# Patient Record
Sex: MALE | Race: OTHER | HISPANIC OR LATINO | Employment: OTHER | URBAN - METROPOLITAN AREA
[De-identification: names, ages, dates, MRNs, and addresses within clinical notes are randomized per-mention and may not be internally consistent; named-entity substitution may affect disease eponyms.]

---

## 2017-08-19 ENCOUNTER — APPOINTMENT (OUTPATIENT)
Dept: LAB | Facility: HOSPITAL | Age: 67
End: 2017-08-19
Attending: OTOLARYNGOLOGY
Payer: MEDICARE

## 2017-08-19 ENCOUNTER — TRANSCRIBE ORDERS (OUTPATIENT)
Dept: RADIOLOGY | Facility: HOSPITAL | Age: 67
End: 2017-08-19

## 2017-08-19 ENCOUNTER — OFFICE VISIT (OUTPATIENT)
Dept: LAB | Facility: HOSPITAL | Age: 67
End: 2017-08-19
Attending: OTOLARYNGOLOGY
Payer: MEDICARE

## 2017-08-19 DIAGNOSIS — R22.0 INTRACRANIAL SWELLING: ICD-10-CM

## 2017-08-19 DIAGNOSIS — R22.0 INTRACRANIAL SWELLING: Primary | ICD-10-CM

## 2017-08-19 LAB
ANION GAP SERPL CALCULATED.3IONS-SCNC: 10 MMOL/L (ref 4–13)
BASOPHILS # BLD AUTO: 0.04 THOUSANDS/ΜL (ref 0–0.1)
BASOPHILS NFR BLD AUTO: 1 % (ref 0–1)
BUN SERPL-MCNC: 13 MG/DL (ref 5–25)
CALCIUM SERPL-MCNC: 9.6 MG/DL (ref 8.3–10.1)
CHLORIDE SERPL-SCNC: 106 MMOL/L (ref 100–108)
CO2 SERPL-SCNC: 26 MMOL/L (ref 21–32)
CREAT SERPL-MCNC: 0.82 MG/DL (ref 0.6–1.3)
EOSINOPHIL # BLD AUTO: 0.11 THOUSAND/ΜL (ref 0–0.61)
EOSINOPHIL NFR BLD AUTO: 3 % (ref 0–6)
ERYTHROCYTE [DISTWIDTH] IN BLOOD BY AUTOMATED COUNT: 14.3 % (ref 11.6–15.1)
GFR SERPL CREATININE-BSD FRML MDRD: 91 ML/MIN/1.73SQ M
GLUCOSE SERPL-MCNC: 98 MG/DL (ref 65–140)
HCT VFR BLD AUTO: 38.2 % (ref 36.5–49.3)
HGB BLD-MCNC: 12.8 G/DL (ref 12–17)
LYMPHOCYTES # BLD AUTO: 1.01 THOUSANDS/ΜL (ref 0.6–4.47)
LYMPHOCYTES NFR BLD AUTO: 25 % (ref 14–44)
MCH RBC QN AUTO: 32 PG (ref 26.8–34.3)
MCHC RBC AUTO-ENTMCNC: 33.5 G/DL (ref 31.4–37.4)
MCV RBC AUTO: 96 FL (ref 82–98)
MONOCYTES # BLD AUTO: 0.38 THOUSAND/ΜL (ref 0.17–1.22)
MONOCYTES NFR BLD AUTO: 9 % (ref 4–12)
NEUTROPHILS # BLD AUTO: 2.57 THOUSANDS/ΜL (ref 1.85–7.62)
NEUTS SEG NFR BLD AUTO: 62 % (ref 43–75)
NRBC BLD AUTO-RTO: 0 /100 WBCS
PLATELET # BLD AUTO: 188 THOUSANDS/UL (ref 149–390)
PMV BLD AUTO: 10.6 FL (ref 8.9–12.7)
POTASSIUM SERPL-SCNC: 4.3 MMOL/L (ref 3.5–5.3)
RBC # BLD AUTO: 4 MILLION/UL (ref 3.88–5.62)
SODIUM SERPL-SCNC: 142 MMOL/L (ref 136–145)
WBC # BLD AUTO: 4.12 THOUSAND/UL (ref 4.31–10.16)

## 2017-08-19 PROCEDURE — 36415 COLL VENOUS BLD VENIPUNCTURE: CPT

## 2017-08-19 PROCEDURE — 80048 BASIC METABOLIC PNL TOTAL CA: CPT

## 2017-08-19 PROCEDURE — 93005 ELECTROCARDIOGRAM TRACING: CPT

## 2017-08-19 PROCEDURE — 85025 COMPLETE CBC W/AUTO DIFF WBC: CPT

## 2017-08-21 LAB
ATRIAL RATE: 57 BPM
P AXIS: 75 DEGREES
PR INTERVAL: 152 MS
QRS AXIS: -10 DEGREES
QRSD INTERVAL: 74 MS
QT INTERVAL: 398 MS
QTC INTERVAL: 387 MS
T WAVE AXIS: 53 DEGREES
VENTRICULAR RATE: 57 BPM

## 2017-08-22 ENCOUNTER — ANESTHESIA EVENT (OUTPATIENT)
Dept: PERIOP | Facility: HOSPITAL | Age: 67
End: 2017-08-22
Payer: MEDICARE

## 2017-08-24 ENCOUNTER — HOSPITAL ENCOUNTER (OUTPATIENT)
Facility: HOSPITAL | Age: 67
Setting detail: OUTPATIENT SURGERY
Discharge: HOME | End: 2017-08-24
Attending: OTOLARYNGOLOGY | Admitting: OTOLARYNGOLOGY
Payer: MEDICARE

## 2017-08-24 ENCOUNTER — ANESTHESIA (OUTPATIENT)
Dept: PERIOP | Facility: HOSPITAL | Age: 67
End: 2017-08-24
Payer: MEDICARE

## 2017-08-24 VITALS
RESPIRATION RATE: 16 BRPM | WEIGHT: 117 LBS | SYSTOLIC BLOOD PRESSURE: 183 MMHG | HEIGHT: 65 IN | BODY MASS INDEX: 19.49 KG/M2 | HEART RATE: 53 BPM | OXYGEN SATURATION: 96 % | TEMPERATURE: 97.5 F | DIASTOLIC BLOOD PRESSURE: 84 MMHG

## 2017-08-24 DIAGNOSIS — R22.0 LOCALIZED SWELLING, MASS, AND LUMP OF HEAD: ICD-10-CM

## 2017-08-24 PROCEDURE — 88342 IMHCHEM/IMCYTCHM 1ST ANTB: CPT | Performed by: OTOLARYNGOLOGY

## 2017-08-24 PROCEDURE — 88312 SPECIAL STAINS GROUP 1: CPT | Performed by: OTOLARYNGOLOGY

## 2017-08-24 PROCEDURE — 88305 TISSUE EXAM BY PATHOLOGIST: CPT | Performed by: OTOLARYNGOLOGY

## 2017-08-24 RX ORDER — PROPOFOL 10 MG/ML
INJECTION, EMULSION INTRAVENOUS AS NEEDED
Status: DISCONTINUED | OUTPATIENT
Start: 2017-08-24 | End: 2017-08-24 | Stop reason: SURG

## 2017-08-24 RX ORDER — FENTANYL CITRATE 50 UG/ML
INJECTION, SOLUTION INTRAMUSCULAR; INTRAVENOUS AS NEEDED
Status: DISCONTINUED | OUTPATIENT
Start: 2017-08-24 | End: 2017-08-24 | Stop reason: SURG

## 2017-08-24 RX ORDER — LIDOCAINE HYDROCHLORIDE 10 MG/ML
INJECTION, SOLUTION INFILTRATION; PERINEURAL AS NEEDED
Status: DISCONTINUED | OUTPATIENT
Start: 2017-08-24 | End: 2017-08-24 | Stop reason: SURG

## 2017-08-24 RX ORDER — FENTANYL CITRATE/PF 50 MCG/ML
50 SYRINGE (ML) INJECTION
Status: COMPLETED | OUTPATIENT
Start: 2017-08-24 | End: 2017-08-24

## 2017-08-24 RX ORDER — ACETAMINOPHEN 500 MG
500 TABLET ORAL EVERY 6 HOURS PRN
COMMUNITY
End: 2019-11-24 | Stop reason: HOSPADM

## 2017-08-24 RX ORDER — GLYCOPYRROLATE 0.2 MG/ML
INJECTION INTRAMUSCULAR; INTRAVENOUS AS NEEDED
Status: DISCONTINUED | OUTPATIENT
Start: 2017-08-24 | End: 2017-08-24 | Stop reason: SURG

## 2017-08-24 RX ORDER — IBUPROFEN 600 MG/1
TABLET ORAL EVERY 6 HOURS PRN
COMMUNITY
End: 2019-11-24 | Stop reason: HOSPADM

## 2017-08-24 RX ORDER — MIDAZOLAM HYDROCHLORIDE 1 MG/ML
INJECTION INTRAMUSCULAR; INTRAVENOUS AS NEEDED
Status: DISCONTINUED | OUTPATIENT
Start: 2017-08-24 | End: 2017-08-24 | Stop reason: SURG

## 2017-08-24 RX ORDER — SODIUM CHLORIDE 9 MG/ML
125 INJECTION, SOLUTION INTRAVENOUS CONTINUOUS
Status: DISCONTINUED | OUTPATIENT
Start: 2017-08-24 | End: 2017-08-24 | Stop reason: HOSPADM

## 2017-08-24 RX ORDER — MAGNESIUM HYDROXIDE 1200 MG/15ML
LIQUID ORAL AS NEEDED
Status: DISCONTINUED | OUTPATIENT
Start: 2017-08-24 | End: 2017-08-24 | Stop reason: HOSPADM

## 2017-08-24 RX ORDER — ROCURONIUM BROMIDE 10 MG/ML
INJECTION, SOLUTION INTRAVENOUS AS NEEDED
Status: DISCONTINUED | OUTPATIENT
Start: 2017-08-24 | End: 2017-08-24 | Stop reason: SURG

## 2017-08-24 RX ORDER — ONDANSETRON 2 MG/ML
INJECTION INTRAMUSCULAR; INTRAVENOUS AS NEEDED
Status: DISCONTINUED | OUTPATIENT
Start: 2017-08-24 | End: 2017-08-24 | Stop reason: SURG

## 2017-08-24 RX ORDER — OXYCODONE HCL 5 MG/5 ML
5 SOLUTION, ORAL ORAL EVERY 4 HOURS PRN
Status: DISCONTINUED | OUTPATIENT
Start: 2017-08-24 | End: 2017-08-24 | Stop reason: HOSPADM

## 2017-08-24 RX ORDER — ONDANSETRON 2 MG/ML
4 INJECTION INTRAMUSCULAR; INTRAVENOUS ONCE AS NEEDED
Status: DISCONTINUED | OUTPATIENT
Start: 2017-08-24 | End: 2017-08-24 | Stop reason: HOSPADM

## 2017-08-24 RX ADMIN — NEOSTIGMINE METHYLSULFATE 3 MG: 1 INJECTION, SOLUTION INTRAMUSCULAR; INTRAVENOUS; SUBCUTANEOUS at 09:29

## 2017-08-24 RX ADMIN — FENTANYL CITRATE 50 MCG: 50 INJECTION, SOLUTION INTRAMUSCULAR; INTRAVENOUS at 09:21

## 2017-08-24 RX ADMIN — LIDOCAINE HYDROCHLORIDE 60 MG: 10 INJECTION, SOLUTION INFILTRATION; PERINEURAL at 08:59

## 2017-08-24 RX ADMIN — ONDANSETRON HYDROCHLORIDE 8 MG: 2 INJECTION, SOLUTION INTRAVENOUS at 09:13

## 2017-08-24 RX ADMIN — SODIUM CHLORIDE: 0.9 INJECTION, SOLUTION INTRAVENOUS at 09:46

## 2017-08-24 RX ADMIN — SODIUM CHLORIDE 125 ML/HR: 0.9 INJECTION, SOLUTION INTRAVENOUS at 07:59

## 2017-08-24 RX ADMIN — FENTANYL CITRATE 50 MCG: 50 INJECTION INTRAMUSCULAR; INTRAVENOUS at 10:21

## 2017-08-24 RX ADMIN — FENTANYL CITRATE 100 MCG: 50 INJECTION, SOLUTION INTRAMUSCULAR; INTRAVENOUS at 08:59

## 2017-08-24 RX ADMIN — FENTANYL CITRATE 50 MCG: 50 INJECTION INTRAMUSCULAR; INTRAVENOUS at 10:13

## 2017-08-24 RX ADMIN — DEXAMETHASONE SODIUM PHOSPHATE 8 MG: 10 INJECTION INTRAMUSCULAR; INTRAVENOUS at 09:13

## 2017-08-24 RX ADMIN — GLYCOPYRROLATE 0.4 MG: 0.2 INJECTION, SOLUTION INTRAMUSCULAR; INTRAVENOUS at 09:29

## 2017-08-24 RX ADMIN — ROCURONIUM BROMIDE 30 MG: 10 INJECTION, SOLUTION INTRAVENOUS at 08:59

## 2017-08-24 RX ADMIN — PROPOFOL 200 MG: 10 INJECTION, EMULSION INTRAVENOUS at 08:59

## 2017-08-24 RX ADMIN — MIDAZOLAM HYDROCHLORIDE 2 MG: 1 INJECTION, SOLUTION INTRAMUSCULAR; INTRAVENOUS at 08:53

## 2017-08-24 RX ADMIN — FENTANYL CITRATE 50 MCG: 50 INJECTION, SOLUTION INTRAMUSCULAR; INTRAVENOUS at 09:18

## 2018-08-06 ENCOUNTER — HOSPITAL ENCOUNTER (EMERGENCY)
Facility: HOSPITAL | Age: 68
Discharge: HOME | End: 2018-08-06
Attending: EMERGENCY MEDICINE
Payer: MEDICARE

## 2018-08-06 VITALS
DIASTOLIC BLOOD PRESSURE: 73 MMHG | WEIGHT: 105 LBS | SYSTOLIC BLOOD PRESSURE: 156 MMHG | OXYGEN SATURATION: 100 % | HEIGHT: 65 IN | RESPIRATION RATE: 18 BRPM | HEART RATE: 80 BPM | TEMPERATURE: 98.2 F | BODY MASS INDEX: 17.49 KG/M2

## 2018-08-06 DIAGNOSIS — L03.90 CELLULITIS: Primary | ICD-10-CM

## 2018-08-06 PROCEDURE — 99282 EMERGENCY DEPT VISIT SF MDM: CPT

## 2018-08-06 RX ORDER — MULTIVIT WITH MINERALS/LUTEIN
TABLET ORAL
COMMUNITY
Start: 2018-06-06 | End: 2019-11-24 | Stop reason: HOSPADM

## 2018-08-06 RX ORDER — CHLORHEXIDINE GLUCONATE 0.12 MG/ML
15 RINSE ORAL
COMMUNITY
Start: 2018-06-06 | End: 2019-11-24 | Stop reason: HOSPADM

## 2018-08-06 RX ORDER — BACITRACIN, NEOMYCIN, POLYMYXIN B 400; 3.5; 5 [USP'U]/G; MG/G; [USP'U]/G
1 OINTMENT TOPICAL ONCE
Status: COMPLETED | OUTPATIENT
Start: 2018-08-06 | End: 2018-08-06

## 2018-08-06 RX ORDER — CEPHALEXIN 250 MG/1
500 CAPSULE ORAL ONCE
Status: COMPLETED | OUTPATIENT
Start: 2018-08-06 | End: 2018-08-06

## 2018-08-06 RX ORDER — BACITRACIN, NEOMYCIN, POLYMYXIN B 400; 3.5; 5 [USP'U]/G; MG/G; [USP'U]/G
OINTMENT TOPICAL 2 TIMES DAILY
Qty: 15 G | Refills: 0 | Status: SHIPPED | OUTPATIENT
Start: 2018-08-06

## 2018-08-06 RX ORDER — CEPHALEXIN 500 MG/1
500 CAPSULE ORAL EVERY 6 HOURS SCHEDULED
Qty: 30 CAPSULE | Refills: 0 | Status: SHIPPED | OUTPATIENT
Start: 2018-08-06 | End: 2018-08-14

## 2018-08-06 RX ORDER — MORPHINE SULFATE 15 MG/1
TABLET, FILM COATED, EXTENDED RELEASE ORAL
COMMUNITY
Start: 2018-05-15 | End: 2019-11-24 | Stop reason: HOSPADM

## 2018-08-06 RX ORDER — MORPHINE SULFATE 15 MG/1
TABLET ORAL
COMMUNITY
Start: 2018-05-15 | End: 2019-11-24 | Stop reason: HOSPADM

## 2018-08-06 RX ORDER — NUTRITIONAL SUPPLEMENT 0.07 G-1.5
250 LIQUID (ML) ORAL
COMMUNITY
Start: 2018-04-13 | End: 2019-11-24 | Stop reason: HOSPADM

## 2018-08-06 RX ADMIN — CEPHALEXIN 500 MG: 250 CAPSULE ORAL at 13:30

## 2018-08-06 RX ADMIN — BACITRACIN, NEOMYCIN, POLYMYXIN B 1 SMALL APPLICATION: 400; 3.5; 5 OINTMENT TOPICAL at 13:30

## 2018-08-06 NOTE — DISCHARGE INSTRUCTIONS
Celulitis   INFORMACIÓN GENERAL:   La celulitis  es lynda infección en la piel causada por bacterias  Los siguientes son los síntomas más comunes:   · Charles Anthony    · Un área rojiza, caliente e inflamada en robert piel    · Dolor al tocar el área afectada    · Vejigas o ampollas (absceso) que podrían drenar pus    · Piel elevada y desigual que se siente sandra la cáscara de lynda naranja  Busque atención inmediata al presentar los siguientes síntomas:   · Aumento del dolor, enrojecimiento, calor y tamaño del área     · Omayra hawkins que salen del área infectada    · Lynda secreción leve, de color jeffrey-marrón que supura del área de la piel infectada    · Usted siente sonidos crepitantes bajo la piel al tocarla    · Usted tiene puntos o protuberancias color steven en robert piel o nota tawny debajo robert piel    · Lynda nueva inflamación y dolor en bernadette piernas    · Dificultad para respirar o dolor en el pecho repentinamente  El tratamiento para la celulitis  puede incluir medicamentos para tratar la infección bacteriana o disminuir el dolor  La limpieza de la infección puede se necesaria  Es posible que tengan que cortar el tejido que esté dañado, muerto o infectado para ayudar a que robert herida cicatrice  Controle bernadette síntomas:   · Eleve robert herida por encima del nivel de robert corazón  tan frecuente sandra le sea posible  [place] le ayudará a disminuir la inflamación y el dolor  [place] robert herida sobre almohadas o cobijas dobladas para mantenerla elevada y cómoda  · Limpie robert herida según indicaciones  Es probable que usted tenga que lavarse la herida con agua y [place]  Revise por signos de infección  · Use medias de presión según indicaciones  Las medias de presión son ajustadas y ejercen presión en bernadette piernas  [place] mejora el flujo de tawny y disminuye la inflamación  Prevenir la celulitis:   · Lavando bernadette bhupendra a menudo  Use agua y [place]  [employer] las bhupendra después de usar el baño, cambiar pañales o estornudar   Lávese las bhupendra antes de preparar o consumir alimentos  Use lynda loción para prevenir la piel seca y agrietada  · No comparta artículos personales,  sandra toallas, ropa y navajas de afeitar  · Limpie el equipo de ejercicio  con un detergente desinfectante antes y después de usarlo  Programe lynda pamela con reynolds proveedor de [employer] se le haya indicado: Anote bernadette preguntas para que se acuerde de hacerlas roel bernadette visitas  ACUERDOS SOBRE REYNOLDS CUIDADO:   Usted tiene el derecho de participar en la planificación de reynolds cuidado  Aprenda todo lo que pueda sobre reynolds condición y sandra darle tratamiento  Discuta con bernadette médicos bernadette opciones de tratamiento para juntos decidir el cuidado que usted quiere recibir  Usted siempre tiene el derecho a rechazar reynolds tratamiento  Esta información es sólo para uso en educación  Reynolds intención no es darle un consejo médico sobre enfermedades o tratamientos  Colsulte con reynolds [place] [employer] farmacéutico antes de seguir cualquier régimen médico para saber si es seguro y efectivo para usted  © 2014 [address] is for End User's use only and may not be sold, redistributed or otherwise used for commercial purposes  All illustrations and images included in CareNotes® are the copyrighted property of A D A M , Inc  or Adriano Mcgraw

## 2018-08-06 NOTE — ED PROVIDER NOTES
History  Chief Complaint   Patient presents with    Feeding Tube Problem     daughter states the g tube appears to have come out this morning     HPI  76-year-old male presents with complaints of irritation, pain around his gastrostomy tube site  Patient has a past history of oral pharyngeal cancer status post chemo, radiation  While being treated for that he had a gastrostomy tube placed for supplemental nutrition  Patient says he no longer requires G-tube for nutrition has been taking food and drink by mouth at this point  Patient says his ENT says he does not require the G-tube anymore  He otherwise denies fevers, chills, nausea, vomiting, diarrhea  No abdominal pain only pain is localized to the skin around the G-tube site  On exam there appears to be irritation will some erythema and induration around the site  Twelve systems reviewed otherwise negative except as stated in HPI  Impression and plan 76-year-old male presents with irritation around G-tube site  No longer requires it for nutrition  I will remove the G-tube  And place patient on Keflex cover cellulitis as well as bacitracin  I will follow up his primary doctor  Strict return precautions discussed  Prior to Admission Medications   Prescriptions Last Dose Informant Patient Reported? Taking?    Nutritional Supplements (NUTREN 1 5) LIQD   Yes Yes   Si mL   acetaminophen (TYLENOL) 500 mg tablet   Yes No   Sig: Take 500 mg by mouth every 6 (six) hours as needed for mild pain   chlorhexidine (PERIDEX) 0 12 % solution   Yes Yes   Sig: Apply 15 mL topically   hydrocortisone 2 5 % cream   Yes Yes   Sig: Apply to to affected areas 2-3 times daily   ibuprofen (MOTRIN) 600 mg tablet   Yes No   Sig: Take by mouth every 6 (six) hours as needed for mild pain   morphine (MS CONTIN) 15 mg 12 hr tablet   Yes Yes   Sig: Take by mouth   morphine (MSIR) 15 mg tablet   Yes Yes   Sig: Take by mouth   vitamin E, tocopherol, 1,000 units capsule   Yes Yes   Sig: Take by mouth      Facility-Administered Medications: None       History reviewed  No pertinent past medical history  Past Surgical History:   Procedure Laterality Date    BYPASS FEMORAL-FEMORAL Right     ESOPHAGOSCOPY N/A 8/24/2017    Procedure: ESOPHAGOSCOPY FLEXIBLE;  Surgeon: Mitchell Benton MD;  [facility];  Service: ENT    DE [address] N/A 8/24/2017    Procedure: Bronchoscopy;  Surgeon: Mitchell Benton MD;  [facility];  Service: ENT    DE LARYNGOSCOPY,DIRCT,OP,BIOPSY N/A 8/24/2017    Procedure: LARYNGOSCOPY DIRECT;  Surgeon: Mitchell Benton MD;  [facility];  Service: ENT    TOE AMPUTATION Right     2 toes       History reviewed  No pertinent family history  I have reviewed and agree with the history as documented  Social History   Substance Use Topics    Smoking status: Current Every Day Smoker     Packs/day: 1 00     Types: Cigarettes    Smokeless tobacco: Not on file      Comment: 50+ years    Alcohol use No        Review of Systems   Constitutional: Negative for activity change, appetite change, chills and fever  HENT: Negative for trouble swallowing and voice change  Eyes: Negative for photophobia  Respiratory: Negative for shortness of breath  Cardiovascular: Negative for chest pain, palpitations and leg swelling  Gastrointestinal: Negative for abdominal distention, abdominal pain, anal bleeding, blood in stool, constipation, diarrhea, nausea, rectal pain and vomiting  Endocrine: Negative for polyuria  Genitourinary: Negative for dysuria  Musculoskeletal: Negative for back pain  Skin: Positive for wound  Negative for rash  Allergic/Immunologic: Negative  Neurological: Negative for dizziness, tremors, seizures, syncope, facial asymmetry, speech difficulty, weakness, light-headedness, numbness and headaches  Psychiatric/Behavioral: Negative for agitation         Physical Exam  Physical Exam   Constitutional: He is oriented to person, place, and time  He appears well-developed and well-nourished  No distress  HENT:   Head: Normocephalic and atraumatic  Right Ear: No hemotympanum  Left Ear: No hemotympanum  Nose: No nasal septal hematoma  Mouth/Throat: Uvula is midline and oropharynx is clear and moist  No oropharyngeal exudate  Eyes: EOM are normal  Pupils are equal, round, and reactive to light  Right eye exhibits no discharge  Left eye exhibits no discharge  No scleral icterus  Neck: Normal range of motion  Neck supple  No JVD present  No tracheal deviation present  No thyromegaly present  Cardiovascular: Normal rate, regular rhythm, normal heart sounds and intact distal pulses  Exam reveals no gallop and no friction rub  No murmur heard  Pulmonary/Chest: Effort normal and breath sounds normal  No stridor  No respiratory distress  He has no wheezes  He has no rales  He exhibits no tenderness  Abdominal: Soft  Bowel sounds are normal  He exhibits no distension and no mass  There is no tenderness  There is no rebound and no guarding  No hernia  Musculoskeletal: Normal range of motion  He exhibits no edema  Lymphadenopathy:     He has no cervical adenopathy  Neurological: He is alert and oriented to person, place, and time  He has normal strength and normal reflexes  He is not disoriented  No cranial nerve deficit or sensory deficit  GCS eye subscore is 4  GCS verbal subscore is 5  GCS motor subscore is 6  Reflex Scores:       Patellar reflexes are 2+ on the right side and 2+ on the left side  Achilles reflexes are 2+ on the right side and 2+ on the left side  Cn 2-12 grossly intact  No pronator drift  Normal gait  Normal strength/sensation   Skin: Skin is warm and dry  Capillary refill takes less than 2 seconds  He is not diaphoretic  No erythema  No pallor  Erythema around G tube site  Mildly irritated  No flutucance/purulence   Mildly indurated   Psychiatric: He has a normal mood and affect  Vital Signs  ED Triage Vitals [08/06/18 1203]   Temperature Pulse Respirations Blood Pressure SpO2   98 2 °F (36 8 °C) 80 18 156/73 100 %      Temp src Heart Rate Source Patient Position - Orthostatic VS BP Location FiO2 (%)   -- -- -- -- --      Pain Score       8           Vitals:    08/06/18 1203   BP: 156/73   Pulse: 80       Visual Acuity      ED Medications  Medications   cephalexin (KEFLEX) capsule 500 mg (500 mg Oral Given 8/6/18 1330)   neomycin-bacitracin-polymyxin b (NEOSPORIN) ointment 1 small application (1 small application Topical Given 8/6/18 1330)       Diagnostic Studies  Results Reviewed     None                 No orders to display              Procedures  Procedures       Phone Contacts  ED Phone Contact    ED Course  ED Course as of Aug 09 0744   [facility] Aug 06, 2018   1325 Removed feeding tube  MDM  CritCare Time    Disposition  Final diagnoses:   Cellulitis     Time reflects when diagnosis was documented in both MDM as applicable and the Disposition within this note     Time User Action Codes Description Comment    8/6/2018  1:42 PM Glee Members T Add [L03 [id number]] Cellulitis       ED Disposition     ED Disposition Condition Comment    Discharge  [address] discharge to home/self care      Condition at discharge: Good        Follow-up Information     Follow up With Specialties Details Why Contact Info Additional Information    La Lion MD Internal Medicine In 2 days  [address] [facility] [phone]       [facility] Emergency Department Emergency Medicine  If symptoms worsen [address] ED, [address] [place] [address], [id number]          Discharge Medication List as of 8/6/2018  1:43 PM      START taking these medications    Details   cephalexin (KEFLEX) 500 mg capsule Take 1 capsule (500 mg total) by mouth every 6 (six) hours for 30 doses, Starting Mon 8/6/2018, Until Tue 8/14/2018, Print      neomycin-bacitracin-polymyxin b (NEOSPORIN) ointment Apply topically 2 (two) times a day, Starting Mon 8/6/2018, Print         CONTINUE these medications which have NOT CHANGED    Details   chlorhexidine (PERIDEX) 0 12 % solution Apply 15 mL topically, Starting Wed 6/6/2018, Historical Med      hydrocortisone 2 5 % cream Apply to to affected areas 2-3 times daily, Historical Med      morphine (MS CONTIN) 15 mg 12 hr tablet Take by mouth, Starting Tue 5/15/2018, Historical Med      morphine (MSIR) 15 mg tablet Take by mouth, Starting Tue 5/15/2018, Historical Med      Nutritional Supplements (NUTREN 1 5) LIQD 250 mL, Starting Fri 4/13/2018, Historical Med      vitamin E, tocopherol, 1,000 units capsule Take by mouth, Starting Wed 6/6/2018, Historical Med      acetaminophen (TYLENOL) 500 mg tablet Take 500 mg by mouth every 6 (six) hours as needed for mild pain, Historical Med      ibuprofen (MOTRIN) 600 mg tablet Take by mouth every 6 (six) hours as needed for mild pain, Historical Med           No discharge procedures on file      ED Provider  Electronically Signed by           Houston Ortiz MD  08/09/18 4495

## 2019-04-30 ENCOUNTER — APPOINTMENT (EMERGENCY)
Dept: RADIOLOGY | Facility: HOSPITAL | Age: 69
End: 2019-04-30
Payer: MEDICARE

## 2019-04-30 ENCOUNTER — APPOINTMENT (EMERGENCY)
Dept: ULTRASOUND IMAGING | Facility: HOSPITAL | Age: 69
End: 2019-04-30
Payer: MEDICARE

## 2019-04-30 ENCOUNTER — HOSPITAL ENCOUNTER (EMERGENCY)
Facility: HOSPITAL | Age: 69
Discharge: HOME | End: 2019-04-30
Attending: EMERGENCY MEDICINE | Admitting: EMERGENCY MEDICINE
Payer: MEDICARE

## 2019-04-30 VITALS
HEIGHT: 65 IN | BODY MASS INDEX: 19.65 KG/M2 | SYSTOLIC BLOOD PRESSURE: 199 MMHG | RESPIRATION RATE: 16 BRPM | TEMPERATURE: 97.9 F | OXYGEN SATURATION: 99 % | WEIGHT: 117.95 LBS | HEART RATE: 71 BPM | DIASTOLIC BLOOD PRESSURE: 84 MMHG

## 2019-04-30 DIAGNOSIS — R60.0 PEDAL EDEMA: Primary | ICD-10-CM

## 2019-04-30 LAB
ANION GAP SERPL CALCULATED.3IONS-SCNC: 7 MMOL/L (ref 4–13)
ATRIAL RATE: 62 BPM
BASOPHILS # BLD AUTO: 0.03 THOUSANDS/ΜL (ref 0–0.1)
BASOPHILS NFR BLD AUTO: 1 % (ref 0–1)
BUN SERPL-MCNC: 22 MG/DL (ref 5–25)
CALCIUM SERPL-MCNC: 9.4 MG/DL (ref 8.3–10.1)
CHLORIDE SERPL-SCNC: 106 MMOL/L (ref 100–108)
CO2 SERPL-SCNC: 28 MMOL/L (ref 21–32)
CREAT SERPL-MCNC: 0.97 MG/DL (ref 0.6–1.3)
EOSINOPHIL # BLD AUTO: 0.28 THOUSAND/ΜL (ref 0–0.61)
EOSINOPHIL NFR BLD AUTO: 6 % (ref 0–6)
ERYTHROCYTE [DISTWIDTH] IN BLOOD BY AUTOMATED COUNT: 14.6 % (ref 11.6–15.1)
GFR SERPL CREATININE-BSD FRML MDRD: 79 ML/MIN/1.73SQ M
GLUCOSE SERPL-MCNC: 104 MG/DL (ref 65–140)
HCT VFR BLD AUTO: 38 % (ref 36.5–49.3)
HGB BLD-MCNC: 12.5 G/DL (ref 12–17)
IMM GRANULOCYTES # BLD AUTO: 0.03 THOUSAND/UL (ref 0–0.2)
IMM GRANULOCYTES NFR BLD AUTO: 1 % (ref 0–2)
LYMPHOCYTES # BLD AUTO: 0.69 THOUSANDS/ΜL (ref 0.6–4.47)
LYMPHOCYTES NFR BLD AUTO: 16 % (ref 14–44)
MCH RBC QN AUTO: 31.7 PG (ref 26.8–34.3)
MCHC RBC AUTO-ENTMCNC: 32.9 G/DL (ref 31.4–37.4)
MCV RBC AUTO: 96 FL (ref 82–98)
MONOCYTES # BLD AUTO: 0.44 THOUSAND/ΜL (ref 0.17–1.22)
MONOCYTES NFR BLD AUTO: 10 % (ref 4–12)
NEUTROPHILS # BLD AUTO: 2.93 THOUSANDS/ΜL (ref 1.85–7.62)
NEUTS SEG NFR BLD AUTO: 66 % (ref 43–75)
NRBC BLD AUTO-RTO: 0 /100 WBCS
P AXIS: 68 DEGREES
PLATELET # BLD AUTO: 218 THOUSANDS/UL (ref 149–390)
PMV BLD AUTO: 10.2 FL (ref 8.9–12.7)
POTASSIUM SERPL-SCNC: 4.2 MMOL/L (ref 3.5–5.3)
PR INTERVAL: 180 MS
QRS AXIS: -7 DEGREES
QRSD INTERVAL: 96 MS
QT INTERVAL: 390 MS
QTC INTERVAL: 395 MS
RBC # BLD AUTO: 3.94 MILLION/UL (ref 3.88–5.62)
SODIUM SERPL-SCNC: 141 MMOL/L (ref 136–145)
T WAVE AXIS: 53 DEGREES
VENTRICULAR RATE: 62 BPM
WBC # BLD AUTO: 4.4 THOUSAND/UL (ref 4.31–10.16)

## 2019-04-30 PROCEDURE — 93971 EXTREMITY STUDY: CPT

## 2019-04-30 PROCEDURE — 93971 EXTREMITY STUDY: CPT | Performed by: SURGERY

## 2019-04-30 PROCEDURE — 93005 ELECTROCARDIOGRAM TRACING: CPT

## 2019-04-30 PROCEDURE — 36415 COLL VENOUS BLD VENIPUNCTURE: CPT | Performed by: PHYSICIAN ASSISTANT

## 2019-04-30 PROCEDURE — 93010 ELECTROCARDIOGRAM REPORT: CPT | Performed by: INTERNAL MEDICINE

## 2019-04-30 PROCEDURE — 85025 COMPLETE CBC W/AUTO DIFF WBC: CPT | Performed by: PHYSICIAN ASSISTANT

## 2019-04-30 PROCEDURE — 73630 X-RAY EXAM OF FOOT: CPT

## 2019-04-30 PROCEDURE — 99284 EMERGENCY DEPT VISIT MOD MDM: CPT

## 2019-04-30 PROCEDURE — 80048 BASIC METABOLIC PNL TOTAL CA: CPT | Performed by: PHYSICIAN ASSISTANT

## 2019-04-30 PROCEDURE — 99284 EMERGENCY DEPT VISIT MOD MDM: CPT | Performed by: PHYSICIAN ASSISTANT

## 2019-11-01 ENCOUNTER — APPOINTMENT (EMERGENCY)
Dept: RADIOLOGY | Facility: HOSPITAL | Age: 69
End: 2019-11-01
Payer: MEDICARE

## 2019-11-01 ENCOUNTER — APPOINTMENT (EMERGENCY)
Dept: ULTRASOUND IMAGING | Facility: HOSPITAL | Age: 69
End: 2019-11-01
Payer: MEDICARE

## 2019-11-01 ENCOUNTER — HOSPITAL ENCOUNTER (EMERGENCY)
Facility: HOSPITAL | Age: 69
End: 2019-11-02
Attending: EMERGENCY MEDICINE | Admitting: EMERGENCY MEDICINE
Payer: MEDICARE

## 2019-11-01 ENCOUNTER — APPOINTMENT (EMERGENCY)
Dept: CT IMAGING | Facility: HOSPITAL | Age: 69
End: 2019-11-01
Payer: MEDICARE

## 2019-11-01 DIAGNOSIS — I70.202 FEMORAL ARTERY OCCLUSION, LEFT (HCC): Primary | ICD-10-CM

## 2019-11-01 LAB
ANION GAP SERPL CALCULATED.3IONS-SCNC: 7 MMOL/L (ref 4–13)
APTT PPP: 33 SECONDS (ref 23–37)
BASOPHILS # BLD AUTO: 0.05 THOUSANDS/ΜL (ref 0–0.1)
BASOPHILS NFR BLD AUTO: 1 % (ref 0–1)
BUN SERPL-MCNC: 19 MG/DL (ref 5–25)
CALCIUM SERPL-MCNC: 9.2 MG/DL (ref 8.3–10.1)
CHLORIDE SERPL-SCNC: 109 MMOL/L (ref 100–108)
CO2 SERPL-SCNC: 30 MMOL/L (ref 21–32)
CREAT SERPL-MCNC: 0.86 MG/DL (ref 0.6–1.3)
EOSINOPHIL # BLD AUTO: 0.16 THOUSAND/ΜL (ref 0–0.61)
EOSINOPHIL NFR BLD AUTO: 4 % (ref 0–6)
ERYTHROCYTE [DISTWIDTH] IN BLOOD BY AUTOMATED COUNT: 15.1 % (ref 11.6–15.1)
ERYTHROCYTE [DISTWIDTH] IN BLOOD BY AUTOMATED COUNT: 15.2 % (ref 11.6–15.1)
GFR SERPL CREATININE-BSD FRML MDRD: 88 ML/MIN/1.73SQ M
GLUCOSE SERPL-MCNC: 85 MG/DL (ref 65–140)
HCT VFR BLD AUTO: 37.2 % (ref 36.5–49.3)
HCT VFR BLD AUTO: 40.6 % (ref 36.5–49.3)
HGB BLD-MCNC: 12.2 G/DL (ref 12–17)
HGB BLD-MCNC: 13.3 G/DL (ref 12–17)
IMM GRANULOCYTES # BLD AUTO: 0.01 THOUSAND/UL (ref 0–0.2)
IMM GRANULOCYTES NFR BLD AUTO: 0 % (ref 0–2)
INR PPP: 1.15 (ref 0.84–1.19)
LYMPHOCYTES # BLD AUTO: 0.72 THOUSANDS/ΜL (ref 0.6–4.47)
LYMPHOCYTES NFR BLD AUTO: 16 % (ref 14–44)
MCH RBC QN AUTO: 31.6 PG (ref 26.8–34.3)
MCH RBC QN AUTO: 31.9 PG (ref 26.8–34.3)
MCHC RBC AUTO-ENTMCNC: 32.8 G/DL (ref 31.4–37.4)
MCHC RBC AUTO-ENTMCNC: 32.8 G/DL (ref 31.4–37.4)
MCV RBC AUTO: 96 FL (ref 82–98)
MCV RBC AUTO: 97 FL (ref 82–98)
MONOCYTES # BLD AUTO: 0.42 THOUSAND/ΜL (ref 0.17–1.22)
MONOCYTES NFR BLD AUTO: 9 % (ref 4–12)
NEUTROPHILS # BLD AUTO: 3.13 THOUSANDS/ΜL (ref 1.85–7.62)
NEUTS SEG NFR BLD AUTO: 70 % (ref 43–75)
NRBC BLD AUTO-RTO: 0 /100 WBCS
PLATELET # BLD AUTO: 180 THOUSANDS/UL (ref 149–390)
PLATELET # BLD AUTO: 197 THOUSANDS/UL (ref 149–390)
PMV BLD AUTO: 10.2 FL (ref 8.9–12.7)
PMV BLD AUTO: 10.9 FL (ref 8.9–12.7)
POTASSIUM SERPL-SCNC: 4.2 MMOL/L (ref 3.5–5.3)
PROTHROMBIN TIME: 14.8 SECONDS (ref 11.6–14.5)
RBC # BLD AUTO: 3.86 MILLION/UL (ref 3.88–5.62)
RBC # BLD AUTO: 4.17 MILLION/UL (ref 3.88–5.62)
SODIUM SERPL-SCNC: 146 MMOL/L (ref 136–145)
WBC # BLD AUTO: 4.49 THOUSAND/UL (ref 4.31–10.16)
WBC # BLD AUTO: 5.27 THOUSAND/UL (ref 4.31–10.16)

## 2019-11-01 PROCEDURE — 85730 THROMBOPLASTIN TIME PARTIAL: CPT | Performed by: EMERGENCY MEDICINE

## 2019-11-01 PROCEDURE — 85025 COMPLETE CBC W/AUTO DIFF WBC: CPT | Performed by: EMERGENCY MEDICINE

## 2019-11-01 PROCEDURE — 99285 EMERGENCY DEPT VISIT HI MDM: CPT | Performed by: EMERGENCY MEDICINE

## 2019-11-01 PROCEDURE — 85610 PROTHROMBIN TIME: CPT | Performed by: EMERGENCY MEDICINE

## 2019-11-01 PROCEDURE — 99285 EMERGENCY DEPT VISIT HI MDM: CPT

## 2019-11-01 PROCEDURE — 96375 TX/PRO/DX INJ NEW DRUG ADDON: CPT

## 2019-11-01 PROCEDURE — 80048 BASIC METABOLIC PNL TOTAL CA: CPT | Performed by: EMERGENCY MEDICINE

## 2019-11-01 PROCEDURE — 1124F ACP DISCUSS-NO DSCNMKR DOCD: CPT | Performed by: INTERNAL MEDICINE

## 2019-11-01 PROCEDURE — 96366 THER/PROPH/DIAG IV INF ADDON: CPT

## 2019-11-01 PROCEDURE — 75635 CT ANGIO ABDOMINAL ARTERIES: CPT

## 2019-11-01 PROCEDURE — 93971 EXTREMITY STUDY: CPT

## 2019-11-01 PROCEDURE — 36415 COLL VENOUS BLD VENIPUNCTURE: CPT | Performed by: EMERGENCY MEDICINE

## 2019-11-01 PROCEDURE — 85027 COMPLETE CBC AUTOMATED: CPT | Performed by: EMERGENCY MEDICINE

## 2019-11-01 PROCEDURE — 93971 EXTREMITY STUDY: CPT | Performed by: SURGERY

## 2019-11-01 PROCEDURE — 96365 THER/PROPH/DIAG IV INF INIT: CPT

## 2019-11-01 RX ORDER — HEPARIN SODIUM 1000 [USP'U]/ML
4000 INJECTION, SOLUTION INTRAVENOUS; SUBCUTANEOUS ONCE
Status: COMPLETED | OUTPATIENT
Start: 2019-11-01 | End: 2019-11-01

## 2019-11-01 RX ORDER — HEPARIN SODIUM 1000 [USP'U]/ML
2000 INJECTION, SOLUTION INTRAVENOUS; SUBCUTANEOUS AS NEEDED
Status: DISCONTINUED | OUTPATIENT
Start: 2019-11-01 | End: 2019-11-02 | Stop reason: HOSPADM

## 2019-11-01 RX ORDER — HEPARIN SODIUM 10000 [USP'U]/100ML
3-30 INJECTION, SOLUTION INTRAVENOUS
Status: DISCONTINUED | OUTPATIENT
Start: 2019-11-01 | End: 2019-11-02 | Stop reason: HOSPADM

## 2019-11-01 RX ORDER — HEPARIN SODIUM 1000 [USP'U]/ML
4000 INJECTION, SOLUTION INTRAVENOUS; SUBCUTANEOUS AS NEEDED
Status: DISCONTINUED | OUTPATIENT
Start: 2019-11-01 | End: 2019-11-02 | Stop reason: HOSPADM

## 2019-11-01 RX ADMIN — IOHEXOL 120 ML: 350 INJECTION, SOLUTION INTRAVENOUS at 21:55

## 2019-11-01 RX ADMIN — HEPARIN SODIUM 4000 UNITS: 1000 INJECTION, SOLUTION INTRAVENOUS; SUBCUTANEOUS at 21:32

## 2019-11-01 RX ADMIN — HEPARIN SODIUM AND DEXTROSE 18 UNITS/KG/HR: 10000; 5 INJECTION INTRAVENOUS at 21:33

## 2019-11-02 ENCOUNTER — HOSPITAL ENCOUNTER (INPATIENT)
Facility: HOSPITAL | Age: 69
LOS: 22 days | Discharge: NURSING, REHAB OR LONG-TERM CARE | DRG: 252 | End: 2019-11-24
Attending: INTERNAL MEDICINE | Admitting: ANESTHESIOLOGY
Payer: MEDICARE

## 2019-11-02 VITALS
OXYGEN SATURATION: 99 % | DIASTOLIC BLOOD PRESSURE: 82 MMHG | RESPIRATION RATE: 18 BRPM | SYSTOLIC BLOOD PRESSURE: 182 MMHG | TEMPERATURE: 97.5 F | HEART RATE: 57 BPM | WEIGHT: 110.89 LBS | BODY MASS INDEX: 18.45 KG/M2

## 2019-11-02 DIAGNOSIS — Z09 FOLLOW UP: ICD-10-CM

## 2019-11-02 DIAGNOSIS — I73.9 PERIPHERAL VASCULAR DISEASE (HCC): ICD-10-CM

## 2019-11-02 DIAGNOSIS — T14.8XXA HEMATOMA: ICD-10-CM

## 2019-11-02 DIAGNOSIS — I44.2 COMPLETE HEART BLOCK (HCC): ICD-10-CM

## 2019-11-02 DIAGNOSIS — R74.01 TRANSAMINITIS: ICD-10-CM

## 2019-11-02 DIAGNOSIS — R41.0 CONFUSION: ICD-10-CM

## 2019-11-02 DIAGNOSIS — D64.9 ANEMIA: ICD-10-CM

## 2019-11-02 DIAGNOSIS — D62 ACUTE BLOOD LOSS ANEMIA: ICD-10-CM

## 2019-11-02 DIAGNOSIS — I73.9 PAD (PERIPHERAL ARTERY DISEASE) (HCC): Primary | ICD-10-CM

## 2019-11-02 PROBLEM — Z85.819 HISTORY OF THROAT CANCER: Status: ACTIVE | Noted: 2019-11-02

## 2019-11-02 PROBLEM — R03.0 ELEVATED BLOOD-PRESSURE READING WITHOUT DIAGNOSIS OF HYPERTENSION: Status: ACTIVE | Noted: 2019-11-02

## 2019-11-02 PROBLEM — F17.210 DEPENDENCE ON NICOTINE FROM CIGARETTES: Status: ACTIVE | Noted: 2019-11-02

## 2019-11-02 LAB
ANION GAP SERPL CALCULATED.3IONS-SCNC: 6 MMOL/L (ref 4–13)
ANION GAP SERPL CALCULATED.3IONS-SCNC: 7 MMOL/L (ref 4–13)
APTT PPP: 128 SECONDS (ref 23–37)
APTT PPP: 58 SECONDS (ref 23–37)
APTT PPP: 59 SECONDS (ref 23–37)
APTT PPP: >210 SECONDS (ref 23–37)
ATRIAL RATE: 67 BPM
BASOPHILS # BLD AUTO: 0.07 THOUSANDS/ΜL (ref 0–0.1)
BASOPHILS NFR BLD AUTO: 1 % (ref 0–1)
BUN SERPL-MCNC: 16 MG/DL (ref 5–25)
BUN SERPL-MCNC: 20 MG/DL (ref 5–25)
CALCIUM SERPL-MCNC: 9.2 MG/DL (ref 8.3–10.1)
CALCIUM SERPL-MCNC: 9.3 MG/DL (ref 8.3–10.1)
CHLORIDE SERPL-SCNC: 108 MMOL/L (ref 100–108)
CHLORIDE SERPL-SCNC: 109 MMOL/L (ref 100–108)
CO2 SERPL-SCNC: 26 MMOL/L (ref 21–32)
CO2 SERPL-SCNC: 27 MMOL/L (ref 21–32)
CREAT SERPL-MCNC: 0.8 MG/DL (ref 0.6–1.3)
CREAT SERPL-MCNC: 1.15 MG/DL (ref 0.6–1.3)
EOSINOPHIL # BLD AUTO: 0.27 THOUSAND/ΜL (ref 0–0.61)
EOSINOPHIL NFR BLD AUTO: 4 % (ref 0–6)
ERYTHROCYTE [DISTWIDTH] IN BLOOD BY AUTOMATED COUNT: 15.2 % (ref 11.6–15.1)
ERYTHROCYTE [DISTWIDTH] IN BLOOD BY AUTOMATED COUNT: 15.2 % (ref 11.6–15.1)
GFR SERPL CREATININE-BSD FRML MDRD: 64 ML/MIN/1.73SQ M
GFR SERPL CREATININE-BSD FRML MDRD: 91 ML/MIN/1.73SQ M
GLUCOSE SERPL-MCNC: 101 MG/DL (ref 65–140)
GLUCOSE SERPL-MCNC: 117 MG/DL (ref 65–140)
GLUCOSE SERPL-MCNC: 127 MG/DL (ref 65–140)
HCT VFR BLD AUTO: 36 % (ref 36.5–49.3)
HCT VFR BLD AUTO: 37.5 % (ref 36.5–49.3)
HGB BLD-MCNC: 11.8 G/DL (ref 12–17)
HGB BLD-MCNC: 12.4 G/DL (ref 12–17)
IMM GRANULOCYTES # BLD AUTO: 0.03 THOUSAND/UL (ref 0–0.2)
IMM GRANULOCYTES NFR BLD AUTO: 0 % (ref 0–2)
INR PPP: 1.24 (ref 0.84–1.19)
LYMPHOCYTES # BLD AUTO: 1.17 THOUSANDS/ΜL (ref 0.6–4.47)
LYMPHOCYTES NFR BLD AUTO: 16 % (ref 14–44)
MAGNESIUM SERPL-MCNC: 1.9 MG/DL (ref 1.6–2.6)
MCH RBC QN AUTO: 31.6 PG (ref 26.8–34.3)
MCH RBC QN AUTO: 32 PG (ref 26.8–34.3)
MCHC RBC AUTO-ENTMCNC: 32.8 G/DL (ref 31.4–37.4)
MCHC RBC AUTO-ENTMCNC: 33.1 G/DL (ref 31.4–37.4)
MCV RBC AUTO: 96 FL (ref 82–98)
MCV RBC AUTO: 97 FL (ref 82–98)
MONOCYTES # BLD AUTO: 0.4 THOUSAND/ΜL (ref 0.17–1.22)
MONOCYTES NFR BLD AUTO: 6 % (ref 4–12)
NEUTROPHILS # BLD AUTO: 5.32 THOUSANDS/ΜL (ref 1.85–7.62)
NEUTS SEG NFR BLD AUTO: 73 % (ref 43–75)
NRBC BLD AUTO-RTO: 0 /100 WBCS
P AXIS: 62 DEGREES
PHOSPHATE SERPL-MCNC: 3.3 MG/DL (ref 2.3–4.1)
PLATELET # BLD AUTO: 170 THOUSANDS/UL (ref 149–390)
PLATELET # BLD AUTO: 205 THOUSANDS/UL (ref 149–390)
PMV BLD AUTO: 10.3 FL (ref 8.9–12.7)
PMV BLD AUTO: 10.9 FL (ref 8.9–12.7)
POTASSIUM SERPL-SCNC: 3.6 MMOL/L (ref 3.5–5.3)
POTASSIUM SERPL-SCNC: 3.7 MMOL/L (ref 3.5–5.3)
PR INTERVAL: 180 MS
PROTHROMBIN TIME: 15.2 SECONDS (ref 11.6–14.5)
QRS AXIS: -35 DEGREES
QRSD INTERVAL: 102 MS
QT INTERVAL: 398 MS
QTC INTERVAL: 420 MS
RBC # BLD AUTO: 3.74 MILLION/UL (ref 3.88–5.62)
RBC # BLD AUTO: 3.88 MILLION/UL (ref 3.88–5.62)
SODIUM SERPL-SCNC: 141 MMOL/L (ref 136–145)
SODIUM SERPL-SCNC: 142 MMOL/L (ref 136–145)
T WAVE AXIS: 51 DEGREES
TROPONIN I SERPL-MCNC: <0.02 NG/ML
VENTRICULAR RATE: 67 BPM
WBC # BLD AUTO: 4.69 THOUSAND/UL (ref 4.31–10.16)
WBC # BLD AUTO: 7.26 THOUSAND/UL (ref 4.31–10.16)

## 2019-11-02 PROCEDURE — NC001 PR NO CHARGE: Performed by: PHYSICIAN ASSISTANT

## 2019-11-02 PROCEDURE — 85730 THROMBOPLASTIN TIME PARTIAL: CPT | Performed by: ANESTHESIOLOGY

## 2019-11-02 PROCEDURE — 80048 BASIC METABOLIC PNL TOTAL CA: CPT | Performed by: STUDENT IN AN ORGANIZED HEALTH CARE EDUCATION/TRAINING PROGRAM

## 2019-11-02 PROCEDURE — 82948 REAGENT STRIP/BLOOD GLUCOSE: CPT

## 2019-11-02 PROCEDURE — 85610 PROTHROMBIN TIME: CPT | Performed by: PHYSICIAN ASSISTANT

## 2019-11-02 PROCEDURE — 93005 ELECTROCARDIOGRAM TRACING: CPT

## 2019-11-02 PROCEDURE — 84100 ASSAY OF PHOSPHORUS: CPT | Performed by: STUDENT IN AN ORGANIZED HEALTH CARE EDUCATION/TRAINING PROGRAM

## 2019-11-02 PROCEDURE — 93010 ELECTROCARDIOGRAM REPORT: CPT | Performed by: INTERNAL MEDICINE

## 2019-11-02 PROCEDURE — 96375 TX/PRO/DX INJ NEW DRUG ADDON: CPT

## 2019-11-02 PROCEDURE — 96372 THER/PROPH/DIAG INJ SC/IM: CPT

## 2019-11-02 PROCEDURE — 80048 BASIC METABOLIC PNL TOTAL CA: CPT | Performed by: PHYSICIAN ASSISTANT

## 2019-11-02 PROCEDURE — 85025 COMPLETE CBC W/AUTO DIFF WBC: CPT | Performed by: STUDENT IN AN ORGANIZED HEALTH CARE EDUCATION/TRAINING PROGRAM

## 2019-11-02 PROCEDURE — 85730 THROMBOPLASTIN TIME PARTIAL: CPT | Performed by: HOSPITALIST

## 2019-11-02 PROCEDURE — 90662 IIV NO PRSV INCREASED AG IM: CPT | Performed by: INTERNAL MEDICINE

## 2019-11-02 PROCEDURE — G0008 ADMIN INFLUENZA VIRUS VAC: HCPCS | Performed by: INTERNAL MEDICINE

## 2019-11-02 PROCEDURE — 99223 1ST HOSP IP/OBS HIGH 75: CPT | Performed by: SURGERY

## 2019-11-02 PROCEDURE — 85730 THROMBOPLASTIN TIME PARTIAL: CPT | Performed by: INTERNAL MEDICINE

## 2019-11-02 PROCEDURE — 83735 ASSAY OF MAGNESIUM: CPT | Performed by: STUDENT IN AN ORGANIZED HEALTH CARE EDUCATION/TRAINING PROGRAM

## 2019-11-02 PROCEDURE — 84484 ASSAY OF TROPONIN QUANT: CPT | Performed by: STUDENT IN AN ORGANIZED HEALTH CARE EDUCATION/TRAINING PROGRAM

## 2019-11-02 PROCEDURE — 85027 COMPLETE CBC AUTOMATED: CPT | Performed by: PHYSICIAN ASSISTANT

## 2019-11-02 PROCEDURE — 99291 CRITICAL CARE FIRST HOUR: CPT | Performed by: PHYSICIAN ASSISTANT

## 2019-11-02 PROCEDURE — 99223 1ST HOSP IP/OBS HIGH 75: CPT | Performed by: INTERNAL MEDICINE

## 2019-11-02 PROCEDURE — 96366 THER/PROPH/DIAG IV INF ADDON: CPT

## 2019-11-02 PROCEDURE — RECHECK: Performed by: INTERNAL MEDICINE

## 2019-11-02 RX ORDER — ACETAMINOPHEN 325 MG/1
650 TABLET ORAL EVERY 6 HOURS PRN
Status: DISCONTINUED | OUTPATIENT
Start: 2019-11-02 | End: 2019-11-24 | Stop reason: HOSPADM

## 2019-11-02 RX ORDER — NICOTINE 21 MG/24HR
1 PATCH, TRANSDERMAL 24 HOURS TRANSDERMAL DAILY
Status: DISCONTINUED | OUTPATIENT
Start: 2019-11-02 | End: 2019-11-24 | Stop reason: HOSPADM

## 2019-11-02 RX ORDER — ASPIRIN 81 MG/1
81 TABLET ORAL DAILY
Status: DISCONTINUED | OUTPATIENT
Start: 2019-11-02 | End: 2019-11-24 | Stop reason: HOSPADM

## 2019-11-02 RX ORDER — SODIUM CHLORIDE, SODIUM GLUCONATE, SODIUM ACETATE, POTASSIUM CHLORIDE, MAGNESIUM CHLORIDE, SODIUM PHOSPHATE, DIBASIC, AND POTASSIUM PHOSPHATE .53; .5; .37; .037; .03; .012; .00082 G/100ML; G/100ML; G/100ML; G/100ML; G/100ML; G/100ML; G/100ML
500 INJECTION, SOLUTION INTRAVENOUS ONCE
Status: COMPLETED | OUTPATIENT
Start: 2019-11-02 | End: 2019-11-02

## 2019-11-02 RX ORDER — OXYCODONE HYDROCHLORIDE 5 MG/1
2.5 TABLET ORAL EVERY 4 HOURS PRN
Status: DISCONTINUED | OUTPATIENT
Start: 2019-11-02 | End: 2019-11-04

## 2019-11-02 RX ORDER — LISINOPRIL 10 MG/1
10 TABLET ORAL DAILY
Status: DISCONTINUED | OUTPATIENT
Start: 2019-11-02 | End: 2019-11-02

## 2019-11-02 RX ORDER — ATORVASTATIN CALCIUM 40 MG/1
40 TABLET, FILM COATED ORAL
Status: DISCONTINUED | OUTPATIENT
Start: 2019-11-02 | End: 2019-11-13

## 2019-11-02 RX ORDER — MORPHINE SULFATE 4 MG/ML
4 INJECTION, SOLUTION INTRAMUSCULAR; INTRAVENOUS ONCE
Status: COMPLETED | OUTPATIENT
Start: 2019-11-02 | End: 2019-11-02

## 2019-11-02 RX ORDER — HEPARIN SODIUM 1000 [USP'U]/ML
4000 INJECTION, SOLUTION INTRAVENOUS; SUBCUTANEOUS AS NEEDED
Status: DISCONTINUED | OUTPATIENT
Start: 2019-11-02 | End: 2019-11-07

## 2019-11-02 RX ORDER — SODIUM CHLORIDE, SODIUM GLUCONATE, SODIUM ACETATE, POTASSIUM CHLORIDE, MAGNESIUM CHLORIDE, SODIUM PHOSPHATE, DIBASIC, AND POTASSIUM PHOSPHATE .53; .5; .37; .037; .03; .012; .00082 G/100ML; G/100ML; G/100ML; G/100ML; G/100ML; G/100ML; G/100ML
100 INJECTION, SOLUTION INTRAVENOUS CONTINUOUS
Status: DISCONTINUED | OUTPATIENT
Start: 2019-11-02 | End: 2019-11-06

## 2019-11-02 RX ORDER — FENTANYL CITRATE 50 UG/ML
50 INJECTION, SOLUTION INTRAMUSCULAR; INTRAVENOUS EVERY 2 HOUR PRN
Status: DISCONTINUED | OUTPATIENT
Start: 2019-11-02 | End: 2019-11-04

## 2019-11-02 RX ORDER — OLANZAPINE 10 MG/1
5 INJECTION, POWDER, LYOPHILIZED, FOR SOLUTION INTRAMUSCULAR ONCE
Status: DISCONTINUED | OUTPATIENT
Start: 2019-11-02 | End: 2019-11-02

## 2019-11-02 RX ORDER — OXYCODONE HYDROCHLORIDE 5 MG/1
5 TABLET ORAL EVERY 4 HOURS PRN
Status: DISCONTINUED | OUTPATIENT
Start: 2019-11-02 | End: 2019-11-04

## 2019-11-02 RX ORDER — HEPARIN SODIUM 10000 [USP'U]/100ML
3-30 INJECTION, SOLUTION INTRAVENOUS
Status: DISCONTINUED | OUTPATIENT
Start: 2019-11-02 | End: 2019-11-07

## 2019-11-02 RX ORDER — OLANZAPINE 10 MG/1
2.5 INJECTION, POWDER, LYOPHILIZED, FOR SOLUTION INTRAMUSCULAR ONCE
Status: DISCONTINUED | OUTPATIENT
Start: 2019-11-02 | End: 2019-11-02

## 2019-11-02 RX ORDER — MAGNESIUM SULFATE HEPTAHYDRATE 40 MG/ML
2 INJECTION, SOLUTION INTRAVENOUS ONCE
Status: COMPLETED | OUTPATIENT
Start: 2019-11-02 | End: 2019-11-02

## 2019-11-02 RX ORDER — HYDRALAZINE HYDROCHLORIDE 20 MG/ML
5 INJECTION INTRAMUSCULAR; INTRAVENOUS EVERY 6 HOURS PRN
Status: DISCONTINUED | OUTPATIENT
Start: 2019-11-02 | End: 2019-11-02

## 2019-11-02 RX ORDER — AMLODIPINE BESYLATE 5 MG/1
5 TABLET ORAL DAILY
Status: DISCONTINUED | OUTPATIENT
Start: 2019-11-02 | End: 2019-11-02

## 2019-11-02 RX ORDER — OLANZAPINE 10 MG/1
5 INJECTION, POWDER, LYOPHILIZED, FOR SOLUTION INTRAMUSCULAR ONCE
Status: COMPLETED | OUTPATIENT
Start: 2019-11-02 | End: 2019-11-02

## 2019-11-02 RX ORDER — ATORVASTATIN CALCIUM 80 MG/1
80 TABLET, FILM COATED ORAL
Status: DISCONTINUED | OUTPATIENT
Start: 2019-11-02 | End: 2019-11-02

## 2019-11-02 RX ORDER — POTASSIUM CHLORIDE 20 MEQ/1
20 TABLET, EXTENDED RELEASE ORAL ONCE
Status: COMPLETED | OUTPATIENT
Start: 2019-11-02 | End: 2019-11-02

## 2019-11-02 RX ORDER — DOPAMINE HYDROCHLORIDE 160 MG/100ML
1-20 INJECTION, SOLUTION INTRAVENOUS
Status: DISCONTINUED | OUTPATIENT
Start: 2019-11-02 | End: 2019-11-02

## 2019-11-02 RX ORDER — DOPAMINE HYDROCHLORIDE 160 MG/100ML
INJECTION, SOLUTION INTRAVENOUS
Status: COMPLETED
Start: 2019-11-02 | End: 2019-11-02

## 2019-11-02 RX ORDER — HEPARIN SODIUM 1000 [USP'U]/ML
2000 INJECTION, SOLUTION INTRAVENOUS; SUBCUTANEOUS AS NEEDED
Status: DISCONTINUED | OUTPATIENT
Start: 2019-11-02 | End: 2019-11-07

## 2019-11-02 RX ADMIN — SODIUM CHLORIDE, SODIUM GLUCONATE, SODIUM ACETATE, POTASSIUM CHLORIDE AND MAGNESIUM CHLORIDE 100 ML/HR: 526; 502; 368; 37; 30 INJECTION, SOLUTION INTRAVENOUS at 19:39

## 2019-11-02 RX ADMIN — ASPIRIN 81 MG: 81 TABLET, COATED ORAL at 12:48

## 2019-11-02 RX ADMIN — HYDRALAZINE HYDROCHLORIDE 5 MG: 20 INJECTION INTRAMUSCULAR; INTRAVENOUS at 07:17

## 2019-11-02 RX ADMIN — HEPARIN SODIUM 12 UNITS/KG/HR: 10000 INJECTION, SOLUTION INTRAVENOUS at 19:21

## 2019-11-02 RX ADMIN — AMLODIPINE BESYLATE 5 MG: 5 TABLET ORAL at 08:39

## 2019-11-02 RX ADMIN — MAGNESIUM SULFATE HEPTAHYDRATE 2 G: 40 INJECTION, SOLUTION INTRAVENOUS at 19:36

## 2019-11-02 RX ADMIN — INFLUENZA A VIRUS A/MICHIGAN/45/2015 X-275 (H1N1) ANTIGEN (FORMALDEHYDE INACTIVATED), INFLUENZA A VIRUS A/SINGAPORE/INFIMH-16-0019/2016 IVR-186 (H3N2) ANTIGEN (FORMALDEHYDE INACTIVATED), AND INFLUENZA B VIRUS B/MARYLAND/15/2016 BX-69A (A B/COLORADO/6/2017-LIKE VIRUS) ANTIGEN (FORMALDEHYDE INACTIVATED) 0.5 ML: 60; 60; 60 INJECTION, SUSPENSION INTRAMUSCULAR at 06:10

## 2019-11-02 RX ADMIN — WATER 10 ML: 1 INJECTION INTRAMUSCULAR; INTRAVENOUS; SUBCUTANEOUS at 01:13

## 2019-11-02 RX ADMIN — DOPAMINE HYDROCHLORIDE 1 MCG/KG/MIN: 160 INJECTION, SOLUTION INTRAVENOUS at 17:50

## 2019-11-02 RX ADMIN — OXYCODONE HYDROCHLORIDE 5 MG: 5 TABLET ORAL at 19:33

## 2019-11-02 RX ADMIN — ATORVASTATIN CALCIUM 40 MG: 40 TABLET, FILM COATED ORAL at 17:16

## 2019-11-02 RX ADMIN — MORPHINE SULFATE 4 MG: 4 INJECTION INTRAVENOUS at 00:53

## 2019-11-02 RX ADMIN — DOPAMINE HYDROCHLORIDE IN DEXTROSE 1 MCG/KG/MIN: 1.6 INJECTION, SOLUTION INTRAVENOUS at 17:50

## 2019-11-02 RX ADMIN — LISINOPRIL 10 MG: 10 TABLET ORAL at 08:39

## 2019-11-02 RX ADMIN — POTASSIUM CHLORIDE 20 MEQ: 1500 TABLET, EXTENDED RELEASE ORAL at 19:33

## 2019-11-02 RX ADMIN — OLANZAPINE 5 MG: 10 INJECTION, POWDER, FOR SOLUTION INTRAMUSCULAR at 01:13

## 2019-11-02 RX ADMIN — NICOTINE 1 PATCH: 14 PATCH TRANSDERMAL at 08:39

## 2019-11-02 RX ADMIN — SODIUM CHLORIDE, SODIUM GLUCONATE, SODIUM ACETATE, POTASSIUM CHLORIDE, MAGNESIUM CHLORIDE, SODIUM PHOSPHATE, DIBASIC, AND POTASSIUM PHOSPHATE 500 ML: .53; .5; .37; .037; .03; .012; .00082 INJECTION, SOLUTION INTRAVENOUS at 20:24

## 2019-11-02 NOTE — ASSESSMENT & PLAN NOTE
· During encounter, pt only sometimes answers daughter's questions appropriately  She says that this is his baseline  · Daughter reports that pt acts strangely sometimes, example given that he has urinated on a window at her home in the past  These types of behaviors have been occurring for at least one year    · Denies diagnosis of dementia  · Consider gerontology consult

## 2019-11-02 NOTE — ED PROVIDER NOTES
History  Chief Complaint   Patient presents with    Leg Pain     Pt presents to the ED reporting left leg pain [id number]  Pt reported he was Dx with a blood clot while in [employer]  Pt was on a plane ride today from [place]      77 y/o male, hx of throat cancer- not currently on chemo, presents to the ED for left leg pain x 2 months  Patient is Divehi speaking and daughter is translating  Patient's daughter states that he just returned from [place] today and reports that while he was there he was evaluated and told that he had blood clot  She states that he was not put on any medications at that time  Patient reports that he has pain with ambulation  States that when he is not ambulating then he does not have any pain or symptoms  Denies any numbness/tingling of the leg or swelling  Daughter states that the patient has "circulation issue" and had surgery to his right leg years ago  Patient's daughter is unsure what he had done at that time  She states that patient is not on any blood thinners  Denies any fevers/chills, chest pain, shortness of breath, abdominal pain, or nausea/vomiting  Denies any injury or trauma to the leg  No other complaints  History provided by:  Patient  Leg Pain   Location:  Leg  Injury: no    Leg location:  L leg  Pain details:     Quality:  Unable to specify    Radiates to:  Does not radiate    Severity:  Moderate    Onset quality:  Gradual    Timing:  Constant    Progression:  Worsening  Chronicity:  Recurrent  Prior injury to area:  No  Relieved by:  Immobilization  Exacerbated by: ambulation   Ineffective treatments:  None tried  Associated symptoms: no back pain, no fever, no neck pain, no numbness, no swelling and no tingling        Prior to Admission Medications   Prescriptions Last Dose Informant Patient Reported? Taking?    Nutritional Supplements (NUTREN 1 5) LIQD   Yes No   Si mL   acetaminophen (TYLENOL) 500 mg tablet   Yes No   Sig: Take 500 mg by mouth every 6 (six) hours as needed for mild pain   chlorhexidine (PERIDEX) 0 12 % solution   Yes No   Sig: Apply 15 mL topically   hydrocortisone 2 5 % cream   Yes No   Sig: Apply to to affected areas 2-3 times daily   ibuprofen (MOTRIN) 600 mg tablet   Yes No   Sig: Take by mouth every 6 (six) hours as needed for mild pain   morphine (MS CONTIN) 15 mg 12 hr tablet   Yes No   Sig: Take by mouth   morphine (MSIR) 15 mg tablet   Yes No   Sig: Take by mouth   neomycin-bacitracin-polymyxin b (NEOSPORIN) ointment   No No   Sig: Apply topically 2 (two) times a day   Patient not taking: Reported on 11/2/2019   vitamin E, tocopherol, 1,000 units capsule   Yes No   Sig: Take by mouth      Facility-Administered Medications: None       Past Medical History:   Diagnosis Date    Cancer ([facility] )     throat cancer    PAD (peripheral artery disease) ([facility] ) 11/2/2019    PEG (percutaneous endoscopic gastrostomy) status ([place] [id number] )     Port-A-Cath in place        Past Surgical History:   Procedure Laterality Date    BYPASS FEMORAL-FEMORAL Right     ESOPHAGOSCOPY N/A 8/24/2017    Procedure: ESOPHAGOSCOPY FLEXIBLE;  Surgeon: Conchis Beavers MD;  Location: [place] [facility];  Service: ENT    [place] [address] N/A 8/24/2017    Procedure: Bronchoscopy;  Surgeon: Conchis Beavers MD;  [facility];  Service: ENT    [place] LARYNGOSCOPY,DIRCT,OP,BIOPSY N/A 8/24/2017    Procedure: LARYNGOSCOPY DIRECT;  Surgeon: Conchis Beavers MD;  [facility] [place] [facility];  Service: ENT    TOE AMPUTATION Right     2 toes       History reviewed  No pertinent family history  I have reviewed and agree with the history as documented      Social History     Tobacco Use    Smoking status: Current Every Day Smoker     Packs/day: 1 00     Years: 15 00     Pack years: 15 00     Types: Cigarettes    Smokeless tobacco: Never Used    Tobacco comment: 50+ years   Substance Use Topics    Alcohol use: Never     Alcohol/week: 0 0 standard drinks     Frequency: Patient refused     Drinks per session: Patient refused     Binge frequency: Patient refused    Drug use: No        Review of Systems   Constitutional: Negative for chills and fever  HENT: Negative for congestion, ear pain and sore throat  Eyes: Negative for pain and visual disturbance  Respiratory: Negative for cough, shortness of breath and wheezing  Cardiovascular: Negative for chest pain and leg swelling  Gastrointestinal: Negative for abdominal pain, diarrhea, nausea and vomiting  Genitourinary: Negative for dysuria, frequency, hematuria and urgency  Musculoskeletal: Negative for back pain, neck pain and neck stiffness  Skin: Negative for rash and wound  Neurological: Negative for weakness, numbness and headaches  Psychiatric/Behavioral: Negative for agitation and confusion  All other systems reviewed and are negative  Physical Exam  Physical Exam   Constitutional: He is oriented to person, place, and time  He appears well-developed and well-nourished  HENT:   Head: Normocephalic and atraumatic  Mouth/Throat: Oropharynx is clear and moist    Eyes: Pupils are equal, round, and reactive to light  EOM are normal    Neck: Normal range of motion  Neck supple  Cardiovascular: Normal rate and regular rhythm  Pulmonary/Chest: Effort normal and breath sounds normal    Abdominal: Soft  Bowel sounds are normal  He exhibits no distension  There is no tenderness  Musculoskeletal: Normal range of motion  L lower leg- cool and pale  No DP pulses noted in LLE  Sensation grossly intact  Strength 5/5 bilateral LE  No bony tenderness noted    Neurological: He is alert and oriented to person, place, and time  No focal deficits   Skin: Skin is warm and dry  Nursing note and vitals reviewed        Vital Signs  ED Triage Vitals   Temperature Pulse Respirations Blood Pressure SpO2   11/01/19 1951 11/01/19 1951 11/01/19 1951 11/01/19 1951 11/01/19 1951   97 5 °F (36 4 °C) 58 16 (!) 192/88 99 % Temp Source Heart Rate Source Patient Position - Orthostatic VS BP Location FiO2 (%)   11/01/19 1951 11/01/19 1951 11/01/19 2200 11/01/19 1951 --   Oral Monitor Lying Right arm       Pain Score       11/01/19 1951       8           Vitals:    11/01/19 1951 11/01/19 2200 11/01/19 2300 11/02/19 0030   BP: (!) 192/88 (!) 222/88 164/77 (!) 182/82   Pulse: 58 67 59 57   Patient Position - Orthostatic VS:  Lying Lying Lying         Visual Acuity      ED Medications  Medications   heparin (porcine) injection 4,000 Units (4,000 Units Intravenous Given 11/1/19 2132)   iohexol (OMNIPAQUE) 350 MG/ML injection (MULTI-DOSE) 120 mL (120 mL Intravenous Given 11/1/19 2155)   morphine (PF) 4 mg/mL injection 4 mg (4 mg Intravenous Given 11/2/19 0053)   OLANZapine (ZyPREXA) IM injection 5 mg (5 mg Intramuscular Given 11/2/19 0113)   sterile water injection 10 mL (10 mL Injection Given 11/2/19 0113)       Diagnostic Studies  Results Reviewed     Procedure Component Value Units Date/Time    APTT [[id number]]  (Normal) Collected:  11/01/19 2120    Lab Status:  Final result Specimen:  Blood from Arm, Left Updated:  11/01/19 2147     PTT 33 seconds     Protime-INR [[id number]]  (Abnormal) Collected:  11/01/19 2120    Lab Status:  Final result Specimen:  Blood from Arm, Left Updated:  11/01/19 2147     Protime 14 8 seconds      INR 1 15    CBC [[id number]]  (Abnormal) Collected:  11/01/19 2120    Lab Status:  Final result Specimen:  Blood from Arm, Left Updated:  11/01/19 2126     WBC 5 27 Thousand/uL      RBC 4 17 Million/uL      Hemoglobin 13 3 g/dL      Hematocrit 40 6 %      MCV 97 fL      MCH 31 9 pg      MCHC 32 8 g/dL      RDW 15 2 %      Platelets 294 Thousands/uL      MPV 10 9 fL     Basic metabolic panel [[id number]]  (Abnormal) Collected:  11/01/19 2101    Lab Status:  Final result Specimen:  Blood from Arm, Right Updated:  11/01/19 2117     Sodium 146 mmol/L      Potassium 4 2 mmol/L      Chloride 109 mmol/L      CO2 30 mmol/L ANION GAP 7 mmol/L      BUN 19 mg/dL      Creatinine 0 86 mg/dL      Glucose 85 mg/dL      Calcium 9 2 mg/dL      eGFR 88 ml/min/1 73sq m     Narrative:       National Kidney Disease Foundation guidelines for Chronic Kidney Disease (CKD):     Stage 1 with normal or high GFR (GFR > 90 mL/min/1 73 square meters)    Stage 2 Mild CKD (GFR = 60-89 mL/min/1 73 square meters)    Stage 3A Moderate CKD (GFR = 45-59 mL/min/1 73 square meters)    Stage 3B Moderate CKD (GFR = 30-44 mL/min/1 73 square meters)    Stage 4 Severe CKD (GFR = 15-29 mL/min/1 73 square meters)    Stage 5 End Stage CKD (GFR <15 mL/min/1 73 square meters)  Note: GFR calculation is accurate only with a steady state creatinine    CBC and differential [[id number]]  (Abnormal) Collected:  11/01/19 2101    Lab Status:  Final result Specimen:  Blood from Arm, Right Updated:  11/01/19 2107     WBC 4 49 Thousand/uL      RBC 3 86 Million/uL      Hemoglobin 12 2 g/dL      Hematocrit 37 2 %      MCV 96 fL      MCH 31 6 pg      MCHC 32 8 g/dL      RDW 15 1 %      MPV 10 2 fL      Platelets 945 Thousands/uL      nRBC 0 /100 WBCs      Neutrophils Relative 70 %      Immat GRANS % 0 %      Lymphocytes Relative 16 %      Monocytes Relative 9 %      Eosinophils Relative 4 %      Basophils Relative 1 %      Neutrophils Absolute 3 13 Thousands/µL      Immature Grans Absolute 0 01 Thousand/uL      Lymphocytes Absolute 0 72 Thousands/µL      Monocytes Absolute 0 42 Thousand/µL      Eosinophils Absolute 0 16 Thousand/µL      Basophils Absolute 0 05 Thousands/µL                  CTA abdominal w run off w wo contrast   Final Result by Landon Dennis MD (11/01 2234)      1  Severe peripheral vascular disease as described above  Limited evaluation of the calf vessels due to extensive calcifications  2   Severe areas of narrowing and focal occlusion of the right superficial femoral artery and diffuse occlusion of the popliteal artery    Distal reconstitution of the right peroneal artery and scattered areas of the right posterior tibial artery  3   Occlusion of the left common femoral artery and left superficial femoral artery with distal reconstitution however severe narrowing of the mid to distal superficial femoral artery  Severe narrowing of the left popliteal artery  Patent left peroneal    artery and scattered areas of the left posterior tibial artery  4   Vascular consultation is recommended  5   9 x 5 x 5 mm calculus within the posterior bladder, near the left ureterovesicular junction however there is no hydronephrosis to suggest obstruction  Workstation performed: [id number]         VAS lower limb venous duplex study, unilateral/limited   Final Result by Boo Link MD (11/01 2108)                 Procedures  Procedures       ED Course  ED Course as of Nov 02 1725   Fri Nov 01, 2019 2108 Spoke with Vascular tech- states that there are no distal doppler signals- recommends contacting vascular and getting CTA with run off       2127 Spoke with vascular surgeon- Dr Gail Puckett- recommends CTA with runoff and heparin gtt      2243 CTA abdominal w run off w wo contrast   2304 Spoke with Dr Gail Puckett- recommends transfer to [place] under the [facility] service       Sat Nov 02, 2019   0104 Patient is agitated and c/o left leg pain after ambulating to the bed  Transport is at bedside- will give morphine and zyprexa for the ambulance ride  Identification of Seniors at Risk      Most Recent Value   (ISAR) Identification of Seniors at Risk   Before the illness or injury that brought you to the Emergency, did you need someone to help you on a regular basis? 1 Filed at: 11/01/2019 1953   In the last 24 hours, have you needed more help than usual?  0 Filed at: 11/01/2019 1953   Have you been hospitalized for one or more nights during the past 6 months?   0 Filed at: 11/01/2019 1953   In general, do you see well?  0 Filed at: 11/01/2019 1953   In general, do you have serious problems with your memory? 1 Filed at: 11/01/2019 1953   Do you take more than three different medications every day?  0 Filed at: 11/01/2019 1953   ISAR Score  2 Filed at: 11/01/2019 1953                          [facility]  Number of Diagnoses or Management Options  Femoral artery occlusion, left [facility]): new and requires workup  Diagnosis management comments: Patient with LLE pain- will get Duplex, labs, and xray  Spoke with vascular tech- concern for arterial occlusion  Will consult vascular for recommendations and start on heparin gtt  ED Course as of Nov 02 0149  Fri Nov 01, 2019  2108 Spoke with Vascular tech- states that there are no distal doppler signals- recommends contacting vascular and getting CTA with run off      2127 Spoke with vascular surgeon- Dr Misha Carroll- recommends CTA with runoff and heparin gtt     2243 CTA abdominal w run off w wo contrast  2304 Spoke with Dr Misha Carroll- recommends transfer to [place] under the [facility] service      Sat Nov 02, 2019  0104 Patient is agitated and c/o left leg pain after ambulating to the bed  Transport is at bedside- will give morphine and zyprexa for the ambulance ride               Amount and/or Complexity of Data Reviewed  Clinical lab tests: ordered and reviewed  Tests in the radiology section of CPT®: ordered and reviewed  Tests in the medicine section of CPT®: ordered and reviewed  Discussion of test results with the performing providers: yes  Decide to obtain previous medical records or to obtain history from someone other than the patient: yes  Obtain history from someone other than the patient: yes  Review and summarize past medical records: yes  Discuss the patient with other providers: yes  Independent visualization of images, tracings, or specimens: yes    Patient Progress  Patient progress: improved      Disposition  Final diagnoses:   Femoral artery occlusion, left ([address] )     Time reflects when diagnosis was documented in both MDM as applicable and the Disposition within this note     Time User Action Codes Description Comment    11/1/2019 11:04 PM Chely Munson Add [[address]] Femoral artery occlusion, left [facility])       ED Disposition     ED Disposition Condition Date/Time Comment    Transfer to Another Facility-In Network  Fri Nov 1, 2019 11:22 PM Mateusz Seen should be transferred out to [facility]          MD Documentation      Most Recent Value   Patient Condition  The patient has been stabilized such that within reasonable medical probability, no material deterioration of the patient condition or the condition of the unborn child(justin) is likely to result from the transfer   Reason for Transfer  Level of Care needed not available at this facility   Benefits of Transfer  Specialized equipment and/or services available at the receiving facility (Include comment)________________________, Continuity of care   Risks of Transfer  Potential for delay in receiving treatment, Potential deterioration of medical condition, Loss of IV, Increased discomfort during transfer, Possible worsening of condition or death during transfer   Accepting Physician  Dr Bard Ronquillo Name, Nini Hernandez   Provider Certification  General risk, such as traffic hazards, adverse weather conditions, rough terrain or turbulence, possible failure of equipment (including vehicle or aircraft), or consequences of actions of persons outside the control of the transport personnel, Unanticipated needs of medical equipment and personnel during transport, Risk of worsening condition, The possibility of a transport vehicle being unavailable      RN Documentation      [facility], [address]   SL   Bed Assignment  [facility]   Report Given to  Shimon Barnes RN      Follow-up Information    None         Discharge Medication List as of 11/2/2019  1:25 AM      CONTINUE these medications which have NOT CHANGED    Details acetaminophen (TYLENOL) 500 mg tablet Take 500 mg by mouth every 6 (six) hours as needed for mild pain, Historical Med      chlorhexidine (PERIDEX) 0 12 % solution Apply 15 mL topically, Starting Wed 6/6/2018, Historical Med      hydrocortisone 2 5 % cream Apply to to affected areas 2-3 times daily, Historical Med      ibuprofen (MOTRIN) 600 mg tablet Take by mouth every 6 (six) hours as needed for mild pain, Historical Med      morphine (MS CONTIN) 15 mg 12 hr tablet Take by mouth, Starting Tue 5/15/2018, Historical Med      morphine (MSIR) 15 mg tablet Take by mouth, Starting Tue 5/15/2018, Historical Med      neomycin-bacitracin-polymyxin b (NEOSPORIN) ointment Apply topically 2 (two) times a day, Starting Mon 8/6/2018, Print      Nutritional Supplements (NUTREN 1 5) LIQD 250 mL, Starting Fri 4/13/2018, Historical Med      vitamin E, tocopherol, 1,000 units capsule Take by mouth, Starting Wed 6/6/2018, Historical Med           No discharge procedures on file      ED Provider  Electronically Signed by           Quinn Oakes DO  11/02/19 3563

## 2019-11-02 NOTE — PLAN OF CARE
Problem: Prexisting or High Potential for Compromised Skin Integrity  Goal: Skin integrity is maintained or improved  Description  INTERVENTIONS:  - Identify patients at risk for skin breakdown  - Assess and monitor skin integrity  - Assess and monitor nutrition and hydration status  - Monitor labs   - Turn and reposition patient  - Assist with mobility/ambulation  - Relieve pressure over bony prominences  - Avoid friction and shearing  - Provide appropriate hygiene as needed including keeping skin clean and dry  - Evaluate need for skin moisturizer/barrier cream  - Collaborate with interdisciplinary team   - Patient/family teaching   Outcome: Progressing     Problem: Potential for Falls  Goal: Patient will remain free of falls  Description  INTERVENTIONS:  - Assess patient frequently for physical needs  -  Identify cognitive and physical deficits and behaviors that affect risk of falls    -  Dow fall precautions as indicated by assessment   - Educate patient/family on patient safety including physical limitations  - Instruct patient to call for assistance with activity based on assessment  - Modify environment to reduce risk of injury  - Consider OT/PT consult to assist with strengthening/mobility  Outcome: Progressing     Problem: RESPIRATORY - ADULT  Goal: Achieves optimal ventilation and oxygenation  Description  INTERVENTIONS:  - Assess for changes in respiratory status  - Assess for changes in mentation and behavior  - Position to facilitate oxygenation and minimize respiratory effort  - Oxygen administered by appropriate delivery if ordered  - Initiate smoking cessation education as indicated  - Encourage broncho-pulmonary hygiene including cough, deep breathe, Incentive Spirometry  - Assess the need for suctioning and aspirate as needed  - Assess and instruct to report SOB or any respiratory difficulty  - Respiratory Therapy support as indicated  Outcome: Progressing     Problem: METABOLIC, FLUID AND ELECTROLYTES - ADULT  Goal: Fluid balance maintained  Description  INTERVENTIONS:  - Monitor labs   - Monitor I/O and WT  - Instruct patient on fluid and nutrition as appropriate  - Assess for signs & symptoms of volume excess or deficit  Outcome: Progressing     Problem: MUSCULOSKELETAL - ADULT  Goal: Maintain or return mobility to safest level of function  Description  INTERVENTIONS:  - Assess patient's ability to carry out ADLs; assess patient's baseline for ADL function and identify physical deficits which impact ability to perform ADLs (bathing, care of mouth/teeth, toileting, grooming, dressing, etc )  - Assess/evaluate cause of self-care deficits   - Assess range of motion  - Assess patient's mobility  - Assess patient's need for assistive devices and provide as appropriate  - Encourage maximum independence but intervene and supervise when necessary  - Involve family in performance of ADLs  - Assess for home care needs following discharge   - Consider OT consult to assist with ADL evaluation and planning for discharge  - Provide patient education as appropriate  Outcome: Progressing

## 2019-11-02 NOTE — PROGRESS NOTES
H&P Exam - [facility] 78 y o  male MRN: [id number]  Unit/Bed#: [facility] 515-01 Encounter: [id number]      -------------------------------------------------------------------------------------------------------------  Chief Complaint: LLQ pain, bradycardia, hypotension     History of Present Illness   HX and PE limited by: Bulgarian speaking only   Bello Lopez is a 78 y o  male w/ PMHx PVD, HPTN, prior laryngeal CA, and HLD who initially presented to the ED @ [facility] this AM with complaints of B/L LE pain L>R  CTA on admission with R SFA and popliteal artery stenosis  As a result, the patient was started on a heparin infusion and transferred to [facility] for vascular surgery evaluation  Vascular surgery recommended L common femoral endartectomy with profundaplasty next week as he does not have an acutely ischemic limb  Cardiology evaluated the patient this AM for pre-operative cardiac clearance  An EKG was done and found to be in NSR  This afternoon a rapid response was called after the patient suffered a syncopal event  Upon my arrival the patient was found to be hypotensive and bradycardic with complaints of LLQ pain  EKG significant for heart block  The patient was started on a dopamine infusion with improvement in his HR and BP  He has been transferred to the ICU for further monitoring and care   History obtained from chart review and the patient   -------------------------------------------------------------------------------------------------------------  Assessment and Plan:    Neuro:    Analgesia:  P r n   Tylenol, oxycodone 2 5/5   Sedation none   Delirium prophylaxis      CV:    Complete heart block:  Cardiology and route to evaluate patient   o Maintain life pack monitoring with VFib pads in place  o Continue dopamine drip at 2  o Proceed with echocardiogram as ordered earlier in the day   Cardiogenic shock:  Secondary to above  o Continue dopamine infusion  o If BP does not improve consider arterial line      Pulm:   No active issues   Incentive spirometry      GI:    NPO sips with meds pending possible cardiology intervention   No indication for GI prophylaxis   Bowel regimen:  Colace and senna   LLQ pain: hgb stable, low concern for RP bleed      :    SARAH:  Likely prerenal secondary to poor flow cardiogenic shock as above continue low-dose maintenance fluids while NPO, forward flow with dopamine, repeat in a m   o Avoid nephrotoxins      F/E/N:    Fluids:  Isolyte 100      Heme/Onc:    Peripheral arterial disease:  Vascular surgery following continue heparin drip, aspirin, Lipitor   DVT prophylaxis:  Heparin drip, SCDs      Endo:    Q6 POCT while NPO   No active issues      ID:    No active issue is afebrile no leukocytosis      MSK/Skin:    Turn frequently and reposition      Disposition: Continue Critical Care   Code Status: Level 1 - Full Code  --------------------------------------------------------------------------------------------------------------  Review of Systems   Musculoskeletal: Positive for arthralgias (RLE pain)  A 12-point, complete review of systems was reviewed and negative except as stated above     Physical Exam   Constitutional: He is oriented to person, place, and time  He appears well-developed and well-nourished  HENT:   Head: Normocephalic and atraumatic  Eyes: Pupils are equal, round, and reactive to light  EOM are normal    Neck: Normal range of motion  Neck supple  Cardiovascular:   Irregular and bradycardic     Pulmonary/Chest: Effort normal and breath sounds normal  No respiratory distress  Abdominal: Soft  Bowel sounds are normal    LLQ incisional scar     Musculoskeletal: Normal range of motion  Neurological: He is alert and oriented to person, place, and time  Skin: Skin is warm and dry  Capillary refill takes less than 2 seconds  --------------------------------------------------------------------------------------------------------------  Historical Information   Past Medical History:   Diagnosis Date    Cancer ([place] [address] )     throat cancer    PAD (peripheral artery disease) ([place] [address] ) 11/2/2019    PEG (percutaneous endoscopic gastrostomy) status ([place] [address] )     Port-A-Cath in place      Past Surgical History:   Procedure Laterality Date    BYPASS FEMORAL-FEMORAL Right     ESOPHAGOSCOPY N/A 8/24/2017    Procedure: ESOPHAGOSCOPY FLEXIBLE;  Surgeon: Sedrick Hyman MD;  [facility];  Service: ENT    [place] [address] N/A 8/24/2017    Procedure: Bronchoscopy;  Surgeon: Sedrick Hyman MD;  [facility];  Service: ENT    IA LARYNGOSCOPY,DIRCT,OP,BIOPSY N/A 8/24/2017    Procedure: LARYNGOSCOPY DIRECT;  Surgeon: Sedrick Hyman MD;  [facility];  Service: ENT    TOE AMPUTATION Right     2 toes     Social History   Social History     Substance and Sexual Activity   Alcohol Use Never    Alcohol/week: 0 0 standard drinks    Frequency: Patient refused    Drinks per session: Patient refused    Binge frequency: Patient refused     Social History     Substance and Sexual Activity   Drug Use No     Social History     Tobacco Use   Smoking Status Current Every Day Smoker    Packs/day: 1 00    Years: 15 00    Pack years: 15 00    Types: Cigarettes   Smokeless Tobacco Never Used   Tobacco Comment    50+ years     Exercise History:  Unknown  Family History: I have reviewed this patient's family history and commented on sigificant items within the HPI    Vitals:   Vitals:    11/02/19 1800 11/02/19 1803 11/02/19 1805 11/02/19 1806   BP:   130/61    Pulse: (!) 50 (!) 54 (!) 54 (!) 53   Resp:       Temp:       SpO2: 97% 97%  98%   Weight:         Temp  Min: 97 3 °F (36 3 °C)  Max: 97 7 °F (36 5 °C)  IBW: -88 kg     Body mass index is 18 47 kg/m²    N/A    Medications:  Current Facility-Administered Medications Medication Dose Route Frequency    acetaminophen (TYLENOL) tablet 650 mg  650 mg Oral Q6H PRN    aspirin (ECOTRIN LOW STRENGTH) EC tablet 81 mg  81 mg Oral Daily    atorvastatin (LIPITOR) tablet 40 mg  40 mg Oral Daily With Dinner    DOPamine (INTROPIN) 400 mg in 250 mL infusion (premix)  1-20 mcg/kg/min Intravenous Titrated    heparin (porcine) 25,000 units in 250 mL infusion (premix)  3-30 Units/kg/hr (Order-Specific) Intravenous Titrated    heparin (porcine) injection 2,000 Units  2,000 Units Intravenous PRN    heparin (porcine) injection 4,000 Units  4,000 Units Intravenous PRN    nicotine (NICODERM CQ) 14 mg/24hr TD 24 hr patch 1 patch  1 patch Transdermal Daily     Home medications:  Prior to Admission Medications   Prescriptions Last Dose Informant Patient Reported? Taking?    Nutritional Supplements (NUTREN 1 5) LIQD Not Taking at Unknown time  Yes No   Si mL   acetaminophen (TYLENOL) 500 mg tablet Not Taking at Unknown time  Yes No   Sig: Take 500 mg by mouth every 6 (six) hours as needed for mild pain   chlorhexidine (PERIDEX) 0 12 % solution Not Taking at Unknown time  Yes No   Sig: Apply 15 mL topically   hydrocortisone 2 5 % cream Not Taking at Unknown time  Yes No   Sig: Apply to to affected areas 2-3 times daily   ibuprofen (MOTRIN) 600 mg tablet Not Taking at Unknown time  Yes No   Sig: Take by mouth every 6 (six) hours as needed for mild pain   morphine (MS CONTIN) 15 mg 12 hr tablet Not Taking at Unknown time  Yes No   Sig: Take by mouth   morphine (MSIR) 15 mg tablet Not Taking at Unknown time  Yes No   Sig: Take by mouth   neomycin-bacitracin-polymyxin b (NEOSPORIN) ointment Not Taking at Unknown time  No No   Sig: Apply topically 2 (two) times a day   Patient not taking: Reported on 2019   vitamin E, tocopherol, 1,000 units capsule Not Taking at Unknown time  Yes No   Sig: Take by mouth      Facility-Administered Medications: None     Allergies:  No Known Allergies      Laboratory and Diagnostics:  Results from last 7 days   Lab Units 11/02/19 1757 11/02/19 0317 11/01/19 2120 11/01/19  2101   WBC Thousand/uL 7 26 4 69 5 27 4 49   HEMOGLOBIN g/dL 12 4 11 8* 13 3 12 2   HEMATOCRIT % 37 5 36 0* 40 6 37 2   PLATELETS Thousands/uL 205 170 197 180   NEUTROS PCT % 73  --   --  70   MONOS PCT % 6  --   --  9     Results from last 7 days   Lab Units 11/02/19  1756 11/02/19 0357 11/01/19  2101   SODIUM mmol/L 141 142 146*   POTASSIUM mmol/L 3 7 3 6 4 2   CHLORIDE mmol/L 108 109* 109*   CO2 mmol/L 26 27 30   ANION GAP mmol/L 7 6 7   BUN mg/dL 20 16 19   CREATININE mg/dL 1 15 0 80 0 86   CALCIUM mg/dL 9 3 9 2 9 2   GLUCOSE RANDOM mg/dL 127 101 85     Results from last 7 days   Lab Units 11/02/19  1756   MAGNESIUM mg/dL 1 9   PHOSPHORUS mg/dL 3 3      Results from last 7 days   Lab Units 11/02/19  1040 11/02/19 0317 11/01/19 2120   INR   --  1 24* 1 15   PTT seconds 128* >210* 33              ABG:    VBG:          Micro:          EKG:  Complete heart block      ------------------------------------------------------------------------------------------------------------  Advance Directive and Living Will:      Power of :    POLST:    ------------------------------------------------------------------------------------------------------------  Anticipated Length of Stay is > 2 midnights    Counseling / Coordination of Care  Total Critical Care time spent Forty minutes excluding procedures, teaching and family updates  Deborah Hall PA-C        Portions of the record may have been created with voice recognition software  Occasional wrong word or "sound a like" substitutions may have occurred due to the inherent limitations of voice recognition software    Read the chart carefully and recognize, using context, where substitutions have occurred

## 2019-11-02 NOTE — ASSESSMENT & PLAN NOTE
· /80 on arrival  · Pt's daughter denies hx of hypertension  · Pt does not take any home meds  · Hydralazine 5mg prn for BP>180  · Pt was started on norvasc/lisinopril, continue and titrate PRN

## 2019-11-02 NOTE — ASSESSMENT & PLAN NOTE
· Pt presents with 2-month history of LLE pain with walking  Denies pain at rest, but pt is unable to walk more than a few feet without pain  · CTA: Severe peripheral vascular disease  Limited evaluation of the calf vessels due to extensive calcifications  Severe areas of narrowing and focal occlusion of the right superficial femoral artery and diffuse occlusion of the popliteal artery   Distal reconstitution of the right peroneal artery and scattered areas of the right posterior tibial artery  Occlusion of the left common femoral artery and left superficial femoral artery with distal reconstitution however severe narrowing of the mid to distal superficial femoral artery   Severe narrowing of the left popliteal artery  Patent left peroneal artery and scattered areas of the left posterior tibial artery  · Pt seen by vascular surgery residents in room  They found 2+ R femoral and 1+ L femoral pulses; doppler was unable to find pedal pulses b/l  They suspect that this is a chronic issue     · Scars on RLE suggest previous surgeries, but pt and daughter unable to provide details except that pt has "circulation problems"  · Heparin gtt  · Formal vascular surgery consult placed, recommendations appreciated

## 2019-11-02 NOTE — RAPID RESPONSE
Progress Note - Rapid Response   Madia Villalta 78 y o  male MRN: [id number]    Time Called ( Time): 8243  Date Called: 11/2/19  Level of Care: MS  Room#: 706  [id number] Time ( Time): 1386  Event End Time ( Time): 4430  Primary reason for call: Acute change in HR and Acute change in SBP  Interventions:  Airway/Breathing:  No Intervention  Circulation: IV Fluid Bolus  Other Treatments: dopamine infusion       Assessment:   1  Cardiogenic Shock  2  Heart Block  3  PVD    Plan:   · Dopamine infusion started, continue @ 2, BP improved  · Cardiology appreciated  · Patient will be transferred to [address] [place] [address] on P5       HPI/Chief Complaint (Background/Situation):   Maida Villalta is a 78y o  year old male w/ PMHx PVD, HPTN, prior laryngeal CA, and HLD who initially presented to the ED @ [facility] this AM with complaints of B/L LE pain L>R  CTA on admission with R SFA and popliteal artery stenosis  As a result, the patient was started on a heparin infusion and transferred to [facility] for vascular surgery evaluation  Vascular surgery recommended L common femoral endartectomy with profundaplasty next week as he does not have an acutely ischemic limb  Cardiology evaluated the patient this AM for pre-operative cardiac clearance  An EKG was done and found to be in NSR  This afternoon a rapid response was called after the patient suffered a syncopal event  Upon my arrival the patient was found to be hypotensive and bradycardic with complaints of LLQ pain  EKG significant for heart block  The patient was started on a dopamine infusion with improvement in his HR and BP  He will be transferred to ICU for further monitoring and care       Historical Information   Past Medical History:   Diagnosis Date    Cancer ([facility] [address] )     throat cancer    PAD (peripheral artery disease) ([facility] ) 11/2/2019    PEG (percutaneous endoscopic gastrostomy) status ([facility] )     Port-A-Cath in place      Past Surgical History:   Procedure Laterality Date    BYPASS FEMORAL-FEMORAL Right     ESOPHAGOSCOPY N/A 8/24/2017    Procedure: ESOPHAGOSCOPY FLEXIBLE;  Surgeon: Jarret Willingham MD;  [facility];  Service: ENT    CA [address] N/A 8/24/2017    Procedure: Bronchoscopy;  Surgeon: Jarret Willingham MD;  [facility];  Service: ENT    CA LARYNGOSCOPY,DIRCT,OP,BIOPSY N/A 8/24/2017    Procedure: LARYNGOSCOPY DIRECT;  Surgeon: Jarret Willingham MD;  [facility];  Service: ENT    TOE AMPUTATION Right     2 toes     Social History   Social History     Substance and Sexual Activity   Alcohol Use Never    Alcohol/week: 0 0 standard drinks    Frequency: Patient refused    Drinks per session: Patient refused    Binge frequency: Patient refused     Social History     Substance and Sexual Activity   Drug Use No     Social History     Tobacco Use   Smoking Status Current Every Day Smoker    Packs/day: 1 00    Years: 15 00    Pack years: 15 00    Types: Cigarettes   Smokeless Tobacco Never Used   Tobacco Comment    50+ years     Family History: non-contributory    Meds/Allergies     Current Facility-Administered Medications:  acetaminophen 650 mg Oral Q6H PRN Maritza Wan, PA-C    aspirin 81 mg Oral Daily Martina Frederick MD    atorvastatin 40 mg Oral Daily With Vicente Romero MD    DOPamine in dextrose 5% 1-20 mcg/kg/min Intravenous Titrated Chasity Garcia MD Last Rate: 2 mcg/kg/min (11/02/19 1805)   heparin (porcine) 3-30 Units/kg/hr (Order-Specific) Intravenous Titrated Maritza Wan, PA-C Last Rate: Stopped (11/02/19 1755)   heparin (porcine) 2,000 Units Intravenous PRN Maritza Byrnezajustice, PA-C    heparin (porcine) 4,000 Units Intravenous PRN Maritza Reidyl, PA-C    nicotine 1 patch Transdermal Daily Maritza Wan PA-C          DOPamine in dextrose 5% 1-20 mcg/kg/min Last Rate: 2 mcg/kg/min (11/02/19 1805)   heparin (porcine) 3-30 Units/kg/hr (Order-Specific) Last Rate: Stopped (11/02/19 1755)       No Known Allergies    ROS: Negative except LLQ pain, nausea     Vitals:   Vitals:    11/02/19 1800 11/02/19 1803 11/02/19 1805 11/02/19 1806   BP:   130/61    Pulse: (!) 50 (!) 54 (!) 54 (!) 53   Resp:       Temp:       SpO2: 97% 97%  98%   Weight:             Physical Exam:  Gen: frail, cachectic   HEENT: PERRLA  Neck: No JVD  Chest: bradycardia, no m/r/g  Cor: CTAB  Abd: soft, minimally tender   Ext: warm, dry, slow cap refill  Neuro: AAOx4, Georgian speaking only   Skin: warm, dry      Intake/Output Summary (Last 24 hours) at 11/2/2019 1807  Last data filed at 11/2/2019 0630  Gross per 24 hour   Intake 0 ml   Output 525 ml   Net -525 ml       Respiratory    Lab Data (Last 4 hours)    None         O2/Vent Data (Last 4 hours)    None              Invasive Devices     Peripheral Intravenous Line            Peripheral IV 11/01/19 Left Forearm less than 1 day    Peripheral IV 11/02/19 Right Hand less than 1 day                DIAGNOSTIC DATA:    Lab: I have personally reviewed pertinent lab results  CBC:   Results from last 7 days   Lab Units 11/02/19  0317   WBC Thousand/uL 4 69   HEMOGLOBIN g/dL 11 8*   HEMATOCRIT % 36 0*   PLATELETS Thousands/uL 170     CMP:   Results from last 7 days   Lab Units 11/02/19  0357 11/01/19  2101   POTASSIUM mmol/L 3 6 4 2   CHLORIDE mmol/L 109* 109*   CO2 mmol/L 27 30   BUN mg/dL 16 19   CREATININE mg/dL 0 80 0 86   CALCIUM mg/dL 9 2 9 2     PT/INR:   Lab Results   Component Value Date    INR 1 24 (H) 11/02/2019   ,   Magnesium: No components found for: MAG,   Phosphorous: No results found for: PHOS    Microbiology:  No results found for: Miguelina Records, SPUTUMCULTUR      OUTCOME:   Transferred to Critical Care Unit  Family notified of transfer: yes  Family member contacted: daughter @ bedside   Code Status: Level 1 - Full Code  Critical Care Time: Total Critical Care time spent 30 minutes excluding procedures, teaching and family updates

## 2019-11-02 NOTE — SOCIAL WORK
CM met with pt to discuss CM role in D/C planning  Dtr, Lorena Hart; GEORGE named Champ mock; and Son, Jacey Corrales at pt's bedside  Dtr and son reported the following:  Pt arrived [facility] yesterday  Pt previously lived in the [place]  Emergency Contact: Daughter, Lorena Hart ([phone]) and Michael Portillo ([phone])  POA/LW: None  Level of assist with ADL's PTA: Dressing self however pain interfered with pt's ambulation  House or Apt: Pt stays with dtCalvin lombardi in 2 sh  DAKOTAH to enter: 6 DAKOTAH to enter; Pt stays on 1st floor  BATH on 1st floor: Full Bath  DME: Ricco Kapadia  VNA: Hx however unable to recall provider  SNF/Rehab: None    Transportation: Calvin Alejandro  Help at home: Calvin Alejandro is not employed and assists pt  Pharmacy/Rx Coverage: [facility] in [place]  Name of PCP: Dr Diego Pressley tx for MH/SA: None  7 years ago, pt was hospitalized for 2 months in the [place] due to alcohol use  Dtr reported that pt no longer uses alcohol  Anticipated D/C Plan: Dtr stated that pt will return home and she will transport and assist  Dtr stated that pt will have surgery next week  CM reviewed d/c planning process including the following: identifying help at home, patient preference for d/c planning needs, Discharge Lounge, [facility] Meds to Bed program, availability of treatment team to discuss questions or concerns patient and/or family may have regarding understanding medications and recognizing signs and symptoms once discharged  CM also encouraged patient to follow up with all recommended appointments after discharge  Patient advised of importance for patient and family to participate in managing patients medical well being

## 2019-11-02 NOTE — PROGRESS NOTES
Pt  Reported dizziness, when doing vitals pt  Loss consciousness for couple of seconds, rapid response called

## 2019-11-02 NOTE — ED NOTES
P/U approximately 30 minutes by Corry Stout [address] [facility]   Call report to Tanvi [facility] 108  11/02/19 0020

## 2019-11-02 NOTE — CONSULTS
Consultation - Vascular Surgery   Maida Villalta 78 y o  male MRN: [id number]  Unit/Bed#: -01 Encounter: [id number]    Assessment/Plan     Assessment:  69M with likely chronic lower extremity PAD, L>R  Patient and family provide limited history and are unsure of previous positions  Plan:  -Continue heparin gtt  -Cardiac evaluation  -Further history investigation  -consider further vascular studies to prepare for possible operative intervention; time and procedures to be discussed  No acute tissue loss, so no urgent intervention indicated    History of Present Illness   HPI:  Maida Villalta is a 78 y o  male who presents with LLE pain  He has had the pain for about 2 months while residing in [facility]  His family convinced him to return to the [address] [facility] for further medical care  The patient only intermittently responds appropriately to questions, reportedly, this is at baseline  Limited history was provided by daughter at bedside  He has known "ciculation problems" and has had prior surgery on his RLE, but the procedure/s are unknown  CTA reveals significant PAD of b/l LE, currently worse on the left  He does not have acute tissue loss at this time  Consults    Review of Systems   Constitutional: Negative  Negative for activity change and appetite change  HENT: Negative  Negative for hearing loss and trouble swallowing  Eyes: Negative  Negative for photophobia and redness  Respiratory: Negative  Negative for chest tightness and shortness of breath  Cardiovascular: Negative  Negative for chest pain and palpitations  Gastrointestinal: Negative  Negative for abdominal distention and abdominal pain  Endocrine: Negative  Negative for cold intolerance and heat intolerance  Genitourinary: Negative  Negative for flank pain and genital sores  Musculoskeletal: Positive for gait problem (Pain with ambulation)  Negative for arthralgias and back pain  Skin: Negative    Negative for color change and pallor  Allergic/Immunologic: Negative  Neurological: Negative for facial asymmetry, speech difficulty and light-headedness  Hematological: Negative  Negative for adenopathy  Psychiatric/Behavioral: Negative  Negative for agitation and behavioral problems  Historical Information   Past Medical History:   Diagnosis Date    Cancer ([facility] )     throat cancer    PEG (percutaneous endoscopic gastrostomy) status ([facility] )     Port-A-Cath in place      Past Surgical History:   Procedure Laterality Date    BYPASS FEMORAL-FEMORAL Right     ESOPHAGOSCOPY N/A 8/24/2017    Procedure: Mony Quails;  Surgeon: Mar Johnson MD;  [facility]: [facility];  Service: ENT    TX [address] N/A 8/24/2017    Procedure: Bronchoscopy;  Surgeon: Mar Johnson MD;  [facility];  Service: ENT    TX LARYNGOSCOPY,DIRCT,OP,BIOPSY N/A 8/24/2017    Procedure: LARYNGOSCOPY DIRECT;  Surgeon: Mar Johnson MD;  [facility];  Service: ENT    TOE AMPUTATION Right     2 toes     Social History   Social History     Substance and Sexual Activity   Alcohol Use No     Social History     Substance and Sexual Activity   Drug Use No     Social History     Tobacco Use   Smoking Status Current Every Day Smoker    Packs/day: 1 00    Types: Cigarettes   Smokeless Tobacco Never Used   Tobacco Comment    50+ years     Family History: No family history on file      Meds/Allergies   current meds:   Current Facility-Administered Medications   Medication Dose Route Frequency    acetaminophen (TYLENOL) tablet 650 mg  650 mg Oral Q6H PRN    heparin (porcine) 25,000 units in 250 mL infusion (premix)  3-30 Units/kg/hr (Order-Specific) Intravenous Titrated    heparin (porcine) injection 2,000 Units  2,000 Units Intravenous PRN    heparin (porcine) injection 4,000 Units  4,000 Units Intravenous PRN    hydrALAZINE (APRESOLINE) injection 5 mg  5 mg Intravenous Q6H PRN    nicotine (NICODERM CQ) 14 mg/24hr TD 24 hr patch 1 patch  1 patch Transdermal Daily    and PTA meds:   Prior to Admission Medications   Prescriptions Last Dose Informant Patient Reported? Taking?    Nutritional Supplements (NUTREN 1 5) LIQD Not Taking at Unknown time  Yes No   Si mL   acetaminophen (TYLENOL) 500 mg tablet Not Taking at Unknown time  Yes No   Sig: Take 500 mg by mouth every 6 (six) hours as needed for mild pain   chlorhexidine (PERIDEX) 0 12 % solution Not Taking at Unknown time  Yes No   Sig: Apply 15 mL topically   hydrocortisone 2 5 % cream Not Taking at Unknown time  Yes No   Sig: Apply to to affected areas 2-3 times daily   ibuprofen (MOTRIN) 600 mg tablet Not Taking at Unknown time  Yes No   Sig: Take by mouth every 6 (six) hours as needed for mild pain   morphine (MS CONTIN) 15 mg 12 hr tablet Not Taking at Unknown time  Yes No   Sig: Take by mouth   morphine (MSIR) 15 mg tablet Not Taking at Unknown time  Yes No   Sig: Take by mouth   neomycin-bacitracin-polymyxin b (NEOSPORIN) ointment Not Taking at Unknown time  No No   Sig: Apply topically 2 (two) times a day   Patient not taking: Reported on 2019   vitamin E, tocopherol, 1,000 units capsule Not Taking at Unknown time  Yes No   Sig: Take by mouth      Facility-Administered Medications: None     No Known Allergies    Objective   First Vitals:   Blood Pressure: (!) 196/80 (19)  Pulse: 60 (19)  Temperature: 97 7 °F (36 5 °C) (19)  Respirations: 18 (19)  Weight - Scale: 50 3 kg (111 lb) (19)  SpO2: 100 % (19)    Current Vitals:   Blood Pressure: (!) 196/80 (19)  Pulse: 60 (19)  Temperature: 97 7 °F (36 5 °C) (19)  Respirations: 18 (19)  Weight - Scale: 50 3 kg (111 lb) (19)  SpO2: 100 % (19)    No intake or output data in the 24 hours ending 19    Invasive Devices     Peripheral Intravenous Line            Peripheral IV 11/01/19 Left Forearm less than 1 day                Physical Exam   Constitutional: He appears well-developed and well-nourished  No distress  HENT:   Head: Normocephalic and atraumatic  Right Ear: External ear normal    Left Ear: External ear normal    Eyes: Pupils are equal, round, and reactive to light  EOM are normal  Right eye exhibits no discharge  Left eye exhibits no discharge  No scleral icterus  Neck: No tracheal deviation present  Cardiovascular: Normal rate  Pulses:       Femoral pulses are 2+ on the right side, and 1+ on the left side  Dorsalis pedis pulses are 0 on the right side, and 0 on the left side  Posterior tibial pulses are Detected w/ doppler on the right side, and 0 on the left side  Pulmonary/Chest: Effort normal  No respiratory distress  Abdominal: Soft  He exhibits no distension  There is no tenderness  Feet:   Right Foot:   Skin Integrity: Negative for ulcer or skin breakdown  Left Foot:   Skin Integrity: Negative for ulcer or skin breakdown  Neurological: He is alert  Coordination normal    Skin: No rash noted  He is not diaphoretic  Vitals reviewed  Lab Results:   CBC:   Lab Results   Component Value Date    WBC 5 27 11/01/2019    HGB 13 3 11/01/2019    HCT 40 6 11/01/2019    MCV 97 11/01/2019     11/01/2019    MCH 31 9 11/01/2019    MCHC 32 8 11/01/2019    RDW 15 2 (H) 11/01/2019    MPV 10 9 11/01/2019    NRBC 0 11/01/2019   , CMP:   Lab Results   Component Value Date    SODIUM 146 (H) 11/01/2019    K 4 2 11/01/2019     (H) 11/01/2019    CO2 30 11/01/2019    BUN 19 11/01/2019    CREATININE 0 86 11/01/2019    CALCIUM 9 2 11/01/2019    EGFR 88 11/01/2019   , Coagulation:   Lab Results   Component Value Date    INR 1 15 11/01/2019     Imaging: I have personally reviewed pertinent reports  and I have personally reviewed pertinent films in PACS  EKG, Pathology, and Other Studies: I have personally reviewed pertinent reports

## 2019-11-02 NOTE — EMTALA/ACUTE CARE TRANSFER
[address] [facility] [address]  [place] [address]  Dept: [phone]      EMTALA TRANSFER CONSENT    NAME Pam Sender                                         1950                              MRN [id number]    I have been informed of my rights regarding examination, treatment, and transfer   by Dr Abebe Denise DO    Benefits: Specialized equipment and/or services available at the receiving facility (Include comment)________________________, Continuity of care    Risks: Potential for delay in receiving treatment, Potential deterioration of medical condition, Loss of IV, Increased discomfort during transfer, Possible worsening of condition or death during transfer      Consent for Transfer:  I acknowledge that my medical condition has been evaluated and explained to me by the emergency department physician or other qualified medical person and/or my attending physician, who has recommended that I be transferred to the service of  Accepting Physician: Ludmila Paulino at [address], [address] : [facility]  The above potential benefits of such transfer, the potential risks associated with such transfer, and the probable risks of not being transferred have been explained to me, and I fully understand them  The doctor has explained that, in my case, the benefits of transfer outweigh the risks  I agree to be transferred  I authorize the performance of emergency medical procedures and treatments upon me in both transit and upon arrival at the receiving facility  Additionally, I authorize the release of any and all medical records to the receiving facility and request they be transported with me, if possible  I understand that the safest mode of transportation during a medical emergency is an ambulance and that the Hospital advocates the use of this mode of transport   Risks of traveling to the receiving facility by car, including absence of medical control, life sustaining equipment, such as oxygen, and medical personnel has been explained to me and I fully understand them  (BORIS CORRECT BOX BELOW)  [  ]  I consent to the stated transfer and to be transported by ambulance/helicopter  [  ]  I consent to the stated transfer, but refuse transportation by ambulance and accept full responsibility for my transportation by car  I understand the risks of non-ambulance transfers and I exonerate the Hospital and its staff from any deterioration in my condition that results from this refusal     X___________________________________________    DATE  19  TIME________  Signature of patient or legally responsible individual signing on patient behalf           RELATIONSHIP TO PATIENT_________________________          Provider [address] [place] [address]                                         1950                              MRN [id number]    A medical screening exam was performed on the above named patient  Based on the examination:    Condition Necessitating Transfer The encounter diagnosis was Femoral artery occlusion, left ([address] )      Patient Condition: The patient has been stabilized such that within reasonable medical probability, no material deterioration of the patient condition or the condition of the unborn child(justin) is likely to result from the transfer    Reason for Transfer: Level of Care needed not available at this facility    Transfer Requirements: Facility [facility]   · Space available and qualified personnel available for treatment as acknowledged by    · Agreed to accept transfer and to provide appropriate medical treatment as acknowledged by       Brandon  · Appropriate medical records of the examination and treatment of the patient are provided at the time of transfer   [address] _______  · Transfer will be performed by qualified personnel from    and appropriate transfer equipment as required, including the use of necessary and appropriate life support measures  Provider Certification: I have examined the patient and explained the following risks and benefits of being transferred/refusing transfer to the patient/family:  General risk, such as traffic hazards, adverse weather conditions, rough terrain or turbulence, possible failure of equipment (including vehicle or aircraft), or consequences of actions of persons outside the control of the transport personnel, Unanticipated needs of medical equipment and personnel during transport, Risk of worsening condition, The possibility of a transport vehicle being unavailable      Based on these reasonable risks and benefits to the patient and/or the unborn child(justin), and based upon the information available at the time of the patients examination, I certify that the medical benefits reasonably to be expected from the provision of appropriate medical treatments at another medical facility outweigh the increasing risks, if any, to the individuals medical condition, and in the case of labor to the unborn child, from effecting the transfer      X____________________________________________ DATE 11/01/19        TIME_______      ORIGINAL - SEND TO MEDICAL RECORDS   COPY - SEND WITH PATIENT DURING TRANSFER

## 2019-11-02 NOTE — ASSESSMENT & PLAN NOTE
· /80 on arrival  · Pt's daughter denies hx of hypertension  · Pt does not take any home meds  · Hydralazine 5mg prn for BP>180  · Consider starting lisinopril and/or HCTZ

## 2019-11-02 NOTE — PROGRESS NOTES
[address] 1950, 78 y o  male MRN: [id number]    Unit/Bed#: -01 Encounter: [id number]    Primary Care Provider: Earl Waldron MD   Date and time admitted to hospital: 11/2/2019  2:01 AM    [facility] Internal Medicine Post Admit Check:     Date of visit: 11/02/19    The patient was admitted earlier to the [facility] Internal Medicine Service  Please see initial intake history and physical for detailed admission history  This is a supplemental update following a post admit checkup  Subjective: Pt awake, alertt and in no acute distress  Given some mild confusion reported by RN (speaks Nigerian), pt has been slightly confused  No acute complaints  Daughter at bedside requesting geriatrics consultation  Exam:   CV: no m/r/g, RRR  Lungs: CTAB  Abd: soft  Ext: difficult to palpate pulses, no obvious rashes, edema    Assessment and Plan:     * Peripheral vascular disease ([address] )  Assessment & Plan  · Pt presented with 2-month history of LLE pain with walking  Denies pain at rest, but pt is unable to walk more than a few feet without pain  · CTA: Severe peripheral vascular disease  Limited evaluation of the calf vessels due to extensive calcifications  Severe areas of narrowing and focal occlusion of the right superficial femoral artery and diffuse occlusion of the popliteal artery   Distal reconstitution of the right peroneal artery and scattered areas of the right posterior tibial artery  Occlusion of the left common femoral artery and left superficial femoral artery with distal reconstitution however severe narrowing of the mid to distal superficial femoral artery   Severe narrowing of the left popliteal artery  Patent left peroneal artery and scattered areas of the left posterior tibial artery  · Pt seen by vascular surgery--They found 2+ R femoral and 1+ L femoral pulses; doppler was unable to find pedal pulses b/l  They suspect that this is a chronic issue     · Scars on RLE suggest previous surgeries, but pt and daughter unable to provide details except that pt has "circulation problems"  · Heparin gtt, asa/statin  · F/u vascular surgery input--likely will require vascular surgery but needs cardiac clearance, consult appreciated   · Echo, EKG pending  BB started    Dependence on nicotine from cigarettes  Assessment & Plan  · Still smokes despite diagnosis of throat cancer, per daughter  · She thinks he smokes 1ppd  · Nicotine patch  · Provide smoking cessation education    Confusion  Assessment & Plan  · During encounter, pt only intermittently was appropriate in answering questions--?baseline    · Daughter reported that pt acts strangely sometimes, example given that he has urinated on a window at her home in the past  These types of behaviors have been occurring for at least one year    · Denies diagnosis of dementia  · Consider gerontology consult at some point     Elevated blood-pressure reading without diagnosis of hypertension  Assessment & Plan  · /80 on arrival  · Pt's daughter denies hx of hypertension  · Pt does not take any home meds  · Hydralazine 5mg prn for BP>180  · Pt was started on norvasc/lisinopril, continue and titrate PRN    History of throat cancer  Assessment & Plan  · Per daughter, pt diagnosed with throat CA 2 years ago and has been treated with chemo and radiation  · Not undergoing treatment at this time      Sánchez Francis PA-C

## 2019-11-02 NOTE — CONSULTS
Cardiology   Karlene Oaeks 78 y o  male MRN: [id number]  Unit/Bed#: -01 Encounter: [id number]      Reason for Consult / Principal Problem: Preop risk stratification    Physician Requesting Consult:  Margarito Olson MD    Cardiologist: None        Assessment/Plan:    >> LLE pain  >> Hypertension  >> Throat cancer  >> Anemia  >> Coronary artery and aortic calcifications on prior CT scan:  >> Active smoker      Plan: Will check ecg and echo  Patient likely has CAD given risk factors and coronary calcification reported on recent CT scan done to look for metastatic disease  Would start high intensity atorvastatin  Agree with amlodipine and lisinopril- he has not actually received either yet  Would add aspirin if OK with vascular surgery  Will start beta blocker if surgery is not urgent   Patient is high risk for perioperative MACE  Will await vascular surgery decision on surgical procedure  Heparin per vascular surgery          CC: Pre-operative     HPI: Karlene Oakes 78y o  year old male who presents with leg pain and difficulty ambulating  He is a very poor historian and it is difficult to assess if he does not understand the questions (cognitive impairment) or simply does not want to answer  History was attempted [facility] [place]   Patient denies any active chest or leg pain, admits to being a smoker  Denies chest pain before coming to the hospital but is very limited functionally  Attempted to call the patient's son but there is no answer and his VM inbox is full  The other number listed in patient's chart is disconnected  Denies chest pain, shortness of breath, palpitations, orthopnea, PND, pedal edema, syncope, presyncope, diaphoresis, nausea/vomiting     Remainder of ROS done and negative    Telemetry: not on tele      Weight: 50 kg    EKG: last ECG dated in April 2019- unremarkable        Family History: History reviewed  No pertinent family history    Historical Information   Past Medical History:   Diagnosis Date    Cancer [facility])     throat cancer    PAD (peripheral artery disease) ([facility] [address] ) 11/2/2019    PEG (percutaneous endoscopic gastrostomy) status ([facility] )     Port-A-Cath in place      Past Surgical History:   Procedure Laterality Date    BYPASS FEMORAL-FEMORAL Right     ESOPHAGOSCOPY N/A 8/24/2017    Procedure: ESOPHAGOSCOPY FLEXIBLE;  Surgeon: Tanner Guo MD;  [facility];  Service: ENT    [place] [address] N/A 8/24/2017    Procedure: Bronchoscopy;  Surgeon: Tanner Guo MD;  [facility];  Service: ENT    [place] LARYNGOSCOPY,DIRCT,OP,BIOPSY N/A 8/24/2017    Procedure: LARYNGOSCOPY DIRECT;  Surgeon: Tanner Guo MD;  [facility];  Service: ENT    TOE AMPUTATION Right     2 toes     Social History   Social History     Substance and Sexual Activity   Alcohol Use Never    Alcohol/week: 0 0 standard drinks    Frequency: Patient refused    Drinks per session: Patient refused    Binge frequency: Patient refused     Social History     Substance and Sexual Activity   Drug Use No     Social History     Tobacco Use   Smoking Status Current Every Day Smoker    Packs/day: 1 00    Years: 15 00    Pack years: 15 00    Types: Cigarettes   Smokeless Tobacco Never Used   Tobacco Comment    50+ years     Family History: History reviewed  No pertinent family history      Review of Systems:  Review of Systems        Scheduled Meds:  Current Facility-Administered Medications:  acetaminophen 650 mg Oral Q6H PRN Maritza Hutzayluk, PA-C    amLODIPine 5 mg Oral Daily Tyaskin Alias, DO    heparin (porcine) 3-30 Units/kg/hr (Order-Specific) Intravenous Titrated Maritza Catyzayluk, PA-C Last Rate: 15 Units/kg/hr (11/02/19 0525)   heparin (porcine) 2,000 Units Intravenous PRN Maritza Hutzayluk, PA-C    heparin (porcine) 4,000 Units Intravenous PRN Maritza Hutzayluk, PA-C    hydrALAZINE 5 mg Intravenous Q6H PRN Maritza Hutzayluk, PA-C    lisinopril 10 mg Oral Daily Lemon Hopping, DO    nicotine 1 patch Transdermal Daily Maritza Wan PA-C      Continuous Infusions:  heparin (porcine) 3-30 Units/kg/hr (Order-Specific) Last Rate: 15 Units/kg/hr (19 2706)     PRN Meds:   acetaminophen    heparin (porcine)    heparin (porcine)    hydrALAZINE  current meds:   Current Facility-Administered Medications   Medication Dose Route Frequency    acetaminophen (TYLENOL) tablet 650 mg  650 mg Oral Q6H PRN    amLODIPine (NORVASC) tablet 5 mg  5 mg Oral Daily    heparin (porcine) 25,000 units in 250 mL infusion (premix)  3-30 Units/kg/hr (Order-Specific) Intravenous Titrated    heparin (porcine) injection 2,000 Units  2,000 Units Intravenous PRN    heparin (porcine) injection 4,000 Units  4,000 Units Intravenous PRN    hydrALAZINE (APRESOLINE) injection 5 mg  5 mg Intravenous Q6H PRN    lisinopril (ZESTRIL) tablet 10 mg  10 mg Oral Daily    nicotine (NICODERM CQ) 14 mg/24hr TD 24 hr patch 1 patch  1 patch Transdermal Daily    and PTA meds:   Prior to Admission Medications   Prescriptions Last Dose Informant Patient Reported? Taking?    Nutritional Supplements (NUTREN 1 5) LIQD Not Taking at Unknown time  Yes No   Si mL   acetaminophen (TYLENOL) 500 mg tablet Not Taking at Unknown time  Yes No   Sig: Take 500 mg by mouth every 6 (six) hours as needed for mild pain   chlorhexidine (PERIDEX) 0 12 % solution Not Taking at Unknown time  Yes No   Sig: Apply 15 mL topically   hydrocortisone 2 5 % cream Not Taking at Unknown time  Yes No   Sig: Apply to to affected areas 2-3 times daily   ibuprofen (MOTRIN) 600 mg tablet Not Taking at Unknown time  Yes No   Sig: Take by mouth every 6 (six) hours as needed for mild pain   morphine (MS CONTIN) 15 mg 12 hr tablet Not Taking at Unknown time  Yes No   Sig: Take by mouth   morphine (MSIR) 15 mg tablet Not Taking at Unknown time  Yes No   Sig: Take by mouth   neomycin-bacitracin-polymyxin b (NEOSPORIN) ointment Not Taking at Unknown time  No No   Sig: Apply topically 2 (two) times a day   Patient not taking: Reported on 11/2/2019   vitamin E, tocopherol, 1,000 units capsule Not Taking at Unknown time  Yes No   Sig: Take by mouth      Facility-Administered Medications: None       No Known Allergies    Objective   Vitals: Blood pressure (!) 171/67, pulse 65, temperature (!) 97 3 °F (36 3 °C), resp  rate 18, weight 50 3 kg (111 lb), SpO2 100 %  , Body mass index is 18 47 kg/m² , Orthostatic Blood Pressures      Most Recent Value   Blood Pressure  (!) 171/67 filed at 11/02/2019 0753            Intake/Output Summary (Last 24 hours) at 11/2/2019 0802  Last data filed at 11/2/2019 0630  Gross per 24 hour   Intake 0 ml   Output 525 ml   Net -525 ml       Invasive Devices     Peripheral Intravenous Line            Peripheral IV 11/01/19 Left Forearm less than 1 day                Physical Exam:    General:  AO x3, no acute distress  Cardiac:  S1-S2 normal  No murmurs, rubs or gallops, JVP: not seen  Lungs:  Clear to auscultation bilaterally, no wheezing or crackles    Abdomen:  Soft nontender nondistended, positive bowel sounds  Extremities:  Warm, , pulses difficult to palpate, no ulcers or rashes, no pedal edema  Neuro: Grossly nonfocal  Psych:  Normal affect      Lab Results:   Recent Results (from the past 24 hour(s))   CBC and differential    Collection Time: 11/01/19  9:01 PM   Result Value Ref Range    WBC 4 49 4 31 - 10 16 Thousand/uL    RBC 3 86 (L) 3 88 - 5 62 Million/uL    Hemoglobin 12 2 12 0 - 17 0 g/dL    Hematocrit 37 2 36 5 - 49 3 %    MCV 96 82 - 98 fL    MCH 31 6 26 8 - 34 3 pg    MCHC 32 8 31 4 - 37 4 g/dL    RDW 15 1 11 6 - 15 1 %    MPV 10 2 8 9 - 12 7 fL    Platelets 810 057 - 937 Thousands/uL    nRBC 0 /100 WBCs    Neutrophils Relative 70 43 - 75 %    Immat GRANS % 0 0 - 2 %    Lymphocytes Relative 16 14 - 44 %    Monocytes Relative 9 4 - 12 %    Eosinophils Relative 4 0 - 6 %    Basophils Relative 1 0 - 1 % Neutrophils Absolute 3 13 1 85 - 7 62 Thousands/µL    Immature Grans Absolute 0 01 0 00 - 0 20 Thousand/uL    Lymphocytes Absolute 0 72 0 60 - 4 47 Thousands/µL    Monocytes Absolute 0 42 0 17 - 1 22 Thousand/µL    Eosinophils Absolute 0 16 0 00 - 0 61 Thousand/µL    Basophils Absolute 0 05 0 00 - 0 10 Thousands/µL   Basic metabolic panel    Collection Time: 11/01/19  9:01 PM   Result Value Ref Range    Sodium 146 (H) 136 - 145 mmol/L    Potassium 4 2 3 5 - 5 3 mmol/L    Chloride 109 (H) 100 - 108 mmol/L    CO2 30 21 - 32 mmol/L    ANION GAP 7 4 - 13 mmol/L    BUN 19 5 - 25 mg/dL    Creatinine 0 86 0 60 - 1 30 mg/dL    Glucose 85 65 - 140 mg/dL    Calcium 9 2 8 3 - 10 1 mg/dL    eGFR 88 ml/min/1 73sq m   APTT    Collection Time: 11/01/19  9:20 PM   Result Value Ref Range    PTT 33 23 - 37 seconds   CBC    Collection Time: 11/01/19  9:20 PM   Result Value Ref Range    WBC 5 27 4 31 - 10 16 Thousand/uL    RBC 4 17 3 88 - 5 62 Million/uL    Hemoglobin 13 3 12 0 - 17 0 g/dL    Hematocrit 40 6 36 5 - 49 3 %    MCV 97 82 - 98 fL    MCH 31 9 26 8 - 34 3 pg    MCHC 32 8 31 4 - 37 4 g/dL    RDW 15 2 (H) 11 6 - 15 1 %    Platelets 702 767 - 147 Thousands/uL    MPV 10 9 8 9 - 12 7 fL   Protime-INR    Collection Time: 11/01/19  9:20 PM   Result Value Ref Range    Protime 14 8 (H) 11 6 - 14 5 seconds    INR 1 15 0 84 - 1 19   APTT    Collection Time: 11/02/19  3:17 AM   Result Value Ref Range    PTT >210 (HH) 23 - 37 seconds   CBC    Collection Time: 11/02/19  3:17 AM   Result Value Ref Range    WBC 4 69 4 31 - 10 16 Thousand/uL    RBC 3 74 (L) 3 88 - 5 62 Million/uL    Hemoglobin 11 8 (L) 12 0 - 17 0 g/dL    Hematocrit 36 0 (L) 36 5 - 49 3 %    MCV 96 82 - 98 fL    MCH 31 6 26 8 - 34 3 pg    MCHC 32 8 31 4 - 37 4 g/dL    RDW 15 2 (H) 11 6 - 15 1 %    Platelets 556 031 - 513 Thousands/uL    MPV 10 3 8 9 - 12 7 fL   Protime-INR    Collection Time: 11/02/19  3:17 AM   Result Value Ref Range    Protime 15 2 (H) 11 6 - 14 5 seconds INR 1 24 (H) 0 84 - 8 57   Basic metabolic panel    Collection Time: 11/02/19  3:57 AM   Result Value Ref Range    Sodium 142 136 - 145 mmol/L    Potassium 3 6 3 5 - 5 3 mmol/L    Chloride 109 (H) 100 - 108 mmol/L    CO2 27 21 - 32 mmol/L    ANION GAP 6 4 - 13 mmol/L    BUN 16 5 - 25 mg/dL    Creatinine 0 80 0 60 - 1 30 mg/dL    Glucose 101 65 - 140 mg/dL    Calcium 9 2 8 3 - 10 1 mg/dL    eGFR 91 ml/min/1 73sq m       Imaging: I have personally reviewed pertinent reports

## 2019-11-02 NOTE — ASSESSMENT & PLAN NOTE
· During encounter, pt only intermittently was appropriate in answering questions--?baseline    · Daughter reported that pt acts strangely sometimes, example given that he has urinated on a window at her home in the past  These types of behaviors have been occurring for at least one year    · Denies diagnosis of dementia  · Consider gerontology consult at some point

## 2019-11-02 NOTE — ASSESSMENT & PLAN NOTE
· Per daughter, pt diagnosed with throat CA 2 years ago and has been treated with chemo and radiation  · Not undergoing treatment at this time

## 2019-11-02 NOTE — H&P
H&P- Marcel Rivera 1950, 78 y o  male MRN: [id number]    Unit/Bed#: -01 Encounter: [id number]    Primary Care Provider: Leticia Hale MD   Date and time admitted to hospital: 11/2/2019  2:01 AM        * Peripheral vascular disease ([facility] [address] )  Assessment & Plan  · Pt presents with 2-month history of LLE pain with walking  Denies pain at rest, but pt is unable to walk more than a few feet without pain  · CTA: Severe peripheral vascular disease  Limited evaluation of the calf vessels due to extensive calcifications  Severe areas of narrowing and focal occlusion of the right superficial femoral artery and diffuse occlusion of the popliteal artery   Distal reconstitution of the right peroneal artery and scattered areas of the right posterior tibial artery  Occlusion of the left common femoral artery and left superficial femoral artery with distal reconstitution however severe narrowing of the mid to distal superficial femoral artery   Severe narrowing of the left popliteal artery  Patent left peroneal artery and scattered areas of the left posterior tibial artery  · Pt seen by vascular surgery residents in room  They found 2+ R femoral and 1+ L femoral pulses; doppler was unable to find pedal pulses b/l  They suspect that this is a chronic issue  · Scars on RLE suggest previous surgeries, but pt and daughter unable to provide details except that pt has "circulation problems"  · Heparin gtt  · Formal vascular surgery consult placed, recommendations appreciated    Elevated blood-pressure reading without diagnosis of hypertension  Assessment & Plan  · /80 on arrival  · Pt's daughter denies hx of hypertension  · Pt does not take any home meds  · Hydralazine 5mg prn for BP>180  · Consider starting lisinopril and/or HCTZ     Confusion  Assessment & Plan  · During encounter, pt only sometimes answers daughter's questions appropriately  She says that this is his baseline     · Daughter reports that pt acts strangely sometimes, example given that he has urinated on a window at her home in the past  These types of behaviors have been occurring for at least one year  · Denies diagnosis of dementia  · Consider gerontology consult    History of throat cancer  Assessment & Plan  · Per daughter, pt diagnosed with throat CA 2 years ago and has been treated with chemo and radiation  · Not undergoing treatment at this time    Dependence on nicotine from cigarettes  Assessment & Plan  · Still smokes despite diagnosis of throat cancer, per daughter  · She thinks he smokes 1ppd  · Nicotine patch  · Provide smoking cessation education        VTE Prophylaxis: Heparin Drip  / sequential compression device   Code Status: Full Code  POLST: POLST form is not discussed and not completed at this time  Discussion with family: daughter at bedside    Anticipated Length of Stay:  Patient will be admitted on an Inpatient basis with an anticipated length of stay of  At least 2 midnights  Justification for Hospital Stay: vascular workup needed, possible intervention    Total Time for Visit, including Counseling / Coordination of Care: 45 minutes  Greater than 50% of this total time spent on direct patient counseling and coordination of care  Chief Complaint:   LLE pain x 2 months    History of Present Illness:    Rogelio Jain is a 78 y o  male with "circulation problems" and previous diagnosis of throat CA not currently being treated who presents with worsening LLE pain x 2 months  Pt is Slovak-speaking only, so the history was obtained from his daughter  Pt lives in [facility] and comes back to the [facility] about every 3 months for doctor appointments  He was scheduled to come back in December, but returned early due to his leg pain  Pt is unable to walk more than a few feet without pain   He denies pain at rest  Pt is poor historian, and would not always answer his daughter's questions during the encounter (she reports this is his baseline for the past year)  He has surgical scars on his RLE, but it is unknown what surgery he had or when  CTA shows extensive peripheral vascular disease bilaterally, worse on the left side  Review of Systems:    Reason ROS unable to be performed: Dementia    Past Medical and Surgical History:     Past Medical History:   Diagnosis Date    Cancer ([facility] )     throat cancer    PAD (peripheral artery disease) ([facility] ) 11/2/2019    PEG (percutaneous endoscopic gastrostomy) status ([place] [id number] )     Port-A-Cath in place        Past Surgical History:   Procedure Laterality Date    BYPASS FEMORAL-FEMORAL Right     ESOPHAGOSCOPY N/A 8/24/2017    Procedure: ESOPHAGOSCOPY FLEXIBLE;  Surgeon: Meghna Sanz MD;  [facility];  Service: ENT    [place] [address] N/A 8/24/2017    Procedure: Bronchoscopy;  Surgeon: Meghna Sanz MD;  [facility];  Service: ENT    [place] LARYNGOSCOPY,DIRCT,OP,BIOPSY N/A 8/24/2017    Procedure: LARYNGOSCOPY DIRECT;  Surgeon: Meghna Sanz MD;  [facility];  Service: ENT    TOE AMPUTATION Right     2 toes       Meds/Allergies:    Prior to Admission medications    Medication Sig Start Date End Date Taking?  Authorizing Provider   acetaminophen (TYLENOL) 500 mg tablet Take 500 mg by mouth every 6 (six) hours as needed for mild pain    Historical Provider, MD   chlorhexidine (PERIDEX) 0 12 % solution Apply 15 mL topically 6/6/18   Historical Provider, MD   hydrocortisone 2 5 % cream Apply to to affected areas 2-3 times daily 3/5/18   Historical Provider, MD   ibuprofen (MOTRIN) 600 mg tablet Take by mouth every 6 (six) hours as needed for mild pain    Historical Provider, MD   morphine (MS CONTIN) 15 mg 12 hr tablet Take by mouth 5/15/18   Historical Provider, MD   morphine (MSIR) 15 mg tablet Take by mouth 5/15/18   Historical Provider, MD   neomycin-bacitracin-polymyxin b (NEOSPORIN) ointment Apply topically 2 (two) times a day  Patient not taking: Reported on 11/2/2019 8/6/18   Karlene Vizcarra MD   Nutritional Supplements (NUTREN 1 5) LIQD 250 mL 4/13/18   Historical Provider, MD   vitamin E, tocopherol, 1,000 units capsule Take by mouth 6/6/18   Historical Provider, MD     I have reviewed home medications with patient family member  Allergies: No Known Allergies    Social History:     Marital Status: Single   Occupation: Retired  Patient Pre-hospital Living Situation: lives with daughter in [address] [place] [address],[address], or with other family members in [facility]  Patient Pre-hospital Level of Mobility: no AD used  Patient Pre-hospital Diet Restrictions: none  Substance Use History:   Social History     Substance and Sexual Activity   Alcohol Use No     Social History     Tobacco Use   Smoking Status Current Every Day Smoker    Packs/day: 1 00    Types: Cigarettes   Smokeless Tobacco Never Used   Tobacco Comment    50+ years     Social History     Substance and Sexual Activity   Drug Use No       Family History:    No family history on file  Physical Exam:     Vitals:   Blood Pressure: (!) 196/80 (11/02/19 0209)  Pulse: 60 (11/02/19 0209)  Temperature: 97 7 °F (36 5 °C) (11/02/19 0209)  Respirations: 18 (11/02/19 0209)  Weight - Scale: 50 3 kg (111 lb) (11/02/19 0209)  SpO2: 100 % (11/02/19 0209)    Physical Exam   Constitutional: He appears well-developed  No distress  HENT:   Head: Normocephalic and atraumatic  Right Ear: External ear normal    Left Ear: External ear normal    Eyes: Pupils are equal, round, and reactive to light  Conjunctivae and EOM are normal    Neck: Normal range of motion  Neck supple  Cardiovascular: Normal rate, regular rhythm and normal heart sounds  UE distal pulses intact, LE distal pulses unable to be found on doppler   Pulmonary/Chest: Effort normal and breath sounds normal  No respiratory distress  Abdominal: Soft  Bowel sounds are normal  He exhibits no distension  There is no tenderness     Musculoskeletal: Normal range of motion  He exhibits no edema or tenderness  Neurological: He is alert  Pt did not answer questions regarding orientation   Skin: Skin is dry  He is not diaphoretic  Cool lower extremities b/l   Psychiatric:   Intermittently responded to daughter's questions  Difficult to redirect when he wanted to get out of bed; he did not like having to use the bedside urinal    Vitals reviewed  Additional Data:     Lab Results: I have personally reviewed pertinent reports  Results from last 7 days   Lab Units 11/02/19  0317  11/01/19  2101   WBC Thousand/uL 4 69   < > 4 49   HEMOGLOBIN g/dL 11 8*   < > 12 2   HEMATOCRIT % 36 0*   < > 37 2   PLATELETS Thousands/uL 170   < > 180   NEUTROS PCT %  --   --  70   LYMPHS PCT %  --   --  16   MONOS PCT %  --   --  9   EOS PCT %  --   --  4    < > = values in this interval not displayed  Results from last 7 days   Lab Units 11/01/19  2101   SODIUM mmol/L 146*   POTASSIUM mmol/L 4 2   CHLORIDE mmol/L 109*   CO2 mmol/L 30   BUN mg/dL 19   CREATININE mg/dL 0 86   ANION GAP mmol/L 7   CALCIUM mg/dL 9 2   GLUCOSE RANDOM mg/dL 85     Results from last 7 days   Lab Units 11/01/19  2120   INR  1 15                   Imaging: I have personally reviewed pertinent reports  No orders to display       EKG, Pathology, and Other Studies Reviewed on Admission:   · CTA    Allscripts / Epic Records Reviewed: Yes     ** Please Note: This note has been constructed using a voice recognition system   **

## 2019-11-02 NOTE — ASSESSMENT & PLAN NOTE
· Pt presented with 2-month history of LLE pain with walking  Denies pain at rest, but pt is unable to walk more than a few feet without pain  · CTA: Severe peripheral vascular disease  Limited evaluation of the calf vessels due to extensive calcifications  Severe areas of narrowing and focal occlusion of the right superficial femoral artery and diffuse occlusion of the popliteal artery   Distal reconstitution of the right peroneal artery and scattered areas of the right posterior tibial artery  Occlusion of the left common femoral artery and left superficial femoral artery with distal reconstitution however severe narrowing of the mid to distal superficial femoral artery   Severe narrowing of the left popliteal artery  Patent left peroneal artery and scattered areas of the left posterior tibial artery  · Pt seen by vascular surgery--They found 2+ R femoral and 1+ L femoral pulses; doppler was unable to find pedal pulses b/l  They suspect that this is a chronic issue  · Scars on RLE suggest previous surgeries, but pt and daughter unable to provide details except that pt has "circulation problems"  · Heparin gtt, asa/statin  · F/u vascular surgery input--likely will require vascular surgery but needs cardiac clearance, consult appreciated   · Echo, EKG pending   MONY sage

## 2019-11-02 NOTE — ASSESSMENT & PLAN NOTE
· Still smokes despite diagnosis of throat cancer, per daughter  · She thinks he smokes 1ppd  · Nicotine patch  · Provide smoking cessation education

## 2019-11-03 ENCOUNTER — APPOINTMENT (INPATIENT)
Dept: NON INVASIVE DIAGNOSTICS | Facility: HOSPITAL | Age: 69
DRG: 252 | End: 2019-11-03
Payer: MEDICARE

## 2019-11-03 LAB
ALBUMIN SERPL BCP-MCNC: 3.4 G/DL (ref 3.5–5)
ALP SERPL-CCNC: 87 U/L (ref 46–116)
ALT SERPL W P-5'-P-CCNC: 13 U/L (ref 12–78)
ANION GAP SERPL CALCULATED.3IONS-SCNC: 7 MMOL/L (ref 4–13)
APTT PPP: 147 SECONDS (ref 23–37)
APTT PPP: 147 SECONDS (ref 23–37)
APTT PPP: 56 SECONDS (ref 23–37)
AST SERPL W P-5'-P-CCNC: 12 U/L (ref 5–45)
ATRIAL RATE: 45 BPM
ATRIAL RATE: 56 BPM
ATRIAL RATE: 58 BPM
ATRIAL RATE: 59 BPM
BASOPHILS # BLD AUTO: 0.02 THOUSANDS/ΜL (ref 0–0.1)
BASOPHILS NFR BLD AUTO: 0 % (ref 0–1)
BILIRUB SERPL-MCNC: 0.39 MG/DL (ref 0.2–1)
BUN SERPL-MCNC: 19 MG/DL (ref 5–25)
CALCIUM SERPL-MCNC: 8.9 MG/DL (ref 8.3–10.1)
CHLORIDE SERPL-SCNC: 108 MMOL/L (ref 100–108)
CO2 SERPL-SCNC: 26 MMOL/L (ref 21–32)
CREAT SERPL-MCNC: 1 MG/DL (ref 0.6–1.3)
EOSINOPHIL # BLD AUTO: 0.16 THOUSAND/ΜL (ref 0–0.61)
EOSINOPHIL NFR BLD AUTO: 3 % (ref 0–6)
ERYTHROCYTE [DISTWIDTH] IN BLOOD BY AUTOMATED COUNT: 15.1 % (ref 11.6–15.1)
EST. AVERAGE GLUCOSE BLD GHB EST-MCNC: 105 MG/DL
GFR SERPL CREATININE-BSD FRML MDRD: 76 ML/MIN/1.73SQ M
GLUCOSE SERPL-MCNC: 98 MG/DL (ref 65–140)
HBA1C MFR BLD: 5.3 % (ref 4.2–6.3)
HCT VFR BLD AUTO: 38.8 % (ref 36.5–49.3)
HGB BLD-MCNC: 12.5 G/DL (ref 12–17)
IMM GRANULOCYTES # BLD AUTO: 0.02 THOUSAND/UL (ref 0–0.2)
IMM GRANULOCYTES NFR BLD AUTO: 0 % (ref 0–2)
LYMPHOCYTES # BLD AUTO: 0.48 THOUSANDS/ΜL (ref 0.6–4.47)
LYMPHOCYTES NFR BLD AUTO: 9 % (ref 14–44)
MCH RBC QN AUTO: 31.4 PG (ref 26.8–34.3)
MCHC RBC AUTO-ENTMCNC: 32.2 G/DL (ref 31.4–37.4)
MCV RBC AUTO: 98 FL (ref 82–98)
MONOCYTES # BLD AUTO: 0.27 THOUSAND/ΜL (ref 0.17–1.22)
MONOCYTES NFR BLD AUTO: 5 % (ref 4–12)
NEUTROPHILS # BLD AUTO: 4.43 THOUSANDS/ΜL (ref 1.85–7.62)
NEUTS SEG NFR BLD AUTO: 83 % (ref 43–75)
NRBC BLD AUTO-RTO: 0 /100 WBCS
P AXIS: 52 DEGREES
P AXIS: 54 DEGREES
P AXIS: 62 DEGREES
P AXIS: 66 DEGREES
PLATELET # BLD AUTO: 185 THOUSANDS/UL (ref 149–390)
PMV BLD AUTO: 11.1 FL (ref 8.9–12.7)
POTASSIUM SERPL-SCNC: 4 MMOL/L (ref 3.5–5.3)
PR INTERVAL: 188 MS
PR INTERVAL: 188 MS
PR INTERVAL: 192 MS
PROT SERPL-MCNC: 6.8 G/DL (ref 6.4–8.2)
QRS AXIS: -28 DEGREES
QRS AXIS: -29 DEGREES
QRS AXIS: -29 DEGREES
QRS AXIS: 5 DEGREES
QRSD INTERVAL: 100 MS
QRSD INTERVAL: 72 MS
QRSD INTERVAL: 96 MS
QRSD INTERVAL: 96 MS
QT INTERVAL: 417 MS
QT INTERVAL: 418 MS
QT INTERVAL: 425 MS
QT INTERVAL: 429 MS
QTC INTERVAL: 340 MS
QTC INTERVAL: 414 MS
QTC INTERVAL: 414 MS
QTC INTERVAL: 418 MS
RBC # BLD AUTO: 3.98 MILLION/UL (ref 3.88–5.62)
SODIUM SERPL-SCNC: 141 MMOL/L (ref 136–145)
T WAVE AXIS: 28 DEGREES
T WAVE AXIS: 29 DEGREES
T WAVE AXIS: 35 DEGREES
T WAVE AXIS: 36 DEGREES
VENTRICULAR RATE: 40 BPM
VENTRICULAR RATE: 56 BPM
VENTRICULAR RATE: 58 BPM
VENTRICULAR RATE: 59 BPM
WBC # BLD AUTO: 5.38 THOUSAND/UL (ref 4.31–10.16)

## 2019-11-03 PROCEDURE — 83036 HEMOGLOBIN GLYCOSYLATED A1C: CPT | Performed by: PHYSICIAN ASSISTANT

## 2019-11-03 PROCEDURE — 97163 PT EVAL HIGH COMPLEX 45 MIN: CPT

## 2019-11-03 PROCEDURE — 85730 THROMBOPLASTIN TIME PARTIAL: CPT | Performed by: FAMILY MEDICINE

## 2019-11-03 PROCEDURE — 99233 SBSQ HOSP IP/OBS HIGH 50: CPT | Performed by: INTERNAL MEDICINE

## 2019-11-03 PROCEDURE — 99232 SBSQ HOSP IP/OBS MODERATE 35: CPT | Performed by: SURGERY

## 2019-11-03 PROCEDURE — 85025 COMPLETE CBC W/AUTO DIFF WBC: CPT | Performed by: PHYSICIAN ASSISTANT

## 2019-11-03 PROCEDURE — 80053 COMPREHEN METABOLIC PANEL: CPT | Performed by: PHYSICIAN ASSISTANT

## 2019-11-03 PROCEDURE — G8979 MOBILITY GOAL STATUS: HCPCS

## 2019-11-03 PROCEDURE — 93010 ELECTROCARDIOGRAM REPORT: CPT | Performed by: INTERNAL MEDICINE

## 2019-11-03 PROCEDURE — 93306 TTE W/DOPPLER COMPLETE: CPT | Performed by: INTERNAL MEDICINE

## 2019-11-03 PROCEDURE — 99232 SBSQ HOSP IP/OBS MODERATE 35: CPT | Performed by: INTERNAL MEDICINE

## 2019-11-03 PROCEDURE — 93306 TTE W/DOPPLER COMPLETE: CPT

## 2019-11-03 PROCEDURE — NC001 PR NO CHARGE: Performed by: SURGERY

## 2019-11-03 PROCEDURE — 93005 ELECTROCARDIOGRAM TRACING: CPT

## 2019-11-03 PROCEDURE — G8978 MOBILITY CURRENT STATUS: HCPCS

## 2019-11-03 PROCEDURE — 85730 THROMBOPLASTIN TIME PARTIAL: CPT | Performed by: ANESTHESIOLOGY

## 2019-11-03 PROCEDURE — NC001 PR NO CHARGE: Performed by: INTERNAL MEDICINE

## 2019-11-03 RX ORDER — POLYETHYLENE GLYCOL 3350 17 G/17G
17 POWDER, FOR SOLUTION ORAL DAILY
Status: DISCONTINUED | OUTPATIENT
Start: 2019-11-03 | End: 2019-11-04

## 2019-11-03 RX ORDER — BISACODYL 10 MG
10 SUPPOSITORY, RECTAL RECTAL DAILY PRN
Status: DISCONTINUED | OUTPATIENT
Start: 2019-11-03 | End: 2019-11-24 | Stop reason: HOSPADM

## 2019-11-03 RX ORDER — AMOXICILLIN 250 MG
1 CAPSULE ORAL 2 TIMES DAILY
Status: DISCONTINUED | OUTPATIENT
Start: 2019-11-03 | End: 2019-11-24 | Stop reason: HOSPADM

## 2019-11-03 RX ORDER — AMLODIPINE BESYLATE 2.5 MG/1
2.5 TABLET ORAL DAILY
Status: DISCONTINUED | OUTPATIENT
Start: 2019-11-03 | End: 2019-11-04

## 2019-11-03 RX ORDER — TAMSULOSIN HYDROCHLORIDE 0.4 MG/1
0.4 CAPSULE ORAL
Status: DISCONTINUED | OUTPATIENT
Start: 2019-11-03 | End: 2019-11-24 | Stop reason: HOSPADM

## 2019-11-03 RX ORDER — HYDRALAZINE HYDROCHLORIDE 20 MG/ML
10 INJECTION INTRAMUSCULAR; INTRAVENOUS EVERY 6 HOURS PRN
Status: DISCONTINUED | OUTPATIENT
Start: 2019-11-03 | End: 2019-11-08

## 2019-11-03 RX ADMIN — ATORVASTATIN CALCIUM 40 MG: 40 TABLET, FILM COATED ORAL at 17:06

## 2019-11-03 RX ADMIN — SODIUM CHLORIDE, SODIUM GLUCONATE, SODIUM ACETATE, POTASSIUM CHLORIDE AND MAGNESIUM CHLORIDE 100 ML/HR: 526; 502; 368; 37; 30 INJECTION, SOLUTION INTRAVENOUS at 20:07

## 2019-11-03 RX ADMIN — HYDRALAZINE HYDROCHLORIDE 10 MG: 20 INJECTION INTRAMUSCULAR; INTRAVENOUS at 23:15

## 2019-11-03 RX ADMIN — HYDRALAZINE HYDROCHLORIDE 10 MG: 20 INJECTION INTRAMUSCULAR; INTRAVENOUS at 17:36

## 2019-11-03 RX ADMIN — HEPARIN SODIUM 2000 UNITS: 1000 INJECTION, SOLUTION INTRAVENOUS; SUBCUTANEOUS at 00:49

## 2019-11-03 RX ADMIN — HEPARIN SODIUM 2000 UNITS: 1000 INJECTION, SOLUTION INTRAVENOUS; SUBCUTANEOUS at 15:09

## 2019-11-03 RX ADMIN — NICOTINE 1 PATCH: 14 PATCH TRANSDERMAL at 08:24

## 2019-11-03 RX ADMIN — TAMSULOSIN HYDROCHLORIDE 0.4 MG: 0.4 CAPSULE ORAL at 17:06

## 2019-11-03 RX ADMIN — ASPIRIN 81 MG: 81 TABLET, COATED ORAL at 08:23

## 2019-11-03 RX ADMIN — SENNOSIDES AND DOCUSATE SODIUM 1 TABLET: 8.6; 5 TABLET ORAL at 17:06

## 2019-11-03 RX ADMIN — SENNOSIDES AND DOCUSATE SODIUM 1 TABLET: 8.6; 5 TABLET ORAL at 09:00

## 2019-11-03 RX ADMIN — POLYETHYLENE GLYCOL 3350 17 G: 17 POWDER, FOR SOLUTION ORAL at 09:00

## 2019-11-03 RX ADMIN — AMLODIPINE BESYLATE 2.5 MG: 2.5 TABLET ORAL at 17:40

## 2019-11-03 RX ADMIN — SODIUM CHLORIDE, SODIUM GLUCONATE, SODIUM ACETATE, POTASSIUM CHLORIDE AND MAGNESIUM CHLORIDE 100 ML/HR: 526; 502; 368; 37; 30 INJECTION, SOLUTION INTRAVENOUS at 10:07

## 2019-11-03 NOTE — PHYSICAL THERAPY NOTE
Physical Therapy Evaluation:    2 forms of pt ID verified:name,birthdate and pt ID caroline    Patient's Name: Rogelio Jain    Admitting Diagnosis  Femoral artery occlusion ([place] [address] ) [E99 [id number]]    Problem List  Patient Active Problem List   Diagnosis    Peripheral vascular disease ([place] [address] )    History of throat cancer    Elevated blood-pressure reading without diagnosis of hypertension    Confusion    Dependence on nicotine from cigarettes       Past Medical History  Past Medical History:   Diagnosis Date    Cancer ([place] [address] )     throat cancer    PAD (peripheral artery disease) ([place] [address] ) 11/2/2019    PEG (percutaneous endoscopic gastrostomy) status ([place] [address] )     Port-A-Cath in place        Past Surgical History  Past Surgical History:   Procedure Laterality Date    BYPASS FEMORAL-FEMORAL Right     ESOPHAGOSCOPY N/A 8/24/2017    Procedure: ESOPHAGOSCOPY FLEXIBLE;  Surgeon: Idania Moore MD;  [facility];  Service: ENT    NE [address] N/A 8/24/2017    Procedure: Bronchoscopy;  Surgeon: Idania Moore MD;  [facility];  Service: ENT    NE LARYNGOSCOPY,DIRCT,OP,BIOPSY N/A 8/24/2017    Procedure: LARYNGOSCOPY DIRECT;  Surgeon: Idania Moore MD;  [facility];  Service: ENT    TOE AMPUTATION Right     2 toes          11/03/19 1100   Note Type   Note type Eval only   Pain Assessment   Pain Assessment 0-10   Pain Score 6   Pain Type Acute pain;Chronic pain   Pain Location Leg   Pain Orientation Left   Hospital Pain Intervention(s) Repositioned; Ambulation/increased activity; Emotional support; Rest   Home Living   Type of [address] One level;Stairs to enter with rails; Able to live on main level with bedroom/bathroom; Performs ADLs on one level  (ranch style home per pt's daughter,6 [place]AKOT[address])   Home Equipment Cane;Walker  (owns [facility] and rollator;no use of DME PTA)   Additional Comments pt currently lives with daughter and family,no use of personal DME PTA,reports no recent falls and reports being (I) PTA   Prior Function   Level of [place] Independent with ADLs and functional mobility  (per pt PTA)   Lives With Daughter;Family  (daughter A at all times as needed (24/7))   Receives Help From Family  (as needed PTA)   ADL Assistance Independent   IADLs Needs assistance   Falls in the last 6 months 0   Restrictions/Precautions   Other Precautions Multiple lines;Telemetry; Fall Risk;Pain  (language barrier (primary language Bulgarian))   General   Additional Pertinent History femoral artery occlusion,PVD   Family/Caregiver Present Yes  (daughter and family)   Cognition   Overall Cognitive Status Impaired   Arousal/Participation Cooperative   Orientation Level Oriented to person;Oriented to place;Oriented to time;Oriented to situation   Following Commands Follows one step commands with increased time or repetition  (2* inc pain LLE following mobility,language barrier,slow mob)   RLE Assessment   RLE Assessment   (4/5 grossly throughout)   LLE Assessment   LLE Assessment   (4/5 grossly throughout)   Coordination   Movements are Fluid and Coordinated 0   Coordination and Movement Description ataxic and unsteady,slow mobility,inc LLE pain following and during mobility   Sensation [facility]   Light Touch   RLE Light Touch Grossly intact   LLE Light Touch Grossly intact   Bed Mobility   Supine to Sit Unable to assess  (pt sitting in chair pre and post mobility)   Transfers   Sit to Stand 4  Minimal assistance   Additional items Assist x 1; Armrests; Increased time required;Verbal cues   Stand to Sit 4  Minimal assistance   Additional items Assist x 1; Armrests; Increased time required;Verbal cues  (for safety,education and control descent)   Ambulation/Elevation   Gait pattern Antalgic;Narrow DOMINICK; Forward Flexion; Shuffling; Inconsistent kusum; Foward flexed; Short stride; Ataxia   Gait Assistance 4  Minimal assist   Additional items Assist x 1;Verbal cues; Tactile cues  (2nd personal A for line management)   Assistive Device Other (Comment); Rolling walker  (use of IV pole for 1st 100 feet and RW for last 100 feet)   Distance 100 feet x2 with 1st 100 feet use of IV pole and 2nd 100 feet with use of RW  Balance   Static Sitting Good  (in chair pre and post mobility)   Dynamic Sitting Poor +   Static Standing Poor +   Dynamic Standing Poor +   Ambulatory Poor +   Endurance Deficit   Endurance Deficit Yes   Endurance Deficit Description weakness,pain,fatigue   Activity Tolerance   Activity Tolerance Patient limited by fatigue;Patient limited by pain  (fair)   Nurse Made Aware yes   Assessment   Prognosis Fair   Problem List Decreased strength; Impaired balance;Decreased endurance;Decreased mobility; Decreased skin integrity;Pain  (primary language Filipino)   Assessment Pt is a 79 y/o male admitted to [facility] 28 PVD,femoral artery occulsion  Pt lives with daughter and family in ranch style home,(+)DAKOTAH,reports no use of personal DME PTA,reports being completely (I) PTA and reports no recent falls  Pt currently is not at functional mobility baseline,needs Ax1 for mobility and use of RW,inc LLE pain during and following mobility,telemetry monitoring,ataxic and unsteady gait pattern,multiple lines,currently being medically treated in critical care P5,primary language Filipino and ongoing medical care  Pt demonstrates minimal deficits during functional mobility and gait including dec endurance,dec balance,dec BLE strength,ataxic and unsteady gait pattern,inc LLE pain and needs minAx1 for transfers and gait with use of RW  Pt would cont to benefit from skilled inpt PT services to maximize functional independence     Barriers to Discharge Inaccessible home environment  (DAKOTAH)   Goals   Patient Goals to dec the leg pain   STG Expiration Date 11/13/19   Short Term Goal #1 in 7-10 days:(1) Pt will be able to ambulate greater than 300 feet with use of appropriate DME on various surfaces needing S level of A in order to A pt to return to PLOF, (2) activity tolerance:45 mins/45mins, (3) pt will be able to perform sit to stand transfers needing S level of A to and from various surfaces consistently in order to return to PLOF, (4) pt will be able to perform BM needing S level of A to A pt to return to PLOF, (5) (I) with BLE therapeutic ex HEP in various positions to A pt to inc balance,strength,mobility,endurance and to A to dec pain, (6) inc balance 1/2 grade in order to dec fall risk, (7) pt will be able to go up and down 6 steps  needing minAx1->S level of A in order to navigate DAKOTAH as able and as needed prior to D/C, (8) cont to provide pt and pt family education for safe D/C planning, (9) inc BLE strength 1/2 to 1 full grade in order to A pt to inc balance,strength,mobility,endurance and to A to dec pain   PT Treatment Day 0   Plan   Treatment/Interventions Functional transfer training;LE strengthening/ROM; Elevations; Therapeutic exercise; Endurance training;Patient/family training;Equipment eval/education; Bed mobility;Gait training;Spoke to nursing;Family   PT Frequency Other (Comment)  (3-5x/week)   Recommendation   Recommendation Home with family support   Equipment Recommended Walker  (current use of RW for mobility)   Barthel Index   Feeding 10   Bathing 0   Grooming Score 5   Dressing Score 5   Bladder Score 10   Bowels Score 10   Toilet Use Score 5   Transfers (Bed/Chair) Score 10   Mobility (Level Surface) Score 0   Stairs Score 0   Barthel Index Score 55       @Karen Mendes, PT, DPT@

## 2019-11-03 NOTE — CONSULTS
Please refer to consult as completed 11/02/19 by LISA Martell MD- PGY2/ Dr Brigid Cam PA-C  11/3/2019

## 2019-11-03 NOTE — PLAN OF CARE
Problem: PHYSICAL THERAPY ADULT  Goal: Performs mobility at highest level of function for planned discharge setting  See evaluation for individualized goals  Description  Treatment/Interventions: Functional transfer training, LE strengthening/ROM, Elevations, Therapeutic exercise, Endurance training, Patient/family training, Equipment eval/education, Bed mobility, Gait training, Spoke to nursing, Family  Equipment Recommended: Walker(current use of RW for mobility)       See flowsheet documentation for full assessment, interventions and recommendations  Note:   Prognosis: Fair  Problem List: Decreased strength, Impaired balance, Decreased endurance, Decreased mobility, Decreased skin integrity, Pain(primary language Swedish)  Assessment: Pt is a 79 y/o male admitted to [facility] 28 PVD,femoral artery occulsion  Pt lives with daughter and family in ranch style home,(+)DAKOTAH,reports no use of personal DME PTA,reports being completely (I) PTA and reports no recent falls  Pt currently is not at functional mobility baseline,needs Ax1 for mobility and use of RW,inc LLE pain during and following mobility,telemetry monitoring,ataxic and unsteady gait pattern,multiple lines,currently being medically treated in critical care P5,primary language Swedish and ongoing medical care  Pt demonstrates minimal deficits during functional mobility and gait including dec endurance,dec balance,dec BLE strength,ataxic and unsteady gait pattern,inc LLE pain and needs minAx1 for transfers and gait with use of RW  Pt would cont to benefit from skilled inpt PT services to maximize functional independence  Barriers to Discharge: Inaccessible home environment(DAKOTAH)     Recommendation: Home with family support          See flowsheet documentation for full assessment

## 2019-11-03 NOTE — PROGRESS NOTES
Cardiology Progress Note - Rogelio Jain 78 y o  male MRN: [id number]    Unit/Bed#: [facility] 515-01 Encounter: [id number]      Assessment:  Principal Problem:    Peripheral vascular disease ([address] )  Active Problems:    History of throat cancer    Elevated blood-pressure reading without diagnosis of hypertension    Confusion    Dependence on nicotine from cigarettes      Plan:    1  Preoperative cardiovascular risk assessment - Overall intermediate risk for a vascular surgery of his lower extremity, assumed an open surgery  He has no cardiac history, but has multiple risk factors, coronary calcifications on CT scan and unclear what his functional status is  I do agree with starting high-intensity statin, but we will hold off on beta-blocker therapy at this point given his vagal event and bradycardia  No changes made today  2  Vasovagal syncope - Patient had an episode of syncope while in extreme pain yesterday  ECG shows sinus bradycardia with second-degree AV block, Dave 1  This was transient and repeat ECG was normal   Telemetry unremarkable since  Echocardiogram shows normal LV systolic function without significant valve disease  3  PAD yosvany CAD - His CAD is presumed based on coronary calcifications  I would start anti-platelet therapy, at least with aspirin but will leave the timing per vascular surgery  We will review his echocardiogram     4  HTN - Agree with amlodipine and lisinopril, and he is hypertensive as he has not received yet  Avoid beta-blocker for now  Subjective:   Patient seen and examined  No significant events overnight  Patient had a syncopal episode during extreme pain yesterday that appeared to be a vagal event  He was transferred to ICU for closer observation  No recurrent events  Patient without symptoms from a cardiac standpoint currently  Objective:     Vitals: Blood pressure 152/65, pulse 58, temperature 97 6 °F (36 4 °C), temperature source Oral, resp   rate 18, height 5' 5" (1 651 m), weight 50 3 kg (111 lb), SpO2 100 %  , Body mass index is 18 47 kg/m² ,   Orthostatic Blood Pressures      Most Recent Value   Blood Pressure  152/65 filed at 11/03/2019 0543            Intake/Output Summary (Last 24 hours) at 11/3/2019 1455  Last data filed at 11/3/2019 1435  Gross per 24 hour   Intake 2305 13 ml   Output 1635 ml   Net 670 13 ml       No significant arrhythmias seen on telemetry review  Normal sinus rhythm overnight in intensive care  Physical Exam:    GEN: Suraj Patrick appears well, alert and oriented x 3, pleasant and cooperative   HEENT: pupils equal, round, and reactive to light; extraocular muscles intact  NECK: supple, no carotid bruit    No JVD s   HEART: regular rhythm, distant S1 and S2, no murmurs, clicks, gallops or rubs   LUNGS:  Diminished bilaterally; no wheezes, rales, or rhonchi   ABDOMEN: normal bowel sounds, soft, no tenderness, no distention  EXTREMITIES: peripheral pulses diminished; no clubbing, cyanosis, or edema  NEURO: no focal findings   SKIN: normal without suspicious lesions on exposed skin    Medications:      Current Facility-Administered Medications:     acetaminophen (TYLENOL) tablet 650 mg, 650 mg, Oral, Q6H PRN, Valdez Ross PA-C    aspirin (ECOTRIN LOW STRENGTH) EC tablet 81 mg, 81 mg, Oral, Daily, Purvi Bella PA-C, 81 mg at 11/03/19 4915    atorvastatin (LIPITOR) tablet 40 mg, 40 mg, Oral, Daily With Christian Harrell PA-C, 40 mg at 11/02/19 1716    bisacodyl (DULCOLAX) rectal suppository 10 mg, 10 mg, Rectal, Daily PRN, April Salvdaor PA-C    fentanyl citrate (PF) 100 MCG/2ML 50 mcg, 50 mcg, Intravenous, Q2H PRN, Valdez Ross PA-C    heparin (porcine) 25,000 units in 250 mL infusion (premix), 3-30 Units/kg/hr (Order-Specific), Intravenous, Titrated, Valdez Ross PA-C, Last Rate: 8 mL/hr at 11/03/19 0047, 16 Units/kg/hr at 11/03/19 0047    heparin (porcine) injection 2,000 Units, 2,000 Units, Intravenous, PRN, Jessica Rao PA-C, 2,000 Units at 11/03/19 0049    heparin (porcine) injection 4,000 Units, 4,000 Units, Intravenous, PRN, Jessica Rao PA-C    multi-electrolyte (PLASMALYTE-A/ISOLYTE-S PH 7 4) IV solution, 100 mL/hr, Intravenous, Continuous, Frances Ross PA-C, Last Rate: 100 mL/hr at 11/03/19 1007, 100 mL/hr at 11/03/19 1007    nicotine (NICODERM CQ) 14 mg/24hr TD 24 hr patch 1 patch, 1 patch, Transdermal, Daily, Britany Ross PA-C, 1 patch at 11/03/19 0824    oxyCODONE (ROXICODONE) IR tablet 2 5 mg, 2 5 mg, Oral, Q4H PRN, Britany Ross PA-C    oxyCODONE (ROXICODONE) IR tablet 5 mg, 5 mg, Oral, Q4H PRN, Britany Ross PA-C, 5 mg at 11/02/19 1933    polyethylene glycol (MIRALAX) packet 17 g, 17 g, Oral, Daily, Eddie Salvador PA-C, 17 g at 11/03/19 0900    senna-docusate sodium (SENOKOT S) 8 6-50 mg per tablet 1 tablet, 1 tablet, Oral, BID, Hayes Salvador PA-C, 1 tablet at 11/03/19 0900    tamsulosin (FLOMAX) capsule 0 4 mg, 0 4 mg, Oral, Daily With aRdha Reese PA-C     Labs & Results:    Results from last 7 days   Lab Units 11/02/19 1756   TROPONIN I ng/mL <0 02     Results from last 7 days   Lab Units 11/03/19  0522 11/02/19  1757 11/02/19  0317   WBC Thousand/uL 5 38 7 26 4 69   HEMOGLOBIN g/dL 12 5 12 4 11 8*   HEMATOCRIT % 38 8 37 5 36 0*   PLATELETS Thousands/uL 185 205 170         Results from last 7 days   Lab Units 11/03/19  0522 11/02/19  1756 11/02/19  0357   POTASSIUM mmol/L 4 0 3 7 3 6   CHLORIDE mmol/L 108 108 109*   CO2 mmol/L 26 26 27   BUN mg/dL 19 20 16   CREATININE mg/dL 1 00 1 15 0 80   CALCIUM mg/dL 8 9 9 3 9 2   ALK PHOS U/L 87  --   --    ALT U/L 13  --   --    AST U/L 12  --   --      Results from last 7 days   Lab Units 11/03/19  1316 11/03/19  0522 11/02/19  2325  11/02/19  0317 11/01/19  2120   INR   --   --   --   --  1 24* 1 15   PTT seconds 56* 147* 58*   < > >210* 33    < > = values in this interval not displayed  Results from last 7 days   Lab Units 11/02/19  1756   MAGNESIUM mg/dL 1 9         EKG personally reviewed by Mehdi Savage MD   Sinus rhythm or sinus bradycardia most ECGs, normal ECG upon presentation  Yesterday during an apparent vagal event he had sinus bradycardia with Mobitz 1 second-degree AV block  Repeat ECG shows sinus rhythm and is normal     ECHO:  LEFT VENTRICLE:  Systolic function was normal  Ejection fraction was estimated to be 65 %  There were no regional wall motion abnormalities  Wall thickness was at the upper limits of normal      ATRIAL SEPTUM:  There was redundancy of the septum, with borderline criteria for aneurysm  No defect or patent foramen ovale was identified      MITRAL VALVE:  There was mild regurgitation      TRICUSPID VALVE:  There was trace regurgitation  Pulmonary artery systolic pressure was within the normal range        Counseling / Coordination of Care  Total floor / unit time spent today 25 minutes  Greater than 50% of total time was spent with the patient and / or family counseling and / or coordination of care

## 2019-11-03 NOTE — PROGRESS NOTES
Progress Note - Vascular Surgery   Pippa July 78 y o  male MRN: [id number]  Unit/Bed#: [facility] 515-01 Encounter: [id number]    Assessment/Plan:  78 y o  male with Chronic PAD, L>R     -hep gtt  -high risk for OR  -Cards recs: antiplatelet therapy; hold antihypertensives     Subjective/Objective     Subjective: Syncopal episode and moved to unite  No events since then      Objective:     Vitals: Temp:  [97 6 °F (36 4 °C)-97 8 °F (36 6 °C)] 97 8 °F (36 6 °C)  HR:  [41-66] 58  Resp:  [9-23] 18  BP: ()/(37-88) 152/65  Body mass index is 18 47 kg/m²  I/O       11/01 0701 - 11/02 0700 11/02 0701 - 11/03 0700 11/03 0701 - 11/04 0700    P  O  0      I V  (mL/kg)  1493 1 (29 7)     IV Piggyback  50     Total Intake(mL/kg) 0 (0) 1543 1 (30 7)     Urine (mL/kg/hr) 525 900 (0 7)     Total Output 525 900     Net -525 +643 1                  Physical Exam:  GEN: NAD  HEENT: MMM  CV: RRR  Lung: Normal effort  Ab: Soft, NT/ND  Extrem: No CCE  Neuro:  A+Ox3    Lab, Imaging and other studies:   CBC with diff:   Lab Results   Component Value Date    WBC 5 38 11/03/2019    HGB 12 5 11/03/2019    HCT 38 8 11/03/2019    MCV 98 11/03/2019     11/03/2019    MCH 31 4 11/03/2019    MCHC 32 2 11/03/2019    RDW 15 1 11/03/2019    MPV 11 1 11/03/2019    NRBC 0 11/03/2019   , BMP/CMP:   Lab Results   Component Value Date    SODIUM 141 11/03/2019    K 4 0 11/03/2019     11/03/2019    CO2 26 11/03/2019    BUN 19 11/03/2019    CREATININE 1 00 11/03/2019    CALCIUM 8 9 11/03/2019    AST 12 11/03/2019    ALT 13 11/03/2019    ALKPHOS 87 11/03/2019    EGFR 76 11/03/2019     VTE Pharmacologic Prophylaxis: Heparin  VTE Mechanical Prophylaxis: sequential compression device

## 2019-11-03 NOTE — PROGRESS NOTES
Daily Progress Note - [address] [facility] 78 y o  male MRN: [id number]  Unit/Bed#: [facility] 515-01 Encounter: [id number]        ----------------------------------------------------------------------------------------  HPI/24hr events: Patient rapid response yesterday for symptomatic 2nd degree AV block type 1 with hypotension and syncope  Briefly placed on dopamine infusion with improvement in HR and BP  Weaned off dopamine and remained off throughout the night  Cardiology evaluated bedside and suspected event was vasovagal 2/2 poor pain control  No further cardiologic interventions at this time  ---------------------------------------------------------------------------------------  SUBJECTIVE  Had conversation this AM with pt via  phone  Offers no complaints at this time during ROS  Denies any significant pain in lower extremities  Denies chest pain, SOB, dizziness/lightheadedness      Review of Systems  Review of systems was reviewed and negative unless stated above in HPI/24-hour events   ---------------------------------------------------------------------------------------  Assessment and Plan:    Neuro:  · Diagnosis: pain 2/2 lower extremity PAD  · Plan:  · Pain controlled with:  · PRN: tylenol, oxy 2 5/5  · Delirium Precautions  · CAM ICU per protocol  · Regulate sleep/wake cycle+  · Trend neuro exam    CV:   · Diagnosis: Transient 2nd degree heart block type 1 2/2 vasovagal response to pain, hypotension 2/2 previous  · Plan:   · Cardiology following  · Continue hemodynamic monitoring via telemetry  · Maintained off dopamine overnight  · F/u echo results  · Holding anti-hypertensive at this time  · MAP goal > 65  · Systolic (28GYT), QAX:770 , Min:66 , Max:171   ·   · Rhythm: NSR    Lung:   · Diagnosis: Nicotine dependence  · Plan:   · Nicotine patch  · Incentive spirometry    GI:   · Diagnosis: No acute issues  · Plan:   · Increase bowel regimen: colace  and senna, miralax, prn dulcolax suppository    FEN:   · Fluid/Diuretic plan: IVF for gentle hydration  · Nutrition/diet plan: was NPO pending cards eval, resume diet  · Replete electrolytes with goals: K >4 0, Mag >2 0, and Phos >3 0    :   · Diagnosis: Mild SARAH (resolving)  · Plan:   · Indwelling Willingham present: no   · Trend UOP and BUN/creat  · Strict I and O    Intake/Output Summary (Last 24 hours) at 11/3/2019 0737  Last data filed at 11/3/2019 0700  Gross per 24 hour   Intake 1543 13 ml   Output 900 ml   Net 643 13 ml   ·   · Gentle IV hydration,a void nephrotoxin agents    ID:   · Diagnosis: No active issues  · Plan:   · Trend temps and WBC count  · Temp (24hrs), Av 7 °F (36 5 °C), Min:97 6 °F (36 4 °C), Max:97 8 °F (36 6 °C)  ·     Heme:   · Diagnosis: Peripheral arterial disease  · Plan:   · Vascular surgery following  · Continue heparin gtt, ASA, lipitor  · Trend hgb and plts  · Transfuse as needed for goal hgb >7 0    Endo:   · Diagnosis: No acute issues  · Plan:   · Q6 accuchecks while NPO    MSK/Skin:  · Diagnosis: No acute issues  · Plan:   · Mobility goal: early mobility as able  · Frequent turning and pressure off-loading      Disposition: Transfer to Med Surg with Telemetry   Code Status: Level 1 - Full Code  ---------------------------------------------------------------------------------------  ICU CORE MEASURES    Prophylaxis   VTE Pharmacologic Prophylaxis: Heparin Drip  VTE Mechanical Prophylaxis: sequential compression device  Stress Ulcer Prophylaxis: Prophylaxis Not Indicated     ABCDE Protocol (if indicated)  Plan to perform spontaneous awakening trial today? Not applicable  Plan to perform spontaneous breathing trial today? Not applicable  Obvious barriers to extubation?  no  CAM-ICU: Negative    Invasive Devices Review  Invasive Devices     Peripheral Intravenous Line            Peripheral IV 19 Left Forearm 1 day    Peripheral IV 11/02/19 Right Hand less than 1 day              Can any invasive devices be discontinued today? Not applicable  ---------------------------------------------------------------------------------------  OBJECTIVE    Physical Exam   Constitutional: He appears well-developed and well-nourished  No distress  HENT:   Head: Normocephalic and atraumatic  Mouth/Throat: Oropharynx is clear and moist    Eyes: Pupils are equal, round, and reactive to light  Conjunctivae and EOM are normal    Neck: Normal range of motion  Neck supple  Cardiovascular: Normal heart sounds and intact distal pulses  Exam reveals no friction rub  No murmur heard  Sinus bradycardia   Pulmonary/Chest: Effort normal and breath sounds normal  No stridor  No respiratory distress  He has no wheezes  Abdominal: Bowel sounds are normal  He exhibits distension (firm)  There is no tenderness  There is no guarding  Musculoskeletal: Normal range of motion  He exhibits no edema  Neurological: He is alert  Skin: Skin is warm and dry  Capillary refill takes less than 2 seconds  He is not diaphoretic  Vitals   Vitals:    19 0343 19 0443 19 0543 19 0657   BP: 129/59 161/66 152/65    Pulse: 56 56 58    Resp: 12 12 18    Temp:    97 8 °F (36 6 °C)   TempSrc:    Oral   SpO2: 99% 99% 100%    Weight:         Temp (24hrs), Av 7 °F (36 5 °C), Min:97 6 °F (36 4 °C), Max:97 8 °F (36 6 °C)  Current: Temperature: 97 8 °F (36 6 °C)  HR: 60  BP: 152/65  RR: 18  SpO2: 100 (RA)    Invasive/non-invasive ventilation settings   Respiratory    Lab Data (Last 4 hours)    None         O2/Vent Data (Last 4 hours)    None                Height and Weights      IBW: -88 kg  Body mass index is 18 47 kg/m²  Weight (last 2 days)     Date/Time   Weight    19 02:09:14   50 3 (111)                Intake and Output  I/O        07 -  -  07 -  07    P  O  0      I V  (mL/kg)  1493 1 (29 7)     IV Piggyback  50     Total Intake(mL/kg) 0 (0) 1543 1 (30 7) Urine (mL/kg/hr) 525 900 (0 7)     Total Output 525 900     Net -525 +643 1                UOP: 40 ml/hr     Nutrition       Diet Orders   (From admission, onward)             Start     Ordered    11/02/19 1757  Diet NPO  Diet effective now     Question Answer Comment   Diet Type NPO    RD to adjust diet per protocol?  Yes        11/02/19 1756                Laboratory and Diagnostics:  Results from last 7 days   Lab Units 11/03/19 0522 11/02/19 1757 11/02/19 0317 11/01/19 2120 11/01/19  2101   WBC Thousand/uL 5 38 7 26 4 69 5 27 4 49   HEMOGLOBIN g/dL 12 5 12 4 11 8* 13 3 12 2   HEMATOCRIT % 38 8 37 5 36 0* 40 6 37 2   PLATELETS Thousands/uL 185 205 170 197 180   NEUTROS PCT % 83* 73  --   --  70   MONOS PCT % 5 6  --   --  9     Results from last 7 days   Lab Units 11/03/19 0522 11/02/19 1756 11/02/19 0357 11/01/19  2101   SODIUM mmol/L 141 141 142 146*   POTASSIUM mmol/L 4 0 3 7 3 6 4 2   CHLORIDE mmol/L 108 108 109* 109*   CO2 mmol/L 26 26 27 30   ANION GAP mmol/L 7 7 6 7   BUN mg/dL 19 20 16 19   CREATININE mg/dL 1 00 1 15 0 80 0 86   CALCIUM mg/dL 8 9 9 3 9 2 9 2   GLUCOSE RANDOM mg/dL 98 127 101 85   ALT U/L 13  --   --   --    AST U/L 12  --   --   --    ALK PHOS U/L 87  --   --   --    ALBUMIN g/dL 3 4*  --   --   --    TOTAL BILIRUBIN mg/dL 0 39  --   --   --      Results from last 7 days   Lab Units 11/02/19  1756   MAGNESIUM mg/dL 1 9   PHOSPHORUS mg/dL 3 3      Results from last 7 days   Lab Units 11/03/19 0522 11/02/19 2325 11/02/19 1802 11/02/19 1040 11/02/19 0317 11/01/19 2120   INR   --   --   --   --  1 24* 1 15   PTT seconds 147* 58* 59* 128* >210* 33      Results from last 7 days   Lab Units 11/02/19 1756   TROPONIN I ng/mL <0 02         ABG:    VBG:          Micro        EKG: Sinus bradycardia  Imaging: No new    Active Medications  Scheduled Meds:  Current Facility-Administered Medications:  acetaminophen 650 mg Oral Q6H PRN Jaret Ross PA-C    aspirin 81 mg Oral Daily Kieran Repress, PA-C    atorvastatin 40 mg Oral Daily With [employer] PA-C    fentanyl citrate (PF) 50 mcg Intravenous Q2H PRN Darling [place] Ann-Marieon, PA-C    heparin (porcine) 3-30 Units/kg/hr (Order-Specific) Intravenous Titrated Kieran Repress, PA-C Last Rate: 16 Units/kg/hr (11/03/19 0047)   heparin (porcine) 2,000 Units Intravenous PRN Darling [place] Rasmuson, PA-C    heparin (porcine) 4,000 Units Intravenous PRN Kieran Repress, PA-C    multi-electrolyte 100 mL/hr Intravenous Continuous [place] Repress, PA-C Last Rate: 100 mL/hr (11/02/19 1939)   nicotine 1 patch Transdermal Daily Darling Cora Ross PA-C    oxyCODONE 2 5 mg Oral Q4H PRN [place]cloud Repress, PA-C    oxyCODONE 5 mg Oral Q4H PRN Darling [place] Rasmuson, PA-C      Continuous Infusions:    heparin (porcine) 3-30 Units/kg/hr (Order-Specific) Last Rate: 16 Units/kg/hr (11/03/19 0047)   multi-electrolyte 100 mL/hr Last Rate: 100 mL/hr (11/02/19 1939)     PRN Meds:     acetaminophen 650 mg Q6H PRN   fentanyl citrate (PF) 50 mcg Q2H PRN   heparin (porcine) 2,000 Units PRN   heparin (porcine) 4,000 Units PRN   oxyCODONE 2 5 mg Q4H PRN   oxyCODONE 5 mg Q4H PRN       Allergies   No Known Allergies    Advance Directive and Living Will:      Power of :    POLST:        Counseling / Coordination of Care  Total time spent today 32 minutes  Greater than 50% of total time was spent with the patient and / or family counseling and / or coordination of care  A description of the counseling / coordination of care: physical, review labs/chart, ROS, documentation      Sj Sutton PA-C        Portions of the record may have been created with voice recognition software  Occasional wrong word or "sound a like" substitutions may have occurred due to the inherent limitations of voice recognition software    Read the chart carefully and recognize, using context, where substitutions have occurred

## 2019-11-03 NOTE — PROGRESS NOTES
Progress Note - ICU Transfer to SD/MS tele   Pippa July 78 y o  male MRN: [id number]  [address]   Unit/Bed#: [facility] 159-91 Encounter: [id number]    Code Status: Level 1 - Full Code  POA:    POLST:      Reason for ICU admission: Transient 2nd degree heart block type 1, syncopal event, hypotension    Active problems:   Principal Problem:    Peripheral vascular disease ([address] )  Active Problems:    History of throat cancer    Elevated blood-pressure reading without diagnosis of hypertension    Confusion    Dependence on nicotine from cigarettes  Resolved Problems:    * No resolved hospital problems  *      Consultants: Cardiology, vascular surgery    History of Present Illness: Per TERRA Ross, "Pippa July is a 78 y o  male w/ PMHx PVD, HPTN, prior laryngeal CA, and HLD who initially presented to the ED @ [facility] this AM with complaints of B/L LE pain L>R  CTA on admission with R SFA and popliteal artery stenosis  As a result, the patient was started on a heparin infusion and transferred to [facility] for vascular surgery evaluation  Vascular surgery recommended L common femoral endartectomy with profundaplasty next week as he does not have an acutely ischemic limb  Cardiology evaluated the patient this AM for pre-operative cardiac clearance  An EKG was done and found to be in NSR "    Summary of clinical course: Yesterday afternoon, patient was rapid response following a syncopal event  Family was in the room at time say patient complaining he was in severe pain that had nausea and diaphoresis and then passed out  Upon arrival to rapid response, patient noted to have heart rate in 40s and 50s EKG appeared to show a second-degree type 1 block time  BP 60s over 40s  Patient started on dopamine drip and transferred ICU  Dopamine rapidly weaned off    Patient was assessed by Cardiology service who felt that episode was a vagal response to pain and no further interventions were necessary at this time  Patient remained off any hemodynamic support throughout the night  Blood pressure remained stable and 110s to 150s  Maintain a sinus bradycardia in the 50s throughout most of the night  No further syncopal events  Patient this morning asymptomatic  Denying any chest pain, shortness of breath, leg pain, abdominal pain, nausea, diaphoresis, lightheadedness, dizziness  Stable for transfer out of ICU to [place] [id number]  Recent or scheduled procedures:  Left common femoral endarterectomy with profundoplasty next week with vascular surgery    Outstanding/pending diagnostics: Follow-up TTE results    Cultures:              Mobilization Plan:  Early mobilization as able  Out of bed at meals, ambulate is able  Nutrition Plan:          Diet Orders   (From admission, onward)             Start     Ordered    11/03/19 0921  Diet Cardiovascular; Cardiac  Diet effective now     Question Answer Comment   Diet Type Cardiovascular    Cardiac Cardiac    RD to adjust diet per protocol? Yes        11/03/19 0920                Discharge Plan:   Patient should be ready for discharge pending vascular surgery intervention      Initial Physical Therapy Recommendations:  Pending  Initial Occupational Therapy Recommendations:  Pending  Initial /Plan:  Pending    Home medications that are not reordered and reason why:  None     Specific Diagnosis Plan:    Transient second-degree type 1 heart block  - evaluated by Cardiology, thought to be a vagal response to pain  - no further cardiology intervention at this time be on TTE this morning  - follow up echo results  - cardiology continues to follow    Nicotine dependence  - nicotine patch, incentive spirometry    Mild Imtiaz  - continue gentle IV fluid hydration  - trend BUN/creatinine    Peripheral arterial disease  - vascular surgery following  - continue heparin drip, aspirin, Lipitor  - trend hemoglobin and platelets  - pain controlled with p r n  Tylenol, Oxy 2 5/5    Spoke with Dr Odalis Rivera  regarding transfer  Please call [phone] with any questions or concerns  Portions of the record may have been created with voice recognition software  Occasional wrong word or "sound a like" substitutions may have occurred due to the inherent limitations of voice recognition software  Read the chart carefully and recognize, using context, where substitutions have occurred      Gela Gardiner PA-C

## 2019-11-04 ENCOUNTER — APPOINTMENT (INPATIENT)
Dept: RADIOLOGY | Facility: HOSPITAL | Age: 69
DRG: 252 | End: 2019-11-04
Attending: INTERNAL MEDICINE
Payer: MEDICARE

## 2019-11-04 PROBLEM — R55 SYNCOPE AND COLLAPSE: Status: ACTIVE | Noted: 2019-11-04

## 2019-11-04 PROBLEM — N20.0 RENAL STONE: Status: ACTIVE | Noted: 2019-11-04

## 2019-11-04 PROBLEM — I10 HYPERTENSION: Status: ACTIVE | Noted: 2019-11-02

## 2019-11-04 PROBLEM — K59.00 CONSTIPATION: Status: ACTIVE | Noted: 2019-11-04

## 2019-11-04 LAB
APTT PPP: 51 SECONDS (ref 23–37)
APTT PPP: 69 SECONDS (ref 23–37)
APTT PPP: 99 SECONDS (ref 23–37)

## 2019-11-04 PROCEDURE — 74018 RADEX ABDOMEN 1 VIEW: CPT

## 2019-11-04 PROCEDURE — 99232 SBSQ HOSP IP/OBS MODERATE 35: CPT | Performed by: INTERNAL MEDICINE

## 2019-11-04 PROCEDURE — 99232 SBSQ HOSP IP/OBS MODERATE 35: CPT | Performed by: SURGERY

## 2019-11-04 PROCEDURE — 99222 1ST HOSP IP/OBS MODERATE 55: CPT | Performed by: INTERNAL MEDICINE

## 2019-11-04 PROCEDURE — 85730 THROMBOPLASTIN TIME PARTIAL: CPT | Performed by: FAMILY MEDICINE

## 2019-11-04 PROCEDURE — 85730 THROMBOPLASTIN TIME PARTIAL: CPT | Performed by: INTERNAL MEDICINE

## 2019-11-04 RX ORDER — LANOLIN ALCOHOL/MO/W.PET/CERES
6 CREAM (GRAM) TOPICAL
Status: DISCONTINUED | OUTPATIENT
Start: 2019-11-04 | End: 2019-11-24 | Stop reason: HOSPADM

## 2019-11-04 RX ORDER — OXYCODONE HYDROCHLORIDE 5 MG/1
2.5 TABLET ORAL EVERY 4 HOURS PRN
Status: DISCONTINUED | OUTPATIENT
Start: 2019-11-04 | End: 2019-11-24 | Stop reason: HOSPADM

## 2019-11-04 RX ORDER — AMLODIPINE BESYLATE 2.5 MG/1
2.5 TABLET ORAL ONCE
Status: COMPLETED | OUTPATIENT
Start: 2019-11-04 | End: 2019-11-04

## 2019-11-04 RX ORDER — POLYETHYLENE GLYCOL 3350 17 G/17G
17 POWDER, FOR SOLUTION ORAL 2 TIMES DAILY
Status: DISCONTINUED | OUTPATIENT
Start: 2019-11-04 | End: 2019-11-24 | Stop reason: HOSPADM

## 2019-11-04 RX ORDER — OLANZAPINE 10 MG/1
2.5 INJECTION, POWDER, LYOPHILIZED, FOR SOLUTION INTRAMUSCULAR EVERY 8 HOURS PRN
Status: DISCONTINUED | OUTPATIENT
Start: 2019-11-04 | End: 2019-11-14

## 2019-11-04 RX ORDER — GABAPENTIN 100 MG/1
100 CAPSULE ORAL DAILY
Status: DISCONTINUED | OUTPATIENT
Start: 2019-11-04 | End: 2019-11-24 | Stop reason: HOSPADM

## 2019-11-04 RX ORDER — OXYCODONE HYDROCHLORIDE 5 MG/1
5 TABLET ORAL EVERY 4 HOURS PRN
Status: DISCONTINUED | OUTPATIENT
Start: 2019-11-04 | End: 2019-11-24 | Stop reason: HOSPADM

## 2019-11-04 RX ORDER — HALOPERIDOL 5 MG/ML
1 INJECTION INTRAMUSCULAR ONCE AS NEEDED
Status: DISCONTINUED | OUTPATIENT
Start: 2019-11-04 | End: 2019-11-04

## 2019-11-04 RX ORDER — THIAMINE MONONITRATE (VIT B1) 100 MG
100 TABLET ORAL DAILY
Status: DISCONTINUED | OUTPATIENT
Start: 2019-11-05 | End: 2019-11-24 | Stop reason: HOSPADM

## 2019-11-04 RX ORDER — AMLODIPINE BESYLATE 5 MG/1
5 TABLET ORAL DAILY
Status: DISCONTINUED | OUTPATIENT
Start: 2019-11-05 | End: 2019-11-08

## 2019-11-04 RX ADMIN — AMLODIPINE BESYLATE 2.5 MG: 2.5 TABLET ORAL at 08:37

## 2019-11-04 RX ADMIN — FENTANYL CITRATE 50 MCG: 50 INJECTION INTRAMUSCULAR; INTRAVENOUS at 01:12

## 2019-11-04 RX ADMIN — SODIUM CHLORIDE, SODIUM GLUCONATE, SODIUM ACETATE, POTASSIUM CHLORIDE AND MAGNESIUM CHLORIDE 100 ML/HR: 526; 502; 368; 37; 30 INJECTION, SOLUTION INTRAVENOUS at 18:01

## 2019-11-04 RX ADMIN — TAMSULOSIN HYDROCHLORIDE 0.4 MG: 0.4 CAPSULE ORAL at 17:54

## 2019-11-04 RX ADMIN — WATER 2.1 ML: 1 INJECTION INTRAMUSCULAR; INTRAVENOUS; SUBCUTANEOUS at 23:31

## 2019-11-04 RX ADMIN — HEPARIN SODIUM 2000 UNITS: 1000 INJECTION, SOLUTION INTRAVENOUS; SUBCUTANEOUS at 05:53

## 2019-11-04 RX ADMIN — OXYCODONE HYDROCHLORIDE 5 MG: 5 TABLET ORAL at 00:41

## 2019-11-04 RX ADMIN — POLYETHYLENE GLYCOL 3350 17 G: 17 POWDER, FOR SOLUTION ORAL at 08:37

## 2019-11-04 RX ADMIN — GABAPENTIN 100 MG: 100 CAPSULE ORAL at 15:01

## 2019-11-04 RX ADMIN — SENNOSIDES AND DOCUSATE SODIUM 1 TABLET: 8.6; 5 TABLET ORAL at 17:54

## 2019-11-04 RX ADMIN — ACETAMINOPHEN 650 MG: 325 TABLET ORAL at 00:22

## 2019-11-04 RX ADMIN — NICOTINE 1 PATCH: 14 PATCH TRANSDERMAL at 08:41

## 2019-11-04 RX ADMIN — OXYCODONE HYDROCHLORIDE 5 MG: 5 TABLET ORAL at 17:55

## 2019-11-04 RX ADMIN — ASPIRIN 81 MG: 81 TABLET, COATED ORAL at 08:38

## 2019-11-04 RX ADMIN — HYDRALAZINE HYDROCHLORIDE 10 MG: 20 INJECTION INTRAMUSCULAR; INTRAVENOUS at 15:49

## 2019-11-04 RX ADMIN — SENNOSIDES AND DOCUSATE SODIUM 1 TABLET: 8.6; 5 TABLET ORAL at 08:37

## 2019-11-04 RX ADMIN — HEPARIN SODIUM 14 UNITS/KG/HR: 10000 INJECTION, SOLUTION INTRAVENOUS at 08:44

## 2019-11-04 RX ADMIN — POLYETHYLENE GLYCOL 3350 17 G: 17 POWDER, FOR SOLUTION ORAL at 17:54

## 2019-11-04 RX ADMIN — MELATONIN 6 MG: 3 TAB ORAL at 21:03

## 2019-11-04 RX ADMIN — OLANZAPINE 2.5 MG: 10 INJECTION, POWDER, FOR SOLUTION INTRAMUSCULAR at 23:20

## 2019-11-04 RX ADMIN — ACETAMINOPHEN 650 MG: 325 TABLET ORAL at 21:03

## 2019-11-04 RX ADMIN — AMLODIPINE BESYLATE 2.5 MG: 2.5 TABLET ORAL at 14:59

## 2019-11-04 RX ADMIN — ATORVASTATIN CALCIUM 40 MG: 40 TABLET, FILM COATED ORAL at 17:54

## 2019-11-04 RX ADMIN — SODIUM CHLORIDE, SODIUM GLUCONATE, SODIUM ACETATE, POTASSIUM CHLORIDE AND MAGNESIUM CHLORIDE 100 ML/HR: 526; 502; 368; 37; 30 INJECTION, SOLUTION INTRAVENOUS at 06:20

## 2019-11-04 NOTE — PROGRESS NOTES
RN responded to bed alarm  Pt OOB, removed IV, agitated and trying to leave  Patient not oriented to place  Attempted reorientation, patient became more agitated, and control team called  Patient placed back in bed, soft restraints applied, and SLIM paged  Anika Whiteside PA-C ordered Continuous 1:1 observation and restraints were removed  Patient now calm, 1:1 sitter in room  Will continue to monitor

## 2019-11-04 NOTE — ASSESSMENT & PLAN NOTE
· Had RRT while IP fpr syncope and was intially though to have CHB, was started on dopamine infusion and was transferred to ICU  · Cardiology evaluated the patient in icu, now off dopamine and OOU  · likely a vasovagal syncope in setting of severe pain and transient second degree heart block  · Echo showed EF of 65% with no WMA or no AS    PLAN:  · Avoid BB for now per cardiology  · Adequate pain control  · Continue to Monitor on telemetry for now

## 2019-11-04 NOTE — PROGRESS NOTES
Progress Note - Kulwant Stockton 1950, 78 y o  male MRN: [id number]    Unit/Bed#: [facility] 430-01 Encounter: [id number]    Primary Care Provider: Mariluz Quiroz MD   Date and time admitted to hospital: 11/2/2019  2:01 AM        * Peripheral vascular disease ([facility] [address] )  Assessment & Plan  · Pt presented with 2-month history of LLE pain with walking  Denies pain at rest, but pt is unable to walk more than a few feet without pain  · CTA: Severe peripheral vascular disease  Limited evaluation of the calf vessels due to extensive calcifications  Severe areas of narrowing and focal occlusion of the right superficial femoral artery and diffuse occlusion of the popliteal artery   Distal reconstitution of the right peroneal artery and scattered areas of the right posterior tibial artery  Occlusion of the left common femoral artery and left superficial femoral artery with distal reconstitution however severe narrowing of the mid to distal superficial femoral artery   Severe narrowing of the left popliteal artery  Patent left peroneal artery and scattered areas of the left posterior tibial artery  PLAN:  · Continue Heparin gtt, asa/statin  · Plan for left femoral endarterectomy with retrograde iliac intervention per vascular, tentatively planned for Friday  Appreciate vascular surgery input  · Cardiology on board for preop risk stratification, appreciate input  · Pain control per geriatrics pain management order set    Syncope and collapse  Assessment & Plan  · Had RRT while IP fpr syncope and was intially though to have CHB, was started on dopamine infusion and was transferred to ICU  · Cardiology evaluated the patient in icu, now off dopamine and OOU  · likely a vasovagal syncope in setting of severe pain and transient second degree heart block  · Echo showed EF of 65% with no WMA or no AS    PLAN:  · Avoid BB for now per cardiology    · Adequate pain control  · Continue to Monitor on telemetry for now     Confusion  Assessment & Plan  · Multifactorial  Likely he has vascular dementia at baseline per geriatrics, confusion is intermittently worse due to pain, constipation, language barrier and sun downing? · Daughter reported that pt acts strangely sometimes, example given that he has urinated on a window at her home in the past  These types of behaviors have been occurring for at least one year  · Denies diagnosis of dementia  · Geriatric input appreciated  · Limit narcotics as much as possible  · Gabapentin added  · No haldol per geriatrics, okay to give zyprexa prn  · Aggressive bowel regimen  · Delirium precautions and frequent reorientation  · Melatonin hs  · Currently on 1:1 due to intermittent agitation  · Check TSH, vitamin b12, folate    Constipation  Assessment & Plan  · No abdominal tenderness on exam  · Aggressive bowel regimen while on narcotics  · Obtain abdomen xray    Renal stone  Assessment & Plan  · Incidental finding on CT scan  · 9 x 5 x 5 mm calculus within the posterior bladder, near the left ureterovesicular junction however there is no hydronephrosis to suggest obstruction  · Pain control  · Consider urology eval if symptomatic or if renal function worsens    Dependence on nicotine from cigarettes  Assessment & Plan  · Still smokes despite diagnosis of throat cancer, per daughter  · She thinks he smokes 1ppd  · Nicotine patch  · Provide smoking cessation education    Hypertension  Assessment & Plan  · Increase amlodipine dose to 5 mg daily  · Hydralazine ordered prn    History of throat cancer  Assessment & Plan  · Per daughter, pt diagnosed with throat CA 2 years ago and has been treated with chemo and radiation  · Not undergoing treatment at this time      VTE Pharmacologic Prophylaxis:   Pharmacologic: Heparin Drip  Mechanical VTE Prophylaxis in Place: Yes    Patient Centered Rounds: I have performed bedside rounds with nursing staff today      Discussions with Specialists or Other Care Team Provider: geriatrics, CM    Education and Discussions with Family / Patient: plan of care, discussed with patient' s daughter on phone    Time Spent for Care: 30 minutes  More than 50% of total time spent on counseling and coordination of care as described above  Current Length of Stay: 2 day(s)    Current Patient Status: Inpatient   Certification Statement: The patient will continue to require additional inpatient hospital stay due to not medically stable    Discharge Plan: when medically stable    Code Status: Level 1 - Full Code      Subjective:   Overnight events of agitation mnoted  Pt was calm and pleasant during my interaction with him this morning  He denies any pain or discomfort   Objective:     Vitals:   Temp (24hrs), Av 9 °F (36 6 °C), Min:97 8 °F (36 6 °C), Max:98 1 °F (36 7 °C)    Temp:  [97 8 °F (36 6 °C)-98 1 °F (36 7 °C)] 98 1 °F (36 7 °C)  HR:  [] 86  Resp:  [18-20] 18  BP: (154-187)/() 168/72  SpO2:  [95 %-98 %] 95 %  Body mass index is 18 47 kg/m²  Input and Output Summary (last 24 hours): Intake/Output Summary (Last 24 hours) at 2019 1825  Last data filed at 2019 1701  Gross per 24 hour   Intake 2121 28 ml   Output 3500 ml   Net -1378 72 ml       Physical Exam:     Physical Exam   Constitutional: No distress  Eyes: Pupils are equal, round, and reactive to light  Cardiovascular: Normal rate, regular rhythm and normal heart sounds  No murmur heard  Pulmonary/Chest: Effort normal and breath sounds normal  No respiratory distress  He has no wheezes  He has no rales  Abdominal: Soft  Bowel sounds are normal  He exhibits no distension  There is no tenderness  Musculoskeletal: He exhibits no edema  Neurological: He is alert  Awake and communicative  moves all extremities   Skin: Skin is warm           Additional Data:     Labs:    Results from last 7 days   Lab Units 19  0522   WBC Thousand/uL 5 38   HEMOGLOBIN g/dL 12 5 HEMATOCRIT % 38 8   PLATELETS Thousands/uL 185   NEUTROS PCT % 83*   LYMPHS PCT % 9*   MONOS PCT % 5   EOS PCT % 3     Results from last 7 days   Lab Units 11/03/19  0522   SODIUM mmol/L 141   POTASSIUM mmol/L 4 0   CHLORIDE mmol/L 108   CO2 mmol/L 26   BUN mg/dL 19   CREATININE mg/dL 1 00   ANION GAP mmol/L 7   CALCIUM mg/dL 8 9   ALBUMIN g/dL 3 4*   TOTAL BILIRUBIN mg/dL 0 39   ALK PHOS U/L 87   ALT U/L 13   AST U/L 12   GLUCOSE RANDOM mg/dL 98     Results from last 7 days   Lab Units 11/02/19  0317   INR  1 24*     Results from last 7 days   Lab Units 11/02/19  1745   POC GLUCOSE mg/dl 117     Results from last 7 days   Lab Units 11/03/19  0522   HEMOGLOBIN A1C % 5 3               * I Have Reviewed All Lab Data Listed Above  * Additional Pertinent Lab Tests Reviewed:  All Labs Within Last 24 Hours Reviewed    Imaging:    XR abdomen 1 vw portable    (Results Pending)     Recent Cultures (last 7 days):           Last 24 Hours Medication List:     Current Facility-Administered Medications:  acetaminophen 650 mg Oral Q6H PRN Sj Sutton PA-C    [START ON 11/5/2019] amLODIPine 5 mg Oral Daily Janet Hoffman MD    aspirin 81 mg Oral Daily Eddie WING Salvador PA-C    atorvastatin 40 mg Oral Daily With [employer] WING Salvador PA-C    bisacodyl 10 mg Rectal Daily PRN Sj Sutton PA-C    gabapentin 100 mg Oral Daily Janet Hoffman MD    heparin (porcine) 3-30 Units/kg/hr (Order-Specific) Intravenous Titrated BANDAR Pichardo-C Last Rate: 12 Units/kg/hr (11/04/19 1331)   heparin (porcine) 2,000 Units Intravenous PRN Nae Salvador PA-C    heparin (porcine) 4,000 Units Intravenous PRN Nae Salvador PA-C    hydrALAZINE 10 mg Intravenous Q6H PRN Spring Morales MD    melatonin 6 mg Oral HS Janet Hoffman MD    multi-electrolyte 100 mL/hr Intravenous Continuous BANDAR Pichardo-C Last Rate: 100 mL/hr (11/04/19 1801)   nicotine 1 patch Transdermal Daily Nae Salvador PA-C    OLANZapine 2 5 mg Intramuscular Q8H PRN Janet Hoffman MD    oxyCODONE 2 5 mg Oral Q4H PRN Charlynne Route, TERRA    oxyCODONE 5 mg Oral Q4H PRN Lisalyaprile Route, TERRA    polyethylene glycol 17 g Oral BID Ani Alexandra MD    senna-docusate sodium 1 tablet Oral BID Kirill Salvador, TERRA    tamsulosin 0 4 mg Oral Daily With MD June Montes De Oca ON 11/5/2019] thiamine 100 mg Oral Daily Kristen Gonzalez, TERRA         Today, Patient Was Seen By: Ani Alexandra MD    ** Please Note: Dictation voice to text software may have been used in the creation of this document   **

## 2019-11-04 NOTE — CONSULTS
Consultation - Pérez [age] y o  male MRN: [id number]  Unit/Bed#: [facility] 430-01 Encounter: [id number]      Assessment/Plan     # Confusion  · Likely multifactorial in the setting of history of alcohol abuse, severe leg pain due to peripheral vascular disease and pain medications in the setting of oxycodone and fentanyl  · Differential include other undiagnosed medical problem such as constipation as patient had no documented bowel movement and his abdomen tense on exam with decreased bowel sounds  · Possible differential vascular dementia given his severe peripheral vascular disease  Plan:  · Adjust pain medication to  5 mg oxycodone q 4hours as breakthrough and 2 5 mg PRN Q 4 hrs for severe pain and discontinue fentanyl  · Tylenol 650 mg for mild pain q 4 hours  · Try non pharmacologic methods such as warm compresses  · X-ray abdomen looking for impaction of stool  · Check TSH and free T4  · Agree with one-to-one observation and avoid physical restraint    # PVD  · CTA with occlusive disease of the left femoral, tibioperoneal and SFA  · Heparin drip per vascular surgery  · Statin and aspirin okay from geriatric standpoint  · Possible surgical intervention this week per vascular surgery  # HTN  · Agree with amlodipine and lisinopril   · PRN hydralazine for goal SBP < 150  # History of alcohol abuse  · Quit over 5 years ago after 2 month hospitalization  · Recommend thiamine 100 mg daily  # Vasovagal syncope  · Evaluated by Cardiology now resolved  · EKG was second-degree poor o'clock now resolved status post brief dopamine infusion    # Incidental left ureter  stone 9 mm   · Agree with Flomax  · Ketorolac for her pain 15 mg q 8 hours p r n   With full glass of water     Memory/Cognitive screening:  · Impaired for the last year perdaughter in the setting of history of alcohol abuse  · Appears to have significant cognitive impairment recommend outpatient official evaluation for cognitive function  · Recommend thiamine supplementation given history of alcohol  · Occupational therapy as outpatient    Mobility:   ·  Independent at home without using DME per daughter  · Given history of peripheral vascular disease he will likely need assistance with device such as a walker  · He will likely benefit from short-term rehab  Falls:  Did not fall in the last 6 months  Assistive Devices:  Has a cane and walker but does not use it at home per daughter  Sheryl Ashia:   ·  Frail with depressed appetite for the past year  · Recommend Ensure supplement with meals  · Soft diet given absence of dentures or natural teeth  Nutrition/weight loss/grocery shopping/meal preparation:  Poor appetite per daughter  Vision impairment:  No known vision impairment does not wear glasses  Hearing impairment:  Difficulty hearing on the left here no hearing aide  Incontinence:  No  Delirium:    · For the past year has been having bizarre behavior - urinating on the window and forgetful  · Recommend gabapentin for delirium prophylaxis and treatment  Polypharmacy:  Does not take prescription medication at home  Patients primary residence:  Live in a home [address] Lives with:  Daughter  iADL's:  Independent with minimum assist  ADL's:  Independent    History of Present Illness   Physician Requesting Consult: Rukhsana Barragan MD  Reason for Consult / Principal Problem: # Confusion  # PVD   Hx and PE limited by:  Cognitive impairment and [place]i-speaking requiring interpretation by daughter  Additional history obtained from:  Chart review, patient and daughter at bedside        HPI: Bello Lopez is a 78y o  year old male who presents with past medical history of throat cancer status post surgery, radiation and chemo therapy 2 years ago, severe peripheral vascular disease status post right femoral bypass-[place]-  [place] over 5 years ago, alcohol abuse with 2 month hospitalization over 5 years ago in [place] now quit drinking and current smoker of 15 pack years  Jessica Forrester lives independently with his daughter in [address] he never been  he used to be a  retired over 12 years ago  He is independent of his activity of daily living  Has been traveling back and forth to [place] he just arrived from [facility] last Friday with two  months of severe left leg pain and now not able to walk  He had been diagnosed with peripheral vascular disease over 5 years ago and had right femoral bypass reported in [facility] [address] no medical record available  He was not taking any prescription medication apart from recent pain killer unsure of exact name reported by daughter in [facility] for his left leg pain  He has been having memory impairment, forgetfulness, bizarre behaviors for the past year such as urination inappropriately however never lost control of his bowel or bladder  Has been suffering from alcohol abuse requiring 2 month hospitalization over 7 years ago quit since then  They arrived Friday from [address] [place] [address] independently to be evaluated for his left leg pain  He was transferred from the [facility] when he was found to have severe peripheral vascular disease on CTA run off that showed occlusion of left common femoral artery and left superficial femoral artery and tibial peroneal artery for vascular surgery evaluation  Since admission has been on heparin drip however he has been having severe leg pain and screaming overnight with agitation requiring control team activation as well as 21 observation  He was managed by oxycodone than fentanyl before his pain improved  Geriatric consulted to evaluate further of his confusion            Inpatient consult to Gerontology     Performed by  Anneliese Hanna MD     Authorized by Hector Weeks PA-C              Review of Systems   Unable to perform ROS: Other   Pleasantly confused with impaired orientation and cognition    Historical Information   Past Medical History:   Diagnosis Date    Cancer ([facility] )     throat cancer    PAD (peripheral artery disease) ([facility] ) 11/2/2019    PEG (percutaneous endoscopic gastrostomy) status ([place] [id number] )     Port-A-Cath in place      Past Surgical History:   Procedure Laterality Date    BYPASS FEMORAL-FEMORAL Right     ESOPHAGOSCOPY N/A 8/24/2017    Procedure: ESOPHAGOSCOPY FLEXIBLE;  Surgeon: Idania Moore MD;  [facility];  Service: ENT    MT [address] N/A 8/24/2017    Procedure: Bronchoscopy;  Surgeon: Idania Moore MD;  [facility];  Service: ENT    MT LARYNGOSCOPY,DIRCT,OP,BIOPSY N/A 8/24/2017    Procedure: LARYNGOSCOPY DIRECT;  Surgeon: Idania Moore MD;  [facility];  Service: ENT    TOE AMPUTATION Right     2 toes     Social History   Social History     Substance and Sexual Activity   Alcohol Use Never    Alcohol/week: 0 0 standard drinks    Frequency: Patient refused    Drinks per session: Patient refused    Binge frequency: Patient refused     Social History     Substance and Sexual Activity   Drug Use No     Social History     Tobacco Use   Smoking Status Current Every Day Smoker    Packs/day: 1 00    Years: 15 00    Pack years: 15 00    Types: Cigarettes   Smokeless Tobacco Never Used   Tobacco Comment    50+ years     Family History: non-contributory    Meds/Allergies   all current active meds have been reviewed    No Known Allergies    Objective     Intake/Output Summary (Last 24 hours) at 11/4/2019 1002  Last data filed at 11/4/2019 0844  Gross per 24 hour   Intake 2127 44 ml   Output 4742 ml   Net -2614 56 ml     Invasive Devices     Peripheral Intravenous Line            Peripheral IV 11/02/19 Right Hand 1 day    Peripheral IV 11/04/19 Left Forearm less than 1 day                Physical Exam   Constitutional: No distress  Thin appearing   HENT:   Head: Normocephalic and atraumatic     Mouth/Throat: Oropharyngeal exudate present  Absence of natural teeth or dentures   Eyes: Right eye exhibits no discharge  Left eye exhibits no discharge  No scleral icterus  Neck: Neck supple  Cardiovascular: Regular rhythm and normal heart sounds  Exam reveals no friction rub  No murmur heard  Mild tachycardia in the low 100  Unable to feel dorsalis pedis bilaterally   Pulmonary/Chest: Effort normal and breath sounds normal  No stridor  No respiratory distress  He has no wheezes  He has no rales  Abdominal: Soft  He exhibits distension  He exhibits no mass  There is no tenderness  There is no guarding  Abdomen tense and decreased bowel sounds   Lymphadenopathy:     He has no cervical adenopathy  Neurological: He is alert  No sensory deficit  He exhibits normal muscle tone  Oriented to self, time not oriented to the President or place  Oriented to situation  Fluent speech, distracted  Skin: Skin is dry  He is not diaphoretic  Psychiatric: He has a normal mood and affect  His behavior is impaired with frequent disruption of medical equipment such as pulling pulse oximetry  Vitals reviewed  Lab Results:   I have personally reviewed pertinent lab results including the following:    Lab Results   Component Value Date    WBC 5 38 11/03/2019    HGB 12 5 11/03/2019    HCT 38 8 11/03/2019    MCV 98 11/03/2019     11/03/2019     Lab Results   Component Value Date    SODIUM 141 11/03/2019    K 4 0 11/03/2019     11/03/2019    CO2 26 11/03/2019    BUN 19 11/03/2019    CREATININE 1 00 11/03/2019    GLUC 98 11/03/2019    CALCIUM 8 9 11/03/2019     HbA1c 5 3%    I have personally reviewed the following imaging study reports:  - - CTA abdominal with run off W/WO  1  Severe peripheral vascular disease  Limited evaluation of the calf vessels due to extensive calcifications  2   Severe areas of narrowing and focal occlusion of the right superficial femoral artery and diffuse occlusion of the popliteal artery  Distal reconstitution of the right peroneal artery and scattered areas of the right posterior tibial artery  3   Occlusion of the left common femoral artery and left superficial femoral artery with distal reconstitution however severe narrowing of the mid to distal superficial femoral artery  Severe narrowing of the left popliteal artery  Patent left peroneal   artery and scattered areas of the left posterior tibial artery  4   Vascular consultation is recommended  5   9 x 5 x 5 mm calculus within the posterior bladder, near the left ureterovesicular junction however there is no hydronephrosis to suggest obstruction  Personal interpretation of imaging studies mentioned above:    - severe peripheral vascular disease with occlusive disease in the left femoral, tibial-peroneal arteries        Therapies:   PT:  Home with family support  OT:  In process    VTE Prophylaxis: Heparin    Code Status: Level 1 - Full Code  Advance Directive and Living Will:      Power of :    POLST:      Family and Social Support:  Daughter  No data recorded    Goals of Care:  Full code    Arie Cummins MD  Available on [employer]  THE [facility]  Internal medicine resident

## 2019-11-04 NOTE — OCCUPATIONAL THERAPY NOTE
Occupational Therapy Screen        Patient Name: Lalito Daniels  EKQORJOHANN Date: 11/4/2019      OT orders received  Chart reviewed  Pt for L femoral endarterectomy with profundoplasty, retrograde iliac artery stenting this week  Please re-consult post op  Recommend continued participation in ADLs and functional mobility w/ staff  No further acute OT needs at this time, d/c OT         Rachele Wiggins, MS, OTR/L

## 2019-11-04 NOTE — PROGRESS NOTES
Елена Goldsmith PA-C paged about unresolved LLE pain 30 minutes after oxycodone given  Told to give 50mcgs Fentanyl for breakthrough  Patient now less agitated, resting comfortably  Will continue to monitor

## 2019-11-04 NOTE — ASSESSMENT & PLAN NOTE
· Multifactorial  Likely he has vascular dementia at baseline per geriatrics, confusion is intermittently worse due to pain, constipation, language barrier and sun downing? · Daughter reported that pt acts strangely sometimes, example given that he has urinated on a window at her home in the past  These types of behaviors have been occurring for at least one year  · Denies diagnosis of dementia  · Geriatric input appreciated  · Limit narcotics as much as possible  · Gabapentin added  · No haldol per geriatrics, okay to give zyprexa prn    · Aggressive bowel regimen  · Delirium precautions and frequent reorientation  · Melatonin hs  · Currently on 1:1 due to intermittent agitation  · Check TSH, vitamin b12, folate

## 2019-11-04 NOTE — ASSESSMENT & PLAN NOTE
· No abdominal tenderness on exam  · Aggressive bowel regimen while on narcotics  · Obtain abdomen xray

## 2019-11-04 NOTE — PROGRESS NOTES
Reginaldo Ray PA-C about 10/10 LLE pain  Unable to doppler distal pulses in LLE, only femoral  Condition chronic and SLIM & Vascular aware  5mg Oxycodone given  Told to page SLIM in 30 minutes if no pain relief from Oxy   Will continue to monitor

## 2019-11-04 NOTE — PROGRESS NOTES
Progress Note - Vascular Surgery   Fabian Segal 78 y o  male MRN: [id number]  Unit/Bed#: [facility] 430-01 Encounter: [id number]      Subjective:  Some confusion overnight  Vitals:  BP (!) 175/74 (BP Location: Right arm) Comment: Map 107  Pulse 103   Temp 97 8 °F (36 6 °C) (Oral)   Resp 20   Ht 5' 5" (1 651 m)   Wt 50 3 kg (111 lb)   SpO2 98%   BMI 18 47 kg/m²     I/Os:  I/O last 3 completed shifts: In: 4047 4 [P O :360; I V :3637 4; IV Piggyback:50]  Out: 6815 [Urine:5317]  No intake/output data recorded      Lab Results and Cultures:   CBC with diff:   Lab Results   Component Value Date    WBC 5 38 11/03/2019    HGB 12 5 11/03/2019    HCT 38 8 11/03/2019    MCV 98 11/03/2019     11/03/2019    MCH 31 4 11/03/2019    MCHC 32 2 11/03/2019    RDW 15 1 11/03/2019    MPV 11 1 11/03/2019    NRBC 0 11/03/2019   ,   BMP/CMP:  Lab Results   Component Value Date    SODIUM 141 11/03/2019    K 4 0 11/03/2019     11/03/2019    CO2 26 11/03/2019    BUN 19 11/03/2019    CREATININE 1 00 11/03/2019    CALCIUM 8 9 11/03/2019    AST 12 11/03/2019    ALT 13 11/03/2019    ALKPHOS 87 11/03/2019    EGFR 76 11/03/2019   ,   Lipid Panel: No results found for: CHOL,   Coags:   Lab Results   Component Value Date    PTT 51 (H) 11/04/2019    INR 1 24 (H) 11/02/2019   ,     Blood Culture: No results found for: BLOODCX,   Urinalysis: No results found for: COLORU, CLARITYU, SPECGRAV, PHUR, LEUKOCYTESUR, NITRITE, PROTEINUA, GLUCOSEU, KETONESU, BILIRUBINUR, BLOODU,   Urine Culture: No results found for: URINECX,   Wound Culure: No results found for: WOUNDCULT    Medications:  Current Facility-Administered Medications   Medication Dose Route Frequency    acetaminophen (TYLENOL) tablet 650 mg  650 mg Oral Q6H PRN    amLODIPine (NORVASC) tablet 2 5 mg  2 5 mg Oral Daily    aspirin (ECOTRIN LOW STRENGTH) EC tablet 81 mg  81 mg Oral Daily    atorvastatin (LIPITOR) tablet 40 mg  40 mg Oral Daily With Dinner    bisacodyl (DULCOLAX) rectal suppository 10 mg  10 mg Rectal Daily PRN    fentanyl citrate (PF) 100 MCG/2ML 50 mcg  50 mcg Intravenous Q2H PRN    heparin (porcine) 25,000 units in 250 mL infusion (premix)  3-30 Units/kg/hr (Order-Specific) Intravenous Titrated    heparin (porcine) injection 2,000 Units  2,000 Units Intravenous PRN    heparin (porcine) injection 4,000 Units  4,000 Units Intravenous PRN    hydrALAZINE (APRESOLINE) injection 10 mg  10 mg Intravenous Q6H PRN    multi-electrolyte (PLASMALYTE-A/ISOLYTE-S PH 7 4) IV solution  100 mL/hr Intravenous Continuous    nicotine (NICODERM CQ) 14 mg/24hr TD 24 hr patch 1 patch  1 patch Transdermal Daily    oxyCODONE (ROXICODONE) IR tablet 2 5 mg  2 5 mg Oral Q4H PRN    oxyCODONE (ROXICODONE) IR tablet 5 mg  5 mg Oral Q4H PRN    polyethylene glycol (MIRALAX) packet 17 g  17 g Oral Daily    senna-docusate sodium (SENOKOT S) 8 6-50 mg per tablet 1 tablet  1 tablet Oral BID    tamsulosin (FLOMAX) capsule 0 4 mg  0 4 mg Oral Daily With Dinner         Physical Exam:    General:  No acute distress  CV:  Regular rate and rhythm  Respiratory:  Nonlabored respirations  Abdominal:  Soft, nontender  Extremities:  Moves all extremities      Pulse exam:  Palpable right femoral, Doppler signal left femoral  Doppler signal right PT    Assessment:  74-year-old male with peripheral arterial disease, severe left lower extremity claudication with left common femoral, proximal SFA and profunda femorals artery occlusion  Areas of severe left external iliac artery stenosis and diffuse fem/pop, tibioperoneal disease      Plan:  Diet as tolerated  Continue heparin drip  Plan for left femoral endarterectomy with profundoplasty, retrograde iliac artery stenting this week  Continue aspirin and statin  Rest of management per primary service    Toya La MD  11/4/2019

## 2019-11-04 NOTE — ASSESSMENT & PLAN NOTE
· Incidental finding on CT scan  · 9 x 5 x 5 mm calculus within the posterior bladder, near the left ureterovesicular junction however there is no hydronephrosis to suggest obstruction    · Pain control  · Consider urology eval if symptomatic or if renal function worsens

## 2019-11-04 NOTE — PHYSICAL THERAPY NOTE
Physical Therapy Cancellation Note    Chart reviewed; noted pt had an episode of agitation earlier in AM and was placed on 1:1 obs; nsg notes also indicate they were unable to obtain distal pulses in (L) LE w/ doppler; per vasc sx, the plan is for left femoral endarterectomy with profundoplasty, retrograde iliac artery stenting this week; will hold PT/mobilization at this time based on overall status; will follow as clinical course allows      Bernard Harris PT

## 2019-11-04 NOTE — ASSESSMENT & PLAN NOTE
· Pt presented with 2-month history of LLE pain with walking  Denies pain at rest, but pt is unable to walk more than a few feet without pain  · CTA: Severe peripheral vascular disease  Limited evaluation of the calf vessels due to extensive calcifications  Severe areas of narrowing and focal occlusion of the right superficial femoral artery and diffuse occlusion of the popliteal artery   Distal reconstitution of the right peroneal artery and scattered areas of the right posterior tibial artery  Occlusion of the left common femoral artery and left superficial femoral artery with distal reconstitution however severe narrowing of the mid to distal superficial femoral artery   Severe narrowing of the left popliteal artery  Patent left peroneal artery and scattered areas of the left posterior tibial artery  PLAN:  · Continue Heparin gtt, asa/statin  · Plan for left femoral endarterectomy with retrograde iliac intervention per vascular, tentatively planned for Friday  Appreciate vascular surgery input  · Cardiology on board for preop risk stratification, appreciate input    · Pain control per geriatrics pain management order set

## 2019-11-05 ENCOUNTER — APPOINTMENT (INPATIENT)
Dept: RADIOLOGY | Facility: HOSPITAL | Age: 69
DRG: 252 | End: 2019-11-05
Payer: MEDICARE

## 2019-11-05 LAB
ANION GAP SERPL CALCULATED.3IONS-SCNC: 6 MMOL/L (ref 4–13)
APTT PPP: 65 SECONDS (ref 23–37)
APTT PPP: 71 SECONDS (ref 23–37)
BASOPHILS # BLD AUTO: 0.01 THOUSANDS/ΜL (ref 0–0.1)
BASOPHILS NFR BLD AUTO: 0 % (ref 0–1)
BUN SERPL-MCNC: 7 MG/DL (ref 5–25)
CALCIUM SERPL-MCNC: 8.8 MG/DL (ref 8.3–10.1)
CHLORIDE SERPL-SCNC: 112 MMOL/L (ref 100–108)
CO2 SERPL-SCNC: 24 MMOL/L (ref 21–32)
CREAT SERPL-MCNC: 0.76 MG/DL (ref 0.6–1.3)
EOSINOPHIL # BLD AUTO: 0.13 THOUSAND/ΜL (ref 0–0.61)
EOSINOPHIL NFR BLD AUTO: 4 % (ref 0–6)
ERYTHROCYTE [DISTWIDTH] IN BLOOD BY AUTOMATED COUNT: 15.4 % (ref 11.6–15.1)
FOLATE SERPL-MCNC: 18.4 NG/ML (ref 3.1–17.5)
GFR SERPL CREATININE-BSD FRML MDRD: 92 ML/MIN/1.73SQ M
GLUCOSE SERPL-MCNC: 102 MG/DL (ref 65–140)
HCT VFR BLD AUTO: 33.3 % (ref 36.5–49.3)
HGB BLD-MCNC: 10.7 G/DL (ref 12–17)
IMM GRANULOCYTES # BLD AUTO: 0 THOUSAND/UL (ref 0–0.2)
IMM GRANULOCYTES NFR BLD AUTO: 0 % (ref 0–2)
LYMPHOCYTES # BLD AUTO: 0.39 THOUSANDS/ΜL (ref 0.6–4.47)
LYMPHOCYTES NFR BLD AUTO: 13 % (ref 14–44)
MCH RBC QN AUTO: 30.8 PG (ref 26.8–34.3)
MCHC RBC AUTO-ENTMCNC: 32.1 G/DL (ref 31.4–37.4)
MCV RBC AUTO: 96 FL (ref 82–98)
MONOCYTES # BLD AUTO: 0.27 THOUSAND/ΜL (ref 0.17–1.22)
MONOCYTES NFR BLD AUTO: 9 % (ref 4–12)
NEUTROPHILS # BLD AUTO: 2.15 THOUSANDS/ΜL (ref 1.85–7.62)
NEUTS SEG NFR BLD AUTO: 74 % (ref 43–75)
NRBC BLD AUTO-RTO: 0 /100 WBCS
PLATELET # BLD AUTO: 159 THOUSANDS/UL (ref 149–390)
PMV BLD AUTO: 11.1 FL (ref 8.9–12.7)
POTASSIUM SERPL-SCNC: 3.8 MMOL/L (ref 3.5–5.3)
RBC # BLD AUTO: 3.47 MILLION/UL (ref 3.88–5.62)
SODIUM SERPL-SCNC: 142 MMOL/L (ref 136–145)
T4 FREE SERPL-MCNC: 1.11 NG/DL (ref 0.76–1.46)
TSH SERPL DL<=0.05 MIU/L-ACNC: 4.38 UIU/ML (ref 0.36–3.74)
VIT B12 SERPL-MCNC: 431 PG/ML (ref 100–900)
WBC # BLD AUTO: 2.95 THOUSAND/UL (ref 4.31–10.16)

## 2019-11-05 PROCEDURE — 84439 ASSAY OF FREE THYROXINE: CPT | Performed by: INTERNAL MEDICINE

## 2019-11-05 PROCEDURE — 85730 THROMBOPLASTIN TIME PARTIAL: CPT | Performed by: INTERNAL MEDICINE

## 2019-11-05 PROCEDURE — 84443 ASSAY THYROID STIM HORMONE: CPT | Performed by: INTERNAL MEDICINE

## 2019-11-05 PROCEDURE — 99232 SBSQ HOSP IP/OBS MODERATE 35: CPT | Performed by: SURGERY

## 2019-11-05 PROCEDURE — 70450 CT HEAD/BRAIN W/O DYE: CPT

## 2019-11-05 PROCEDURE — 80048 BASIC METABOLIC PNL TOTAL CA: CPT | Performed by: INTERNAL MEDICINE

## 2019-11-05 PROCEDURE — 99232 SBSQ HOSP IP/OBS MODERATE 35: CPT | Performed by: INTERNAL MEDICINE

## 2019-11-05 PROCEDURE — 82607 VITAMIN B-12: CPT | Performed by: INTERNAL MEDICINE

## 2019-11-05 PROCEDURE — 85025 COMPLETE CBC W/AUTO DIFF WBC: CPT | Performed by: INTERNAL MEDICINE

## 2019-11-05 PROCEDURE — 82746 ASSAY OF FOLIC ACID SERUM: CPT | Performed by: INTERNAL MEDICINE

## 2019-11-05 RX ADMIN — SODIUM CHLORIDE, SODIUM GLUCONATE, SODIUM ACETATE, POTASSIUM CHLORIDE AND MAGNESIUM CHLORIDE 100 ML/HR: 526; 502; 368; 37; 30 INJECTION, SOLUTION INTRAVENOUS at 13:31

## 2019-11-05 RX ADMIN — HEPARIN SODIUM 12 UNITS/KG/HR: 10000 INJECTION, SOLUTION INTRAVENOUS at 23:54

## 2019-11-05 RX ADMIN — SODIUM CHLORIDE, SODIUM GLUCONATE, SODIUM ACETATE, POTASSIUM CHLORIDE AND MAGNESIUM CHLORIDE 100 ML/HR: 526; 502; 368; 37; 30 INJECTION, SOLUTION INTRAVENOUS at 03:14

## 2019-11-05 RX ADMIN — ATORVASTATIN CALCIUM 40 MG: 40 TABLET, FILM COATED ORAL at 17:07

## 2019-11-05 RX ADMIN — ASPIRIN 81 MG: 81 TABLET, COATED ORAL at 08:46

## 2019-11-05 RX ADMIN — SENNOSIDES AND DOCUSATE SODIUM 1 TABLET: 8.6; 5 TABLET ORAL at 08:45

## 2019-11-05 RX ADMIN — POLYETHYLENE GLYCOL 3350 17 G: 17 POWDER, FOR SOLUTION ORAL at 08:46

## 2019-11-05 RX ADMIN — SODIUM CHLORIDE, SODIUM GLUCONATE, SODIUM ACETATE, POTASSIUM CHLORIDE AND MAGNESIUM CHLORIDE 100 ML/HR: 526; 502; 368; 37; 30 INJECTION, SOLUTION INTRAVENOUS at 23:53

## 2019-11-05 RX ADMIN — MELATONIN 6 MG: 3 TAB ORAL at 22:34

## 2019-11-05 RX ADMIN — TAMSULOSIN HYDROCHLORIDE 0.4 MG: 0.4 CAPSULE ORAL at 17:07

## 2019-11-05 RX ADMIN — NICOTINE 1 PATCH: 14 PATCH TRANSDERMAL at 08:46

## 2019-11-05 RX ADMIN — AMLODIPINE BESYLATE 5 MG: 5 TABLET ORAL at 08:45

## 2019-11-05 RX ADMIN — GABAPENTIN 100 MG: 100 CAPSULE ORAL at 08:46

## 2019-11-05 RX ADMIN — POLYETHYLENE GLYCOL 3350 17 G: 17 POWDER, FOR SOLUTION ORAL at 17:07

## 2019-11-05 RX ADMIN — THIAMINE HCL TAB 100 MG 100 MG: 100 TAB at 08:46

## 2019-11-05 RX ADMIN — SENNOSIDES AND DOCUSATE SODIUM 1 TABLET: 8.6; 5 TABLET ORAL at 17:08

## 2019-11-05 NOTE — PROGRESS NOTES
Progress Note - Suraj Patrick 1950, 78 y o  male MRN: [id number]    Unit/Bed#: [facility] 430-01 Encounter: [id number]    Primary Care Provider: Jodee Gustafson MD   Date and time admitted to hospital: 11/2/2019  2:01 AM        Constipation  Assessment & Plan  · No abdominal tenderness on exam  · Aggressive bowel regimen while on narcotics  · Obtain abdomen xray    Renal stone  Assessment & Plan  · Incidental finding on CT scan  · 9 x 5 x 5 mm calculus within the posterior bladder, near the left ureterovesicular junction however there is no hydronephrosis to suggest obstruction  · Pain control  · Consider urology eval if symptomatic or if renal function worsens    Syncope and collapse  Assessment & Plan  · Had RRT while IP fpr syncope and was intially though to have CHB, was started on dopamine infusion and was transferred to ICU  · Cardiology evaluated the patient in icu, now off dopamine and OOU  · likely a vasovagal syncope in setting of severe pain and transient second degree heart block  · Echo showed EF of 65% with no WMA or no AS    PLAN:  · Avoid BB for now per cardiology  · Adequate pain control  · Continue to Monitor on telemetry for now  Dependence on nicotine from cigarettes  Assessment & Plan  · Still smokes despite diagnosis of throat cancer, per daughter  · She thinks he smokes 1ppd  · Nicotine patch  · Provide smoking cessation education    Confusion  Assessment & Plan  · Multifactorial  Likely he has vascular dementia at baseline per geriatrics, confusion is intermittently worse due to pain, constipation, language barrier and sun downing? · Daughter reported that pt acts strangely sometimes, example given that he has urinated on a window at her home in the past  These types of behaviors have been occurring for at least one year  · Denies diagnosis of dementia  · Geriatric input appreciated  · Limit narcotics as much as possible    · Gabapentin added  · No haldol per geriatrics, okay to give zyprexa prn  · Aggressive bowel regimen  · Delirium precautions and frequent reorientation  · Melatonin hs  · Currently on 1:1 due to intermittent agitation  · Check TSH, vitamin b12, folate    Hypertension  Assessment & Plan  · Increase amlodipine dose to 5 mg daily  · Hydralazine ordered prn    History of throat cancer  Assessment & Plan  · Per daughter, pt diagnosed with throat CA 2 years ago and has been treated with chemo and radiation  · Not undergoing treatment at this time    * Peripheral vascular disease ([facility] [address] )  Assessment & Plan  · Pt presented with 2-month history of LLE pain with walking  Denies pain at rest, but pt is unable to walk more than a few feet without pain  · CTA: Severe peripheral vascular disease  Limited evaluation of the calf vessels due to extensive calcifications  Severe areas of narrowing and focal occlusion of the right superficial femoral artery and diffuse occlusion of the popliteal artery   Distal reconstitution of the right peroneal artery and scattered areas of the right posterior tibial artery  Occlusion of the left common femoral artery and left superficial femoral artery with distal reconstitution however severe narrowing of the mid to distal superficial femoral artery   Severe narrowing of the left popliteal artery  Patent left peroneal artery and scattered areas of the left posterior tibial artery  PLAN:  · Continue Heparin gtt, asa/statin  · Plan for left femoral endarterectomy with retrograde iliac intervention per vascular, tentatively planned for Friday  Appreciate vascular surgery input  · Cardiology on board for preop risk stratification, appreciate input  · Pain control per geriatrics pain management order set           VTE Pharmacologic Prophylaxis:   Pharmacologic: Heparin Drip  Mechanical VTE Prophylaxis in Place: Yes    Patient Centered Rounds: I have performed bedside rounds with nursing staff today      Discussions with Specialists or Other Care Team Provider: geriatrics    Education and Discussions with Family / Patient: patient     Time Spent for Care: 45 minutes  More than 50% of total time spent on counseling and coordination of care as described above  Current Length of Stay: 3 day(s)    Current Patient Status: Inpatient   Certification Statement: The patient will continue to require additional inpatient hospital stay due to AMS, delirium    Discharge Plan: depending on secondary to A gram, patient capable of going through surgery and recover  Work up on the way  Code Status: Level 1 - Full Code      Subjective:   Improved significantly mental status   Continue close monitoring for now  Objective:     Vitals:   Temp (24hrs), Av 1 °F (36 7 °C), Min:97 7 °F (36 5 °C), Max:98 4 °F (36 9 °C)    Temp:  [97 7 °F (36 5 °C)-98 4 °F (36 9 °C)] 98 1 °F (36 7 °C)  HR:  [] 75  Resp:  [20] 20  BP: (144-175)/(65-79) 149/67  SpO2:  [93 %-97 %] 97 %  Body mass index is 18 47 kg/m²  Input and Output Summary (last 24 hours): Intake/Output Summary (Last 24 hours) at 2019 1651  Last data filed at 2019 1328  Gross per 24 hour   Intake 2176 67 ml   Output 2575 ml   Net -398 33 ml       Physical Exam:     Physical Exam   Constitutional: He is oriented to person, place, and time  He appears well-developed and well-nourished  HENT:   Head: Normocephalic and atraumatic  Right Ear: External ear normal    Left Ear: External ear normal    Nose: Nose normal    Mouth/Throat: Oropharynx is clear and moist  No oropharyngeal exudate  Eyes: Pupils are equal, round, and reactive to light  Conjunctivae and EOM are normal  Right eye exhibits no discharge  Left eye exhibits no discharge  No scleral icterus  Neck: Normal range of motion  Neck supple  No JVD present  No tracheal deviation present  No thyromegaly present  Cardiovascular: Normal rate, regular rhythm, normal heart sounds and intact distal pulses   Exam reveals no gallop and no friction rub  No murmur heard  Pulmonary/Chest: Effort normal and breath sounds normal  No stridor  No respiratory distress  He has no wheezes  He has no rales  He exhibits no tenderness  Abdominal: Soft  Bowel sounds are normal  He exhibits no distension and no mass  There is no tenderness  There is no rebound and no guarding  No hernia  Musculoskeletal: Normal range of motion  He exhibits no edema, tenderness or deformity  Lymphadenopathy:     He has no cervical adenopathy  Neurological: He is alert and oriented to person, place, and time  He displays normal reflexes  No cranial nerve deficit or sensory deficit  He exhibits normal muscle tone  Coordination normal    Skin: Skin is warm and dry  No erythema  No pallor  Nursing note and vitals reviewed  Additional Data:     Labs:    Results from last 7 days   Lab Units 11/05/19  0521   WBC Thousand/uL 2 95*   HEMOGLOBIN g/dL 10 7*   HEMATOCRIT % 33 3*   PLATELETS Thousands/uL 159   NEUTROS PCT % 74   LYMPHS PCT % 13*   MONOS PCT % 9   EOS PCT % 4     Results from last 7 days   Lab Units 11/05/19  0521 11/03/19  0522   POTASSIUM mmol/L 3 8 4 0   CHLORIDE mmol/L 112* 108   CO2 mmol/L 24 26   BUN mg/dL 7 19   CREATININE mg/dL 0 76 1 00   CALCIUM mg/dL 8 8 8 9   ALK PHOS U/L  --  87   ALT U/L  --  13   AST U/L  --  12     Results from last 7 days   Lab Units 11/02/19  0317   INR  1 24*       * I Have Reviewed All Lab Data Listed Above  * Additional Pertinent Lab Tests Reviewed:  [facility] 66 Admission Reviewed    Imaging:  C  Imaging Reports Reviewed Today Include:  CT head no contrast  Imaging Personally Reviewed by Myself Includes:  CT head no contrast     Recent Cultures (last 7 days):           Last 24 Hours Medication List:     Current Facility-Administered Medications:  acetaminophen 650 mg Oral Q6H PRN Vanessa Salvador PA-C    amLODIPine 5 mg Oral Daily Jerardo Mcintyre MD    aspirin 81 mg Oral Daily [place], [place] atorvastatin 40 mg Oral Daily With [employer] W[employer] Salvador PA-C    bisacodyl 10 mg Rectal Daily PRN Wallula Coral, PA-WENDY    gabapentin 100 mg Oral Daily Lana Sosa MD    heparin (porcine) 3-30 Units/kg/hr (Order-Specific) Intravenous Titrated Wallula Shanta, PA-C Last Rate: 12 Units/kg/hr (11/04/19 1331)   heparin (porcine) 2,000 Units Intravenous PRN Clearence BANDAR Ferrara-WENDY    heparin (porcine) 4,000 Units Intravenous PRN Clearence BANDAR Ferrara-C    hydrALAZINE 10 mg Intravenous Q6H PRN Cory Alonso MD    melatonin 6 mg Oral HS Lana Sosa MD    multi-electrolyte 100 mL/hr Intravenous Continuous Wallula Shanta PA-C Last Rate: 100 mL/hr (11/05/19 1331)   nicotine 1 patch Transdermal Daily Eddie WING Salvador PA-C    OLANZapine 2 5 mg Intramuscular Q8H PRN Lana Sosa MD    oxyCODONE 2 5 mg Oral Q4H PRN Jigar Yamilex, PA-WENDY    oxyCODONE 5 mg Oral Q4H PRN Jigar Yamilex, PA-C    polyethylene glycol 17 g Oral BID Lana Sosa MD    senna-docusate sodium 1 tablet Oral BID Nery Salvador PA-C    tamsulosin 0 4 mg Oral Daily With Terrance Torres MD    thiamine 100 mg Oral Daily Jigar TERRA Kaminski         Today, Patient Was Seen By: Luis Enrique Saini MD    ** Please Note: Dictation voice to text software may have been used in the creation of this document   **

## 2019-11-05 NOTE — NURSING NOTE
Pt got up  Got aggressive with staff, agitated and screaming  Control team called  Assisted back in bed  Zyprexa given prn for agitation  One to one observation continued  Patient calm down  Will continue to monitor

## 2019-11-05 NOTE — PROGRESS NOTES
Progress Note - Geriatric Medicine   Pippa July 78 y o  male MRN: [id number]  Unit/Bed#: [facility] 430-01 Encounter: [id number]      Assessment/Plan:    Acute metabolic encephalopathy  -multifactorial including constipation,suspected underlying vascular dementia with acute pain, and environment change  -continue to reorient and redirect as able  -currently on continual observation   -continue to monitor for additional underlying metabolic derangements which may be contributing   -suspect language barrier is also contributing, encourage staffing of employees who speak Wolof to allow easier interaction and help patient feel more at ease    Suspected Vascular Dementia  -CTH without contrast today revealed significant chronic microangiopathic disease most notable in bilateral temporal lobes  -recommend increasing Gabapentin to 300mg   -patient will likely require increased level of care at discharge and may require placement, will follow-up with family as concern for caregiver burnout     Constipation  -may be worsened by Amlodipine   -no BM charted since admission continue aggressive bowel regimen, ambulation with assistance as medically cleared as well as hydration    Deconditioning/debility/frailty  -multifactorial including albumin low at 3 4  -continue to encourage nutritional support   -continue PT/OT as tolerated/medically indicated    Peripheral vascular disease  -patient is on heparin GTT, aspirin, and statin  -is awaiting left femoral endarterectomy tentatively planned for later this week pending cardiac risk stratification  -patient is elevated risk of post operative delirium due to underlying cognition and current encephalopathy     Care coordination: Discussed results and recommendations with [facility] attending, RN, and discussed with patients daughter Abiodun De La Cruz [phone] by phone  Discussed suspect underlying vascular dementia and will need comprehensive assessment as o/p   Long term goals also discussed and she and family wish to care for patient at home with increased in home support as needed  Subjective:   Patient seen examined at the chair side where he is sitting resting comfortably  He is pleasantly confused  He has no family at the bedside and he has just returned from his CT brain  Review of Systems   Reason unable to perform ROS: patient reports left leg pain and denies all other ROS  Objective:     Vitals: Blood pressure 149/67, pulse 75, temperature 98 1 °F (36 7 °C), temperature source Oral, resp  rate 20, height 5' 5" (1 651 m), weight 50 3 kg (111 lb), SpO2 97 %  ,Body mass index is 18 47 kg/m²  Intake/Output Summary (Last 24 hours) at 11/5/2019 1642  Last data filed at 11/5/2019 1328  Gross per 24 hour   Intake 2176 67 ml   Output 2575 ml   Net -398 33 ml       Current Medications: Reviewed    Physical Exam:   Physical Exam   Constitutional: He appears well-developed and well-nourished  HENT:   Head: Normocephalic and atraumatic  Mucous membranes tachy   Eyes: Right conjunctiva is injected  Left conjunctiva is injected  Right pupil is round  Left pupil is round  Pupils are equal    Neck: Normal range of motion  No tracheal deviation present  Cardiovascular: Normal rate and regular rhythm  Pulmonary/Chest: Effort normal and breath sounds normal    Abdominal: Soft  There is no tenderness  Musculoskeletal: He exhibits tenderness (LLE )  Neurological:   Awake and alert, in chair aside bed   Skin: Skin is warm and dry  He is not diaphoretic  Nursing note and vitals reviewed       Invasive Devices     Peripheral Intravenous Line            Peripheral IV 11/02/19 Right Hand 2 days    Peripheral IV 11/04/19 Left Forearm 1 day              Lab, Imaging and other studies:    -Sodium 142, potassium 3 8, chloride 112, BUN 7, creatinine 0 76, GFR 92  -hemoglobin 10 7, hematocrit 33 3, WBC 2 95, platelets 432    CTH obtained 11/5/19: Final read pending, on personal image review appears to have moderate to severe microangiopathic disease consistent with expected for degree of peripheral vascular disease, most notable in bilateral temporal lobes

## 2019-11-06 ENCOUNTER — ANESTHESIA EVENT (INPATIENT)
Dept: PERIOP | Facility: HOSPITAL | Age: 69
DRG: 252 | End: 2019-11-06
Payer: MEDICARE

## 2019-11-06 PROBLEM — I73.9 PAD (PERIPHERAL ARTERY DISEASE) (HCC): Status: ACTIVE | Noted: 2019-11-01

## 2019-11-06 LAB
ABO GROUP BLD: NORMAL
ANION GAP SERPL CALCULATED.3IONS-SCNC: 9 MMOL/L (ref 4–13)
APTT PPP: 36 SECONDS (ref 23–37)
BASOPHILS # BLD AUTO: 0.02 THOUSANDS/ΜL (ref 0–0.1)
BASOPHILS NFR BLD AUTO: 1 % (ref 0–1)
BLD GP AB SCN SERPL QL: NEGATIVE
BUN SERPL-MCNC: 6 MG/DL (ref 5–25)
CALCIUM SERPL-MCNC: 8.9 MG/DL (ref 8.3–10.1)
CHLORIDE SERPL-SCNC: 110 MMOL/L (ref 100–108)
CO2 SERPL-SCNC: 23 MMOL/L (ref 21–32)
CREAT SERPL-MCNC: 0.8 MG/DL (ref 0.6–1.3)
EOSINOPHIL # BLD AUTO: 0.16 THOUSAND/ΜL (ref 0–0.61)
EOSINOPHIL NFR BLD AUTO: 4 % (ref 0–6)
ERYTHROCYTE [DISTWIDTH] IN BLOOD BY AUTOMATED COUNT: 15.3 % (ref 11.6–15.1)
GFR SERPL CREATININE-BSD FRML MDRD: 91 ML/MIN/1.73SQ M
GLUCOSE SERPL-MCNC: 95 MG/DL (ref 65–140)
HCT VFR BLD AUTO: 34.2 % (ref 36.5–49.3)
HGB BLD-MCNC: 11 G/DL (ref 12–17)
IMM GRANULOCYTES # BLD AUTO: 0.01 THOUSAND/UL (ref 0–0.2)
IMM GRANULOCYTES NFR BLD AUTO: 0 % (ref 0–2)
LYMPHOCYTES # BLD AUTO: 0.43 THOUSANDS/ΜL (ref 0.6–4.47)
LYMPHOCYTES NFR BLD AUTO: 12 % (ref 14–44)
MCH RBC QN AUTO: 31.1 PG (ref 26.8–34.3)
MCHC RBC AUTO-ENTMCNC: 32.2 G/DL (ref 31.4–37.4)
MCV RBC AUTO: 97 FL (ref 82–98)
MONOCYTES # BLD AUTO: 0.28 THOUSAND/ΜL (ref 0.17–1.22)
MONOCYTES NFR BLD AUTO: 8 % (ref 4–12)
NEUTROPHILS # BLD AUTO: 2.84 THOUSANDS/ΜL (ref 1.85–7.62)
NEUTS SEG NFR BLD AUTO: 75 % (ref 43–75)
NRBC BLD AUTO-RTO: 0 /100 WBCS
PLATELET # BLD AUTO: 155 THOUSANDS/UL (ref 149–390)
PMV BLD AUTO: 11.2 FL (ref 8.9–12.7)
POTASSIUM SERPL-SCNC: 3.7 MMOL/L (ref 3.5–5.3)
RBC # BLD AUTO: 3.54 MILLION/UL (ref 3.88–5.62)
RH BLD: POSITIVE
SODIUM SERPL-SCNC: 142 MMOL/L (ref 136–145)
SPECIMEN EXPIRATION DATE: NORMAL
WBC # BLD AUTO: 3.74 THOUSAND/UL (ref 4.31–10.16)

## 2019-11-06 PROCEDURE — 86920 COMPATIBILITY TEST SPIN: CPT

## 2019-11-06 PROCEDURE — 86850 RBC ANTIBODY SCREEN: CPT | Performed by: PHYSICIAN ASSISTANT

## 2019-11-06 PROCEDURE — 85025 COMPLETE CBC W/AUTO DIFF WBC: CPT | Performed by: INTERNAL MEDICINE

## 2019-11-06 PROCEDURE — 86900 BLOOD TYPING SEROLOGIC ABO: CPT | Performed by: PHYSICIAN ASSISTANT

## 2019-11-06 PROCEDURE — 99232 SBSQ HOSP IP/OBS MODERATE 35: CPT | Performed by: INTERNAL MEDICINE

## 2019-11-06 PROCEDURE — 97530 THERAPEUTIC ACTIVITIES: CPT

## 2019-11-06 PROCEDURE — 97116 GAIT TRAINING THERAPY: CPT

## 2019-11-06 PROCEDURE — 86901 BLOOD TYPING SEROLOGIC RH(D): CPT | Performed by: PHYSICIAN ASSISTANT

## 2019-11-06 PROCEDURE — 80048 BASIC METABOLIC PNL TOTAL CA: CPT | Performed by: INTERNAL MEDICINE

## 2019-11-06 PROCEDURE — 85730 THROMBOPLASTIN TIME PARTIAL: CPT | Performed by: PHYSICIAN ASSISTANT

## 2019-11-06 PROCEDURE — 99231 SBSQ HOSP IP/OBS SF/LOW 25: CPT | Performed by: SURGERY

## 2019-11-06 RX ORDER — CHLORHEXIDINE GLUCONATE 0.12 MG/ML
15 RINSE ORAL ONCE
Status: COMPLETED | OUTPATIENT
Start: 2019-11-06 | End: 2019-11-06

## 2019-11-06 RX ORDER — SODIUM CHLORIDE 9 MG/ML
60 INJECTION, SOLUTION INTRAVENOUS CONTINUOUS
Status: DISCONTINUED | OUTPATIENT
Start: 2019-11-07 | End: 2019-11-07

## 2019-11-06 RX ADMIN — MELATONIN 6 MG: 3 TAB ORAL at 22:03

## 2019-11-06 RX ADMIN — AMLODIPINE BESYLATE 5 MG: 5 TABLET ORAL at 09:30

## 2019-11-06 RX ADMIN — SODIUM CHLORIDE, SODIUM GLUCONATE, SODIUM ACETATE, POTASSIUM CHLORIDE AND MAGNESIUM CHLORIDE 100 ML/HR: 526; 502; 368; 37; 30 INJECTION, SOLUTION INTRAVENOUS at 10:32

## 2019-11-06 RX ADMIN — ATORVASTATIN CALCIUM 40 MG: 40 TABLET, FILM COATED ORAL at 17:23

## 2019-11-06 RX ADMIN — SENNOSIDES AND DOCUSATE SODIUM 1 TABLET: 8.6; 5 TABLET ORAL at 17:23

## 2019-11-06 RX ADMIN — SENNOSIDES AND DOCUSATE SODIUM 1 TABLET: 8.6; 5 TABLET ORAL at 09:30

## 2019-11-06 RX ADMIN — CHLORHEXIDINE GLUCONATE 0.12% ORAL RINSE 15 ML: 1.2 LIQUID ORAL at 14:34

## 2019-11-06 RX ADMIN — NICOTINE 1 PATCH: 14 PATCH TRANSDERMAL at 09:31

## 2019-11-06 RX ADMIN — THIAMINE HCL TAB 100 MG 100 MG: 100 TAB at 09:30

## 2019-11-06 RX ADMIN — POLYETHYLENE GLYCOL 3350 17 G: 17 POWDER, FOR SOLUTION ORAL at 17:23

## 2019-11-06 RX ADMIN — Medication 1 APPLICATION: at 22:04

## 2019-11-06 RX ADMIN — GABAPENTIN 100 MG: 100 CAPSULE ORAL at 09:30

## 2019-11-06 RX ADMIN — HEPARIN SODIUM 12 UNITS/KG/HR: 10000 INJECTION, SOLUTION INTRAVENOUS at 22:49

## 2019-11-06 RX ADMIN — OXYCODONE HYDROCHLORIDE 2.5 MG: 5 TABLET ORAL at 05:11

## 2019-11-06 RX ADMIN — OXYCODONE HYDROCHLORIDE 5 MG: 5 TABLET ORAL at 07:08

## 2019-11-06 RX ADMIN — ASPIRIN 81 MG: 81 TABLET, COATED ORAL at 09:30

## 2019-11-06 RX ADMIN — TAMSULOSIN HYDROCHLORIDE 0.4 MG: 0.4 CAPSULE ORAL at 17:23

## 2019-11-06 RX ADMIN — ACETAMINOPHEN 650 MG: 325 TABLET ORAL at 23:03

## 2019-11-06 RX ADMIN — HEPARIN SODIUM 4000 UNITS: 1000 INJECTION, SOLUTION INTRAVENOUS; SUBCUTANEOUS at 23:32

## 2019-11-06 NOTE — ASSESSMENT & PLAN NOTE
· Pt presented with 2-month history of LLE pain with walking  Denies pain at rest, but pt is unable to walk more than a few feet without pain  · CTA: Severe peripheral vascular disease  Limited evaluation of the calf vessels due to extensive calcifications  Severe areas of narrowing and focal occlusion of the right superficial femoral artery and diffuse occlusion of the popliteal artery   Distal reconstitution of the right peroneal artery and scattered areas of the right posterior tibial artery  Occlusion of the left common femoral artery and left superficial femoral artery with distal reconstitution however severe narrowing of the mid to distal superficial femoral artery   Severe narrowing of the left popliteal artery  Patent left peroneal artery and scattered areas of the left posterior tibial artery  PLAN:  · Continue Heparin gtt, asa/statin  · Plan for left femoral endarterectomy with retrograde iliac intervention per vascular, tentatively planned for Friday  Appreciate vascular surgery input  · Cardiology on board for preop risk stratification, appreciate input    · Pain control per geriatrics pain management order set  · Patient to get A gram tomorrow

## 2019-11-06 NOTE — PLAN OF CARE
Problem: PHYSICAL THERAPY ADULT  Goal: Performs mobility at highest level of function for planned discharge setting  See evaluation for individualized goals  Description  Treatment/Interventions: Functional transfer training, LE strengthening/ROM, Elevations, Therapeutic exercise, Endurance training, Patient/family training, Equipment eval/education, Bed mobility, Gait training, Spoke to nursing, Family  Equipment Recommended: Walker(current use of RW for mobility)       See flowsheet documentation for full assessment, interventions and recommendations  Outcome: Progressing  Note:   Prognosis: Fair  Problem List: Decreased strength, Impaired balance, Decreased endurance, Decreased mobility, Decreased skin integrity, Pain  Assessment: The pt  has improving mobility as he is ambulating community distances with assistance as he fatigues and his pain worsens  He had no loss of balance, and he did require occasional standing rests due to his pain  Will continue to follow  Barriers to Discharge: Inaccessible home environment, Decreased caregiver support     Recommendation: Home with family support     PT - OK to Discharge: No    See flowsheet documentation for full assessment

## 2019-11-06 NOTE — PROGRESS NOTES
Progress Note - Kelly Abraham 1950, 78 y o  male MRN: [id number]    Unit/Bed#: [facility] 430-01 Encounter: [id number]    Primary Care Provider: Gela Jose MD   Date and time admitted to hospital: 11/2/2019  2:01 AM        Constipation  Assessment & Plan  · No abdominal tenderness on exam  · Aggressive bowel regimen while on narcotics  · Obtain abdomen xray    Renal stone  Assessment & Plan  · Incidental finding on CT scan  · 9 x 5 x 5 mm calculus within the posterior bladder, near the left ureterovesicular junction however there is no hydronephrosis to suggest obstruction  · Pain control  · Consider urology eval if symptomatic or if renal function worsens    Syncope and collapse  Assessment & Plan  · Had RRT while IP fpr syncope and was intially though to have CHB, was started on dopamine infusion and was transferred to ICU  · Cardiology evaluated the patient in icu, now off dopamine and OOU  · likely a vasovagal syncope in setting of severe pain and transient second degree heart block  · Echo showed EF of 65% with no WMA or no AS    PLAN:  · Avoid BB for now per cardiology  · Adequate pain control  · Continue to Monitor on telemetry for now  Dependence on nicotine from cigarettes  Assessment & Plan  · Still smokes despite diagnosis of throat cancer, per daughter  · She thinks he smokes 1ppd  · Nicotine patch  · Provide smoking cessation education    Confusion  Assessment & Plan  · Multifactorial  Likely he has vascular dementia at baseline per geriatrics, confusion is intermittently worse due to pain, constipation, language barrier and sun downing? · Daughter reported that pt acts strangely sometimes, example given that he has urinated on a window at her home in the past  These types of behaviors have been occurring for at least one year  · Denies diagnosis of dementia  · Geriatric input appreciated  · Limit narcotics as much as possible    · Gabapentin added  · No haldol per geriatrics, okay to give zyprexa prn  · Aggressive bowel regimen  · Delirium precautions and frequent reorientation  · Melatonin hs  · Currently on 1:1 due to intermittent agitation  · Check TSH, vitamin b12, folate  · improved    Hypertension  Assessment & Plan  · Increase amlodipine dose to 5 mg daily  · Hydralazine ordered prn    History of throat cancer  Assessment & Plan  · Per daughter, pt diagnosed with throat CA 2 years ago and has been treated with chemo and radiation  · Not undergoing treatment at this time    * Peripheral vascular disease ([facility] [address] )  Assessment & Plan  · Pt presented with 2-month history of LLE pain with walking  Denies pain at rest, but pt is unable to walk more than a few feet without pain  · CTA: Severe peripheral vascular disease  Limited evaluation of the calf vessels due to extensive calcifications  Severe areas of narrowing and focal occlusion of the right superficial femoral artery and diffuse occlusion of the popliteal artery   Distal reconstitution of the right peroneal artery and scattered areas of the right posterior tibial artery  Occlusion of the left common femoral artery and left superficial femoral artery with distal reconstitution however severe narrowing of the mid to distal superficial femoral artery   Severe narrowing of the left popliteal artery  Patent left peroneal artery and scattered areas of the left posterior tibial artery  PLAN:  · Continue Heparin gtt, asa/statin  · Plan for left femoral endarterectomy with retrograde iliac intervention per vascular, tentatively planned for Friday  Appreciate vascular surgery input  · Cardiology on board for preop risk stratification, appreciate input    · Pain control per geriatrics pain management order set  · Patient to get A gram tomorrow         VTE Pharmacologic Prophylaxis:   Pharmacologic: Heparin Drip  Mechanical VTE Prophylaxis in Place: Yes    Patient Centered Rounds: I have performed bedside rounds with nursing staff today  Discussions with Specialists or Other Care Team Provider: Vascular surgery, Geriatrics    Education and Discussions with Family / Patient: patient     Time Spent for Care: 45 minutes  More than 50% of total time spent on counseling and coordination of care as described above  Current Length of Stay: 4 day(s)    Current Patient Status: Inpatient   Certification Statement: The patient will continue to require additional inpatient hospital stay due to need work up     Discharge Plan:  Possible once procedure tomorrow  Code Status: Level 1 - Full Code      Subjective:   Patient is improving  He has was making jokes today  He has no new complaints today  Objective:     Vitals:   Temp (24hrs), Av 3 °F (36 8 °C), Min:97 9 °F (36 6 °C), Max:98 9 °F (37 2 °C)    Temp:  [97 9 °F (36 6 °C)-98 9 °F (37 2 °C)] 98 9 °F (37 2 °C)  HR:  [76-84] 77  Resp:  [16-20] 16  BP: (143-167)/(63-90) 150/68  SpO2:  [94 %-99 %] 98 %  Body mass index is 18 47 kg/m²  Input and Output Summary (last 24 hours): Intake/Output Summary (Last 24 hours) at 2019 1732  Last data filed at 2019 1401  Gross per 24 hour   Intake 2979 28 ml   Output 1850 ml   Net 1129 28 ml       Physical Exam:     Physical Exam    Additional Data:     Labs:    Results from last 7 days   Lab Units 19  0544   WBC Thousand/uL 3 74*   HEMOGLOBIN g/dL 11 0*   HEMATOCRIT % 34 2*   PLATELETS Thousands/uL 155   NEUTROS PCT % 75   LYMPHS PCT % 12*   MONOS PCT % 8   EOS PCT % 4     Results from last 7 days   Lab Units 19  0544  19  0522   POTASSIUM mmol/L 3 7   < > 4 0   CHLORIDE mmol/L 110*   < > 108   CO2 mmol/L 23   < > 26   BUN mg/dL 6   < > 19   CREATININE mg/dL 0 80   < > 1 00   CALCIUM mg/dL 8 9   < > 8 9   ALK PHOS U/L  --   --  87   ALT U/L  --   --  13   AST U/L  --   --  12    < > = values in this interval not displayed       Results from last 7 days   Lab Units 19  0317   INR  1 24*       * I Have Reviewed All Lab Data Listed Above  * Additional Pertinent Lab Tests Reviewed: [facility] Admission Reviewed    Imaging:    Imaging Reports Reviewed Today Include:    Imaging Personally Reviewed by Myself Includes:       Recent Cultures (last 7 days):           Last 24 Hours Medication List:     Current Facility-Administered Medications:  acetaminophen 650 mg Oral Q6H PRN Phil Hennessy PA-C    amLODIPine 5 mg Oral Daily Rukhsana Barragan MD    aspirin 81 mg Oral Daily Eddie WING Salvador PA-C    atorvastatin 40 mg Oral Daily With [employer] W[employer] Salvador PA-C    bisacodyl 10 mg Rectal Daily PRN Phil Hennessy, TERRA    gabapentin 100 mg Oral Daily Rukhsana Barragan MD    heparin (porcine) 3-30 Units/kg/hr (Order-Specific) Intravenous Titrated Ayo Medina PA-C Last Rate: 12 Units/kg/hr (11/05/19 2955)   heparin (porcine) 2,000 Units Intravenous PRN Phil Hennessy PA-C    heparin (porcine) 4,000 Units Intravenous PRN Phil Hennessy PA-C    hydrALAZINE 10 mg Intravenous Q6H PRN Jb Quan MD    melatonin 6 mg Oral HS Rukhsana Barragan MD    mupirocin  Nasal Q12H [address] Elvia Morales MD    nicotine 1 patch Transdermal Daily Phil Hennessy PA-C    OLANZapine 2 5 mg Intramuscular Q8H PRN Rukhsana Barragan MD    oxyCODONE 2 5 mg Oral Q4H PRN Anika Kwon PA-C    oxyCODONE 5 mg Oral Q4H PRN Anika Kwon PA-C    polyethylene glycol 17 g Oral BID Rukhsana Barragan MD    senna-docusate sodium 1 tablet Oral BID Phil Hennessy PA-C    [START ON 11/7/2019] sodium chloride 60 mL/hr Intravenous Continuous Anika Kwon PA-C    tamsulosin 0 4 mg Oral Daily With Flory Merino MD    thiamine 100 mg Oral Daily Anika Kwon PA-C         Today, Patient Was Seen By: Elvia Morales MD    ** Please Note: Dictation voice to text software may have been used in the creation of this document   **

## 2019-11-06 NOTE — PROGRESS NOTES
Progress Note - Cardiology   Stella Pain 78 y o  male MRN: [id number]  Unit/Bed#: [facility] 430-01 Encounter: [id number]  11/06/19  1:27 PM          Impression and Plan:    71-year-old with hypertension, peripheral vascular disease, chronic and active tobacco use, admitted with claudication symptoms of the left lower extremity and worsening peripheral vascular disease  Denied any cardiac complaints  Also noted to have vascular calcifications on CT  Scheduled for left femoral endarterectomy with retrograde iliac intervention on 11/8/2019  At this time, patient is partial oriented but overall not a great historian  Plan:    Preoperative evaluation prior to vascular surgery:  Can proceed at intermediate cardiac risk without further cardiac testing  At this time patient remains asymptomatic and no evidence of volume overload  LV function is preserved  Ischemic evaluation/testing is unlikely to   Hypertension:  Controlled    Will check a routine lipid profile  New high potency statin  Will see patient postoperatively       ===================================================================    Chief Complaint: No chief complaint on file  Subjective/Objective     Subjective:  Patient is dementia, cannot provide any reliable history but denies any chest pains or shortness of breath      Objective:  No distress at the time of exam, appears comfortable    Patient Active Problem List   Diagnosis    Peripheral vascular disease ([address] )    History of throat cancer    Hypertension    Confusion    Dependence on nicotine from cigarettes    Syncope and collapse    Renal stone    Constipation    PAD (peripheral artery disease) ([facility])       Vitals: /67   Pulse 84   Temp 98 4 °F (36 9 °C) (Oral)   Resp 18   Ht 5' 5" (1 651 m)   Wt 50 3 kg (111 lb)   SpO2 99%   BMI 18 47 kg/m²     I/O this shift:  In: 0476 [P O :180; I V :1302]  Out: 150 [Urine:150]  Wt Readings from Last 3 Encounters:   11/02/19 50 3 kg (111 lb)   11/01/19 50 3 kg (110 lb 14 3 oz)   04/30/19 53 5 kg (117 lb 15 1 oz)       Intake/Output Summary (Last 24 hours) at 11/6/2019 1327  Last data filed at 11/6/2019 1217  Gross per 24 hour   Intake 3518 88 ml   Output 2600 ml   Net 918 88 ml     I/O last 3 completed shifts: In: 3461 8 [P O :1160;  I V :2301 8]  Out: 4025 [Urine:4025]    Invasive Devices     Peripheral Intravenous Line            Peripheral IV 11/02/19 Right Hand 3 days    Peripheral IV 11/04/19 Left Forearm 2 days                  Physical Exam:  GEN: Kelly Abraham appears well, alert and oriented x 3, pleasant and cooperative   HEENT: pupils equal, round, and reactive to light; extraocular muscles intact  NECK: supple, no carotid bruits or JVD  HEART: regular rhythm, normal S1 and S2, no murmur, no clicks, gallops or rubs   LUNGS: clear to auscultation bilaterally; no wheezes or rhonchi, no rales  ABDOMEN/GI: normal bowel sounds, soft, no tenderness, no distention  EXTREMITIES/Musculoskeltal: peripheral pulses normal; no clubbing, cyanosis, no edema  NEURO: no focal motor findings   SKIN: normal without suspicious lesions on exposed skin              Lab Results:   Results from last 7 days   Lab Units 11/02/19  1756   TROPONIN I ng/mL <0 02     Results from last 7 days   Lab Units 11/06/19  0544 11/05/19  0521 11/03/19  0522   WBC Thousand/uL 3 74* 2 95* 5 38   HEMOGLOBIN g/dL 11 0* 10 7* 12 5   HEMATOCRIT % 34 2* 33 3* 38 8   PLATELETS Thousands/uL 155 159 185         Results from last 7 days   Lab Units 11/06/19  0544 11/05/19  0521 11/03/19  0522   POTASSIUM mmol/L 3 7 3 8 4 0   CHLORIDE mmol/L 110* 112* 108   CO2 mmol/L 23 24 26   BUN mg/dL 6 7 19   CREATININE mg/dL 0 80 0 76 1 00   CALCIUM mg/dL 8 9 8 8 8 9   ALK PHOS U/L  --   --  87   ALT U/L  --   --  13   AST U/L  --   --  12     Results from last 7 days   Lab Units 11/02/19 0317 11/01/19  2120   INR  1 24* 1 15       Imaging: I have personally reviewed pertinent reports  EKG/Telemtry:  No events    Scheduled Meds:    Current Facility-Administered Medications:  acetaminophen 650 mg Oral Q6H PRN Maria E Salvador PA-C    amLODIPine 5 mg Oral Daily Nany Estes MD    aspirin 81 mg Oral Daily Eddie WING Salvador PA-C    atorvastatin 40 mg Oral Daily With [employer] WING Salvador PA-C    bisacodyl 10 mg Rectal Daily PRN Elma Hartman PA-C    gabapentin 100 mg Oral Daily Nany Estes MD    heparin (porcine) 3-30 Units/kg/hr (Order-Specific) Intravenous Titrated Elma Hartman PA-C Last Rate: 12 Units/kg/hr (11/05/19 2354)   heparin (porcine) 2,000 Units Intravenous PRN Maria E Salvador PA-C    heparin (porcine) 4,000 Units Intravenous PRN Maira E Terry Salvador PA-C    hydrALAZINE 10 mg Intravenous Q6H PRN Sunil Almendarez MD    melatonin 6 mg Oral HS Nany Estes MD    nicotine 1 patch Transdermal Daily Maria E Salvador PA-C    OLANZapine 2 5 mg Intramuscular Q8H PRN Nany Estes MD    oxyCODONE 2 5 mg Oral Q4H PRN Terrell Peralta PA-C    oxyCODONE 5 mg Oral Q4H PRN Terrell Peralta PA-C    polyethylene glycol 17 g Oral BID Nany Estes MD    senna-docusate sodium 1 tablet Oral BID Maria E Salvador PA-C    tamsulosin 0 4 mg Oral Daily With Dinner Sunil Almendarez MD    thiamine 100 mg Oral Daily Terrell Peralta PA-C      Continuous Infusions:    heparin (porcine) 3-30 Units/kg/hr (Order-Specific) Last Rate: 12 Units/kg/hr (11/05/19 2354)       VTE Pharmacologic Prophylaxis: Heparin  VTE Mechanical Prophylaxis: sequential compression device      Counseling / Coordination of Care  Total time spent 15 minutes including teaching and family updates  More than 50% was spent counseling pt and family        ]

## 2019-11-06 NOTE — PHYSICAL THERAPY NOTE
Physical Therapy Progress Note     11/06/19 1000   Pain Assessment   Pain Assessment 0-10   Pain Score 4   Pain Type Acute pain   Pain Location Leg   Pain Orientation Left   Hospital Pain Intervention(s) Repositioned; Ambulation/increased activity; Emotional support   Response to Interventions Tolerated  Restrictions/Precautions   Other Precautions Fall Risk;Pain   Subjective   Subjective The pt  states that he has some pain when he walks  Transfers   Sit to Stand 4  Minimal assistance   Additional items Assist x 1; Increased time required;Verbal cues   Stand to Sit 4  Minimal assistance   Additional items Assist x 1; Increased time required   Ambulation/Elevation   Gait pattern Excessively slow; Inconsistent kusum; Antalgic   Gait Assistance 4  Minimal assist   Additional items Assist x 1   Assistive Device Rolling walker   Distance 210 feet  Balance   Static Sitting Good   Dynamic Sitting Fair   Static Standing Fair -   Ambulatory Poor +   Activity Tolerance   Activity Tolerance Patient tolerated treatment well   Nurse Marnie Daley RN  Assessment   Prognosis Fair   Problem List Decreased strength; Impaired balance;Decreased endurance;Decreased mobility; Decreased skin integrity;Pain   Assessment The pt  has improving mobility as he is ambulating community distances with assistance as he fatigues and his pain worsens  He had no loss of balance, and he did require occasional standing rests due to his pain  Will continue to follow  Barriers to Discharge Inaccessible home environment;Decreased caregiver support   Goals   Patient Goals To have less pain  STG Expiration Date 11/13/19   PT Treatment Day 1   Plan   Treatment/Interventions Functional transfer training;LE strengthening/ROM; Therapeutic exercise; Endurance training;Patient/family training;Bed mobility;Gait training;Elevations   Progress Progressing toward goals   PT Frequency   (3-5x a week )   Recommendation   Recommendation Home with family support   Equipment Recommended Gabby Medina   PT - OK to Discharge No     Manolo Arredondo, MITUL

## 2019-11-06 NOTE — PROGRESS NOTES
Progress Note - Geriatric Medicine   Kelly Abraham 78 y o  male MRN: [id number]  Unit/Bed#: [facility] 430-01 Encounter: [id number]      Assessment/Plan:    Acute metabolic cephalopathy  -multifactorial including suspected underlying vascular dementia as well as hospital associated delirium superimposed and previously uncontrolled pain, continues to slowly improve and is almost back to baseline per family at bedside  -currently on continue observation  -continue treatment of underlying medical conditions   -continue dressing any metabolic derangements  -maintain normal sleep-wake cycle  -encourage nutritional support    Highly suspicious of vascular dementia  -CT head reviewed which revealed moderate to severe chronic microangiopathic disease most notable bilateral temporal areas  -currently on gabapentin for sundowning prophylaxis, tolerating well, continue at current dose, dependent on renal function could increased to 300 mg HS if needed  -patient will require close outpatient follow-up with Geriatrics in comprehensive geriatric assessment     Deconditioning/debility/frailty  -continue treatment of PVD, anticipating surgical intervention for lower extremity atherosclerotic disease tomorrow  -patient will require PT and OT and would likely benefit from short-term rehab following surgical procedure  -continue good control of other chronic medical conditions  -continue to encourage good nutritional support    Ambulatory dysfunction  -patient remains high risk of falls  -continue to encourage assistance with all transfers  -PT and OT re-eval following surgical procedure would be beneficial    Constipation  -continue to encourage bowel regimen for prevention of constipation which can contribute to delirium and encephalopathy    Care coordination: Plan discussed with [facility] attending, family updated at bedside  Will follow up post op  Subjective:   Patient seen and examined the chair side where he sitting resting  He remains on continual observation and per his PCA he continues to have fluctuations in mental status with short bursts of acute confusion but otherwise is much calmer today  He denies acute complaints  His daughter and son in law were updated at the bedside  Review of Systems   Constitutional: Negative for chills and fever  HENT: Positive for hearing loss  Negative for trouble swallowing  Eyes: Negative for visual disturbance  Respiratory: Negative for shortness of breath and wheezing  Cardiovascular: Negative for chest pain and palpitations  Gastrointestinal: Negative for diarrhea, nausea and vomiting  Genitourinary: Negative for difficulty urinating  Musculoskeletal: Negative for arthralgias  Skin: Negative  Neurological: Negative for weakness  Hematological: Bruises/bleeds easily  Psychiatric/Behavioral: Positive for confusion  Negative for sleep disturbance  The patient is not nervous/anxious  All other systems reviewed and are negative  Objective:     Vitals: Blood pressure 155/67, pulse 84, temperature 98 4 °F (36 9 °C), temperature source Oral, resp  rate 18, height 5' 5" (1 651 m), weight 50 3 kg (111 lb), SpO2 99 %  ,Body mass index is 18 47 kg/m²  Intake/Output Summary (Last 24 hours) at 11/6/2019 1315  Last data filed at 11/6/2019 1217  Gross per 24 hour   Intake 3518 88 ml   Output 2600 ml   Net 918 88 ml     Current Medications:Reviewed    Physical Exam:   Physical Exam   Constitutional: He appears well-developed and well-nourished  HENT:   Head: Normocephalic and atraumatic  MMM   Eyes: Conjunctivae and EOM are normal  No scleral icterus  Neck: Normal range of motion  No tracheal deviation present  Cardiovascular: Normal rate and intact distal pulses  Pulmonary/Chest: Effort normal  He has no wheezes  Abdominal: Soft  Bowel sounds are normal  There is no tenderness  Musculoskeletal: He exhibits no edema     Neurological:   Awake and alert to person Skin: Skin is warm and dry  Psychiatric:   Affect fluctuates through day   Nursing note and vitals reviewed       Invasive Devices     Peripheral Intravenous Line            Peripheral IV 11/02/19 Right Hand 3 days    Peripheral IV 11/04/19 Left Forearm 2 days              Lab, Imaging and other studies:   -sodium 142, potassium 3 point cycle chloride 110, BUN 6, creatinine 0 80  -hemoglobin 11 0, hematocrit 34 2, WBC 3 74, platelets 563

## 2019-11-06 NOTE — PROGRESS NOTES
Progress Note - Mar Batista 78 y o  male MRN: [id number]    Unit/Bed#: [facility] 430-01 Encounter: [id number]      Assessment:  68yo male with PAD and short distance claudication, has baseline confusion  VSS  Afebrile  Palpable femoral b/l  Dopplerable PT on Right, absent DP/PT signals on left  Plan:  Continue aspirin, statin, heparin gtt  Plan is for left fem endart w retrograde iliac intervention on 11/8  Subjective:   Calm overnight  No complaints  Continued 1:1 observation  Denied fever, chills, chest pain, shortness of breath, nausea, vomiting, or abdominal pain this morning  Objective:     Vitals: Blood pressure 167/74, pulse 76, temperature 98 3 °F (36 8 °C), temperature source Oral, resp  rate 20, height 5' 5" (1 651 m), weight 50 3 kg (111 lb), SpO2 96 %  ,Body mass index is 18 47 kg/m²  Intake/Output Summary (Last 24 hours) at 11/5/2019 2351  Last data filed at 11/5/2019 2253  Gross per 24 hour   Intake 1230 ml   Output 2500 ml   Net -1270 ml       Physical Exam  General: NAD  HEENT: NC/AT  MMM  Cv: RRR  Lungs: normal effort  Ab: Soft, NT/ND  Ex: no CCE  Palpable fem pulses b/l  dopplerable PT on right, absent DP/ PT on left     Neuro: AAOx3    Scheduled Meds:  Current Facility-Administered Medications:  acetaminophen 650 mg Oral Q6H PRN Jessa Salvador PA-C    amLODIPine 5 mg Oral Daily Neva Beaulieu MD    aspirin 81 mg Oral Daily Eddie WING Salvador PA-C    atorvastatin 40 mg Oral Daily With [employer] WING Salvador PA-C    bisacodyl 10 mg Rectal Daily PRN Janis Delong PA-C    gabapentin 100 mg Oral Daily Neva Beaulieu MD    heparin (porcine) 3-30 Units/kg/hr (Order-Specific) Intravenous Titrated Janis Delong PA-C Last Rate: 12 Units/kg/hr (11/05/19 4454)   heparin (porcine) 2,000 Units Intravenous PRN Jessa Salvador PA-C    heparin (porcine) 4,000 Units Intravenous PRN Jessa Salvador PA-C    hydrALAZINE 10 mg Intravenous Q6H PRN Lulu Patrick MD    melatonin 6 mg Oral HS [place] DIMAS Germania Godfrey MD    multi-electrolyte 100 mL/hr Intravenous Continuous Scott Verde [place], [place] Last Rate: 100 mL/hr (11/05/19 2353)   nicotine 1 patch Transdermal Daily Scott Salvador PA-C    OLANZapine 2 5 mg Intramuscular Q8H PRN Flavio Malloy MD    oxyCODONE 2 5 mg Oral Q4H PRN Hermilo Bhagat PA-C    oxyCODONE 5 mg Oral Q4H PRN Hermilo Bhagat PA-C    polyethylene glycol 17 g Oral BID Flavio Malloy MD    senna-docusate sodium 1 tablet Oral BID Layne Dumont PA-C    tamsulosin 0 4 mg Oral Daily With Dinner Heath Avendaño MD    thiamine 100 mg Oral Daily Hermilo Bhagat PA-C      Continuous Infusions:  heparin (porcine) 3-30 Units/kg/hr (Order-Specific) Last Rate: 12 Units/kg/hr (11/05/19 2354)   multi-electrolyte 100 mL/hr Last Rate: 100 mL/hr (11/05/19 2353)     PRN Meds:   acetaminophen    bisacodyl    heparin (porcine)    heparin (porcine)    hydrALAZINE    OLANZapine    oxyCODONE    oxyCODONE    Invasive Devices     Peripheral Intravenous Line            Peripheral IV 11/02/19 Right Hand 3 days    Peripheral IV 11/04/19 Left Forearm 1 day                Lab, Imaging and other studies: I have personally reviewed pertinent reports      VTE Pharmacologic Prophylaxis: Heparin  VTE Mechanical Prophylaxis: sequential compression device

## 2019-11-07 ENCOUNTER — APPOINTMENT (INPATIENT)
Dept: RADIOLOGY | Facility: HOSPITAL | Age: 69
DRG: 252 | End: 2019-11-07
Payer: MEDICARE

## 2019-11-07 ENCOUNTER — ANESTHESIA (INPATIENT)
Dept: PERIOP | Facility: HOSPITAL | Age: 69
DRG: 252 | End: 2019-11-07
Payer: MEDICARE

## 2019-11-07 PROBLEM — R41.0 CONFUSION: Status: RESOLVED | Noted: 2019-11-02 | Resolved: 2019-11-07

## 2019-11-07 LAB
ANION GAP SERPL CALCULATED.3IONS-SCNC: 6 MMOL/L (ref 4–13)
ANION GAP SERPL CALCULATED.3IONS-SCNC: 8 MMOL/L (ref 4–13)
APTT PPP: 43 SECONDS (ref 23–37)
APTT PPP: 43 SECONDS (ref 23–37)
APTT PPP: 45 SECONDS (ref 23–37)
APTT PPP: 76 SECONDS (ref 23–37)
BASE EXCESS BLDA CALC-SCNC: -4 MMOL/L (ref -2–3)
BASOPHILS # BLD AUTO: 0.02 THOUSANDS/ΜL (ref 0–0.1)
BASOPHILS NFR BLD AUTO: 0 % (ref 0–1)
BUN SERPL-MCNC: 11 MG/DL (ref 5–25)
BUN SERPL-MCNC: 8 MG/DL (ref 5–25)
CA-I BLD-SCNC: 1.14 MMOL/L (ref 1.12–1.32)
CALCIUM SERPL-MCNC: 7.8 MG/DL (ref 8.3–10.1)
CALCIUM SERPL-MCNC: 9.5 MG/DL (ref 8.3–10.1)
CHLORIDE SERPL-SCNC: 111 MMOL/L (ref 100–108)
CHLORIDE SERPL-SCNC: 115 MMOL/L (ref 100–108)
CO2 SERPL-SCNC: 22 MMOL/L (ref 21–32)
CO2 SERPL-SCNC: 23 MMOL/L (ref 21–32)
CREAT SERPL-MCNC: 0.8 MG/DL (ref 0.6–1.3)
CREAT SERPL-MCNC: 0.84 MG/DL (ref 0.6–1.3)
EOSINOPHIL # BLD AUTO: 0.15 THOUSAND/ΜL (ref 0–0.61)
EOSINOPHIL NFR BLD AUTO: 3 % (ref 0–6)
ERYTHROCYTE [DISTWIDTH] IN BLOOD BY AUTOMATED COUNT: 14.9 % (ref 11.6–15.1)
ERYTHROCYTE [DISTWIDTH] IN BLOOD BY AUTOMATED COUNT: 16.2 % (ref 11.6–15.1)
ERYTHROCYTE [DISTWIDTH] IN BLOOD BY AUTOMATED COUNT: 16.3 % (ref 11.6–15.1)
GFR SERPL CREATININE-BSD FRML MDRD: 89 ML/MIN/1.73SQ M
GFR SERPL CREATININE-BSD FRML MDRD: 91 ML/MIN/1.73SQ M
GLUCOSE SERPL-MCNC: 105 MG/DL (ref 65–140)
GLUCOSE SERPL-MCNC: 127 MG/DL (ref 65–140)
GLUCOSE SERPL-MCNC: 128 MG/DL (ref 65–140)
HCO3 BLDA-SCNC: 21.1 MMOL/L (ref 22–28)
HCT VFR BLD AUTO: 31.8 % (ref 36.5–49.3)
HCT VFR BLD AUTO: 33.6 % (ref 36.5–49.3)
HCT VFR BLD AUTO: 35.9 % (ref 36.5–49.3)
HCT VFR BLD CALC: 18 % (ref 36.5–49.3)
HGB BLD-MCNC: 10.6 G/DL (ref 12–17)
HGB BLD-MCNC: 11.2 G/DL (ref 12–17)
HGB BLD-MCNC: 11.6 G/DL (ref 12–17)
HGB BLDA-MCNC: 6.1 G/DL (ref 12–17)
IMM GRANULOCYTES # BLD AUTO: 0.02 THOUSAND/UL (ref 0–0.2)
IMM GRANULOCYTES NFR BLD AUTO: 0 % (ref 0–2)
INR PPP: 1.27 (ref 0.84–1.19)
INR PPP: 1.3 (ref 0.84–1.19)
KCT BLD-ACNC: 149 SEC (ref 89–137)
KCT BLD-ACNC: 230 SEC (ref 89–137)
LYMPHOCYTES # BLD AUTO: 0.5 THOUSANDS/ΜL (ref 0.6–4.47)
LYMPHOCYTES NFR BLD AUTO: 11 % (ref 14–44)
MCH RBC QN AUTO: 30.8 PG (ref 26.8–34.3)
MCH RBC QN AUTO: 31.2 PG (ref 26.8–34.3)
MCH RBC QN AUTO: 31.2 PG (ref 26.8–34.3)
MCHC RBC AUTO-ENTMCNC: 32.3 G/DL (ref 31.4–37.4)
MCHC RBC AUTO-ENTMCNC: 33.3 G/DL (ref 31.4–37.4)
MCHC RBC AUTO-ENTMCNC: 33.3 G/DL (ref 31.4–37.4)
MCV RBC AUTO: 92 FL (ref 82–98)
MCV RBC AUTO: 94 FL (ref 82–98)
MCV RBC AUTO: 97 FL (ref 82–98)
MONOCYTES # BLD AUTO: 0.34 THOUSAND/ΜL (ref 0.17–1.22)
MONOCYTES NFR BLD AUTO: 8 % (ref 4–12)
NEUTROPHILS # BLD AUTO: 3.47 THOUSANDS/ΜL (ref 1.85–7.62)
NEUTS SEG NFR BLD AUTO: 78 % (ref 43–75)
NRBC BLD AUTO-RTO: 0 /100 WBCS
PCO2 BLD: 22 MMOL/L (ref 21–32)
PCO2 BLD: 37.5 MM HG (ref 36–44)
PH BLD: 7.36 [PH] (ref 7.35–7.45)
PLATELET # BLD AUTO: 109 THOUSANDS/UL (ref 149–390)
PLATELET # BLD AUTO: 119 THOUSANDS/UL (ref 149–390)
PLATELET # BLD AUTO: 169 THOUSANDS/UL (ref 149–390)
PMV BLD AUTO: 10.6 FL (ref 8.9–12.7)
PMV BLD AUTO: 10.8 FL (ref 8.9–12.7)
PMV BLD AUTO: 11 FL (ref 8.9–12.7)
PO2 BLD: 351 MM HG (ref 75–129)
POTASSIUM BLD-SCNC: 3.6 MMOL/L (ref 3.5–5.3)
POTASSIUM SERPL-SCNC: 4.1 MMOL/L (ref 3.5–5.3)
POTASSIUM SERPL-SCNC: 4.1 MMOL/L (ref 3.5–5.3)
PROTHROMBIN TIME: 15.5 SECONDS (ref 11.6–14.5)
PROTHROMBIN TIME: 15.8 SECONDS (ref 11.6–14.5)
RBC # BLD AUTO: 3.4 MILLION/UL (ref 3.88–5.62)
RBC # BLD AUTO: 3.64 MILLION/UL (ref 3.88–5.62)
RBC # BLD AUTO: 3.72 MILLION/UL (ref 3.88–5.62)
SAO2 % BLD FROM PO2: 100 % (ref 60–85)
SODIUM BLD-SCNC: 140 MMOL/L (ref 136–145)
SODIUM SERPL-SCNC: 142 MMOL/L (ref 136–145)
SODIUM SERPL-SCNC: 143 MMOL/L (ref 136–145)
SPECIMEN SOURCE: ABNORMAL
WBC # BLD AUTO: 4.5 THOUSAND/UL (ref 4.31–10.16)
WBC # BLD AUTO: 7.74 THOUSAND/UL (ref 4.31–10.16)
WBC # BLD AUTO: 7.92 THOUSAND/UL (ref 4.31–10.16)

## 2019-11-07 PROCEDURE — 85027 COMPLETE CBC AUTOMATED: CPT | Performed by: SURGERY

## 2019-11-07 PROCEDURE — C1769 GUIDE WIRE: HCPCS | Performed by: SURGERY

## 2019-11-07 PROCEDURE — 85610 PROTHROMBIN TIME: CPT | Performed by: SURGERY

## 2019-11-07 PROCEDURE — 88304 TISSUE EXAM BY PATHOLOGIST: CPT | Performed by: PATHOLOGY

## 2019-11-07 PROCEDURE — 85347 COAGULATION TIME ACTIVATED: CPT

## 2019-11-07 PROCEDURE — 80048 BASIC METABOLIC PNL TOTAL CA: CPT | Performed by: INTERNAL MEDICINE

## 2019-11-07 PROCEDURE — C1894 INTRO/SHEATH, NON-LASER: HCPCS | Performed by: SURGERY

## 2019-11-07 PROCEDURE — B41GYZZ FLUOROSCOPY OF LEFT LOWER EXTREMITY ARTERIES USING OTHER CONTRAST: ICD-10-PCS | Performed by: SURGERY

## 2019-11-07 PROCEDURE — C1725 CATH, TRANSLUMIN NON-LASER: HCPCS | Performed by: SURGERY

## 2019-11-07 PROCEDURE — 99024 POSTOP FOLLOW-UP VISIT: CPT | Performed by: SURGERY

## 2019-11-07 PROCEDURE — 047D0DZ DILATION OF LEFT COMMON ILIAC ARTERY WITH INTRALUMINAL DEVICE, OPEN APPROACH: ICD-10-PCS | Performed by: SURGERY

## 2019-11-07 PROCEDURE — C1876 STENT, NON-COA/NON-COV W/DEL: HCPCS | Performed by: SURGERY

## 2019-11-07 PROCEDURE — 047J0DZ DILATION OF LEFT EXTERNAL ILIAC ARTERY WITH INTRALUMINAL DEVICE, OPEN APPROACH: ICD-10-PCS | Performed by: SURGERY

## 2019-11-07 PROCEDURE — 37221 PR REVASCULARIZE ILIAC ARTERY,ANGIOPLASTY/STENT, INITIAL VESSEL: CPT | Performed by: SURGERY

## 2019-11-07 PROCEDURE — 04UL0JZ SUPPLEMENT LEFT FEMORAL ARTERY WITH SYNTHETIC SUBSTITUTE, OPEN APPROACH: ICD-10-PCS | Performed by: SURGERY

## 2019-11-07 PROCEDURE — C1781 MESH (IMPLANTABLE): HCPCS | Performed by: SURGERY

## 2019-11-07 PROCEDURE — 85730 THROMBOPLASTIN TIME PARTIAL: CPT | Performed by: SURGERY

## 2019-11-07 PROCEDURE — 75710 ARTERY X-RAYS ARM/LEG: CPT

## 2019-11-07 PROCEDURE — 85025 COMPLETE CBC W/AUTO DIFF WBC: CPT | Performed by: INTERNAL MEDICINE

## 2019-11-07 PROCEDURE — P9016 RBC LEUKOCYTES REDUCED: HCPCS

## 2019-11-07 PROCEDURE — 04CL0ZZ EXTIRPATION OF MATTER FROM LEFT FEMORAL ARTERY, OPEN APPROACH: ICD-10-PCS | Performed by: SURGERY

## 2019-11-07 PROCEDURE — 84295 ASSAY OF SERUM SODIUM: CPT

## 2019-11-07 PROCEDURE — 80048 BASIC METABOLIC PNL TOTAL CA: CPT | Performed by: SURGERY

## 2019-11-07 PROCEDURE — 82947 ASSAY GLUCOSE BLOOD QUANT: CPT

## 2019-11-07 PROCEDURE — C1887 CATHETER, GUIDING: HCPCS | Performed by: SURGERY

## 2019-11-07 PROCEDURE — 30233N1 TRANSFUSION OF NONAUTOLOGOUS RED BLOOD CELLS INTO PERIPHERAL VEIN, PERCUTANEOUS APPROACH: ICD-10-PCS | Performed by: STUDENT IN AN ORGANIZED HEALTH CARE EDUCATION/TRAINING PROGRAM

## 2019-11-07 PROCEDURE — 99232 SBSQ HOSP IP/OBS MODERATE 35: CPT | Performed by: INTERNAL MEDICINE

## 2019-11-07 PROCEDURE — 82330 ASSAY OF CALCIUM: CPT

## 2019-11-07 PROCEDURE — 70450 CT HEAD/BRAIN W/O DYE: CPT

## 2019-11-07 PROCEDURE — 85730 THROMBOPLASTIN TIME PARTIAL: CPT | Performed by: INTERNAL MEDICINE

## 2019-11-07 PROCEDURE — 35371 RECHANNELING OF ARTERY: CPT | Performed by: SURGERY

## 2019-11-07 PROCEDURE — 85014 HEMATOCRIT: CPT

## 2019-11-07 PROCEDURE — 37223 PR REVASCULARIZE ILIAC ARTERY,ANGIOPLASTY/STENT, EA ADD VESSEL: CPT | Performed by: SURGERY

## 2019-11-07 PROCEDURE — 82803 BLOOD GASES ANY COMBINATION: CPT

## 2019-11-07 PROCEDURE — 84132 ASSAY OF SERUM POTASSIUM: CPT

## 2019-11-07 DEVICE — IMPLANTABLE DEVICE: Type: IMPLANTABLE DEVICE | Site: ARTERIAL | Status: FUNCTIONAL

## 2019-11-07 DEVICE — XENOSURE BIOLOGIC PATCH, 1CM X 14CM, EIFU
Type: IMPLANTABLE DEVICE | Site: GROIN | Status: FUNCTIONAL
Brand: XENOSURE BIOLOGIC PATCH

## 2019-11-07 RX ORDER — HEPARIN SODIUM 10000 [USP'U]/100ML
3-20 INJECTION, SOLUTION INTRAVENOUS
Status: DISCONTINUED | OUTPATIENT
Start: 2019-11-07 | End: 2019-11-07

## 2019-11-07 RX ORDER — FENTANYL CITRATE 50 UG/ML
INJECTION, SOLUTION INTRAMUSCULAR; INTRAVENOUS AS NEEDED
Status: DISCONTINUED | OUTPATIENT
Start: 2019-11-07 | End: 2019-11-07 | Stop reason: SURG

## 2019-11-07 RX ORDER — HEPARIN SODIUM 1000 [USP'U]/ML
INJECTION, SOLUTION INTRAVENOUS; SUBCUTANEOUS AS NEEDED
Status: DISCONTINUED | OUTPATIENT
Start: 2019-11-07 | End: 2019-11-07 | Stop reason: SURG

## 2019-11-07 RX ORDER — ONDANSETRON 2 MG/ML
4 INJECTION INTRAMUSCULAR; INTRAVENOUS ONCE AS NEEDED
Status: DISCONTINUED | OUTPATIENT
Start: 2019-11-07 | End: 2019-11-07 | Stop reason: HOSPADM

## 2019-11-07 RX ORDER — FENTANYL CITRATE/PF 50 MCG/ML
25 SYRINGE (ML) INJECTION
Status: DISCONTINUED | OUTPATIENT
Start: 2019-11-07 | End: 2019-11-07 | Stop reason: HOSPADM

## 2019-11-07 RX ORDER — SODIUM CHLORIDE, SODIUM LACTATE, POTASSIUM CHLORIDE, CALCIUM CHLORIDE 600; 310; 30; 20 MG/100ML; MG/100ML; MG/100ML; MG/100ML
100 INJECTION, SOLUTION INTRAVENOUS CONTINUOUS
Status: DISCONTINUED | OUTPATIENT
Start: 2019-11-07 | End: 2019-11-07

## 2019-11-07 RX ORDER — NEOSTIGMINE METHYLSULFATE 1 MG/ML
INJECTION INTRAVENOUS AS NEEDED
Status: DISCONTINUED | OUTPATIENT
Start: 2019-11-07 | End: 2019-11-07 | Stop reason: SURG

## 2019-11-07 RX ORDER — SODIUM CHLORIDE 9 MG/ML
75 INJECTION, SOLUTION INTRAVENOUS CONTINUOUS
Status: DISCONTINUED | OUTPATIENT
Start: 2019-11-07 | End: 2019-11-11

## 2019-11-07 RX ORDER — ALBUMIN, HUMAN INJ 5% 5 %
SOLUTION INTRAVENOUS CONTINUOUS PRN
Status: DISCONTINUED | OUTPATIENT
Start: 2019-11-07 | End: 2019-11-07 | Stop reason: SURG

## 2019-11-07 RX ORDER — CEFAZOLIN SODIUM 1 G/50ML
1000 SOLUTION INTRAVENOUS ONCE
Status: COMPLETED | OUTPATIENT
Start: 2019-11-07 | End: 2019-11-07

## 2019-11-07 RX ORDER — LABETALOL 20 MG/4 ML (5 MG/ML) INTRAVENOUS SYRINGE
AS NEEDED
Status: DISCONTINUED | OUTPATIENT
Start: 2019-11-07 | End: 2019-11-07 | Stop reason: SURG

## 2019-11-07 RX ORDER — ROCURONIUM BROMIDE 10 MG/ML
INJECTION, SOLUTION INTRAVENOUS AS NEEDED
Status: DISCONTINUED | OUTPATIENT
Start: 2019-11-07 | End: 2019-11-07 | Stop reason: SURG

## 2019-11-07 RX ORDER — SODIUM CHLORIDE, SODIUM LACTATE, POTASSIUM CHLORIDE, CALCIUM CHLORIDE 600; 310; 30; 20 MG/100ML; MG/100ML; MG/100ML; MG/100ML
125 INJECTION, SOLUTION INTRAVENOUS CONTINUOUS
Status: DISCONTINUED | OUTPATIENT
Start: 2019-11-07 | End: 2019-11-07

## 2019-11-07 RX ORDER — EPHEDRINE SULFATE 50 MG/ML
INJECTION INTRAVENOUS AS NEEDED
Status: DISCONTINUED | OUTPATIENT
Start: 2019-11-07 | End: 2019-11-07 | Stop reason: SURG

## 2019-11-07 RX ORDER — HYDROMORPHONE HCL/PF 1 MG/ML
0.5 SYRINGE (ML) INJECTION
Status: DISCONTINUED | OUTPATIENT
Start: 2019-11-07 | End: 2019-11-07 | Stop reason: HOSPADM

## 2019-11-07 RX ORDER — ONDANSETRON 2 MG/ML
INJECTION INTRAMUSCULAR; INTRAVENOUS AS NEEDED
Status: DISCONTINUED | OUTPATIENT
Start: 2019-11-07 | End: 2019-11-07 | Stop reason: SURG

## 2019-11-07 RX ORDER — DEXAMETHASONE SODIUM PHOSPHATE 10 MG/ML
INJECTION, SOLUTION INTRAMUSCULAR; INTRAVENOUS AS NEEDED
Status: DISCONTINUED | OUTPATIENT
Start: 2019-11-07 | End: 2019-11-07 | Stop reason: SURG

## 2019-11-07 RX ORDER — MIDAZOLAM HYDROCHLORIDE 2 MG/2ML
INJECTION, SOLUTION INTRAMUSCULAR; INTRAVENOUS AS NEEDED
Status: DISCONTINUED | OUTPATIENT
Start: 2019-11-07 | End: 2019-11-07 | Stop reason: SURG

## 2019-11-07 RX ORDER — DIPHENHYDRAMINE HYDROCHLORIDE 50 MG/ML
12.5 INJECTION INTRAMUSCULAR; INTRAVENOUS ONCE AS NEEDED
Status: DISCONTINUED | OUTPATIENT
Start: 2019-11-07 | End: 2019-11-07 | Stop reason: HOSPADM

## 2019-11-07 RX ORDER — PROPOFOL 10 MG/ML
INJECTION, EMULSION INTRAVENOUS AS NEEDED
Status: DISCONTINUED | OUTPATIENT
Start: 2019-11-07 | End: 2019-11-07 | Stop reason: SURG

## 2019-11-07 RX ORDER — HEPARIN SODIUM 10000 [USP'U]/100ML
3-20 INJECTION, SOLUTION INTRAVENOUS
Status: DISCONTINUED | OUTPATIENT
Start: 2019-11-07 | End: 2019-11-11

## 2019-11-07 RX ORDER — CLOPIDOGREL BISULFATE 75 MG/1
75 TABLET ORAL DAILY
Status: DISCONTINUED | OUTPATIENT
Start: 2019-11-08 | End: 2019-11-24 | Stop reason: HOSPADM

## 2019-11-07 RX ORDER — LIDOCAINE HYDROCHLORIDE 10 MG/ML
0.5 INJECTION, SOLUTION EPIDURAL; INFILTRATION; INTRACAUDAL; PERINEURAL ONCE AS NEEDED
Status: DISCONTINUED | OUTPATIENT
Start: 2019-11-07 | End: 2019-11-24 | Stop reason: HOSPADM

## 2019-11-07 RX ORDER — LIDOCAINE HYDROCHLORIDE 10 MG/ML
INJECTION, SOLUTION INFILTRATION; PERINEURAL AS NEEDED
Status: DISCONTINUED | OUTPATIENT
Start: 2019-11-07 | End: 2019-11-07 | Stop reason: SURG

## 2019-11-07 RX ORDER — SODIUM CHLORIDE, SODIUM LACTATE, POTASSIUM CHLORIDE, CALCIUM CHLORIDE 600; 310; 30; 20 MG/100ML; MG/100ML; MG/100ML; MG/100ML
INJECTION, SOLUTION INTRAVENOUS CONTINUOUS PRN
Status: DISCONTINUED | OUTPATIENT
Start: 2019-11-07 | End: 2019-11-07 | Stop reason: SURG

## 2019-11-07 RX ORDER — GLYCOPYRROLATE 0.2 MG/ML
INJECTION INTRAMUSCULAR; INTRAVENOUS AS NEEDED
Status: DISCONTINUED | OUTPATIENT
Start: 2019-11-07 | End: 2019-11-07 | Stop reason: SURG

## 2019-11-07 RX ORDER — HEPARIN SODIUM 10000 [USP'U]/100ML
3-30 INJECTION, SOLUTION INTRAVENOUS
Status: DISCONTINUED | OUTPATIENT
Start: 2019-11-07 | End: 2019-11-07

## 2019-11-07 RX ORDER — PROTAMINE SULFATE 10 MG/ML
INJECTION, SOLUTION INTRAVENOUS AS NEEDED
Status: DISCONTINUED | OUTPATIENT
Start: 2019-11-07 | End: 2019-11-07 | Stop reason: SURG

## 2019-11-07 RX ORDER — CHLORHEXIDINE GLUCONATE 0.12 MG/ML
15 RINSE ORAL EVERY 12 HOURS SCHEDULED
Status: DISCONTINUED | OUTPATIENT
Start: 2019-11-07 | End: 2019-11-24 | Stop reason: HOSPADM

## 2019-11-07 RX ORDER — KETAMINE HYDROCHLORIDE 50 MG/ML
INJECTION, SOLUTION, CONCENTRATE INTRAMUSCULAR; INTRAVENOUS AS NEEDED
Status: DISCONTINUED | OUTPATIENT
Start: 2019-11-07 | End: 2019-11-07 | Stop reason: SURG

## 2019-11-07 RX ORDER — SODIUM CHLORIDE 9 MG/ML
INJECTION, SOLUTION INTRAVENOUS CONTINUOUS PRN
Status: DISCONTINUED | OUTPATIENT
Start: 2019-11-07 | End: 2019-11-07 | Stop reason: SURG

## 2019-11-07 RX ADMIN — PHENYLEPHRINE HYDROCHLORIDE 200 MCG: 10 INJECTION INTRAVENOUS at 15:50

## 2019-11-07 RX ADMIN — Medication: at 22:28

## 2019-11-07 RX ADMIN — ALBUMIN (HUMAN): 12.5 SOLUTION INTRAVENOUS at 17:58

## 2019-11-07 RX ADMIN — HYDRALAZINE HYDROCHLORIDE 10 MG: 20 INJECTION INTRAMUSCULAR; INTRAVENOUS at 23:21

## 2019-11-07 RX ADMIN — SODIUM CHLORIDE 60 ML/HR: 0.9 INJECTION, SOLUTION INTRAVENOUS at 00:20

## 2019-11-07 RX ADMIN — PHENYLEPHRINE HYDROCHLORIDE 100 MCG: 10 INJECTION INTRAVENOUS at 15:46

## 2019-11-07 RX ADMIN — LABETALOL 20 MG/4 ML (5 MG/ML) INTRAVENOUS SYRINGE 5 MG: at 16:15

## 2019-11-07 RX ADMIN — HEPARIN SODIUM 3000 UNITS: 1000 INJECTION INTRAVENOUS; SUBCUTANEOUS at 19:36

## 2019-11-07 RX ADMIN — KETAMINE HYDROCHLORIDE 50 MG: 50 INJECTION, SOLUTION INTRAMUSCULAR; INTRAVENOUS at 15:35

## 2019-11-07 RX ADMIN — NEOSTIGMINE METHYLSULFATE 3 MG: 1 INJECTION, SOLUTION INTRAVENOUS at 20:03

## 2019-11-07 RX ADMIN — ASPIRIN 81 MG: 81 TABLET, COATED ORAL at 10:02

## 2019-11-07 RX ADMIN — AMLODIPINE BESYLATE 5 MG: 5 TABLET ORAL at 10:03

## 2019-11-07 RX ADMIN — Medication 1 APPLICATION: at 10:02

## 2019-11-07 RX ADMIN — EPHEDRINE SULFATE 5 MG: 50 INJECTION, SOLUTION INTRAVENOUS at 15:42

## 2019-11-07 RX ADMIN — ALBUMIN (HUMAN): 12.5 SOLUTION INTRAVENOUS at 18:51

## 2019-11-07 RX ADMIN — FENTANYL CITRATE 25 MCG: 50 INJECTION, SOLUTION INTRAMUSCULAR; INTRAVENOUS at 20:07

## 2019-11-07 RX ADMIN — DEXMEDETOMIDINE 0.4 MCG/KG/HR: 100 INJECTION, SOLUTION, CONCENTRATE INTRAVENOUS at 16:02

## 2019-11-07 RX ADMIN — ACETAMINOPHEN 650 MG: 325 TABLET ORAL at 10:11

## 2019-11-07 RX ADMIN — PROTAMINE SULFATE 40 MG: 10 INJECTION, SOLUTION INTRAVENOUS at 20:00

## 2019-11-07 RX ADMIN — CEFAZOLIN SODIUM 1000 MG: 1 SOLUTION INTRAVENOUS at 19:21

## 2019-11-07 RX ADMIN — MIDAZOLAM 2 MG: 1 INJECTION INTRAMUSCULAR; INTRAVENOUS at 15:18

## 2019-11-07 RX ADMIN — SODIUM CHLORIDE: 0.9 INJECTION, SOLUTION INTRAVENOUS at 15:49

## 2019-11-07 RX ADMIN — ROCURONIUM BROMIDE 50 MG: 10 INJECTION, SOLUTION INTRAVENOUS at 15:35

## 2019-11-07 RX ADMIN — PHENYLEPHRINE HYDROCHLORIDE 200 MCG: 10 INJECTION INTRAVENOUS at 15:55

## 2019-11-07 RX ADMIN — HEPARIN SODIUM 3000 UNITS: 1000 INJECTION INTRAVENOUS; SUBCUTANEOUS at 18:21

## 2019-11-07 RX ADMIN — HEPARIN SODIUM 2000 UNITS: 1000 INJECTION INTRAVENOUS; SUBCUTANEOUS at 16:57

## 2019-11-07 RX ADMIN — NICOTINE 1 PATCH: 14 PATCH TRANSDERMAL at 10:04

## 2019-11-07 RX ADMIN — MIDAZOLAM 2 MG: 1 INJECTION INTRAMUSCULAR; INTRAVENOUS at 16:16

## 2019-11-07 RX ADMIN — NOREPINEPHRINE BITARTRATE 6 MCG/MIN: 1 INJECTION INTRAVENOUS at 17:53

## 2019-11-07 RX ADMIN — SODIUM CHLORIDE, SODIUM LACTATE, POTASSIUM CHLORIDE, AND CALCIUM CHLORIDE: .6; .31; .03; .02 INJECTION, SOLUTION INTRAVENOUS at 18:15

## 2019-11-07 RX ADMIN — CHLORHEXIDINE GLUCONATE 0.12% ORAL RINSE 15 ML: 1.2 LIQUID ORAL at 10:02

## 2019-11-07 RX ADMIN — LIDOCAINE HYDROCHLORIDE 50 MG: 10 INJECTION, SOLUTION INFILTRATION; PERINEURAL at 15:35

## 2019-11-07 RX ADMIN — SODIUM CHLORIDE, SODIUM LACTATE, POTASSIUM CHLORIDE, AND CALCIUM CHLORIDE: .6; .31; .03; .02 INJECTION, SOLUTION INTRAVENOUS at 15:16

## 2019-11-07 RX ADMIN — HEPARIN SODIUM 12 UNITS/KG/HR: 10000 INJECTION, SOLUTION INTRAVENOUS at 21:11

## 2019-11-07 RX ADMIN — FENTANYL CITRATE 50 MCG: 50 INJECTION, SOLUTION INTRAMUSCULAR; INTRAVENOUS at 20:32

## 2019-11-07 RX ADMIN — PROPOFOL 120 MG: 10 INJECTION, EMULSION INTRAVENOUS at 15:35

## 2019-11-07 RX ADMIN — GLYCOPYRROLATE 0.4 MG: 0.2 INJECTION, SOLUTION INTRAMUSCULAR; INTRAVENOUS at 20:03

## 2019-11-07 RX ADMIN — HEPARIN SODIUM 2000 UNITS: 1000 INJECTION, SOLUTION INTRAVENOUS; SUBCUTANEOUS at 08:25

## 2019-11-07 RX ADMIN — GLYCOPYRROLATE 0.1 MG: 0.2 INJECTION, SOLUTION INTRAMUSCULAR; INTRAVENOUS at 15:18

## 2019-11-07 RX ADMIN — THIAMINE HCL TAB 100 MG 100 MG: 100 TAB at 10:02

## 2019-11-07 RX ADMIN — ONDANSETRON 4 MG: 2 INJECTION INTRAMUSCULAR; INTRAVENOUS at 15:18

## 2019-11-07 RX ADMIN — ROCURONIUM BROMIDE 20 MG: 10 INJECTION, SOLUTION INTRAVENOUS at 17:50

## 2019-11-07 RX ADMIN — GABAPENTIN 100 MG: 100 CAPSULE ORAL at 10:02

## 2019-11-07 RX ADMIN — HEPARIN SODIUM 6000 UNITS: 1000 INJECTION INTRAVENOUS; SUBCUTANEOUS at 16:45

## 2019-11-07 RX ADMIN — SODIUM CHLORIDE 100 ML/HR: 0.9 INJECTION, SOLUTION INTRAVENOUS at 22:28

## 2019-11-07 RX ADMIN — CEFAZOLIN SODIUM 1000 MG: 1 SOLUTION INTRAVENOUS at 15:26

## 2019-11-07 RX ADMIN — DEXAMETHASONE SODIUM PHOSPHATE 10 MG: 10 INJECTION, SOLUTION INTRAMUSCULAR; INTRAVENOUS at 18:37

## 2019-11-07 NOTE — PHYSICAL THERAPY NOTE
Physical Therapy Cancellation Note    The pt  Is off of the floor for left femoral endarterectomy and retrograde iliac stenting  Please re-consult physical therapy post-op with updated activity orders  Thank you      Bettie Monroe, MITUL

## 2019-11-07 NOTE — PROGRESS NOTES
Progress Note - Geriatric Medicine   Kayla Cavazos 78 y o  male MRN: [id number]  Unit/Bed#: OR POOL Encounter: [id number]      Assessment/Plan:    Acute metabolic encephalopathy  -somewhat improved this morning, continues to wax and wane  -suspect due to acute pain, or multiple metabolic derangements, and hospitalization superimposed on probable vascular dementia  -continue to reorient and redirect frequently  -continues to require 1:1 supervision for impulsivity and rapid mental status and mood changes  -anticipate vascular surgery later this afternoon, patient will need close monitoring following anesthesia given his underlying cognitive impairment   -continue Gabapentin HS for both pain control and behaviors    Acute pain due to surgery  -recommend acute pain control per geriatric pain protocol  -recommend aggressive bowel regimen to prevent constipation    Probable vascular dementia  -CTH revealed moderate-severe chronic microangiopathic changes consistent with vascular dementia and what be expected with his severity of peripheral vascular disease  -continue to avoid extremes of BP  -currently on Lipitor, aspirin, and Plavix   -would benefit from comprehensive geriatric assessment as outpatient    Deconditioning/debility/frailty  -anticipate OR today for vascular procedure  -PT OT pending return from OR and clearance by vascular surgery  -may benefit from short-term rehab discharge, will follow up with PT and OT for final recommendations  -continue fall precautions  -continue nutritional support    Ambulatory dysfunction  -multifactorial including peripheral vascular disease and claudication pain as well as deconditioning  -continue to encourage assistance all transfers  -continue fall precautions  -may benefit from rehab    Subjective:   Patient seen examined at bedside where he sitting resting comfortably, he is awaiting his surgical procedure later this afternoon   Nursing reports no acute complaints  Review of Systems   Reason unable to perform ROS: encephalopathy  Objective:      Vitals: Blood pressure (!) 189/76, pulse 77, temperature 98 5 °F (36 9 °C), temperature source Oral, resp  rate 18, height 5' 5" (1 651 m), weight 50 3 kg (111 lb), SpO2 99 %  ,Body mass index is 18 47 kg/m²  Intake/Output Summary (Last 24 hours) at 11/7/2019 1504  Last data filed at 11/7/2019 1201  Gross per 24 hour   Intake 173 9 ml   Output 2350 ml   Net -2176 1 ml     Current Medications: Reviewed    Physical Exam:   Physical Exam   Constitutional: He appears well-developed and well-nourished  No distress  HENT:   Head: Normocephalic and atraumatic  Mucous membranes dry   Eyes: Pupils are equal, round, and reactive to light  Conjunctivae are normal  No scleral icterus  Neck: Neck supple  No tracheal deviation present  Cardiovascular: Normal rate and intact distal pulses  Pulmonary/Chest: Effort normal  No respiratory distress  Abdominal: Soft  There is no tenderness  Musculoskeletal: Normal range of motion  He exhibits no edema or tenderness  Neurological: He is alert  Skin: Skin is warm and dry  He is not diaphoretic  Nursing note and vitals reviewed       Invasive Devices     Peripheral Intravenous Line            Peripheral IV 11/06/19 Right;Ventral (anterior) Forearm less than 1 day              Lab, Imaging and other studies:   -sodium 142, potassium 4 1, chloride 111, BUN 8, creatinine 0 80, GFR 91  -hemoglobin 11 6, hematocrit 35 9, WBC 4 5, platelets a 404

## 2019-11-07 NOTE — PROGRESS NOTES
Progress Note - Vascular Surgery  Rogelio Jain 78 y o  male MRN: [id number]  Unit/Bed#: [facility] 430-01 Encounter: [id number]    Assessment:  79 y/o M presents with PAD and LLE claudication    Plan:  - NPO today   - Plan for left femoral endarterectomy with retrograde iliac intervention Today with Dr Susana Michaels   - Continue ASA statin   - PT/OT recommends home with family support   - rest of care per primary service     Subjective/Objective   Chief Complaint: No chief complaint on file  Subjective: 78 y o  y/o male was seen and evaluated at bedside  Patient denies any acute events overnight  Blood pressure 163/77, pulse 83, temperature 98 4 °F (36 9 °C), temperature source Oral, resp  rate 18, height 5' 5" (1 651 m), weight 50 3 kg (111 lb), SpO2 98 %  ,Body mass index is 18 47 kg/m²  Invasive Devices     Peripheral Intravenous Line            Peripheral IV 11/04/19 Left Forearm 3 days    Peripheral IV 11/06/19 Right;Ventral (anterior) Forearm less than 1 day                Physical Exam:   General: Alert, cooperative and no distress  Lungs: Non labored breathing  Heart: Positive S1, S2  Abdomen: Soft, non-tender  Extremity: Pulse exam: Non-palpable pedal pulses bilateral  Dopplerable PT right, Non-dopplerable PT/DP  Lab, Imaging and other studies:   CBC:   Lab Results   Component Value Date    WBC 4 50 11/07/2019    HGB 11 6 (L) 11/07/2019    HCT 35 9 (L) 11/07/2019    MCV 97 11/07/2019     11/07/2019    MCH 31 2 11/07/2019    MCHC 32 3 11/07/2019    RDW 14 9 11/07/2019    MPV 11 0 11/07/2019    NRBC 0 11/07/2019   , CMP: No results found for: SODIUM, K, CL, CO2, ANIONGAP, BUN, CREATININE, GLUCOSE, CALCIUM, AST, ALT, ALKPHOS, PROT, BILITOT, EGFR    Imaging: I have personally reviewed pertinent films in PACS  EKG, Pathology, and Other Studies: I have personally reviewed pertinent reports    VTE Pharmacologic Prophylaxis: Sequential compression device (Venodyne)

## 2019-11-08 ENCOUNTER — APPOINTMENT (INPATIENT)
Dept: RADIOLOGY | Facility: HOSPITAL | Age: 69
DRG: 252 | End: 2019-11-08
Payer: MEDICARE

## 2019-11-08 PROBLEM — D62 ACUTE BLOOD LOSS ANEMIA: Status: ACTIVE | Noted: 2019-11-08

## 2019-11-08 LAB
ABO GROUP BLD BPU: NORMAL
ANION GAP SERPL CALCULATED.3IONS-SCNC: 10 MMOL/L (ref 4–13)
ANION GAP SERPL CALCULATED.3IONS-SCNC: 9 MMOL/L (ref 4–13)
APTT PPP: 64 SECONDS (ref 23–37)
BPU ID: NORMAL
BUN SERPL-MCNC: 11 MG/DL (ref 5–25)
BUN SERPL-MCNC: 16 MG/DL (ref 5–25)
CALCIUM SERPL-MCNC: 8.5 MG/DL (ref 8.3–10.1)
CALCIUM SERPL-MCNC: 8.6 MG/DL (ref 8.3–10.1)
CHLORIDE SERPL-SCNC: 111 MMOL/L (ref 100–108)
CHLORIDE SERPL-SCNC: 112 MMOL/L (ref 100–108)
CO2 SERPL-SCNC: 20 MMOL/L (ref 21–32)
CO2 SERPL-SCNC: 21 MMOL/L (ref 21–32)
CREAT SERPL-MCNC: 0.72 MG/DL (ref 0.6–1.3)
CREAT SERPL-MCNC: 0.81 MG/DL (ref 0.6–1.3)
CROSSMATCH: NORMAL
ERYTHROCYTE [DISTWIDTH] IN BLOOD BY AUTOMATED COUNT: 16.6 % (ref 11.6–15.1)
GFR SERPL CREATININE-BSD FRML MDRD: 90 ML/MIN/1.73SQ M
GFR SERPL CREATININE-BSD FRML MDRD: 95 ML/MIN/1.73SQ M
GLUCOSE SERPL-MCNC: 121 MG/DL (ref 65–140)
GLUCOSE SERPL-MCNC: 135 MG/DL (ref 65–140)
HCT VFR BLD AUTO: 35.6 % (ref 36.5–49.3)
HGB BLD-MCNC: 11.8 G/DL (ref 12–17)
MCH RBC QN AUTO: 30.5 PG (ref 26.8–34.3)
MCHC RBC AUTO-ENTMCNC: 33.1 G/DL (ref 31.4–37.4)
MCV RBC AUTO: 92 FL (ref 82–98)
PLATELET # BLD AUTO: 128 THOUSANDS/UL (ref 149–390)
PMV BLD AUTO: 11.9 FL (ref 8.9–12.7)
POTASSIUM SERPL-SCNC: 3.4 MMOL/L (ref 3.5–5.3)
POTASSIUM SERPL-SCNC: 3.9 MMOL/L (ref 3.5–5.3)
RBC # BLD AUTO: 3.87 MILLION/UL (ref 3.88–5.62)
SODIUM SERPL-SCNC: 141 MMOL/L (ref 136–145)
SODIUM SERPL-SCNC: 142 MMOL/L (ref 136–145)
UNIT DISPENSE STATUS: NORMAL
UNIT PRODUCT CODE: NORMAL
UNIT RH: NORMAL
WBC # BLD AUTO: 8.52 THOUSAND/UL (ref 4.31–10.16)

## 2019-11-08 PROCEDURE — 85027 COMPLETE CBC AUTOMATED: CPT | Performed by: SURGERY

## 2019-11-08 PROCEDURE — 99232 SBSQ HOSP IP/OBS MODERATE 35: CPT | Performed by: INTERNAL MEDICINE

## 2019-11-08 PROCEDURE — 80048 BASIC METABOLIC PNL TOTAL CA: CPT | Performed by: INTERNAL MEDICINE

## 2019-11-08 PROCEDURE — 71275 CT ANGIOGRAPHY CHEST: CPT

## 2019-11-08 PROCEDURE — 99024 POSTOP FOLLOW-UP VISIT: CPT | Performed by: SURGERY

## 2019-11-08 PROCEDURE — 85730 THROMBOPLASTIN TIME PARTIAL: CPT | Performed by: INTERNAL MEDICINE

## 2019-11-08 PROCEDURE — 80048 BASIC METABOLIC PNL TOTAL CA: CPT | Performed by: SURGERY

## 2019-11-08 RX ORDER — AMLODIPINE BESYLATE 5 MG/1
5 TABLET ORAL ONCE
Status: COMPLETED | OUTPATIENT
Start: 2019-11-08 | End: 2019-11-08

## 2019-11-08 RX ORDER — POTASSIUM CHLORIDE 20 MEQ/1
40 TABLET, EXTENDED RELEASE ORAL ONCE
Status: COMPLETED | OUTPATIENT
Start: 2019-11-08 | End: 2019-11-08

## 2019-11-08 RX ORDER — WARFARIN SODIUM 5 MG/1
5 TABLET ORAL
Status: DISCONTINUED | OUTPATIENT
Start: 2019-11-08 | End: 2019-11-10

## 2019-11-08 RX ORDER — HYDRALAZINE HYDROCHLORIDE 20 MG/ML
10 INJECTION INTRAMUSCULAR; INTRAVENOUS EVERY 4 HOURS PRN
Status: DISCONTINUED | OUTPATIENT
Start: 2019-11-08 | End: 2019-11-24 | Stop reason: HOSPADM

## 2019-11-08 RX ORDER — AMLODIPINE BESYLATE 10 MG/1
10 TABLET ORAL DAILY
Status: DISCONTINUED | OUTPATIENT
Start: 2019-11-09 | End: 2019-11-24 | Stop reason: HOSPADM

## 2019-11-08 RX ADMIN — POLYETHYLENE GLYCOL 3350 17 G: 17 POWDER, FOR SOLUTION ORAL at 08:11

## 2019-11-08 RX ADMIN — AMLODIPINE BESYLATE 5 MG: 5 TABLET ORAL at 18:08

## 2019-11-08 RX ADMIN — CHLORHEXIDINE GLUCONATE 0.12% ORAL RINSE 15 ML: 1.2 LIQUID ORAL at 08:11

## 2019-11-08 RX ADMIN — Medication 1 APPLICATION: at 08:11

## 2019-11-08 RX ADMIN — SODIUM CHLORIDE 100 ML/HR: 0.9 INJECTION, SOLUTION INTRAVENOUS at 08:11

## 2019-11-08 RX ADMIN — IOHEXOL 85 ML: 350 INJECTION, SOLUTION INTRAVENOUS at 17:13

## 2019-11-08 RX ADMIN — CLOPIDOGREL 75 MG: 75 TABLET, FILM COATED ORAL at 08:11

## 2019-11-08 RX ADMIN — HYDRALAZINE HYDROCHLORIDE 10 MG: 20 INJECTION INTRAMUSCULAR; INTRAVENOUS at 03:07

## 2019-11-08 RX ADMIN — WARFARIN SODIUM 5 MG: 5 TABLET ORAL at 18:06

## 2019-11-08 RX ADMIN — POLYETHYLENE GLYCOL 3350 17 G: 17 POWDER, FOR SOLUTION ORAL at 18:06

## 2019-11-08 RX ADMIN — THIAMINE HCL TAB 100 MG 100 MG: 100 TAB at 08:11

## 2019-11-08 RX ADMIN — POTASSIUM CHLORIDE 40 MEQ: 1500 TABLET, EXTENDED RELEASE ORAL at 10:36

## 2019-11-08 RX ADMIN — SENNOSIDES AND DOCUSATE SODIUM 1 TABLET: 8.6; 5 TABLET ORAL at 18:06

## 2019-11-08 RX ADMIN — GABAPENTIN 100 MG: 100 CAPSULE ORAL at 08:11

## 2019-11-08 RX ADMIN — HYDRALAZINE HYDROCHLORIDE 10 MG: 20 INJECTION INTRAMUSCULAR; INTRAVENOUS at 16:26

## 2019-11-08 RX ADMIN — MELATONIN 6 MG: 3 TAB ORAL at 21:01

## 2019-11-08 RX ADMIN — ATORVASTATIN CALCIUM 40 MG: 40 TABLET, FILM COATED ORAL at 16:25

## 2019-11-08 RX ADMIN — NICOTINE 1 PATCH: 14 PATCH TRANSDERMAL at 08:23

## 2019-11-08 RX ADMIN — ASPIRIN 81 MG: 81 TABLET, COATED ORAL at 08:11

## 2019-11-08 RX ADMIN — Medication 1 APPLICATION: at 21:01

## 2019-11-08 RX ADMIN — SENNOSIDES AND DOCUSATE SODIUM 1 TABLET: 8.6; 5 TABLET ORAL at 08:11

## 2019-11-08 RX ADMIN — CHLORHEXIDINE GLUCONATE 0.12% ORAL RINSE 15 ML: 1.2 LIQUID ORAL at 21:01

## 2019-11-08 RX ADMIN — AMLODIPINE BESYLATE 5 MG: 5 TABLET ORAL at 08:11

## 2019-11-08 RX ADMIN — TAMSULOSIN HYDROCHLORIDE 0.4 MG: 0.4 CAPSULE ORAL at 16:25

## 2019-11-08 RX ADMIN — SODIUM CHLORIDE 75 ML/HR: 0.9 INJECTION, SOLUTION INTRAVENOUS at 19:20

## 2019-11-08 NOTE — SOCIAL WORK
CM spoke w/ pt's daughter Carey San at bedside about aftercare planning  Daughter reported that she and rest of family want pt to return home no matter the recommendation  CM offered [facility] services as an alternative  Daughter reported she is agreeable  CM [facility]  Daughter requested referral to [facility]  CM made Ecin referral to [facility]  CM to follow

## 2019-11-08 NOTE — PROGRESS NOTES
Progress Note - Tru Hermila 1950, 78 y o  male MRN: [id number]    Unit/Bed#: OR POOL Encounter: [id number]    Primary Care Provider: Rachelle Pollard MD   Date and time admitted to hospital: 11/2/2019  2:01 AM        Renal stone  Assessment & Plan  · Incidental finding on CT scan  · 9 x 5 x 5 mm calculus within the posterior bladder, near the left ureterovesicular junction however there is no hydronephrosis to suggest obstruction  · Pain control  · Consider urology eval if symptomatic or if renal function worsens    Syncope and collapse  Assessment & Plan  · Had RRT while IP fpr syncope and was intially though to have CHB, was started on dopamine infusion and was transferred to ICU  · Cardiology evaluated the patient in icu, now off dopamine and OOU  · likely a vasovagal syncope in setting of severe pain and transient second degree heart block  · Echo showed EF of 65% with no WMA or no AS    PLAN:  · Avoid BB   · Adequate pain control  · Discontinue telemetry    Hypertension  Assessment & Plan  · Increase amlodipine dose to 5 mg daily  · Hydralazine ordered prn    History of throat cancer  Assessment & Plan  · Per daughter, pt diagnosed with throat CA 2 years ago and has been treated with chemo and radiation  · Not undergoing treatment at this time    * Peripheral vascular disease ([facility] [address] )  Assessment & Plan  · Pt presented with 2-month history of LLE pain with walking  Denies pain at rest, but pt is unable to walk more than a few feet without pain  · CTA: Severe peripheral vascular disease  Limited evaluation of the calf vessels due to extensive calcifications  Severe areas of narrowing and focal occlusion of the right superficial femoral artery and diffuse occlusion of the popliteal artery   Distal reconstitution of the right peroneal artery and scattered areas of the right posterior tibial artery   Occlusion of the left common femoral artery and left superficial femoral artery with distal reconstitution however severe narrowing of the mid to distal superficial femoral artery   Severe narrowing of the left popliteal artery  Patent left peroneal artery and scattered areas of the left posterior tibial artery  PLAN:  · Continue Heparin gtt, asa/statin  · Plan for left femoral endarterectomy with retrograde iliac intervention per vascular, tentatively planned for Friday  Appreciate vascular surgery input  · Cardiology on board for preop risk stratification, appreciate input  · Pain control per geriatrics pain management order set  · A gram in OR today    Confusionresolved as of 11/7/2019  Assessment & Plan  · Multifactorial  Likely he has vascular dementia at baseline per geriatrics, confusion is intermittently worse due to pain, constipation, language barrier and sun downing? · Daughter reported that pt acts strangely sometimes, example given that he has urinated on a window at her home in the past  These types of behaviors have been occurring for at least one year  · Denies diagnosis of dementia  · Geriatric input appreciated  · Limit narcotics as much as possible  · Gabapentin added  · No haldol per geriatrics, okay to give zyprexa prn  · Aggressive bowel regimen  · Delirium precautions and frequent reorientation  · Melatonin hs  · Currently on 1:1 due to intermittent agitation  · Check TSH, vitamin b12, folate  · improved       VTE Pharmacologic Prophylaxis:   Pharmacologic: Heparin Drip  Mechanical VTE Prophylaxis in Place: Yes    Patient Centered Rounds: I have performed bedside rounds with nursing staff today  Discussions with Specialists or Other Care Team Provider: Vascular surgery, Geriatrics    Education and Discussions with Family / Patient: patient     Time Spent for Care: 45 minutes  More than 50% of total time spent on counseling and coordination of care as described above  Current Length of Stay: 5 day(s)    Current Patient Status: Inpatient   Certification Statement:  The patient will continue to require additional inpatient hospital stay due to need work up     Discharge Plan:  Possible once procedure tomorrow  Code Status: Level 1 - Full Code      Subjective:   Patient is improving  He has was making jokes today  He has no new complaints today  Objective:     Vitals:   Temp (24hrs), Av 5 °F (36 9 °C), Min:98 4 °F (36 9 °C), Max:98 5 °F (36 9 °C)    Temp:  [98 4 °F (36 9 °C)-98 5 °F (36 9 °C)] 98 5 °F (36 9 °C)  HR:  [71-83] 77  Resp:  [16-18] 18  BP: (160-189)/(68-77) 189/76  SpO2:  [98 %-99 %] 99 %  Body mass index is 18 47 kg/m²  Input and Output Summary (last 24 hours): Intake/Output Summary (Last 24 hours) at 2019  Last data filed at 2019  Gross per 24 hour   Intake 3692 ml   Output 2950 ml   Net 742 ml       Physical Exam:     Physical Exam   Constitutional: He is oriented to person, place, and time  Eyes: Pupils are equal, round, and reactive to light  Conjunctivae and EOM are normal  Right eye exhibits no discharge  Left eye exhibits no discharge  No scleral icterus  Neck: Normal range of motion  Neck supple  No JVD present  No tracheal deviation present  No thyromegaly present  Pulmonary/Chest: Effort normal and breath sounds normal    Abdominal: Soft  Bowel sounds are normal  He exhibits no distension  Musculoskeletal: Normal range of motion  He exhibits edema and deformity  He exhibits no tenderness  Neurological: He is oriented to person, place, and time  He displays normal reflexes  No sensory deficit  He exhibits normal muscle tone  Skin: Skin is warm and dry  No rash noted  No erythema  No pallor  Psychiatric: He has a normal mood and affect         Additional Data:     Labs:    Results from last 7 days   Lab Units 19  0550   WBC Thousand/uL 4 50   HEMOGLOBIN g/dL 11 6*   HEMATOCRIT % 35 9*   PLATELETS Thousands/uL 169   NEUTROS PCT % 78*   LYMPHS PCT % 11*   MONOS PCT % 8   EOS PCT % 3     Results from last 7 days   Lab Units 11/07/19  0550  11/03/19  0522   POTASSIUM mmol/L 4 1   < > 4 0   CHLORIDE mmol/L 111*   < > 108   CO2 mmol/L 23   < > 26   BUN mg/dL 8   < > 19   CREATININE mg/dL 0 80   < > 1 00   CALCIUM mg/dL 9 5   < > 8 9   ALK PHOS U/L  --   --  87   ALT U/L  --   --  13   AST U/L  --   --  12    < > = values in this interval not displayed  Results from last 7 days   Lab Units 11/02/19  0317   INR  1 24*       * I Have Reviewed All Lab Data Listed Above  * Additional Pertinent Lab Tests Reviewed:  Frank Daniels Admission Reviewed    Imaging:    Imaging Reports Reviewed Today Include:    Imaging Personally Reviewed by Myself Includes:       Recent Cultures (last 7 days):           Last 24 Hours Medication List:     Current Facility-Administered Medications:  [MAR Hold] acetaminophen 650 mg Oral Q6H PRN Gela Gardiner PA-C    [facility] Hold] amLODIPine 5 mg Oral Daily Manual MD Yanique    [facility] Hold] aspirin 81 mg Oral Daily Alla Martinez [facility] Hold] atorvastatin 40 mg Oral Daily With Dariel Yeager PA-C    [facility] Hold] bisacodyl 10 mg Rectal Daily PRN Alla Salvador PA-C    cefazolin 1 g in sodium chloride 0 9% 1000 mL irrigation bottle  Irrigation Once Ksenia Riddle MD    chlorhexidine 15 mL Swish & Spit Q12H [facility] Ksenia Riddle MD    [facility] Hold] gabapentin 100 mg Oral Daily Manual MD Yanique    heparin (porcine) 3-30 Units/kg/hr (Order-Specific) Intravenous Titrated Gela Gardiner PA-C Last Rate: 18 Units/kg/hr (11/07/19 0825)   [MAR Hold] heparin (porcine) 2,000 Units Intravenous PRN Gela Gardiner PA-C    [facility] Hold] heparin (porcine) 4,000 Units Intravenous PRN Gela Gardiner PA-C    [facility] Hold] hydrALAZINE 10 mg Intravenous Q6H PRN Katelyn Ryder MD    iodixanol   PRN Ksenia Riddle MD    [facility] Hold] melatonin 6 mg Oral HS Manoj Chanel MD    [facility] Hold] mupirocin  Nasal Q12H Lesa Otero MD    [facility] Hold] nicotine 1 patch Transdermal Daily Gela Gardiner PA-C    [facility] Hold] OLANZapine 2 5 mg Intramuscular Q8H PRN Ursula Jarvis MD    [facility] Hold] oxyCODONE 2 5 mg Oral Q4H PRN Dana Esteban PA-C    [facility] Hold] oxyCODONE 5 mg Oral Q4H PRN Dana Esteban PA-C    [facility] Hold] polyethylene glycol 17 g Oral BID Ursula Jarvis MD    [facility] Hold] senna-docusate sodium 1 tablet Oral BID Chaya Salvador PA-C    sodium chloride 60 mL/hr Intravenous Continuous Dana Esteban PA-C Last Rate: 60 mL/hr (11/07/19 0020)   [MAR Hold] tamsulosin 0 4 mg Oral Daily With Lm Chapa MD    [facility] Hold] thiamine 100 mg Oral Daily Dana Esteban PA-C    thrombin   PRN Susan Colón MD      Facility-Administered Medications Ordered in Other Encounters:  albumin human   Continuous PRN D  Robbin Stade, CRNA Last Rate: Stopped (11/07/19 1912)   dexamethasone (PF)   PRN D  Robbin Stade, CRNA    dexmedetomidine (PRECEDEX) 4 mcg/ml infusion (ANES)  Intravenous Continuous PRN D  Robbin Stade, CRNA Last Rate: Stopped (11/07/19 1955)   ePHEDrine   PRN D  Robbin Stade, CRNA    fentanyl citrate (PF)  Intravenous PRN D  Robbin Stade, CRNA    glycopyrrolate  Intravenous PRN D  Robbin Stade, CRNA    heparin (porcine)   PRN D  Robbin Stade, CRNA    ketamine  Intramuscular PRN D  Robbin Stade, CRNA    Labetalol HCl  Intravenous PRN D  Robbin Stade, CRNA    lactated ringers   Continuous PRN D  Robbin Stade, CRNA    lidocaine   PRN D  Robbin Stade, CRNA    midazolam  Intravenous PRN D  Robbin Stade, CRNA    neostigmine  Intravenous PRN D  Robbin Stade, CRNA    norepinephrine (LEVOPHED) 4 mg (STANDARD CONCENTRATION) IV in sodium chloride 0 9% 250 mL   Continuous PRN D  Robbin Stade, CRNA Last Rate: Stopped (11/07/19 2034)   norepinephrine   PRN D  Robbin Stade, CRNA    ondansetron   PRN D  Robbin Stade, CRNA    phenylephrine   PRN D   Robbin Stade, CRNA    propofol  Intravenous PRN D  Kristen Fraser, CRNA    protamine   PRN D  Kristen Fraser, CRNA    ROCuronium   PRN D  Kristen Fraser, CRNA    sodium chloride   Continuous PRN D  Kristen Fraser, CRNA Last Rate: Stopped (11/07/19 1624)        Today, Patient Was Seen By: Craig Hopson MD    ** Please Note: Dictation voice to text software may have been used in the creation of this document   **

## 2019-11-08 NOTE — PROGRESS NOTES
Vascular Surgery Post-Op Check     S: Patient not answering questions appropriately  O: CT head negative for any acute intracranial abnormality    Vitals:    11/07/19 2153   BP: 167/74   Pulse: 72   Resp: 18   Temp:    SpO2:       I/O       11/06 0701 - 11/07 0700 11/07 0701 - 11/08 0700    P  O  300     I V  (mL/kg) 1366 3 (27 2) 2450 (48 7)    Blood  700    IV Piggyback  500    Total Intake(mL/kg) 1666 3 (33 1) 3650 (72 6)    Urine (mL/kg/hr) 2450 (2) 1400 (1 8)    Stool 0     Blood  500    Total Output 2450 1900    Net -783 7 +1750          Unmeasured Stool Occurrence 2 x         PE:  GEN: NAD, lethargic but arousable  HEENT: MMM  CV: RRR, leobardo in place L doppl PT  Lung: normal effort, no respiratory distress, SpO2 96% on RA  Abd: Soft, NT/ND  Groin: L groin incision C/D/I, del toro in place  Extrem: L foot appears warm and well-perfused, R foot with missing toes  Neuro: moving all 4 extremities against gravity, not following commands but withdraws to pain and opens eyes to voice     Lab Results   Component Value Date    WBC 7 74 11/07/2019    HGB 10 6 (L) 11/07/2019    HCT 31 8 (L) 11/07/2019    MCV 94 11/07/2019     (L) 11/07/2019     Lab Results   Component Value Date    GLUCOSE 128 11/07/2019    CALCIUM 7 8 (L) 11/07/2019    K 4 1 11/07/2019    CO2 22 11/07/2019     (H) 11/07/2019    BUN 11 11/07/2019    CREATININE 0 84 11/07/2019           A/P: 66yo M s/p L CFAE with profundoplasty, arteriogram with retrograde iliac stenting, SFA and profunda embolectomy  Received 2u packed cells following procedure    · Cardiac / dysphagia 3 dental  · NS @ 100  · CT head negative for any acute intracranial abnormalities  · 1:1 monitoring  · Continue heparin gtt (ACS low)  · Continue ASA 81 / statin  · Resume plavix 11/8  · Q2 neurovascular checks  · Maintain leobardo  · Maintain del toro  · F/u AM labs  · DVT PPx      Ciara Carranza MD

## 2019-11-08 NOTE — ASSESSMENT & PLAN NOTE
· Pt presented with 2-month history of LLE pain with walking  Denies pain at rest, but pt is unable to walk more than a few feet without pain  · CTA: Severe peripheral vascular disease  Limited evaluation of the calf vessels due to extensive calcifications  Severe areas of narrowing and focal occlusion of the right superficial femoral artery and diffuse occlusion of the popliteal artery   Distal reconstitution of the right peroneal artery and scattered areas of the right posterior tibial artery  Occlusion of the left common femoral artery and left superficial femoral artery with distal reconstitution however severe narrowing of the mid to distal superficial femoral artery   Severe narrowing of the left popliteal artery  Patent left peroneal artery and scattered areas of the left posterior tibial artery  PLAN:  · Continue Heparin gtt, asa/statin  · Plan for left femoral endarterectomy with retrograde iliac intervention per vascular, tentatively planned for Friday  Appreciate vascular surgery input  · Cardiology on board for preop risk stratification, appreciate input  · Pain control per geriatrics pain management order set  · s/p left CFA endarterectomy, profundoplasty, embolectomy, arteriogram with retrograde iliac stenting 11/7  · Blood pressure under control  · He is off a gram  · Off of step down as his mental status improved  · Continue close monitoring

## 2019-11-08 NOTE — PROGRESS NOTES
Progress Note - Cardiology   Mateusz Seen 78 y o  male MRN: [id number]  Unit/Bed#: [facility] 430-01 Encounter: [id number]        Principal Problem:    Peripheral vascular disease ([address] )  Active Problems:    History of throat cancer    Hypertension    Dependence on nicotine from cigarettes    Syncope and collapse    Renal stone    Constipation    PAD (peripheral artery disease) ([facility])    Acute blood loss anemia      Assessment/Plan    1  PAD- s/p left CFA endarterectomy, profundoplasty, embolectomy, arteriogram with retrograde iliac stenting 11/7  On IV heparin gtt  NOAC cost being investigated  Asa, plavix, statin  Vascular recommending CTA chest for thromboembolic w/u d/t 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 within common femoral artery SFA and profunda femoris  There was signifiant stenotic disease of external iliac artery significant stenosis secondary to atherosclerosis of left common iliac artery  Vascular recommending cardiac input regarding any further w/u- will discuss with attending  Telemetry - NSR  2D echo- LVEF 65%, redundancy of atrial septum, borderline aneurysmal  No PFO  2  Vasovagal syncope- episode of second degree AV block, mobitz 1 while in extreme pain  Transient  Tele- nsr no further events  3  CAD- presumed based on coronary calcifications  Outpatient followup   He lives "In [facility]"  Will plan f/u at [facility] office    4  HTN- hypertensive  On norvasc 5 , increase to BID  5  ABLA- s/p 2 UPRBC's    6  Tobacco abuse- nicotine patch        Subjective/Objective   Chief Complaint/Subjective  Pt on 1:1  He is [place] and speaks [place]  Sounds like confusion improving   Pt denies CP, SOB          Vitals: /68 (BP Location: Right arm) Comment: Map 111  Pulse 91   Temp 98 °F (36 7 °C) (Axillary)   Resp 18   Ht 5' 5" (1 651 m)   Wt 50 3 kg (111 lb)   SpO2 98%   BMI 18 47 kg/m²     Vitals:    11/02/19 0209   Weight: 50 3 kg (111 lb)     Orthostatic Blood Pressures      Most Recent Value   Blood Pressure  163/68 [Map 111] filed at 11/08/2019 1058   Patient Position - Orthostatic VS  Lying filed at 11/08/2019 1058            Intake/Output Summary (Last 24 hours) at 11/8/2019 1320  Last data filed at 11/8/2019 0823  Gross per 24 hour   Intake 4621 67 ml   Output 2675 ml   Net 1946 67 ml       Invasive Devices     Peripheral Intravenous Line            Peripheral IV 11/06/19 Right;Ventral (anterior) Forearm 1 day    Peripheral IV 11/07/19 Left Wrist less than 1 day          Arterial Line            Arterial Line 11/07/19 Right Radial less than 1 day          Drain            Urethral Catheter Latex 16 Fr  less than 1 day                Current Facility-Administered Medications   Medication Dose Route Frequency    acetaminophen (TYLENOL) tablet 650 mg  650 mg Oral Q6H PRN    amLODIPine (NORVASC) tablet 5 mg  5 mg Oral Daily    aspirin (ECOTRIN LOW STRENGTH) EC tablet 81 mg  81 mg Oral Daily    atorvastatin (LIPITOR) tablet 40 mg  40 mg Oral Daily With Dinner    bisacodyl (DULCOLAX) rectal suppository 10 mg  10 mg Rectal Daily PRN    chlorhexidine (PERIDEX) 0 12 % oral rinse 15 mL  15 mL Swish & Spit Q12H [facility]    clopidogrel (PLAVIX) tablet 75 mg  75 mg Oral Daily    gabapentin (NEURONTIN) capsule 100 mg  100 mg Oral Daily    heparin (porcine) 25,000 units in 250 mL infusion (premix)  3-20 Units/kg/hr (Order-Specific) Intravenous Titrated    hydrALAZINE (APRESOLINE) injection 10 mg  10 mg Intravenous Q4H PRN    lidocaine (PF) (XYLOCAINE-MPF) 1 % injection 0 5 mL  0 5 mL Infiltration Once PRN    melatonin tablet 6 mg  6 mg Oral HS    mupirocin (BACTROBAN) 2 % nasal ointment   Nasal Q12H [facility]    nicotine (NICODERM CQ) 14 mg/24hr TD 24 hr patch 1 patch  1 patch Transdermal Daily    OLANZapine (ZyPREXA) IM injection 2 5 mg  2 5 mg Intramuscular Q8H PRN    oxyCODONE (ROXICODONE) IR tablet 2 5 mg  2 5 mg Oral Q4H PRN    oxyCODONE (ROXICODONE) IR tablet 5 mg  5 mg Oral Q4H PRN    polyethylene glycol (MIRALAX) packet 17 g  17 g Oral BID    senna-docusate sodium (SENOKOT S) 8 6-50 mg per tablet 1 tablet  1 tablet Oral BID    sodium chloride 0 9 % infusion  100 mL/hr Intravenous Continuous    tamsulosin (FLOMAX) capsule 0 4 mg  0 4 mg Oral Daily With Dinner    thiamine (VITAMIN B1) tablet 100 mg  100 mg Oral Daily         Physical Exam: /68 (BP Location: Right arm) Comment: Map 111  Pulse 91   Temp 98 °F (36 7 °C) (Axillary)   Resp 18   Ht 5' 5" (1 651 m)   Wt 50 3 kg (111 lb)   SpO2 98%   BMI 18 47 kg/m²     General Appearance:    Alert, cooperative, no distress, appears stated age   Head:    Normocephalic, no scleral icterus   Eyes:    PERRL   Nose:   Nares normal, septum midline, no drainage    Throat:   Lips, mucosa, and tongue normal   Neck:   Supple, symmetrical, trachea midline,         Back:     Symmetric, no CVA tenderness   Lungs:     Clear to auscultation bilaterally, respirations unlabored   Chest Wall:    No tenderness or deformity    Heart:    Regular rate and rhythm, S1 and S2 normal, no murmur, rub   or gallop   Abdomen:     Soft, non-tender, bowel sounds active all four quadrants,     no masses, no organomegaly   Extremities:   Extremities normal, atraumatic, no cyanosis or edema       Skin:   Skin warm   Neurologic:   Alert and oriented to person place and time, no focal deficits                 Lab Results:   Recent Results (from the past 72 hour(s))   CBC and differential    Collection Time: 11/06/19  5:44 AM   Result Value Ref Range    WBC 3 74 (L) 4 31 - 10 16 Thousand/uL    RBC 3 54 (L) 3 88 - 5 62 Million/uL    Hemoglobin 11 0 (L) 12 0 - 17 0 g/dL    Hematocrit 34 2 (L) 36 5 - 49 3 %    MCV 97 82 - 98 fL    MCH 31 1 26 8 - 34 3 pg    MCHC 32 2 31 4 - 37 4 g/dL    RDW 15 3 (H) 11 6 - 15 1 %    MPV 11 2 8 9 - 12 7 fL    Platelets 128 718 - 678 Thousands/uL    nRBC 0 /100 WBCs    Neutrophils Relative 75 43 - 75 %    Immat GRANS % 0 0 - 2 %    Lymphocytes Relative 12 (L) 14 - 44 %    Monocytes Relative 8 4 - 12 %    Eosinophils Relative 4 0 - 6 %    Basophils Relative 1 0 - 1 %    Neutrophils Absolute 2 84 1 85 - 7 62 Thousands/µL    Immature Grans Absolute 0 01 0 00 - 0 20 Thousand/uL    Lymphocytes Absolute 0 43 (L) 0 60 - 4 47 Thousands/µL    Monocytes Absolute 0 28 0 17 - 1 22 Thousand/µL    Eosinophils Absolute 0 16 0 00 - 0 61 Thousand/µL    Basophils Absolute 0 02 0 00 - 0 10 Thousands/µL   Basic metabolic panel    Collection Time: 11/06/19  5:44 AM   Result Value Ref Range    Sodium 142 136 - 145 mmol/L    Potassium 3 7 3 5 - 5 3 mmol/L    Chloride 110 (H) 100 - 108 mmol/L    CO2 23 21 - 32 mmol/L    ANION GAP 9 4 - 13 mmol/L    BUN 6 5 - 25 mg/dL    Creatinine 0 80 0 60 - 1 30 mg/dL    Glucose 95 65 - 140 mg/dL    Calcium 8 9 8 3 - 10 1 mg/dL    eGFR 91 ml/min/1 73sq m   Type and screen    Collection Time: 11/06/19  2:32 PM   Result Value Ref Range    ABO Grouping A     Rh Factor Positive     Antibody Screen Negative     Specimen Expiration Date [id number]    APTT    Collection Time: 11/06/19 10:27 PM   Result Value Ref Range    PTT 36 23 - 37 seconds   APTT    Collection Time: 11/07/19  5:50 AM   Result Value Ref Range    PTT 45 (H) 23 - 37 seconds   CBC and differential    Collection Time: 11/07/19  5:50 AM   Result Value Ref Range    WBC 4 50 4 31 - 10 16 Thousand/uL    RBC 3 72 (L) 3 88 - 5 62 Million/uL    Hemoglobin 11 6 (L) 12 0 - 17 0 g/dL    Hematocrit 35 9 (L) 36 5 - 49 3 %    MCV 97 82 - 98 fL    MCH 31 2 26 8 - 34 3 pg    MCHC 32 3 31 4 - 37 4 g/dL    RDW 14 9 11 6 - 15 1 %    MPV 11 0 8 9 - 12 7 fL    Platelets 240 275 - 401 Thousands/uL    nRBC 0 /100 WBCs    Neutrophils Relative 78 (H) 43 - 75 %    Immat GRANS % 0 0 - 2 %    Lymphocytes Relative 11 (L) 14 - 44 %    Monocytes Relative 8 4 - 12 %    Eosinophils Relative 3 0 - 6 %    Basophils Relative 0 0 - 1 %    Neutrophils Absolute 3 47 1 85 - 7 62 Thousands/µL    Immature Grans Absolute 0 02 0 00 - 0 20 Thousand/uL    Lymphocytes Absolute 0 50 (L) 0 60 - 4 47 Thousands/µL    Monocytes Absolute 0 34 0 17 - 1 22 Thousand/µL    Eosinophils Absolute 0 15 0 00 - 0 61 Thousand/µL    Basophils Absolute 0 02 0 00 - 0 10 Thousands/µL   Basic metabolic panel    Collection Time: 11/07/19  5:50 AM   Result Value Ref Range    Sodium 142 136 - 145 mmol/L    Potassium 4 1 3 5 - 5 3 mmol/L    Chloride 111 (H) 100 - 108 mmol/L    CO2 23 21 - 32 mmol/L    ANION GAP 8 4 - 13 mmol/L    BUN 8 5 - 25 mg/dL    Creatinine 0 80 0 60 - 1 30 mg/dL    Glucose 105 65 - 140 mg/dL    Calcium 9 5 8 3 - 10 1 mg/dL    eGFR 91 ml/min/1 73sq m   POCT activated clotting time    Collection Time: 11/07/19  4:44 PM   Result Value Ref Range    Activated Clotting Time, i-STAT 149 (H) 89 - 137 sec    Specimen Type ARTERIAL    POCT activated clotting time    Collection Time: 11/07/19  4:55 PM   Result Value Ref Range    Activated Clotting Time, i-STAT 230 (H) 89 - 137 sec    Specimen Type ARTERIAL    POCT Blood Gas (CG8+)    Collection Time: 11/07/19  7:21 PM   Result Value Ref Range    pH, Art i-STAT 7 359 7 350 - 7 450    pCO2, Art i-STAT 37 5 36 0 - 44 0 mm HG    pO2, ART i-STAT 351 0 (H) 75 0 - 129 0 mm HG    BE, i-STAT -4 (L) -2 - 3 mmol/L    HCO3, Art i-STAT 21 1 (L) 22 0 - 28 0 mmol/L    CO2, i-STAT 22 21 - 32 mmol/L    O2 Sat, i-STAT 100 (H) 60 - 85 %    SODIUM, I-STAT 140 136 - 145 mmol/l    Potassium, i-STAT 3 6 3 5 - 5 3 mmol/L    Calcium, Ionized i-STAT 1 14 1 12 - 1 32 mmol/L    Hct, i-STAT 18 (L) 36 5 - 49 3 %    Hgb, i-STAT 6 1 (LL) 12 0 - 17 0 g/dl    Glucose, i-STAT 128 65 - 140 mg/dl    Specimen Type ARTERIAL    APTT    Collection Time: 11/07/19  9:00 PM   Result Value Ref Range    PTT 43 (H) 23 - 37 seconds   APTT    Collection Time: 11/07/19  9:00 PM   Result Value Ref Range    PTT 43 (H) 23 - 37 seconds   Protime-INR    Collection Time: 11/07/19  9:00 PM   Result Value Ref Range    Protime 15 8 (H) 11 6 - 14 5 seconds    INR 1 30 (H) 0 84 - 1 19   CBC    Collection Time: 11/07/19  9:00 PM   Result Value Ref Range    WBC 7 74 4 31 - 10 16 Thousand/uL    RBC 3 40 (L) 3 88 - 5 62 Million/uL    Hemoglobin 10 6 (L) 12 0 - 17 0 g/dL    Hematocrit 31 8 (L) 36 5 - 49 3 %    MCV 94 82 - 98 fL    MCH 31 2 26 8 - 34 3 pg    MCHC 33 3 31 4 - 37 4 g/dL    RDW 16 2 (H) 11 6 - 15 1 %    Platelets 092 (L) 837 - 390 Thousands/uL    MPV 10 6 8 9 - 12 7 fL   Basic metabolic panel    Collection Time: 11/07/19  9:00 PM   Result Value Ref Range    Sodium 143 136 - 145 mmol/L    Potassium 4 1 3 5 - 5 3 mmol/L    Chloride 115 (H) 100 - 108 mmol/L    CO2 22 21 - 32 mmol/L    ANION GAP 6 4 - 13 mmol/L    BUN 11 5 - 25 mg/dL    Creatinine 0 84 0 60 - 1 30 mg/dL    Glucose 127 65 - 140 mg/dL    Calcium 7 8 (L) 8 3 - 10 1 mg/dL    eGFR 89 ml/min/1 73sq m   CBC    Collection Time: 11/07/19 10:29 PM   Result Value Ref Range    WBC 7 92 4 31 - 10 16 Thousand/uL    RBC 3 64 (L) 3 88 - 5 62 Million/uL    Hemoglobin 11 2 (L) 12 0 - 17 0 g/dL    Hematocrit 33 6 (L) 36 5 - 49 3 %    MCV 92 82 - 98 fL    MCH 30 8 26 8 - 34 3 pg    MCHC 33 3 31 4 - 37 4 g/dL    RDW 16 3 (H) 11 6 - 15 1 %    Platelets 630 (L) 277 - 390 Thousands/uL    MPV 10 8 8 9 - 12 7 fL   APTT    Collection Time: 11/07/19 10:29 PM   Result Value Ref Range    PTT 76 (H) 23 - 37 seconds   Protime-INR    Collection Time: 11/07/19 10:29 PM   Result Value Ref Range    Protime 15 5 (H) 11 6 - 14 5 seconds    INR 1 27 (H) 0 84 - 1 19   APTT    Collection Time: 11/08/19  4:31 AM   Result Value Ref Range    PTT 64 (H) 23 - 37 seconds   CBC    Collection Time: 11/08/19  4:31 AM   Result Value Ref Range    WBC 8 52 4 31 - 10 16 Thousand/uL    RBC 3 87 (L) 3 88 - 5 62 Million/uL    Hemoglobin 11 8 (L) 12 0 - 17 0 g/dL    Hematocrit 35 6 (L) 36 5 - 49 3 %    MCV 92 82 - 98 fL    MCH 30 5 26 8 - 34 3 pg    MCHC 33 1 31 4 - 37 4 g/dL    RDW 16 6 (H) 11 6 - 15 1 %    Platelets 111 (L) 482 - 390 Thousands/uL    MPV 11 9 8 9 - 12 7 fL   Basic metabolic panel    Collection Time: 19  4:31 AM   Result Value Ref Range    Sodium 142 136 - 145 mmol/L    Potassium 3 4 (L) 3 5 - 5 3 mmol/L    Chloride 112 (H) 100 - 108 mmol/L    CO2 20 (L) 21 - 32 mmol/L    ANION GAP 10 4 - 13 mmol/L    BUN 11 5 - 25 mg/dL    Creatinine 0 72 0 60 - 1 30 mg/dL    Glucose 121 65 - 140 mg/dL    Calcium 8 5 8 3 - 10 1 mg/dL    eGFR 95 ml/min/1 73sq m   Prepare RBC:Has consent been obtained? Yes; Where is the Surgery Scheduled? [facility]; Date of Surgery: 2019; Date of Infusion: 2019, 2 Units    Collection Time: 19  5:55 AM   Result Value Ref Range    Unit Product Code F0002Q06     Unit Number [id number]     Unit ABO A     Unit [facility] POS     Crossmatch Compatible     Unit Dispense Status Presumed Trans     Unit Product Code W9816G23     Unit Number [id number]     Unit ABO A     Unit RH POS     Crossmatch Compatible     Unit Dispense Status Presumed Trans    Prepare RBC:Has consent been obtained?  No, 2 Units    Collection Time: 19  7:04 AM   Result Value Ref Range    Unit Product Code Q4844A77     Unit Number [id number]     Unit ABO A     Unit RH POS     Crossmatch Compatible     Unit Dispense Status Return to [facility]     Unit Product Code G2378G68     Unit Number [id number]     Unit ABO A     Unit RH POS     Crossmatch Compatible     Unit Dispense Status Return to [address] [place] [address]  [phone]     Transthoracic Echocardiogram  2D, M-mode, Doppler, and Color Doppler     Study date:  2019     Patient: Vani Barry  MR number: [id number]  Account number: [de-identified]  : 1950  Age: 78 years  Gender: Male  Status: Inpatient  Location: Bedside  Height: 65 in  Weight: 111 lb  BP: 91/ 46 mmHg     Indications: Cardiomyopathy     Diagnoses: I42 9 - Cardiomyopathy, unspecified     Sonographer:  SUKHI Melgar, [facility]  Primary Physician:  Nivia Edwards MD  Referring Physician:  Prasad Pardo DO  [facility]  [facility]  Interpreting Physician:  Nonnie Landau, MD     SUMMARY     LEFT VENTRICLE:  Systolic function was normal  Ejection fraction was estimated to be 65 %  There were no regional wall motion abnormalities  Wall thickness was at the upper limits of normal      ATRIAL SEPTUM:  There was redundancy of the septum, with borderline criteria for aneurysm  No defect or patent foramen ovale was identified      MITRAL VALVE:  There was mild regurgitation      TRICUSPID VALVE:  There was trace regurgitation  Pulmonary artery systolic pressure was within the normal range      HISTORY: PRIOR HISTORY: Hypertension; Syncope; PAD; PVD; Hx of throat Cancer; Smoker     PROCEDURE: The procedure was performed at the bedside  This was a routine study  The transthoracic approach was used  The study included complete 2D imaging, M-mode, complete spectral Doppler, and color Doppler  The heart rate was 57 bpm,  at the start of the study  Images were obtained from the parasternal, apical, subcostal, and suprasternal notch acoustic windows  Image quality was adequate      LEFT VENTRICLE: Size was normal  Systolic function was normal  Ejection fraction was estimated to be 65 %  There were no regional wall motion abnormalities  Wall thickness was at the upper limits of normal  DOPPLER: Left ventricular  diastolic function parameters were normal for the patient's age      RIGHT VENTRICLE: The size was normal  Systolic function was normal  Wall thickness was normal      LEFT ATRIUM: Size was normal      ATRIAL SEPTUM: There was redundancy of the septum, with borderline criteria for aneurysm  No defect or patent foramen ovale was identified      RIGHT ATRIUM: Size was normal      MITRAL VALVE: Valve structure was normal  There was normal leaflet separation   DOPPLER: The transmitral velocity was within the normal range  There was no evidence for stenosis  There was mild regurgitation      AORTIC VALVE: The valve was trileaflet  Leaflets exhibited normal thickness, normal cuspal separation, and sclerosis  DOPPLER: Transaortic velocity was within the normal range  There was no evidence for stenosis  There was no significant  regurgitation      TRICUSPID VALVE: The valve structure was normal  There was normal leaflet separation  DOPPLER: The transtricuspid velocity was within the normal range  There was no evidence for stenosis  There was trace regurgitation  Pulmonary artery  systolic pressure was within the normal range      PULMONIC VALVE: Not well visualized  DOPPLER: The transpulmonic velocity was within the normal range  There was no significant regurgitation      PERICARDIUM: There was no pericardial effusion  The pericardium was normal in appearance      AORTA: The root exhibited normal size      SYSTEMIC VEINS: IVC: The inferior vena cava was normal in size      PULMONARY VEINS: DOPPLER: Doppler flow pattern was normal in the pulmonary vein(s)      SYSTEM MEASUREMENT TABLES     Imaging: I have personally reviewed pertinent reports  Tele- nsr      Counseling / Coordination of Care  Total time spent today 30   minutes  Greater than 50% of total time was spent with the patient and / or family counseling and / or coordination of care

## 2019-11-08 NOTE — PROGRESS NOTES
Vascular Surgery    Progress Note - Blake Truong 1950, 78 y o  male MRN: [id number]    Unit/Bed#: [facility] 430-01 Encounter: [id number]    Primary Care Provider: Ananya Whitehead MD   Date and time admitted to hospital: 11/2/2019  2:01 AM    PAD (peripheral artery disease) [facility])  Assessment & Plan  PAD with LLE claudication/rest pain    S/P left CFA endarterectomy, profundoplasty, embolectomy, arteriogram with retrograde iliac stenting 11/7/19    Plan:  Continue heparin drip, will discuss plan for [facility] at time of discharge  Continue aspirin, Plavix, statin  OK to D/C Margret/Maulik and downgrade from step down when medically appropriate will defer to medicine team  PT/OT    Acute blood loss anemia  Assessment & Plan  In setting of vascular surgery  S/P 2 units pRBC's    Plan:  Monitor          Subjective:  Pt remains confused, awake/arousable  HTN overnight     Vitals:  /68   Pulse 77   Temp 98 2 °F (36 8 °C) (Oral)   Resp 18   Ht 5' 5" (1 651 m)   Wt 50 3 kg (111 lb)   SpO2 98%   BMI 18 47 kg/m²     I/Os:  I/O last 3 completed shifts: In: 3716 3 [P O :300; I V :3166 3; IV Piggyback:250]  Out: 5861 [Urine:3450;  Blood:200]  I/O this shift:  In: 1600 [I V :650; Blood:700; IV Piggyback:250]  Out: 1600 [Urine:1300; Blood:300]    Lab Results and Cultures:   Lab Results   Component Value Date    WBC 8 52 11/08/2019    HGB 11 8 (L) 11/08/2019    HCT 35 6 (L) 11/08/2019    MCV 92 11/08/2019     (L) 11/08/2019     Lab Results   Component Value Date    GLUCOSE 128 11/07/2019    CALCIUM 7 8 (L) 11/07/2019    K 4 1 11/07/2019    CO2 22 11/07/2019     (H) 11/07/2019    BUN 11 11/07/2019    CREATININE 0 84 11/07/2019     Lab Results   Component Value Date    INR 1 27 (H) 11/07/2019    INR 1 30 (H) 11/07/2019    INR 1 24 (H) 11/02/2019    PROTIME 15 5 (H) 11/07/2019    PROTIME 15 8 (H) 11/07/2019    PROTIME 15 2 (H) 11/02/2019        Blood Culture: No results found for: BLOODCX,   Urinalysis: No results found for: Moris Flack, SPECGRAV, PHUR, LEUKOCYTESUR, NITRITE, PROTEINUA, GLUCOSEU, KETONESU, BILIRUBINUR, BLOODU,   Urine Culture: No results found for: URINECX,   Wound Culure: No results found for: WOUNDCULT    Medications:  Current Facility-Administered Medications   Medication Dose Route Frequency    acetaminophen (TYLENOL) tablet 650 mg  650 mg Oral Q6H PRN    amLODIPine (NORVASC) tablet 5 mg  5 mg Oral Daily    aspirin (ECOTRIN LOW STRENGTH) EC tablet 81 mg  81 mg Oral Daily    atorvastatin (LIPITOR) tablet 40 mg  40 mg Oral Daily With Dinner    bisacodyl (DULCOLAX) rectal suppository 10 mg  10 mg Rectal Daily PRN    chlorhexidine (PERIDEX) 0 12 % oral rinse 15 mL  15 mL Swish & Spit Q12H [facility]    clopidogrel (PLAVIX) tablet 75 mg  75 mg Oral Daily    gabapentin (NEURONTIN) capsule 100 mg  100 mg Oral Daily    heparin (porcine) 25,000 units in 250 mL infusion (premix)  3-20 Units/kg/hr (Order-Specific) Intravenous Titrated    hydrALAZINE (APRESOLINE) injection 10 mg  10 mg Intravenous Q4H PRN    lidocaine (PF) (XYLOCAINE-MPF) 1 % injection 0 5 mL  0 5 mL Infiltration Once PRN    melatonin tablet 6 mg  6 mg Oral HS    mupirocin (BACTROBAN) 2 % nasal ointment   Nasal Q12H [facility]    nicotine (NICODERM CQ) 14 mg/24hr TD 24 hr patch 1 patch  1 patch Transdermal Daily    OLANZapine (ZyPREXA) IM injection 2 5 mg  2 5 mg Intramuscular Q8H PRN    oxyCODONE (ROXICODONE) IR tablet 2 5 mg  2 5 mg Oral Q4H PRN    oxyCODONE (ROXICODONE) IR tablet 5 mg  5 mg Oral Q4H PRN    polyethylene glycol (MIRALAX) packet 17 g  17 g Oral BID    senna-docusate sodium (SENOKOT S) 8 6-50 mg per tablet 1 tablet  1 tablet Oral BID    sodium chloride 0 9 % infusion  100 mL/hr Intravenous Continuous    tamsulosin (FLOMAX) capsule 0 4 mg  0 4 mg Oral Daily With Dinner    thiamine (VITAMIN B1) tablet 100 mg  100 mg Oral Daily       Physical Exam:    General appearance: alert and oriented, in no acute distress  Lungs: clear to auscultation bilaterally  Heart: regular rate and rhythm, S1, S2 normal, no murmur, click, rub or gallop  Abdomen: soft, non-tender; bowel sounds normal; no masses,  no organomegaly  Extremities: Left foot warm, M/S intact    Wound/Incision:  Left groin incision clean, dry, and intact    Pulse exam:  DP:  Left: doppler signal  PT: Left: 2+      Annette Lee PA-C  11/8/2019

## 2019-11-08 NOTE — SOCIAL WORK
Rx Xarelto for pt will cost $372/month after 1st month for free  Pt will then be charged $47/month beginning w/ 4th month (Feb 2020) through end of 2020, then will be charged $372 for 1st month (Jan 2021) of new year / new deductible period  Pt's family is not agreeable to these costs  CM notified Vascular Surgery TERRA Willson of this  CM to follow

## 2019-11-08 NOTE — PROGRESS NOTES
Progress Note - Ladarius Castañeda 1950, 78 y o  male MRN: [id number]    Unit/Bed#: [facility] 430-01 Encounter: [id number]    Primary Care Provider: Dominga Rodriguez MD   Date and time admitted to hospital: 11/2/2019  2:01 AM        Renal stone  Assessment & Plan  · Incidental finding on CT scan  · 9 x 5 x 5 mm calculus within the posterior bladder, near the left ureterovesicular junction however there is no hydronephrosis to suggest obstruction  · Pain control  · Consider urology eval if symptomatic or if renal function worsens    Syncope and collapse  Assessment & Plan  · Had RRT while IP fpr syncope and was intially though to have CHB, was started on dopamine infusion and was transferred to ICU  · Cardiology evaluated the patient in icu, now off dopamine and OOU  · likely a vasovagal syncope in setting of severe pain and transient second degree heart block  · Echo showed EF of 65% with no WMA or no AS    PLAN:  · Avoid BB   · Adequate pain control  · Discontinue telemetry    Hypertension  Assessment & Plan  · Increase amlodipine dose to 5 mg daily  · Hydralazine ordered prn    History of throat cancer  Assessment & Plan  · Per daughter, pt diagnosed with throat CA 2 years ago and has been treated with chemo and radiation  · Not undergoing treatment at this time    * Peripheral vascular disease ([facility] [address] )  Assessment & Plan  · Pt presented with 2-month history of LLE pain with walking  Denies pain at rest, but pt is unable to walk more than a few feet without pain  · CTA: Severe peripheral vascular disease  Limited evaluation of the calf vessels due to extensive calcifications  Severe areas of narrowing and focal occlusion of the right superficial femoral artery and diffuse occlusion of the popliteal artery   Distal reconstitution of the right peroneal artery and scattered areas of the right posterior tibial artery   Occlusion of the left common femoral artery and left superficial femoral artery with distal reconstitution however severe narrowing of the mid to distal superficial femoral artery   Severe narrowing of the left popliteal artery  Patent left peroneal artery and scattered areas of the left posterior tibial artery  PLAN:  · Continue Heparin gtt, asa/statin  · Plan for left femoral endarterectomy with retrograde iliac intervention per vascular, tentatively planned for Friday  Appreciate vascular surgery input  · Cardiology on board for preop risk stratification, appreciate input  · Pain control per geriatrics pain management order set  · s/p left CFA endarterectomy, profundoplasty, embolectomy, arteriogram with retrograde iliac stenting 11/7  · Blood pressure under control  · He is off a gram  · Off of step down as his mental status improved  · Continue close monitoring  Confusionresolved as of 11/7/2019  Assessment & Plan  · Multifactorial  Likely he has vascular dementia at baseline per geriatrics, confusion is intermittently worse due to pain, constipation, language barrier and sun downing? · Daughter reported that pt acts strangely sometimes, example given that he has urinated on a window at her home in the past  These types of behaviors have been occurring for at least one year  · Denies diagnosis of dementia  · Geriatric input appreciated  · Limit narcotics as much as possible  · Gabapentin added  · No haldol per geriatrics, okay to give zyprexa prn  · Aggressive bowel regimen  · Delirium precautions and frequent reorientation  · Melatonin hs  · Currently on 1:1 due to intermittent agitation  · Check TSH, vitamin b12, folate  · improved       VTE Pharmacologic Prophylaxis:   Pharmacologic: Heparin Drip  Mechanical VTE Prophylaxis in Place: Yes    Patient Centered Rounds: I have performed bedside rounds with nursing staff today      Discussions with Specialists or Other Care Team Provider: Vascular surgery, Geriatrics    Education and Discussions with Family / Patient: patient Time Spent for Care: 45 minutes  More than 50% of total time spent on counseling and coordination of care as described above  Current Length of Stay: 6 day(s)    Current Patient Status: Inpatient   Certification Statement:  Still on heparin gtt, need oral a/c before discharge  He was started on warfarin    Discharge Plan:  Possible once procedure tomorrow  Code Status: Level 1 - Full Code      Subjective:   Patient is improving  He has was making jokes today  He has no new complaints today  Objective:     Vitals:   Temp (24hrs), Av 3 °F (36 8 °C), Min:98 °F (36 7 °C), Max:98 5 °F (36 9 °C)    Temp:  [98 °F (36 7 °C)-98 5 °F (36 9 °C)] 98 5 °F (36 9 °C)  HR:  [] 97  Resp:  [17-26] 18  BP: (139-184)/(64-80) 162/70  SpO2:  [93 %-100 %] 93 %  Body mass index is 18 47 kg/m²  Input and Output Summary (last 24 hours): Intake/Output Summary (Last 24 hours) at 2019 1626  Last data filed at 2019 1530  Gross per 24 hour   Intake 4081 67 ml   Output 3575 ml   Net 506 67 ml       Physical Exam:     Physical Exam   Constitutional: He is oriented to person, place, and time  Eyes: Pupils are equal, round, and reactive to light  Conjunctivae and EOM are normal  Right eye exhibits no discharge  Left eye exhibits no discharge  No scleral icterus  Neck: Normal range of motion  Neck supple  No JVD present  No tracheal deviation present  No thyromegaly present  Pulmonary/Chest: Effort normal and breath sounds normal    Abdominal: Soft  Bowel sounds are normal  He exhibits no distension  Musculoskeletal: Normal range of motion  He exhibits edema and deformity  He exhibits no tenderness  Neurological: He is oriented to person, place, and time  He displays normal reflexes  No sensory deficit  He exhibits normal muscle tone  Skin: Skin is warm and dry  No rash noted  No erythema  No pallor  Psychiatric: He has a normal mood and affect         Additional Data:     Labs:    Results from last 7 days   Lab Units 11/08/19  0431  11/07/19  0550   WBC Thousand/uL 8 52   < > 4 50   HEMOGLOBIN g/dL 11 8*   < > 11 6*   I STAT HEMOGLOBIN   --    < >  --    HEMATOCRIT % 35 6*   < > 35 9*   HEMATOCRIT, ISTAT   --    < >  --    PLATELETS Thousands/uL 128*   < > 169   NEUTROS PCT %  --   --  78*   LYMPHS PCT %  --   --  11*   MONOS PCT %  --   --  8   EOS PCT %  --   --  3    < > = values in this interval not displayed  Results from last 7 days   Lab Units 11/08/19  0431  11/07/19  1921  11/03/19  0522   POTASSIUM mmol/L 3 4*   < >  --    < > 4 0   CHLORIDE mmol/L 112*   < >  --    < > 108   CO2 mmol/L 20*   < >  --    < > 26   CO2, I-STAT mmol/L  --   --  22  --   --    BUN mg/dL 11   < >  --    < > 19   CREATININE mg/dL 0 72   < >  --    < > 1 00   CALCIUM mg/dL 8 5   < >  --    < > 8 9   ALK PHOS U/L  --   --   --   --  87   ALT U/L  --   --   --   --  13   AST U/L  --   --   --   --  12   GLUCOSE, ISTAT mg/dl  --   --  128  --   --     < > = values in this interval not displayed  Results from last 7 days   Lab Units 11/07/19  2229   INR  1 27*       * I Have Reviewed All Lab Data Listed Above  * Additional Pertinent Lab Tests Reviewed:  [address] Admission Reviewed    Imaging:    Imaging Reports Reviewed Today Include:    Imaging Personally Reviewed by Myself Includes:       Recent Cultures (last 7 days):           Last 24 Hours Medication List:     Current Facility-Administered Medications:  acetaminophen 650 mg Oral Q6H PRN Lizzie Melendez MD    amLODIPine 5 mg Oral Daily Lizzie Melendez MD    aspirin 81 mg Oral Daily Lizzie Melendez MD    atorvastatin 40 mg Oral Daily With Shayna Calderon MD    bisacodyl 10 mg Rectal Daily PRN Lizzie Melendez MD    chlorhexidine 15 mL Swish & Spit Q12H Isabel Eid MD    clopidogrel 75 mg Oral Daily Lizzie Melendez MD    gabapentin 100 mg Oral Daily Lizzie Melendez MD    heparin (porcine) 3-20 Units/kg/hr (Order-Specific) Intravenous Titrated Fabrizio Evans MD Last Rate: 12 Units/kg/hr (11/07/19 2111)   hydrALAZINE 10 mg Intravenous Q4H PRN Massiel Haskins PA-C    lidocaine (PF) 0 5 mL Infiltration Once PRN Fabrizio Evans MD    melatonin 6 mg Oral HS Fabrizio Evans MD    mupirocin  Nasal Q12H [address] Fabrizio Evans MD    nicotine 1 patch Transdermal Daily Fabrizio Evans MD    OLANZapine 2 5 mg Intramuscular Q8H PRN Fabrizio Evans MD    oxyCODONE 2 5 mg Oral Q4H PRN Fabrizoi Evans MD    oxyCODONE 5 mg Oral Q4H PRN Fabrizio Evans MD    polyethylene glycol 17 g Oral BID Fabrizio Evans MD    senna-docusate sodium 1 tablet Oral BID Fabrizio Evans MD    sodium chloride 75 mL/hr Intravenous Continuous Selena Curtis PA-C Last Rate: 75 mL/hr (11/08/19 1427)   tamsulosin 0 4 mg Oral Daily With Sonia Iqbal MD    thiamine 100 mg Oral Daily Fabrizio Evans MD    warfarin 5 mg Oral Daily (warfarin) Selena Curtis PA-C         Today, Patient Was Seen By: Daniel Jamison MD    ** Please Note: Dictation voice to text software may have been used in the creation of this document   **

## 2019-11-08 NOTE — PROGRESS NOTES
SLIM paged to have patient downgraded to MS-tele  Order placed  Willingham to be removed  Paged Vascular team to clarify neuro checks since patient has been downgraded to MS-tele  Okay to check neurovascular q4h  Per Vascular team, okay to administer PRN Hydralazine for BP of 160s

## 2019-11-08 NOTE — ASSESSMENT & PLAN NOTE
· Multifactorial  Likely he has vascular dementia at baseline per geriatrics, confusion is intermittently worse due to pain, constipation, language barrier and sun downing? · Daughter reported that pt acts strangely sometimes, example given that he has urinated on a window at her home in the past  These types of behaviors have been occurring for at least one year  · Denies diagnosis of dementia  · Geriatric input appreciated  · Limit narcotics as much as possible  · Gabapentin added  · No haldol per geriatrics, okay to give zyprexa prn    · Aggressive bowel regimen  · Delirium precautions and frequent reorientation  · Melatonin hs  · Currently on 1:1 due to intermittent agitation  · Check TSH, vitamin b12, folate  · improved

## 2019-11-08 NOTE — PROGRESS NOTES
Pts BP was in the 180s around 2315 11/7 and PRN 10mg IV hydralazine was given  Again at 0300 11/8 (today) pt's BP alecia to the 170s  BANDAR Haskins switched PRN from Q6 to Q4 and 10 mg of hydralazine was given at 0307  Now at 0430 pt's BP is back in the 160s/170s  Nilsajuan a Marsh was [employer], awaiting further orders

## 2019-11-08 NOTE — ASSESSMENT & PLAN NOTE
PAD with LLE claudication/rest pain    S/P left CFA endarterectomy, profundoplasty, embolectomy, arteriogram with retrograde iliac stenting 11/7/19    Plan:  Continue heparin drip, will discuss plan for [facility] at time of discharge  Continue aspirin, Plavix, statin  OK to D/C Margret/Maulik and downgrade from step down when medically appropriate will defer to medicine team  PT/OT

## 2019-11-08 NOTE — OP NOTE
OPERATIVE REPORT  PATIENT NAME: Kelly Abraham    :  1950  MRN: [id number]  Pt Location: [facility] HYBRID OR ROOM 02    SURGERY DATE: 2019    Surgeon(s) and Role:     * Ann-Marie Murphy MD - Primary     * Fouzia Jones MD - Assisting    Preop Diagnosis:  PAD (peripheral artery disease) ([address] ) [I73 9]    Post-Op Diagnosis Codes:     * PAD (peripheral artery disease) ([address] ) [I73 9]    Procedure(s) (LRB):  LEFT ENDARTERECTOMY ARTERIAL FEMORAL; L CFAE WITH PROFUNDOPLASTY; ARTERIOGRAM;RETROGRADE ILIAC STENTING (Left)  ARTERIOGRAM Possible retrograde iliac stenting (Left)    Specimen(s):  ID Type Source Tests Collected by Time Destination   1 : femoral artery thrombus Tissue Thrombus/Embolus TISSUE EXAM Ann-Marie Murphy MD 2019 1653        Estimated Blood Loss:   500 mL    Drains:  Urethral Catheter Latex 16 Fr  (Active)   Number of days: 0       Anesthesia Type:   General    Operative Indications:  PAD (peripheral artery disease) ([facility] [address] ) [I689]  69-year-old male that presented with acute onset rest pain and left lower extremity claudication with noninvasive imaging demonstrating complete occlusion the left common femoral artery and femoral bifurcation along with a significant external iliac artery and common iliac artery atherosclerotic stenosis  Operative Findings:  Acute and chronic thrombus embolus within the common femoral artery SFA and profunda femoris  Significant stenotic disease of the external iliac artery significant stenosis secondary to atherosclerosis of the left common iliac artery  Complications:   none    Procedure and Technique:  Patient was brought to the OR and placed on the table in supine position  General anesthesia was initiated  The lower abdomen and bilateral extremity thighs were prepped and draped in usual sterile fashion  Perioperative antibiotics were administered  Surgical time-out was performed    Longitudinal incision was made over the location of the common femoral artery  Proximal and distal control was obtained using vessel loops in Michaels fashion  All side branches were also controlled with vessel loops in Michaels fashion  The artery was noted to have a bluish hue throughout its entire length extending into the profunda femoris and SFA  Patient was heparinized with 7000 units of IV heparin and ACTs were maintained greater than 250 throughout the remainder of the case  Longitudinal arteriotomy was performed using an 11 blade and extended with Michaels scissors  Immediately upon opening the artery we encountered acute subacute appearing thrombus this was mechanically removed with forceps and noted to have a chronic component to the proximally  The clot was extending down both the profunda femoris and SFA  Ligia embolectomy catheters were utilized a 3 and a 4 passed down the profunda femoris and the SFA  There was subsequent extensive amount acute and subacute appearing clot from the SFA  There was also a chronic appearing tan clot down the profunda femoris with the tail of acute thrombus attached to it  After the embolectomy was performed there was still residual significant atherosclerotic disease of the common femoral artery extending down the SFA and profunda femoris  Arteriotomy was extended down to the distal profunda femoris as there was noted to be no backbleeding from the profunda femoris after embolectomy  There was brisk backbleeding from the SFA after embolectomy  Dissection was carried down to the tertiary branches of the profunda femoris artery and the arteriotomy was extended  There was significant atherosclerotic disease of the profunda femoris as such endarterectomy was performed starting from the common femoral artery extending into the distal profunda femoris tertiary branches  All plaque was removed and after removal of the endarterectomy plaque from the profunda there was noted to be backbleeding from all the distal branches    Proximal endarterectomy was also performed of the SFA  There was atherosclerotic disease of the SFA which was not noted to be significant in the proximal segment  Patch was sewn in the entire endarterectomy with 6 0 Prolene suture  Prior to the completion of the anastomosis all arteries were back flushed as appropriate  At this point there was only a weak palpable pulse within the femoral artery and endarterectomy site  We then performed the endovascular portion of case  Access was obtained above the femoral endarterectomy patch in the proximal common femoral artery and also in the short 7 [place] sheath was placed wire access was obtained into the distal aorta and angiography was performed using a flush catheter  This demonstrated a significant atherosclerotic lesion of the left common iliac artery along with diffused stenotic segment of the external iliac artery  The common iliac artery was treated with 210 mm balloon expandable stents and the external iliac artery was treated with a long 6 mm self expanding stent  This was post dilated with a 6 mm balloon  Completion angiography demonstrated resolution of all of the previously noted lesions with no residual stenosis  The sheath was then removed along with the wire and the arteriotomy site was repaired a 6 0 Prolene suture  At this point there was a strongly palpable pulse within the proximal common artery however the endarterectomy site still remained with a very weak pulse  Given the change in quality of pulse or such short distance there was only a short segment of the the distal external iliac artery and proximal common femoral artery which was not directly visualized  As such clamps were reapplied and arteriotomy was performed more proximal to the patch and extended with Michaels scissors  There was a residual occlusive atherosclerotic plaque at the segment which was endarterectomized    An another patch angioplasty repair was performed using 6-0 Prolene suture  Prior to the completion of the anastomosis all arteries were back bled and flushed as appropriate  Upon removal of the clamps on the was brisk strong pulses within the entire common femoral artery profunda femoris and SFA  Doppler insonation of all 3 vessels was strong with low resistance flow noted in each one  Three repair sutures were required to obtain hemostasis  Signals were checked now in the feet and the patient was noted to have a biphasic PT signal   40 mg of protamine was administered  FloSeal and thrombin Gelfoam was applied to the surgical side  Electrocautery was utilized to obtain hemostasis  Antibiotic irrigation was performed and the wound bed is was noted to be hemostatic  Wound was closed with running layers of 2-0 3-0 Monocryl suture  The skin was reapproximated with 4-0 subcuticular stitch  Signals were again checked and noted to be stable  General anesthesia was terminated    Patient was transferred to recovery in stable condition     I was present for the entire procedure    Patient Disposition:  PACU     SIGNATURE: Vickie Barbosa MD  DATE: November 7, 2019  TIME: 8:24 PM

## 2019-11-08 NOTE — ASSESSMENT & PLAN NOTE
· Had RRT while IP fpr syncope and was intially though to have CHB, was started on dopamine infusion and was transferred to ICU  · Cardiology evaluated the patient in icu, now off dopamine and OOU    · likely a vasovagal syncope in setting of severe pain and transient second degree heart block  · Echo showed EF of 65% with no WMA or no AS    PLAN:  · Avoid BB   · Adequate pain control  · Discontinue telemetry

## 2019-11-08 NOTE — PLAN OF CARE
Problem: Prexisting or High Potential for Compromised Skin Integrity  Goal: Skin integrity is maintained or improved  Description  INTERVENTIONS:  - Identify patients at risk for skin breakdown  - Assess and monitor skin integrity  - Assess and monitor nutrition and hydration status  - Monitor labs   - Turn and reposition patient  - Assist with mobility/ambulation  - Relieve pressure over bony prominences  - Avoid friction and shearing  - Provide appropriate hygiene as needed including keeping skin clean and dry  - Evaluate need for skin moisturizer/barrier cream  - Collaborate with interdisciplinary team   - Patient/family teaching   Outcome: Progressing     Problem: Potential for Falls  Goal: Patient will remain free of falls  Description  INTERVENTIONS:  - Assess patient frequently for physical needs  -  Identify cognitive and physical deficits and behaviors that affect risk of falls    -  [place] fall precautions as indicated by assessment   - Educate patient/family on patient safety including physical limitations  - Instruct patient to call for assistance with activity based on assessment  - Modify environment to reduce risk of injury  - Consider OT/PT consult to assist with strengthening/mobility  Outcome: Progressing     Problem: RESPIRATORY - ADULT  Goal: Achieves optimal ventilation and oxygenation  Description  INTERVENTIONS:  - Assess for changes in respiratory status  - Assess for changes in mentation and behavior  - Position to facilitate oxygenation and minimize respiratory effort  - Oxygen administered by appropriate delivery if ordered  - Initiate smoking cessation education as indicated  - Encourage broncho-pulmonary hygiene including cough, deep breathe, Incentive Spirometry  - Assess the need for suctioning and aspirate as needed  - Assess and instruct to report SOB or any respiratory difficulty  - Respiratory Therapy support as indicated  Outcome: Progressing     Problem: METABOLIC, FLUID AND ELECTROLYTES - ADULT  Goal: Fluid balance maintained  Description  INTERVENTIONS:  - Monitor labs   - Monitor I/O and WT  - Instruct patient on fluid and nutrition as appropriate  - Assess for signs & symptoms of volume excess or deficit  Outcome: Progressing     Problem: MUSCULOSKELETAL - ADULT  Goal: Maintain or return mobility to safest level of function  Description  INTERVENTIONS:  - Assess patient's ability to carry out ADLs; assess patient's baseline for ADL function and identify physical deficits which impact ability to perform ADLs (bathing, care of mouth/teeth, toileting, grooming, dressing, etc )  - Assess/evaluate cause of self-care deficits   - Assess range of motion  - Assess patient's mobility  - Assess patient's need for assistive devices and provide as appropriate  - Encourage maximum independence but intervene and supervise when necessary  - Involve family in performance of ADLs  - Assess for home care needs following discharge   - Consider OT consult to assist with ADL evaluation and planning for discharge  - Provide patient education as appropriate  Outcome: Progressing     Problem: PAIN - ADULT  Goal: Verbalizes/displays adequate comfort level or baseline comfort level  Description  Interventions:  - Encourage patient to monitor pain and request assistance  - Assess pain using appropriate pain scale  - Administer analgesics based on type and severity of pain and evaluate response  - Implement non-pharmacological measures as appropriate and evaluate response  - Consider cultural and social influences on pain and pain management  - Notify physician/advanced practitioner if interventions unsuccessful or patient reports new pain  Outcome: Progressing     Problem: INFECTION - ADULT  Goal: Absence or prevention of progression during hospitalization  Description  INTERVENTIONS:  - Assess and monitor for signs and symptoms of infection  - Monitor lab/diagnostic results  - Monitor all insertion sites, i e  indwelling lines, tubes, and drains  - Monitor endotracheal if appropriate and nasal secretions for changes in amount and color  - [place] appropriate cooling/warming therapies per order  - Administer medications as ordered  - Instruct and encourage patient and family to use good hand hygiene technique  - Identify and instruct in appropriate isolation precautions for identified infection/condition  Outcome: Progressing     Problem: SAFETY ADULT  Goal: Maintain or return to baseline ADL function  Description  INTERVENTIONS:  -  Assess patient's ability to carry out ADLs; assess patient's baseline for ADL function and identify physical deficits which impact ability to perform ADLs (bathing, care of mouth/teeth, toileting, grooming, dressing, etc )  - Assess/evaluate cause of self-care deficits   - Assess range of motion  - Assess patient's mobility; develop plan if impaired  - Assess patient's need for assistive devices and provide as appropriate  - Encourage maximum independence but intervene and supervise when necessary  - Involve family in performance of ADLs  - Assess for home care needs following discharge   - Consider OT consult to assist with ADL evaluation and planning for discharge  - Provide patient education as appropriate  Outcome: Progressing  Goal: Maintain or return mobility status to optimal level  Description  INTERVENTIONS:  - Assess patient's baseline mobility status (ambulation, transfers, stairs, etc )    - Identify cognitive and physical deficits and behaviors that affect mobility  - Identify mobility aids required to assist with transfers and/or ambulation (gait belt, sit-to-stand, lift, walker, cane, etc )  - [place] fall precautions as indicated by assessment  - Record patient progress and toleration of activity level on Mobility SBAR; progress patient to next Phase/Stage  - Instruct patient to call for assistance with activity based on assessment  - Consider rehabilitation consult to assist with strengthening/weightbearing, etc   Outcome: Progressing     Problem: DISCHARGE PLANNING  Goal: Discharge to home or other facility with appropriate resources  Description  INTERVENTIONS:  - Identify barriers to discharge w/patient and caregiver  - Arrange for needed discharge resources and transportation as appropriate  - Identify discharge learning needs (meds, wound care, etc )  - Arrange for interpretive services to assist at discharge as needed  - Refer to Case Management Department for coordinating discharge planning if the patient needs post-hospital services based on physician/advanced practitioner order or complex needs related to functional status, cognitive ability, or social support system  Outcome: Progressing     Problem: Knowledge Deficit  Goal: Patient/family/caregiver demonstrates understanding of disease process, treatment plan, medications, and discharge instructions  Description  Complete learning assessment and assess knowledge base  Interventions:  - Provide teaching at level of understanding  - Provide teaching via preferred learning methods  Outcome: Progressing     Problem: Nutrition/Hydration-ADULT  Goal: Nutrient/Hydration intake appropriate for improving, restoring or maintaining nutritional needs  Description  Monitor and assess patient's nutrition/hydration status for malnutrition  Collaborate with interdisciplinary team and initiate plan and interventions as ordered  Monitor patient's weight and dietary intake as ordered or per policy  Utilize nutrition screening tool and intervene as necessary  Determine patient's food preferences and provide high-protein, high-caloric foods as appropriate       INTERVENTIONS:  - Monitor oral intake, urinary output, labs, and treatment plans  - Assess nutrition and hydration status and recommend course of action  - Evaluate amount of meals eaten  - Assist patient with eating if necessary   - Allow adequate time for meals  - Recommend/ encourage appropriate diets, oral nutritional supplements, and vitamin/mineral supplements  - Order, calculate, and assess calorie counts as needed  - Recommend, monitor, and adjust tube feedings and TPN/PPN based on assessed needs  - Assess need for intravenous fluids  - Provide specific nutrition/hydration education as appropriate  - Include patient/family/caregiver in decisions related to nutrition  Outcome: Progressing

## 2019-11-09 ENCOUNTER — APPOINTMENT (INPATIENT)
Dept: NON INVASIVE DIAGNOSTICS | Facility: HOSPITAL | Age: 69
DRG: 252 | End: 2019-11-09
Payer: MEDICARE

## 2019-11-09 PROBLEM — K59.00 CONSTIPATION: Status: RESOLVED | Noted: 2019-11-04 | Resolved: 2019-11-09

## 2019-11-09 LAB
ANION GAP SERPL CALCULATED.3IONS-SCNC: 7 MMOL/L (ref 4–13)
APTT PPP: 105 SECONDS (ref 23–37)
APTT PPP: 54 SECONDS (ref 23–37)
APTT PPP: 90 SECONDS (ref 23–37)
BASOPHILS # BLD AUTO: 0.03 THOUSANDS/ΜL (ref 0–0.1)
BASOPHILS NFR BLD AUTO: 0 % (ref 0–1)
BUN SERPL-MCNC: 17 MG/DL (ref 5–25)
CALCIUM SERPL-MCNC: 9.1 MG/DL (ref 8.3–10.1)
CHLORIDE SERPL-SCNC: 110 MMOL/L (ref 100–108)
CHOLEST SERPL-MCNC: 132 MG/DL (ref 50–200)
CO2 SERPL-SCNC: 23 MMOL/L (ref 21–32)
CREAT SERPL-MCNC: 0.83 MG/DL (ref 0.6–1.3)
EOSINOPHIL # BLD AUTO: 0.01 THOUSAND/ΜL (ref 0–0.61)
EOSINOPHIL NFR BLD AUTO: 0 % (ref 0–6)
ERYTHROCYTE [DISTWIDTH] IN BLOOD BY AUTOMATED COUNT: 16.2 % (ref 11.6–15.1)
GFR SERPL CREATININE-BSD FRML MDRD: 89 ML/MIN/1.73SQ M
GLUCOSE SERPL-MCNC: 115 MG/DL (ref 65–140)
HCT VFR BLD AUTO: 36.8 % (ref 36.5–49.3)
HDLC SERPL-MCNC: 44 MG/DL
HGB BLD-MCNC: 12.1 G/DL (ref 12–17)
IMM GRANULOCYTES # BLD AUTO: 0.04 THOUSAND/UL (ref 0–0.2)
IMM GRANULOCYTES NFR BLD AUTO: 0 % (ref 0–2)
INR PPP: 1.23 (ref 0.84–1.19)
LDLC SERPL CALC-MCNC: 73 MG/DL (ref 0–100)
LYMPHOCYTES # BLD AUTO: 0.43 THOUSANDS/ΜL (ref 0.6–4.47)
LYMPHOCYTES NFR BLD AUTO: 5 % (ref 14–44)
MCH RBC QN AUTO: 30.6 PG (ref 26.8–34.3)
MCHC RBC AUTO-ENTMCNC: 32.9 G/DL (ref 31.4–37.4)
MCV RBC AUTO: 93 FL (ref 82–98)
MONOCYTES # BLD AUTO: 0.69 THOUSAND/ΜL (ref 0.17–1.22)
MONOCYTES NFR BLD AUTO: 7 % (ref 4–12)
NEUTROPHILS # BLD AUTO: 8.45 THOUSANDS/ΜL (ref 1.85–7.62)
NEUTS SEG NFR BLD AUTO: 88 % (ref 43–75)
NRBC BLD AUTO-RTO: 0 /100 WBCS
PLATELET # BLD AUTO: 142 THOUSANDS/UL (ref 149–390)
PMV BLD AUTO: 11.3 FL (ref 8.9–12.7)
POTASSIUM SERPL-SCNC: 3.7 MMOL/L (ref 3.5–5.3)
PROTHROMBIN TIME: 15.1 SECONDS (ref 11.6–14.5)
RBC # BLD AUTO: 3.95 MILLION/UL (ref 3.88–5.62)
SODIUM SERPL-SCNC: 140 MMOL/L (ref 136–145)
TRIGL SERPL-MCNC: 76 MG/DL
WBC # BLD AUTO: 9.65 THOUSAND/UL (ref 4.31–10.16)

## 2019-11-09 PROCEDURE — 85610 PROTHROMBIN TIME: CPT | Performed by: PHYSICIAN ASSISTANT

## 2019-11-09 PROCEDURE — 80061 LIPID PANEL: CPT | Performed by: INTERNAL MEDICINE

## 2019-11-09 PROCEDURE — 99232 SBSQ HOSP IP/OBS MODERATE 35: CPT | Performed by: INTERNAL MEDICINE

## 2019-11-09 PROCEDURE — 93923 UPR/LXTR ART STDY 3+ LVLS: CPT

## 2019-11-09 PROCEDURE — 99024 POSTOP FOLLOW-UP VISIT: CPT | Performed by: SURGERY

## 2019-11-09 PROCEDURE — 93925 LOWER EXTREMITY STUDY: CPT

## 2019-11-09 PROCEDURE — 85730 THROMBOPLASTIN TIME PARTIAL: CPT | Performed by: INTERNAL MEDICINE

## 2019-11-09 PROCEDURE — 85025 COMPLETE CBC W/AUTO DIFF WBC: CPT | Performed by: INTERNAL MEDICINE

## 2019-11-09 PROCEDURE — 85730 THROMBOPLASTIN TIME PARTIAL: CPT | Performed by: SURGERY

## 2019-11-09 PROCEDURE — 80048 BASIC METABOLIC PNL TOTAL CA: CPT | Performed by: PHYSICIAN ASSISTANT

## 2019-11-09 RX ADMIN — Medication 1 APPLICATION: at 08:40

## 2019-11-09 RX ADMIN — SODIUM CHLORIDE 75 ML/HR: 0.9 INJECTION, SOLUTION INTRAVENOUS at 08:47

## 2019-11-09 RX ADMIN — SENNOSIDES AND DOCUSATE SODIUM 1 TABLET: 8.6; 5 TABLET ORAL at 18:07

## 2019-11-09 RX ADMIN — THIAMINE HCL TAB 100 MG 100 MG: 100 TAB at 08:39

## 2019-11-09 RX ADMIN — TAMSULOSIN HYDROCHLORIDE 0.4 MG: 0.4 CAPSULE ORAL at 18:05

## 2019-11-09 RX ADMIN — ASPIRIN 81 MG: 81 TABLET, COATED ORAL at 08:39

## 2019-11-09 RX ADMIN — OXYCODONE HYDROCHLORIDE 2.5 MG: 5 TABLET ORAL at 05:02

## 2019-11-09 RX ADMIN — HEPARIN SODIUM 14 UNITS/KG/HR: 10000 INJECTION, SOLUTION INTRAVENOUS at 08:41

## 2019-11-09 RX ADMIN — AMLODIPINE BESYLATE 10 MG: 10 TABLET ORAL at 08:39

## 2019-11-09 RX ADMIN — Medication 1 APPLICATION: at 22:17

## 2019-11-09 RX ADMIN — CLOPIDOGREL 75 MG: 75 TABLET, FILM COATED ORAL at 08:39

## 2019-11-09 RX ADMIN — POLYETHYLENE GLYCOL 3350 17 G: 17 POWDER, FOR SOLUTION ORAL at 08:39

## 2019-11-09 RX ADMIN — SODIUM CHLORIDE 75 ML/HR: 0.9 INJECTION, SOLUTION INTRAVENOUS at 23:53

## 2019-11-09 RX ADMIN — POLYETHYLENE GLYCOL 3350 17 G: 17 POWDER, FOR SOLUTION ORAL at 18:06

## 2019-11-09 RX ADMIN — CHLORHEXIDINE GLUCONATE 0.12% ORAL RINSE 15 ML: 1.2 LIQUID ORAL at 08:39

## 2019-11-09 RX ADMIN — WARFARIN SODIUM 5 MG: 5 TABLET ORAL at 18:06

## 2019-11-09 RX ADMIN — GABAPENTIN 100 MG: 100 CAPSULE ORAL at 08:39

## 2019-11-09 RX ADMIN — ATORVASTATIN CALCIUM 40 MG: 40 TABLET, FILM COATED ORAL at 18:05

## 2019-11-09 RX ADMIN — NICOTINE 1 PATCH: 14 PATCH TRANSDERMAL at 08:45

## 2019-11-09 RX ADMIN — SENNOSIDES AND DOCUSATE SODIUM 1 TABLET: 8.6; 5 TABLET ORAL at 08:39

## 2019-11-09 RX ADMIN — CHLORHEXIDINE GLUCONATE 0.12% ORAL RINSE 15 ML: 1.2 LIQUID ORAL at 22:16

## 2019-11-09 RX ADMIN — MELATONIN 6 MG: 3 TAB ORAL at 22:16

## 2019-11-09 NOTE — PROGRESS NOTES
Uneventful night [id number]  Surgical site looks good, no signs of infection, ALLAN, palpable pedal pulse  Will continue to monitor

## 2019-11-09 NOTE — PLAN OF CARE
Problem: Prexisting or High Potential for Compromised Skin Integrity  Goal: Skin integrity is maintained or improved  Description  INTERVENTIONS:  - Identify patients at risk for skin breakdown  - Assess and monitor skin integrity  - Assess and monitor nutrition and hydration status  - Monitor labs   - Turn and reposition patient  - Assist with mobility/ambulation  - Relieve pressure over bony prominences  - Avoid friction and shearing  - Provide appropriate hygiene as needed including keeping skin clean and dry  - Evaluate need for skin moisturizer/barrier cream  - Collaborate with interdisciplinary team   - Patient/family teaching   Outcome: Progressing     Problem: Potential for Falls  Goal: Patient will remain free of falls  Description  INTERVENTIONS:  - Assess patient frequently for physical needs  -  Identify cognitive and physical deficits and behaviors that affect risk of falls    -  [place] fall precautions as indicated by assessment   - Educate patient/family on patient safety including physical limitations  - Instruct patient to call for assistance with activity based on assessment  - Modify environment to reduce risk of injury  - Consider OT/PT consult to assist with strengthening/mobility  Outcome: Progressing     Problem: RESPIRATORY - ADULT  Goal: Achieves optimal ventilation and oxygenation  Description  INTERVENTIONS:  - Assess for changes in respiratory status  - Assess for changes in mentation and behavior  - Position to facilitate oxygenation and minimize respiratory effort  - Oxygen administered by appropriate delivery if ordered  - Initiate smoking cessation education as indicated  - Encourage broncho-pulmonary hygiene including cough, deep breathe, Incentive Spirometry  - Assess the need for suctioning and aspirate as needed  - Assess and instruct to report SOB or any respiratory difficulty  - Respiratory Therapy support as indicated  Outcome: Progressing     Problem: METABOLIC, FLUID AND ELECTROLYTES - ADULT  Goal: Fluid balance maintained  Description  INTERVENTIONS:  - Monitor labs   - Monitor I/O and WT  - Instruct patient on fluid and nutrition as appropriate  - Assess for signs & symptoms of volume excess or deficit  Outcome: Progressing     Problem: MUSCULOSKELETAL - ADULT  Goal: Maintain or return mobility to safest level of function  Description  INTERVENTIONS:  - Assess patient's ability to carry out ADLs; assess patient's baseline for ADL function and identify physical deficits which impact ability to perform ADLs (bathing, care of mouth/teeth, toileting, grooming, dressing, etc )  - Assess/evaluate cause of self-care deficits   - Assess range of motion  - Assess patient's mobility  - Assess patient's need for assistive devices and provide as appropriate  - Encourage maximum independence but intervene and supervise when necessary  - Involve family in performance of ADLs  - Assess for home care needs following discharge   - Consider OT consult to assist with ADL evaluation and planning for discharge  - Provide patient education as appropriate  Outcome: Progressing     Problem: PAIN - ADULT  Goal: Verbalizes/displays adequate comfort level or baseline comfort level  Description  Interventions:  - Encourage patient to monitor pain and request assistance  - Assess pain using appropriate pain scale  - Administer analgesics based on type and severity of pain and evaluate response  - Implement non-pharmacological measures as appropriate and evaluate response  - Consider cultural and social influences on pain and pain management  - Notify physician/advanced practitioner if interventions unsuccessful or patient reports new pain  Outcome: Progressing     Problem: INFECTION - ADULT  Goal: Absence or prevention of progression during hospitalization  Description  INTERVENTIONS:  - Assess and monitor for signs and symptoms of infection  - Monitor lab/diagnostic results  - Monitor all insertion sites, i e  indwelling lines, tubes, and drains  - Monitor endotracheal if appropriate and nasal secretions for changes in amount and color  - [place] appropriate cooling/warming therapies per order  - Administer medications as ordered  - Instruct and encourage patient and family to use good hand hygiene technique  - Identify and instruct in appropriate isolation precautions for identified infection/condition  Outcome: Progressing     Problem: SAFETY ADULT  Goal: Maintain or return to baseline ADL function  Description  INTERVENTIONS:  -  Assess patient's ability to carry out ADLs; assess patient's baseline for ADL function and identify physical deficits which impact ability to perform ADLs (bathing, care of mouth/teeth, toileting, grooming, dressing, etc )  - Assess/evaluate cause of self-care deficits   - Assess range of motion  - Assess patient's mobility; develop plan if impaired  - Assess patient's need for assistive devices and provide as appropriate  - Encourage maximum independence but intervene and supervise when necessary  - Involve family in performance of ADLs  - Assess for home care needs following discharge   - Consider OT consult to assist with ADL evaluation and planning for discharge  - Provide patient education as appropriate  Outcome: Progressing  Goal: Maintain or return mobility status to optimal level  Description  INTERVENTIONS:  - Assess patient's baseline mobility status (ambulation, transfers, stairs, etc )    - Identify cognitive and physical deficits and behaviors that affect mobility  - Identify mobility aids required to assist with transfers and/or ambulation (gait belt, sit-to-stand, lift, walker, cane, etc )  - [place] fall precautions as indicated by assessment  - Record patient progress and toleration of activity level on Mobility SBAR; progress patient to next Phase/Stage  - Instruct patient to call for assistance with activity based on assessment  - Consider rehabilitation consult to assist with strengthening/weightbearing, etc   Outcome: Progressing     Problem: DISCHARGE PLANNING  Goal: Discharge to home or other facility with appropriate resources  Description  INTERVENTIONS:  - Identify barriers to discharge w/patient and caregiver  - Arrange for needed discharge resources and transportation as appropriate  - Identify discharge learning needs (meds, wound care, etc )  - Arrange for interpretive services to assist at discharge as needed  - Refer to Case Management Department for coordinating discharge planning if the patient needs post-hospital services based on physician/advanced practitioner order or complex needs related to functional status, cognitive ability, or social support system  Outcome: Progressing     Problem: Knowledge Deficit  Goal: Patient/family/caregiver demonstrates understanding of disease process, treatment plan, medications, and discharge instructions  Description  Complete learning assessment and assess knowledge base  Interventions:  - Provide teaching at level of understanding  - Provide teaching via preferred learning methods  Outcome: Progressing     Problem: Nutrition/Hydration-ADULT  Goal: Nutrient/Hydration intake appropriate for improving, restoring or maintaining nutritional needs  Description  Monitor and assess patient's nutrition/hydration status for malnutrition  Collaborate with interdisciplinary team and initiate plan and interventions as ordered  Monitor patient's weight and dietary intake as ordered or per policy  Utilize nutrition screening tool and intervene as necessary  Determine patient's food preferences and provide high-protein, high-caloric foods as appropriate       INTERVENTIONS:  - Monitor oral intake, urinary output, labs, and treatment plans  - Assess nutrition and hydration status and recommend course of action  - Evaluate amount of meals eaten  - Assist patient with eating if necessary   - Allow adequate time for meals  - Recommend/ encourage appropriate diets, oral nutritional supplements, and vitamin/mineral supplements  - Order, calculate, and assess calorie counts as needed  - Recommend, monitor, and adjust tube feedings and TPN/PPN based on assessed needs  - Assess need for intravenous fluids  - Provide specific nutrition/hydration education as appropriate  - Include patient/family/caregiver in decisions related to nutrition  Outcome: Progressing

## 2019-11-09 NOTE — PROGRESS NOTES
[facility] paged around 9 pm to notify them about repeat BMP potassium level of 3 9  Patient received 40 mg oral potassium previous to repeat  No response yet

## 2019-11-09 NOTE — PROGRESS NOTES
Vascular Surgery    Progress Note - Ladarius Castañeda 1950, 78 y o  male MRN: [id number]    Unit/Bed#: [facility] 430-01 Encounter: [id number]    Primary Care Provider: Dominga Rodriguez MD   Date and time admitted to hospital: 11/2/2019  2:01 AM          Subjective:  Pt remains confused, awake/arousable on a 1 to 1   No overnight events     Vitals:  /63   Pulse 95   Temp 98 3 °F (36 8 °C) (Oral)   Resp 18   Ht 5' 5" (1 651 m)   Wt 50 3 kg (111 lb)   SpO2 95%   BMI 18 47 kg/m²     I/Os:  I/O last 3 completed shifts: In: 5620 4 [P O :840; I V :3830 4;  Blood:700; IV Piggyback:250]  Out: 4000 [Urine:3700; Blood:300]  I/O this shift:  In: 205 8 [I V :205 8]  Out: 100 [Urine:100]    Lab Results and Cultures:   Lab Results   Component Value Date    WBC 9 65 11/09/2019    HGB 12 1 11/09/2019    HCT 36 8 11/09/2019    MCV 93 11/09/2019     (L) 11/09/2019     Lab Results   Component Value Date    GLUCOSE 128 11/07/2019    CALCIUM 9 1 11/09/2019    K 3 7 11/09/2019    CO2 23 11/09/2019     (H) 11/09/2019    BUN 17 11/09/2019    CREATININE 0 83 11/09/2019     Lab Results   Component Value Date    INR 1 23 (H) 11/09/2019    INR 1 27 (H) 11/07/2019    INR 1 30 (H) 11/07/2019    PROTIME 15 1 (H) 11/09/2019    PROTIME 15 5 (H) 11/07/2019    PROTIME 15 8 (H) 11/07/2019        Blood Culture: No results found for: BLOODCX,   Urinalysis: No results found for: COLORU, CLARITYU, SPECGRAV, PHUR, LEUKOCYTESUR, NITRITE, PROTEINUA, GLUCOSEU, KETONESU, BILIRUBINUR, BLOODU,   Urine Culture: No results found for: URINECX,   Wound Culure: No results found for: WOUNDCULT    Medications:  Current Facility-Administered Medications   Medication Dose Route Frequency    acetaminophen (TYLENOL) tablet 650 mg  650 mg Oral Q6H PRN    amLODIPine (NORVASC) tablet 10 mg  10 mg Oral Daily    aspirin (ECOTRIN LOW STRENGTH) EC tablet 81 mg  81 mg Oral Daily    atorvastatin (LIPITOR) tablet 40 mg  40 mg Oral Daily With Dinner    bisacodyl (DULCOLAX) rectal suppository 10 mg  10 mg Rectal Daily PRN    chlorhexidine (PERIDEX) 0 12 % oral rinse 15 mL  15 mL Swish & Spit Q12H [address]    clopidogrel (PLAVIX) tablet 75 mg  75 mg Oral Daily    gabapentin (NEURONTIN) capsule 100 mg  100 mg Oral Daily    heparin (porcine) 25,000 units in 250 mL infusion (premix)  3-20 Units/kg/hr (Order-Specific) Intravenous Titrated    hydrALAZINE (APRESOLINE) injection 10 mg  10 mg Intravenous Q4H PRN    lidocaine (PF) (XYLOCAINE-MPF) 1 % injection 0 5 mL  0 5 mL Infiltration Once PRN    melatonin tablet 6 mg  6 mg Oral HS    mupirocin (BACTROBAN) 2 % nasal ointment   Nasal Q12H [address]    nicotine (NICODERM CQ) 14 mg/24hr TD 24 hr patch 1 patch  1 patch Transdermal Daily    OLANZapine (ZyPREXA) IM injection 2 5 mg  2 5 mg Intramuscular Q8H PRN    oxyCODONE (ROXICODONE) IR tablet 2 5 mg  2 5 mg Oral Q4H PRN    oxyCODONE (ROXICODONE) IR tablet 5 mg  5 mg Oral Q4H PRN    polyethylene glycol (MIRALAX) packet 17 g  17 g Oral BID    senna-docusate sodium (SENOKOT S) 8 6-50 mg per tablet 1 tablet  1 tablet Oral BID    sodium chloride 0 9 % infusion  75 mL/hr Intravenous Continuous    tamsulosin (FLOMAX) capsule 0 4 mg  0 4 mg Oral Daily With Dinner    thiamine (VITAMIN B1) tablet 100 mg  100 mg Oral Daily    warfarin (COUMADIN) tablet 5 mg  5 mg Oral Daily (warfarin)       Physical Exam:    General appearance: alert and oriented, in no acute distress  Lungs: clear to auscultation bilaterally  Heart: regular rate and rhythm, S1, S2 normal, no murmur, click, rub or gallop  Abdomen: soft, non-tender; bowel sounds normal; no masses,  no organomegaly  Extremities: Left foot warm, M/S intact    Wound/Incision:  Left groin incision clean, dry, and intact   No PSA    Pulse exam:  DP: Left: doppler signal  PT: Left: 2+      Plan:   -Continue asa/plavix/ hep gtt,   -CT-angio chest pending  Cardio on board  - will need repeat LEAD  - c/w PT eval and treat  Pau Zee MD  11/9/2019

## 2019-11-09 NOTE — PROGRESS NOTES
Progress Note - Pippa July 1950, 78 y o  male MRN: [id number]    Unit/Bed#: [facility] 430-01 Encounter: [id number]    Primary Care Provider: Santiago Mosley MD   Date and time admitted to hospital: 11/2/2019  2:01 AM        Renal stone  Assessment & Plan  · Incidental finding on CT scan  · 9 x 5 x 5 mm calculus within the posterior bladder, near the left ureterovesicular junction however there is no hydronephrosis to suggest obstruction  · Pain control  · Consider urology eval if symptomatic or if renal function worsens    Syncope and collapse  Assessment & Plan  · Had RRT while IP fpr syncope and was intially though to have CHB, was started on dopamine infusion and was transferred to ICU  · Cardiology evaluated the patient in icu, now off dopamine and OOU  · likely a vasovagal syncope in setting of severe pain and transient second degree heart block  · Echo showed EF of 65% with no WMA or no AS    PLAN:  · Avoid BB   · Adequate pain control  · Discontinue telemetry    Dependence on nicotine from cigarettes  Assessment & Plan  · Still smokes despite diagnosis of throat cancer, per daughter  · She thinks he smokes 1ppd  · Nicotine patch  · Provide smoking cessation education    Hypertension  Assessment & Plan  · Increase amlodipine dose to 5 mg daily  · Hydralazine ordered prn    History of throat cancer  Assessment & Plan  · Per daughter, pt diagnosed with throat CA 2 years ago and has been treated with chemo and radiation  · Not undergoing treatment at this time    * Peripheral vascular disease ([facility] [address] )  Assessment & Plan  · Pt presented with 2-month history of LLE pain with walking  Denies pain at rest, but pt is unable to walk more than a few feet without pain  · CTA: Severe peripheral vascular disease  Limited evaluation of the calf vessels due to extensive calcifications   Severe areas of narrowing and focal occlusion of the right superficial femoral artery and diffuse occlusion of the popliteal artery   Distal reconstitution of the right peroneal artery and scattered areas of the right posterior tibial artery  Occlusion of the left common femoral artery and left superficial femoral artery with distal reconstitution however severe narrowing of the mid to distal superficial femoral artery   Severe narrowing of the left popliteal artery  Patent left peroneal artery and scattered areas of the left posterior tibial artery  PLAN:  · Continue Heparin gtt, asa/statin  · Plan for left femoral endarterectomy with retrograde iliac intervention per vascular, tentatively planned for Friday  Appreciate vascular surgery input  · Cardiology on board for preop risk stratification, appreciate input  · Pain control per geriatrics pain management order set  · s/p left CFA endarterectomy, profundoplasty, embolectomy, arteriogram with retrograde iliac stenting 11/7  · Blood pressure under control  · He is off a gram  · Off of step down as his mental status improved  · Continue close monitoring  Constipationresolved as of 11/9/2019  Assessment & Plan  · No abdominal tenderness on exam  · Aggressive bowel regimen while on narcotics  · Obtain abdomen xray    Confusionresolved as of 11/7/2019  Assessment & Plan  · Multifactorial  Likely he has vascular dementia at baseline per geriatrics, confusion is intermittently worse due to pain, constipation, language barrier and sun downing? · Daughter reported that pt acts strangely sometimes, example given that he has urinated on a window at her home in the past  These types of behaviors have been occurring for at least one year  · Denies diagnosis of dementia  · Geriatric input appreciated  · Limit narcotics as much as possible  · Gabapentin added  · No haldol per geriatrics, okay to give zyprexa prn    · Aggressive bowel regimen  · Delirium precautions and frequent reorientation  · Melatonin hs  · Currently on 1:1 due to intermittent agitation  · Check TSH, vitamin b12, folate  · improved        VTE Pharmacologic Prophylaxis:   Pharmacologic: Heparin  Mechanical VTE Prophylaxis in Place: Yes    Patient Centered Rounds: I have performed bedside rounds with nursing staff today  Discussions with Specialists or Other Care Team Provider: vascular surgery     Education and Discussions with Family / Patient: patient     Time Spent for Care: 45 minutes  More than 50% of total time spent on counseling and coordination of care as described above  Current Length of Stay: 7 day(s)    Current Patient Status: Inpatient   Certification Statement: The patient will continue to require additional inpatient hospital stay due to vasular work up    Discharge Plan: pending work up    Code Status: Level 1 - Full Code      Subjective:   Patient has no questions  Objective:     Vitals:   Temp (24hrs), Av 7 °F (37 1 °C), Min:98 °F (36 7 °C), Max:100 °F (37 8 °C)    Temp:  [98 °F (36 7 °C)-100 °F (37 8 °C)] 100 °F (37 8 °C)  HR:  [90-95] 94  Resp:  [18] 18  BP: (133-181)/(60-82) 133/60  SpO2:  [95 %-97 %] 96 %  Body mass index is 18 47 kg/m²  Input and Output Summary (last 24 hours): Intake/Output Summary (Last 24 hours) at 2019 1631  Last data filed at 2019 1601  Gross per 24 hour   Intake 2875 83 ml   Output 1275 ml   Net 1600 83 ml       Physical Exam:     Physical Exam   Constitutional: He is oriented to person, place, and time  He appears well-developed and well-nourished  HENT:   Head: Normocephalic and atraumatic  Right Ear: External ear normal    Left Ear: External ear normal    Nose: Nose normal    Mouth/Throat: Oropharynx is clear and moist  No oropharyngeal exudate  Eyes: Pupils are equal, round, and reactive to light  Conjunctivae and EOM are normal  Right eye exhibits no discharge  Left eye exhibits no discharge  No scleral icterus  Neck: Normal range of motion  Neck supple  No JVD present  No tracheal deviation present   No thyromegaly present  Cardiovascular: Normal rate, regular rhythm, normal heart sounds and intact distal pulses  Exam reveals no gallop and no friction rub  No murmur heard  Pulmonary/Chest: Effort normal and breath sounds normal  No stridor  No respiratory distress  He has no wheezes  He has no rales  Abdominal: Soft  Bowel sounds are normal  He exhibits no distension and no mass  There is no tenderness  There is no guarding  Musculoskeletal: Normal range of motion  He exhibits no edema, tenderness or deformity  Lymphadenopathy:     He has no cervical adenopathy  Neurological: He is alert and oriented to person, place, and time  He displays normal reflexes  No cranial nerve deficit or sensory deficit  He exhibits normal muscle tone  Coordination normal    Skin: Skin is warm and dry  Psychiatric: He has a normal mood and affect  His behavior is normal  Judgment and thought content normal    Nursing note and vitals reviewed  Additional Data:     Labs:    Results from last 7 days   Lab Units 11/09/19 0444   WBC Thousand/uL 9 65   HEMOGLOBIN g/dL 12 1   HEMATOCRIT % 36 8   PLATELETS Thousands/uL 142*   NEUTROS PCT % 88*   LYMPHS PCT % 5*   MONOS PCT % 7   EOS PCT % 0     Results from last 7 days   Lab Units 11/09/19 0444 11/07/19  1921  11/03/19  0522   POTASSIUM mmol/L 3 7   < >  --    < > 4 0   CHLORIDE mmol/L 110*   < >  --    < > 108   CO2 mmol/L 23   < >  --    < > 26   CO2, I-STAT mmol/L  --   --  22  --   --    BUN mg/dL 17   < >  --    < > 19   CREATININE mg/dL 0 83   < >  --    < > 1 00   CALCIUM mg/dL 9 1   < >  --    < > 8 9   ALK PHOS U/L  --   --   --   --  87   ALT U/L  --   --   --   --  13   AST U/L  --   --   --   --  12   GLUCOSE, ISTAT mg/dl  --   --  128  --   --     < > = values in this interval not displayed  Results from last 7 days   Lab Units 11/09/19 0444   INR  1 23*       * I Have Reviewed All Lab Data Listed Above  * Additional Pertinent Lab Tests Reviewed:  All Labs For Current Hospital Admission Reviewed    Imaging:    Imaging Reports Reviewed Today Include:    Imaging Personally Reviewed by Myself Includes:      Recent Cultures (last 7 days):           Last 24 Hours Medication List:     Current Facility-Administered Medications:  acetaminophen 650 mg Oral Q6H PRN Abi Almonte MD    amLODIPine 10 mg Oral Daily Jimmy Alicea MD    aspirin 81 mg Oral Daily Abi Almonte MD    atorvastatin 40 mg Oral Daily With Thania Cline MD    bisacodyl 10 mg Rectal Daily PRN Abi Almonte MD    chlorhexidine 15 mL Swish & Spit Q12H [address] , MD    clopidogrel 75 mg Oral Daily Abi Minimelisa, MD    gabapentin 100 mg Oral Daily [place] MD Gage    heparin (porcine) 3-20 Units/kg/hr (Order-Specific) Intravenous Titrated Abi Almonte MD Last Rate: 14 Units/kg/hr (11/09/19 0841)   hydrALAZINE 10 mg Intravenous Q4H PRN Massiel Haskins PA-C    lidocaine (PF) 0 5 mL Infiltration Once PRN Abi Almonte MD    melatonin 6 mg Oral HS Abi Almonte MD    mupirocin  Nasal Q12H [address] , MD    nicotine 1 patch Transdermal Daily Abi Almonte MD    OLANZapine 2 5 mg Intramuscular Q8H PRN Abi Almonte MD    oxyCODONE 2 5 mg Oral Q4H PRN [place]karl Almonte MD    oxyCODONE 5 mg Oral Q4H PRN Abi Minimelisa, MD    polyethylene glycol 17 g Oral BID Abi Almonte MD    senna-docusate sodium 1 tablet Oral BID Abi Almonte MD    sodium chloride 75 mL/hr Intravenous Continuous Jeremias Savage PA-C Last Rate: 75 mL/hr (11/09/19 0847)   tamsulosin 0 4 mg Oral Daily With Thania Cline MD    thiamine 100 mg Oral Daily Abi Almonte MD    warfarin 5 mg Oral Daily (warfarin) Jeremias Savage PA-C         Today, Patient Was Seen By: Craig Hopson MD    ** Please Note: Dictation voice to text software may have been used in the creation of this document   **

## 2019-11-09 NOTE — PROGRESS NOTES
Progress Note - Geriatric Medicine   Fabian Segal 78 y o  male MRN: [id number]  Unit/Bed#: [facility] 430-01 Encounter: [id number]      Assessment/Plan:    Acute metabolic encephalopathy  -significantly improved, likely multifactorial including acute pain in hospitalization superimposed on likely underlying vascular dementia   -continue supportive cares  -continue to encourage normal sleep-wake cycle  -continue acute pain control  -avoid deliriogenic agents  -would benefit from outpatient comprehensive geriatric assessment to establish clear baseline and connect patient and family have resources    Probable vascular dementia  -continue supportive care  -recommend outpatient follow-up for comprehensive geriatric assessment as noted above    Peripheral artery disease  -s/p left CFA endarterectomy, embolectomy, and arteriogram with retrograde iliac stenting  -currently on aspirin/Plavix as well as heparin drip, CM assisting with determining cost of oral anticoagulation for discharge  -continue to monitor for signs of bleeding     Acute pain due to surgery  -continue multimodal acute pain control with geriatric pain protocol    Ambulatory dysfunction  -continue to encourage assistance with all transfers  -continue fall precautions  -continue bed to lowest setting with bed alarm    Subjective:   Patient seen examined at bedside where is lying resting comfortably  He reports that he has no pain from his surgical incision and that he slept well overnight  He reports that as soon as they took him down for surgery he immediately fell asleep and slept although into this morning  The PCA was bedside reports that he has been sleeping well and had no acute complaints  His appetite is good and his mood is significantly improved, his cognition is also significantly improved today  He is able to accurately provide year, month, date, and day of week      Review of Systems   Constitutional: Negative for appetite change, chills and fever  HENT: Negative for congestion  Eyes: Negative for visual disturbance  Respiratory: Negative for shortness of breath and wheezing  Cardiovascular: Negative for chest pain and palpitations  Gastrointestinal: Negative for diarrhea, nausea and vomiting  Musculoskeletal: Positive for gait problem  Negative for arthralgias and back pain  Skin: Negative  Neurological: Positive for weakness  Hematological: Bruises/bleeds easily  Psychiatric/Behavioral: Negative for decreased concentration and sleep disturbance  The patient is not nervous/anxious  All other systems reviewed and are negative  Objective:     Vitals: Blood pressure 145/65, pulse 90, temperature 98 °F (36 7 °C), temperature source Oral, resp  rate 18, height 5' 5" (1 651 m), weight 50 3 kg (111 lb), SpO2 97 %  ,Body mass index is 18 47 kg/m²  Intake/Output Summary (Last 24 hours) at 11/9/2019 0032  Last data filed at 11/8/2019 2200  Gross per 24 hour   Intake 2862 66 ml   Output 2425 ml   Net 437 66 ml     Current Medications: Reviewed    Physical Exam:   Physical Exam   Constitutional: He is oriented to person, place, and time  He appears well-developed  No distress  HENT:   Head: Normocephalic and atraumatic  Mouth/Throat: Oropharynx is clear and moist    Eyes: Pupils are equal, round, and reactive to light  Conjunctivae and EOM are normal    Neck: No tracheal deviation present  Cardiovascular: Normal rate  No murmur heard  Pulmonary/Chest: Effort normal and breath sounds normal  No respiratory distress  Abdominal: Soft  There is no tenderness  Musculoskeletal: Normal range of motion  He exhibits no edema or tenderness  Neurological: He is alert and oriented to person, place, and time  Skin: Skin is warm and dry  He is not diaphoretic  Left groin surgical incision clean, dry, intact   Nursing note and vitals reviewed       Invasive Devices     Peripheral Intravenous Line            Peripheral IV 11/06/19 Right;Ventral (anterior) Forearm 2 days    Peripheral IV 11/07/19 Left Wrist 1 day              Lab, Imaging and other studies:   -sodium 141, potassium 3 9, chloride 111, CO2 21, BUN 16, creatinine 0 1, glucose 135, GFR 90

## 2019-11-10 LAB
ANION GAP SERPL CALCULATED.3IONS-SCNC: 6 MMOL/L (ref 4–13)
APTT PPP: 107 SECONDS (ref 23–37)
APTT PPP: 74 SECONDS (ref 23–37)
APTT PPP: 78 SECONDS (ref 23–37)
APTT PPP: 87 SECONDS (ref 23–37)
APTT PPP: 95 SECONDS (ref 23–37)
BASOPHILS # BLD AUTO: 0.03 THOUSANDS/ΜL (ref 0–0.1)
BASOPHILS NFR BLD AUTO: 0 % (ref 0–1)
BUN SERPL-MCNC: 16 MG/DL (ref 5–25)
CALCIUM SERPL-MCNC: 8.4 MG/DL (ref 8.3–10.1)
CHLORIDE SERPL-SCNC: 111 MMOL/L (ref 100–108)
CO2 SERPL-SCNC: 23 MMOL/L (ref 21–32)
CREAT SERPL-MCNC: 0.68 MG/DL (ref 0.6–1.3)
EOSINOPHIL # BLD AUTO: 0.07 THOUSAND/ΜL (ref 0–0.61)
EOSINOPHIL NFR BLD AUTO: 1 % (ref 0–6)
ERYTHROCYTE [DISTWIDTH] IN BLOOD BY AUTOMATED COUNT: 15.7 % (ref 11.6–15.1)
GFR SERPL CREATININE-BSD FRML MDRD: 97 ML/MIN/1.73SQ M
GLUCOSE SERPL-MCNC: 107 MG/DL (ref 65–140)
HCT VFR BLD AUTO: 30.7 % (ref 36.5–49.3)
HGB BLD-MCNC: 10.1 G/DL (ref 12–17)
IMM GRANULOCYTES # BLD AUTO: 0.07 THOUSAND/UL (ref 0–0.2)
IMM GRANULOCYTES NFR BLD AUTO: 1 % (ref 0–2)
INR PPP: 3.74 (ref 0.84–1.19)
LYMPHOCYTES # BLD AUTO: 0.45 THOUSANDS/ΜL (ref 0.6–4.47)
LYMPHOCYTES NFR BLD AUTO: 6 % (ref 14–44)
MCH RBC QN AUTO: 30.6 PG (ref 26.8–34.3)
MCHC RBC AUTO-ENTMCNC: 32.9 G/DL (ref 31.4–37.4)
MCV RBC AUTO: 93 FL (ref 82–98)
MONOCYTES # BLD AUTO: 0.62 THOUSAND/ΜL (ref 0.17–1.22)
MONOCYTES NFR BLD AUTO: 8 % (ref 4–12)
NEUTROPHILS # BLD AUTO: 6.13 THOUSANDS/ΜL (ref 1.85–7.62)
NEUTS SEG NFR BLD AUTO: 84 % (ref 43–75)
NRBC BLD AUTO-RTO: 0 /100 WBCS
PLATELET # BLD AUTO: 129 THOUSANDS/UL (ref 149–390)
PMV BLD AUTO: 11.6 FL (ref 8.9–12.7)
POTASSIUM SERPL-SCNC: 3.7 MMOL/L (ref 3.5–5.3)
PROTHROMBIN TIME: 36.5 SECONDS (ref 11.6–14.5)
RBC # BLD AUTO: 3.3 MILLION/UL (ref 3.88–5.62)
SODIUM SERPL-SCNC: 140 MMOL/L (ref 136–145)
WBC # BLD AUTO: 7.37 THOUSAND/UL (ref 4.31–10.16)

## 2019-11-10 PROCEDURE — 93922 UPR/L XTREMITY ART 2 LEVELS: CPT | Performed by: SURGERY

## 2019-11-10 PROCEDURE — 93925 LOWER EXTREMITY STUDY: CPT | Performed by: SURGERY

## 2019-11-10 PROCEDURE — 99024 POSTOP FOLLOW-UP VISIT: CPT | Performed by: STUDENT IN AN ORGANIZED HEALTH CARE EDUCATION/TRAINING PROGRAM

## 2019-11-10 PROCEDURE — 85610 PROTHROMBIN TIME: CPT | Performed by: INTERNAL MEDICINE

## 2019-11-10 PROCEDURE — 85730 THROMBOPLASTIN TIME PARTIAL: CPT | Performed by: INTERNAL MEDICINE

## 2019-11-10 PROCEDURE — 99233 SBSQ HOSP IP/OBS HIGH 50: CPT | Performed by: INTERNAL MEDICINE

## 2019-11-10 PROCEDURE — 85025 COMPLETE CBC W/AUTO DIFF WBC: CPT | Performed by: INTERNAL MEDICINE

## 2019-11-10 PROCEDURE — 80048 BASIC METABOLIC PNL TOTAL CA: CPT | Performed by: INTERNAL MEDICINE

## 2019-11-10 RX ADMIN — SODIUM CHLORIDE 75 ML/HR: 0.9 INJECTION, SOLUTION INTRAVENOUS at 13:49

## 2019-11-10 RX ADMIN — TAMSULOSIN HYDROCHLORIDE 0.4 MG: 0.4 CAPSULE ORAL at 15:55

## 2019-11-10 RX ADMIN — ASPIRIN 81 MG: 81 TABLET, COATED ORAL at 08:23

## 2019-11-10 RX ADMIN — AMLODIPINE BESYLATE 10 MG: 10 TABLET ORAL at 08:23

## 2019-11-10 RX ADMIN — CHLORHEXIDINE GLUCONATE 0.12% ORAL RINSE 15 ML: 1.2 LIQUID ORAL at 08:23

## 2019-11-10 RX ADMIN — THIAMINE HCL TAB 100 MG 100 MG: 100 TAB at 08:23

## 2019-11-10 RX ADMIN — Medication: at 20:56

## 2019-11-10 RX ADMIN — NICOTINE 1 PATCH: 14 PATCH TRANSDERMAL at 08:23

## 2019-11-10 RX ADMIN — Medication 1 APPLICATION: at 08:23

## 2019-11-10 RX ADMIN — SENNOSIDES AND DOCUSATE SODIUM 1 TABLET: 8.6; 5 TABLET ORAL at 08:23

## 2019-11-10 RX ADMIN — CHLORHEXIDINE GLUCONATE 0.12% ORAL RINSE 15 ML: 1.2 LIQUID ORAL at 20:55

## 2019-11-10 RX ADMIN — MELATONIN 6 MG: 3 TAB ORAL at 22:31

## 2019-11-10 RX ADMIN — ATORVASTATIN CALCIUM 40 MG: 40 TABLET, FILM COATED ORAL at 15:55

## 2019-11-10 RX ADMIN — POLYETHYLENE GLYCOL 3350 17 G: 17 POWDER, FOR SOLUTION ORAL at 08:23

## 2019-11-10 RX ADMIN — POLYETHYLENE GLYCOL 3350 17 G: 17 POWDER, FOR SOLUTION ORAL at 17:41

## 2019-11-10 RX ADMIN — OXYCODONE HYDROCHLORIDE 5 MG: 5 TABLET ORAL at 14:46

## 2019-11-10 RX ADMIN — GABAPENTIN 100 MG: 100 CAPSULE ORAL at 08:23

## 2019-11-10 RX ADMIN — SENNOSIDES AND DOCUSATE SODIUM 1 TABLET: 8.6; 5 TABLET ORAL at 17:41

## 2019-11-10 RX ADMIN — CLOPIDOGREL 75 MG: 75 TABLET, FILM COATED ORAL at 08:23

## 2019-11-10 NOTE — PROGRESS NOTES
Progress Note - Tommy Maurice 1950, 78 y o  male MRN: [id number]    Unit/Bed#: [facility] 430-01 Encounter: [id number]    Primary Care Provider: Beth Mortensen MD   Date and time admitted to hospital: 2019  2:01 AM        Renal stone  Assessment & Plan  · Incidental finding on CT scan  · 9 x 5 x 5 mm calculus within the posterior bladder, near the left ureterovesicular junction however there is no hydronephrosis to suggest obstruction  · Pain control  · Consider urology eval if symptomatic or if renal function worsens    Syncope and collapse  Assessment & Plan  · Had RRT while IP fpr syncope and was intially though to have CHB, was started on dopamine infusion and was transferred to ICU  · Cardiology evaluated the patient in icu, now off dopamine and OOU  · likely a vasovagal syncope in setting of severe pain and transient second degree heart block  · Echo showed EF of 65% with no WMA or no AS    PLAN:  · Avoid BB   · Adequate pain control  · Vascular dementia noted on CT head    Dependence on nicotine from cigarettes  Assessment & Plan  · Still smokes despite diagnosis of throat cancer, per daughter  · She thinks he smokes 1ppd  · Nicotine patch  · Provide smoking cessation education    Hypertension  Assessment & Plan  · Increase amlodipine dose to 5 mg daily  · Hydralazine ordered prn    * Peripheral vascular disease ([facility] [address] )  Assessment & Plan  · Pt presented with 2-month history of LLE pain with walking  Denies pain at rest, but pt is unable to walk more than a few feet without pain  · CTA: Severe peripheral vascular disease  Limited evaluation of the calf vessels due to extensive calcifications  Severe areas of narrowing and focal occlusion of the right superficial femoral artery and diffuse occlusion of the popliteal artery   Distal reconstitution of the right peroneal artery and scattered areas of the right posterior tibial artery   Occlusion of the left common femoral artery and left superficial femoral artery with distal reconstitution however severe narrowing of the mid to distal superficial femoral artery   Severe narrowing of the left popliteal artery  Patent left peroneal artery and scattered areas of the left posterior tibial artery  PLAN:  · Continue Heparin gtt, asa/statin  · Plan for left femoral endarterectomy with retrograde iliac intervention per vascular, tentatively planned for Friday  Appreciate vascular surgery input  · Cardiology on board for preop risk stratification, appreciate input  · Pain control per geriatrics pain management order set  · s/p left CFA endarterectomy, profundoplasty, embolectomy, arteriogram with retrograde iliac stenting   · Blood pressure under control  · Off of step down,  as his mental status improved  · Await an angiogram/intervention of left lower extremity to optimize left foot perfusion prior to discharge      VTE Pharmacologic Prophylaxis:   Pharmacologic: Heparin  Mechanical VTE Prophylaxis in Place: Yes    Patient Centered Rounds: I have performed bedside rounds with nursing staff today  Discussions with Specialists or Other Care Team Provider: vascular surgery     Education and Discussions with Family / Patient: patient     Time Spent for Care: 45 minutes  More than 50% of total time spent on counseling and coordination of care as described above  Current Length of Stay: 8 day(s)    Current Patient Status: Inpatient   Certification Statement: The patient will continue to require additional inpatient hospital stay due to vasular work up    Discharge Plan: pending work up    Code Status: Level 1 - Full Code      Subjective:   Patient has no questions       Objective:     Vitals:   Temp (24hrs), Av 5 °F (37 5 °C), Min:97 9 °F (36 6 °C), Max:100 8 °F (38 2 °C)    Temp:  [97 9 °F (36 6 °C)-100 8 °F (38 2 °C)] 98 8 °F (37 1 °C)  HR:  [84-97] 87  Resp:  [18] 18  BP: (130-140)/(60-65) 139/62  SpO2:  [94 %-99 %] 99 %  Body mass index is 18 47 kg/m²  Input and Output Summary (last 24 hours): Intake/Output Summary (Last 24 hours) at 11/10/2019 1244  Last data filed at 11/10/2019 1005  Gross per 24 hour   Intake 2594 8 ml   Output 3400 ml   Net -805 2 ml       Physical Exam:     Physical Exam   Constitutional: He is oriented to person, place, and time  He appears well-developed and well-nourished  HENT:   Head: Normocephalic and atraumatic  Right Ear: External ear normal    Left Ear: External ear normal    Nose: Nose normal    Mouth/Throat: Oropharynx is clear and moist  No oropharyngeal exudate  Eyes: Pupils are equal, round, and reactive to light  Conjunctivae and EOM are normal  Right eye exhibits no discharge  Left eye exhibits no discharge  No scleral icterus  Neck: Normal range of motion  Neck supple  No JVD present  No tracheal deviation present  No thyromegaly present  Cardiovascular: Normal rate, regular rhythm, normal heart sounds and intact distal pulses  Exam reveals no gallop and no friction rub  No murmur heard  Pulmonary/Chest: Effort normal and breath sounds normal  No stridor  No respiratory distress  He has no wheezes  He has no rales  Abdominal: Soft  Bowel sounds are normal  He exhibits no distension and no mass  There is no tenderness  There is no guarding  Musculoskeletal: Normal range of motion  He exhibits edema, tenderness and deformity  Pedal pulses are deminished   Lymphadenopathy:     He has no cervical adenopathy  Neurological: He is alert and oriented to person, place, and time  He displays normal reflexes  No cranial nerve deficit or sensory deficit  He exhibits normal muscle tone  Coordination normal    Skin: Skin is warm and dry  Psychiatric: He has a normal mood and affect  His behavior is normal  Judgment and thought content normal    Nursing note and vitals reviewed        Additional Data:     Labs:    Results from last 7 days   Lab Units 11/10/19  0514   WBC Thousand/uL 7 37 HEMOGLOBIN g/dL 10 1*   HEMATOCRIT % 30 7*   PLATELETS Thousands/uL 129*   NEUTROS PCT % 84*   LYMPHS PCT % 6*   MONOS PCT % 8   EOS PCT % 1     Results from last 7 days   Lab Units 11/10/19  0514  11/07/19  1921   POTASSIUM mmol/L 3 7   < >  --    CHLORIDE mmol/L 111*   < >  --    CO2 mmol/L 23   < >  --    CO2, I-STAT mmol/L  --   --  22   BUN mg/dL 16   < >  --    CREATININE mg/dL 0 68   < >  --    CALCIUM mg/dL 8 4   < >  --    GLUCOSE, ISTAT mg/dl  --   --  128    < > = values in this interval not displayed  Results from last 7 days   Lab Units 11/10/19  0514   INR  3 74*       * I Have Reviewed All Lab Data Listed Above  * Additional Pertinent Lab Tests Reviewed:  Frank 65 Admission Reviewed    Imaging:    Imaging Reports Reviewed Today Include:    Imaging Personally Reviewed by Myself Includes:      Recent Cultures (last 7 days):           Last 24 Hours Medication List:     Current Facility-Administered Medications:  acetaminophen 650 mg Oral Q6H PRN Devin Mccloud MD    amLODIPine 10 mg Oral Daily Taylor Pedraza MD    aspirin 81 mg Oral Daily Devin Mccloud MD    atorvastatin 40 mg Oral Daily With Don Farmer MD    bisacodyl 10 mg Rectal Daily PRN Devin Mccloud MD    chlorhexidine 15 mL Swish & Spit Q12H [address] , MD    clopidogrel 75 mg Oral Daily Devin Mccloud MD    gabapentin 100 mg Oral Daily Devin Mccloud MD    heparin (porcine) 3-20 Units/kg/hr (Order-Specific) Intravenous Titrated Devin Mccloud MD Last Rate: 8 Units/kg/hr (11/10/19 1005)   hydrALAZINE 10 mg Intravenous Q4H PRN Massiel Haskins PA-C    lidocaine (PF) 0 5 mL Infiltration Once PRN Devin Mccloud MD    melatonin 6 mg Oral HS Devin Mccloud MD    mupirocin  Nasal Q12H [address] , MD    nicotine 1 patch Transdermal Daily Devin Mccloud MD    OLANZapine 2 5 mg Intramuscular Q8H PRN Devin Mccloud MD    oxyCODONE 2 5 mg Oral Q4H PRN Devin Mccloud MD oxyCODONE 5 mg Oral Q4H PRN Jeanette Ferrell MD    polyethylene glycol 17 g Oral BID Jeanette Ferrell MD    senna-docusate sodium 1 tablet Oral BID Jeanette Ferrell MD    sodium chloride 75 mL/hr Intravenous Continuous Tony Palacio PA-C Last Rate: 75 mL/hr (11/09/19 7653)   tamsulosin 0 4 mg Oral Daily With Jenny Bowman MD    thiamine 100 mg Oral Daily Jeanette Ferrell MD    warfarin 5 mg Oral Daily (warfarin) Tony Palacio PA-C         Today, Patient Was Seen By: Nichole Farah MD    ** Please Note: Dictation voice to text software may have been used in the creation of this document   **

## 2019-11-10 NOTE — PROGRESS NOTES
Progress Note - Vascular Surgery  Mateusz Seen 78 y o  male MRN: [id number]  Unit/Bed#: [facility] 430-01 Encounter: [id number]    Assessment:  79 y/o male with PAD/LLE claudication/rest pain now s/p left CFA endarterectomy, profundoplasty, embolectomy, arteriogram with retrograde iliac stenting 11/7/19     Plan:  - f/u final readings on CTA chest and LEADs  - continue heparin gtt, asa/statin   - pain control as per geriatrics pain management order set    - continue PT evaluation and treat     Subjective/Objective   Chief Complaint: No chief complaint on file  Subjective: 78 y o  y/o male was seen and evaluated at bedside  Patient states he does not have pain today  Denies acute events overnight  Blood pressure 130/62, pulse 85, temperature 97 9 °F (36 6 °C), temperature source Oral, resp  rate 18, height 5' 5" (1 651 m), weight 50 3 kg (111 lb), SpO2 99 %  ,Body mass index is 18 47 kg/m²  Invasive Devices     Peripheral Intravenous Line            Peripheral IV 11/10/19 Right Forearm less than 1 day                Physical Exam:   General: Alert, cooperative and no distress  Lungs: Non labored breathing  Heart: Positive S1, S2  Abdomen: Soft, non-tender  Extremity: Left foot is warm to touch  Left Dopplerable signal PT, DP and femoral  Able to move left foot and digits             Lab, Imaging and other studies:   CBC:   Lab Results   Component Value Date    WBC 7 37 11/10/2019    HGB 10 1 (L) 11/10/2019    HCT 30 7 (L) 11/10/2019    MCV 93 11/10/2019     (L) 11/10/2019    MCH 30 6 11/10/2019    MCHC 32 9 11/10/2019    RDW 15 7 (H) 11/10/2019    MPV 11 6 11/10/2019    NRBC 0 11/10/2019   , CMP:   Lab Results   Component Value Date    SODIUM 140 11/10/2019    K 3 7 11/10/2019     (H) 11/10/2019    CO2 23 11/10/2019    BUN 16 11/10/2019    CREATININE 0 68 11/10/2019    CALCIUM 8 4 11/10/2019    EGFR 97 11/10/2019       Imaging: I have personally reviewed pertinent films in PACS  EKG, Pathology, and Other Studies: I have personally reviewed pertinent reports    VTE Pharmacologic Prophylaxis: Heparin

## 2019-11-10 NOTE — ASSESSMENT & PLAN NOTE
· Had RRT while IP fpr syncope and was intially though to have CHB, was started on dopamine infusion and was transferred to ICU  · Cardiology evaluated the patient in icu, now off dopamine and OOU    · likely a vasovagal syncope in setting of severe pain and transient second degree heart block  · Echo showed EF of 65% with no WMA or no AS    PLAN:  · Avoid BB   · Adequate pain control  · Vascular dementia noted on CT head

## 2019-11-11 LAB
ANION GAP SERPL CALCULATED.3IONS-SCNC: 7 MMOL/L (ref 4–13)
APTT PPP: 90 SECONDS (ref 23–37)
BASOPHILS # BLD AUTO: 0.03 THOUSANDS/ΜL (ref 0–0.1)
BASOPHILS NFR BLD AUTO: 0 % (ref 0–1)
BUN SERPL-MCNC: 19 MG/DL (ref 5–25)
CALCIUM SERPL-MCNC: 8.5 MG/DL (ref 8.3–10.1)
CHLORIDE SERPL-SCNC: 111 MMOL/L (ref 100–108)
CO2 SERPL-SCNC: 22 MMOL/L (ref 21–32)
CREAT SERPL-MCNC: 0.68 MG/DL (ref 0.6–1.3)
EOSINOPHIL # BLD AUTO: 0.16 THOUSAND/ΜL (ref 0–0.61)
EOSINOPHIL NFR BLD AUTO: 2 % (ref 0–6)
ERYTHROCYTE [DISTWIDTH] IN BLOOD BY AUTOMATED COUNT: 15.4 % (ref 11.6–15.1)
GFR SERPL CREATININE-BSD FRML MDRD: 97 ML/MIN/1.73SQ M
GLUCOSE SERPL-MCNC: 108 MG/DL (ref 65–140)
HCT VFR BLD AUTO: 31.4 % (ref 36.5–49.3)
HGB BLD-MCNC: 10.2 G/DL (ref 12–17)
IMM GRANULOCYTES # BLD AUTO: 0.03 THOUSAND/UL (ref 0–0.2)
IMM GRANULOCYTES NFR BLD AUTO: 0 % (ref 0–2)
INR PPP: 4.83 (ref 0.84–1.19)
LYMPHOCYTES # BLD AUTO: 0.43 THOUSANDS/ΜL (ref 0.6–4.47)
LYMPHOCYTES NFR BLD AUTO: 6 % (ref 14–44)
MCH RBC QN AUTO: 31 PG (ref 26.8–34.3)
MCHC RBC AUTO-ENTMCNC: 32.5 G/DL (ref 31.4–37.4)
MCV RBC AUTO: 95 FL (ref 82–98)
MONOCYTES # BLD AUTO: 0.55 THOUSAND/ΜL (ref 0.17–1.22)
MONOCYTES NFR BLD AUTO: 8 % (ref 4–12)
NEUTROPHILS # BLD AUTO: 5.64 THOUSANDS/ΜL (ref 1.85–7.62)
NEUTS SEG NFR BLD AUTO: 84 % (ref 43–75)
NRBC BLD AUTO-RTO: 0 /100 WBCS
PLATELET # BLD AUTO: 151 THOUSANDS/UL (ref 149–390)
PMV BLD AUTO: 11.8 FL (ref 8.9–12.7)
POTASSIUM SERPL-SCNC: 4 MMOL/L (ref 3.5–5.3)
PROTHROMBIN TIME: 44.7 SECONDS (ref 11.6–14.5)
RBC # BLD AUTO: 3.29 MILLION/UL (ref 3.88–5.62)
SODIUM SERPL-SCNC: 140 MMOL/L (ref 136–145)
WBC # BLD AUTO: 6.84 THOUSAND/UL (ref 4.31–10.16)

## 2019-11-11 PROCEDURE — 99232 SBSQ HOSP IP/OBS MODERATE 35: CPT | Performed by: INTERNAL MEDICINE

## 2019-11-11 PROCEDURE — 80048 BASIC METABOLIC PNL TOTAL CA: CPT | Performed by: INTERNAL MEDICINE

## 2019-11-11 PROCEDURE — 99024 POSTOP FOLLOW-UP VISIT: CPT | Performed by: STUDENT IN AN ORGANIZED HEALTH CARE EDUCATION/TRAINING PROGRAM

## 2019-11-11 PROCEDURE — 85610 PROTHROMBIN TIME: CPT | Performed by: INTERNAL MEDICINE

## 2019-11-11 PROCEDURE — 85025 COMPLETE CBC W/AUTO DIFF WBC: CPT | Performed by: INTERNAL MEDICINE

## 2019-11-11 PROCEDURE — 99223 1ST HOSP IP/OBS HIGH 75: CPT | Performed by: INTERNAL MEDICINE

## 2019-11-11 PROCEDURE — 85730 THROMBOPLASTIN TIME PARTIAL: CPT | Performed by: INTERNAL MEDICINE

## 2019-11-11 RX ADMIN — CHLORHEXIDINE GLUCONATE 0.12% ORAL RINSE 15 ML: 1.2 LIQUID ORAL at 08:14

## 2019-11-11 RX ADMIN — POLYETHYLENE GLYCOL 3350 17 G: 17 POWDER, FOR SOLUTION ORAL at 17:45

## 2019-11-11 RX ADMIN — ATORVASTATIN CALCIUM 40 MG: 40 TABLET, FILM COATED ORAL at 17:46

## 2019-11-11 RX ADMIN — SODIUM CHLORIDE 75 ML/HR: 0.9 INJECTION, SOLUTION INTRAVENOUS at 03:07

## 2019-11-11 RX ADMIN — OXYCODONE HYDROCHLORIDE 5 MG: 5 TABLET ORAL at 18:19

## 2019-11-11 RX ADMIN — GABAPENTIN 100 MG: 100 CAPSULE ORAL at 08:11

## 2019-11-11 RX ADMIN — SENNOSIDES AND DOCUSATE SODIUM 1 TABLET: 8.6; 5 TABLET ORAL at 08:11

## 2019-11-11 RX ADMIN — MELATONIN 6 MG: 3 TAB ORAL at 21:16

## 2019-11-11 RX ADMIN — TAMSULOSIN HYDROCHLORIDE 0.4 MG: 0.4 CAPSULE ORAL at 17:46

## 2019-11-11 RX ADMIN — SENNOSIDES AND DOCUSATE SODIUM 1 TABLET: 8.6; 5 TABLET ORAL at 17:46

## 2019-11-11 RX ADMIN — NICOTINE 1 PATCH: 14 PATCH TRANSDERMAL at 08:19

## 2019-11-11 RX ADMIN — AMLODIPINE BESYLATE 10 MG: 10 TABLET ORAL at 08:11

## 2019-11-11 RX ADMIN — POLYETHYLENE GLYCOL 3350 17 G: 17 POWDER, FOR SOLUTION ORAL at 08:10

## 2019-11-11 RX ADMIN — OXYCODONE HYDROCHLORIDE 5 MG: 5 TABLET ORAL at 08:11

## 2019-11-11 RX ADMIN — THIAMINE HCL TAB 100 MG 100 MG: 100 TAB at 08:11

## 2019-11-11 RX ADMIN — ASPIRIN 81 MG: 81 TABLET, COATED ORAL at 08:11

## 2019-11-11 RX ADMIN — CHLORHEXIDINE GLUCONATE 0.12% ORAL RINSE 15 ML: 1.2 LIQUID ORAL at 21:16

## 2019-11-11 RX ADMIN — Medication 1 APPLICATION: at 08:18

## 2019-11-11 RX ADMIN — HEPARIN SODIUM 8 UNITS/KG/HR: 10000 INJECTION, SOLUTION INTRAVENOUS at 03:06

## 2019-11-11 RX ADMIN — CLOPIDOGREL 75 MG: 75 TABLET, FILM COATED ORAL at 08:11

## 2019-11-11 NOTE — PROGRESS NOTES
Progress Note - Geriatric Medicine   Stella Pain 78 y o  male MRN: [id number]  Unit/Bed#: [facility] 430-01 Encounter: [id number]      Assessment/Plan:    Acute metabolic encephalopathy  -significantly improved, mostly back to baseline which fluctuates between oriented and forgetful  -continue to treat pain and maintain normal sleep/wake cycle  -avoid deliriogenic agents  -continue to monitor and treat other/additional underlying metabolic derangements   -will need close o/p f/u with PCP and Geriatrics     Probable vascular dementia  -continue supportive care  -will need increased level of care at d/c, family wishes to care for him at home and do not wish for placement at this time  -continue reorientation and redirection, encourage patient to remain cognitively, physically and mentally engaged  -[facility]    Peripheral artery disease  -vascular surgery following  -s/p CFA endarectomy, embolectomy, and arteriogram with retrograde iliac stenting  -currently on ASA/Plavix, no longer on heparin gtt  -INR 4 83 today, heme-onc following, coumadin on hold 2/2 supra therapeutic INR    Supratherapeutic INR  -INR 4 83, coumadin on hold, continue per primary team  -consider coumadin teaching for patient and family, monitor nutritional supplements including boost/ensure which may contribute to fluctuations in INR  -monitor for signs bleeding    Ambulatory Dysfunction  -multifactorial including deconditioning and debility  -continue ambulation with assistance  -continue fall precautions  -continue PT/OT    Deconditioning/debility/frailty  -multifactorial including underlying dementia, PVD, and poor overall nutritional status   -continue to encourage smoking cessation  -continue to encourage nutritional support     Subjective:   Patient seen and examined at the bedside where he is sitting resting comfortably   His PCA is at his side and reports that he is doing well today, ate most of his breakfast and has been moving his bowels and bladder  He complains only of the bruising on his arms  He offers no additional complaints  Review of Systems   Constitutional: Negative for appetite change, chills and fever  HENT: Negative for congestion and trouble swallowing  Eyes: Negative for visual disturbance  Respiratory: Negative for shortness of breath and wheezing  Cardiovascular: Negative for chest pain  Gastrointestinal: Negative for diarrhea, nausea and vomiting  Genitourinary: Negative for difficulty urinating and dysuria  Incontinence     Musculoskeletal: Negative for arthralgias  Skin:        Bruising bilateral upper extremities     Neurological: Negative for dizziness, weakness and light-headedness  Hematological: Bruises/bleeds easily  Psychiatric/Behavioral: Positive for decreased concentration  Negative for suicidal ideas  All other systems reviewed and are negative  Objective:     Vitals: Blood pressure 151/67, pulse 75, temperature 98 3 °F (36 8 °C), temperature source Oral, resp  rate 18, height 5' 5" (1 651 m), weight 50 3 kg (111 lb), SpO2 98 %  ,Body mass index is 18 47 kg/m²  Intake/Output Summary (Last 24 hours) at 11/11/2019 1319  Last data filed at 11/11/2019 1100  Gross per 24 hour   Intake 2211 25 ml   Output 2330 ml   Net -118 75 ml     Current Medications: Reviewed    Physical Exam:   Physical Exam   Constitutional: He appears well-developed  No distress  Appears stated age, NAD    HENT:   Head: Normocephalic and atraumatic  Eyes: Pupils are equal, round, and reactive to light  Conjunctivae and EOM are normal    Cardiovascular: Normal rate  Pulmonary/Chest: Effort normal  He has no wheezes  Abdominal: Soft  He exhibits no distension  There is no tenderness  Musculoskeletal: He exhibits no tenderness  Reduced overall muscle mass and tone     Neurological: He is alert  Alert and oriented to person and    Skin: Skin is warm and dry   He is not diaphoretic  Psychiatric:   Normal mood and affect   Nursing note and vitals reviewed       Invasive Devices     Peripheral Intravenous Line            Peripheral IV 11/10/19 Right Forearm 1 day              Lab, Imaging and other studies:   -sodium 140, potassium 4 0, chloride 111, BUN 19, creatinine 0 68, GFR 97  -hemoglobin 10 2, hematocrit 31 4, WBC 6 4, platelets 553  -no new imaging overnight

## 2019-11-11 NOTE — PROGRESS NOTES
Progress Note - Lalito Daniels 1950, 78 y o  male MRN: [id number]    Unit/Bed#: [facility] 430-01 Encounter: [id number]    Primary Care Provider: Martina Winchester MD   Date and time admitted to hospital: 11/2/2019  2:01 AM        Renal stone  Assessment & Plan  · Incidental finding on CT scan  · 9 x 5 x 5 mm calculus within the posterior bladder, near the left ureterovesicular junction however there is no hydronephrosis to suggest obstruction  · Pain control  · Consider urology eval if symptomatic or if renal function worsens    Syncope and collapse  Assessment & Plan  · Had RRT while IP fpr syncope and was intially though to have CHB, was started on dopamine infusion and was transferred to ICU  · Cardiology evaluated the patient in icu, now off dopamine and OOU  · likely a vasovagal syncope in setting of severe pain and transient second degree heart block  · Echo showed EF of 65% with no WMA or no AS    PLAN:  · Avoid BB   · Adequate pain control  · Vascular dementia noted on CT head    Dependence on nicotine from cigarettes  Assessment & Plan  · Still smokes despite diagnosis of throat cancer, per daughter  · She thinks he smokes 1ppd  · Nicotine patch  · Provide smoking cessation education    Hypertension  Assessment & Plan  · Increase amlodipine dose to 5 mg daily  · Hydralazine ordered prn    History of throat cancer  Assessment & Plan  · Per daughter, pt diagnosed with throat CA 2 years ago and has been treated with chemo and radiation  · Not undergoing treatment at this time    * Peripheral vascular disease ([facility] [address] )  Assessment & Plan  · Pt presented with 2-month history of LLE pain with walking  Denies pain at rest, but pt is unable to walk more than a few feet without pain  · CTA: Severe peripheral vascular disease  Limited evaluation of the calf vessels due to extensive calcifications   Severe areas of narrowing and focal occlusion of the right superficial femoral artery and diffuse occlusion of the popliteal artery   Distal reconstitution of the right peroneal artery and scattered areas of the right posterior tibial artery  Occlusion of the left common femoral artery and left superficial femoral artery with distal reconstitution however severe narrowing of the mid to distal superficial femoral artery   Severe narrowing of the left popliteal artery  Patent left peroneal artery and scattered areas of the left posterior tibial artery  PLAN:  · Continue Heparin gtt, asa/statin  · Plan for left femoral endarterectomy with retrograde iliac intervention per vascular, tentatively planned for Friday  Appreciate vascular surgery input  · Cardiology on board for preop risk stratification, appreciate input  · Pain control per geriatrics pain management order set  · s/p left CFA endarterectomy, profundoplasty, embolectomy, arteriogram with retrograde iliac stenting 11/7  · Blood pressure under control  · Off of step down,  as his mental status improved  · Await an angiogram/intervention of left lower extremity to optimize left foot perfusion prior to discharge    Constipationresolved as of 11/9/2019  Assessment & Plan  · No abdominal tenderness on exam  · Aggressive bowel regimen while on narcotics  · Obtain abdomen xray        VTE Pharmacologic Prophylaxis:   Pharmacologic: Pharmacologic VTE Prophylaxis contraindicated due to on hold as INR is >4  Mechanical VTE Prophylaxis in Place: Yes    Patient Centered Rounds: I have performed bedside rounds with nursing staff today  Discussions with Specialists or Other Care Team Provider: Vascular surgery    Education and Discussions with Family / Patient: patient     Time Spent for Care: 45 minutes  More than 50% of total time spent on counseling and coordination of care as described above      Current Length of Stay: 9 day(s)    Current Patient Status: Inpatient   Certification Statement: The patient will continue to require additional inpatient hospital stay due to need a gram    Discharge Plan: pending A gram, anticoagulation with coumadin    Code Status: Level 1 - Full Code      Subjective:   Patient is a sleep  Still having a lot of pain in his leg  He says still numb to the leg  Objective:     Vitals:   Temp (24hrs), Av 6 °F (37 °C), Min:98 3 °F (36 8 °C), Max:99 1 °F (37 3 °C)    Temp:  [98 3 °F (36 8 °C)-99 1 °F (37 3 °C)] 98 7 °F (37 1 °C)  HR:  [75-89] 79  Resp:  [16-19] 18  BP: (119-151)/(58-67) 132/60  SpO2:  [91 %-98 %] 94 %  Body mass index is 18 47 kg/m²  Input and Output Summary (last 24 hours): Intake/Output Summary (Last 24 hours) at 2019 1624  Last data filed at 2019 1510  Gross per 24 hour   Intake 2411 25 ml   Output 2155 ml   Net 256 25 ml       Physical Exam:     Physical Exam   Constitutional: He is oriented to person, place, and time  He appears well-developed and well-nourished  HENT:   Head: Normocephalic and atraumatic  Right Ear: External ear normal    Left Ear: External ear normal    Nose: Nose normal    Mouth/Throat: Oropharynx is clear and moist  No oropharyngeal exudate  Eyes: Pupils are equal, round, and reactive to light  Conjunctivae and EOM are normal  Right eye exhibits no discharge  Left eye exhibits no discharge  No scleral icterus  Neck: Normal range of motion  Neck supple  No JVD present  No tracheal deviation present  No thyromegaly present  Cardiovascular: Normal rate, regular rhythm, normal heart sounds and intact distal pulses  Exam reveals no gallop and no friction rub  No murmur heard  Pulmonary/Chest: Effort normal and breath sounds normal  No stridor  No respiratory distress  He has no wheezes  He has no rales  He exhibits no tenderness  Abdominal: Soft  Bowel sounds are normal  He exhibits no distension and no mass  There is no tenderness  There is no rebound and no guarding  No hernia  Musculoskeletal: Normal range of motion   He exhibits no edema, tenderness or deformity  Lymphadenopathy:     He has no cervical adenopathy  Neurological: He is alert and oriented to person, place, and time  He displays normal reflexes  No cranial nerve deficit or sensory deficit  He exhibits normal muscle tone  Coordination normal    Skin: Skin is warm and dry  Psychiatric: He has a normal mood and affect  His behavior is normal  Judgment and thought content normal    Nursing note and vitals reviewed  Additional Data:     Labs:    Results from last 7 days   Lab Units 11/11/19  0509   WBC Thousand/uL 6 84   HEMOGLOBIN g/dL 10 2*   HEMATOCRIT % 31 4*   PLATELETS Thousands/uL 151   NEUTROS PCT % 84*   LYMPHS PCT % 6*   MONOS PCT % 8   EOS PCT % 2     Results from last 7 days   Lab Units 11/11/19  0509  11/07/19  1921   POTASSIUM mmol/L 4 0   < >  --    CHLORIDE mmol/L 111*   < >  --    CO2 mmol/L 22   < >  --    CO2, I-STAT mmol/L  --   --  22   BUN mg/dL 19   < >  --    CREATININE mg/dL 0 68   < >  --    CALCIUM mg/dL 8 5   < >  --    GLUCOSE, ISTAT mg/dl  --   --  128    < > = values in this interval not displayed  Results from last 7 days   Lab Units 11/11/19  0509   INR  4 83*       * I Have Reviewed All Lab Data Listed Above  * Additional Pertinent Lab Tests Reviewed:  Frank 65 Admission Reviewed    Imaging:    Imaging Reports Reviewed Today Include:    Imaging Personally Reviewed by Myself Includes:      Recent Cultures (last 7 days):           Last 24 Hours Medication List:     Current Facility-Administered Medications:  acetaminophen 650 mg Oral Q6H PRN Zafar Contreras MD   amLODIPine 10 mg Oral Daily Lupe Opitz, MD   aspirin 81 mg Oral Daily Zafar Contreras MD   atorvastatin 40 mg Oral Daily With Kristin Brewster MD   bisacodyl 10 mg Rectal Daily PRN Zafar Contreras MD   chlorhexidine 15 mL Swish & Spit Q12H Isabel Eid MD   clopidogrel 75 mg Oral Daily Zafar Contreras MD   gabapentin 100 mg Oral Daily aZfar Contreras MD hydrALAZINE 10 mg Intravenous Q4H PRN Massiel Haskins PA-C   lidocaine (PF) 0 5 mL Infiltration Once PRN Cira Chavez MD   melatonin 6 mg Oral HS Cira Chavez MD   nicotine 1 patch Transdermal Daily Cira Chavez MD   OLANZapine 2 5 mg Intramuscular Q8H PRN Cira Chavez MD   oxyCODONE 2 5 mg Oral Q4H PRN Cira Chavez MD   oxyCODONE 5 mg Oral Q4H PRN Cira Chavez MD   polyethylene glycol 17 g Oral BID Cira Chavez MD   senna-docusate sodium 1 tablet Oral BID Cira Chavez MD   tamsulosin 0 4 mg Oral Daily With El Coates MD   thiamine 100 mg Oral Daily Cira Chavez MD        Today, Patient Was Seen By: Faizan Kulkarni MD    ** Please Note: Dictation voice to text software may have been used in the creation of this document   **

## 2019-11-11 NOTE — CONSULTS
Oncology Consult Note  Lee Velazquez 78 y o  male MRN: [id number]  Unit/Bed#: [facility] 430-01 Encounter: [id number]      Presenting Complaint:  Jump in INR while on Coumadin and heparin    History of Presenting Illness: The patient is a pleasant 69-year-old male with possible memory issues/dementia who apparently presented with a two-month history of left lower extremity pain with walking  He had workup which revealed severe peripheral vascular disease with occlusion of the left common femoral artery and left superficial femoral artery with distal reconstitution however severe narrowing of the mid to distal superficial femoral artery was seen  There is also severe narrowing of the left popliteal artery  The patient ended up having left common femoral artery endarterectomy with her fundoplasty, embolectomy , arteriogram and retrograde iliac stenting on 11/07/2019  He was on heparin and was being transitioned to Coumadin apparently   He was given a dose of 5 mg of Coumadin on the 8th of November and another 5 mg on the 9th of November per the medical record  He was also on heparin at that time  On the 10th of November his INR jumped to 3 7 probably as a response to the 5 mg of Coumadin he had been given  This morning the INR went up to 4 83  Coumadin and heparin are on hold currently  His INR on the 1st of November was actually 1 15  It has been slightly higher while he has been on heparin  The patient does not speak [place]  A discussion through a  revealed he has no complaints currently  He does have memory issues and actually is on a 1-1 right now  Denies any nausea denies any vomiting denies any diarrhea  The rest of his 14 point review of systems today was negative  Again he does appear to have some dementia and 1 reason why his INR could have jumped quickly on the 5 mg of Coumadin could be underlying vitamin K deficiency because of inadequate diet         Review of Systems - As stated in the HPI otherwise the fourteen point review of systems was negative  Past Medical History:   Diagnosis Date    Cancer [facility])     throat cancer    PAD (peripheral artery disease) ([facility] ) 11/2/2019    PEG (percutaneous endoscopic gastrostomy) status ([facility] )     Port-A-Cath in place        Social History     Socioeconomic History    Marital status: Single     Spouse name: None    Number of children: None    Years of education: None    Highest education level: None   Occupational History    None   Social Needs    Financial resource strain: None    Food insecurity:     Worry: None     Inability: None    Transportation needs:     Medical: None     Non-medical: None   Tobacco Use    Smoking status: Current Every Day Smoker     Packs/day: 1 00     Years: 15 00     Pack years: 15 00     Types: Cigarettes    Smokeless tobacco: Never Used    Tobacco comment: 50+ years   Substance and Sexual Activity    Alcohol use: Never     Alcohol/week: 0 0 standard drinks     Frequency: Patient refused     Drinks per session: Patient refused     Binge frequency: Patient refused    Drug use: No    Sexual activity: Not Currently     Birth control/protection: None   Lifestyle    Physical activity:     Days per week: None     Minutes per session: None    Stress: None   Relationships    Social connections:     Talks on phone: None     Gets together: None     Attends [employer] service: None     Active member of club or organization: None     Attends meetings of clubs or organizations: None     Relationship status: None    Intimate partner violence:     Fear of current or ex partner: None     Emotionally abused: None     Physically abused: None     Forced sexual activity: None   Other Topics Concern    None   Social History Narrative    None       History reviewed  No pertinent family history      No Known Allergies      Current Facility-Administered Medications:     acetaminophen (TYLENOL) tablet 650 mg, 650 mg, Oral, Q6H PRN, Bony Bernal MD, 650 mg at 11/07/19 1011    amLODIPine (NORVASC) tablet 10 mg, 10 mg, Oral, Daily, Estefani Morgan MD, 10 mg at 11/11/19 0811    aspirin (ECOTRIN LOW STRENGTH) EC tablet 81 mg, 81 mg, Oral, Daily, Bony Bernal MD, 81 mg at 11/11/19 4525    atorvastatin (LIPITOR) tablet 40 mg, 40 mg, Oral, Daily With Carol Ferraro MD, 40 mg at 11/10/19 1555    bisacodyl (DULCOLAX) rectal suppository 10 mg, 10 mg, Rectal, Daily PRN, Bony Bernal MD    chlorhexidine (PERIDEX) 0 12 % oral rinse 15 mL, 15 mL, Swish & Spit, Q12H [address], Bony Bernal MD, 15 mL at 11/11/19 0814    clopidogrel (PLAVIX) tablet 75 mg, 75 mg, Oral, Daily, Bony Bernal MD, 75 mg at 11/11/19 0811    gabapentin (NEURONTIN) capsule 100 mg, 100 mg, Oral, Daily, Bony Bernal MD, 100 mg at 11/11/19 0811    hydrALAZINE (APRESOLINE) injection 10 mg, 10 mg, Intravenous, Q4H PRN, Massiel Haskins PA-C, 10 mg at 11/08/19 1626    lidocaine (PF) (XYLOCAINE-MPF) 1 % injection 0 5 mL, 0 5 mL, Infiltration, Once PRN, Bony Bernal MD    melatonin tablet 6 mg, 6 mg, Oral, HS, Bony Bernal MD, 6 mg at 11/10/19 2231    nicotine (NICODERM CQ) 14 mg/24hr TD 24 hr patch 1 patch, 1 patch, Transdermal, Daily, Bony Bernal MD, 1 patch at 11/11/19 0819    OLANZapine (ZyPREXA) IM injection 2 5 mg, 2 5 mg, Intramuscular, Q8H PRN, Bony Bernal MD, 2 5 mg at 11/04/19 2320    oxyCODONE (ROXICODONE) IR tablet 2 5 mg, 2 5 mg, Oral, Q4H PRN, Bony Bernal MD, 2 5 mg at 11/09/19 0502    oxyCODONE (ROXICODONE) IR tablet 5 mg, 5 mg, Oral, Q4H PRN, Bony Bernal MD, 5 mg at 11/11/19 0811    polyethylene glycol (MIRALAX) packet 17 g, 17 g, Oral, BID, oBny Bernla MD, 17 g at 11/11/19 0810    senna-docusate sodium (SENOKOT S) 8 6-50 mg per tablet 1 tablet, 1 tablet, Oral, BID, Bony Bernal MD, 1 tablet at 11/11/19 0811    tamsulosin (FLOMAX) capsule 0 4 mg, 0 4 mg, Oral, Daily With Jarrod White Polo Calles MD, 0 4 mg at 11/10/19 1555    thiamine (VITAMIN B1) tablet 100 mg, 100 mg, Oral, Daily, Olivia Negron MD, 100 mg at 11/11/19 0811      /65 (BP Location: Right arm)   Pulse 75   Temp 98 3 °F (36 8 °C) (Oral)   Resp 18   Ht 5' 5" (1 651 m)   Wt 50 3 kg (111 lb)   SpO2 98%   BMI 18 47 kg/m²     General Appearance:    Alert, oriented        Eyes:    PERRL   Ears:    Normal external ear canals, both ears   Nose:   Nares normal, septum midline   Throat:   Mucosa moist  Pharynx without injection  Neck:   Supple       Lungs:     Clear to auscultation bilaterally   Chest Wall:    No tenderness or deformity    Heart:    Regular rate and rhythm       Abdomen:     Soft, non-tender, bowel sounds +, no organomegaly           Extremities:   Extremities no cyanosis or edema       Skin:   no rash or icterus      Lymph nodes:   Cervical, supraclavicular, and axillary nodes normal   Neurologic:   CNII-XII intact, normal strength, sensation and reflexes     Throughout               Recent Results (from the past 48 hour(s))   APTT    Collection Time: 11/09/19 12:34 PM   Result Value Ref Range    PTT 90 (H) 23 - 37 seconds   APTT    Collection Time: 11/09/19  6:08 PM   Result Value Ref Range     (H) 23 - 37 seconds   APTT    Collection Time: 11/10/19  1:27 AM   Result Value Ref Range     (H) 23 - 37 seconds   CBC and differential    Collection Time: 11/10/19  5:14 AM   Result Value Ref Range    WBC 7 37 4 31 - 10 16 Thousand/uL    RBC 3 30 (L) 3 88 - 5 62 Million/uL    Hemoglobin 10 1 (L) 12 0 - 17 0 g/dL    Hematocrit 30 7 (L) 36 5 - 49 3 %    MCV 93 82 - 98 fL    MCH 30 6 26 8 - 34 3 pg    MCHC 32 9 31 4 - 37 4 g/dL    RDW 15 7 (H) 11 6 - 15 1 %    MPV 11 6 8 9 - 12 7 fL    Platelets 311 (L) 579 - 390 Thousands/uL    nRBC 0 /100 WBCs    Neutrophils Relative 84 (H) 43 - 75 %    Immat GRANS % 1 0 - 2 %    Lymphocytes Relative 6 (L) 14 - 44 %    Monocytes Relative 8 4 - 12 %    Eosinophils Relative 1 0 - 6 %    Basophils Relative 0 0 - 1 %    Neutrophils Absolute 6 13 1 85 - 7 62 Thousands/µL    Immature Grans Absolute 0 07 0 00 - 0 20 Thousand/uL    Lymphocytes Absolute 0 45 (L) 0 60 - 4 47 Thousands/µL    Monocytes Absolute 0 62 0 17 - 1 22 Thousand/µL    Eosinophils Absolute 0 07 0 00 - 0 61 Thousand/µL    Basophils Absolute 0 03 0 00 - 0 10 Thousands/µL   Basic metabolic panel    Collection Time: 11/10/19  5:14 AM   Result Value Ref Range    Sodium 140 136 - 145 mmol/L    Potassium 3 7 3 5 - 5 3 mmol/L    Chloride 111 (H) 100 - 108 mmol/L    CO2 23 21 - 32 mmol/L    ANION GAP 6 4 - 13 mmol/L    BUN 16 5 - 25 mg/dL    Creatinine 0 68 0 60 - 1 30 mg/dL    Glucose 107 65 - 140 mg/dL    Calcium 8 4 8 3 - 10 1 mg/dL    eGFR 97 ml/min/1 73sq m   Protime-INR    Collection Time: 11/10/19  5:14 AM   Result Value Ref Range    Protime 36 5 (H) 11 6 - 14 5 seconds    INR 3 74 (H) 0 84 - 1 19   APTT    Collection Time: 11/10/19  5:14 AM   Result Value Ref Range    PTT 78 (H) 23 - 37 seconds   APTT    Collection Time: 11/10/19  8:37 AM   Result Value Ref Range    PTT 95 (H) 23 - 37 seconds   APTT    Collection Time: 11/10/19  4:12 PM   Result Value Ref Range    PTT 87 (H) 23 - 37 seconds   APTT    Collection Time: 11/10/19 10:22 PM   Result Value Ref Range    PTT 74 (H) 23 - 37 seconds   Protime-INR    Collection Time: 11/11/19  5:09 AM   Result Value Ref Range    Protime 44 7 (H) 11 6 - 14 5 seconds    INR 4 83 (H) 0 84 - 1 19   CBC and differential    Collection Time: 11/11/19  5:09 AM   Result Value Ref Range    WBC 6 84 4 31 - 10 16 Thousand/uL    RBC 3 29 (L) 3 88 - 5 62 Million/uL    Hemoglobin 10 2 (L) 12 0 - 17 0 g/dL    Hematocrit 31 4 (L) 36 5 - 49 3 %    MCV 95 82 - 98 fL    MCH 31 0 26 8 - 34 3 pg    MCHC 32 5 31 4 - 37 4 g/dL    RDW 15 4 (H) 11 6 - 15 1 %    MPV 11 8 8 9 - 12 7 fL    Platelets 693 468 - 266 Thousands/uL    nRBC 0 /100 WBCs    Neutrophils Relative 84 (H) 43 - 75 %    Immat GRANS % 0 0 - 2 %    Lymphocytes Relative 6 (L) 14 - 44 %    Monocytes Relative 8 4 - 12 %    Eosinophils Relative 2 0 - 6 %    Basophils Relative 0 0 - 1 %    Neutrophils Absolute 5 64 1 85 - 7 62 Thousands/µL    Immature Grans Absolute 0 03 0 00 - 0 20 Thousand/uL    Lymphocytes Absolute 0 43 (L) 0 60 - 4 47 Thousands/µL    Monocytes Absolute 0 55 0 17 - 1 22 Thousand/µL    Eosinophils Absolute 0 16 0 00 - 0 61 Thousand/µL    Basophils Absolute 0 03 0 00 - 0 10 Thousands/µL   Basic metabolic panel    Collection Time: 11/11/19  5:09 AM   Result Value Ref Range    Sodium 140 136 - 145 mmol/L    Potassium 4 0 3 5 - 5 3 mmol/L    Chloride 111 (H) 100 - 108 mmol/L    CO2 22 21 - 32 mmol/L    ANION GAP 7 4 - 13 mmol/L    BUN 19 5 - 25 mg/dL    Creatinine 0 68 0 60 - 1 30 mg/dL    Glucose 108 65 - 140 mg/dL    Calcium 8 5 8 3 - 10 1 mg/dL    eGFR 97 ml/min/1 73sq m   APTT    Collection Time: 11/11/19  5:09 AM   Result Value Ref Range    PTT 90 (H) 23 - 37 seconds         Ct Head Wo Contrast    Result Date: 11/7/2019  Narrative: CT BRAIN - WITHOUT CONTRAST INDICATION:   Left sided weakness  COMPARISON:  CT head dated 11/5/2019 TECHNIQUE:  CT examination of the brain was performed  In addition to axial images, coronal 2D reformatted images were created and submitted for interpretation  Radiation dose length product (DLP) for this visit:  843 mGy-cm   This examination, like all CT scans performed in the [facility], was performed utilizing techniques to minimize radiation dose exposure, including the use of iterative reconstruction and automated exposure control  IMAGE QUALITY:  Diagnostic  FINDINGS: PARENCHYMA: Decreased attenuation is noted in periventricular and subcortical white matter demonstrating an appearance that is statistically most likely to represent mild microangiopathic change  Gray-white differentiation appears maintained  No CT signs of acute territorial infarction    Small old lacunar infarct in the right basal ganglia  No intracranial mass, mass effect or midline shift  No acute parenchymal hemorrhage  Mild parenchymal atrophy  VENTRICLES AND EXTRA-AXIAL SPACES:  Ventricles and extra-axial CSF spaces are prominent commensurate with the degree of volume loss  No hydrocephalus  No acute extra-axial hemorrhage  VISUALIZED ORBITS AND PARANASAL SINUSES:  Mild to moderate mucosal thickening in the bilateral ethmoid sinuses with opacification of several ethmoid air cells  There is mild mucosal thickening in the bilateral maxillary sinuses  Left-sided mastoid effusion is redemonstrated  Paranasal sinuses otherwise are grossly clear  The orbits appear intact  CALVARIUM AND EXTRACRANIAL SOFT TISSUES:  Normal      Impression: No acute intracranial abnormality is seen  Other findings as above  Workstation performed: [id number]     Ct Head Wo Contrast    Result Date: 11/5/2019  Narrative: CT BRAIN - WITHOUT CONTRAST INDICATION:   Altered mental status  COMPARISON:  None  TECHNIQUE:  CT examination of the brain was performed  In addition to axial images, coronal 2D reformatted images were created and submitted for interpretation  Radiation dose length product (DLP) for this visit:  966 93 mGy-cm   This examination, like all CT scans performed in the [facility], was performed utilizing techniques to minimize radiation dose exposure, including the use of iterative  reconstruction and automated exposure control  IMAGE QUALITY:  Diagnostic  FINDINGS: PARENCHYMA: Decreased attenuation is noted in periventricular and subcortical white matter demonstrating an appearance that is statistically most likely to represent moderate microangiopathic change  No CT signs of acute infarction  No intracranial mass, mass effect or midline shift  No acute parenchymal hemorrhage  VENTRICLES AND EXTRA-AXIAL SPACES:  Normal for the patient's age   VISUALIZED ORBITS AND PARANASAL SINUSES:  Left mastoid effusion CALVARIUM AND EXTRACRANIAL SOFT TISSUES:  Normal      Impression: No acute intracranial abnormality  Microangiopathic changes  Left mastoid effusion  Workstation performed: [id number]     Cta Abdominal W Run Off W Wo Contrast    Result Date: 11/1/2019  Narrative: CT ANGIOGRAM OF THE AORTA AND LOWER EXTREMITIES WITH IV CONTRAST INDICATION:  Arterial embolism, lower extremity  Right foot swelling  COMPARISON: None  TECHNIQUE:  CT angiogram examination of the abdomen, pelvis, and lower extremities was performed according to standard protocol with intravenous contrast   This examination, like all CT scans performed in the [facility], was performed utilizing techniques to minimize radiation dose exposure, including the use of iterative reconstruction and automated exposure control  3D reconstructions were performed an independent workstation, and are supplied for review  Rad dose 1712 75 mGy-cm IV Contrast:  120 mL of iohexol (OMNIPAQUE) was administered intravenously without immediate adverse reaction  FINDINGS: VASCULAR STRUCTURES:   Normal size lower abdominal aorta  Mild calcification of the lower abdominal aorta  The celiac, superior mesenteric, and inferior mesenteric arteries are patent  Mild narrowing at the origins of the bilateral single renal arteries  Atherosclerotic plaques of bilateral common iliac arteries with scattered areas of mild narrowing  There is irregularity and areas of stenosis within the distal internal iliac arteries bilaterally  Atherosclerosis and scattered areas of mild narrowing of the right external iliac artery  Mild narrowing just distal to the origin of the right profunda femoral artery however the remainder of the arteries patent  There is severe narrowing at the origin of the right superficial femoral artery and multiple scattered areas of severe narrowing and focal occlusion on (series 2, image 181)  The right popliteal artery is occluded    Limited evaluation of the calf vessels due to calcifications  There is distal reconstitution of the right peroneal artery which appears patent and scattered areas of the posterior tibial artery  The right anterior tibial artery appears occluded  Contrast return in posterior right calf veins are seen (series 6, image 412)  Atherosclerosis and scattered areas of severe narrowing of the left external iliac artery for example (series 4, image 435)  There is occlusion of the left common femoral artery, left superficial femoral artery, and origin of the left profunda femoral artery  There is distal reconstitution of the left profunda femoral artery branches  There is distal reconstitution with severe narrowing of the left mid to distal superficial femoral artery  There is severe narrowing with areas of near occlusion of the left popliteal artery  Limited evaluation of the calf vessels due to calcifications  The left peroneal artery appears patent  Scattered areas of the posterior tibial artery demonstrates reconstitution and occlusion  The left anterior tibial artery appears occluded  OTHER FINDINGS ABDOMEN LOWER CHEST:  No significant abnormality in the visualized lung bases  LIVER/BILIARY TREE:  The partially visualized liver is unremarkable  GALLBLADDER:  No calcified gallstones  No pericholecystic inflammatory change  SPLEEN:  Partially visualized spleen is unremarkable  Normal size  PANCREAS:  Unremarkable  ADRENAL GLANDS: Unremarkable  KIDNEYS/URETERS:  No solid renal mass  No hydronephrosis  No urinary tract calculi  PELVIS REPRODUCTIVE ORGANS:  Unremarkable for patient's age  URINARY BLADDER:  There is a lobulated 9 x 5 x 5 mm calculus within the posterior bladder  ADDITIONAL ABDOMINAL AND PELVIC STRUCTURES STOMACH AND BOWEL: ABDOMINOPELVIC CAVITY:   No pathologically enlarged mesenteric or retroperitoneal lymph nodes  No ascites or free intraperitoneal air   ABDOMINAL WALL/INGUINAL REGIONS:  Ventral abdominal mesh wall repair  Right lateral fat-containing inguinal hernias  OSSEOUS STRUCTURES:  No acute fracture or destructive osseous lesion  Amputations of the 3rd and 4th digits of the right foot  Impression: 1  Severe peripheral vascular disease as described above  Limited evaluation of the calf vessels due to extensive calcifications  2   Severe areas of narrowing and focal occlusion of the right superficial femoral artery and diffuse occlusion of the popliteal artery  Distal reconstitution of the right peroneal artery and scattered areas of the right posterior tibial artery  3   Occlusion of the left common femoral artery and left superficial femoral artery with distal reconstitution however severe narrowing of the mid to distal superficial femoral artery  Severe narrowing of the left popliteal artery  Patent left peroneal  artery and scattered areas of the left posterior tibial artery  4   Vascular consultation is recommended  5   9 x 5 x 5 mm calculus within the posterior bladder, near the left ureterovesicular junction however there is no hydronephrosis to suggest obstruction  Workstation performed: [id number]     Xr Abdomen 1 Vw Portable    Result Date: 11/5/2019  Narrative: ABDOMEN INDICATION:   abdomina; pain, fecal impaction? ?  COMPARISON:  None VIEWS:  AP supine FINDINGS: There is a nonobstructive bowel gas pattern  Mild fecal stasis  No discernible free air on this supine study  Upright or left lateral decubitus imaging is more sensitive to detect subtle free air in the appropriate setting  No pathologic calcifications or soft tissue masses  Visualized lung bases are clear  Visualized osseous structures are unremarkable for the patient's age  Pelvic hernia repair  Impression: Mild fecal stasis  No obstruction   Workstation performed: [id number]     Vas Lower Limb Arterial Duplex, Complete Bilateral    Result Date: 11/10/2019  Narrative:  [facility] REPORT CLINICAL: Indications: Patient complains of left leg pain  Left iliofemoral, fem-pop thromboembolectomy, along with femoral endarterectomy with extensive profundaplasty and stenting of common and external iliac artery  Risk Factors The patient has history of HTN, Diabetes (Yes), HLD and PAD  FINDINGS:  Segment                Right                 Left                                          Impression  PSV  EDV  Impression  PSV  Common Femoral Artery              274                    94  Prox Profunda                      134                    82  Prox SFA                            26       50-75%      272  Mid SFA                             26                   127  Dist SFA                            19       50-75%      326  Proximal Pop           Occluded      1                   153  Distal Pop             Occluded      0                   104  Dist Post Tibial                     7                    13  Dist  Ant  Tibial                   12    8               44  Dist Peroneal                       17                    50     CONCLUSION: Impression: RIGHT LOWER LIMB: The popliteal artery is occluded  Diffuse disease noted throughout the femoral artery and tibioperoneal disease  Ankle/Brachial index:  0 15 PVR/ PPG tracings are dampened  Metatarsal and Great toe pressure are unobtainable secondary to attenuated PPG waveform  LEFT LOWER LIMB: Common femoral arterectomy site appears patent, although it was difficult to visualize vessel secondary to edema  Residual 50-75% stenosis is noted in multiple segments of the femoral artery  There is tibioperoneal disease  Ankle/Brachial index:  0 52 PVR/ PPG tracings are dampened   Metatarsal pressure of 45mmHg Great toe pressure of 36mmHg, within the healing range  No previous study to compare  SIGNATURE: Electronically Signed by: Shahriar Erazo on 2019-11-10 11:40:50 AM    Vas Lower Limb Venous Duplex Study, Unilateral/limited    Result Date: 11/1/2019  Narrative:  [facility] REPORT CLINICAL: Indications: Limb Pain [M79 [id number]]  Left sided pain x several weeks  Patient just flew back from [facility]  No history of DVT  FINDINGS:  Left     Impression       CFV      Normal (Patent)     CONCLUSION: Impression: RIGHT LOWER LIMB LIMITED: Evaluation shows no evidence of thrombus in the common femoral vein  Doppler evaluation shows a normal response to augmentation maneuvers  LEFT LOWER LIMB: No evidence of acute or chronic deep vein thrombosis No evidence of superficial thrombophlebitis noted  Doppler evaluation shows a normal response to augmentation maneuvers  Popliteal arterial Doppler waveform is monophasic  Posterior and anterior tibial arteries appear chronically occluded  Incidentally, the common femoral artery is occluded with reconstitution in the mid superficial femoral artery  Technical findings were given to Dr Ricki Haines at time of scan  SIGNATURE: Electronically Signed by: Britany Sow MD on 2019-11-01 09:08:11 PM      Assessment and Plan: This is a pleasant 50-year-old male who was admitted for leg pain and found to have narrowing of blood vessels  He had an endarterectomy and other procedures performed and was being transitioned to Coumadin when his INR jumped  I suspect considering his INR was normal previously that this is related to the Coumadin he received with possible underlying vitamin K deficiency  My recommendation would be to restart a much lower dose of Coumadin at 2 mg once his INR drops below 2  With his underlying issues novel oral anticoagulants if indicated in this setting may be safer and more appropriate  Regardless if Coumadin is to be used I would recommend a much lower dose and going up or titration very slowly  He may just need a much lower dose based on his body habitus and diet  We will follow along with you  Please call with any questions    Of note imaging done recently although not ideal did not show any evidence of liver disease or cirrhosis   CMP on the 3rd of November had shown normal LFTs although the albumin was low at the time

## 2019-11-11 NOTE — PROGRESS NOTES
Progress Note - Vascular Surgery  Rogelio Jain 78 y o  male MRN: [id number]  Unit/Bed#: [facility] 430-01 Encounter: [id number]    Assessment:  79 y/o male with with PAD/LLE claudication/rest pain now s/p left CFA endarterectomy, profundoplasty, embolectomy, arteriogram with retrograde iliac stenting 11/7/19    Plan:  - Plan for LLE agram/intervension to optimize left foot perfusion during this admission   - Discontinue Heparin, INR 4 83  - continue ASA/Lipitor, plavix   - Pain control as per geriatric pain management order set  - continue PT evaluation and treat       Subjective/Objective   Chief Complaint: No chief complaint on file  Subjective: 78 y o  y/o male was seen and evaluated at bedside  MA at bedside states patient slept well and there were no acute events  Pt does complaint of pain in the LLE this morning  Blood pressure 138/63, pulse 81, temperature 98 6 °F (37 °C), temperature source Oral, resp  rate 16, height 5' 5" (1 651 m), weight 50 3 kg (111 lb), SpO2 91 %  ,Body mass index is 18 47 kg/m²  Invasive Devices     Peripheral Intravenous Line            Peripheral IV 11/10/19 Right Forearm 1 day                Physical Exam:   General: Alert, cooperative and no distress  Lungs: Non labored breathing  Heart: Positive S1, S2  Abdomen: Soft, non-tender  Extremity: Left foot is warm to touch  Left DP/PT/Femoral pulses dopplerable signals  Pain upon palpable to the left lower extremity             Lab, Imaging and other studies:   CBC: No results found for: WBC, HGB, HCT, MCV, PLT, ADJUSTEDWBC, MCH, MCHC, RDW, MPV, NRBC, CMP:   Lab Results   Component Value Date    SODIUM 140 11/11/2019    K 4 0 11/11/2019     (H) 11/11/2019    CO2 22 11/11/2019    BUN 19 11/11/2019    CREATININE 0 68 11/11/2019    CALCIUM 8 5 11/11/2019    EGFR 97 11/11/2019       Imaging: I have personally reviewed pertinent films in PACS  EKG, Pathology, and Other Studies: I have personally reviewed pertinent reports    VTE Pharmacologic Prophylaxis: Sequential compression device (Venodyne)

## 2019-11-12 PROBLEM — D68.9 COAGULOPATHY (HCC): Status: ACTIVE | Noted: 2019-11-12

## 2019-11-12 PROBLEM — R74.01 TRANSAMINITIS: Status: ACTIVE | Noted: 2019-11-12

## 2019-11-12 LAB
ALBUMIN SERPL BCP-MCNC: 2.6 G/DL (ref 3.5–5)
ALP SERPL-CCNC: 93 U/L (ref 46–116)
ALT SERPL W P-5'-P-CCNC: 91 U/L (ref 12–78)
ANION GAP SERPL CALCULATED.3IONS-SCNC: 7 MMOL/L (ref 4–13)
AST SERPL W P-5'-P-CCNC: 77 U/L (ref 5–45)
BASOPHILS # BLD AUTO: 0.03 THOUSANDS/ΜL (ref 0–0.1)
BASOPHILS NFR BLD AUTO: 1 % (ref 0–1)
BILIRUB DIRECT SERPL-MCNC: 0.12 MG/DL (ref 0–0.2)
BILIRUB SERPL-MCNC: 0.31 MG/DL (ref 0.2–1)
BUN SERPL-MCNC: 25 MG/DL (ref 5–25)
CALCIUM SERPL-MCNC: 8.9 MG/DL (ref 8.3–10.1)
CHLORIDE SERPL-SCNC: 106 MMOL/L (ref 100–108)
CO2 SERPL-SCNC: 25 MMOL/L (ref 21–32)
CREAT SERPL-MCNC: 0.71 MG/DL (ref 0.6–1.3)
EOSINOPHIL # BLD AUTO: 0.23 THOUSAND/ΜL (ref 0–0.61)
EOSINOPHIL NFR BLD AUTO: 4 % (ref 0–6)
ERYTHROCYTE [DISTWIDTH] IN BLOOD BY AUTOMATED COUNT: 15.2 % (ref 11.6–15.1)
GFR SERPL CREATININE-BSD FRML MDRD: 95 ML/MIN/1.73SQ M
GLUCOSE SERPL-MCNC: 105 MG/DL (ref 65–140)
HCT VFR BLD AUTO: 30.4 % (ref 36.5–49.3)
HGB BLD-MCNC: 9.8 G/DL (ref 12–17)
IMM GRANULOCYTES # BLD AUTO: 0.03 THOUSAND/UL (ref 0–0.2)
IMM GRANULOCYTES NFR BLD AUTO: 1 % (ref 0–2)
INR PPP: 2.78 (ref 0.84–1.19)
LYMPHOCYTES # BLD AUTO: 0.45 THOUSANDS/ΜL (ref 0.6–4.47)
LYMPHOCYTES NFR BLD AUTO: 7 % (ref 14–44)
MCH RBC QN AUTO: 30.6 PG (ref 26.8–34.3)
MCHC RBC AUTO-ENTMCNC: 32.2 G/DL (ref 31.4–37.4)
MCV RBC AUTO: 95 FL (ref 82–98)
MONOCYTES # BLD AUTO: 0.58 THOUSAND/ΜL (ref 0.17–1.22)
MONOCYTES NFR BLD AUTO: 9 % (ref 4–12)
NEUTROPHILS # BLD AUTO: 5.18 THOUSANDS/ΜL (ref 1.85–7.62)
NEUTS SEG NFR BLD AUTO: 78 % (ref 43–75)
NRBC BLD AUTO-RTO: 0 /100 WBCS
PLATELET # BLD AUTO: 176 THOUSANDS/UL (ref 149–390)
PMV BLD AUTO: 11.3 FL (ref 8.9–12.7)
POTASSIUM SERPL-SCNC: 4 MMOL/L (ref 3.5–5.3)
PROT SERPL-MCNC: 6 G/DL (ref 6.4–8.2)
PROTHROMBIN TIME: 28.8 SECONDS (ref 11.6–14.5)
RBC # BLD AUTO: 3.2 MILLION/UL (ref 3.88–5.62)
SODIUM SERPL-SCNC: 138 MMOL/L (ref 136–145)
WBC # BLD AUTO: 6.5 THOUSAND/UL (ref 4.31–10.16)

## 2019-11-12 PROCEDURE — 99024 POSTOP FOLLOW-UP VISIT: CPT | Performed by: SURGERY

## 2019-11-12 PROCEDURE — 85610 PROTHROMBIN TIME: CPT | Performed by: INTERNAL MEDICINE

## 2019-11-12 PROCEDURE — 99232 SBSQ HOSP IP/OBS MODERATE 35: CPT | Performed by: INTERNAL MEDICINE

## 2019-11-12 PROCEDURE — G8979 MOBILITY GOAL STATUS: HCPCS

## 2019-11-12 PROCEDURE — G8978 MOBILITY CURRENT STATUS: HCPCS

## 2019-11-12 PROCEDURE — 80076 HEPATIC FUNCTION PANEL: CPT | Performed by: PHYSICIAN ASSISTANT

## 2019-11-12 PROCEDURE — G8988 SELF CARE GOAL STATUS: HCPCS

## 2019-11-12 PROCEDURE — 97164 PT RE-EVAL EST PLAN CARE: CPT

## 2019-11-12 PROCEDURE — 80048 BASIC METABOLIC PNL TOTAL CA: CPT | Performed by: INTERNAL MEDICINE

## 2019-11-12 PROCEDURE — 97167 OT EVAL HIGH COMPLEX 60 MIN: CPT

## 2019-11-12 PROCEDURE — G8987 SELF CARE CURRENT STATUS: HCPCS

## 2019-11-12 PROCEDURE — 85025 COMPLETE CBC W/AUTO DIFF WBC: CPT | Performed by: INTERNAL MEDICINE

## 2019-11-12 RX ADMIN — POLYETHYLENE GLYCOL 3350 17 G: 17 POWDER, FOR SOLUTION ORAL at 17:15

## 2019-11-12 RX ADMIN — TAMSULOSIN HYDROCHLORIDE 0.4 MG: 0.4 CAPSULE ORAL at 17:15

## 2019-11-12 RX ADMIN — CHLORHEXIDINE GLUCONATE 0.12% ORAL RINSE 15 ML: 1.2 LIQUID ORAL at 22:42

## 2019-11-12 RX ADMIN — MELATONIN 6 MG: 3 TAB ORAL at 22:43

## 2019-11-12 RX ADMIN — POLYETHYLENE GLYCOL 3350 17 G: 17 POWDER, FOR SOLUTION ORAL at 08:44

## 2019-11-12 RX ADMIN — CHLORHEXIDINE GLUCONATE 0.12% ORAL RINSE 15 ML: 1.2 LIQUID ORAL at 08:44

## 2019-11-12 RX ADMIN — SENNOSIDES AND DOCUSATE SODIUM 1 TABLET: 8.6; 5 TABLET ORAL at 17:15

## 2019-11-12 RX ADMIN — THIAMINE HCL TAB 100 MG 100 MG: 100 TAB at 08:44

## 2019-11-12 RX ADMIN — SENNOSIDES AND DOCUSATE SODIUM 1 TABLET: 8.6; 5 TABLET ORAL at 08:44

## 2019-11-12 RX ADMIN — GABAPENTIN 100 MG: 100 CAPSULE ORAL at 08:44

## 2019-11-12 RX ADMIN — OXYCODONE HYDROCHLORIDE 5 MG: 5 TABLET ORAL at 08:09

## 2019-11-12 RX ADMIN — NICOTINE 1 PATCH: 14 PATCH TRANSDERMAL at 08:45

## 2019-11-12 RX ADMIN — ASPIRIN 81 MG: 81 TABLET, COATED ORAL at 08:44

## 2019-11-12 RX ADMIN — ATORVASTATIN CALCIUM 40 MG: 40 TABLET, FILM COATED ORAL at 17:15

## 2019-11-12 RX ADMIN — ACETAMINOPHEN 650 MG: 325 TABLET ORAL at 11:44

## 2019-11-12 RX ADMIN — AMLODIPINE BESYLATE 10 MG: 10 TABLET ORAL at 08:44

## 2019-11-12 RX ADMIN — CLOPIDOGREL 75 MG: 75 TABLET, FILM COATED ORAL at 08:44

## 2019-11-12 NOTE — OCCUPATIONAL THERAPY NOTE
[address]  Evaluation     Patient Name: Blake TRAYLOR Date: 11/12/2019  Problem List  Principal Problem:    Peripheral vascular disease ([facility] )  Active Problems:    History of throat cancer    Hypertension    Dependence on nicotine from cigarettes    Syncope and collapse    Renal stone    PAD (peripheral artery disease) (HCC)    Acute blood loss anemia    Past Medical History  Past Medical History:   Diagnosis Date    Cancer ([facility] )     throat cancer    PAD (peripheral artery disease) ([facility] ) 11/2/2019    PEG (percutaneous endoscopic gastrostomy) status ([place] [address] )     Port-A-Cath in place      Past Surgical History  Past Surgical History:   Procedure Laterality Date    BYPASS FEMORAL-FEMORAL Right     ESOPHAGOSCOPY N/A 8/24/2017    Procedure: ESOPHAGOSCOPY FLEXIBLE;  Surgeon: Monica Merino MD;  [facility];  Service: ENT    [place] [address] N/A 8/24/2017    Procedure: Bronchoscopy;  Surgeon: Monica Merino MD;  [facility];  Service: ENT    [place] LARYNGOSCOPY,DIRCT,OP,BIOPSY N/A 8/24/2017    Procedure: LARYNGOSCOPY DIRECT;  Surgeon: Monica Merino MD;  [facility];  Service: ENT    [place] [facility] Left 11/7/2019    Procedure: LEFT ENDARTERECTOMY ARTERIAL FEMORAL; L CFAE WITH PROFUNDOPLASTY; ARTERIOGRAM;RETROGRADE ILIAC STENTING;  Surgeon: Bryna Habermann, MD;  [facility];  Service: Vascular    TOE AMPUTATION Right     2 toes         11/12/19 1526   Note Type   Note type Eval/Treat   Restrictions/Precautions   Weight Bearing Precautions Per Order No   Other Precautions Bed Alarm; Fall Risk  (Persian speaking; bed alarm active post session)   Pain Assessment   Pain Assessment FLACC   Pain Rating: FLACC (Rest) - Face 0   Pain Rating: FLACC (Rest) - Legs 0   Pain Rating: FLACC (Rest) - Activity 0   Pain Rating: FLACC (Rest) - Cry 0   Pain Rating: FLACC (Rest) - Consolability 0   Score: FLACC (Rest) 0   Pain Rating: FLACC (Activity) - Face 1   Pain Rating: FLACC (Activity) - Legs 0   Pain Rating: FLACC (Activity) - Activity 0   Pain Rating: FLACC (Activity) - Cry 1   Pain Rating: FLACC (Activity) - Consolability 1   Score: FLACC (Activity) 3   Home Living   Type of Home House   Home Layout Two level; Able to live on main level with bedroom/bathroom   Bathroom Shower/Tub Tub/shower unit   Bathroom Toilet Standard   Home Equipment Walker;Cane   Additional Comments refused to use DME   Prior Function   Level of [place] Independent with ADLs and functional mobility   Lives With Daughter;Family   Receives Help From Family   ADL Assistance Independent   IADLs Needs assistance   Falls in the last 6 months 0   Vocational Retired   Lifestyle   Autonomy pta per pt's son pt I for ADLs/IADLs/funcitonal mobility   Reciprocal Relationships supportive family able to assist 24 hours PRN   Service to Others retired   Intrinsic Gratification enjoys going outside when they are in [address] [place] [address] ([address]) [address] "I walk, I go home"   ADL   Where [address] 5  Supervision/Setup   Grooming Assistance 5  Supervision/Setup   UB [address] 4  Minimal Assistance   LB [address] 4  [address] 4  [address] [employer] 4  Minimal Assistance   Bed Mobility   Supine to Sit 4  Minimal assistance   Additional items Assist x 1   Sit to Supine 4  Minimal assistance   Additional items Assist x 1   Transfers   Sit to Stand 3  Moderate assistance   Additional items Assist x 1   Stand to Sit 3  Moderate assistance   Additional items Assist x 1; Increased time required   Functional Mobility   Functional Mobility 4  Minimal assistance   Additional items Rolling walker   Balance   Static Sitting Fair   Dynamic Sitting Fair   Static Standing Poor +   Dynamic Standing Poor +   Ambulatory Poor +   Activity Tolerance   Activity Tolerance Patient limited by fatigue   Medical Staff Made Aware PT Kb   Nurse Made Aware okay to see per RN    RUE Assessment   RUE Assessment WFL   LUE Assessment   LUE Assessment WFL   Hand Function   Gross Motor Coordination Functional   Fine Motor Coordination Functional   Cognition   Overall Cognitive Status Impaired   Arousal/Participation Cooperative   Attention Attends with cues to redirect   Orientation Level Oriented to place;Oriented to person;Disoriented to time;Oriented to situation   Memory Decreased recall of precautions;Decreased recall of recent events   Following Commands Follows one step commands with increased time or repetition  (son translating for therapy)   Comments probable vascular dementia per EMR   Assessment   Limitation Decreased ADL status; Decreased Safe judgement during ADL;Decreased cognition;Decreased endurance;Decreased high-level ADLs   Prognosis Good   Assessment Pt is a 78 y o  YO  male admitted to [facility] on [facility]/2019 w/ PVD s/p left CFA endarterectomy, profundoplasty, embolectomy, arteriogram with retrograde iliac stenting 11/7/19  Comorbidities include a h/o throat cancer, probable vascular dementia, nicotine dependence, and PEG   Pt with active OT orders and activity as tolerated orders    Pt resides in a [facility] with first floor setup and family who is able to assist PRN  Pt was I w/  ADLS and IADLS, (-) drove, & required no use of DME PTA  Family reports he has a RW but does not utilize it  Currently pt is Mod A for transfers and Min A for ADLs  Pt is limited at this time 2*: pain, endurance, activity tolerance, trunk control, functional standing tolerance, unsupportive home environment, decreased I w/ ADLS/IADLS, cognitive impairments, decreased safety awareness and decreased insight into deficits  The following Occupational Performance Areas to address include: grooming, bathing/shower, toilet hygiene, dressing, health maintenance and household maintenance   Pt scored overall  55/100 on the Barthel Index  Based on the aforementioned OT evaluation, functional performance deficits, and assessments, pt has been identified as a high complexity evaluation  From OT standpoint, anticipate d/c home with family support w/ increased family support  Pt to continue to benefit from acute immediate OT services to address the following goals 3-5x/week to  w/in 7-10 days: Sofia Console Goals   Patient Goals to walk and go home   LTG Time Frame 7-10   Plan   Treatment Interventions ADL retraining;Functional transfer training; Endurance training;Patient/family training; Compensatory technique education   Goal Expiration Date 19   OT Frequency 3-5x/wk   Recommendation   OT Discharge Recommendation Home with family support  (increased family support)   OT - OK to Discharge Yes   Barthel Index   Feeding 10   Bathing 0   Grooming Score 5   Dressing Score 5   Bladder Score 10   Bowels Score 10   Toilet Use Score 5   Transfers (Bed/Chair) Score 10   Mobility (Level Surface) Score 0   Stairs Score 0   Barthel Index Score 55   Modified [place] Scale   Modified [place] Scale 4     GOALS    1) Pt will increase activity tolerance to G for 30 min txment sessions    2) Pt will complete UB/LB dressing/self care w/ S using adaptive device and DME as needed    3) Pt will complete bathing w/ S w/ use of AE and DME as needed    4) Pt will complete toileting w/ S w/ G hygiene/thoroughness using DME as needed    5) Pt will improve functional transfers to S on/off all surfaces using DME as needed w/ G balance/safety     6) Pt will improve functional mobility during ADL/IADL/leisure tasks to S using DME as needed w/ G balance/safety     7) Pt will demonstrate G carryover of pt/caregiver education and training as appropriate              Chuck Valadez MS, OTR/L

## 2019-11-12 NOTE — PHYSICAL THERAPY NOTE
Physical Therapy Re-Evaluation     Patient's Name: Chinedu Ballard    Admitting Diagnosis  Femoral artery occlusion ([place] [address] ) [Y22 [id number]]    Problem List  Patient Active Problem List   Diagnosis    Peripheral vascular disease ([place] [address] )    History of throat cancer    Hypertension    Dependence on nicotine from cigarettes    Syncope and collapse    Renal stone    PAD (peripheral artery disease) ([place] [address] )    Acute blood loss anemia       Past Medical History  Past Medical History:   Diagnosis Date    Cancer ([place] [address] )     throat cancer    PAD (peripheral artery disease) ([place] [address] ) 11/2/2019    PEG (percutaneous endoscopic gastrostomy) status ([place] [address] )     Port-A-Cath in place        Past Surgical History  Past Surgical History:   Procedure Laterality Date    BYPASS FEMORAL-FEMORAL Right     ESOPHAGOSCOPY N/A 8/24/2017    Procedure: ESOPHAGOSCOPY FLEXIBLE;  Surgeon: Trent Borden MD;  [facility];  Service: ENT    [place] [address] N/A 8/24/2017    Procedure: Bronchoscopy;  Surgeon: Trent Borden MD;  [facility];  Service: ENT    [place] LARYNGOSCOPY,DIRCT,OP,BIOPSY N/A 8/24/2017    Procedure: LARYNGOSCOPY DIRECT;  Surgeon: Trent Borden MD;  [facility];  Service: ENT    [place] [facility] Left 11/7/2019    Procedure: LEFT ENDARTERECTOMY ARTERIAL FEMORAL; L CFAE WITH PROFUNDOPLASTY; ARTERIOGRAM;RETROGRADE ILIAC STENTING;  Surgeon: Peggy Moran MD;  [facility];  Service: Vascular    TOE AMPUTATION Right     2 toes          11/12/19 8015   Note Type   Note type Re-eval   Pain Assessment   Pain Assessment FLACC   Pain Type Acute pain   Pain Location Leg   Pain Orientation Right  (non- op LE)   Pain Descriptors Aching;Discomfort;Tiring   Pain Frequency Intermittent   Pain Onset Unable to tell   Clinical Progression Gradually improving   Effect of Pain on Daily Activities discomfort post amb   Patient's Stated Pain Goal No pain   Hospital Pain Intervention(s) Repositioned; Emotional support;Distraction   Response to Interventions more comfortable during rest   Pain Rating: FLACC (Rest) - Face 0   Pain Rating: FLACC (Rest) - Legs 0   Pain Rating: FLACC (Rest) - Activity 0   Pain Rating: FLACC (Rest) - Cry 0   Pain Rating: FLACC (Rest) - Consolability 0   Score: FLACC (Rest) 0   Pain Rating: FLACC (Activity) - Face 1   Pain Rating: FLACC (Activity) - Legs 0   Pain Rating: FLACC (Activity) - Activity 0   Pain Rating: FLACC (Activity) - Cry 1   Pain Rating: FLACC (Activity) - Consolability 1   Score: FLACC (Activity) 3   Home Living   Type of Home House; Apartment   Home Layout Two level; Able to live on main level with bedroom/bathroom  (1st floor set-up w/ 3 +10 DAKOTAH w/ hand rail)   Home Equipment Walker;Cane   Prior Function   Level of [place] Independent with ADLs and functional mobility  (supposed to use an AD as per family but does not )   Lives With Daughter;Family   Receives Help From Family   Restrictions/Precautions   Braces or Orthoses   (none, as per family)   Other Precautions Fall Risk;Cognitive;Pain;Bed Alarm   General   Additional Pertinent History During the course, pt underwent left CFA endarterectomy, profundoplasty, embolectomy, arteriogram with retrograde iliac stenting 11/7/19; PT is reconsulted; pt is cleared for assessment/mobilization (spoke to nsg)   Family/Caregiver Present Yes  (son (provides interpretation during the session))   Cognition   Overall Cognitive Status Impaired   Arousal/Participation Alert   Orientation Level Oriented to person;Oriented to situation   Memory Decreased recall of precautions;Decreased recall of recent events   Following Commands Follows one step commands with increased time or repetition   Comments Pt is observed in bed; Uzbek speaking mainly (family is present); agreeable to try mobilization; denies dizziness upon sitting up   RUE Assessment   RUE Assessment WFL  (AROM)   LUE Assessment   LUE Assessment WFL  (AROM)   RLE Assessment   RLE Assessment WFL  (AROM)   Strength RLE   RLE Overall Strength   (fair +/ good - (grossly))   LLE Assessment   LLE Assessment WFL  (AROM)   Strength LLE   LLE Overall Strength   (fair/ fair + (grossly))   Bed Mobility   Supine to Sit 4  Minimal assistance   Additional items Assist x 1;Verbal cues   Sit to Supine 4  Minimal assistance   Additional items Assist x 1;Verbal cues   Transfers   Sit to Stand 3  Moderate assistance   Additional items Assist x 1;Assist x 2  ((A) x 2 -->1; 2 trials)   Stand to Sit 3  Moderate assistance   Additional items Assist x 1;Verbal cues  (2 trials)   Ambulation/Elevation   Gait pattern Excessively slow; Step to;Short stride; Inconsistent kusum  (gait patterns improved during the 2nd amb trial)   Gait Assistance 3  Moderate assist  (1st amb trial; min (A) 2nd trial)   Additional items Assist x 1;Verbal cues; Tactile cues  (stand by (A) of 2nd for chair follow)   Assistive Device Rolling walker   Distance 30 ft and 25 ft w/ seated rest period in between   Balance   Static Sitting Fair   Static Standing Poor +   Ambulatory Poor +   Activity Tolerance   Activity Tolerance Patient limited by fatigue;Patient limited by pain   Nurse Made Aware spoke to [address]   Assessment   Prognosis Good   Problem List Decreased strength;Decreased endurance; Impaired balance;Decreased mobility; Decreased cognition;Pain   Assessment Functional mobility re-assessment performed; pt presents s/p above vasc procedure and currently exhibits discomfort in non-op (R) LE (describes it as fatigue) and general weakness and deconditioning requiring (A)x1 w/ all aspects of observed mobility; pt was able to amb household distance w/ rw and (A)x1; remained in NAD and appeared to be comfortable at the end of session   Currently, anticipate pt will return home w/ family support upon D/C provided he cont improving w/ mobility skills, endurance and safety (incl on the steps) and if appropriate level of support is available at home; home PT follow up is then recommended; otherwise, rehab may need to be considered; will follow  Barriers to Discharge Inaccessible home environment   Goals   Patient Goals to walk and to go home   STG Expiration Date 11/22/19   Short Term Goal #1 7-10 days  Pt will amb 150 ft w/ rw <--> SPC, mod (I) in order to facilitate safe return to premorbid environment and to initiate return to community amb status  Pt will negotiate 13 steps w/ hand rail and SPC, (S)x1 in order to assure safe navigation in and out of the premorbid living environment  Pt will achieve (I) level w/ bed mob in order to facilitate safety with OOB and back to bed transitions in own living environment  Pt will perform transfers w/ mod (I) to assure (I) and safety w/ functional mobility/transitions w/ all aspects of mobility/locomotion  Pt will participate in LE therex and balance activities to max progression w/ mobility skills  PT Treatment Day 0   Plan   Treatment/Interventions Functional transfer training;LE strengthening/ROM; Elevations; Therapeutic exercise; Endurance training;Cognitive reorientation;Equipment eval/education; Bed mobility;Gait training;Spoke to nursing;OT;Family   PT Frequency Other (Comment)  (3-5x/wk)   Recommendation   Recommendation Home PT; Home with family support  (pending progress)   Equipment Recommended Walker  (at this time)   Modified [place] Scale   Modified [place] Scale 4   Barthel Index   Feeding 10   Bathing 0   Grooming Score 5   Dressing Score 5   Bladder Score 10   Bowels Score 10   Toilet Use Score 5   Transfers (Bed/Chair) Score 10   Mobility (Level Surface) Score 0   Stairs Score 0   Barthel Index Score 55         Branden Aponte, PT

## 2019-11-12 NOTE — PROGRESS NOTES
Brief HPI:  Becki  is a 5  y.o. 5  m.o.  Female   was admitted for hip pain.     Admit Date:  7/19/2017     Discharge Date: 7/21/17     PMD: Trini Marcos M.D.     Consults: Dr. Booth       [facility] Problem List/Discharge Diagnosis:   Chief complaint: Left Hip pain that radiated to upper thigh and lower abdominal quadrant.  Inability to walk or bare weight on leg due to pain.     Discharge Diagnosis: Probable transient synovitis     Hospital Course:   7/19:  Patient was admitted to pediatric floor from ER due to severe left hip pain and inability to bare weight on left leg.  In the ER, patient received an X-Ray that was unremarkable for femoral fracture or septic joint.  An ultrasound displayed small amounts of fluid in the hip joint.  Patient also had blood work that was not indicative of infection.  Ibuprofen, Hycet, and morphine were utilized PRN for pain management following admission.     7/20:  Patient received MRI and ortho consult (Dr. Booth).  Patient's symptoms continued to improve, but patient still required pain management regiment.  Patient was started on Toradol during the night, per Dr. Booth   7/21:  Patient's symptoms had improved significantly.  Patient was in good spirits upon waking and had been tolerating her Toradol treatment well.  Patient was no longer complaining of pain in her hip and was able to freely move her left leg about the hip and knee joint.  PT consult before discharge.  Patient discharged with motrin PRN for pain management.       Procedures:   None     Significant Imaging Findings:   X-Ray (7/19):  Unremarkable for signs of septic joint, femoral fracture, or abnormalities of hip joint   U/S (7/19): Unremarkable, but small amount of fluid in the left hip joint   MRI (7/20): Unremarkable     Significant Laboratory Findings:   CBC/CMP: within normal limits, with no sign of infection     Disposition:   Discharge to: Home     Follow Up:   Follow up with PCP.  Instructions  Progress Note - Geriatric Medicine   Maida Villalta 78 y o  male MRN: [id number]  Unit/Bed#: [facility] 430-01 Encounter: [id number]      Assessment/Plan:    Acute metabolic encephalopathy  -mostly resolved, son feels he is back at baseline  -continue to monitor for pain and fecal/urinary retention  -high risk of recurrent encephalopathy due to underlying cognitive impairment   -recommend close o/p f/u with PCP and Geriatrics    Probable Vascular dementia  -continue supportive care  -continue to encourage family at bedside for support  -maintain normal sleep/wake cycle  -continue tx acute pain  -monitor for sundowning, Gabapentin 100mg at 3pm daily has been VERY helpful for managing behaviors  -redirect unwanted behaviors  -avoid antipsychotics as may cause rebound adverse  -recommend comprehensive geriatric eval following dc to identify additional resources on return to home w family  -will likely need increased level of care on d/c    PAD  -vascular surgery following  -s/p CFA endarectomy, embolectomy and a gram with retrograde iliac stenting  -on ASA, Plavix  -awaiting repeat a-gram for residual SFA stenosis when INR drops to target 2 or lower    Supratherapeutic INR  -INR improved to 2 78 from 4 83 yesterday, continue to hold coumadin and close monitoring INR  -monitor INR with Tylenol use    Ambulatory dysfunction  -multifactorial including PVD and associated pain  -continue ASA/Plavix, awaiting repeat A-gram  -PT/OT following  -may benefit from graded exercise program following revascularization    Deconditioning/debility/frailty  -currently taking ensure supplements between meals  -continue nutritional supplementation, monitor response to increased intake  -will likely benefit from STR at d/c, will follow-up PT/OT post A-gram for final recommendations    Subjective:   Patient seen and examined at the bedside where he is sitting resting comfortably  His son is at his side   Adi reports much more pain in his have been given to return to ER if similar symptoms recur.     Discharge  Medications:   Motrin PRN for home pain management.    left leg today but that it is helped significantly with the low dose Roxicodone  He is awaiting A-gram when his INR is in acceptable range  His PCA at his side reports that he has been appropriate and not impulsive for the past day and is able to now ring for assistance  Review of Systems   Constitutional: Negative for appetite change (appetite is good, prefers soft foods), chills and fever  HENT: Positive for dental problem (poor dentition)  Eyes: Negative for visual disturbance  Respiratory: Negative for shortness of breath  Cardiovascular: Negative for chest pain and palpitations  Gastrointestinal: Negative for diarrhea, nausea and vomiting  Genitourinary: Negative for difficulty urinating  Musculoskeletal: Positive for gait problem (due to LLE pain with ambulation)  Digit amputation right foot   Skin: Negative  Neurological: Positive for weakness  Negative for dizziness, light-headedness and headaches  Hematological: Bruises/bleeds easily  Psychiatric/Behavioral: Negative for sleep disturbance  All other systems reviewed and are negative  Objective:     Vitals: Blood pressure 119/57, pulse 82, temperature 98 9 °F (37 2 °C), temperature source Oral, resp  rate 18, height 5' 5" (1 651 m), weight 50 3 kg (111 lb), SpO2 95 %  ,Body mass index is 18 47 kg/m²  Intake/Output Summary (Last 24 hours) at 11/12/2019 1710  Last data filed at 11/12/2019 1243  Gross per 24 hour   Intake 1494 ml   Output 1030 ml   Net 464 ml     Current Medications: Reviewed    Physical Exam:   Physical Exam   Constitutional: He appears well-developed  No distress  Appears thin   HENT:   Head: Normocephalic  Mouth/Throat: Oropharynx is clear and moist    Eyes: Conjunctivae and EOM are normal  No scleral icterus  Neck: Neck supple  No tracheal deviation present  Cardiovascular: Normal rate     Unable to palpate DP of either LE, nursing obtaining via doppoler   Pulmonary/Chest: Effort normal and breath sounds normal  He has no wheezes  Abdominal: Soft  There is no tenderness  Musculoskeletal: He exhibits tenderness (entire length of left lower extremity) and deformity (missing digit right foot)  Skin: Skin is warm and dry  He is not diaphoretic    ashen   Psychiatric: He has a normal mood and affect  Nursing note and vitals reviewed       Invasive Devices     Peripheral Intravenous Line            Peripheral IV 11/10/19 Right Forearm 2 days              Lab, Imaging and other studies:   -Na 138, K 4 0, Cl 106, CO 25, BUN 25, Cr 0 71, protein 6 0, alb 2 6, gfr 95  -Hb 9 8, Hct 30 4, WBC 6 50,   -INR 2 78

## 2019-11-12 NOTE — PLAN OF CARE
Problem: PHYSICAL THERAPY ADULT  Goal: Performs mobility at highest level of function for planned discharge setting  See evaluation for individualized goals  Description  Treatment/Interventions: Functional transfer training, LE strengthening/ROM, Elevations, Therapeutic exercise, Endurance training, Patient/family training, Equipment eval/education, Bed mobility, Gait training, Spoke to nursing, Family  Equipment Recommended: Walker(current use of RW for mobility)       See flowsheet documentation for full assessment, interventions and recommendations  Note:   Prognosis: Good  Problem List: Decreased strength, Decreased endurance, Impaired balance, Decreased mobility, Decreased cognition, Pain  Assessment: Functional mobility re-assessment performed; pt presents s/p above vasc procedure and currently exhibits discomfort in non-op (R) LE (describes it as fatigue) and general weakness and deconditioning requiring (A)x1 w/ all aspects of observed mobility; pt was able to amb household distance w/ rw and (A)x1; remained in NAD and appeared to be comfortable at the end of session  Currently, anticipate pt will return home w/ family support upon D/C provided he cont improving w/ mobility skills, endurance and safety (incl on the steps) and if appropriate level of support is available at home; home PT follow up is then recommended; otherwise, rehab may need to be considered; will follow  Barriers to Discharge: Inaccessible home environment     Recommendation: Home PT, Home with family support(pending progress)     PT - OK to Discharge: No    See flowsheet documentation for full assessment

## 2019-11-12 NOTE — PROGRESS NOTES
Progress Note - [address] 78 y o  male MRN: [id number]  Unit/Bed#: [facility] 430-01 Encounter: [id number]    Assessment:  78 M with PAD and LLE claudication status post left femoral endarterectomy, profundoplasty, embolectomy, and iliac stenting  - still with residual SFA stenosis and TACOS of 0 52    Plan:  Plan for angiogram and intervention for residual SFA stenosis  INR 2 7 today, resume coumadin at 2 mg  Continue Plavix  Pain control    Subjective/Objective     Subjective: No acute events overnight  No complaints this morning though still confused  Objective:    Blood pressure 142/64, pulse 77, temperature 98 1 °F (36 7 °C), temperature source Oral, resp  rate 20, height 5' 5" (1 651 m), weight 50 3 kg (111 lb), SpO2 97 %  ,Body mass index is 18 47 kg/m²        Intake/Output Summary (Last 24 hours) at 11/12/2019 0636  Last data filed at 11/12/2019 0316  Gross per 24 hour   Intake 2645 25 ml   Output 1850 ml   Net 795 25 ml       Invasive Devices     Peripheral Intravenous Line            Peripheral IV 11/10/19 Right Forearm 2 days                Physical Exam:   General: NAD, awake and alert  Eyes: PERRL  ENT: moist mucous membranes  Neck: supple  CV: RRR +S1/S2  Chest: breath sounds bilaterally  Abdomen: soft, NT ND  Extremities: left groin incision c/d/i  Doppler PT bilaterally      Results from last 7 days   Lab Units 11/12/19 0424 11/11/19  0509 11/10/19  0514   WBC Thousand/uL 6 50 6 84 7 37   HEMOGLOBIN g/dL 9 8* 10 2* 10 1*   HEMATOCRIT % 30 4* 31 4* 30 7*   PLATELETS Thousands/uL 176 151 129*     Results from last 7 days   Lab Units 11/12/19  0424 11/11/19  0509 11/10/19  0514  11/07/19  1921   POTASSIUM mmol/L 4 0 4 0 3 7   < >  --    CHLORIDE mmol/L 106 111* 111*   < >  --    CO2 mmol/L 25 22 23   < >  --    CO2, I-STAT mmol/L  --   --   --   --  22   BUN mg/dL 25 19 16   < >  --    CREATININE mg/dL 0 71 0 68 0 68   < >  --    GLUCOSE, ISTAT mg/dl  --   --   --   --  128 CALCIUM mg/dL 8 9 8 5 8 4   < >  --     < > = values in this interval not displayed  Results from last 7 days   Lab Units 11/12/19  0424 11/11/19  0509 11/10/19  2222 11/10/19  1612  11/10/19  0514   INR  2 78* 4 83*  --   --   --  3 74*   PTT seconds  --  90* 74* 87*   < > 78*    < > = values in this interval not displayed

## 2019-11-12 NOTE — PLAN OF CARE
Problem: OCCUPATIONAL THERAPY ADULT  Goal: Performs self-care activities at highest level of function for planned discharge setting  See evaluation for individualized goals  Description  Treatment Interventions: ADL retraining, Functional transfer training, Endurance training, Patient/family training, Compensatory technique education          See flowsheet documentation for full assessment, interventions and recommendations  Note:   Limitation: Decreased ADL status, Decreased Safe judgement during ADL, Decreased cognition, Decreased endurance, Decreased high-level ADLs  Prognosis: Good  Assessment: Pt is a 78 y o  YO  male admitted to [facility] on 11/2/2019 w/ PVD s/p left CFA endarterectomy, profundoplasty, embolectomy, arteriogram with retrograde iliac stenting 11/7/19  Comorbidities include a h/o throat cancer, probable vascular dementia, nicotine dependence, and PEG   Pt with active OT orders and activity as tolerated orders    Pt resides in a [facility] with first floor setup and family who is able to assist PRN  Pt was I w/  ADLS and IADLS, (-) drove, & required no use of DME PTA  Family reports he has a RW but does not utilize it  Currently pt is Mod A for transfers and Min A for ADLs  Pt is limited at this time 2*: pain, endurance, activity tolerance, trunk control, functional standing tolerance, unsupportive home environment, decreased I w/ ADLS/IADLS, cognitive impairments, decreased safety awareness and decreased insight into deficits  The following Occupational Performance Areas to address include: grooming, bathing/shower, toilet hygiene, dressing, health maintenance and household maintenance  Pt scored overall  55/100 on the Barthel Index  Based on the aforementioned OT evaluation, functional performance deficits, and assessments, pt has been identified as a high complexity evaluation  From OT standpoint, anticipate d/c home with family support w/ increased family support   Pt to continue to benefit from acute immediate OT services to address the following goals 3-5x/week to  w/in 7-10 days:   OT Discharge Recommendation: Home with family support(increased family support)  OT - OK to Discharge:  Yes

## 2019-11-12 NOTE — RESTORATIVE TECHNICIAN NOTE
Restorative Specialist Mobility Note       Activity: Chair     Assistive Device: Other (Comment)(HHA x 1)

## 2019-11-13 ENCOUNTER — APPOINTMENT (INPATIENT)
Dept: RADIOLOGY | Facility: HOSPITAL | Age: 69
DRG: 252 | End: 2019-11-13
Payer: MEDICARE

## 2019-11-13 LAB
ALBUMIN SERPL BCP-MCNC: 2.4 G/DL (ref 3.5–5)
ALP SERPL-CCNC: 103 U/L (ref 46–116)
ALT SERPL W P-5'-P-CCNC: 100 U/L (ref 12–78)
ANION GAP SERPL CALCULATED.3IONS-SCNC: 7 MMOL/L (ref 4–13)
APTT PPP: 94 SECONDS (ref 23–37)
AST SERPL W P-5'-P-CCNC: 84 U/L (ref 5–45)
BILIRUB DIRECT SERPL-MCNC: 0.13 MG/DL (ref 0–0.2)
BILIRUB SERPL-MCNC: 0.4 MG/DL (ref 0.2–1)
BUN SERPL-MCNC: 34 MG/DL (ref 5–25)
CALCIUM SERPL-MCNC: 8.8 MG/DL (ref 8.3–10.1)
CHLORIDE SERPL-SCNC: 106 MMOL/L (ref 100–108)
CO2 SERPL-SCNC: 25 MMOL/L (ref 21–32)
CREAT SERPL-MCNC: 0.81 MG/DL (ref 0.6–1.3)
ERYTHROCYTE [DISTWIDTH] IN BLOOD BY AUTOMATED COUNT: 14.9 % (ref 11.6–15.1)
GFR SERPL CREATININE-BSD FRML MDRD: 90 ML/MIN/1.73SQ M
GLUCOSE SERPL-MCNC: 106 MG/DL (ref 65–140)
HCT VFR BLD AUTO: 30.3 % (ref 36.5–49.3)
HGB BLD-MCNC: 9.7 G/DL (ref 12–17)
INR PPP: 1.98 (ref 0.84–1.19)
MCH RBC QN AUTO: 30.7 PG (ref 26.8–34.3)
MCHC RBC AUTO-ENTMCNC: 32 G/DL (ref 31.4–37.4)
MCV RBC AUTO: 96 FL (ref 82–98)
PLATELET # BLD AUTO: 201 THOUSANDS/UL (ref 149–390)
PMV BLD AUTO: 11.2 FL (ref 8.9–12.7)
POTASSIUM SERPL-SCNC: 4.2 MMOL/L (ref 3.5–5.3)
PROT SERPL-MCNC: 6.2 G/DL (ref 6.4–8.2)
PROTHROMBIN TIME: 22 SECONDS (ref 11.6–14.5)
RBC # BLD AUTO: 3.16 MILLION/UL (ref 3.88–5.62)
SODIUM SERPL-SCNC: 138 MMOL/L (ref 136–145)
WBC # BLD AUTO: 7.37 THOUSAND/UL (ref 4.31–10.16)

## 2019-11-13 PROCEDURE — 97530 THERAPEUTIC ACTIVITIES: CPT

## 2019-11-13 PROCEDURE — 97110 THERAPEUTIC EXERCISES: CPT

## 2019-11-13 PROCEDURE — 80048 BASIC METABOLIC PNL TOTAL CA: CPT | Performed by: PHYSICIAN ASSISTANT

## 2019-11-13 PROCEDURE — 76700 US EXAM ABDOM COMPLETE: CPT

## 2019-11-13 PROCEDURE — 85730 THROMBOPLASTIN TIME PARTIAL: CPT | Performed by: INTERNAL MEDICINE

## 2019-11-13 PROCEDURE — 99232 SBSQ HOSP IP/OBS MODERATE 35: CPT | Performed by: INTERNAL MEDICINE

## 2019-11-13 PROCEDURE — 80076 HEPATIC FUNCTION PANEL: CPT | Performed by: INTERNAL MEDICINE

## 2019-11-13 PROCEDURE — 85027 COMPLETE CBC AUTOMATED: CPT | Performed by: PHYSICIAN ASSISTANT

## 2019-11-13 PROCEDURE — 99024 POSTOP FOLLOW-UP VISIT: CPT | Performed by: STUDENT IN AN ORGANIZED HEALTH CARE EDUCATION/TRAINING PROGRAM

## 2019-11-13 PROCEDURE — 97116 GAIT TRAINING THERAPY: CPT

## 2019-11-13 PROCEDURE — 85610 PROTHROMBIN TIME: CPT | Performed by: PHYSICIAN ASSISTANT

## 2019-11-13 PROCEDURE — 99222 1ST HOSP IP/OBS MODERATE 55: CPT | Performed by: INTERNAL MEDICINE

## 2019-11-13 RX ORDER — HEPARIN SODIUM 1000 [USP'U]/ML
4000 INJECTION, SOLUTION INTRAVENOUS; SUBCUTANEOUS AS NEEDED
Status: DISCONTINUED | OUTPATIENT
Start: 2019-11-13 | End: 2019-11-23

## 2019-11-13 RX ORDER — SODIUM CHLORIDE 9 MG/ML
75 INJECTION, SOLUTION INTRAVENOUS CONTINUOUS
Status: DISCONTINUED | OUTPATIENT
Start: 2019-11-14 | End: 2019-11-17

## 2019-11-13 RX ORDER — HEPARIN SODIUM 1000 [USP'U]/ML
2000 INJECTION, SOLUTION INTRAVENOUS; SUBCUTANEOUS AS NEEDED
Status: DISCONTINUED | OUTPATIENT
Start: 2019-11-13 | End: 2019-11-23

## 2019-11-13 RX ORDER — HEPARIN SODIUM 10000 [USP'U]/100ML
3-30 INJECTION, SOLUTION INTRAVENOUS
Status: DISCONTINUED | OUTPATIENT
Start: 2019-11-13 | End: 2019-11-23

## 2019-11-13 RX ADMIN — MELATONIN 6 MG: 3 TAB ORAL at 22:58

## 2019-11-13 RX ADMIN — OXYCODONE HYDROCHLORIDE 2.5 MG: 5 TABLET ORAL at 15:01

## 2019-11-13 RX ADMIN — SENNOSIDES AND DOCUSATE SODIUM 1 TABLET: 8.6; 5 TABLET ORAL at 16:34

## 2019-11-13 RX ADMIN — OXYCODONE HYDROCHLORIDE 2.5 MG: 5 TABLET ORAL at 23:31

## 2019-11-13 RX ADMIN — CHLORHEXIDINE GLUCONATE 0.12% ORAL RINSE 15 ML: 1.2 LIQUID ORAL at 09:15

## 2019-11-13 RX ADMIN — CHLORHEXIDINE GLUCONATE 0.12% ORAL RINSE 15 ML: 1.2 LIQUID ORAL at 22:57

## 2019-11-13 RX ADMIN — SENNOSIDES AND DOCUSATE SODIUM 1 TABLET: 8.6; 5 TABLET ORAL at 09:15

## 2019-11-13 RX ADMIN — NICOTINE 1 PATCH: 14 PATCH TRANSDERMAL at 09:16

## 2019-11-13 RX ADMIN — SODIUM CHLORIDE 75 ML/HR: 0.9 INJECTION, SOLUTION INTRAVENOUS at 23:40

## 2019-11-13 RX ADMIN — GABAPENTIN 100 MG: 100 CAPSULE ORAL at 09:15

## 2019-11-13 RX ADMIN — HEPARIN SODIUM 18 UNITS/KG/HR: 10000 INJECTION, SOLUTION INTRAVENOUS at 12:18

## 2019-11-13 RX ADMIN — AMLODIPINE BESYLATE 10 MG: 10 TABLET ORAL at 09:15

## 2019-11-13 RX ADMIN — POLYETHYLENE GLYCOL 3350 17 G: 17 POWDER, FOR SOLUTION ORAL at 16:34

## 2019-11-13 RX ADMIN — CLOPIDOGREL 75 MG: 75 TABLET, FILM COATED ORAL at 09:15

## 2019-11-13 RX ADMIN — THIAMINE HCL TAB 100 MG 100 MG: 100 TAB at 09:15

## 2019-11-13 RX ADMIN — POLYETHYLENE GLYCOL 3350 17 G: 17 POWDER, FOR SOLUTION ORAL at 09:15

## 2019-11-13 RX ADMIN — ASPIRIN 81 MG: 81 TABLET, COATED ORAL at 09:15

## 2019-11-13 RX ADMIN — TAMSULOSIN HYDROCHLORIDE 0.4 MG: 0.4 CAPSULE ORAL at 16:34

## 2019-11-13 NOTE — PROGRESS NOTES
Progress Note - Vascular Surgery   Bello Lopez 78 y o  male MRN: [id number]  Unit/Bed#: [facility] 430-01 Encounter: [id number]    Assessment:  78 M with PAD and LLE claudication s/p L femoral endarterectomy, profundoplasty, SFA embolectomy and iliac stenting, with residual stenosis and decreased TACOS    Plan:  Diet as tolerated  Plan for Angiogram and intervention tomorrow, 11/14  NPO at midnight  Hold coumadin  Continue Plavix    Subjective/Objective     Subjective: No acute events overnight  No complaints this morning  Denies pain  Objective:    Blood pressure 135/60, pulse 77, temperature 98 4 °F (36 9 °C), temperature source Oral, resp  rate 20, height 5' 5" (1 651 m), weight 50 3 kg (111 lb), SpO2 97 %  ,Body mass index is 18 47 kg/m²        Intake/Output Summary (Last 24 hours) at 11/13/2019 0727  Last data filed at 11/13/2019 0601  Gross per 24 hour   Intake 1020 ml   Output 830 ml   Net 190 ml       Invasive Devices     Peripheral Intravenous Line            Peripheral IV 11/10/19 Right Forearm 3 days                Physical Exam:   General: NAD, AAOx3  Eyes: PERRL  ENT: moist mucous membranes  Neck: supple  CV: RRR +S1/S2  Chest: breath sounds bilaterally  Abdomen: soft, NT ND  Extremities: groin incision c/d/i  Doppler PT signals      Results from last 7 days   Lab Units 11/13/19 0454 11/12/19  0424 11/11/19  0509   WBC Thousand/uL 7 37 6 50 6 84   HEMOGLOBIN g/dL 9 7* 9 8* 10 2*   HEMATOCRIT % 30 3* 30 4* 31 4*   PLATELETS Thousands/uL 201 176 151     Results from last 7 days   Lab Units 11/13/19  0454 11/12/19  0424 11/11/19  0509  11/07/19  1921   POTASSIUM mmol/L 4 2 4 0 4 0   < >  --    CHLORIDE mmol/L 106 106 111*   < >  --    CO2 mmol/L 25 25 22   < >  --    CO2, I-STAT mmol/L  --   --   --   --  22   BUN mg/dL 34* 25 19   < >  --    CREATININE mg/dL 0 81 0 71 0 68   < >  --    GLUCOSE, ISTAT mg/dl  --   --   --   --  128   CALCIUM mg/dL 8 8 8 9 8 5   < >  --     < > = values in this interval not displayed       Results from last 7 days   Lab Units 11/13/19  0454 11/12/19  0424 11/11/19  0509 11/10/19  2222 11/10/19  1612   INR  1 98* 2 78* 4 83*  --   --    PTT seconds  --   --  90* 74* 87*

## 2019-11-13 NOTE — ASSESSMENT & PLAN NOTE
· Was placed on heparin and was being bridged to Coumadin per vascular for PAD  · Had rapid rise in INR - seen by Hematology - likely from underlying vitamin K deficiency  · Coumadin to be started at lower dose of 2 mg when INR drops below 2 per hematology   · Heparin/lovenox to Coumadin bridge when INR below 2

## 2019-11-13 NOTE — ASSESSMENT & PLAN NOTE
· PAD and left lower extremity claudication - post left femoral endarterectomy, profundoplasty, SFA embolectomy and retrograde iliac stenting  · Has residual left SFA stenosis with TACOS of 0 52  · Vascular following - input appreciated - for angiogram and possible intervention for residual SFA stenosis when INR is less than 2  · INR 2 78 today    Coumadin held  · Continue Plavix

## 2019-11-13 NOTE — PROGRESS NOTES
Progress Note - Jasmin Cummings 1950, 78 y o  male MRN: [id number]    Unit/Bed#: [facility] 430-01 Encounter: [id number]    Primary Care Provider: Jovanny Ozuna MD   Date and time admitted to hospital: 11/2/2019  2:01 AM        * PAD (peripheral artery disease) ([facility] )  Assessment & Plan  · PAD and left lower extremity claudication - post left femoral endarterectomy, profundoplasty, SFA embolectomy and retrograde iliac stenting  · Has residual left SFA stenosis with TACOS of 0 52  · Vascular following - input appreciated - for angiogram and possible intervention for residual SFA stenosis when INR is less than 2  · INR 2 78 today  Coumadin held  · Continue Plavix      Syncope and collapse  Assessment & Plan  · Had RRT while IP for syncope and was intially though to have CHB, was started on dopamine infusion and was transferred to ICU  · Cardiology evaluated the patient in icu, now off dopamine and OOU  · likely a vasovagal syncope in setting of severe pain and transient second degree heart block  · Echo showed EF of 65% with no WMA or no AS    PLAN:  · Avoid BB   · Adequate pain control      History of throat cancer  Assessment & Plan  · Per daughter, pt diagnosed with throat CA 2 years ago and has been treated with chemo and radiation  · Not undergoing treatment at this time    Hypertension  Assessment & Plan  · BP acceptable with increase in dose of amlodipine to 10 mg daily  · Continue hydralazine p r n      Dependence on nicotine from cigarettes  Assessment & Plan  · Still smokes despite diagnosis of throat cancer, per daughter  · She thinks he smokes 1ppd  · Nicotine patch  · Provide smoking cessation education    Coagulopathy ([facility] )  Assessment & Plan  · Was placed on heparin and was being bridged to Coumadin per vascular for PAD  · Had rapid rise in INR - seen by Hematology - likely from underlying vitamin K deficiency  · Coumadin to be started at lower dose of 2 mg when INR drops below 2 per hematology   · Heparin/lovenox to Coumadin bridge when INR below 2    Transaminitis  Assessment & Plan  · Not POA  · Likely from hypotension  · Monitor      VTE Pharmacologic Prophylaxis:   Pharmacologic: INR 2 78      Patient Centered Rounds: I have performed bedside rounds with nursing staff today  Discussions with Specialists or Other Care Team Provider:  Discussed with vascular surgery    Education and Discussions with Family / Patient:  Discussed with patient and son Eduar Whatley on the phone    Time Spent for Care: 20 minutes  More than 50% of total time spent on counseling and coordination of care as described above  Current Length of Stay: 10 day(s)    Current Patient Status: Inpatient   Certification Statement: The patient will continue to require additional inpatient hospital stay due to Peripheral arterial disease awaiting angiogram    Code Status: Level 1 - Full Code    Subjective:   Resting comfortably in bed  Left lower extremity pain controlled    Objective:     Vitals:   Temp (24hrs), Av 4 °F (36 9 °C), Min:98 °F (36 7 °C), Max:98 9 °F (37 2 °C)    Temp:  [98 °F (36 7 °C)-98 9 °F (37 2 °C)] 98 9 °F (37 2 °C)  HR:  [77-87] 82  Resp:  [18-20] 18  BP: (119-142)/(57-65) 119/57  SpO2:  [95 %-97 %] 95 %  Body mass index is 18 47 kg/m²  Physical Exam:     Physical Exam   HENT:   Head: Normocephalic and atraumatic  Eyes: Pupils are equal, round, and reactive to light  EOM are normal    Neck: Normal range of motion  Neck supple  Cardiovascular: Normal rate and regular rhythm  Pulmonary/Chest: Effort normal and breath sounds normal    Abdominal: Soft  Bowel sounds are normal    Musculoskeletal: He exhibits no edema  Left leg tender  Left groin incision - clean/dry/intact   Neurological: He is alert  Psychiatric: He has a normal mood and affect         Additional Data:     Labs:    Results from last 7 days   Lab Units 19  0424   WBC Thousand/uL 6 50   HEMOGLOBIN g/dL 9 8*   HEMATOCRIT % 30 4*   PLATELETS Thousands/uL 176   NEUTROS PCT % 78*   LYMPHS PCT % 7*   MONOS PCT % 9   EOS PCT % 4     Results from last 7 days   Lab Units 11/12/19  0424   SODIUM mmol/L 138   POTASSIUM mmol/L 4 0   CHLORIDE mmol/L 106   CO2 mmol/L 25   BUN mg/dL 25   CREATININE mg/dL 0 71   ANION GAP mmol/L 7   CALCIUM mg/dL 8 9   ALBUMIN g/dL 2 6*   TOTAL BILIRUBIN mg/dL 0 31   ALK PHOS U/L 93   ALT U/L 91*   AST U/L 77*   GLUCOSE RANDOM mg/dL 105     Results from last 7 days   Lab Units 11/12/19  0424   INR  2 78*         * I Have Reviewed All Lab Data Listed Above  * Additional Pertinent Lab Tests Reviewed:  Frank 65 Admission Reviewed      Last 24 Hours Medication List:     Current Facility-Administered Medications:  acetaminophen 650 mg Oral Q6H PRN Adriano Christensen MD   amLODIPine 10 mg Oral Daily Rachaeldeniz Dominguze MD   aspirin 81 mg Oral Daily Adriano Christensen MD   atorvastatin 40 mg Oral Daily With Izabel Hylton MD   bisacodyl 10 mg Rectal Daily PRN Adriano Christensen MD   chlorhexidine 15 mL Swish & Spit Q12H Bonillaléneleanor U  78 , MD   clopidogrel 75 mg Oral Daily Adriano Christensen MD   gabapentin 100 mg Oral Daily Adriano Christensen MD   hydrALAZINE 10 mg Intravenous Q4H PRN Massiel Haskins PA-C   lidocaine (PF) 0 5 mL Infiltration Once PRN Adriano Christensen MD   melatonin 6 mg Oral HS Adriano Christensen MD   nicotine 1 patch Transdermal Daily Adriano Christensen MD   OLANZapine 2 5 mg Intramuscular Q8H PRN Adriano Christensen MD   oxyCODONE 2 5 mg Oral Q4H PRN Adriano Crhistensen MD   oxyCODONE 5 mg Oral Q4H PRN Adriano Christensen MD   polyethylene glycol 17 g Oral BID Adriano Christensen MD   senna-docusate sodium 1 tablet Oral BID Adriano Christensen MD   tamsulosin 0 4 mg Oral Daily With Izabel Hylton MD   thiamine 100 mg Oral Daily Adriano Christensen MD        Today, Patient Was Seen By: Seema Sousa MD    ** Please Note: Dictation voice to text software may have been used in the creation of this document   **

## 2019-11-13 NOTE — PHYSICAL THERAPY NOTE
Physical Therapy Treatment Note     11/13/19 1126   Pain Assessment   Pain Assessment 0-10   Pain Score Worst Possible Pain   Pain Type Other (Comment); Chronic pain;Acute pain   Pain Location Foot   Pain Orientation Right;Left   Restrictions/Precautions   Weight Bearing Precautions Per Order No   Other Precautions Fall Risk;Pain; Chair Alarm; Bed Alarm  (Eth[place]ian speaking  son translates)   General   Chart Reviewed Yes   Family/Caregiver Present No  (talked on the phone)   Cognition   Overall Cognitive Status WFL   Subjective   Subjective Pt reported he has pain in his R foot which is chronic and L foot is also painful however increased pain in R foot currently  This therapist talked to son on the phone and all this was translated by the son to the therapist  pt  needed encouragement to participate in session due to pain  Pt  in bed supine upon entry   Bed Mobility   Supine to Sit 4  Minimal assistance   Additional items Assist x 1;Bedrails;HOB elevated; Increased time required   Transfers   Sit to Stand 4  Minimal assistance   Additional items Assist x 1; Increased time required;Verbal cues   Stand to Sit 4  Minimal assistance   Additional items Assist x 1; Increased time required;Verbal cues;Armrests   Stand pivot 4  Minimal assistance   Additional items Assist x 1; Increased time required   Ambulation/Elevation   Gait pattern Antalgic;Decreased foot clearance; Improper Weight shift;Decreased R stance; Inconsistent kusum; Excessively slow; Short stride   Gait Assistance 4  Minimal assist   Additional items Assist x 1;Assist x 2;Verbal cues  (2nd A for chair follow)   Assistive Device Rolling walker   Distance 50ft, 10ft   Stair Management Assistance 4  Minimal assist   Additional items Assist x 1;Verbal cues; Increased time required; Tactile cues   Stair Management Technique Two rails; Step to pattern; Alternating pattern; Foreward;Reciprocal;Nonreciprocal   Number of Stairs 7   Balance   Static Sitting Fair +   Ambulatory Poor +   Endurance Deficit   Endurance Deficit Yes   Endurance Deficit Description pain   Activity Tolerance   Activity Tolerance Patient limited by pain   Nurse Made Aware Yes   Exercises   TKR Sitting;Bilateral;AROM;10 reps   Assessment   Prognosis Good   Problem List Impaired balance;Decreased mobility; Decreased coordination; Impaired judgement;Decreased safety awareness;Pain;Decreased skin integrity   Assessment Pt  able to tolerate all activities and partiicpated well despite of the LE pain  Recommended to son to bring shoes for patient to ambulate  pt  was limited in ambualtion this day due to increased pain with WBing  Increased pain reported in R foot which son reported is chronic  Negotiated steps with Min A and cues for LE sequencing  Will continue to follow during the stay to maximize functional mobility  Decreased gait distance and hands on A needed for safe mobility  Goals   Patient Goals None reporterd   STG Expiration Date 11/22/19   PT Treatment Day 1   Plan   Treatment/Interventions Functional transfer training;LE strengthening/ROM; Elevations; Therapeutic exercise;Patient/family training;Equipment eval/education; Bed mobility;Gait training;Spoke to nursing;Family   Progress Progressing toward goals   PT Frequency Other (Comment)  (3-5x/week)   Recommendation   Recommendation Home PT; Home with family support  (pending progress)   Equipment Recommended Raquel Diaz, PTA

## 2019-11-13 NOTE — PLAN OF CARE
Problem: PHYSICAL THERAPY ADULT  Goal: Performs mobility at highest level of function for planned discharge setting  See evaluation for individualized goals  Description  Treatment/Interventions: Functional transfer training, LE strengthening/ROM, Elevations, Therapeutic exercise, Endurance training, Patient/family training, Equipment eval/education, Bed mobility, Gait training, Spoke to nursing, Family  Equipment Recommended: Walker(current use of RW for mobility)       See flowsheet documentation for full assessment, interventions and recommendations  Outcome: Progressing  Note:   Prognosis: Good  Problem List: Impaired balance, Decreased mobility, Decreased coordination, Impaired judgement, Decreased safety awareness, Pain, Decreased skin integrity  Assessment: Pt  able to tolerate all activities and partiicpated well despite of the LE pain  Recommended to son to bring shoes for patient to ambulate  pt  was limited in ambualtion this day due to increased pain with WBing  Increased pain reported in R foot which son reported is chronic  Negotiated steps with Min A and cues for LE sequencing  Will continue to follow during the stay to maximize functional mobility  Decreased gait distance and hands on A needed for safe mobility  Barriers to Discharge: Inaccessible home environment     Recommendation: Home PT, Home with family support(pending progress)     PT - OK to Discharge: No    See flowsheet documentation for full assessment

## 2019-11-13 NOTE — ASSESSMENT & PLAN NOTE
· Had RRT while IP for syncope and was intially though to have CHB, was started on dopamine infusion and was transferred to ICU  · Cardiology evaluated the patient in icu, now off dopamine and OOU    · likely a vasovagal syncope in setting of severe pain and transient second degree heart block  · Echo showed EF of 65% with no WMA or no AS    PLAN:  · Avoid BB   · Adequate pain control

## 2019-11-13 NOTE — PROGRESS NOTES
Progress Note - Geriatric Medicine   Aliyah Wilder 78 y o  male MRN: [id number]  Unit/Bed#: [facility] 430-01 Encounter: [id number]      Assessment/Plan:    Peripheral artery disease  -s/p CFA endarectomy, embolectomy and A-gram with retrograde iliac stent  -residual left SFA stenosis, confirmed on TACOS, vascular planning for A-gram and possible intervention tomorrow  -currently on Plavix, Coumadin on hold to maintain INR<2 for procedure per vascular    Acute pain due to claudication  -intermittent and now occurring at rest as well, continue treatment per vascular surgery  -pain is well controlled on current regimen  -continue monitor vascular status closely    Ambulatory Dysfunction  -multifactorial including acute and chronic medical conditions such as peripheral vascular disease  -continue fall precautions  -encourage use of assistant devices as directed by PT/OT  -encourage adequate lighting and assistance when out of bed  -encourage reduction of tethers to bed/IV to reduce trip hazards: consider hep lock IV when not actively infusing, ensure call bell is within reach  -encourage appropriate body mechanics and monitor for hypotension/orthostatic hypotension  -recommend appropriate footwear at all times   -PT/OT and early frequent mobilization    Deconditioning/debility/frailty  -albumin remains low at 2 4, total protein remains low at 6 2  -appetite remains somewhat poor and is possibly related to poor vasculature although patient does not report abdominal pain with eating which would suggest mesenteric ischemia  -continue to encourage nutritional intake  -encourage boost and Ensure supplements between meals and not at time of tray so they service supplement and not replace primary mealtime intake    Transaminitis  -LFTs slightly elevated from yesterday, medical team managing, abdominal US completed earlier today and with no significant sonographic abnormality noted  -GI consult pending  -continue to closely monitor LFTs and limit meds with extensive hepatic metabolism as able    Vascular dementia, probable  -continue supportive care  -continue to offer frequent redirection reorientation  -patient is doing well off one-to-one supervision and although is forgetful is appropriate and able to make his needs known  -continue to re-evaluate and recommend comprehensive geriatric assessment outpatient  -patient will likely require higher level of care on discharge as discussed with family    Acute metabolic encephalopathy  -resolved, continue standard delirium precautions as pt is very high risk of recurrence    Care coordination: A/P discussed with patient, RN and SLIM attending    Subjective:   Patient seen and examined at the bedside where he is sitting resting  Initially tried to use Trellise  phone but when  on phone patient said clearly in [place] "I dont know what they are saying"  He then proceeded to tell me that his left anterior leg is hurting and he is amenable to taking his pain medication, nursing reports that he has been doing well off the 1:1, has been appropriate and calling for help when needed  Review of Systems   Constitutional: Negative for appetite change, chills and fever  HENT: Negative for hearing loss and trouble swallowing  Eyes: Negative for visual disturbance  Respiratory: Negative for cough, shortness of breath and wheezing  Cardiovascular: Negative for chest pain and palpitations  Gastrointestinal: Negative for nausea and vomiting  Genitourinary: Negative for difficulty urinating  Musculoskeletal: Positive for gait problem (due to PVD)  Skin: Negative  Neurological: Negative for speech difficulty, weakness, light-headedness and headaches  Hematological: Does not bruise/bleed easily  Psychiatric/Behavioral: Positive for decreased concentration  Negative for agitation and sleep disturbance  The patient is not nervous/anxious      All other systems reviewed and are negative  Objective:     Vitals: Blood pressure 135/60, pulse 77, temperature 98 4 °F (36 9 °C), temperature source Oral, resp  rate 20, height 5' 5" (1 651 m), weight 50 3 kg (111 lb), SpO2 97 %  ,Body mass index is 18 47 kg/m²  Intake/Output Summary (Last 24 hours) at 11/13/2019 1502  Last data filed at 11/13/2019 1410  Gross per 24 hour   Intake 360 ml   Output 900 ml   Net -540 ml     Current Medications: Reviewed    Physical Exam:   Physical Exam   Constitutional: He appears well-developed and well-nourished  The patient patient is stable, restless in bed which she reports is due to left lower extremity pain   HENT:   Head: Normocephalic and atraumatic  Mouth/Throat: Oropharynx is clear and moist    Eyes: Conjunctivae and EOM are normal  No scleral icterus  Neck: Normal range of motion  No tracheal deviation present  Cardiovascular: Normal rate  No murmur heard  Pulmonary/Chest: Effort normal  No respiratory distress  He has no wheezes  Abdominal: Soft  Bowel sounds are normal  There is no tenderness  Musculoskeletal: He exhibits tenderness (LLE to palpation of anterior shin)  He exhibits no edema  Neurological:   Awake and alert person and place   Skin: Skin is warm and dry  Psychiatric: He has a normal mood and affect  Nursing note and vitals reviewed       Invasive Devices     Peripheral Intravenous Line            Peripheral IV 11/10/19 Right Forearm 3 days              Lab, Imaging and other studies:   -ultrasound abdomen 11/13/2019 revealed no significant sonographic abnormality, patent hepatic vasculature with normal directional blood flow  -sodium 138, potassium 4 2, chloride 106, CO2 25, BUN 34, creatinine 0 1, glucose 106, AST 84, , alk phos 103, total protein 6 2, albumin 2 4, GFR 90

## 2019-11-13 NOTE — CONSULTS
Consultation - [facility]  Rogelio Jain 78 y o  male MRN: [id number]  Unit/Bed#: [facility] 430-01 Encounter: [id number]              Inpatient consult to gastroenterology     Performed by  Alexsandra Dan MD     Authorized by Sean Bauman MD              Reason for Consult / Principal Problem:     Elevated LFTs      ASSESSMENT AND PLAN:      67-year-old with history peripheral arterial disease status post femoral endarterectomy SFA embolectomy, iliac stenting during current hospitalization  GI consulted for elevation of liver enzymes  1  Elevated liver enzymes  Hepatocellular pattern  Acute mild elevation  Patient has received multiple medications since 11/03/2019 that could potentially result in liver enzyme elevation/DILI  This includes hydralazine,olanzapine amlodipine, gabapentin and less commonly tamsulosin  Recommend to discuss with pharmacy about finding alternatives to potentially hepatotoxic medications  Will await hepatitis panel results  patient seen and staffed with attending, Dr Elbert Rose MD  Gastroenterology Fellow  [address]  Date: November 13, 2019      ______________________________________________________________________    HPI:  67-year-old with history peripheral arterial disease status post femoral endarterectomy SFA embolectomy, iliac stenting during current hospitalization  GI consulted for elevation of liver enzymes  Patient  does not speak English or translation was provided by son over the phone  He denies any nausea, vomiting, abdominal pain, heartburn, constipation, diarrhea, weight loss  No history of alcohol abuse, smoking, recreation drug use  On review of records AST ALT were normal on 11/03 and then started climbing yesterday when AST was 77 and ALT 91  Today ALT is 100 and AST is 84 while total bili is normal   Albumin 2 4 which was normal on 11/03 labs    INR 1 98 which is come down from 4 83 3 days ago  Patient was on Coumadin which was held due to elevated INR  Ultrasound with Doppler is negative for any liver disease or clots in the portal circulation  Echo on 11/03/2019 did not show heart failure  REVIEW OF SYSTEMS:    CONSTITUTIONAL: Denies any fever, chills, rigors, and weight loss  HEENT: No earache or tinnitus  Denies hearing loss or visual disturbances  CARDIOVASCULAR: No chest pain or palpitations  RESPIRATORY: Denies any cough, hemoptysis, shortness of breath or dyspnea on exertion  GASTROINTESTINAL: As noted in the History of Present Illness  GENITOURINARY: No problems with urination  Denies any hematuria or dysuria  NEUROLOGIC: No dizziness or vertigo, denies headaches  MUSCULOSKELETAL: Denies any muscle or joint pain  SKIN: Denies skin rashes or itching  ENDOCRINE: Denies excessive thirst  Denies intolerance to heat or cold  PSYCHOSOCIAL: Denies depression or anxiety  Denies any recent memory loss         Historical Information   Past Medical History:   Diagnosis Date    Cancer ([facility] )     throat cancer    PAD (peripheral artery disease) ([place] [address] ) 11/2/2019    PEG (percutaneous endoscopic gastrostomy) status ([place] [address] )     Port-A-Cath in place      Past Surgical History:   Procedure Laterality Date    BYPASS FEMORAL-FEMORAL Right     ESOPHAGOSCOPY N/A 8/24/2017    Procedure: ESOPHAGOSCOPY FLEXIBLE;  Surgeon: Idania Moore MD;  [facility];  Service: ENT    MS [place] [address] N/A 8/24/2017    Procedure: Bronchoscopy;  Surgeon: Idania Moore MD;  [facility];  Service: ENT    MS LARYNGOSCOPY,DIRCT,OP,BIOPSY N/A 8/24/2017    Procedure: LARYNGOSCOPY DIRECT;  Surgeon: Idania Moore MD;  [facility];  Service: ENT    MS [facility] Left 11/7/2019    Procedure: LEFT ENDARTERECTOMY ARTERIAL FEMORAL; L CFAE WITH PROFUNDOPLASTY; ARTERIOGRAM;RETROGRADE ILIAC STENTING;  Surgeon: Conner Fisher MD; [facility];  Service: Vascular    TOE AMPUTATION Right     2 toes     Social History   Social History     Substance and Sexual Activity   Alcohol Use Never    Alcohol/week: 0 0 standard drinks    Frequency: Patient refused    Drinks per session: Patient refused    Binge frequency: Patient refused     Social History     Substance and Sexual Activity   Drug Use No     Social History     Tobacco Use   Smoking Status Current Every Day Smoker    Packs/day: 1 00    Years: 15 00    Pack years: 15 00    Types: Cigarettes   Smokeless Tobacco Never Used   Tobacco Comment    50+ years     History reviewed  No pertinent family history      Meds/Allergies     Medications Prior to Admission   Medication    acetaminophen (TYLENOL) 500 mg tablet    chlorhexidine (PERIDEX) 0 12 % solution    hydrocortisone 2 5 % cream    ibuprofen (MOTRIN) 600 mg tablet    morphine (MS CONTIN) 15 mg 12 hr tablet    morphine (MSIR) 15 mg tablet    neomycin-bacitracin-polymyxin b (NEOSPORIN) ointment    Nutritional Supplements (NUTREN 1 5) LIQD    vitamin E, tocopherol, 1,000 units capsule     Current Facility-Administered Medications   Medication Dose Route Frequency    acetaminophen (TYLENOL) tablet 650 mg  650 mg Oral Q6H PRN    amLODIPine (NORVASC) tablet 10 mg  10 mg Oral Daily    aspirin (ECOTRIN LOW STRENGTH) EC tablet 81 mg  81 mg Oral Daily    bisacodyl (DULCOLAX) rectal suppository 10 mg  10 mg Rectal Daily PRN    chlorhexidine (PERIDEX) 0 12 % oral rinse 15 mL  15 mL Swish & Spit Q12H [address]    clopidogrel (PLAVIX) tablet 75 mg  75 mg Oral Daily    gabapentin (NEURONTIN) capsule 100 mg  100 mg Oral Daily    heparin (porcine) 25,000 units in 250 mL infusion (premix)  3-30 Units/kg/hr (Order-Specific) Intravenous Titrated    heparin (porcine) injection 2,000 Units  2,000 Units Intravenous PRN    heparin (porcine) injection 4,000 Units  4,000 Units Intravenous PRN    hydrALAZINE (APRESOLINE) injection 10 mg 10 mg Intravenous Q4H PRN    lidocaine (PF) (XYLOCAINE-MPF) 1 % injection 0 5 mL  0 5 mL Infiltration Once PRN    melatonin tablet 6 mg  6 mg Oral HS    nicotine (NICODERM CQ) 14 mg/24hr TD 24 hr patch 1 patch  1 patch Transdermal Daily    OLANZapine (ZyPREXA) IM injection 2 5 mg  2 5 mg Intramuscular Q8H PRN    oxyCODONE (ROXICODONE) IR tablet 2 5 mg  2 5 mg Oral Q4H PRN    oxyCODONE (ROXICODONE) IR tablet 5 mg  5 mg Oral Q4H PRN    polyethylene glycol (MIRALAX) packet 17 g  17 g Oral BID    senna-docusate sodium (SENOKOT S) 8 6-50 mg per tablet 1 tablet  1 tablet Oral BID    [START ON 11/14/2019] sodium chloride 0 9 % infusion  75 mL/hr Intravenous Continuous    tamsulosin (FLOMAX) capsule 0 4 mg  0 4 mg Oral Daily With Dinner    thiamine (VITAMIN B1) tablet 100 mg  100 mg Oral Daily       No Known Allergies        Objective     Blood pressure 127/61, pulse 85, temperature 98 7 °F (37 1 °C), temperature source Oral, resp  rate 18, height 5' 5" (1 651 m), weight 50 3 kg (111 lb), SpO2 97 %  Body mass index is 18 47 kg/m²  Intake/Output Summary (Last 24 hours) at 11/13/2019 1636  Last data filed at 11/13/2019 1600  Gross per 24 hour   Intake 481 57 ml   Output 900 ml   Net -418 43 ml         PHYSICAL EXAM:      General Appearance:   Alert, cooperative, no distress   HEENT:   Normocephalic, atraumatic, anicteric      Neck:  Supple, symmetrical, trachea midline   Lungs:   Clear to auscultation bilaterally; no rales, rhonchi or wheezing; respirations unlabored    Heart[de-identified]   Regular rate and rhythm; no murmur, rub, or gallop     Abdomen:   Soft, non-tender, non-distended; normal bowel sounds; no masses, no organomegaly    Genitalia:   Deferred    Rectal:   Deferred    Extremities:  No cyanosis, clubbing or edema    Pulses:  2+ and symmetric all extremities    Skin:  No jaundice, rashes, or lesions    Lymph nodes:  No palpable cervical lymphadenopathy        Lab Results:   No results displayed because visit has over 200 results  Imaging Studies: I have personally reviewed pertinent imaging studies

## 2019-11-14 ENCOUNTER — APPOINTMENT (INPATIENT)
Dept: RADIOLOGY | Facility: HOSPITAL | Age: 69
DRG: 252 | End: 2019-11-14
Payer: MEDICARE

## 2019-11-14 ENCOUNTER — APPOINTMENT (INPATIENT)
Dept: NON INVASIVE DIAGNOSTICS | Facility: HOSPITAL | Age: 69
DRG: 252 | End: 2019-11-14
Payer: MEDICARE

## 2019-11-14 LAB
ALBUMIN SERPL BCP-MCNC: 2.1 G/DL (ref 3.5–5)
ALP SERPL-CCNC: 98 U/L (ref 46–116)
ALT SERPL W P-5'-P-CCNC: 124 U/L (ref 12–78)
ANION GAP SERPL CALCULATED.3IONS-SCNC: 6 MMOL/L (ref 4–13)
APTT PPP: 146 SECONDS (ref 23–37)
APTT PPP: 154 SECONDS (ref 23–37)
APTT PPP: 41 SECONDS (ref 23–37)
APTT PPP: 72 SECONDS (ref 23–37)
APTT PPP: 72 SECONDS (ref 23–37)
AST SERPL W P-5'-P-CCNC: 112 U/L (ref 5–45)
BILIRUB DIRECT SERPL-MCNC: 0.12 MG/DL (ref 0–0.2)
BILIRUB SERPL-MCNC: 0.37 MG/DL (ref 0.2–1)
BUN SERPL-MCNC: 25 MG/DL (ref 5–25)
CALCIUM SERPL-MCNC: 8.3 MG/DL (ref 8.3–10.1)
CHLORIDE SERPL-SCNC: 107 MMOL/L (ref 100–108)
CO2 SERPL-SCNC: 24 MMOL/L (ref 21–32)
CREAT SERPL-MCNC: 0.8 MG/DL (ref 0.6–1.3)
ERYTHROCYTE [DISTWIDTH] IN BLOOD BY AUTOMATED COUNT: 15 % (ref 11.6–15.1)
GFR SERPL CREATININE-BSD FRML MDRD: 91 ML/MIN/1.73SQ M
GLUCOSE SERPL-MCNC: 106 MG/DL (ref 65–140)
HAV IGM SER QL: NORMAL
HBV CORE IGM SER QL: NORMAL
HBV SURFACE AG SER QL: NORMAL
HCT VFR BLD AUTO: 26.3 % (ref 36.5–49.3)
HCV AB SER QL: NORMAL
HGB BLD-MCNC: 8.5 G/DL (ref 12–17)
INR PPP: 1.52 (ref 0.84–1.19)
MCH RBC QN AUTO: 31 PG (ref 26.8–34.3)
MCHC RBC AUTO-ENTMCNC: 32.3 G/DL (ref 31.4–37.4)
MCV RBC AUTO: 96 FL (ref 82–98)
PLATELET # BLD AUTO: 226 THOUSANDS/UL (ref 149–390)
PMV BLD AUTO: 11.2 FL (ref 8.9–12.7)
POTASSIUM SERPL-SCNC: 4.4 MMOL/L (ref 3.5–5.3)
PROT SERPL-MCNC: 5.8 G/DL (ref 6.4–8.2)
PROTHROMBIN TIME: 17.8 SECONDS (ref 11.6–14.5)
RBC # BLD AUTO: 2.74 MILLION/UL (ref 3.88–5.62)
SODIUM SERPL-SCNC: 137 MMOL/L (ref 136–145)
WBC # BLD AUTO: 7.02 THOUSAND/UL (ref 4.31–10.16)

## 2019-11-14 PROCEDURE — 85730 THROMBOPLASTIN TIME PARTIAL: CPT | Performed by: SURGERY

## 2019-11-14 PROCEDURE — B41DYZZ FLUOROSCOPY OF AORTA AND BILATERAL LOWER EXTREMITY ARTERIES USING OTHER CONTRAST: ICD-10-PCS | Performed by: RADIOLOGY

## 2019-11-14 PROCEDURE — C1769 GUIDE WIRE: HCPCS

## 2019-11-14 PROCEDURE — C1725 CATH, TRANSLUMIN NON-LASER: HCPCS

## 2019-11-14 PROCEDURE — 85730 THROMBOPLASTIN TIME PARTIAL: CPT | Performed by: INTERNAL MEDICINE

## 2019-11-14 PROCEDURE — 047K3ZZ DILATION OF RIGHT FEMORAL ARTERY, PERCUTANEOUS APPROACH: ICD-10-PCS | Performed by: RADIOLOGY

## 2019-11-14 PROCEDURE — 047L3ZZ DILATION OF LEFT FEMORAL ARTERY, PERCUTANEOUS APPROACH: ICD-10-PCS | Performed by: RADIOLOGY

## 2019-11-14 PROCEDURE — C1894 INTRO/SHEATH, NON-LASER: HCPCS

## 2019-11-14 PROCEDURE — C1760 CLOSURE DEV, VASC: HCPCS

## 2019-11-14 PROCEDURE — 80074 ACUTE HEPATITIS PANEL: CPT | Performed by: INTERNAL MEDICINE

## 2019-11-14 PROCEDURE — 76937 US GUIDE VASCULAR ACCESS: CPT

## 2019-11-14 PROCEDURE — 37224 PR REVSC OPN/PRG FEM/POP W/ANGIOPLASTY UNI: CPT | Performed by: RADIOLOGY

## 2019-11-14 PROCEDURE — 80048 BASIC METABOLIC PNL TOTAL CA: CPT | Performed by: INTERNAL MEDICINE

## 2019-11-14 PROCEDURE — 99024 POSTOP FOLLOW-UP VISIT: CPT | Performed by: RADIOLOGY

## 2019-11-14 PROCEDURE — 99152 MOD SED SAME PHYS/QHP 5/>YRS: CPT

## 2019-11-14 PROCEDURE — 99152 MOD SED SAME PHYS/QHP 5/>YRS: CPT | Performed by: RADIOLOGY

## 2019-11-14 PROCEDURE — 76937 US GUIDE VASCULAR ACCESS: CPT | Performed by: RADIOLOGY

## 2019-11-14 PROCEDURE — C1887 CATHETER, GUIDING: HCPCS

## 2019-11-14 PROCEDURE — 85610 PROTHROMBIN TIME: CPT | Performed by: PHYSICIAN ASSISTANT

## 2019-11-14 PROCEDURE — 75625 CONTRAST EXAM ABDOMINL AORTA: CPT

## 2019-11-14 PROCEDURE — 3E05317 INTRODUCTION OF OTHER THROMBOLYTIC INTO PERIPHERAL ARTERY, PERCUTANEOUS APPROACH: ICD-10-PCS | Performed by: RADIOLOGY

## 2019-11-14 PROCEDURE — 85027 COMPLETE CBC AUTOMATED: CPT | Performed by: INTERNAL MEDICINE

## 2019-11-14 PROCEDURE — 99153 MOD SED SAME PHYS/QHP EA: CPT

## 2019-11-14 PROCEDURE — 047N3ZZ DILATION OF LEFT POPLITEAL ARTERY, PERCUTANEOUS APPROACH: ICD-10-PCS | Performed by: RADIOLOGY

## 2019-11-14 PROCEDURE — 75716 ARTERY X-RAYS ARMS/LEGS: CPT | Performed by: RADIOLOGY

## 2019-11-14 PROCEDURE — 99024 POSTOP FOLLOW-UP VISIT: CPT | Performed by: SURGERY

## 2019-11-14 PROCEDURE — 80076 HEPATIC FUNCTION PANEL: CPT | Performed by: INTERNAL MEDICINE

## 2019-11-14 PROCEDURE — 99232 SBSQ HOSP IP/OBS MODERATE 35: CPT | Performed by: INTERNAL MEDICINE

## 2019-11-14 PROCEDURE — 76942 ECHO GUIDE FOR BIOPSY: CPT

## 2019-11-14 RX ORDER — FENTANYL CITRATE 50 UG/ML
INJECTION, SOLUTION INTRAMUSCULAR; INTRAVENOUS CODE/TRAUMA/SEDATION MEDICATION
Status: COMPLETED | OUTPATIENT
Start: 2019-11-14 | End: 2019-11-14

## 2019-11-14 RX ORDER — LIDOCAINE WITH 8.4% SOD BICARB 0.9%(10ML)
SYRINGE (ML) INJECTION CODE/TRAUMA/SEDATION MEDICATION
Status: COMPLETED | OUTPATIENT
Start: 2019-11-14 | End: 2019-11-14

## 2019-11-14 RX ORDER — MIDAZOLAM HYDROCHLORIDE 2 MG/2ML
INJECTION, SOLUTION INTRAMUSCULAR; INTRAVENOUS CODE/TRAUMA/SEDATION MEDICATION
Status: COMPLETED | OUTPATIENT
Start: 2019-11-14 | End: 2019-11-14

## 2019-11-14 RX ORDER — DIPHENHYDRAMINE HYDROCHLORIDE 50 MG/ML
INJECTION INTRAMUSCULAR; INTRAVENOUS CODE/TRAUMA/SEDATION MEDICATION
Status: COMPLETED | OUTPATIENT
Start: 2019-11-14 | End: 2019-11-14

## 2019-11-14 RX ADMIN — MIDAZOLAM 0.5 MG: 1 INJECTION INTRAMUSCULAR; INTRAVENOUS at 13:53

## 2019-11-14 RX ADMIN — MIDAZOLAM 0.5 MG: 1 INJECTION INTRAMUSCULAR; INTRAVENOUS at 14:47

## 2019-11-14 RX ADMIN — NICOTINE 1 PATCH: 14 PATCH TRANSDERMAL at 09:24

## 2019-11-14 RX ADMIN — DIPHENHYDRAMINE HYDROCHLORIDE 25 MG: 50 INJECTION, SOLUTION INTRAMUSCULAR; INTRAVENOUS at 13:50

## 2019-11-14 RX ADMIN — MIDAZOLAM 0.5 MG: 1 INJECTION INTRAMUSCULAR; INTRAVENOUS at 14:22

## 2019-11-14 RX ADMIN — LIDOCAINE HYDROCHLORIDE 10 ML: 10 INJECTION, SOLUTION INFILTRATION; PERINEURAL at 16:43

## 2019-11-14 RX ADMIN — IODIXANOL 85 ML: 320 INJECTION, SOLUTION INTRAVASCULAR at 15:43

## 2019-11-14 RX ADMIN — FENTANYL CITRATE 25 MCG: 50 INJECTION, SOLUTION INTRAMUSCULAR; INTRAVENOUS at 16:55

## 2019-11-14 RX ADMIN — GABAPENTIN 100 MG: 100 CAPSULE ORAL at 09:21

## 2019-11-14 RX ADMIN — MIDAZOLAM 0.5 MG: 1 INJECTION INTRAMUSCULAR; INTRAVENOUS at 14:38

## 2019-11-14 RX ADMIN — FENTANYL CITRATE 25 MCG: 50 INJECTION, SOLUTION INTRAMUSCULAR; INTRAVENOUS at 18:09

## 2019-11-14 RX ADMIN — TAMSULOSIN HYDROCHLORIDE 0.4 MG: 0.4 CAPSULE ORAL at 20:57

## 2019-11-14 RX ADMIN — ALTEPLASE 4 MG: 2.2 INJECTION, POWDER, LYOPHILIZED, FOR SOLUTION INTRAVENOUS at 18:05

## 2019-11-14 RX ADMIN — OXYCODONE HYDROCHLORIDE 5 MG: 5 TABLET ORAL at 20:59

## 2019-11-14 RX ADMIN — OXYCODONE HYDROCHLORIDE 2.5 MG: 5 TABLET ORAL at 11:20

## 2019-11-14 RX ADMIN — MELATONIN 6 MG: 3 TAB ORAL at 21:01

## 2019-11-14 RX ADMIN — FENTANYL CITRATE 25 MCG: 50 INJECTION, SOLUTION INTRAMUSCULAR; INTRAVENOUS at 15:05

## 2019-11-14 RX ADMIN — MIDAZOLAM 0.5 MG: 1 INJECTION INTRAMUSCULAR; INTRAVENOUS at 18:07

## 2019-11-14 RX ADMIN — CHLORHEXIDINE GLUCONATE 0.12% ORAL RINSE 15 ML: 1.2 LIQUID ORAL at 09:22

## 2019-11-14 RX ADMIN — FENTANYL CITRATE 25 MCG: 50 INJECTION, SOLUTION INTRAMUSCULAR; INTRAVENOUS at 14:21

## 2019-11-14 RX ADMIN — CHLORHEXIDINE GLUCONATE 0.12% ORAL RINSE 15 ML: 1.2 LIQUID ORAL at 21:05

## 2019-11-14 RX ADMIN — MIDAZOLAM 0.5 MG: 1 INJECTION INTRAMUSCULAR; INTRAVENOUS at 17:29

## 2019-11-14 RX ADMIN — FENTANYL CITRATE 25 MCG: 50 INJECTION, SOLUTION INTRAMUSCULAR; INTRAVENOUS at 13:46

## 2019-11-14 RX ADMIN — MIDAZOLAM 0.5 MG: 1 INJECTION INTRAMUSCULAR; INTRAVENOUS at 18:19

## 2019-11-14 RX ADMIN — MIDAZOLAM 0.5 MG: 1 INJECTION INTRAMUSCULAR; INTRAVENOUS at 14:33

## 2019-11-14 RX ADMIN — HEPARIN SODIUM 4000 UNITS: 1000 INJECTION, SOLUTION INTRAVENOUS; SUBCUTANEOUS at 14:53

## 2019-11-14 RX ADMIN — FENTANYL CITRATE 25 MCG: 50 INJECTION, SOLUTION INTRAMUSCULAR; INTRAVENOUS at 14:45

## 2019-11-14 RX ADMIN — FENTANYL CITRATE 25 MCG: 50 INJECTION, SOLUTION INTRAMUSCULAR; INTRAVENOUS at 13:53

## 2019-11-14 RX ADMIN — FENTANYL CITRATE 25 MCG: 50 INJECTION, SOLUTION INTRAMUSCULAR; INTRAVENOUS at 15:07

## 2019-11-14 RX ADMIN — MIDAZOLAM 0.5 MG: 1 INJECTION INTRAMUSCULAR; INTRAVENOUS at 14:07

## 2019-11-14 RX ADMIN — FENTANYL CITRATE 25 MCG: 50 INJECTION, SOLUTION INTRAMUSCULAR; INTRAVENOUS at 14:35

## 2019-11-14 RX ADMIN — AMLODIPINE BESYLATE 10 MG: 10 TABLET ORAL at 09:21

## 2019-11-14 RX ADMIN — DIPHENHYDRAMINE HYDROCHLORIDE 25 MG: 50 INJECTION, SOLUTION INTRAMUSCULAR; INTRAVENOUS at 14:02

## 2019-11-14 RX ADMIN — ASPIRIN 81 MG: 81 TABLET, COATED ORAL at 09:21

## 2019-11-14 RX ADMIN — MIDAZOLAM 0.5 MG: 1 INJECTION INTRAMUSCULAR; INTRAVENOUS at 13:46

## 2019-11-14 RX ADMIN — MIDAZOLAM 0.5 MG: 1 INJECTION INTRAMUSCULAR; INTRAVENOUS at 16:55

## 2019-11-14 RX ADMIN — CLOPIDOGREL 75 MG: 75 TABLET, FILM COATED ORAL at 09:21

## 2019-11-14 RX ADMIN — FENTANYL CITRATE 25 MCG: 50 INJECTION, SOLUTION INTRAMUSCULAR; INTRAVENOUS at 17:14

## 2019-11-14 RX ADMIN — SENNOSIDES AND DOCUSATE SODIUM 1 TABLET: 8.6; 5 TABLET ORAL at 20:57

## 2019-11-14 RX ADMIN — HEPARIN SODIUM 17 UNITS/KG/HR: 10000 INJECTION, SOLUTION INTRAVENOUS at 18:02

## 2019-11-14 RX ADMIN — POLYETHYLENE GLYCOL 3350 17 G: 17 POWDER, FOR SOLUTION ORAL at 21:04

## 2019-11-14 RX ADMIN — THIAMINE HCL TAB 100 MG 100 MG: 100 TAB at 09:21

## 2019-11-14 NOTE — ASSESSMENT & PLAN NOTE
· Not POA  · Noted on 11/12 and worsening   · Discussed with Dr Chase Sexton - likely medication induced  · Atorvastatin held  · RUQ US - normal  Acute Hepatitis panel - negative  · Discussed with pharmacy - current medications reviewed - continue to hold Atorvastatin  No further Zyprexa   Ct other meds  · Received last Atorvaststin on 11/12   · Ct to monitor LFT

## 2019-11-14 NOTE — PROGRESS NOTES
On right foot unable to find pedal pulse this afternoon  Post Tib pulse able to be found by doppler and marked  Vascular made aware  No new orders

## 2019-11-14 NOTE — PHYSICAL THERAPY NOTE
Physical Therapy Cancellation Note    The pt  Is off of the floor at IR for arteriogram  Will continue to follow      Gurvinder Plasencia PTA

## 2019-11-14 NOTE — ASSESSMENT & PLAN NOTE
· Had RRT with syncope, bradycardia and hypotension  · EKG showed Mobitz Type 1 block     · Required transient dopamine  · Reviewed by cardiology - episode felt to be vasovagal from pain  · Adequate pain control  · Avoid AV blocking agents

## 2019-11-14 NOTE — ASSESSMENT & PLAN NOTE
· Not POA  · Noted on 11/12 and worsened today  · Discussed with Dr Genie Cohn - likely medication induced  · Atorvastatin held  · F/u RUQ US, hepatitis panel  · If persistently elevated will need alternatives to potential other hepatotoxic medications including Gabapentin and Amlodipine

## 2019-11-14 NOTE — PROGRESS NOTES
Called to [facility] for hospice consult. Met with patient's son and daughter and their spouses. Hospice philosophy, insurance coverage, care settings discussed. They state patient does not want to return to hospital unless his condition would be significantly improved. Unfortunately, he does not have treatment options for his heart failure or kidney failure to accomplish this.      Family states he c/o abdominal distention and pain, dyspnea with little exertion. He appears to meet criteria for home hospice and they all live nearby and visit daily. Epic order entered, [facility] hospice team updated.     Plan: [facility] to meet with patient and family at his home tomorrow and sign to services if he is in agreement.     Iraida Ortiz, ERIK  [phone]   Progress Note - Vascular Surgery   Jenny Go 78 y o  male MRN: [id number]  Unit/Bed#: [facility] 430-01 Encounter: [id number]    Assessment:  78 M with PAD and LLE claudication s/p left femoral endarterectomy, profundoplasty, SFA embolectomy, and iliac stenting with residual SFA stenosis    Plan:  NPO  To IR for angiogram and possible intervention today  Resume coumadin post-procedure  Continue plavix    Subjective/Objective     Subjective: no acute events overnight  Left leg without significant pain, but feels somewhat heavy    Objective:    Blood pressure 117/57, pulse 70, temperature 98 3 °F (36 8 °C), temperature source Oral, resp  rate 16, height 5' 5" (1 651 m), weight 50 3 kg (111 lb), SpO2 100 %  ,Body mass index is 18 47 kg/m²        Intake/Output Summary (Last 24 hours) at 11/14/2019 0742  Last data filed at 11/14/2019 0600  Gross per 24 hour   Intake 481 57 ml   Output 900 ml   Net -418 43 ml       Invasive Devices     Peripheral Intravenous Line            Peripheral IV 11/10/19 Right Forearm 4 days                Physical Exam:   General: NAD, awake and alert  Eyes: PERRL  ENT: moist mucous membranes  Neck: supple  CV: RRR +S1/S2  Chest: breath sounds bilaterally  Abdomen: soft, NT ND  Extremities: left groin incision c/d/i      Results from last 7 days   Lab Units 11/14/19 0515 11/13/19 0454 11/12/19  0424   WBC Thousand/uL 7 02 7 37 6 50   HEMOGLOBIN g/dL 8 5* 9 7* 9 8*   HEMATOCRIT % 26 3* 30 3* 30 4*   PLATELETS Thousands/uL 226 201 176     Results from last 7 days   Lab Units 11/14/19 0515 11/13/19 0454 11/12/19  0424  11/07/19  1921   POTASSIUM mmol/L 4 4 4 2 4 0   < >  --    CHLORIDE mmol/L 107 106 106   < >  --    CO2 mmol/L 24 25 25   < >  --    CO2, I-STAT mmol/L  --   --   --   --  22   BUN mg/dL 25 34* 25   < >  --    CREATININE mg/dL 0 80 0 81 0 71   < >  --    GLUCOSE, ISTAT mg/dl  --   --   --   --  128   CALCIUM mg/dL 8 3 8 8 8 9   < >  --     < > = values in this interval not displayed       Results from last 7 days   Lab Units 11/14/19  0515 11/14/19  0053 11/13/19  1802 11/13/19  0454 11/12/19  0424   INR  1 52*  --   --  1 98* 2 78*   PTT seconds 72* 154* 94*  --   --

## 2019-11-14 NOTE — PROGRESS NOTES
Progress Note - Lee Velazquez 1950, 78 y o  male MRN: [id number]    Unit/Bed#: [facility] 430-01 Encounter: [id number]    Primary Care Provider: Gadiel Hu MD   Date and time admitted to hospital: 11/2/2019  2:01 AM        * PAD (peripheral artery disease) ([address] )  Assessment & Plan  · PAD and left lower extremity claudication - status post left femoral endarterectomy, profundoplasty, SFA embolectomy and retrograde iliac stenting on 11/06/2019  · Has residual left SFA stenosis with TACOS of 0 52  · Vascular following - input appreciated - for angiogram and possible intervention for residual SFA stenosis today    · Continue Plavix, heparin drip     Syncope and collapse  Assessment & Plan  · Had RRT with syncope, bradycardia and hypotension  · EKG showed Mobitz Type 1 block  · Required transient dopamine  · Reviewed by cardiology - episode felt to be vasovagal from pain  · Adequate pain control  · Avoid AV blocking agents      History of throat cancer  Assessment & Plan  · Per daughter, patient diagnosed with throat cancer 2 years ago and has been treated with chemo and radiation  · Not undergoing treatment at this time    Hypertension  Assessment & Plan  · BP acceptable on Amlodipine 10 mg daily  · Continue hydralazine p r n      Dependence on nicotine from cigarettes  Assessment & Plan  · Still smokes despite diagnosis of throat cancer, per daughter  · She thinks he smokes 1ppd  · Nicotine patch  · Smoking cessation education    Coagulopathy [facility])  Assessment & Plan  · Was placed on heparin and was being bridged to Coumadin per vascular for PAD  · Had rapid rise in INR - seen by Hematology - likely from underlying vitamin K deficiency  · Coumadin to be started at lower dose of 2 mg when INR drops below 2 per hematology   · Heparin/lovenox to Coumadin bridge when INR below 2    Transaminitis  Assessment & Plan  · Not POA  · Noted on 11/12 and worsening   · Discussed with Dr Dominguez Failing - likely medication induced  · Atorvastatin held  · RUQ US - normal  Acute Hepatitis panel - negative  · Discussed with pharmacy - current medications reviewed - continue to hold Atorvastatin  No further Zyprexa  Ct other meds  · Received last Atorvaststin on    · Ct to monitor LFT      VTE Pharmacologic Prophylaxis:   Pharmacologic: Heparin Drip  Mechanical VTE Prophylaxis in Place: Yes    Patient Centered Rounds: I have performed bedside rounds with nursing staff today  Discussions with Specialists or Other Care Team Provider: Discussed with pharmacy    Education and Discussions with Family / Patient:  Discussed with patient  Discussed with son on the phone    Time Spent for Care: 20 minutes  More than 50% of total time spent on counseling and coordination of care as described above  Current Length of Stay: 12 day(s)    Current Patient Status: Inpatient   Certification Statement: The patient will continue to require additional inpatient hospital stay due to Peripheral arterial disease requiring intervention, transaminitis    Code Status: Level 1 - Full Code    Subjective:   Resting comfortably in bed  No leg pain    Objective:     Vitals:   Temp (24hrs), Av 5 °F (36 9 °C), Min:98 °F (36 7 °C), Max:99 2 °F (37 3 °C)    Temp:  [98 °F (36 7 °C)-99 2 °F (37 3 °C)] 98 4 °F (36 9 °C)  HR:  [60-84] 78  Resp:  [15-18] 18  BP: (107-163)/(54-77) 129/60  SpO2:  [91 %-100 %] 95 %  Body mass index is 18 47 kg/m²  Physical Exam:     Physical Exam   Constitutional: He is oriented to person, place, and time  HENT:   Head: Normocephalic and atraumatic  Eyes: Pupils are equal, round, and reactive to light  EOM are normal    Neck: Normal range of motion  Neck supple  Cardiovascular: Normal rate and regular rhythm  Pulmonary/Chest: Effort normal and breath sounds normal    Abdominal: Soft  Bowel sounds are normal    Musculoskeletal: He exhibits no edema     Neurological: He is alert and oriented to person, place, and time  Skin: Skin is warm and dry  Psychiatric: He has a normal mood and affect  Additional Data:     Labs:    Results from last 7 days   Lab Units 11/14/19  0515  11/12/19  0424   WBC Thousand/uL 7 02   < > 6 50   HEMOGLOBIN g/dL 8 5*   < > 9 8*   HEMATOCRIT % 26 3*   < > 30 4*   PLATELETS Thousands/uL 226   < > 176   NEUTROS PCT %  --   --  78*   LYMPHS PCT %  --   --  7*   MONOS PCT %  --   --  9   EOS PCT %  --   --  4    < > = values in this interval not displayed  Results from last 7 days   Lab Units 11/14/19  0515   SODIUM mmol/L 137   POTASSIUM mmol/L 4 4   CHLORIDE mmol/L 107   CO2 mmol/L 24   BUN mg/dL 25   CREATININE mg/dL 0 80   ANION GAP mmol/L 6   CALCIUM mg/dL 8 3   ALBUMIN g/dL 2 1*   TOTAL BILIRUBIN mg/dL 0 37   ALK PHOS U/L 98   ALT U/L 124*   AST U/L 112*   GLUCOSE RANDOM mg/dL 106     Results from last 7 days   Lab Units 11/14/19  0515   INR  1 52*       * I Have Reviewed All Lab Data Listed Above  * Additional Pertinent Lab Tests Reviewed:  [address] Admission Reviewed      Last 24 Hours Medication List:     Current Facility-Administered Medications:  acetaminophen 650 mg Oral Q6H PRN Abi Almonte MD    amLODIPine 10 mg Oral Daily Jimmy Alicea MD    aspirin 81 mg Oral Daily Abi Almonte MD    bisacodyl 10 mg Rectal Daily PRN Abi Almonte MD    chlorhexidine 15 mL Swish & Spit Q12H Isabel Eid MD    clopidogrel 75 mg Oral Daily Abi Almonte MD    diphenhydrAMINE  Intravenous Code/Trauma/Sedation Jamir Lopez MD    fentanyl citrate (PF)  Intravenous Code/Trauma/Sedation Jamir Lopez MD    gabapentin 100 mg Oral Daily Abi Almonte MD    heparin (porcine) 3-30 Units/kg/hr (Order-Specific) Intravenous Titrated Jeremias Savage PA-C Last Rate: 17 Units/kg/hr (11/14/19 1802)   heparin (porcine) 2,000 Units Intravenous PRN Jeremias Savage PA-C    heparin (porcine) 4,000 Units Intravenous PRN Jeremias Savage PA-C hydrALAZINE 10 mg Intravenous Q4H PRN Massiel Haskins PA-C    lidocaine (PF) 0 5 mL Infiltration Once PRN Jeanette Ferrell MD    lidocaine 1% buffered   Code/Trauma/Sedation Med Sonam Contreras MD    melatonin 6 mg Oral HS Jeanette Ferrell MD    midazolam   Code/Trauma/Sedation Med Sonam Contreras MD    nicotine 1 patch Transdermal Daily Jeanette Ferrell MD    oxyCODONE 2 5 mg Oral Q4H PRN Jeanette Ferrell MD    oxyCODONE 5 mg Oral Q4H PRN Jeanette Ferrell MD    polyethylene glycol 17 g Oral BID Jeanette Ferrell MD    senna-docusate sodium 1 tablet Oral BID Jeanette Ferrell MD    sodium chloride 75 mL/hr Intravenous Continuous Tony Palacio PA-C Last Rate: 75 mL/hr (11/13/19 2340)   tamsulosin 0 4 mg Oral Daily With Jenny Bowman MD    thiamine 100 mg Oral Daily Jeanette Ferrell MD         Today, Patient Was Seen By: Maty Duarte MD    ** Please Note: Dictation voice to text software may have been used in the creation of this document   **

## 2019-11-14 NOTE — ASSESSMENT & PLAN NOTE
· Still smokes despite diagnosis of throat cancer, per daughter  · She thinks he smokes 1ppd  · Nicotine patch  · Smoking cessation education

## 2019-11-14 NOTE — ASSESSMENT & PLAN NOTE
· PAD and left lower extremity claudication - status post left femoral endarterectomy, profundoplasty, SFA embolectomy and retrograde iliac stenting on 11/06/2019  · Has residual left SFA stenosis with TACOS of 0 52  · Vascular following - input appreciated - for angiogram and possible intervention for residual SFA stenosis on 11/14   · INR 1 98  · Continue Plavix   Heparin drip restarted by vascular

## 2019-11-14 NOTE — ASSESSMENT & PLAN NOTE
· Per daughter, patient diagnosed with throat cancer 2 years ago and has been treated with chemo and radiation  · Not undergoing treatment at this time

## 2019-11-14 NOTE — SEDATION DOCUMENTATION
IR Procedure Bedrest Start Time is [id number]  Report called to NATHAN Dwyer  Will transport to floor

## 2019-11-14 NOTE — CONSULTS
Interventional Radiology  Consultation 11/14/2019     History of Present Illness:  78year old male with LLE claudication s/p left femoral endarterectomy, profundoplasty, SFA embolectomy, and iliac artery stenting is referred for LLE arteriogram with possible intervention      Past Medical History:   Diagnosis Date    Cancer [facility])     throat cancer    P[address] (peripheral artery [facility]) [address] [address] ) 11/2/2019    PEG (percutaneous endoscopic gastrostomy) status ([facility] )     Port-A-Cath in place         Past Surgical History:   Procedure Laterality Date    BYPASS FEMORAL-FEMORAL Right     ESOPHAGOSCOPY N/A 8/24/2017    Procedure: ESOPHAGOSCOPY FLEXIBLE;  Surgeon: Tatyana Tatum MD;  [facility];  Service: ENT    AL [address] N/A 8/24/2017    Procedure: Bronchoscopy;  Surgeon: Tatyana Tatum MD;  [facility];  Service: ENT    AL LARYNGOSCOPY,DIRCT,OP,BIOPSY N/A 8/24/2017    Procedure: LARYNGOSCOPY DIRECT;  Surgeon: Tatyana Tatum MD;  [facility];  Service: ENT    AL [facility] Left 11/7/2019    Procedure: LEFT ENDARTERECTOMY ARTERIAL FEMORAL; L CFAE WITH PROFUNDOPLASTY; ARTERIOGRAM;RETROGRADE ILIAC STENTING;  Surgeon: Anu Blevins MD;  [facility];  Service: Vascular    TOE AMPUTATION Right     2 toes        Social History     Tobacco Use   Smoking Status Current Every Day Smoker    Packs/day: 1 00    Years: 15 00    Pack years: 15 00    Types: Cigarettes   Smokeless Tobacco Never Used   Tobacco Comment    50+ years        Social History     Substance and Sexual Activity   Alcohol Use Never    Alcohol/week: 0 0 standard drinks    Frequency: Patient refused    Drinks per session: Patient refused    Binge frequency: Patient refused        Social History     Substance and Sexual Activity   Drug Use No        No Known Allergies    Objective:    Vitals:    11/13/19 2331 11/14/19 0300 11/14/19 0735 11/14/19 1137   BP: 116/56 107/58 117/57 117/56   BP Location: Left arm Right arm Right arm Left arm   Pulse: 82 75 70 72   Resp: 18 16 16 18   Temp: 99 2 °F (37 3 °C) 98 °F (36 7 °C) 98 3 °F (36 8 °C) 98 4 °F (36 9 °C)   TempSrc: Oral Oral Oral Oral   SpO2: 97% 98% 100% 100%   Weight:       Height:            Physical Exam    Gen: NAD  LLE: DP and PT dopplerable  RLE: PT dopplerable    Lab Results   Component Value Date    WBC 7 02 11/14/2019    HGB 8 5 (L) 11/14/2019    HCT 26 3 (L) 11/14/2019    MCV 96 11/14/2019     11/14/2019     Lab Results   Component Value Date    INR 1 52 (H) 11/14/2019    INR 1 98 (H) 11/13/2019    INR 2 78 (H) 11/12/2019    PROTIME 17 8 (H) 11/14/2019    PROTIME 22 0 (H) 11/13/2019    PROTIME 28 8 (H) 11/12/2019     Lab Results   Component Value Date    PTT 72 (H) 11/14/2019     I have personally reviewed pertinent imaging and laboratory results  Assessment/Plan:  - Abdominal aortogram with LLE arterial runoff and possible intervention  This procedure has been fully reviewed with the patient and written informed consent has been obtained

## 2019-11-14 NOTE — OCCUPATIONAL THERAPY NOTE
Occupational Therapy Cancellation        Patient Name: Kelly Abraham  HIVKL'G Date: 11/14/2019      Chart reviewed  Pt off the floor at IR for arteriogram  OT will continue to follow to see post op      Cony Torres MS, OTR/L

## 2019-11-14 NOTE — PROGRESS NOTES
Progress Note - Rogelio Jain 1950, 78 y o  male MRN: [id number]    Unit/Bed#: [facility] 430-01 Encounter: [id number]    Primary Care Provider: Polo Rainey MD   Date and time admitted to hospital: 11/2/2019  2:01 AM        * PAD (peripheral artery disease) ([facility] [address] )  Assessment & Plan  · PAD and left lower extremity claudication - status post left femoral endarterectomy, profundoplasty, SFA embolectomy and retrograde iliac stenting on 11/06/2019  · Has residual left SFA stenosis with TACOS of 0 52  · Vascular following - input appreciated - for angiogram and possible intervention for residual SFA stenosis on 11/14   · INR 1 98  · Continue Plavix  Heparin drip restarted by vascular      Syncope and collapse  Assessment & Plan  · Had RRT with syncope, bradycardia and hypotension  · EKG showed Mobitz Type 1 block  · Required transient dopamine  · Reviewed by cardiology - episode felt to be vasovagal from pain  · Adequate pain control  · Avoid AV blocking agents      History of throat cancer  Assessment & Plan  · Per daughter, patient diagnosed with throat cancer 2 years ago and has been treated with chemo and radiation  · Not undergoing treatment at this time    Hypertension  Assessment & Plan  · BP acceptable on Amlodipine 10 mg daily  · Continue hydralazine p r n      Dependence on nicotine from cigarettes  Assessment & Plan  · Still smokes despite diagnosis of throat cancer, per daughter  · She thinks he smokes 1ppd  · Nicotine patch  · Smoking cessation education    Coagulopathy [facility])  Assessment & Plan  · Was placed on heparin and was being bridged to Coumadin per vascular for PAD  · Had rapid rise in INR - seen by Hematology - likely from underlying vitamin K deficiency  · Coumadin to be started at lower dose of 2 mg when INR drops below 2 per hematology   · Heparin/lovenox to Coumadin bridge when INR below 2    Transaminitis  Assessment & Plan  · Not POA  · Noted on 11/12 and worsened today  · Discussed with Dr Genie oChn - likely medication induced  · Atorvastatin held  · F/u RUQ US, hepatitis panel  · If persistently elevated will need alternatives to potential other hepatotoxic medications including Gabapentin and Amlodipine    VTE Pharmacologic Prophylaxis:   Pharmacologic: Heparin Drip    Patient Centered Rounds: Discussed with RN    Discussions with Specialists or Other Care Team Provider:  Discussed with vascular surgery and Gastroenterology    Education and Discussions with Family / Patient: Discussed with patient  Discussed with daughter on the phone      Time Spent for Care: 20 minutes  More than 50% of total time spent on counseling and coordination of care as described above  Current Length of Stay: 11 day(s)    Current Patient Status: Inpatient   Certification Statement: The patient will continue to require additional inpatient hospital stay due to Peripheral arterial disease awaiting angiogram    Code Status: Level 1 - Full Code    Subjective:   Feels well  No pain in left lower extremity    Objective:     Vitals:   Temp (24hrs), Av 5 °F (36 9 °C), Min:98 4 °F (36 9 °C), Max:98 7 °F (37 1 °C)    Temp:  [98 4 °F (36 9 °C)-98 7 °F (37 1 °C)] 98 7 °F (37 1 °C)  HR:  [77-85] 85  Resp:  [18-20] 18  BP: (120-135)/(60-79) 127/61  SpO2:  [95 %-97 %] 97 %  Body mass index is 18 47 kg/m²  Physical Exam:     Physical Exam   Constitutional: He is oriented to person, place, and time  HENT:   Head: Normocephalic and atraumatic  Eyes: Pupils are equal, round, and reactive to light  EOM are normal    Neck: Normal range of motion  Neck supple  Cardiovascular: Normal rate and regular rhythm  Pulmonary/Chest: Breath sounds normal  No respiratory distress  Abdominal: Soft  Bowel sounds are normal    Musculoskeletal: He exhibits no edema  Neurological: He is alert and oriented to person, place, and time     Skin:   Left groin incision - clean/dry/intact   Psychiatric: He has a normal mood and affect  Additional Data:     Labs:    Results from last 7 days   Lab Units 11/13/19  0454 11/12/19  0424   WBC Thousand/uL 7 37 6 50   HEMOGLOBIN g/dL 9 7* 9 8*   HEMATOCRIT % 30 3* 30 4*   PLATELETS Thousands/uL 201 176   NEUTROS PCT %  --  78*   LYMPHS PCT %  --  7*   MONOS PCT %  --  9   EOS PCT %  --  4     Results from last 7 days   Lab Units 11/13/19  0454   SODIUM mmol/L 138   POTASSIUM mmol/L 4 2   CHLORIDE mmol/L 106   CO2 mmol/L 25   BUN mg/dL 34*   CREATININE mg/dL 0 81   ANION GAP mmol/L 7   CALCIUM mg/dL 8 8   ALBUMIN g/dL 2 4*   TOTAL BILIRUBIN mg/dL 0 40   ALK PHOS U/L 103   ALT U/L 100*   AST U/L 84*   GLUCOSE RANDOM mg/dL 106     Results from last 7 days   Lab Units 11/13/19  0454   INR  1 98*       * I Have Reviewed All Lab Data Listed Above  * Additional Pertinent Lab Tests Reviewed:  [address] Admission Reviewed      Last 24 Hours Medication List:     Current Facility-Administered Medications:  acetaminophen 650 mg Oral Q6H PRN Tatianna Epsosito MD    amLODIPine 10 mg Oral Daily Tia Baeza MD    aspirin 81 mg Oral Daily Tatianna Esposito MD    bisacodyl 10 mg Rectal Daily PRN Tatianna Esposito MD    chlorhexidine 15 mL Swish & Spit Q12H Isabel Eid MD    clopidogrel 75 mg Oral Daily Tatianna Esposito MD    gabapentin 100 mg Oral Daily Tatianna Esposito MD    heparin (porcine) 3-30 Units/kg/hr (Order-Specific) Intravenous Titrated Velvet Grewal, PA-C Last Rate: 16 Units/kg/hr (11/13/19 1847)   heparin (porcine) 2,000 Units Intravenous PRN Velvet Grewal, PA-C    heparin (porcine) 4,000 Units Intravenous PRN Velvet Grewal, PA-C    hydrALAZINE 10 mg Intravenous Q4H PRN Massiel SLAUGHTER Held, PA-C    lidocaine (PF) 0 5 mL Infiltration Once PRN Tatianna Esposito MD    melatonin 6 mg Oral HS Tatianna Esposito MD    nicotine 1 patch Transdermal Daily Tatianna Esposito MD    OLANZapine 2 5 mg Intramuscular Q8H PRN Tatianna Esposito MD oxyCODONE 2 5 mg Oral Q4H PRN Raya Bergeron, MD    oxyCODONE 5 mg Oral Q4H PRN Raya Bergeron, MD    polyethylene glycol 17 g Oral BID Raya Bergeron MD    senna-docusate sodium 1 tablet Oral BID MD Ankush Alegria ON 11/14/2019] sodium chloride 75 mL/hr Intravenous Continuous Alida Villeda PA-C    tamsulosin 0 4 mg Oral Daily With Alex Armenta MD    thiamine 100 mg Oral Daily Raya Bergeron MD         Today, Patient Was Seen By: Juaquin Vega MD    ** Please Note: Dictation voice to text software may have been used in the creation of this document   **

## 2019-11-14 NOTE — ASSESSMENT & PLAN NOTE
· PAD and left lower extremity claudication - status post left femoral endarterectomy, profundoplasty, SFA embolectomy and retrograde iliac stenting on 11/06/2019  · Has residual left SFA stenosis with TACOS of 0 52  · Vascular following - input appreciated - for angiogram and possible intervention for residual SFA stenosis today    · Continue Plavix, heparin drip

## 2019-11-15 ENCOUNTER — APPOINTMENT (INPATIENT)
Dept: NON INVASIVE DIAGNOSTICS | Facility: HOSPITAL | Age: 69
DRG: 252 | End: 2019-11-15
Payer: MEDICARE

## 2019-11-15 LAB
ALBUMIN SERPL BCP-MCNC: 2.5 G/DL (ref 3.5–5)
ALP SERPL-CCNC: 97 U/L (ref 46–116)
ALT SERPL W P-5'-P-CCNC: 97 U/L (ref 12–78)
ANION GAP SERPL CALCULATED.3IONS-SCNC: 8 MMOL/L (ref 4–13)
APTT PPP: 59 SECONDS (ref 23–37)
APTT PPP: 77 SECONDS (ref 23–37)
APTT PPP: 99 SECONDS (ref 23–37)
AST SERPL W P-5'-P-CCNC: 58 U/L (ref 5–45)
BILIRUB DIRECT SERPL-MCNC: 0.18 MG/DL (ref 0–0.2)
BILIRUB SERPL-MCNC: 0.44 MG/DL (ref 0.2–1)
BUN SERPL-MCNC: 20 MG/DL (ref 5–25)
CALCIUM SERPL-MCNC: 8.3 MG/DL (ref 8.3–10.1)
CHLORIDE SERPL-SCNC: 109 MMOL/L (ref 100–108)
CO2 SERPL-SCNC: 21 MMOL/L (ref 21–32)
CREAT SERPL-MCNC: 0.64 MG/DL (ref 0.6–1.3)
ERYTHROCYTE [DISTWIDTH] IN BLOOD BY AUTOMATED COUNT: 14.7 % (ref 11.6–15.1)
GFR SERPL CREATININE-BSD FRML MDRD: 99 ML/MIN/1.73SQ M
GLUCOSE SERPL-MCNC: 108 MG/DL (ref 65–140)
HCT VFR BLD AUTO: 26.6 % (ref 36.5–49.3)
HGB BLD-MCNC: 8.3 G/DL (ref 12–17)
INR PPP: 1.6 (ref 0.84–1.19)
MCH RBC QN AUTO: 30.9 PG (ref 26.8–34.3)
MCHC RBC AUTO-ENTMCNC: 31.2 G/DL (ref 31.4–37.4)
MCV RBC AUTO: 99 FL (ref 82–98)
PLATELET # BLD AUTO: 271 THOUSANDS/UL (ref 149–390)
PMV BLD AUTO: 11.1 FL (ref 8.9–12.7)
POTASSIUM SERPL-SCNC: 4.2 MMOL/L (ref 3.5–5.3)
PROT SERPL-MCNC: 6.1 G/DL (ref 6.4–8.2)
PROTHROMBIN TIME: 18.6 SECONDS (ref 11.6–14.5)
RBC # BLD AUTO: 2.69 MILLION/UL (ref 3.88–5.62)
SODIUM SERPL-SCNC: 138 MMOL/L (ref 136–145)
WBC # BLD AUTO: 8.11 THOUSAND/UL (ref 4.31–10.16)

## 2019-11-15 PROCEDURE — 93880 EXTRACRANIAL BILAT STUDY: CPT | Performed by: SURGERY

## 2019-11-15 PROCEDURE — 80076 HEPATIC FUNCTION PANEL: CPT | Performed by: INTERNAL MEDICINE

## 2019-11-15 PROCEDURE — 85610 PROTHROMBIN TIME: CPT | Performed by: PHYSICIAN ASSISTANT

## 2019-11-15 PROCEDURE — 85730 THROMBOPLASTIN TIME PARTIAL: CPT | Performed by: INTERNAL MEDICINE

## 2019-11-15 PROCEDURE — 99232 SBSQ HOSP IP/OBS MODERATE 35: CPT | Performed by: INTERNAL MEDICINE

## 2019-11-15 PROCEDURE — 80048 BASIC METABOLIC PNL TOTAL CA: CPT | Performed by: INTERNAL MEDICINE

## 2019-11-15 PROCEDURE — 99024 POSTOP FOLLOW-UP VISIT: CPT | Performed by: SURGERY

## 2019-11-15 PROCEDURE — 97116 GAIT TRAINING THERAPY: CPT

## 2019-11-15 PROCEDURE — 93880 EXTRACRANIAL BILAT STUDY: CPT

## 2019-11-15 PROCEDURE — 85027 COMPLETE CBC AUTOMATED: CPT | Performed by: PHYSICIAN ASSISTANT

## 2019-11-15 RX ORDER — WARFARIN SODIUM 1 MG/1
2 TABLET ORAL
Status: DISCONTINUED | OUTPATIENT
Start: 2019-11-15 | End: 2019-11-15

## 2019-11-15 RX ADMIN — NICOTINE 1 PATCH: 14 PATCH TRANSDERMAL at 08:08

## 2019-11-15 RX ADMIN — POLYETHYLENE GLYCOL 3350 17 G: 17 POWDER, FOR SOLUTION ORAL at 17:09

## 2019-11-15 RX ADMIN — THIAMINE HCL TAB 100 MG 100 MG: 100 TAB at 08:07

## 2019-11-15 RX ADMIN — GABAPENTIN 100 MG: 100 CAPSULE ORAL at 08:07

## 2019-11-15 RX ADMIN — CLOPIDOGREL 75 MG: 75 TABLET, FILM COATED ORAL at 08:08

## 2019-11-15 RX ADMIN — SENNOSIDES AND DOCUSATE SODIUM 1 TABLET: 8.6; 5 TABLET ORAL at 08:08

## 2019-11-15 RX ADMIN — CHLORHEXIDINE GLUCONATE 0.12% ORAL RINSE 15 ML: 1.2 LIQUID ORAL at 08:08

## 2019-11-15 RX ADMIN — WARFARIN SODIUM 2 MG: 1 TABLET ORAL at 17:09

## 2019-11-15 RX ADMIN — OXYCODONE HYDROCHLORIDE 5 MG: 5 TABLET ORAL at 10:48

## 2019-11-15 RX ADMIN — HEPARIN SODIUM 2000 UNITS: 1000 INJECTION INTRAVENOUS; SUBCUTANEOUS at 21:11

## 2019-11-15 RX ADMIN — SENNOSIDES AND DOCUSATE SODIUM 1 TABLET: 8.6; 5 TABLET ORAL at 17:10

## 2019-11-15 RX ADMIN — POLYETHYLENE GLYCOL 3350 17 G: 17 POWDER, FOR SOLUTION ORAL at 08:07

## 2019-11-15 RX ADMIN — AMLODIPINE BESYLATE 10 MG: 10 TABLET ORAL at 08:08

## 2019-11-15 RX ADMIN — OXYCODONE HYDROCHLORIDE 5 MG: 5 TABLET ORAL at 01:48

## 2019-11-15 RX ADMIN — TAMSULOSIN HYDROCHLORIDE 0.4 MG: 0.4 CAPSULE ORAL at 17:10

## 2019-11-15 RX ADMIN — SODIUM CHLORIDE 75 ML/HR: 0.9 INJECTION, SOLUTION INTRAVENOUS at 18:17

## 2019-11-15 RX ADMIN — ASPIRIN 81 MG: 81 TABLET, COATED ORAL at 08:08

## 2019-11-15 RX ADMIN — CHLORHEXIDINE GLUCONATE 0.12% ORAL RINSE 15 ML: 1.2 LIQUID ORAL at 21:16

## 2019-11-15 RX ADMIN — MELATONIN 6 MG: 3 TAB ORAL at 21:16

## 2019-11-15 RX ADMIN — OXYCODONE HYDROCHLORIDE 5 MG: 5 TABLET ORAL at 18:16

## 2019-11-15 RX ADMIN — SODIUM CHLORIDE 75 ML/HR: 0.9 INJECTION, SOLUTION INTRAVENOUS at 03:00

## 2019-11-15 NOTE — PHYSICAL THERAPY NOTE
Physical Therapy Progress Note     11/15/19 7317   Pain Assessment   Pain Assessment 0-10   Pain Score 3   Pain Type Acute pain   Pain Location Leg   Pain Orientation Right   Hospital Pain Intervention(s) Repositioned; Ambulation/increased activity; Emotional support   Response to Interventions Tolerated  Restrictions/Precautions   Other Precautions Fall Risk;Pain; Chair Alarm; Bed Alarm  (Pt  on stretcher post session )   Subjective   Subjective The pt  notes that his leg continues to hurt  Bed Mobility   Sit to Supine 4  Minimal assistance   Additional items Assist x 1; Increased time required;Verbal cues;LE management   Transfers   Stand to Sit 4  Minimal assistance   Additional items Assist x 1; Increased time required;Verbal cues   Ambulation/Elevation   Gait pattern Excessively slow; Step to;Short stride; Inconsistent kusum;Decreased foot clearance; Antalgic; Improper Weight shift   Gait Assistance 3  Moderate assist   Additional items Assist x 1; Tactile cues; Verbal cues   Assistive Device Other (Comment)  (hand-hold assist )   Distance 5 feet  Balance   Static Sitting Good   Dynamic Sitting Fair   Static Standing Poor +   Ambulatory Poor   Activity Tolerance   Activity Tolerance Patient tolerated treatment well;Patient limited by pain   Nurse [id number] Ryan, RN  Assessment   Prognosis Good   Problem List Impaired balance;Decreased mobility; Decreased coordination; Impaired judgement;Decreased safety awareness;Pain;Decreased skin integrity   Assessment The pt  transferring to the stretcher with transport and they were having notable difficulty  The pt  was attempting to hop on one foot, but this was corrected with instruction for therapist  He was able to transfer to the stretcher with improved steps as well  Will continue to follow  Barriers to Discharge Inaccessible home environment;Decreased caregiver support   Goals   Patient Goals To have less pain     STG Expiration Date 11/22/19   PT Treatment Day 2 Plan   Treatment/Interventions Functional transfer training;LE strengthening/ROM; Therapeutic exercise; Endurance training;Cognitive reorientation;Patient/family training;Bed mobility;Gait training;Elevations   Progress Slow progress, decreased activity tolerance   PT Frequency   (3-5x a week )   Recommendation   Recommendation Home PT; Home with family support   Equipment Recommended Ange Jordan   PT - OK to Discharge No     [place], PTA

## 2019-11-15 NOTE — BRIEF OP NOTE (RAD/CATH)
IR ABDOMINAL ANGIOGRAPHY / INTERVENTION Procedure Note    PATIENT NAME: Jenny Go  : 1950  MRN: [id number]    Pre-op Diagnosis:   1  PAD (peripheral artery disease) ([address] )    2  Confusion    3  Complete heart block ([address] )    4  Acute blood loss anemia    5  Transaminitis      Post-op Diagnosis:   1  PAD (peripheral artery disease) ([address] )    2  Confusion    3  Complete heart block ([address] )    4  Acute blood loss anemia    5  Transaminitis        Surgeon:   Shannan Turner MD    Estimated Blood Loss: 10 mL    Findings:   1  There was diffuse moderate to severe luminal narrowing of the left SFA and popliteal arteries which were successfully angioplastied using 6 mm balloon  2  There was 1 vessel runoff of the LLE through the peroneal artery  3  The left AT and PT appeared chronically occluded  4  There was diffuse severe luminal narrowing of the right SFA which was successfully angioplastied using 5 mm balloon  5  The right popliteal and tibial arteries were chronically occluded  6  No dopplerable signals in the right foot  Left DP and PT were dopplerable      Specimens: None    Complications:  None    Anesthesia: Conscious sedation and Local    Shannan Turner MD     Date: 2019  Time: 7:06 PM

## 2019-11-15 NOTE — PROGRESS NOTES
Progress Note - Vascular Surgery   Arnav Deng 78 y o  male MRN: [id number]  Unit/Bed#: [facility] 430-01 Encounter: [id number]    Assessment:  78 M with PAD and LLE claudication s/p left femoral endarterectomy, profundoplasty, SFA embolectomy, and iliac stenting with residual SFA stenosis  11/14 IR agram with angioplasty b/l SFA with poor flow to the RLE    Plan:  C/w dysphagia diet  Resume coumadin  Continue plavix  Will d/w team and attending about further interventions, if any could be offered     Subjective/Objective     Subjective: no acute events overnight  Left leg without significant pain, but feels somewhat heavy    Objective:    Blood pressure 128/61, pulse 80, temperature 98 5 °F (36 9 °C), temperature source Oral, resp  rate 18, height 5' 5" (1 651 m), weight 50 3 kg (111 lb), SpO2 98 %  ,Body mass index is 18 47 kg/m²  Intake/Output Summary (Last 24 hours) at 11/15/2019 0758  Last data filed at 11/15/2019 0635  Gross per 24 hour   Intake 1260 ml   Output 1400 ml   Net -140 ml       Invasive Devices     Peripheral Intravenous Line            Peripheral IV 11/14/19 Dorsal (posterior); Left Hand 1 day                Physical Exam:   General: NAD, awake and alert  Eyes: PERRL  ENT: moist mucous membranes  Neck: supple  CV: RRR +S1/S2  Chest: breath sounds bilaterally  Abdomen: soft, NT ND  Extremities: left groin incision c/d/i      Results from last 7 days   Lab Units 11/15/19  0437 11/14/19  0515 11/13/19  0454   WBC Thousand/uL 8 11 7 02 7 37   HEMOGLOBIN g/dL 8 3* 8 5* 9 7*   HEMATOCRIT % 26 6* 26 3* 30 3*   PLATELETS Thousands/uL 271 226 201     Results from last 7 days   Lab Units 11/15/19  0437 11/14/19  0515 11/13/19  0454   POTASSIUM mmol/L 4 2 4 4 4 2   CHLORIDE mmol/L 109* 107 106   CO2 mmol/L 21 24 25   BUN mg/dL 20 25 34*   CREATININE mg/dL 0 64 0 80 0 81   CALCIUM mg/dL 8 3 8 3 8 8     Results from last 7 days   Lab Units 11/15/19  0438 11/15/19  0437 11/14/19  2259 11/14/19  1422 11/14/19  0515  11/13/19  0454   INR   --  1 60*  --   --   --  1 52*  --  1 98*   PTT seconds 99*  --  146* 41*   < > 72*   < >  --     < > = values in this interval not displayed

## 2019-11-15 NOTE — PLAN OF CARE
Problem: PHYSICAL THERAPY ADULT  Goal: Performs mobility at highest level of function for planned discharge setting  See evaluation for individualized goals  Description  Treatment/Interventions: Functional transfer training, LE strengthening/ROM, Elevations, Therapeutic exercise, Endurance training, Patient/family training, Equipment eval/education, Bed mobility, Gait training, Spoke to nursing, Family  Equipment Recommended: Walker(current use of RW for mobility)       See flowsheet documentation for full assessment, interventions and recommendations  Outcome: Progressing  Note:   Prognosis: Good  Problem List: Impaired balance, Decreased mobility, Decreased coordination, Impaired judgement, Decreased safety awareness, Pain, Decreased skin integrity  Assessment: The pt  transferring to the stretcher with transport and they were having notable difficulty  The pt  was attempting to hop on one foot, but this was corrected with instruction for therapist  He was able to transfer to the stretcher with improved steps as well  Will continue to follow  Barriers to Discharge: Inaccessible home environment, Decreased caregiver support     Recommendation: Home PT, Home with family support     PT - OK to Discharge: No    See flowsheet documentation for full assessment

## 2019-11-15 NOTE — OCCUPATIONAL THERAPY NOTE
Occupational Therapy Cancellation Note        Patient Name: Dominga STALLWORTH Date: 11/15/2019      OT treatment attempted, pt is currently off the floor at vascular lab  OT will continue to follow and see as medically appropriate and able       HYUN Ernst, OTR/L

## 2019-11-16 LAB
ALBUMIN SERPL BCP-MCNC: 2.5 G/DL (ref 3.5–5)
ALP SERPL-CCNC: 96 U/L (ref 46–116)
ALT SERPL W P-5'-P-CCNC: 79 U/L (ref 12–78)
ANION GAP SERPL CALCULATED.3IONS-SCNC: 11 MMOL/L (ref 4–13)
APTT PPP: 105 SECONDS (ref 23–37)
APTT PPP: 82 SECONDS (ref 23–37)
APTT PPP: 87 SECONDS (ref 23–37)
AST SERPL W P-5'-P-CCNC: 45 U/L (ref 5–45)
BILIRUB SERPL-MCNC: 0.31 MG/DL (ref 0.2–1)
BUN SERPL-MCNC: 19 MG/DL (ref 5–25)
CALCIUM SERPL-MCNC: 8.4 MG/DL (ref 8.3–10.1)
CHLORIDE SERPL-SCNC: 108 MMOL/L (ref 100–108)
CO2 SERPL-SCNC: 23 MMOL/L (ref 21–32)
CREAT SERPL-MCNC: 0.76 MG/DL (ref 0.6–1.3)
ERYTHROCYTE [DISTWIDTH] IN BLOOD BY AUTOMATED COUNT: 14.8 % (ref 11.6–15.1)
GFR SERPL CREATININE-BSD FRML MDRD: 92 ML/MIN/1.73SQ M
GLUCOSE SERPL-MCNC: 90 MG/DL (ref 65–140)
HCT VFR BLD AUTO: 24.7 % (ref 36.5–49.3)
HGB BLD-MCNC: 7.7 G/DL (ref 12–17)
INR PPP: 1.58 (ref 0.84–1.19)
MCH RBC QN AUTO: 30.3 PG (ref 26.8–34.3)
MCHC RBC AUTO-ENTMCNC: 31.2 G/DL (ref 31.4–37.4)
MCV RBC AUTO: 97 FL (ref 82–98)
PLATELET # BLD AUTO: 315 THOUSANDS/UL (ref 149–390)
PMV BLD AUTO: 11.3 FL (ref 8.9–12.7)
POTASSIUM SERPL-SCNC: 4 MMOL/L (ref 3.5–5.3)
PROT SERPL-MCNC: 6.2 G/DL (ref 6.4–8.2)
PROTHROMBIN TIME: 18.4 SECONDS (ref 11.6–14.5)
RBC # BLD AUTO: 2.54 MILLION/UL (ref 3.88–5.62)
SODIUM SERPL-SCNC: 142 MMOL/L (ref 136–145)
WBC # BLD AUTO: 8.39 THOUSAND/UL (ref 4.31–10.16)

## 2019-11-16 PROCEDURE — G8998 SWALLOW D/C STATUS: HCPCS

## 2019-11-16 PROCEDURE — 85027 COMPLETE CBC AUTOMATED: CPT | Performed by: INTERNAL MEDICINE

## 2019-11-16 PROCEDURE — G8996 SWALLOW CURRENT STATUS: HCPCS

## 2019-11-16 PROCEDURE — 99232 SBSQ HOSP IP/OBS MODERATE 35: CPT | Performed by: INTERNAL MEDICINE

## 2019-11-16 PROCEDURE — 80053 COMPREHEN METABOLIC PANEL: CPT | Performed by: INTERNAL MEDICINE

## 2019-11-16 PROCEDURE — 92610 EVALUATE SWALLOWING FUNCTION: CPT

## 2019-11-16 PROCEDURE — 99024 POSTOP FOLLOW-UP VISIT: CPT | Performed by: SURGERY

## 2019-11-16 PROCEDURE — 85610 PROTHROMBIN TIME: CPT | Performed by: INTERNAL MEDICINE

## 2019-11-16 PROCEDURE — 85730 THROMBOPLASTIN TIME PARTIAL: CPT | Performed by: INTERNAL MEDICINE

## 2019-11-16 PROCEDURE — G8997 SWALLOW GOAL STATUS: HCPCS

## 2019-11-16 RX ORDER — WARFARIN SODIUM 1 MG/1
2 TABLET ORAL
Status: DISCONTINUED | OUTPATIENT
Start: 2019-11-16 | End: 2019-11-17

## 2019-11-16 RX ADMIN — WARFARIN SODIUM 2 MG: 1 TABLET ORAL at 17:19

## 2019-11-16 RX ADMIN — TAMSULOSIN HYDROCHLORIDE 0.4 MG: 0.4 CAPSULE ORAL at 17:19

## 2019-11-16 RX ADMIN — CHLORHEXIDINE GLUCONATE 0.12% ORAL RINSE 15 ML: 1.2 LIQUID ORAL at 08:33

## 2019-11-16 RX ADMIN — AMLODIPINE BESYLATE 10 MG: 10 TABLET ORAL at 08:33

## 2019-11-16 RX ADMIN — SODIUM CHLORIDE 75 ML/HR: 0.9 INJECTION, SOLUTION INTRAVENOUS at 05:44

## 2019-11-16 RX ADMIN — ASPIRIN 81 MG: 81 TABLET, COATED ORAL at 08:33

## 2019-11-16 RX ADMIN — NICOTINE 1 PATCH: 14 PATCH TRANSDERMAL at 08:35

## 2019-11-16 RX ADMIN — CLOPIDOGREL 75 MG: 75 TABLET, FILM COATED ORAL at 08:33

## 2019-11-16 RX ADMIN — MELATONIN 6 MG: 3 TAB ORAL at 21:07

## 2019-11-16 RX ADMIN — GABAPENTIN 100 MG: 100 CAPSULE ORAL at 08:33

## 2019-11-16 RX ADMIN — THIAMINE HCL TAB 100 MG 100 MG: 100 TAB at 08:33

## 2019-11-16 RX ADMIN — OXYCODONE HYDROCHLORIDE 5 MG: 5 TABLET ORAL at 08:33

## 2019-11-16 RX ADMIN — HEPARIN SODIUM 12 UNITS/KG/HR: 10000 INJECTION, SOLUTION INTRAVENOUS at 05:45

## 2019-11-16 RX ADMIN — CHLORHEXIDINE GLUCONATE 0.12% ORAL RINSE 15 ML: 1.2 LIQUID ORAL at 21:07

## 2019-11-16 RX ADMIN — OXYCODONE HYDROCHLORIDE 5 MG: 5 TABLET ORAL at 23:57

## 2019-11-16 RX ADMIN — SODIUM CHLORIDE 75 ML/HR: 0.9 INJECTION, SOLUTION INTRAVENOUS at 19:47

## 2019-11-16 NOTE — ASSESSMENT & PLAN NOTE
· PAD and left lower extremity claudication - status post left femoral endarterectomy, profundoplasty, SFA embolectomy and retrograde iliac stenting on 11/06/2019  · Has residual left SFA stenosis for which he underwent left lower extremity angiogram with treatment of SFA and popliteal arteries on 11/14/2019  · Developed thrombosis of small collaterals into the posterior tibial artery on the right - s/p tPA and SFA angioplasty  · Right foot cooler than left - motor/sensory intact in right foot   · Discussed with vascular - no intervention planned at present  · Cleared by vascular to continue Coumadin  · Heparin to Coumadin bridge, Plavix   · Statin held due to worsening transaminitis which has improved off statins

## 2019-11-16 NOTE — ASSESSMENT & PLAN NOTE
· Was placed on heparin and was being bridged to Coumadin per vascular for PAD  · Had rapid rise in INR - seen by Hematology - likely from underlying vitamin K deficiency  · Coumadin started at lower dose of 2 mg on 11/15 as recommended by Hematology  · Heparin to Coumadin bridge as above

## 2019-11-16 NOTE — PROGRESS NOTES
Progress Note - Tru Hermila 1950, 78 y o  male MRN: [id number]    Unit/Bed#: [facility] 430-01 Encounter: [id number]    Primary Care Provider: Rachelle Pollard MD   Date and time admitted to hospital: 11/2/2019  2:01 AM        * PAD (peripheral artery disease) ([facility] [address] )  Assessment & Plan  · PAD and left lower extremity claudication - status post left femoral endarterectomy, profundoplasty, SFA embolectomy and retrograde iliac stenting on 11/06/2019  · Has residual left SFA stenosis for which he underwent left lower extremity angiogram with treatment of SFA and popliteal arteries on 11/14/2019  · Developed thrombosis of small collaterals into the posterior tibial artery on the right - s/p tPA and SFA angioplasty  · Right foot cooler than left - motor/sensory intact in right foot   · Discussed with vascular - no intervention planned at present  · Cleared by vascular to continue Coumadin  · Heparin to Coumadin bridge, Plavix   · Statin held due to worsening transaminitis which has improved off statins    Syncope and collapse  Assessment & Plan  · Had RRT with syncope, bradycardia and hypotension  · EKG showed Mobitz Type 1 block  · Required transient dopamine  · Reviewed by cardiology - episode felt to be vasovagal from pain  · Adequate pain control  · Avoid AV blocking agents      History of throat cancer  Assessment & Plan  · Per daughter, patient diagnosed with throat cancer 2 years ago and has been treated with chemo and radiation  · Not undergoing treatment at this time    Hypertension  Assessment & Plan  · BP acceptable on Amlodipine 10 mg daily  · Continue hydralazine p r n      Dependence on nicotine from cigarettes  Assessment & Plan  · Still smokes despite diagnosis of throat cancer, per daughter  · She thinks he smokes 1ppd  · Nicotine patch  · Smoking cessation education    Coagulopathy [facility])  Assessment & Plan  · Was placed on heparin and was being bridged to Coumadin per vascular for PAD  · Had rapid rise in INR - seen by Hematology - likely from underlying vitamin K deficiency  · Coumadin started at lower dose of 2 mg on 11/15 as recommended by Hematology  · Heparin to Coumadin bridge as above    Transaminitis  Assessment & Plan  · Not POA  · Noted on  - worsened till  and improving since then     · Discussed with Dr Issac Boston on  - likely medication induced  · RUQ US - normal  Acute Hepatitis panel - negative  · Continue to hold atorvastatin  · Ct to monitor LFT      VTE Pharmacologic Prophylaxis:   Pharmacologic: Heparin Drip    Patient Centered Rounds: I have performed bedside rounds with nursing staff today  Discussions with Specialists or Other Care Team Provider: Discussed with vascular    Education and Discussions with Family / Patient:  Discussed with patient  Discussed with son on the phone    Time Spent for Care: 20 minutes  More than 50% of total time spent on counseling and coordination of care as described above  Current Length of Stay: 14 day(s)    Current Patient Status: Inpatient   Certification Statement: The patient will continue to require additional inpatient hospital stay due to Peripheral arterial disease being bridged from heparin to Coumadin    Code Status: Level 1 - Full Code    Subjective:   Feels well  Objective:     Vitals:   Temp (24hrs), Av 6 °F (37 °C), Min:98 2 °F (36 8 °C), Max:99 4 °F (37 4 °C)    Temp:  [98 2 °F (36 8 °C)-99 4 °F (37 4 °C)] 98 9 °F (37 2 °C)  HR:  [76-86] 77  Resp:  [18-20] 18  BP: (106-131)/(56-76) 113/56  SpO2:  [95 %-96 %] 95 %  Body mass index is 18 47 kg/m²  Physical Exam:     Physical Exam   Constitutional: He is oriented to person, place, and time  HENT:   Head: Normocephalic and atraumatic  Eyes: Pupils are equal, round, and reactive to light  EOM are normal    Neck: Normal range of motion  Neck supple  Cardiovascular: Normal rate and regular rhythm     Pulmonary/Chest: Effort normal and breath sounds normal    Abdominal: Soft  Bowel sounds are normal    Musculoskeletal:   Right foot cooler than left   Neurological: He is alert and oriented to person, place, and time  Psychiatric: He has a normal mood and affect  Additional Data:     Labs:    Results from last 7 days   Lab Units 11/16/19 0424 11/12/19 0424   WBC Thousand/uL 8 39   < > 6 50   HEMOGLOBIN g/dL 7 7*   < > 9 8*   HEMATOCRIT % 24 7*   < > 30 4*   PLATELETS Thousands/uL 315   < > 176   NEUTROS PCT %  --   --  78*   LYMPHS PCT %  --   --  7*   MONOS PCT %  --   --  9   EOS PCT %  --   --  4    < > = values in this interval not displayed  Results from last 7 days   Lab Units 11/16/19 0424   SODIUM mmol/L 142   POTASSIUM mmol/L 4 0   CHLORIDE mmol/L 108   CO2 mmol/L 23   BUN mg/dL 19   CREATININE mg/dL 0 76   ANION GAP mmol/L 11   CALCIUM mg/dL 8 4   ALBUMIN g/dL 2 5*   TOTAL BILIRUBIN mg/dL 0 31   ALK PHOS U/L 96   ALT U/L 79*   AST U/L 45   GLUCOSE RANDOM mg/dL 90     Results from last 7 days   Lab Units 11/16/19  0434   INR  1 58*       * I Have Reviewed All Lab Data Listed Above  * Additional Pertinent Lab Tests Reviewed:  [address] Admission Reviewed      Last 24 Hours Medication List:     Current Facility-Administered Medications:  acetaminophen 650 mg Oral Q6H PRN Bony Bernal MD    amLODIPine 10 mg Oral Daily Estefani Morgan MD    aspirin 81 mg Oral Daily Bony Bernal MD    bisacodyl 10 mg Rectal Daily PRN Bony Bernal MD    chlorhexidine 15 mL Swish & Spit Q12H Isabel Eid MD    clopidogrel 75 mg Oral Daily Bony Bernal MD    gabapentin 100 mg Oral Daily Bony Bernal MD    heparin (porcine) 3-30 Units/kg/hr (Order-Specific) Intravenous Titrated Dick Martin PA-C Last Rate: 12 Units/kg/hr (11/16/19 0545)   heparin (porcine) 2,000 Units Intravenous PRN Dick Martin PA-C    heparin (porcine) 4,000 Units Intravenous PRN Dick Martin PA-C    hydrALAZINE 10 mg Intravenous Q4H PRN Massiel Haskins PA-C    lidocaine (PF) 0 5 mL Infiltration Once PRN Lee Clancy MD    melatonin 6 mg Oral HS Lee Clancy MD    nicotine 1 patch Transdermal Daily Lee Clancy MD    oxyCODONE 2 5 mg Oral Q4H PRN Lee Clancy MD    oxyCODONE 5 mg Oral Q4H PRN Lee Clancy MD    polyethylene glycol 17 g Oral BID Lee Clancy MD    senna-docusate sodium 1 tablet Oral BID Lee Clancy MD    sodium chloride 75 mL/hr Intravenous Continuous Liseth Day PA-C Last Rate: 75 mL/hr (11/16/19 0544)   tamsulosin 0 4 mg Oral Daily With Vishnu Whitney MD    thiamine 100 mg Oral Daily Lee Clancy MD    warfarin 2 mg Oral Daily (warfarin) Gaby Cisse MD         Today, Patient Was Seen By: Gbay Cisse MD    ** Please Note: Dictation voice to text software may have been used in the creation of this document   **

## 2019-11-16 NOTE — ASSESSMENT & PLAN NOTE
· Not POA  · Noted on 11/12 - worsened till yesterday but now improved    · Discussed with Dr Rian Oconnell on 11/12 - likely medication induced  · Atorvastatin held  · RUQ US - normal  Acute Hepatitis panel - negative  · Continue to hold atorvastatin  · Ct to monitor LFT

## 2019-11-16 NOTE — ASSESSMENT & PLAN NOTE
· Not POA  · Noted on 11/12 - worsened till 11/14 and improving since then     · Discussed with Dr Ulises Adkins on 11/12 - likely medication induced  · RUQ US - normal  Acute Hepatitis panel - negative  · Continue to hold atorvastatin  · Ct to monitor LFT

## 2019-11-16 NOTE — ASSESSMENT & PLAN NOTE
· Was placed on heparin and was being bridged to Coumadin per vascular for PAD  · Had rapid rise in INR - seen by Hematology - likely from underlying vitamin K deficiency  · Coumadin started at lower dose of 2 mg tonight as recommended by Hematology  · Heparin to Coumadin bridge as above

## 2019-11-16 NOTE — PROGRESS NOTES
Progress Note - Vascular Surgery   Sherri José 78 y o  male MRN: [id number]  Unit/Bed#: [facility] 430-01 Encounter: [id number]    Assessment:  78 M with PAD and LLE claudication s/p left femoral endarterectomy, profundoplasty, SFA embolectomy, and iliac stenting with residual SFA stenosis  11/14 IR agram with angioplasty b/l SFA with poor flow to the RLE    Plan:  -C/w dysphagia diet  -Resume coumadin  -Continue plavix  -Left Carotid with >70% stenosis in proximal and mid ICA    Subjective/Objective     Subjective: no acute events overnight  Objective:    Blood pressure 106/76, pulse 85, temperature 99 4 °F (37 4 °C), temperature source Oral, resp  rate 18, height 5' 5" (1 651 m), weight 50 3 kg (111 lb), SpO2 96 %  ,Body mass index is 18 47 kg/m²  Intake/Output Summary (Last 24 hours) at 11/16/2019 0558  Last data filed at 11/16/2019 0200  Gross per 24 hour   Intake 1862 65 ml   Output 1475 ml   Net 387 65 ml       Invasive Devices     Peripheral Intravenous Line            Peripheral IV 11/14/19 Dorsal (posterior); Left Hand 1 day    Peripheral IV 11/15/19 Right Forearm less than 1 day                Physical Exam:  GEN: NAD  HEENT: MMM  CV: RRR  Lung: Normal effort  Ab: Soft, NT/ND  Extrem: No CCE  Right foot +M/S  Neuro:  A+Ox3        Results from last 7 days   Lab Units 11/16/19  0424 11/15/19  0437 11/14/19  0515   WBC Thousand/uL 8 39 8 11 7 02   HEMOGLOBIN g/dL 7 7* 8 3* 8 5*   HEMATOCRIT % 24 7* 26 6* 26 3*   PLATELETS Thousands/uL 315 271 226     Results from last 7 days   Lab Units 11/15/19  0437 11/14/19  0515 11/13/19  0454   POTASSIUM mmol/L 4 2 4 4 4 2   CHLORIDE mmol/L 109* 107 106   CO2 mmol/L 21 24 25   BUN mg/dL 20 25 34*   CREATININE mg/dL 0 64 0 80 0 81   CALCIUM mg/dL 8 3 8 3 8 8     Results from last 7 days   Lab Units 11/16/19  0434 11/16/19  0408 11/15/19  1849 11/15/19  1241  11/15/19  0437  11/14/19  0515   INR  1 58*  --   --   --   --  1 60*  --  1 52*   PTT seconds  --  105* 59* 77*   < >  --    < > 72*    < > = values in this interval not displayed

## 2019-11-16 NOTE — ASSESSMENT & PLAN NOTE
· PAD and left lower extremity claudication - status post left femoral endarterectomy, profundoplasty, SFA embolectomy and retrograde iliac stenting on 11/06/2019  · Has residual left SFA stenosis for which he underwent left lower extremity angiogram with treatment of SFA and popliteal arteries on 11/14/2019  · After procedure right foot pulses could not be found on Doppler and he underwent antegrade invert intervention to the right leg including right SFA PTA  · TPA was administered  · Examination of right foot this morning shows cooler right foot with no signals in the right foot  · Discussed with vascular - no intervention planned at present  · Cleared by vascular to begin Coumadin tonight  · Heparin to Coumadin bridge, Plavix

## 2019-11-16 NOTE — PROGRESS NOTES
Progress Note - Jonah Ibrahim 1950, 78 y o  male MRN: [id number]    Unit/Bed#: [facility] 430-01 Encounter: [id number]    Primary Care Provider: Alice Arizmendi MD   Date and time admitted to hospital: 11/2/2019  2:01 AM        * PAD (peripheral artery disease) ([facility] [address] )  Assessment & Plan  · PAD and left lower extremity claudication - status post left femoral endarterectomy, profundoplasty, SFA embolectomy and retrograde iliac stenting on 11/06/2019  · Has residual left SFA stenosis for which he underwent left lower extremity angiogram with treatment of SFA and popliteal arteries on 11/14/2019  · After procedure right foot pulses could not be found on Doppler and he underwent antegrade invert intervention to the right leg including right SFA PTA  · TPA was administered  · Examination of right foot this morning shows cooler right foot with no signals in the right foot  · Discussed with vascular - no intervention planned at present  · Cleared by vascular to begin Coumadin tonight  · Heparin to Coumadin bridge, Plavix      Syncope and collapse  Assessment & Plan  · Had RRT with syncope, bradycardia and hypotension  · EKG showed Mobitz Type 1 block  · Required transient dopamine  · Reviewed by cardiology - episode felt to be vasovagal from pain  · Adequate pain control  · Avoid AV blocking agents      History of throat cancer  Assessment & Plan  · Per daughter, patient diagnosed with throat cancer 2 years ago and has been treated with chemo and radiation  · Not undergoing treatment at this time    Hypertension  Assessment & Plan  · BP acceptable on Amlodipine 10 mg daily  · Continue hydralazine p r n      Dependence on nicotine from cigarettes  Assessment & Plan  · Still smokes despite diagnosis of throat cancer, per daughter  · She thinks he smokes 1ppd  · Nicotine patch  · Smoking cessation education    Coagulopathy [facility])  Assessment & Plan  · Was placed on heparin and was being bridged to Coumadin per vascular for PAD  · Had rapid rise in INR - seen by Hematology - likely from underlying vitamin K deficiency  · Coumadin started at lower dose of 2 mg tonight as recommended by Hematology  · Heparin to Coumadin bridge as above    Transaminitis  Assessment & Plan  · Not POA  · Noted on  - worsened till yesterday but now improved    · Discussed with Dr Charbel Polk on  - likely medication induced  · Atorvastatin held  · RUQ US - normal  Acute Hepatitis panel - negative  · Continue to hold atorvastatin  · Ct to monitor LFT      VTE Pharmacologic Prophylaxis:   Pharmacologic: Heparin Drip    Patient Centered Rounds: I have performed bedside rounds with nursing staff today  Discussions with Specialists or Other Care Team Provider:  Discussed with vascular    Education and Discussions with Family / Patient:  Discussed with patient  Discussed with son on the phone    Time Spent for Care: 20 minutes  More than 50% of total time spent on counseling and coordination of care as described above  Current Length of Stay: 13 day(s)    Current Patient Status: Inpatient   Certification Statement: The patient will continue to require additional inpatient hospital stay due to Peripheral arterial disease undergoing heparin to Coumadin bridge    Code Status: Level 1 - Full Code    Subjective:   No lower extremity pain    Objective:     Vitals:   Temp (24hrs), Av 7 °F (37 1 °C), Min:97 5 °F (36 4 °C), Max:99 8 °F (37 7 °C)    Temp:  [97 5 °F (36 4 °C)-99 8 °F (37 7 °C)] 99 8 °F (37 7 °C)  HR:  [75-91] 91  Resp:  [18-21] 21  BP: (122-153)/() 140/64  SpO2:  [96 %-98 %] 96 %  Body mass index is 18 47 kg/m²  Physical Exam:     Physical Exam   Constitutional: He is oriented to person, place, and time  HENT:   Head: Normocephalic and atraumatic  Eyes: Pupils are equal, round, and reactive to light  EOM are normal    Neck: Normal range of motion  Neck supple  Cardiovascular: Normal rate and regular rhythm  Pulmonary/Chest: Effort normal and breath sounds normal    Abdominal: Soft  Bowel sounds are normal    Musculoskeletal: He exhibits no edema  Neurological: He is alert and oriented to person, place, and time  Skin:   Right foot cool   Psychiatric: He has a normal mood and affect  Additional Data:     Labs:    Results from last 7 days   Lab Units 11/15/19  0437  11/12/19  0424   WBC Thousand/uL 8 11   < > 6 50   HEMOGLOBIN g/dL 8 3*   < > 9 8*   HEMATOCRIT % 26 6*   < > 30 4*   PLATELETS Thousands/uL 271   < > 176   NEUTROS PCT %  --   --  78*   LYMPHS PCT %  --   --  7*   MONOS PCT %  --   --  9   EOS PCT %  --   --  4    < > = values in this interval not displayed  Results from last 7 days   Lab Units 11/15/19  0437   SODIUM mmol/L 138   POTASSIUM mmol/L 4 2   CHLORIDE mmol/L 109*   CO2 mmol/L 21   BUN mg/dL 20   CREATININE mg/dL 0 64   ANION GAP mmol/L 8   CALCIUM mg/dL 8 3   ALBUMIN g/dL 2 5*   TOTAL BILIRUBIN mg/dL 0 44   ALK PHOS U/L 97   ALT U/L 97*   AST U/L 58*   GLUCOSE RANDOM mg/dL 108     Results from last 7 days   Lab Units 11/15/19  0437   INR  1 60*       * I Have Reviewed All Lab Data Listed Above  * Additional Pertinent Lab Tests Reviewed:  [address] Admission Reviewed      Last 24 Hours Medication List:     Current Facility-Administered Medications:  acetaminophen 650 mg Oral Q6H PRN Sandra Cross MD    amLODIPine 10 mg Oral Daily Stone Oconnor MD    aspirin 81 mg Oral Daily Sandra Cross MD    bisacodyl 10 mg Rectal Daily PRN Sandra Cross MD    chlorhexidine 15 mL Swish & Spit Q12H Isabel Eid MD    clopidogrel 75 mg Oral Daily Sandra Cross MD    gabapentin 100 mg Oral Daily Sandra Cross MD    heparin (porcine) 3-30 Units/kg/hr (Order-Specific) Intravenous Titrated Roxanne Salo, PA-C Last Rate: 12 Units/kg/hr (11/15/19 0617)   heparin (porcine) 2,000 Units Intravenous PRN Roxanne Salo, PA-C    heparin (porcine) 4,000 Units Intravenous PRN Velvet Grewal, PA-C    hydrALAZINE 10 mg Intravenous Q4H PRN Massiel Haskins PA-C    lidocaine (PF) 0 5 mL Infiltration Once PRN Tatianna Esposito MD    melatonin 6 mg Oral HS Tatianna Esposito MD    nicotine 1 patch Transdermal Daily Tatianna Esposito MD    oxyCODONE 2 5 mg Oral Q4H PRN Tatianna Esposito MD    oxyCODONE 5 mg Oral Q4H PRN Tatianna Esposito MD    polyethylene glycol 17 g Oral BID Tatianna Esposito MD    senna-docusate sodium 1 tablet Oral BID Tatianna Esposito MD    sodium chloride 75 mL/hr Intravenous Continuous Velvet GrewalTERRA Last Rate: 75 mL/hr (11/15/19 1817)   tamsulosin 0 4 mg Oral Daily With Shruthi Albright MD    thiamine 100 mg Oral Daily Tatianna Esposito MD         Today, Patient Was Seen By: Sarah Erickson MD    ** Please Note: Dictation voice to text software may have been used in the creation of this document   **

## 2019-11-17 ENCOUNTER — APPOINTMENT (INPATIENT)
Dept: RADIOLOGY | Facility: HOSPITAL | Age: 69
DRG: 252 | End: 2019-11-17
Payer: MEDICARE

## 2019-11-17 PROBLEM — Z85.818 HISTORY OF CANCER TONSIL: Status: ACTIVE | Noted: 2019-11-02

## 2019-11-17 LAB
ABO GROUP BLD: NORMAL
ALBUMIN SERPL BCP-MCNC: 2.2 G/DL (ref 3.5–5)
ALP SERPL-CCNC: 87 U/L (ref 46–116)
ALT SERPL W P-5'-P-CCNC: 69 U/L (ref 12–78)
ANION GAP SERPL CALCULATED.3IONS-SCNC: 11 MMOL/L (ref 4–13)
APTT PPP: 79 SECONDS (ref 23–37)
AST SERPL W P-5'-P-CCNC: 43 U/L (ref 5–45)
BILIRUB SERPL-MCNC: 0.26 MG/DL (ref 0.2–1)
BLD GP AB SCN SERPL QL: NEGATIVE
BUN SERPL-MCNC: 18 MG/DL (ref 5–25)
CALCIUM SERPL-MCNC: 8.2 MG/DL (ref 8.3–10.1)
CHLORIDE SERPL-SCNC: 111 MMOL/L (ref 100–108)
CO2 SERPL-SCNC: 19 MMOL/L (ref 21–32)
CREAT SERPL-MCNC: 0.62 MG/DL (ref 0.6–1.3)
ERYTHROCYTE [DISTWIDTH] IN BLOOD BY AUTOMATED COUNT: 14.8 % (ref 11.6–15.1)
FERRITIN SERPL-MCNC: 310 NG/ML (ref 8–388)
GFR SERPL CREATININE-BSD FRML MDRD: 101 ML/MIN/1.73SQ M
GLUCOSE SERPL-MCNC: 109 MG/DL (ref 65–140)
HCT VFR BLD AUTO: 23.5 % (ref 36.5–49.3)
HCT VFR BLD AUTO: 25.8 % (ref 36.5–49.3)
HGB BLD-MCNC: 7.3 G/DL (ref 12–17)
HGB BLD-MCNC: 8.1 G/DL (ref 12–17)
INR PPP: 1.77 (ref 0.84–1.19)
IRON SATN MFR SERPL: 8 %
IRON SERPL-MCNC: 17 UG/DL (ref 65–175)
MCH RBC QN AUTO: 30.5 PG (ref 26.8–34.3)
MCHC RBC AUTO-ENTMCNC: 31.1 G/DL (ref 31.4–37.4)
MCV RBC AUTO: 98 FL (ref 82–98)
PLATELET # BLD AUTO: 305 THOUSANDS/UL (ref 149–390)
PMV BLD AUTO: 11 FL (ref 8.9–12.7)
POTASSIUM SERPL-SCNC: 4 MMOL/L (ref 3.5–5.3)
PROT SERPL-MCNC: 5.7 G/DL (ref 6.4–8.2)
PROTHROMBIN TIME: 20.1 SECONDS (ref 11.6–14.5)
RBC # BLD AUTO: 2.39 MILLION/UL (ref 3.88–5.62)
RH BLD: POSITIVE
SODIUM SERPL-SCNC: 141 MMOL/L (ref 136–145)
SPECIMEN EXPIRATION DATE: NORMAL
TIBC SERPL-MCNC: 211 UG/DL (ref 250–450)
WBC # BLD AUTO: 7.35 THOUSAND/UL (ref 4.31–10.16)

## 2019-11-17 PROCEDURE — 74177 CT ABD & PELVIS W/CONTRAST: CPT

## 2019-11-17 PROCEDURE — 99233 SBSQ HOSP IP/OBS HIGH 50: CPT | Performed by: INTERNAL MEDICINE

## 2019-11-17 PROCEDURE — 86850 RBC ANTIBODY SCREEN: CPT | Performed by: INTERNAL MEDICINE

## 2019-11-17 PROCEDURE — 86900 BLOOD TYPING SEROLOGIC ABO: CPT | Performed by: INTERNAL MEDICINE

## 2019-11-17 PROCEDURE — 83540 ASSAY OF IRON: CPT | Performed by: INTERNAL MEDICINE

## 2019-11-17 PROCEDURE — 85027 COMPLETE CBC AUTOMATED: CPT | Performed by: INTERNAL MEDICINE

## 2019-11-17 PROCEDURE — NC001 PR NO CHARGE: Performed by: SURGERY

## 2019-11-17 PROCEDURE — 83550 IRON BINDING TEST: CPT | Performed by: INTERNAL MEDICINE

## 2019-11-17 PROCEDURE — 85610 PROTHROMBIN TIME: CPT | Performed by: INTERNAL MEDICINE

## 2019-11-17 PROCEDURE — 99223 1ST HOSP IP/OBS HIGH 75: CPT | Performed by: INTERNAL MEDICINE

## 2019-11-17 PROCEDURE — 80053 COMPREHEN METABOLIC PANEL: CPT | Performed by: INTERNAL MEDICINE

## 2019-11-17 PROCEDURE — 85014 HEMATOCRIT: CPT | Performed by: INTERNAL MEDICINE

## 2019-11-17 PROCEDURE — 85730 THROMBOPLASTIN TIME PARTIAL: CPT | Performed by: INTERNAL MEDICINE

## 2019-11-17 PROCEDURE — 85018 HEMOGLOBIN: CPT | Performed by: INTERNAL MEDICINE

## 2019-11-17 PROCEDURE — 86901 BLOOD TYPING SEROLOGIC RH(D): CPT | Performed by: INTERNAL MEDICINE

## 2019-11-17 PROCEDURE — 82728 ASSAY OF FERRITIN: CPT | Performed by: INTERNAL MEDICINE

## 2019-11-17 RX ORDER — FERROUS SULFATE 325(65) MG
325 TABLET ORAL 2 TIMES DAILY WITH MEALS
Status: DISCONTINUED | OUTPATIENT
Start: 2019-11-17 | End: 2019-11-24 | Stop reason: HOSPADM

## 2019-11-17 RX ADMIN — OXYCODONE HYDROCHLORIDE 5 MG: 5 TABLET ORAL at 22:42

## 2019-11-17 RX ADMIN — NICOTINE 1 PATCH: 14 PATCH TRANSDERMAL at 09:34

## 2019-11-17 RX ADMIN — AMLODIPINE BESYLATE 10 MG: 10 TABLET ORAL at 09:32

## 2019-11-17 RX ADMIN — POLYETHYLENE GLYCOL 3350 17 G: 17 POWDER, FOR SOLUTION ORAL at 17:06

## 2019-11-17 RX ADMIN — MELATONIN 6 MG: 3 TAB ORAL at 21:26

## 2019-11-17 RX ADMIN — SENNOSIDES AND DOCUSATE SODIUM 1 TABLET: 8.6; 5 TABLET ORAL at 09:32

## 2019-11-17 RX ADMIN — CHLORHEXIDINE GLUCONATE 0.12% ORAL RINSE 15 ML: 1.2 LIQUID ORAL at 09:32

## 2019-11-17 RX ADMIN — Medication 1 TABLET: at 17:08

## 2019-11-17 RX ADMIN — POLYETHYLENE GLYCOL 3350 17 G: 17 POWDER, FOR SOLUTION ORAL at 09:32

## 2019-11-17 RX ADMIN — CLOPIDOGREL 75 MG: 75 TABLET, FILM COATED ORAL at 09:32

## 2019-11-17 RX ADMIN — FERROUS SULFATE TAB 325 MG (65 MG ELEMENTAL FE) 325 MG: 325 (65 FE) TAB at 17:06

## 2019-11-17 RX ADMIN — ASPIRIN 81 MG: 81 TABLET, COATED ORAL at 09:32

## 2019-11-17 RX ADMIN — CHLORHEXIDINE GLUCONATE 0.12% ORAL RINSE 15 ML: 1.2 LIQUID ORAL at 21:26

## 2019-11-17 RX ADMIN — OXYCODONE HYDROCHLORIDE 5 MG: 5 TABLET ORAL at 09:49

## 2019-11-17 RX ADMIN — SENNOSIDES AND DOCUSATE SODIUM 1 TABLET: 8.6; 5 TABLET ORAL at 17:06

## 2019-11-17 RX ADMIN — THIAMINE HCL TAB 100 MG 100 MG: 100 TAB at 09:32

## 2019-11-17 RX ADMIN — GABAPENTIN 100 MG: 100 CAPSULE ORAL at 09:32

## 2019-11-17 RX ADMIN — TAMSULOSIN HYDROCHLORIDE 0.4 MG: 0.4 CAPSULE ORAL at 17:06

## 2019-11-17 RX ADMIN — SODIUM CHLORIDE 75 ML/HR: 0.9 INJECTION, SOLUTION INTRAVENOUS at 09:46

## 2019-11-17 RX ADMIN — IOHEXOL 100 ML: 350 INJECTION, SOLUTION INTRAVENOUS at 15:50

## 2019-11-17 NOTE — ASSESSMENT & PLAN NOTE
· Was placed on heparin and was being bridged to Coumadin per vascular for PAD  · Had rapid rise in INR - seen by Hematology - likely from underlying vitamin K deficiency  · Coumadin started at lower dose of 2 mg on 11/15 as recommended by Hematology - now held due to acute blood loss anemia  · When H/H stable restart Coumadin at 2 mg

## 2019-11-17 NOTE — ASSESSMENT & PLAN NOTE
· Not POA  · Noted on 11/12 - worsened till 11/14 and improving since then - now resolved    · Discussed with Dr Chase Sexton on 11/12 - likely medication induced  · RUQ US - normal  Acute Hepatitis panel - negative  · Continue to hold atorvastatin  · If LFT remains normal after discharge trial of Pravastatin with LFT monitoring

## 2019-11-17 NOTE — CONSULTS
Oncology Consult Note  Stella Pain 78 y o  male MRN: [id number]  Unit/Bed#: [facility] 430-01 Encounter: [id number]      Presenting Complaint:  Progressive anemia, peripheral arterial disease the patient on heparin drip currently he was on Coumadin previously    History of Presenting Illness:  He is 71-year-old  male with history of tonsillar cancer status post radiation therapy with Erbitux in 2017, possible dementia, he presented with left lower extremity pain and claudication, he was found to have occlusion of the left common femoral artery and left superficial femoral artery with distal reconstitution in severe narrowing of popliteal artery status post left common femoral artery endarterectomy, appendectomy he was on heparin drip and later on transition Coumadin at 5 mg p o  Daily iron went up to 4 83 them both agents heparin and Coumadin with put on hold    He was transfused with 1 unit of packed RBC on 11/08/2019    Baseline hemoglobin of 11 8    Currently on heparin drip with hemoglobin of 7 7, MCV 97, WBC 8 3, platelets [id number], creatinine 0 76, INR 1 58,     There was no evidence of GI bleed    Consult is requested for management of anemia    The patient does not speak [place] however he denied any fever chills abdominal pain nausea vomiting skin rash melena or hematochezia              Review of Systems - As stated in the HPI otherwise the fourteen point review of systems was negative      Past Medical History:   Diagnosis Date    Cancer [facility])     throat cancer    PAD (peripheral artery disease) ([facility] ) 11/2/2019    PEG (percutaneous endoscopic gastrostomy) status ([facility] )     Port-A-Cath in place        Social History     Socioeconomic History    Marital status: Single     Spouse name: None    Number of children: None    Years of education: None    Highest education level: None   Occupational History    None   Social Needs    Financial resource strain: None    Food insecurity: Worry: None     Inability: None    Transportation needs:     Medical: None     Non-medical: None   Tobacco Use    Smoking status: Current Every Day Smoker     Packs/day: 1 00     Years: 15 00     Pack years: 15 00     Types: Cigarettes    Smokeless tobacco: Never Used    Tobacco comment: 50+ years   Substance and Sexual Activity    Alcohol use: Never     Alcohol/week: 0 0 standard drinks     Frequency: Patient refused     Drinks per session: Patient refused     Binge frequency: Patient refused    Drug use: No    Sexual activity: Not Currently     Birth control/protection: None   Lifestyle    Physical activity:     Days per week: None     Minutes per session: None    Stress: None   Relationships    Social connections:     Talks on phone: None     Gets together: None     Attends Hindu service: None     Active member of club or organization: None     Attends meetings of clubs or organizations: None     Relationship status: None    Intimate partner violence:     Fear of current or ex partner: None     Emotionally abused: None     Physically abused: None     Forced sexual activity: None   Other Topics Concern    None   Social History Narrative    None       History reviewed  No pertinent family history      No Known Allergies      Current Facility-Administered Medications:     acetaminophen (TYLENOL) tablet 650 mg, 650 mg, Oral, Q6H PRN, Patricio Moise MD, 650 mg at 11/12/19 1144    amLODIPine (NORVASC) tablet 10 mg, 10 mg, Oral, Daily, Mikey Tinoco MD, 10 mg at 11/17/19 0932    aspirin (ECOTRIN LOW STRENGTH) EC tablet 81 mg, 81 mg, Oral, Daily, Patricio Moise MD, 81 mg at 11/17/19 0932    bisacodyl (DULCOLAX) rectal suppository 10 mg, 10 mg, Rectal, Daily PRN, Patricio Moise MD    chlorhexidine (PERIDEX) 0 12 % oral rinse 15 mL, 15 mL, Swish & Spit, Q12H [address], Patricio Moise MD, 15 mL at 11/17/19 0932    clopidogrel (PLAVIX) tablet 75 mg, 75 mg, Oral, Daily, Patricio Moise MD, 75 mg at 11/17/19 0932    gabapentin (NEURONTIN) capsule 100 mg, 100 mg, Oral, Daily, Elise Contreras MD, 100 mg at 11/17/19 0932    heparin (porcine) 25,000 units in 250 mL infusion (premix), 3-30 Units/kg/hr (Order-Specific), Intravenous, Titrated, Jeniffer Metro PA-C, Last Rate: 6 mL/hr at 11/16/19 0545, 12 Units/kg/hr at 11/16/19 0545    heparin (porcine) injection 2,000 Units, 2,000 Units, Intravenous, PRN, Jeniffer Cirilo PA-C, 2,000 Units at 11/15/19 2111    heparin (porcine) injection 4,000 Units, 4,000 Units, Intravenous, PRN, Jeniffer Metro PA-C, 4,000 Units at 11/14/19 1453    hydrALAZINE (APRESOLINE) injection 10 mg, 10 mg, Intravenous, Q4H PRN, Massiel Haskins PA-C, 10 mg at 11/08/19 1626    lidocaine (PF) (XYLOCAINE-MPF) 1 % injection 0 5 mL, 0 5 mL, Infiltration, Once PRN, Elise Contreras MD    melatonin tablet 6 mg, 6 mg, Oral, HS, Elise Contreras MD, 6 mg at 11/16/19 2107    nicotine (NICODERM CQ) 14 mg/24hr TD 24 hr patch 1 patch, 1 patch, Transdermal, Daily, Elise Contreras MD, 1 patch at 11/17/19 0934    oxyCODONE (ROXICODONE) IR tablet 2 5 mg, 2 5 mg, Oral, Q4H PRN, Elise Contreras MD, 2 5 mg at 11/14/19 1120    oxyCODONE (ROXICODONE) IR tablet 5 mg, 5 mg, Oral, Q4H PRN, Elise Contreras MD, 5 mg at 11/17/19 0949    polyethylene glycol (MIRALAX) packet 17 g, 17 g, Oral, BID, Elise Contreras MD, 17 g at 11/17/19 0932    senna-docusate sodium (SENOKOT S) 8 6-50 mg per tablet 1 tablet, 1 tablet, Oral, BID, Elise Contreras MD, 1 tablet at 11/17/19 0932    tamsulosin (FLOMAX) capsule 0 4 mg, 0 4 mg, Oral, Daily With General Annetta MD, 0 4 mg at 11/16/19 1719    thiamine (VITAMIN B1) tablet 100 mg, 100 mg, Oral, Daily, Elise Contreras MD, 100 mg at 11/17/19 0932    warfarin (COUMADIN) tablet 2 mg, 2 mg, Oral, Daily (warfarin), Divina Ramsey MD, 2 mg at 11/16/19 1719      /60 (BP Location: Right arm)   Pulse 85   Temp 98 2 °F (36 8 °C) (Oral) Resp 20   Ht 5' 5" (1 651 m)   Wt 50 3 kg (111 lb)   SpO2 95%   BMI 18 47 kg/m²       General Appearance:    Alert, oriented, cachectic        Eyes:    PERRL   Ears:    Normal external ear canals, both ears   Nose:   Nares normal, septum midline   Throat:   Mucosa moist  Pharynx without injection  Neck:   Supple       Lungs:     Clear to auscultation bilaterally   Chest Wall:    No tenderness or deformity    Heart:    Regular rate and rhythm       Abdomen:     Soft, non-tender, bowel sounds +, no organomegaly           Extremities:   Extremities no cyanosis or edema, right lower extremity is cold       Skin:   no rash or icterus      Lymph nodes:   Cervical, supraclavicular, and axillary nodes normal   Neurologic:   CNII-XII intact, normal strength, sensation and reflexes     Throughout               Recent Results (from the past 48 hour(s))   APTT    Collection Time: 11/15/19  6:49 PM   Result Value Ref Range    PTT 59 (H) 23 - 37 seconds   APTT    Collection Time: 11/16/19  4:08 AM   Result Value Ref Range     (H) 23 - 37 seconds   CBC    Collection Time: 11/16/19  4:24 AM   Result Value Ref Range    WBC 8 39 4 31 - 10 16 Thousand/uL    RBC 2 54 (L) 3 88 - 5 62 Million/uL    Hemoglobin 7 7 (L) 12 0 - 17 0 g/dL    Hematocrit 24 7 (L) 36 5 - 49 3 %    MCV 97 82 - 98 fL    MCH 30 3 26 8 - 34 3 pg    MCHC 31 2 (L) 31 4 - 37 4 g/dL    RDW 14 8 11 6 - 15 1 %    Platelets 640 194 - 450 Thousands/uL    MPV 11 3 8 9 - 12 7 fL   Comprehensive metabolic panel    Collection Time: 11/16/19  4:24 AM   Result Value Ref Range    Sodium 142 136 - 145 mmol/L    Potassium 4 0 3 5 - 5 3 mmol/L    Chloride 108 100 - 108 mmol/L    CO2 23 21 - 32 mmol/L    ANION GAP 11 4 - 13 mmol/L    BUN 19 5 - 25 mg/dL    Creatinine 0 76 0 60 - 1 30 mg/dL    Glucose 90 65 - 140 mg/dL    Calcium 8 4 8 3 - 10 1 mg/dL    AST 45 5 - 45 U/L    ALT 79 (H) 12 - 78 U/L    Alkaline Phosphatase 96 46 - 116 U/L    Total Protein 6 2 (L) 6 4 - 8 2 g/dL    Albumin 2 5 (L) 3 5 - 5 0 g/dL    Total Bilirubin 0 31 0 20 - 1 00 mg/dL    eGFR 92 ml/min/1 73sq m   Protime-INR    Collection Time: 11/16/19  4:34 AM   Result Value Ref Range    Protime 18 4 (H) 11 6 - 14 5 seconds    INR 1 58 (H) 0 84 - 1 19   APTT    Collection Time: 11/16/19 11:00 AM   Result Value Ref Range    PTT 82 (H) 23 - 37 seconds   APTT    Collection Time: 11/16/19  5:00 PM   Result Value Ref Range    PTT 87 (H) 23 - 37 seconds   Protime-INR    Collection Time: 11/17/19  5:42 AM   Result Value Ref Range    Protime 20 1 (H) 11 6 - 14 5 seconds    INR 1 77 (H) 0 84 - 1 19   Comprehensive metabolic panel    Collection Time: 11/17/19  5:42 AM   Result Value Ref Range    Sodium 141 136 - 145 mmol/L    Potassium 4 0 3 5 - 5 3 mmol/L    Chloride 111 (H) 100 - 108 mmol/L    CO2 19 (L) 21 - 32 mmol/L    ANION GAP 11 4 - 13 mmol/L    BUN 18 5 - 25 mg/dL    Creatinine 0 62 0 60 - 1 30 mg/dL    Glucose 109 65 - 140 mg/dL    Calcium 8 2 (L) 8 3 - 10 1 mg/dL    AST 43 5 - 45 U/L    ALT 69 12 - 78 U/L    Alkaline Phosphatase 87 46 - 116 U/L    Total Protein 5 7 (L) 6 4 - 8 2 g/dL    Albumin 2 2 (L) 3 5 - 5 0 g/dL    Total Bilirubin 0 26 0 20 - 1 00 mg/dL    eGFR 101 ml/min/1 73sq m   APTT    Collection Time: 11/17/19  5:42 AM   Result Value Ref Range    PTT 79 (H) 23 - 37 seconds   Iron Saturation %    Collection Time: 11/17/19  5:42 AM   Result Value Ref Range    Iron Saturation 8 %    TIBC 211 (L) 250 - 450 ug/dL    Iron 17 (L) 65 - 175 ug/dL   Ferritin    Collection Time: 11/17/19  5:42 AM   Result Value Ref Range    Ferritin 310 8 - 388 ng/mL   CBC    Collection Time: 11/17/19  5:43 AM   Result Value Ref Range    WBC 7 35 4 31 - 10 16 Thousand/uL    RBC 2 39 (L) 3 88 - 5 62 Million/uL    Hemoglobin 7 3 (L) 12 0 - 17 0 g/dL    Hematocrit 23 5 (L) 36 5 - 49 3 %    MCV 98 82 - 98 fL    MCH 30 5 26 8 - 34 3 pg    MCHC 31 1 (L) 31 4 - 37 4 g/dL    RDW 14 8 11 6 - 15 1 %    Platelets 099 427 - 973 Thousands/uL MPV 11 0 8 9 - 12 7 fL         Ct Head Wo Contrast    Result Date: 11/7/2019  Narrative: CT BRAIN - WITHOUT CONTRAST INDICATION:   Left sided weakness  COMPARISON:  CT head dated 11/5/2019 TECHNIQUE:  CT examination of the brain was performed  In addition to axial images, coronal 2D reformatted images were created and submitted for interpretation  Radiation dose length product (DLP) for this visit:  843 mGy-cm   This examination, like all CT scans performed in the [facility], was performed utilizing techniques to minimize radiation dose exposure, including the use of iterative reconstruction and automated exposure control  IMAGE QUALITY:  Diagnostic  FINDINGS: PARENCHYMA: Decreased attenuation is noted in periventricular and subcortical white matter demonstrating an appearance that is statistically most likely to represent mild microangiopathic change  Gray-white differentiation appears maintained  No CT signs of acute territorial infarction  Small old lacunar infarct in the right basal ganglia  No intracranial mass, mass effect or midline shift  No acute parenchymal hemorrhage  Mild parenchymal atrophy  VENTRICLES AND EXTRA-AXIAL SPACES:  Ventricles and extra-axial CSF spaces are prominent commensurate with the degree of volume loss  No hydrocephalus  No acute extra-axial hemorrhage  VISUALIZED ORBITS AND PARANASAL SINUSES:  Mild to moderate mucosal thickening in the bilateral ethmoid sinuses with opacification of several ethmoid air cells  There is mild mucosal thickening in the bilateral maxillary sinuses  Left-sided mastoid effusion is redemonstrated  Paranasal sinuses otherwise are grossly clear  The orbits appear intact  CALVARIUM AND EXTRACRANIAL SOFT TISSUES:  Normal      Impression: No acute intracranial abnormality is seen  Other findings as above   Workstation performed: [id number]     Ct Head Wo Contrast    Result Date: 11/5/2019  Narrative: CT BRAIN - WITHOUT CONTRAST INDICATION:   Altered mental status  COMPARISON:  None  TECHNIQUE:  CT examination of the brain was performed  In addition to axial images, coronal 2D reformatted images were created and submitted for interpretation  Radiation dose length product (DLP) for this visit:  966 93 mGy-cm   This examination, like all CT scans performed in the [facility], was performed utilizing techniques to minimize radiation dose exposure, including the use of iterative  reconstruction and automated exposure control  IMAGE QUALITY:  Diagnostic  FINDINGS: PARENCHYMA: Decreased attenuation is noted in periventricular and subcortical white matter demonstrating an appearance that is statistically most likely to represent moderate microangiopathic change  No CT signs of acute infarction  No intracranial mass, mass effect or midline shift  No acute parenchymal hemorrhage  VENTRICLES AND EXTRA-AXIAL SPACES:  Normal for the patient's age  VISUALIZED ORBITS AND PARANASAL SINUSES:  Left mastoid effusion CALVARIUM AND EXTRACRANIAL SOFT TISSUES:  Normal      Impression: No acute intracranial abnormality  Microangiopathic changes  Left mastoid effusion  Workstation performed: [id number]     Cta Chest Wo W Contrast    Result Date: 11/14/2019  Narrative: CT ANGIOGRAM OF THE CHEST, WITH AND WITHOUT IV CONTRAST INDICATION:  Evaluate for embolic source COMPARISON: None  TECHNIQUE:  CT angiogram examination of the chest was performed according to standard protocol  Contrast as well as noncontrast images were obtained  This examination, like all CT scans performed in the [facility], was performed utilizing techniques to minimize radiation dose exposure, including the use of iterative reconstruction and automated exposure control  3D reconstructions were performed an independent workstation, and are supplied for review    Rad dose 428 mGy-cm IV Contrast:  85 mL of iohexol (OMNIPAQUE)  FINDINGS: VASCULAR STRUCTURES:  The ascending aorta is nonaneurysmal and normal in appearance without significant atherosclerosis  The aortic arch is minimally atherosclerotic without an ulcerated plaque or source for emboli  There is classic branching anatomy of the aortic arch  The right brachiocephalic artery and visualized common carotid artery are patent  The right subclavian artery is patent  The left common carotid artery has calcifications at its origin with a possible stenosis  Evaluation is difficult due to artifact from the calcification  The remainder of the common carotid artery is patent  The left subclavian artery has a mild ostial stenosis, but is otherwise patent  The descending thoracic aorta is minimally atherosclerotic with scattered calcified plaque, but no source for distal emboli  The visualized abdominal aorta is unremarkable  OTHER FINDINGS: HEART:  Normal cardiac size  No pericardial effusions  LUNGS:  Centrilobular emphysematous changes upper lobe prominence and biapical bullae  2 mm right upper lobe pulmonary nodule (series 3 image 47)  Biapical pleural thickening  Bibasilar subsegmental atelectasis  PLEURA:  Tiny bilateral pleural effusions  MEDIASTINUM AND ARTURO:  No mass or significant lymphadenopathy  CHEST WALL AND LOWER NECK:  Bilateral gynecomastia  VISUALIZED STRUCTURES IN THE UPPER ABDOMEN:  Unremarkable  Impression: Mild atherosclerosis of the thoracic aorta without a source for distal emboli  Possible left common carotid artery stenosis with limited evaluation due to artifact  Mild left subclavian artery ostial stenosis  Emphysema  2 mm right upper lobe pulmonary nodule without a prior study for comparison  Based on current Fleischner Society 2017 Guidelines on incidental pulmonary nodule, because the patient is considered high risk for lung cancer, 12 month follow-up non-contrast chest CT is recommended  Tiny bilateral pleural effusions and subsegmental atelectasis    Workstation performed: [id number]     Cta Abdominal W Run Off W Wo Contrast    Result Date: 11/1/2019  Narrative: CT ANGIOGRAM OF THE AORTA AND LOWER EXTREMITIES WITH IV CONTRAST INDICATION:  Arterial embolism, lower extremity  Right foot swelling  COMPARISON: None  TECHNIQUE:  CT angiogram examination of the abdomen, pelvis, and lower extremities was performed according to standard protocol with intravenous contrast   This examination, like all CT scans performed in the [facility], was performed utilizing techniques to minimize radiation dose exposure, including the use of iterative reconstruction and automated exposure control  3D reconstructions were performed an independent workstation, and are supplied for review  Rad dose 1712 75 mGy-cm IV Contrast:  120 mL of iohexol (OMNIPAQUE) was administered intravenously without immediate adverse reaction  FINDINGS: VASCULAR STRUCTURES:   Normal size lower abdominal aorta  Mild calcification of the lower abdominal aorta  The celiac, superior mesenteric, and inferior mesenteric arteries are patent  Mild narrowing at the origins of the bilateral single renal arteries  Atherosclerotic plaques of bilateral common iliac arteries with scattered areas of mild narrowing  There is irregularity and areas of stenosis within the distal internal iliac arteries bilaterally  Atherosclerosis and scattered areas of mild narrowing of the right external iliac artery  Mild narrowing just distal to the origin of the right profunda femoral artery however the remainder of the arteries patent  There is severe narrowing at the origin of the right superficial femoral artery and multiple scattered areas of severe narrowing and focal occlusion on (series 2, image 181)  The right popliteal artery is occluded  Limited evaluation of the calf vessels due to calcifications    There is distal reconstitution of the right peroneal artery which appears patent and scattered areas of the posterior tibial artery  The right anterior tibial artery appears occluded  Contrast return in posterior right calf veins are seen (series 6, image 412)  Atherosclerosis and scattered areas of severe narrowing of the left external iliac artery for example (series 4, image 435)  There is occlusion of the left common femoral artery, left superficial femoral artery, and origin of the left profunda femoral artery  There is distal reconstitution of the left profunda femoral artery branches  There is distal reconstitution with severe narrowing of the left mid to distal superficial femoral artery  There is severe narrowing with areas of near occlusion of the left popliteal artery  Limited evaluation of the calf vessels due to calcifications  The left peroneal artery appears patent  Scattered areas of the posterior tibial artery demonstrates reconstitution and occlusion  The left anterior tibial artery appears occluded  OTHER FINDINGS ABDOMEN LOWER CHEST:  No significant abnormality in the visualized lung bases  LIVER/BILIARY TREE:  The partially visualized liver is unremarkable  GALLBLADDER:  No calcified gallstones  No pericholecystic inflammatory change  SPLEEN:  Partially visualized spleen is unremarkable  Normal size  PANCREAS:  Unremarkable  ADRENAL GLANDS: Unremarkable  KIDNEYS/URETERS:  No solid renal mass  No hydronephrosis  No urinary tract calculi  PELVIS REPRODUCTIVE ORGANS:  Unremarkable for patient's age  URINARY BLADDER:  There is a lobulated 9 x 5 x 5 mm calculus within the posterior bladder  ADDITIONAL ABDOMINAL AND PELVIC STRUCTURES STOMACH AND BOWEL: ABDOMINOPELVIC CAVITY:   No pathologically enlarged mesenteric or retroperitoneal lymph nodes  No ascites or free intraperitoneal air  ABDOMINAL WALL/INGUINAL REGIONS:  Ventral abdominal mesh wall repair  Right lateral fat-containing inguinal hernias  OSSEOUS STRUCTURES:  No acute fracture or destructive osseous lesion    Amputations of the 3rd and 4th digits of the right foot  Impression: 1  Severe peripheral vascular disease as described above  Limited evaluation of the calf vessels due to extensive calcifications  2   Severe areas of narrowing and focal occlusion of the right superficial femoral artery and diffuse occlusion of the popliteal artery  Distal reconstitution of the right peroneal artery and scattered areas of the right posterior tibial artery  3   Occlusion of the left common femoral artery and left superficial femoral artery with distal reconstitution however severe narrowing of the mid to distal superficial femoral artery  Severe narrowing of the left popliteal artery  Patent left peroneal  artery and scattered areas of the left posterior tibial artery  4   Vascular consultation is recommended  5   9 x 5 x 5 mm calculus within the posterior bladder, near the left ureterovesicular junction however there is no hydronephrosis to suggest obstruction  Workstation performed: [id number]     Xr Abdomen 1 Vw Portable    Result Date: 11/5/2019  Narrative: ABDOMEN INDICATION:   abdomina; pain, fecal impaction? ?  COMPARISON:  None VIEWS:  AP supine FINDINGS: There is a nonobstructive bowel gas pattern  Mild fecal stasis  No discernible free air on this supine study  Upright or left lateral decubitus imaging is more sensitive to detect subtle free air in the appropriate setting  No pathologic calcifications or soft tissue masses  Visualized lung bases are clear  Visualized osseous structures are unremarkable for the patient's age  Pelvic hernia repair  Impression: Mild fecal stasis  No obstruction  Workstation performed: [id number]     Vas Carotid Complete Study    Result Date: 11/15/2019  Narrative:  [facility] REPORT CLINICAL: Indications: Follow up evaluation to CTA which shows questionable stenosis of left common carotid artery and left subclavian artery  Physician wants to rule out B/L ICA stenosis   Operative History: 2019-11-08 Left Common Femoral endarterectomy, primary closure 2019-11-08 Left Extended profundoplasty 2019-11-08 Left Iliac thrombembolectomy, leg incision 2019-11-08 Left Transluminal balloon angioplasty, lower limb artery Fem Fem BPG Right 2nd Toe amputation Peg Tube Risk Factors The patient has history of HTN, Diabetes, throat cancer, left femoral artery occlusion, HLD, PAD, and smoking (current) 1-2 ppd  The patient's current BMI is 18 47, Weight is 111 lb and height is 65 in  Brachial BP: Right:  126/60 mmHg  Left:  IV site  FINDINGS:  Right        Impression         PSV  EDV (cm/s)  Direction of Flow  Ratio  Dist  ICA                       118          17                      0 58  Mid  ICA                         82          25                      0 40  Prox  ICA    1 - 49%             77          14                      0 38  Dist CCA                        170          15                            Mid CCA                         204          34                      1 94  Prox CCA                        105          15                            Ext Carotid                     110           0                      0 54  Prox Vert                       120          23  Antegrade                 Subclavian   Moderate stenosis  284          11                             Left         Impression         PSV  EDV (cm/s)  Direction of Flow  Ratio  Dist  ICA                       113          58                      0 75  Mid  ICA     70%+               357          81                      2 36  Prox   ICA    70 - 99%           368         100                      2 43  Dist CCA     moderate stenosis  211          35                            Mid CCA                         151          30                      1 38  Prox CCA                        109          21                            Ext Carotid  moderate stenosis  277          18                      1 83  Prox Vert                        82          25 Antegrade                 Subclavian   moderate stenosis  275          13                               CONCLUSION: Impression RIGHT: There is <50% stenosis noted in the internal carotid artery  Plaque is heterogenous/calcified and irregular  Vertebral artery flow is antegrade  A moderate stenosis was identified in the proximal subclavian artery  LEFT: There is 70-99% stenosis noted in the internal carotid artery  Plaque is heterogenous/calcified and irregular  Vertebral artery flow is antegrade  A moderate stenosis was identified in the proximal subclavian artery  A moderate stenosis was identified in the proximal external carotid artery and distal common carotid artery  Technically difficult study secondary to severe B/L calcified plaque and arterial shadowing  Technical findings called to  TERRA Munoz Masslino and faxed to chart  Internal carotid artery stenosis determination by consensus criteria from: Prashanth Oro et al  Carotid Artery Stenosis: Gray-Scale and Doppler US Diagnosis - Society of Radiologists in [facility] [address], Radiology 2003; [id number]  SIGNATURE: Electronically Signed by: Tao Sterling MD, [address] on 2019-11-15 11:33:55 PM    Vas Lower Limb Arterial Duplex, Complete Bilateral    Result Date: 11/10/2019  Narrative:  [facility] REPORT CLINICAL: Indications:  Patient complains of left leg pain  Left iliofemoral, fem-pop thromboembolectomy, along with femoral endarterectomy with extensive profundaplasty and stenting of common and external iliac artery  Risk Factors The patient has history of HTN, Diabetes (Yes), HLD and PAD    FINDINGS:  Segment                Right                 Left                                          Impression  PSV  EDV  Impression  PSV  Common Femoral Artery              274                    94  Prox Profunda                      134                    82  Prox SFA                            26       50-75%      272  Mid SFA 26                   127  Dist SFA                            19       50-75%      326  Proximal Pop           Occluded      1                   153  Distal Pop             Occluded      0                   104  Dist Post Tibial                     7                    13  Dist  Ant  Tibial                   12    8               44  Dist Peroneal                       17                    50     CONCLUSION: Impression: RIGHT LOWER LIMB: The popliteal artery is occluded  Diffuse disease noted throughout the femoral artery and tibioperoneal disease  Ankle/Brachial index:  0 15 PVR/ PPG tracings are dampened  Metatarsal and Great toe pressure are unobtainable secondary to attenuated PPG waveform  LEFT LOWER LIMB: Common femoral arterectomy site appears patent, although it was difficult to visualize vessel secondary to edema  Residual 50-75% stenosis is noted in multiple segments of the femoral artery  There is tibioperoneal disease  Ankle/Brachial index:  0 52 PVR/ PPG tracings are dampened  Metatarsal pressure of 45mmHg Great toe pressure of 36mmHg, within the healing range  No previous study to compare  SIGNATURE: Electronically Signed by: Cole Lopez on 2019-11-10 11:40:50 AM    Ir Abdominal Angiography / Intervention    Result Date: 11/14/2019  Narrative: EXAMINATION: 1  Abdominal aortogram with left lower extremity arterial runoff  2   Left SFA and popliteal artery angioplasty 3  Right SFA angioplasty INDICATION: 60-year-old male with left lower extremity claudication status post left femoral endarterectomy, profundoplasty, SFA embolectomy, and iliac artery stenting is referred for left lower extremity arteriogram with possible intervention  CONTRAST: 85 mL Visipaque 320 intra-arterial FLUOROSCOPY TIME:   21 5 MINUTES IMAGES:  694 ANESTHESIA: Moderate sedation and local lidocaine PROCEDURE:  The patient was identified verbally and by wristband  Timeout was performed    Informed consent was obtained from healthcare proxy  Following obtaining informed consent, the patient was prepped and draped in the usual sterile fashion  All elements of maximal sterile barrier technique, cap and mask and sterile gown and sterile gloves and sterile full-body drape and hand hygiene and 2% chlorhexidine for cutaneous antisepsis  Sterile ultrasound technique with sterile gel and sterile probe covers was also utilized  Under ultrasound guidance, a 21-gauge needle was used to gain access into the right common femoral artery  Access was secured using a 5-Cymraes vascular sheath  An Omni Flush catheter was advanced into the abdominal aorta and an abdominal aortogram with  bilateral iliac artery runoff was performed  The Omni Flush catheter was used to across the aortic bifurcation and advanced into the left common femoral artery  A left lower extremity arterial runoff was performed  A 0 035 inch Glidewire advantage was advanced into the left popliteal artery  Angioplasty of the stenoses along the left SFA and popliteal arteries was performed using 6 mm x 220 mm conventional angioplasty balloon  Additional angioplasty of the proximal left SFA was performed using 7 mm x 40 mm conventional angioplasty balloon  Post angioplasty angiogram of the left leg was performed  All wires, catheters, balloons, and sheath were removed  The right CFA arteriotomy site was closed using manual pressure  Sterile dressing was applied  Following the case, a previously dopplerable right PT pulse was found to be no longer dopplerable  Therefore decision was made to perform a right lower extremity arteriogram  Under ultrasound guidance, a 21-gauge needle was used to gain access into the right proximal SFA  Access was secured using a 5-Cymraes transitional sheath and right lower extremity arterial runoff was performed  The 5-Cymraes transitional sheath was exchanged for a 6-Cymraes vascular sheath   A 0 018 inch Glidewire advantage was advanced into the right popliteal artery and angioplasty of the right SFA was performed using 5 mm x 220 mm conventional angioplasty balloon  At this point, the arteries distal to the right SFA was noted to be chronically occluded  After discussion with the vascular surgeon, decision was made not to perform further intervention  4 mg TPA was injected through the sheath in the right SFA for additional thrombolysis  All wires, catheters, balloons, and sheath were removed  Hemostasis at the right SFA puncture site was obtained using manual pressure  Sterile dressing was applied  The patient tolerated the procedure well without complication  The patient left the IR department in stable condition  Findings: 1  Abdominal aorta, and bilateral iliac arteries were patent with atherosclerotic calcifications  2   The left common iliac to external iliac artery stents were patent  3   The left deep femoral artery was patent  4   There were diffuse moderate to severe luminal narrowing of the left SFA and popliteal arteries which were successfully treated using 6 mm and 7 mm balloons  5   There was one vessel runoff of the left lower extremity through the peroneal artery  6   The left AT and PT were chronically occluded  The left dorsalis pedis and plantar arteries reconstituted through through collaterals  7   There were diffuse moderate to severe narrowing of the right SFA which was successfully angioplastied using 5 mm balloon  8   The right popliteal artery and tibial arteries were chronically occluded with faint distal reconstitution of the right posterior tibial artery  9   The right peroneal artery appeared to reconstitute distally through collaterals  Impression: Impression: 1  Successful angioplasty of moderate to severe stenoses along the left SFA and popliteal arteries  2   Successful angioplasty of moderate to severe stenoses along the right SFA   Workstation performed: [id number]     Vas Lower Limb Venous Duplex Study, Unilateral/limited    Result Date: 11/1/2019  Narrative:  [facility] REPORT CLINICAL: Indications: Limb Pain [M79 609]  Left sided pain x several weeks  Patient just flew back from [place]  No history of DVT  FINDINGS:  Left     Impression       CFV      Normal (Patent)     CONCLUSION: Impression: RIGHT LOWER LIMB LIMITED: Evaluation shows no evidence of thrombus in the common femoral vein  Doppler evaluation shows a normal response to augmentation maneuvers  LEFT LOWER LIMB: No evidence of acute or chronic deep vein thrombosis No evidence of superficial thrombophlebitis noted  Doppler evaluation shows a normal response to augmentation maneuvers  Popliteal arterial Doppler waveform is monophasic  Posterior and anterior tibial arteries appear chronically occluded  Incidentally, the common femoral artery is occluded with reconstitution in the mid superficial femoral artery  Technical findings were given to Dr Berenice Shaw at time of scan  SIGNATURE: Electronically Signed by: Yoshi Schrader MD on 2019-11-01 09:08:11 PM    Us Abdomen Complete With Doppler    Result Date: 11/13/2019  Narrative: ABDOMEN ULTRASOUND, COMPLETE WITH DOPPLER INDICATION: Elevated LFTs  COMPARISON:  CT 11/1/2019 TECHNIQUE:   Real-time ultrasound of the abdomen was performed with a curvilinear transducer with both volumetric sweeps and still imaging techniques  FINDINGS: PANCREAS:  Visualized portions of the pancreas are within normal limits  AORTA AND IVC:  Visualized portions are normal for patient age  LIVER: Size:  Within normal range  The liver measures 14 7 cm in the midclavicular line  Contour:  Surface contour is smooth  Parenchyma:  Echogenicity and echotexture are within normal limits  No evidence of suspicious mass  LIVER DOPPLER: The main portal vein and primary branch segments are patent and hepatopetal with normal spectral waveform  Hepatic veins are patent  Spectral waveforms within normal limits    Main hepatic artery appears normal size, patent with normal spectral waveform  BILIARY: The gallbladder is decompressed  No pericholecystic fluid  No stones or sludge identified  No sonographic Parson's sign  No intrahepatic biliary dilatation  CBD measures 5 mm  No choledocholithiasis  KIDNEY: Right kidney measures 10 x 4 3 cm  Within normal limits  Left kidney measures 10 1 x 6 6 cm  Within normal limits  SPLEEN: Measures 9 8 x 11 2 x 4 1 cm  Within normal limits  ASCITES:  None  Impression: No significant sonographic abnormality  Patent hepatic vasculature with normal directional blood flow  Workstation performed: [id number]     ECOG :2      Assessment and plan:  1  History of tonsillar cancer status post radiation therapy with Erbitux in 2017 at [facility]    2   Significant and severe arterial disease specially in the lower extremities, status post angioplasty, endarterectomy of the left lower extremity, his baseline hemoglobin 11 6, the patient has been transfuse with 1 unit of packed RBC, now his hemoglobin of 7 7    This is most likely anemia secondary to GI blood loss or intraperitoneal blood loss    I suggest if the patient does not have melena or hematochezia to do CT scan of the abdomen and pelvis to rule out hematoma formation while he is on heparin drip    Transfuse with 1 unit of packed RBC if hemoglobin went below 7    Discussed with the slim team

## 2019-11-17 NOTE — PROGRESS NOTES
Progress Note - Sherri José 1950, 78 y o  male MRN: [id number]    Unit/Bed#: [facility] 430-01 Encounter: [id number]    Primary Care Provider: Davey Lux MD   Date and time admitted to hospital: 11/2/2019  2:01 AM        * PAD (peripheral artery disease) ([facility] [address] )  Assessment & Plan  · PAD and left lower extremity claudication - status post left femoral endarterectomy, profundoplasty, SFA embolectomy and retrograde iliac stenting on 11/06/2019  · Has residual left SFA stenosis for which he underwent left lower extremity angiogram with treatment of SFA and popliteal arteries on 11/14/2019  · Developed thrombosis of small collaterals into the posterior tibial artery on the right - s/p tPA and SFA angioplasty  · Right foot cooler than left - motor/sensory intact in right foot - no intervention planned per vascular  · Noted to have left groin hematoma with concern for pseudoaneurysm when underwent CT for drop in Hb  · Discussed with vascular - pseudoaneurysm study ordered  · Ok to continue anticoagulation per vascular  · Ct heparin drip  · Hold Coumadin till H/H stable  · Statin held due to worsening transaminitis which has resolved off statins    Acute blood loss anemia  Assessment & Plan  · Drop in Hb to 7 3 from baseline of 11 8 to 13  Repeat Hb this evening 8 1  Am value likely hemodiluted(was on IVF's)  · CT abdomen - "Small hematoma in the region of the left common femoral artery bifurcation measuring 3 8 x 4 1 cm in maximal axial dimensions "  · Monitor H/H  · Transfuse as needed   · Needs to continue anticoagulation per vascular    Hypertension  Assessment & Plan  · BP acceptable on Amlodipine 10 mg daily      History of cancer tonsil  Assessment & Plan  · 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 diagnosed in July 2017  · S/p concurrent chemoradiation with weekly Cetuximab from 10/26/17 to 12/15/17   Total 6750 cGy radiation to left tonsil  · Last seen by ENT on 8/20/19 and by oncology on 9/11/19 - no evidence of disease  · Ct outpatient follow-up with primary oncologist at [facility] and ENT         Dependence on nicotine from cigarettes  Assessment & Plan  · Still smokes despite diagnosis of throat cancer, per daughter  · She thinks he smokes 1ppd  · Nicotine patch  · Smoking cessation education    Coagulopathy [facility])  Assessment & Plan  · Was placed on heparin and was being bridged to Coumadin per vascular for PAD  · Had rapid rise in INR - seen by Hematology - likely from underlying vitamin K deficiency  · Coumadin started at lower dose of 2 mg on 11/15 as recommended by Hematology - now held due to acute blood loss anemia  · When H/H stable restart Coumadin at 2 mg     Transaminitis  Assessment & Plan  · Not POA  · Noted on 11/12 - worsened till 11/14 and improving since then - now resolved    · Discussed with Dr Issac Boston on 11/12 - likely medication induced  · RUQ US - normal  Acute Hepatitis panel - negative  · Continue to hold atorvastatin  · If LFT remains normal after discharge trial of Pravastatin with LFT monitoring    Syncope and collapse  Assessment & Plan  · Had RRT with syncope, bradycardia and hypotension  · EKG showed Mobitz Type 1 block  · Required transient dopamine  · Reviewed by cardiology - episode felt to be vasovagal from pain  · Adequate pain control  · Avoid AV blocking agents        VTE Pharmacologic Prophylaxis:   Pharmacologic: Heparin Drip  Mechanical VTE Prophylaxis in Place: Yes    Patient Centered Rounds: I have performed bedside rounds with nursing staff today  Discussions with Specialists or Other Care Team Provider: Discussed with Dr Melissa Segovia and vascular    Education and Discussions with Family / Patient: Discussed with patient  Discussed with son and daughter on the phone     Time Spent for Care: 45 minutes    More than 50% of total time spent on counseling and coordination of care as described above  Current Length of Stay: 15 day(s)    Current Patient Status: Inpatient   Certification Statement: The patient will continue to require additional inpatient hospital stay due to Acute blood loss anemia    Code Status: Level 1 - Full Code    Subjective:   Leg pain controlled  No abdominal pain  No shortness of breath or lightheadedness  Objective:     Vitals:   Temp (24hrs), Av 4 °F (36 9 °C), Min:98 2 °F (36 8 °C), Max:98 7 °F (37 1 °C)    Temp:  [98 2 °F (36 8 °C)-98 7 °F (37 1 °C)] 98 4 °F (36 9 °C)  HR:  [80-85] 81  Resp:  [18-20] 18  BP: (129-133)/(59-60) 133/60  SpO2:  [95 %-96 %] 96 %  Body mass index is 18 47 kg/m²  Physical Exam:     Physical Exam   HENT:   Head: Normocephalic and atraumatic  Eyes: Pupils are equal, round, and reactive to light  EOM are normal    Neck: Normal range of motion  Neck supple  Cardiovascular: Normal rate and regular rhythm  Pulmonary/Chest: Effort normal and breath sounds normal  No stridor  No respiratory distress  Abdominal: Soft  Bowel sounds are normal  He exhibits no distension  There is no tenderness  Musculoskeletal: He exhibits no edema  Neurological: He is alert  Skin: Skin is warm and dry  Psychiatric: He has a normal mood and affect  Additional Data:     Labs:    Results from last 7 days   Lab Units 19  1644 19  0543  19  0424   WBC Thousand/uL  --  7 35   < > 6 50   HEMOGLOBIN g/dL 8 1* 7 3*   < > 9 8*   HEMATOCRIT % 25 8* 23 5*   < > 30 4*   PLATELETS Thousands/uL  --  305   < > 176   NEUTROS PCT %  --   --   --  78*   LYMPHS PCT %  --   --   --  7*   MONOS PCT %  --   --   --  9   EOS PCT %  --   --   --  4    < > = values in this interval not displayed       Results from last 7 days   Lab Units 19  0542   SODIUM mmol/L 141   POTASSIUM mmol/L 4 0   CHLORIDE mmol/L 111*   CO2 mmol/L 19*   BUN mg/dL 18   CREATININE mg/dL 0 62   ANION GAP mmol/L 11   CALCIUM mg/dL 8 2*   ALBUMIN g/dL 2 2* TOTAL BILIRUBIN mg/dL 0 26   ALK PHOS U/L 87   ALT U/L 69   AST U/L 43   GLUCOSE RANDOM mg/dL 109     Results from last 7 days   Lab Units 11/17/19  0542   INR  1 77*         * I Have Reviewed All Lab Data Listed Above  * Additional Pertinent Lab Tests Reviewed: Frank 66 Admission Reviewed      Last 24 Hours Medication List:     Current Facility-Administered Medications:  acetaminophen 650 mg Oral Q6H PRN Zafar Contreras MD    amLODIPine 10 mg Oral Daily Lupe Opitz, MD    aspirin 81 mg Oral Daily Zafar Contreras MD    bisacodyl 10 mg Rectal Daily PRN Zafar Contreras MD    chlorhexidine 15 mL Swish & Spit Q12H Isabel Eid MD    clopidogrel 75 mg Oral Daily Zafar Contreras MD    ferrous sulfate 325 mg Oral BID With Meals Kimmie Yeager MD    gabapentin 100 mg Oral Daily Zafar Contreras MD    heparin (porcine) 3-30 Units/kg/hr (Order-Specific) Intravenous Titrated Angel Witt PA-C Last Rate: 12 Units/kg/hr (11/16/19 0545)   heparin (porcine) 2,000 Units Intravenous PRN Angel Witt PA-C    heparin (porcine) 4,000 Units Intravenous PRN Angel Witt PA-C    hydrALAZINE 10 mg Intravenous Q4H PRN Massiel Haskins PA-C    lidocaine (PF) 0 5 mL Infiltration Once PRN Zafar Contreras MD    melatonin 6 mg Oral HS Zafar Contreras MD    multivitamin-minerals 1 tablet Oral Daily Kimmie Yeager MD    nicotine 1 patch Transdermal Daily Zafar Contreras MD    oxyCODONE 2 5 mg Oral Q4H PRN Zafar Contreras MD    oxyCODONE 5 mg Oral Q4H PRN Zafar Contreras MD    polyethylene glycol 17 g Oral BID Zafar Contreras MD    senna-docusate sodium 1 tablet Oral BID Zafar Contreras MD    tamsulosin 0 4 mg Oral Daily With Kristin Brewster MD    thiamine 100 mg Oral Daily Zafar Contreras MD         Today, Patient Was Seen By: Osman Ramirez MD    ** Please Note: Dictation voice to text software may have been used in the creation of this document  **

## 2019-11-17 NOTE — ASSESSMENT & PLAN NOTE
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

## 2019-11-17 NOTE — ASSESSMENT & PLAN NOTE
· Drop in Hb to 7 3 from baseline of 11 8 to 13  Repeat Hb this evening 8 1   Am value likely hemodiluted(was on IVF's)  · CT abdomen - "Small hematoma in the region of the left common femoral artery bifurcation measuring 3 8 x 4 1 cm in maximal axial dimensions "  · Monitor H/H  · Transfuse as needed   · Needs to continue anticoagulation per vascular

## 2019-11-17 NOTE — ASSESSMENT & PLAN NOTE
· PAD and left lower extremity claudication - status post left femoral endarterectomy, profundoplasty, SFA embolectomy and retrograde iliac stenting on 11/06/2019  · Has residual left SFA stenosis for which he underwent left lower extremity angiogram with treatment of SFA and popliteal arteries on 11/14/2019  · Developed thrombosis of small collaterals into the posterior tibial artery on the right - s/p tPA and SFA angioplasty  · Right foot cooler than left - motor/sensory intact in right foot - no intervention planned per vascular  · Noted to have left groin hematoma with concern for pseudoaneurysm when underwent CT for drop in Hb  · Discussed with vascular - pseudoaneurysm study ordered     · Ok to continue anticoagulation per vascular  · Ct heparin drip  · Hold Coumadin till H/H stable  · Statin held due to worsening transaminitis which has resolved off statins

## 2019-11-18 ENCOUNTER — APPOINTMENT (INPATIENT)
Dept: NON INVASIVE DIAGNOSTICS | Facility: HOSPITAL | Age: 69
DRG: 252 | End: 2019-11-18
Payer: MEDICARE

## 2019-11-18 PROBLEM — R26.2 AMBULATORY DYSFUNCTION: Status: ACTIVE | Noted: 2019-11-18

## 2019-11-18 PROBLEM — D69.6 THROMBOCYTOPENIA (HCC): Status: ACTIVE | Noted: 2019-11-18

## 2019-11-18 LAB
ANION GAP SERPL CALCULATED.3IONS-SCNC: 9 MMOL/L (ref 4–13)
APTT PPP: 80 SECONDS (ref 23–37)
BUN SERPL-MCNC: 13 MG/DL (ref 5–25)
CALCIUM SERPL-MCNC: 8.7 MG/DL (ref 8.3–10.1)
CHLORIDE SERPL-SCNC: 107 MMOL/L (ref 100–108)
CO2 SERPL-SCNC: 22 MMOL/L (ref 21–32)
CREAT SERPL-MCNC: 0.66 MG/DL (ref 0.6–1.3)
ERYTHROCYTE [DISTWIDTH] IN BLOOD BY AUTOMATED COUNT: 14.5 % (ref 11.6–15.1)
GFR SERPL CREATININE-BSD FRML MDRD: 98 ML/MIN/1.73SQ M
GLUCOSE SERPL-MCNC: 104 MG/DL (ref 65–140)
HCT VFR BLD AUTO: 27 % (ref 36.5–49.3)
HGB BLD-MCNC: 8.6 G/DL (ref 12–17)
INR PPP: 1.79 (ref 0.84–1.19)
MCH RBC QN AUTO: 30.5 PG (ref 26.8–34.3)
MCHC RBC AUTO-ENTMCNC: 31.9 G/DL (ref 31.4–37.4)
MCV RBC AUTO: 96 FL (ref 82–98)
PLATELET # BLD AUTO: 387 THOUSANDS/UL (ref 149–390)
PMV BLD AUTO: 10.7 FL (ref 8.9–12.7)
POTASSIUM SERPL-SCNC: 4 MMOL/L (ref 3.5–5.3)
PROCALCITONIN SERPL-MCNC: 0.1 NG/ML
PROTHROMBIN TIME: 20.3 SECONDS (ref 11.6–14.5)
RBC # BLD AUTO: 2.82 MILLION/UL (ref 3.88–5.62)
SODIUM SERPL-SCNC: 138 MMOL/L (ref 136–145)
WBC # BLD AUTO: 7.69 THOUSAND/UL (ref 4.31–10.16)

## 2019-11-18 PROCEDURE — 84145 PROCALCITONIN (PCT): CPT | Performed by: INTERNAL MEDICINE

## 2019-11-18 PROCEDURE — 99024 POSTOP FOLLOW-UP VISIT: CPT | Performed by: SURGERY

## 2019-11-18 PROCEDURE — 85730 THROMBOPLASTIN TIME PARTIAL: CPT | Performed by: INTERNAL MEDICINE

## 2019-11-18 PROCEDURE — 80048 BASIC METABOLIC PNL TOTAL CA: CPT | Performed by: INTERNAL MEDICINE

## 2019-11-18 PROCEDURE — 85027 COMPLETE CBC AUTOMATED: CPT | Performed by: INTERNAL MEDICINE

## 2019-11-18 PROCEDURE — 99232 SBSQ HOSP IP/OBS MODERATE 35: CPT | Performed by: INTERNAL MEDICINE

## 2019-11-18 PROCEDURE — 93926 LOWER EXTREMITY STUDY: CPT

## 2019-11-18 PROCEDURE — 93926 LOWER EXTREMITY STUDY: CPT | Performed by: SURGERY

## 2019-11-18 PROCEDURE — 85610 PROTHROMBIN TIME: CPT | Performed by: INTERNAL MEDICINE

## 2019-11-18 RX ADMIN — AMLODIPINE BESYLATE 10 MG: 10 TABLET ORAL at 09:02

## 2019-11-18 RX ADMIN — ASPIRIN 81 MG: 81 TABLET, COATED ORAL at 09:03

## 2019-11-18 RX ADMIN — CLOPIDOGREL 75 MG: 75 TABLET, FILM COATED ORAL at 09:02

## 2019-11-18 RX ADMIN — OXYCODONE HYDROCHLORIDE 5 MG: 5 TABLET ORAL at 07:38

## 2019-11-18 RX ADMIN — CHLORHEXIDINE GLUCONATE 0.12% ORAL RINSE 15 ML: 1.2 LIQUID ORAL at 09:02

## 2019-11-18 RX ADMIN — MELATONIN 6 MG: 3 TAB ORAL at 22:12

## 2019-11-18 RX ADMIN — FERROUS SULFATE TAB 325 MG (65 MG ELEMENTAL FE) 325 MG: 325 (65 FE) TAB at 09:02

## 2019-11-18 RX ADMIN — Medication 1 TABLET: at 09:02

## 2019-11-18 RX ADMIN — OXYCODONE HYDROCHLORIDE 5 MG: 5 TABLET ORAL at 22:48

## 2019-11-18 RX ADMIN — GABAPENTIN 100 MG: 100 CAPSULE ORAL at 09:02

## 2019-11-18 RX ADMIN — TAMSULOSIN HYDROCHLORIDE 0.4 MG: 0.4 CAPSULE ORAL at 17:03

## 2019-11-18 RX ADMIN — HEPARIN SODIUM 12 UNITS/KG/HR: 10000 INJECTION, SOLUTION INTRAVENOUS at 01:40

## 2019-11-18 RX ADMIN — CHLORHEXIDINE GLUCONATE 0.12% ORAL RINSE 15 ML: 1.2 LIQUID ORAL at 22:13

## 2019-11-18 RX ADMIN — THIAMINE HCL TAB 100 MG 100 MG: 100 TAB at 09:02

## 2019-11-18 RX ADMIN — OXYCODONE HYDROCHLORIDE 5 MG: 5 TABLET ORAL at 11:57

## 2019-11-18 RX ADMIN — NICOTINE 1 PATCH: 14 PATCH TRANSDERMAL at 09:03

## 2019-11-18 RX ADMIN — FERROUS SULFATE TAB 325 MG (65 MG ELEMENTAL FE) 325 MG: 325 (65 FE) TAB at 17:03

## 2019-11-18 NOTE — RESTORATIVE TECHNICIAN NOTE
Restorative Specialist Mobility Note       Activity: Chair     Assistive Device: Other (Comment)(HHA)

## 2019-11-18 NOTE — PROGRESS NOTES
Progress Note - Vascular Surgery   Sherri José 78 y o  male MRN: [id number]  Unit/Bed#: [facility] 430-01 Encounter: [id number]    Assessment:  78 y o  M with hx of PAD and LLE claudication s/p left femoral endarterectomy, profundoplasty, SFA embolectomy, and iliac stenting with residual SFA stenosis  11/14 IR agram with angioplasty b/l SFA with poor flow to the RLE    11/17 CTAP: Small hematoma in the region of the L CFA bifurcation  There is a hyperdense focus measuring 8mm in this region, cannot exclude a small pseudoaneurysm  Pulses: R: palpable femoral, no distal signals  L: palpable femoral, dopp DP/PT/AT/peroneal    Plan:  PSA study today, 11/18  Dysphagia diet  Heparin gtt  ASA  Continue holding Coumadin  PT/OT    Subjective/Objective     Subjective: No acute events overnight  Complains of pain in his right foot  Tolerating diet without nausea/vomiting  No fevers, chills  Objective:     Blood pressure 136/64, pulse 78, temperature 97 5 °F (36 4 °C), temperature source Oral, resp  rate 16, height 5' 5" (1 651 m), weight 50 3 kg (111 lb), SpO2 97 %  ,Body mass index is 18 47 kg/m²        Intake/Output Summary (Last 24 hours) at 11/18/2019 0640  Last data filed at 11/18/2019 0501  Gross per 24 hour   Intake 753 9 ml   Output 2225 ml   Net -1471 1 ml       Invasive Devices     Peripheral Intravenous Line            Peripheral IV 11/15/19 Right Forearm 2 days                  NAD, alert   Normocephalic, atraumatic  MMM, EOMI, PERRLA  Norm resp effort on RA  RRR  Abd soft, NT/ND  L groin incisions c/d/i, small area of edema along inferior aspect of incision, no pulsatility appreciated   Pulses: R: palpable femoral, no distal signals  L: palpable femoral, dopp DP/PT/AT/peroneal  No calf tenderness or peripheral edema  Motor/sensation intact in distal extremities  CN grossly intact  -rash/lesions    Lab, Imaging and other studies:  CBC:   Lab Results   Component Value Date    WBC 7 69 11/18/2019    HGB 8 6 (L) 11/18/2019    HCT 27 0 (L) 11/18/2019    MCV 96 11/18/2019     11/18/2019    MCH 30 5 11/18/2019    MCHC 31 9 11/18/2019    RDW 14 5 11/18/2019    MPV 10 7 11/18/2019   , CMP:   Lab Results   Component Value Date    SODIUM 138 11/18/2019    K 4 0 11/18/2019     11/18/2019    CO2 22 11/18/2019    BUN 13 11/18/2019    CREATININE 0 66 11/18/2019    CALCIUM 8 7 11/18/2019    EGFR 98 11/18/2019     VTE Pharmacologic Prophylaxis: Heparin  VTE Mechanical Prophylaxis: sequential compression device

## 2019-11-18 NOTE — PROGRESS NOTES
Progress Note - Vascular Surgery   Kelly Abraham 78 y o  male MRN: [id number]  Unit/Bed#: [facility] 430-01 Encounter: [id number]    Assessment:  78 M with PAD and LLE claudication s/p left femoral endarterectomy, profundoplasty, SFA embolectomy, and iliac stenting with residual SFA stenosis  11/14 IR agram with angioplasty b/l SFA with poor flow to the RLE    Plan:  - PSA scan tomorrow morning for concerning CT scan finding  Hematoma though not large enough to account for the Hgb drift   -C/w dysphagia diet  -c/w coumadin  -Continue plavix  -Left Carotid with >70% stenosis in proximal and mid ICA, follow up o/p    Subjective/Objective     Subjective: no acute events     Objective:    Blood pressure 133/60, pulse 81, temperature 98 4 °F (36 9 °C), temperature source Oral, resp  rate 18, height 5' 5" (1 651 m), weight 50 3 kg (111 lb), SpO2 96 %  ,Body mass index is 18 47 kg/m²  Intake/Output Summary (Last 24 hours) at 11/17/2019 1952  Last data filed at 11/17/2019 1843  Gross per 24 hour   Intake 689 ml   Output 1625 ml   Net -936 ml       Invasive Devices     Peripheral Intravenous Line            Peripheral IV 11/14/19 Dorsal (posterior); Left Hand 3 days    Peripheral IV 11/15/19 Right Forearm 2 days                Physical Exam:  GEN: NAD  HEENT: MMM  CV: RRR  Lung: Normal effort  Ab: Soft, NT/ND  Extrem: No CCE  Right foot +M/S  Left groin incision medial inferior aspect has a small raised area of induration corresponding to CT scan finding   Neuro:  A+Ox3        Results from last 7 days   Lab Units 11/17/19  1644 11/17/19  0543 11/16/19  0424 11/15/19  0437   WBC Thousand/uL  --  7 35 8 39 8 11   HEMOGLOBIN g/dL 8 1* 7 3* 7 7* 8 3*   HEMATOCRIT % 25 8* 23 5* 24 7* 26 6*   PLATELETS Thousands/uL  --  305 315 271     Results from last 7 days   Lab Units 11/17/19  0542 11/16/19  0424 11/15/19  0437   POTASSIUM mmol/L 4 0 4 0 4 2   CHLORIDE mmol/L 111* 108 109*   CO2 mmol/L 19* 23 21   BUN mg/dL 18 19 20 CREATININE mg/dL 0 62 0 76 0 64   CALCIUM mg/dL 8 2* 8 4 8 3     Results from last 7 days   Lab Units 11/17/19  0542 11/16/19  1700 11/16/19  1100 11/16/19  0434  11/15/19  0437   INR  1 77*  --   --  1 58*  --  1 60*   PTT seconds 79* 87* 82*  --    < >  --     < > = values in this interval not displayed

## 2019-11-19 LAB
APTT PPP: 65 SECONDS (ref 23–37)
ERYTHROCYTE [DISTWIDTH] IN BLOOD BY AUTOMATED COUNT: 14.7 % (ref 11.6–15.1)
HCT VFR BLD AUTO: 27.8 % (ref 36.5–49.3)
HGB BLD-MCNC: 8.8 G/DL (ref 12–17)
INR PPP: 1.6 (ref 0.84–1.19)
MCH RBC QN AUTO: 30.2 PG (ref 26.8–34.3)
MCHC RBC AUTO-ENTMCNC: 31.7 G/DL (ref 31.4–37.4)
MCV RBC AUTO: 96 FL (ref 82–98)
PLATELET # BLD AUTO: 419 THOUSANDS/UL (ref 149–390)
PMV BLD AUTO: 10.1 FL (ref 8.9–12.7)
PROTHROMBIN TIME: 18.6 SECONDS (ref 11.6–14.5)
RBC # BLD AUTO: 2.91 MILLION/UL (ref 3.88–5.62)
WBC # BLD AUTO: 7.79 THOUSAND/UL (ref 4.31–10.16)

## 2019-11-19 PROCEDURE — 97530 THERAPEUTIC ACTIVITIES: CPT

## 2019-11-19 PROCEDURE — 85027 COMPLETE CBC AUTOMATED: CPT | Performed by: INTERNAL MEDICINE

## 2019-11-19 PROCEDURE — 97116 GAIT TRAINING THERAPY: CPT

## 2019-11-19 PROCEDURE — 85610 PROTHROMBIN TIME: CPT | Performed by: INTERNAL MEDICINE

## 2019-11-19 PROCEDURE — 99232 SBSQ HOSP IP/OBS MODERATE 35: CPT | Performed by: INTERNAL MEDICINE

## 2019-11-19 PROCEDURE — 85730 THROMBOPLASTIN TIME PARTIAL: CPT | Performed by: INTERNAL MEDICINE

## 2019-11-19 PROCEDURE — 99024 POSTOP FOLLOW-UP VISIT: CPT | Performed by: SURGERY

## 2019-11-19 RX ORDER — OXYMETAZOLINE HYDROCHLORIDE 0.05 G/100ML
2 SPRAY NASAL EVERY 12 HOURS PRN
Status: DISPENSED | OUTPATIENT
Start: 2019-11-19 | End: 2019-11-20

## 2019-11-19 RX ORDER — OLANZAPINE 10 MG/1
2.5 INJECTION, POWDER, LYOPHILIZED, FOR SOLUTION INTRAMUSCULAR ONCE AS NEEDED
Status: COMPLETED | OUTPATIENT
Start: 2019-11-19 | End: 2019-11-19

## 2019-11-19 RX ORDER — OLANZAPINE 5 MG/1
2.5 TABLET, ORALLY DISINTEGRATING ORAL ONCE
Status: DISCONTINUED | OUTPATIENT
Start: 2019-11-19 | End: 2019-11-20

## 2019-11-19 RX ORDER — WARFARIN SODIUM 1 MG/1
2 TABLET ORAL
Status: DISCONTINUED | OUTPATIENT
Start: 2019-11-19 | End: 2019-11-20

## 2019-11-19 RX ORDER — ATORVASTATIN CALCIUM 20 MG/1
20 TABLET, FILM COATED ORAL
Status: DISCONTINUED | OUTPATIENT
Start: 2019-11-19 | End: 2019-11-24 | Stop reason: HOSPADM

## 2019-11-19 RX ADMIN — ATORVASTATIN CALCIUM 20 MG: 20 TABLET, FILM COATED ORAL at 17:08

## 2019-11-19 RX ADMIN — POLYETHYLENE GLYCOL 3350 17 G: 17 POWDER, FOR SOLUTION ORAL at 17:01

## 2019-11-19 RX ADMIN — OXYCODONE HYDROCHLORIDE 5 MG: 5 TABLET ORAL at 09:47

## 2019-11-19 RX ADMIN — FERROUS SULFATE TAB 325 MG (65 MG ELEMENTAL FE) 325 MG: 325 (65 FE) TAB at 09:46

## 2019-11-19 RX ADMIN — MELATONIN 6 MG: 3 TAB ORAL at 21:17

## 2019-11-19 RX ADMIN — OXYCODONE HYDROCHLORIDE 5 MG: 5 TABLET ORAL at 05:35

## 2019-11-19 RX ADMIN — Medication 1 TABLET: at 09:46

## 2019-11-19 RX ADMIN — WATER 10 ML: 1 INJECTION INTRAMUSCULAR; INTRAVENOUS; SUBCUTANEOUS at 23:31

## 2019-11-19 RX ADMIN — WARFARIN SODIUM 2 MG: 1 TABLET ORAL at 17:07

## 2019-11-19 RX ADMIN — GABAPENTIN 100 MG: 100 CAPSULE ORAL at 09:46

## 2019-11-19 RX ADMIN — CLOPIDOGREL 75 MG: 75 TABLET, FILM COATED ORAL at 09:46

## 2019-11-19 RX ADMIN — SENNOSIDES AND DOCUSATE SODIUM 1 TABLET: 8.6; 5 TABLET ORAL at 09:46

## 2019-11-19 RX ADMIN — OXYMETAZOLINE HYDROCHLORIDE 2 SPRAY: 0.05 SPRAY NASAL at 21:39

## 2019-11-19 RX ADMIN — OLANZAPINE 2.5 MG: 10 INJECTION, POWDER, FOR SOLUTION INTRAMUSCULAR at 23:21

## 2019-11-19 RX ADMIN — AMLODIPINE BESYLATE 10 MG: 10 TABLET ORAL at 09:46

## 2019-11-19 RX ADMIN — SENNOSIDES AND DOCUSATE SODIUM 1 TABLET: 8.6; 5 TABLET ORAL at 17:01

## 2019-11-19 RX ADMIN — TAMSULOSIN HYDROCHLORIDE 0.4 MG: 0.4 CAPSULE ORAL at 16:59

## 2019-11-19 RX ADMIN — CHLORHEXIDINE GLUCONATE 0.12% ORAL RINSE 15 ML: 1.2 LIQUID ORAL at 09:46

## 2019-11-19 RX ADMIN — THIAMINE HCL TAB 100 MG 100 MG: 100 TAB at 09:46

## 2019-11-19 RX ADMIN — ASPIRIN 81 MG: 81 TABLET, COATED ORAL at 09:46

## 2019-11-19 RX ADMIN — FERROUS SULFATE TAB 325 MG (65 MG ELEMENTAL FE) 325 MG: 325 (65 FE) TAB at 16:59

## 2019-11-19 RX ADMIN — HEPARIN SODIUM 12 UNITS/KG/HR: 10000 INJECTION, SOLUTION INTRAVENOUS at 16:58

## 2019-11-19 RX ADMIN — POLYETHYLENE GLYCOL 3350 17 G: 17 POWDER, FOR SOLUTION ORAL at 09:46

## 2019-11-19 NOTE — PROGRESS NOTES
Progress Note - Vascular Surgery   Pippa July 78 y o  male MRN: [id number]  Unit/Bed#: [facility] 430-01 Encounter: [id number]    Assessment:  78 y o  M with hx of PAD and LLE claudication s/p left femoral endarterectomy, profundoplasty, SFA embolectomy, and iliac stenting with residual SFA stenosis  - 11/14 IR amarilysam with angioplasty b/l SFA with poor flow to the RLE    Plan:  - continue heparin gtt and aspirin, plavix   - Dysphagia diet  - PT/OT eval and treat, recommending home PT with family support   - rest of care per primary service     Subjective/Objective   Chief Complaint: No chief complaint on file  Subjective: 78 y o  y/o male was seen and evaluated at bedside  Patient denies pain to the left lower extremity  Complaints of pain to the right medial foot arch and heel  Blood pressure 129/55, pulse 86, temperature 98 3 °F (36 8 °C), temperature source Oral, resp  rate 18, height 5' 5" (1 651 m), weight 50 3 kg (111 lb), SpO2 97 %  ,Body mass index is 18 47 kg/m²  Invasive Devices     Peripheral Intravenous Line            Peripheral IV 11/19/19 Left Forearm less than 1 day                Physical Exam:   General: Alert, cooperative and no distress  Lungs: Non labored breathing  Heart: Positive S1, S2  Abdomen: Soft, non-tender  Extremity: Palpable femoral b/l  Dopplerable signals on the left for DP and PT  Left foot warm to touch and well perfused  Absent signals for right DP and PT             Lab, Imaging and other studies:   CBC:   Lab Results   Component Value Date    WBC 7 79 11/19/2019    HGB 8 8 (L) 11/19/2019    HCT 27 8 (L) 11/19/2019    MCV 96 11/19/2019     (H) 11/19/2019    MCH 30 2 11/19/2019    MCHC 31 7 11/19/2019    RDW 14 7 11/19/2019    MPV 10 1 11/19/2019   , CMP: No results found for: SODIUM, K, CL, CO2, ANIONGAP, BUN, CREATININE, GLUCOSE, CALCIUM, AST, ALT, ALKPHOS, PROT, BILITOT, EGFR    Imaging: I have personally reviewed pertinent films in PACS  EKG, Pathology, and Other Studies: I have personally reviewed pertinent reports    VTE Pharmacologic Prophylaxis: Heparin

## 2019-11-19 NOTE — ASSESSMENT & PLAN NOTE
Patient called back and said yes that works. Patient added to Dr. Cox's schedule for Wednesday at 4.    YANIQUE Davis         · Pt presented with 2-month history of LLE pain with walking  Denies pain at rest, but pt is unable to walk more than a few feet without pain  · CTA: Severe peripheral vascular disease  Limited evaluation of the calf vessels due to extensive calcifications  Severe areas of narrowing and focal occlusion of the right superficial femoral artery and diffuse occlusion of the popliteal artery   Distal reconstitution of the right peroneal artery and scattered areas of the right posterior tibial artery  Occlusion of the left common femoral artery and left superficial femoral artery with distal reconstitution however severe narrowing of the mid to distal superficial femoral artery   Severe narrowing of the left popliteal artery  Patent left peroneal artery and scattered areas of the left posterior tibial artery  PLAN:  · Continue Heparin gtt, asa/statin  · Plan for left femoral endarterectomy with retrograde iliac intervention per vascular, tentatively planned for Friday  Appreciate vascular surgery input  · Cardiology on board for preop risk stratification, appreciate input  · Pain control per geriatrics pain management order set  · s/p left CFA endarterectomy, profundoplasty, embolectomy, arteriogram with retrograde iliac stenting 11/7  · Blood pressure under control  · Off of step down,  as his mental status improved    · Await an angiogram/intervention of left lower extremity to optimize left foot perfusion prior to discharge

## 2019-11-19 NOTE — ASSESSMENT & PLAN NOTE
· Not POA  · Noted on 11/12 - worsened till 11/14 and improving since then - now resolved    · Discussed with Dr Syeda Pate on 11/12 - likely medication induced  · RUQ US - normal  Acute Hepatitis panel - negative  · Continue to hold atorvastatin  · If LFT remains normal after discharge trial of Pravastatin with LFT monitoring

## 2019-11-19 NOTE — PHYSICAL THERAPY NOTE
Physical Therapy Progress Note     11/19/19 1512   Pain Assessment   Pain Assessment 0-10   Pain Score 7   Pain Type Acute pain   Pain Location Leg   Pain Orientation Right   [facility] Pain Intervention(s) Repositioned; Ambulation/increased activity; Emotional support   Response to Interventions Tolerated  Restrictions/Precautions   Other Precautions Pain; Fall Risk; Chair Alarm; Bed Alarm;Cognitive  (Alarm active post session )   Subjective   Subjective The pt  is agreeable to walk, but he notes that he has considerable pain in his leg  Bed Mobility   Supine to Sit 5  Supervision   Additional items Increased time required   Sit to Supine 5  Supervision   Additional items Increased time required   Transfers   Sit to Stand 4  Minimal assistance   Additional items Assist x 1; Increased time required; Impulsive;Verbal cues   Stand to Sit 5  Supervision   Additional items Increased time required;Verbal cues   Ambulation/Elevation   Gait pattern Excessively slow; Step to;Short stride; Inconsistent kusum;Decreased foot clearance; Improper Weight shift; Antalgic   Gait Assistance 4  Minimal assist   Additional items Assist x 1;Verbal cues   Assistive Device Rolling walker   Distance 35 feet x 2  Balance   Static Sitting Good   Dynamic Sitting Fair   Static Standing Poor +   Ambulatory Poor +   Activity Tolerance   Activity Tolerance Patient tolerated treatment well;Patient limited by fatigue;Patient limited by pain   Nurse [address] [facility], RN  Assessment   Prognosis Good   Problem List Impaired balance;Decreased mobility; Decreased coordination; Impaired judgement;Decreased safety awareness;Pain;Decreased skin integrity   Assessment The pt  was limited today as he was only able to ambulate very short distances before requiring to rest due to increased pain  He also was only able to tolerate minimal weight on his RLE during gait   As the pt  has had notably limited mobility in the last two sessions will change recommendation to inpatient rehab pending continued progress  Barriers to Discharge Inaccessible home environment;Decreased caregiver support   Goals   Patient Goals To have less pain  STG Expiration Date 11/22/19   PT Treatment Day 3   Plan   Treatment/Interventions Functional transfer training;LE strengthening/ROM; Therapeutic exercise; Endurance training;Patient/family training;Bed mobility;Gait training;Elevations   Progress Slow progress, decreased activity tolerance   PT Frequency   (3-5x a week )   Recommendation   Recommendation Post acute IP rehab; Other (Comment)  (pending pt's progress with mobility )   Equipment Recommended Ching Ibrahim PTA

## 2019-11-19 NOTE — ASSESSMENT & PLAN NOTE
· Acute blood loss anemia from left groin hematoma  · H/H stable at present  · Tolerating heparin drip  · Monitor H/H  · Transfuse as needed   · Needs to continue anticoagulation per vascular

## 2019-11-19 NOTE — ASSESSMENT & PLAN NOTE
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

## 2019-11-19 NOTE — ASSESSMENT & PLAN NOTE
· Not POA  · Noted on 11/12 - worsened till 11/14 and improving since then - now resolved    · Discussed with Dr Linn Klinefelter on 11/12 - likely medication induced  · RUQ US - normal  Acute Hepatitis panel - negative  · Continue to hold atorvastatin  · If LFT remains normal after discharge trial of Pravastatin with LFT monitoring

## 2019-11-19 NOTE — SOCIAL WORK
Pt remains recommended for short-term skilled rehab placement for his aftercare plan  LOUISEIM Dr Bandar Lema reported concern that pt will not succeed at home without pursuing SNF rehab  SLIM reported she spoke w/ pt's daughter Denise Harman at bedside who reported she and family are now agreeable toward SNF rehab placement for pt's aftercare  CM spoke w/ pt's daughter about recommendation  Daughter confirmed she is agreeable  CM provided daughter w/ freedom of choice for SNF providers  Daughter requested referrals in no order of preference to [facility], [facility], and [facility]  CM made Ecin referrals in no order of preference to [facility], [facility], and [facility]  CM to follow

## 2019-11-19 NOTE — PROGRESS NOTES
Progress Note - Darral Borne 1950, 78 y o  male MRN: [id number]    Unit/Bed#: [facility] 430-01 Encounter: [id number]    Primary Care Provider: Prince Osorio MD   Date and time admitted to hospital: 11/2/2019  2:01 AM        * PAD (peripheral artery disease) ([facility] [address] )  Assessment & Plan  · PAD and left lower extremity claudication - status post left femoral endarterectomy, profundoplasty, SFA embolectomy and retrograde iliac stenting on 11/06/2019  · Has residual left SFA stenosis for which he underwent left lower extremity angiogram with treatment of SFA and popliteal arteries on 11/14/2019  · Developed thrombosis of small collaterals into the posterior tibial artery on the right - s/p tPA and SFA angioplasty  · Right foot cooler than left - motor/sensory intact in right foot - no intervention planned per vascular  · Noted to have left groin hematoma with concern for pseudoaneurysm when underwent CT for drop in Hb  · On heparin drip - continue per vascular  · Coumadin held till H/H stable and cleared by vascular  · F/u pseudoaneurysm study  · Statin held due to worsening transaminitis which has resolved off statins  · Await vascular review  · Monitor H/H    Acute blood loss anemia  Assessment & Plan  · Acute blood loss anemia from left groin hematoma  · H/H stable at present  · Tolerating heparin drip  · Monitor H/H  · Transfuse as needed   · Needs to continue anticoagulation per vascular    Hypertension  Assessment & Plan  · BP acceptable on Amlodipine 10 mg daily      History of cancer tonsil  Assessment & Plan  · 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 diagnosed in July 2017  · S/p concurrent chemoradiation with weekly Cetuximab from 10/26/17 to 12/15/17   Total 6750 cGy radiation to left tonsil  · Last seen by ENT on 8/20/19 and by oncology on 9/11/19 - no evidence of disease  · Ct outpatient follow-up with primary oncologist at [facility] and ENT         Dependence on nicotine from cigarettes  Assessment & Plan  · Still smokes despite diagnosis of throat cancer, per daughter  · She thinks he smokes 1ppd  · Nicotine patch  · Smoking cessation education    Coagulopathy [facility])  Assessment & Plan  · Was placed on heparin and was being bridged to Coumadin per vascular for PAD  · Had rapid rise in INR - seen by Hematology - likely from underlying vitamin K deficiency  · Coumadin started at lower dose of 2 mg on 11/15 as recommended by Hematology - now held due to acute blood loss anemia  · When H/H stable restart Coumadin at 2 mg     Transaminitis  Assessment & Plan  · Not POA  · Noted on 11/12 - worsened till 11/14 and improving since then - now resolved    · Discussed with Dr Brandon Helton on 11/12 - likely medication induced  · RUQ US - normal  Acute Hepatitis panel - negative  · Continue to hold atorvastatin  · If LFT remains normal after discharge trial of Pravastatin with LFT monitoring    Syncope and collapse  Assessment & Plan  · Had RRT with syncope, bradycardia and hypotension  · EKG showed Mobitz Type 1 block  · Required transient dopamine  · Reviewed by cardiology - episode felt to be vasovagal from pain  · Adequate pain control  · Avoid AV blocking agents      Ambulatory dysfunction  Assessment & Plan  · PT recommends rehab  · Family refused initially but now agreeable    Thrombocytopenia ([address] )  Assessment & Plan  · Resolved  · Monitor    VTE Pharmacologic Prophylaxis:   Pharmacologic: Heparin Drip    Patient Centered Rounds: I have performed bedside rounds with nursing staff today  Education and Discussions with Family / Patient: Discussed with patient  Discussed with daughter on the phone    Time Spent for Care: 20 minutes  More than 50% of total time spent on counseling and coordination of care as described above      Current Length of Stay: 16 day(s)    Current Patient Status: Inpatient   Certification Statement: The patient will continue to require additional inpatient hospital stay due to acute blood loss anemia from recent vascular procedure requiring anticoagulation and monitoring  Awaiting vascular clearance    Code Status: Level 1 - Full Code    Subjective:   No pain in the extremities or left groin  No lightheadedness or shortness of breath  Objective:     Vitals:   Temp (24hrs), Av 9 °F (36 6 °C), Min:97 5 °F (36 4 °C), Max:98 3 °F (36 8 °C)    Temp:  [97 5 °F (36 4 °C)-98 3 °F (36 8 °C)] 98 °F (36 7 °C)  HR:  [78-79] 79  Resp:  [16] 16  BP: (129-146)/(60-67) 129/60  SpO2:  [97 %] 97 %  Body mass index is 18 47 kg/m²  Physical Exam:     Physical Exam   HENT:   Head: Normocephalic and atraumatic  Eyes: Pupils are equal, round, and reactive to light  EOM are normal    Neck: Normal range of motion  Neck supple  Cardiovascular: Normal rate and regular rhythm  Pulmonary/Chest: Effort normal and breath sounds normal    Abdominal: Soft  Bowel sounds are normal    Musculoskeletal: He exhibits no edema  Neurological: He is alert  Skin:   Right foot cooler than left   Psychiatric: He has a normal mood and affect  Additional Data:     Labs:    Results from last 7 days   Lab Units 19  0424   WBC Thousand/uL 7 69   < > 6 50   HEMOGLOBIN g/dL 8 6*   < > 9 8*   HEMATOCRIT % 27 0*   < > 30 4*   PLATELETS Thousands/uL 387   < > 176   NEUTROS PCT %  --   --  78*   LYMPHS PCT %  --   --  7*   MONOS PCT %  --   --  9   EOS PCT %  --   --  4    < > = values in this interval not displayed       Results from last 7 days   Lab Units 19  0444 19  0542   SODIUM mmol/L 138 141   POTASSIUM mmol/L 4 0 4 0   CHLORIDE mmol/L 107 111*   CO2 mmol/L 22 19*   BUN mg/dL 13 18   CREATININE mg/dL 0 66 0 62   ANION GAP mmol/L 9 11   CALCIUM mg/dL 8 7 8 2*   ALBUMIN g/dL  --  2 2*   TOTAL BILIRUBIN mg/dL  --  0 26   ALK PHOS U/L  --  87   ALT U/L  --  69   AST U/L  --  43   GLUCOSE RANDOM mg/dL 104 109     Results from last 7 days   Lab Units 11/18/19  0444   INR  1 79*             Results from last 7 days   Lab Units 11/18/19  0444   PROCALCITONIN ng/ml 0 10     * I Have Reviewed All Lab Data Listed Above  * Additional Pertinent Lab Tests Reviewed: [address] Admission Reviewed      Last 24 Hours Medication List:     Current Facility-Administered Medications:  acetaminophen 650 mg Oral Q6H PRN Calos Wan MD    amLODIPine 10 mg Oral Daily Sammy Oneill MD    aspirin 81 mg Oral Daily Calos Wan MD    bisacodyl 10 mg Rectal Daily PRN Calos Wan MD    chlorhexidine 15 mL Swish & Spit Q12H Isabel [address] , MD    clopidogrel 75 mg Oral Daily Calos Wan MD    ferrous sulfate 325 mg Oral BID With Meals Victor Manuel Power MD    gabapentin 100 mg Oral Daily Calos Wan MD    heparin (porcine) 3-30 Units/kg/hr (Order-Specific) Intravenous Titrated Jerrie Sandifer, PA-C Last Rate: 12 Units/kg/hr (11/18/19 0400)   heparin (porcine) 2,000 Units Intravenous PRN Jerrie Sandifer, PA-C    heparin (porcine) 4,000 Units Intravenous PRN Jerrie Sandifer, PA-WENDY    hydrALAZINE 10 mg Intravenous Q4H PRN Massiel Haskins PA-C    lidocaine (PF) 0 5 mL Infiltration Once PRN Calos Wan MD    melatonin 6 mg Oral HS Calos Wan MD    multivitamin-minerals 1 tablet Oral Daily Victor Manuel Power MD    nicotine 1 patch Transdermal Daily Calos Wan MD    oxyCODONE 2 5 mg Oral Q4H PRN Calos Wan MD    oxyCODONE 5 mg Oral Q4H PRN Calos Wan MD    polyethylene glycol 17 g Oral BID Calos Wan MD    senna-docusate sodium 1 tablet Oral BID Calos Wan MD    tamsulosin 0 4 mg Oral Daily With David Higgins MD    thiamine 100 mg Oral Daily Calos Wan MD         Today, Patient Was Seen By: Lizandro Zuñiga MD    ** Please Note: Dictation voice to text software may have been used in the creation of this document  **

## 2019-11-19 NOTE — PROGRESS NOTES
Progress Note - Mateusz Seen 1950, 78 y o  male MRN: [id number]    Unit/Bed#: [facility] 430-01 Encounter: [id number]    Primary Care Provider: Nelson Alejandre MD   Date and time admitted to hospital: 11/2/2019  2:01 AM        * PAD (peripheral artery disease) ([facility] )  Assessment & Plan  · PAD and left lower extremity claudication - status post left femoral endarterectomy, profundoplasty, SFA embolectomy and retrograde iliac stenting on 11/06/2019  · Has residual left SFA stenosis for which he underwent left lower extremity angiogram with treatment of SFA and popliteal arteries on 11/14/2019  · Developed thrombosis of small collaterals into the posterior tibial artery on the right - s/p tPA and SFA angioplasty  · Right foot cooler than left - motor/sensory intact in right foot - no intervention planned per vascular  · Noted to have left groin hematoma with concern for pseudoaneurysm when underwent CT for drop in Hb  · On heparin drip - continue per vascular  · Coumadin held till H/H stable and cleared by vascular  · F/u pseudoaneurysm study  · Statin held due to worsening transaminitis which has resolved off statins  · Await vascular review  · Monitor H/H    Thrombocytopenia ([facility] )  Assessment & Plan  · Resolved  · Monitor    Ambulatory dysfunction  Assessment & Plan  · PT recommends rehab  · Family refused initially but now agreeable    Transaminitis  Assessment & Plan  · Not POA  · Noted on 11/12 - worsened till 11/14 and improving since then - now resolved    · Discussed with Dr Naun Yeager on 11/12 - likely medication induced  · RUQ US - normal  Acute Hepatitis panel - negative  · Continue to hold atorvastatin  · If LFT remains normal after discharge trial of Pravastatin with LFT monitoring    Coagulopathy ([facility] )  Assessment & Plan  · Was placed on heparin and was being bridged to Coumadin per vascular for PAD  · Had rapid rise in INR - seen by Hematology - likely from underlying vitamin K deficiency  · Coumadin started at lower dose of 2 mg on 11/15 as recommended by Hematology - now held due to acute blood loss anemia  · When H/H stable restart Coumadin at 2 mg     Acute blood loss anemia  Assessment & Plan  · Acute blood loss anemia from left groin hematoma  · H/H stable at present  · Tolerating heparin drip  · Monitor H/H  · Transfuse as needed   · Needs to continue anticoagulation per vascular    Syncope and collapse  Assessment & Plan  · Had RRT with syncope, bradycardia and hypotension  · EKG showed Mobitz Type 1 block  · Required transient dopamine  · Reviewed by cardiology - episode felt to be vasovagal from pain  · Adequate pain control  · Avoid AV blocking agents      Dependence on nicotine from cigarettes  Assessment & Plan  · Still smokes despite diagnosis of throat cancer, per daughter  · She thinks he smokes 1ppd  · Nicotine patch  · Smoking cessation education    Hypertension  Assessment & Plan  · BP acceptable on Amlodipine 10 mg daily      History of cancer tonsil  Assessment & Plan  · 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 diagnosed in July 2017  · S/p concurrent chemoradiation with weekly Cetuximab from 10/26/17 to 12/15/17  Total 6750 cGy radiation to left tonsil  · Last seen by ENT on 8/20/19 and by oncology on 9/11/19 - no evidence of disease  · Ct outpatient follow-up with primary oncologist at [facility] and ENT         Peripheral vascular disease [facility])  Assessment & Plan  · Pt presented with 2-month history of LLE pain with walking  Denies pain at rest, but pt is unable to walk more than a few feet without pain  · CTA: Severe peripheral vascular disease  Limited evaluation of the calf vessels due to extensive calcifications   Severe areas of narrowing and focal occlusion of the right superficial femoral artery and diffuse occlusion of the popliteal artery   Distal reconstitution of the right peroneal artery and scattered areas of the right posterior tibial artery  Occlusion of the left common femoral artery and left superficial femoral artery with distal reconstitution however severe narrowing of the mid to distal superficial femoral artery   Severe narrowing of the left popliteal artery  Patent left peroneal artery and scattered areas of the left posterior tibial artery  PLAN:  · Continue Heparin gtt, asa/statin  · Plan for left femoral endarterectomy with retrograde iliac intervention per vascular, tentatively planned for Friday  Appreciate vascular surgery input  · Cardiology on board for preop risk stratification, appreciate input  · Pain control per geriatrics pain management order set  · s/p left CFA endarterectomy, profundoplasty, embolectomy, arteriogram with retrograde iliac stenting   · Blood pressure under control  · Off of step down,  as his mental status improved  · Await an angiogram/intervention of left lower extremity to optimize left foot perfusion prior to discharge    VTE Pharmacologic Prophylaxis:   Pharmacologic: Heparin Drip  Mechanical VTE Prophylaxis in Place: No    Patient Centered Rounds: I have performed bedside rounds with nursing staff today  Time Spent for Care: 15 minutes  More than 50% of total time spent on counseling and coordination of care as described above  Current Length of Stay: 17 day(s)    Current Patient Status: Inpatient   Certification Statement: The patient will continue to require additional inpatient hospital stay due to Need to monitor symptoms      Code Status: Level 1 - Full Code      Subjective:   No acute distress    Objective:     Vitals:   Temp (24hrs), Av 3 °F (36 8 °C), Min:98 °F (36 7 °C), Max:98 5 °F (36 9 °C)    Temp:  [98 °F (36 7 °C)-98 5 °F (36 9 °C)] 98 3 °F (36 8 °C)  HR:  [79-86] 86  Resp:  [16-18] 18  BP: (129-140)/(55-66) 129/55  SpO2:  [96 %-97 %] 97 %  Body mass index is 18 47 kg/m²       Input and Output Summary (last 24 hours): Intake/Output Summary (Last 24 hours) at 11/19/2019 0955  Last data filed at 11/19/2019 0859  Gross per 24 hour   Intake 892 1 ml   Output 1075 ml   Net -182 9 ml       Physical Exam:     Physical Exam   Constitutional: He is oriented to person, place, and time  HENT:   Head: Normocephalic and atraumatic  Eyes: Pupils are equal, round, and reactive to light  EOM are normal    Neck: Normal range of motion  Neck supple  Pulmonary/Chest: Effort normal and breath sounds normal    Abdominal: Soft  Bowel sounds are normal    Musculoskeletal: Normal range of motion  He exhibits no edema  Neurological: He is alert and oriented to person, place, and time  Skin: Skin is warm and dry  Additional Data:     Labs:    Results from last 7 days   Lab Units 11/19/19  0532   WBC Thousand/uL 7 79   HEMOGLOBIN g/dL 8 8*   HEMATOCRIT % 27 8*   PLATELETS Thousands/uL 419*     Results from last 7 days   Lab Units 11/18/19  0444 11/17/19  0542   POTASSIUM mmol/L 4 0 4 0   CHLORIDE mmol/L 107 111*   CO2 mmol/L 22 19*   BUN mg/dL 13 18   CREATININE mg/dL 0 66 0 62   CALCIUM mg/dL 8 7 8 2*   ALK PHOS U/L  --  87   ALT U/L  --  69   AST U/L  --  43     Results from last 7 days   Lab Units 11/19/19  0532   INR  1 60*       * I Have Reviewed All Lab Data Listed Above  * Additional Pertinent Lab Tests Reviewed:  All Labs Within Last 24 Hours Reviewed        Recent Cultures (last 7 days):           Last 24 Hours Medication List:     Current Facility-Administered Medications:  acetaminophen 650 mg Oral Q6H PRN Diana Doherty MD    amLODIPine 10 mg Oral Daily Jana Ramirez MD    aspirin 81 mg Oral Daily Diana Doherty MD    bisacodyl 10 mg Rectal Daily PRN Diana Doherty MD    chlorhexidine 15 mL Swish & Spit Q12H Isabel Eid MD    clopidogrel 75 mg Oral Daily Diana Doherty MD    ferrous sulfate 325 mg Oral BID With Meals Deacon Bustos MD    gabapentin 100 mg Oral Daily Diana Doherty MD    heparin (porcine) 3-30 Units/kg/hr (Order-Specific) Intravenous Titrated BADNAR Mccormack-WENDY Last Rate: 12 Units/kg/hr (11/18/19 0400)   heparin (porcine) 2,000 Units Intravenous PRN Thersa Clover PA-C    heparin (porcine) 4,000 Units Intravenous PRN Thersa Clover PA-C    hydrALAZINE 10 mg Intravenous Q4H PRN Massiel Haskins PA-C    lidocaine (PF) 0 5 mL Infiltration Once PRN Laurent Santoyo MD    melatonin 6 mg Oral HS Laurent Santoyo MD    multivitamin-minerals 1 tablet Oral Daily Berta Peterson MD    nicotine 1 patch Transdermal Daily Laurent Santoyo MD    oxyCODONE 2 5 mg Oral Q4H PRN Laurent Santoyo MD    oxyCODONE 5 mg Oral Q4H PRN Laurent Santoyo MD    polyethylene glycol 17 g Oral BID Laurent Santoyo MD    senna-docusate sodium 1 tablet Oral BID Laurent Santoyo MD    tamsulosin 0 4 mg Oral Daily With Cheryl Kinney MD    thiamine 100 mg Oral Daily Laurent Santoyo MD         Today, Patient Was Seen By: Sushila Bernard DO    ** Please Note: Dictation voice to text software may have been used in the creation of this document   **

## 2019-11-19 NOTE — ASSESSMENT & PLAN NOTE
· PAD and left lower extremity claudication - status post left femoral endarterectomy, profundoplasty, SFA embolectomy and retrograde iliac stenting on 11/06/2019  · Has residual left SFA stenosis for which he underwent left lower extremity angiogram with treatment of SFA and popliteal arteries on 11/14/2019  · Developed thrombosis of small collaterals into the posterior tibial artery on the right - s/p tPA and SFA angioplasty  · Right foot cooler than left - motor/sensory intact in right foot - no intervention planned per vascular  · Noted to have left groin hematoma with concern for pseudoaneurysm when underwent CT for drop in Hb  · On heparin drip - continue per vascular  · Coumadin held till H/H stable and cleared by vascular  · F/u pseudoaneurysm study  · Statin held due to worsening transaminitis which has resolved off statins  · Await vascular review  · Monitor H/H

## 2019-11-19 NOTE — ASSESSMENT & PLAN NOTE
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

## 2019-11-19 NOTE — ASSESSMENT & PLAN NOTE
· PAD and left lower extremity claudication - status post left femoral endarterectomy, profundoplasty, SFA embolectomy and retrograde iliac stenting on 11/06/2019  · Has residual left SFA stenosis for which he underwent left lower extremity angiogram with treatment of SFA and popliteal arteries on 11/14/2019  · Developed thrombosis of small collaterals into the posterior tibial artery on the right - s/p tPA and SFA angioplasty  · Right foot cooler than left - motor/sensory intact in right foot - no intervention planned per vascular  · Noted to have left groin hematoma with concern for pseudoaneurysm when underwent CT for drop in Hb  · On heparin drip - continue per vascular  · Coumadin held till H/H stable and cleared by vascular  · F/u pseudoaneurysm study-appears to be negative  · Statin held due to worsening transaminitis which has resolved off statins  · Await vascular review  · Monitor H/H

## 2019-11-19 NOTE — PLAN OF CARE
Problem: PHYSICAL THERAPY ADULT  Goal: Performs mobility at highest level of function for planned discharge setting  See evaluation for individualized goals  Description  Treatment/Interventions: Functional transfer training, LE strengthening/ROM, Elevations, Therapeutic exercise, Endurance training, Patient/family training, Equipment eval/education, Bed mobility, Gait training, Spoke to nursing, Family  Equipment Recommended: Walker(current use of RW for mobility)       See flowsheet documentation for full assessment, interventions and recommendations  Outcome: Progressing  Note:   Prognosis: Good  Problem List: Impaired balance, Decreased mobility, Decreased coordination, Impaired judgement, Decreased safety awareness, Pain, Decreased skin integrity  Assessment: The pt  was limited today as he was only able to ambulate very short distances before requiring to rest due to increased pain  He also was only able to tolerate minimal weight on his RLE during gait  As the pt  has had notably limited mobility in the last two sessions will change recommendation to inpatient rehab pending continued progress  Barriers to Discharge: Inaccessible home environment, Decreased caregiver support     Recommendation: Post acute IP rehab, Other (Comment)(pending pt's progress with mobility )     PT - OK to Discharge: No    See flowsheet documentation for full assessment

## 2019-11-20 LAB
ALBUMIN SERPL BCP-MCNC: 3.1 G/DL (ref 3.5–5)
ALP SERPL-CCNC: 119 U/L (ref 46–116)
ALT SERPL W P-5'-P-CCNC: 71 U/L (ref 12–78)
ANION GAP SERPL CALCULATED.3IONS-SCNC: 9 MMOL/L (ref 4–13)
APTT PPP: 49 SECONDS (ref 23–37)
APTT PPP: 49 SECONDS (ref 23–37)
APTT PPP: 61 SECONDS (ref 23–37)
AST SERPL W P-5'-P-CCNC: 35 U/L (ref 5–45)
BASOPHILS # BLD AUTO: 0.04 THOUSANDS/ΜL (ref 0–0.1)
BASOPHILS NFR BLD AUTO: 0 % (ref 0–1)
BILIRUB DIRECT SERPL-MCNC: 0.16 MG/DL (ref 0–0.2)
BILIRUB SERPL-MCNC: 0.39 MG/DL (ref 0.2–1)
BUN SERPL-MCNC: 20 MG/DL (ref 5–25)
CALCIUM SERPL-MCNC: 9.6 MG/DL (ref 8.3–10.1)
CHLORIDE SERPL-SCNC: 106 MMOL/L (ref 100–108)
CO2 SERPL-SCNC: 22 MMOL/L (ref 21–32)
CREAT SERPL-MCNC: 0.76 MG/DL (ref 0.6–1.3)
EOSINOPHIL # BLD AUTO: 0.16 THOUSAND/ΜL (ref 0–0.61)
EOSINOPHIL NFR BLD AUTO: 2 % (ref 0–6)
ERYTHROCYTE [DISTWIDTH] IN BLOOD BY AUTOMATED COUNT: 14.7 % (ref 11.6–15.1)
GFR SERPL CREATININE-BSD FRML MDRD: 92 ML/MIN/1.73SQ M
GLUCOSE SERPL-MCNC: 117 MG/DL (ref 65–140)
HCT VFR BLD AUTO: 27.7 % (ref 36.5–49.3)
HGB BLD-MCNC: 8.7 G/DL (ref 12–17)
IMM GRANULOCYTES # BLD AUTO: 0.05 THOUSAND/UL (ref 0–0.2)
IMM GRANULOCYTES NFR BLD AUTO: 1 % (ref 0–2)
INR PPP: 1.29 (ref 0.84–1.19)
LYMPHOCYTES # BLD AUTO: 0.43 THOUSANDS/ΜL (ref 0.6–4.47)
LYMPHOCYTES NFR BLD AUTO: 5 % (ref 14–44)
MCH RBC QN AUTO: 29.9 PG (ref 26.8–34.3)
MCHC RBC AUTO-ENTMCNC: 31.4 G/DL (ref 31.4–37.4)
MCV RBC AUTO: 95 FL (ref 82–98)
MONOCYTES # BLD AUTO: 0.36 THOUSAND/ΜL (ref 0.17–1.22)
MONOCYTES NFR BLD AUTO: 4 % (ref 4–12)
NEUTROPHILS # BLD AUTO: 8.5 THOUSANDS/ΜL (ref 1.85–7.62)
NEUTS SEG NFR BLD AUTO: 88 % (ref 43–75)
NRBC BLD AUTO-RTO: 0 /100 WBCS
PLATELET # BLD AUTO: 484 THOUSANDS/UL (ref 149–390)
PMV BLD AUTO: 10.2 FL (ref 8.9–12.7)
POTASSIUM SERPL-SCNC: 4.5 MMOL/L (ref 3.5–5.3)
PROT SERPL-MCNC: 7.5 G/DL (ref 6.4–8.2)
PROTHROMBIN TIME: 15.7 SECONDS (ref 11.6–14.5)
RBC # BLD AUTO: 2.91 MILLION/UL (ref 3.88–5.62)
SODIUM SERPL-SCNC: 137 MMOL/L (ref 136–145)
WBC # BLD AUTO: 9.54 THOUSAND/UL (ref 4.31–10.16)

## 2019-11-20 PROCEDURE — 99024 POSTOP FOLLOW-UP VISIT: CPT | Performed by: SURGERY

## 2019-11-20 PROCEDURE — 85610 PROTHROMBIN TIME: CPT | Performed by: INTERNAL MEDICINE

## 2019-11-20 PROCEDURE — 99232 SBSQ HOSP IP/OBS MODERATE 35: CPT | Performed by: INTERNAL MEDICINE

## 2019-11-20 PROCEDURE — 85025 COMPLETE CBC W/AUTO DIFF WBC: CPT | Performed by: INTERNAL MEDICINE

## 2019-11-20 PROCEDURE — 80048 BASIC METABOLIC PNL TOTAL CA: CPT | Performed by: PHYSICIAN ASSISTANT

## 2019-11-20 PROCEDURE — 85730 THROMBOPLASTIN TIME PARTIAL: CPT | Performed by: INTERNAL MEDICINE

## 2019-11-20 PROCEDURE — 80076 HEPATIC FUNCTION PANEL: CPT | Performed by: PHYSICIAN ASSISTANT

## 2019-11-20 RX ORDER — WARFARIN SODIUM 1 MG/1
2 TABLET ORAL
Status: DISCONTINUED | OUTPATIENT
Start: 2019-11-20 | End: 2019-11-24 | Stop reason: HOSPADM

## 2019-11-20 RX ORDER — OLANZAPINE 10 MG/1
2.5 INJECTION, POWDER, LYOPHILIZED, FOR SOLUTION INTRAMUSCULAR ONCE AS NEEDED
Status: DISCONTINUED | OUTPATIENT
Start: 2019-11-20 | End: 2019-11-24 | Stop reason: HOSPADM

## 2019-11-20 RX ORDER — WARFARIN SODIUM 5 MG/1
5 TABLET ORAL
Status: DISCONTINUED | OUTPATIENT
Start: 2019-11-20 | End: 2019-11-20

## 2019-11-20 RX ADMIN — Medication 1 TABLET: at 09:31

## 2019-11-20 RX ADMIN — ATORVASTATIN CALCIUM 20 MG: 20 TABLET, FILM COATED ORAL at 17:50

## 2019-11-20 RX ADMIN — TAMSULOSIN HYDROCHLORIDE 0.4 MG: 0.4 CAPSULE ORAL at 17:50

## 2019-11-20 RX ADMIN — FERROUS SULFATE TAB 325 MG (65 MG ELEMENTAL FE) 325 MG: 325 (65 FE) TAB at 09:32

## 2019-11-20 RX ADMIN — HEPARIN SODIUM 2000 UNITS: 1000 INJECTION INTRAVENOUS; SUBCUTANEOUS at 06:12

## 2019-11-20 RX ADMIN — AMLODIPINE BESYLATE 10 MG: 10 TABLET ORAL at 09:31

## 2019-11-20 RX ADMIN — ASPIRIN 81 MG: 81 TABLET, COATED ORAL at 09:31

## 2019-11-20 RX ADMIN — WARFARIN SODIUM 2 MG: 1 TABLET ORAL at 17:51

## 2019-11-20 RX ADMIN — POLYETHYLENE GLYCOL 3350 17 G: 17 POWDER, FOR SOLUTION ORAL at 09:29

## 2019-11-20 RX ADMIN — CLOPIDOGREL 75 MG: 75 TABLET, FILM COATED ORAL at 09:31

## 2019-11-20 RX ADMIN — THIAMINE HCL TAB 100 MG 100 MG: 100 TAB at 09:31

## 2019-11-20 RX ADMIN — SENNOSIDES AND DOCUSATE SODIUM 1 TABLET: 8.6; 5 TABLET ORAL at 17:50

## 2019-11-20 RX ADMIN — POLYETHYLENE GLYCOL 3350 17 G: 17 POWDER, FOR SOLUTION ORAL at 17:50

## 2019-11-20 RX ADMIN — MELATONIN 6 MG: 3 TAB ORAL at 22:21

## 2019-11-20 RX ADMIN — FERROUS SULFATE TAB 325 MG (65 MG ELEMENTAL FE) 325 MG: 325 (65 FE) TAB at 17:51

## 2019-11-20 RX ADMIN — HEPARIN SODIUM 2000 UNITS: 1000 INJECTION INTRAVENOUS; SUBCUTANEOUS at 14:33

## 2019-11-20 RX ADMIN — HEPARIN SODIUM 14 UNITS/KG/HR: 10000 INJECTION, SOLUTION INTRAVENOUS at 22:21

## 2019-11-20 RX ADMIN — NICOTINE 1 PATCH: 14 PATCH TRANSDERMAL at 09:29

## 2019-11-20 RX ADMIN — GABAPENTIN 100 MG: 100 CAPSULE ORAL at 09:32

## 2019-11-20 RX ADMIN — SENNOSIDES AND DOCUSATE SODIUM 1 TABLET: 8.6; 5 TABLET ORAL at 09:32

## 2019-11-20 RX ADMIN — ACETAMINOPHEN 650 MG: 325 TABLET ORAL at 13:00

## 2019-11-20 NOTE — DISCHARGE INSTRUCTIONS
DISCHARGE INSTRUCTIONS  LEG SURGERY    Following discharge from the hospital, you may have some questions about your operation, your activities or your general condition  These instructions may answer some of your questions and help you adjust during the first few weeks following your operation  ACTIVITY:  Limit your physical activity to slow walking  Avoid climbing or heavy lifting for the first four weeks after surgery  Initially some discomfort will be felt when walking as the skin and muscle tissues heal  You may require help with walking or feel more secure with someone to lean on  Walking up steps and normal activities may be resumed, as you feel ready  You should not drive a car for one week following discharge from the hospital   You may ride in a car for short trips upon discharge  DIET:  Resume your normal diet  Try to eat low fat and low cholesterol foods  Good nutrition is important for healing of your incision  INCISION:  You may include the operated area in a shower on the third day following surgery  Wash your incision daily with soap and water  Pat the incision dry and leave open to air  Do not apply lotions, ointments, creams or powders to incision  Sitting in a tub is not recommended for the first two weeks following surgery or if you have any open wounds  It is normal to have swelling or discoloration around the incision  If increasing redness or pain develops, call our office immediately  Numbness in the region of the incision may occur following the surgery  This normally resolves in six to twelve months  If any of your incisions are open and require dressing changes, you will be given instruction for your daily incision care  If you are not able to change the dressings, a visiting nurse will be arranged  Your physician may have chosen to use a type of adhesive glue to close your incision  There are stitches present under the skin which will absorb on their own  The glue is used to cover the incision, assist in closure, and prevent contamination  This adhesive will darken and peel away on its own within one to two weeks  Alternatively, your surgeon may have chosen to use skin staples to close your incision which will be removed in the office at the time of your follow-up appointment  You may wash the incision with the staples present  LEG SWELLING: Most patients have noticeable leg swelling after leg surgery  This usually disappears within a few weeks  If swelling is present, elevate the leg whenever possible  Avoid sitting with the leg hanging down for prolonged time periods  Walking is beneficial   An ACE bandage or support stocking may be helpful, but this should be discussed with your physician prior to use  FOLLOW UP STUDIES:  Doppler ultrasound studies are very important to your post-operative care  Your surgeon will arrange for them at your first postoperative visit  Repeat studies are then scheduled every three months for the first year and periodically after this  Appt w/ Dr Suraj Solis: 12/9/19 at [address]     [phone] [address] [phone]  [address] [place] [address] , [address], [facility] [place], [address] [place] [address]  [address], [address], [place], [address] [facility] [address]  [address], [place], [address]  [address] [facility], [address]    [facility], [address]st Floor, [address]  [address], [address] [facility], [address] [facility] 97   [address] [facility], [address] [facility], [address]  [address] [facility], [address] [facility], One [facility], Mila Joyce 6               ARTERIOGRAM    WHAT YOU SHOULD KNOW:   An angiogram is a procedure to look at arteries in your body  Arteries are the blood vessels that carry blood from your heart to your body  AFTER YOU LEAVE:     Self-care:   · Limit activity: Rest for the remainder of the day of your procedure  Have some one with you until the next morning  Keep your arm or leg straight as much as possible  Rest as much as possible, sitting lying or reclining  Walk only to go to the bathroom, to bed or to eat  If the angiogram catheter was put in your leg, use the stairs as little as possible  No driving  · Keep your wound clean and dry  You may shower 24 hours after your procedure  The bandage you have on should fall off in 2-3 days  If there is any drainage from the puncture site, you should put on a clean bandage  · Watch for bleeding and bruising: It is normal to have a bruise and soreness where the angiogram catheter went in  · Diet:   · You may resume your regular diet, Sips of flat soda will help with mild nausea  · Drink more liquids than usual for the next 24 hours      · IMMEDIATELY Contact Interventional Radiology at [phone] [place] PATIENTS: Contact Interventional Radiology at [phone]) Aide Cornejo PATIENTS: Contact Interventional Radiology at [phone]) if any of the following occur:  · If your bruise gets larger or if you notice any active bleeding  APPLY DIRECT PRESSURE TO THE BLEEDING SITE  · If you notice increased swelling or have increased pain at the puncture site   · If you have any numbness or pain in the extremity of the puncture site   · If that extremity seems cold or pale      · You have fever greater than 101  · Persistent nausea or vomitting    Follow up with your primary healthcare provider  as directed: Write down your questions so you remember to ask them during your visits

## 2019-11-20 NOTE — ASSESSMENT & PLAN NOTE
· Pt presented with 2-month history of LLE pain with walking  Denies pain at rest, but pt is unable to walk more than a few feet without pain  · CTA: Severe peripheral vascular disease  Limited evaluation of the calf vessels due to extensive calcifications  Severe areas of narrowing and focal occlusion of the right superficial femoral artery and diffuse occlusion of the popliteal artery   Distal reconstitution of the right peroneal artery and scattered areas of the right posterior tibial artery  Occlusion of the left common femoral artery and left superficial femoral artery with distal reconstitution however severe narrowing of the mid to distal superficial femoral artery   Severe narrowing of the left popliteal artery  Patent left peroneal artery and scattered areas of the left posterior tibial artery  PLAN:  · Continue Heparin gtt, asa/statin-bridge to Coumadin  · Plan for left femoral endarterectomy with retrograde iliac intervention per vascular, tentatively planned for Friday  Appreciate vascular surgery input  · Cardiology on board for preop risk stratification, appreciate input  · Pain control per geriatrics pain management order set  · s/p left CFA endarterectomy, profundoplasty, embolectomy, arteriogram with retrograde iliac stenting 11/7  · Blood pressure under control  · Off of step down,  as his mental status improved    · Await an angiogram/intervention of left lower extremity to optimize left foot perfusion prior to discharge

## 2019-11-20 NOTE — NURSING NOTE
Continual observation discontinued by Dr Sammi Smith - bed alarm on, Room near nurses station, urinal at bedside in reach, pt sleeping comfortably  IV wrapped  Will continue to monitor

## 2019-11-20 NOTE — PROGRESS NOTES
Pt becoming increasingly agitated throughout night, wanting to leave AMA and asking for his IV's to be taken out  TERRA Collins with SLIM notified and p o  Zyprexa ordered  Pt refusing to take any p o  medications  2 5 mg IM Zyprexa given  Will continue to monitor

## 2019-11-20 NOTE — PROGRESS NOTES
Progress Note - Vascular Surgery  Laray Sender 78 y o  male MRN: [id number]  Unit/Bed#: [facility] 430-01 Encounter: [id number]    Assessment:  69 y o  M with hx of PAD and LLE claudication s/p left femoral endarterectomy, profundoplasty, SFA embolectomy, and iliac stenting with residual SFA stenosis  - 11/14 IR agram with angioplasty b/l SFA with poor flow to the RLE    Plan:  - Continue coumadin 5mg daily  - Dysphagia diet  - nursing order was daily wound care to the left groin incision site with betadine and dry sterile gauze    - PT/OT eval and treat  - patient is stable from vascular surgery standpoint   - rest of care per primary service      Subjective/Objective   Chief Complaint: No chief complaint on file  Subjective: 78 y o  y/o male was seen and evaluated at bedside  This morning patient appeared very upset, he was found confused and agitated last night wanting to go home  Blood pressure 142/64, pulse 83, temperature 98 3 °F (36 8 °C), temperature source Oral, resp  rate 20, height 5' 5" (1 651 m), weight 50 3 kg (111 lb), SpO2 97 %  ,Body mass index is 18 47 kg/m²  Invasive Devices     Peripheral Intravenous Line            Peripheral IV 11/20/19 Right;Ventral (anterior) Forearm less than 1 day                Physical Exam:   General: Alert, cooperative and no distress  Lungs: Non labored breathing  Heart: Positive S1, S2  Abdomen: Soft, non-tender  Extremity: Left dopplerable signal DP and PT and palpable femoral pulse  Left foot is warm to touch and well perfused  Right non-palpable and non-dopplerable PT and DP  Right foot is cool to touch  Motor sensation intact               Lab, Imaging and other studies:   CBC:   Lab Results   Component Value Date    WBC 9 54 11/20/2019    HGB 8 7 (L) 11/20/2019    HCT 27 7 (L) 11/20/2019    MCV 95 11/20/2019     (H) 11/20/2019    MCH 29 9 11/20/2019    MCHC 31 4 11/20/2019    RDW 14 7 11/20/2019    MPV 10 2 11/20/2019    NRBC 0 11/20/2019   , CMP:   Lab Results   Component Value Date    SODIUM 137 11/20/2019    K 4 5 11/20/2019     11/20/2019    CO2 22 11/20/2019    BUN 20 11/20/2019    CREATININE 0 76 11/20/2019    CALCIUM 9 6 11/20/2019    AST 35 11/20/2019    ALT 71 11/20/2019    ALKPHOS 119 (H) 11/20/2019    EGFR 92 11/20/2019       Imaging: I have personally reviewed pertinent films in PACS  EKG, Pathology, and Other Studies: I have personally reviewed pertinent reports    VTE Pharmacologic Prophylaxis: Sequential compression device (Venodyne)

## 2019-11-20 NOTE — PROGRESS NOTES
Pt found at bedside confused and agitated with his gown off  He also peed all over the floor and blood was also on his sheets and floor  He ripped his IV out with his Heparin infusing out of his arm and ripped the IV tubing in half  TERRA SAUCEDA made aware and pt ordered 2 5 mg IM Zyprexa  1:1 continual observation also ordered  Will continue to monitor

## 2019-11-20 NOTE — PROGRESS NOTES
Progress Note - David Alma Roach 78 y o  male MRN: [id number]  Unit/Bed#: [facility] 430-01 Encounter: [id number]      Assessment/Plan:    Acute metabolic encephalopathy/delirium  -multifactorial including prolonged hospitalization with multiple procedures required for peripheral vascular disease superimposed on probable underlying vascular dementia  -episode overnight of agitation requiring pharmacologic restraints and one-to-one continue observation  -pain likely in part driving encephalopathy overnight, consider scheduling low-dose oxycodone 2 5 mg in early afternoon as patient does not verbalize need for pain medication but is frequently visibly in pain and is unreliable in reporting symptoms  -continue to address underlying factors contributing to agitation/encephalopathy with medication use as last line  -if pain control and conservative delirium treatment is inadequate would recommend first increasing dose of Gabapentin from 100mg to 300mg in early afternoon which has helped significantly in past, if gabapentin increase insufficient than the risk of self harm/interfering with necessary medical care would be greater than the risk of medication required to treat agitation and at that time would recommend low dose Zyprexa 2 5mg PO Q8H PRN or Zyprexa 2 5mg IM Q8H PRN   -if administered dose of Zyprexa recommend rechecking EKG for QTc monitoring, potassium, Mag, and phos    Vascular dementia, probable  -now with recurrent episode of metabolic encephalopathy superimposed  -continue to encourage calm environment to minimize noxious external stimuli  -maintain normal sleep-wake cycle  -encourage family at bedside which has previously beneficial in helping redirect unwanted behaviors  -patient will likely require higher level of care discharge, will follow up with PT and OT for final recommendations  -continue to provide supportive care to family to prevent caregiver burnout    Deconditioning/debility/frailty  -albumin remains low at 3 1  -continue encourage nutritional support  -continue nutritional supplements such as boost shakes between meals    Ambulatory dysfunction  -multifactorial including peripheral vascular disease requiring multiple vascular interventions  -continue to provide assistance with all ambulation  -PT and OT following  -would likely benefit from short-term rehab discharge    PAD  -vascular surgery following, no further interventions anticipated at this immediate time  -currently on aspirin and Plavix with heparin GTT bridging to Coumadin    Care coordination: assessment and plan discussed with SLIM attending    Subjective:   Patient seen examined at bedside where he is lying sleeping, overnight he was noted to be agitated/delirious/encephalopathic and was given IM Zyprexa  On exam this morning he is somewhat somnolent  His PCA at bedside states that he has been sleeping most of the morning  He does not wake up to participate in exam      Review of Systems   Unable to perform ROS: Mental status change     Objective:     Vitals: Blood pressure 142/64, pulse 83, temperature 98 3 °F (36 8 °C), temperature source Oral, resp  rate 20, height 5' 5" (1 651 m), weight 50 3 kg (111 lb), SpO2 97 %  ,Body mass index is 18 47 kg/m²  Intake/Output Summary (Last 24 hours) at 11/20/2019 1301  Last data filed at 11/20/2019 1255  Gross per 24 hour   Intake 83 9 ml   Output 2055 ml   Net -1971 1 ml     Current Medications: Reviewed    Physical Exam:   Physical Exam   Constitutional: He appears well-developed  Patient appears older than stated age, thin, frail, deconditioned and debilitated   HENT:   Unshaven  Mucous membranes dry   Eyes:   Does not open eyes for exam   Neck: Neck supple  No tracheal deviation present  Cardiovascular: Normal rate  Pulmonary/Chest: No respiratory distress  He has no wheezes  Abdominal: Soft   He exhibits distension (Slightly distended with no tenderness or rebound)  There is no tenderness  Musculoskeletal: He exhibits no edema  Right lower extremity cool mid shin and downward   Neurological:   Somnolent, does not awaken for exam   Skin: Skin is dry  Psychiatric:   Unable to determine due to current mental status   Nursing note and vitals reviewed       Invasive Devices     Peripheral Intravenous Line            Peripheral IV 11/20/19 Right;Ventral (anterior) Forearm less than 1 day              Lab, Imaging and other studies:   -sodium 137, potassium 4 5, chloride 106, CO2 22, BUN 20, creatinine 0 76, glucose 117, AST 35, ALT 71, alk-phos 119, total protein 7 5, albumin 3 1, bilirubin 0 39, GFR 92  -hemoglobin 8 7, hematocrit 27 7, WBC 9 54, platelets 820

## 2019-11-20 NOTE — PROGRESS NOTES
Progress Note - Lalito Daniels 1950, 78 y o  male MRN: [id number]    Unit/Bed#: [facility] 430-01 Encounter: [id number]    Primary Care Provider: Martina Winchester MD   Date and time admitted to hospital: 11/2/2019  2:01 AM        * PAD (peripheral artery disease) ([facility] [address] )  Assessment & Plan  · PAD and left lower extremity claudication - status post left femoral endarterectomy, profundoplasty, SFA embolectomy and retrograde iliac stenting on 11/06/2019  · Has residual left SFA stenosis for which he underwent left lower extremity angiogram with treatment of SFA and popliteal arteries on 11/14/2019  · Developed thrombosis of small collaterals into the posterior tibial artery on the right - s/p tPA and SFA angioplasty  · Right foot cooler than left - motor/sensory intact in right foot - no intervention planned per vascular  · Noted to have left groin hematoma with concern for pseudoaneurysm when underwent CT for drop in Hb  · On heparin drip - continue per vascular  · Coumadin held till H/H stable and cleared by vascular  · F/u pseudoaneurysm study-appears to be negative  · Statin held due to worsening transaminitis which has resolved off statins  · Await vascular review  · Monitor H/H    Thrombocytopenia (HCC)  Assessment & Plan  · Resolved  · Monitor    Ambulatory dysfunction  Assessment & Plan  · PT recommends rehab  · Family refused initially but now agreeable    Coagulopathy [facility])  Assessment & Plan  · Was placed on heparin and was being bridged to Coumadin per vascular for PAD  · Had rapid rise in INR - seen by Hematology - likely from underlying vitamin K deficiency  · Coumadin started at lower dose of 2 mg on 11/15 as recommended by Hematology - now held due to acute blood loss anemia  · When H/H stable restart Coumadin at 2 mg     Acute blood loss anemia  Assessment & Plan  · Acute blood loss anemia from left groin hematoma  · H/H stable at present  · Tolerating heparin drip  · Monitor H/H  · Transfuse as needed   · Needs to continue anticoagulation per vascular    Renal stone  Assessment & Plan  · Incidental finding on CT scan  · 9 x 5 x 5 mm calculus within the posterior bladder, near the left ureterovesicular junction however there is no hydronephrosis to suggest obstruction  · Pain control  · Consider urology eval if symptomatic or if renal function worsens    Syncope and collapse  Assessment & Plan  · Had RRT with syncope, bradycardia and hypotension  · EKG showed Mobitz Type 1 block  · Required transient dopamine   · Reviewed by cardiology - episode felt to be vasovagal from pain  · Adequate pain control  · Avoid AV blocking agents      Dependence on nicotine from cigarettes  Assessment & Plan  · Still smokes despite diagnosis of throat cancer, per daughter  · She thinks he smokes 1ppd  · Nicotine patch  · Smoking cessation education    Hypertension  Assessment & Plan  · BP acceptable on Amlodipine 10 mg daily      Peripheral vascular disease ([facility] [address] )  Assessment & Plan  · Pt presented with 2-month history of LLE pain with walking  Denies pain at rest, but pt is unable to walk more than a few feet without pain  · CTA: Severe peripheral vascular disease  Limited evaluation of the calf vessels due to extensive calcifications  Severe areas of narrowing and focal occlusion of the right superficial femoral artery and diffuse occlusion of the popliteal artery   Distal reconstitution of the right peroneal artery and scattered areas of the right posterior tibial artery  Occlusion of the left common femoral artery and left superficial femoral artery with distal reconstitution however severe narrowing of the mid to distal superficial femoral artery   Severe narrowing of the left popliteal artery  Patent left peroneal artery and scattered areas of the left posterior tibial artery      PLAN:  · Continue Heparin gtt, asa/statin-bridge to Coumadin  · Plan for left femoral endarterectomy with retrograde iliac intervention per vascular, tentatively planned for Friday  Appreciate vascular surgery input  · Cardiology on board for preop risk stratification, appreciate input  · Pain control per geriatrics pain management order set  · s/p left CFA endarterectomy, profundoplasty, embolectomy, arteriogram with retrograde iliac stenting   · Blood pressure under control  · Off of step down,  as his mental status improved  · Await an angiogram/intervention of left lower extremity to optimize left foot perfusion prior to discharge        VTE Pharmacologic Prophylaxis:   Pharmacologic: Heparin Drip  Mechanical VTE Prophylaxis in Place: No    Patient Centered Rounds: I have performed bedside rounds with nursing staff today  Time Spent for Care: 15 minutes  More than 50% of total time spent on counseling and coordination of care as described above  Current Length of Stay: 18 day(s)    Current Patient Status: Inpatient       Code Status: Level 1 - Full Code      Subjective:   No acute distress    Objective:     Vitals:   Temp (24hrs), Av 1 °F (36 7 °C), Min:97 9 °F (36 6 °C), Max:98 3 °F (36 8 °C)    Temp:  [97 9 °F (36 6 °C)-98 3 °F (36 8 °C)] 98 3 °F (36 8 °C)  HR:  [83-86] 83  Resp:  [18-20] 20  BP: (142-144)/(64-67) 142/64  SpO2:  [97 %] 97 %  Body mass index is 18 47 kg/m²  Input and Output Summary (last 24 hours): Intake/Output Summary (Last 24 hours) at 2019 1100  Last data filed at 2019 0901  Gross per 24 hour   Intake 548 9 ml   Output 1605 ml   Net -1056 1 ml       Physical Exam:     Physical Exam   Constitutional: He is oriented to person, place, and time  HENT:   Head: Normocephalic and atraumatic  Eyes: Pupils are equal, round, and reactive to light  EOM are normal    Neck: Normal range of motion  Neck supple  Cardiovascular: Normal rate and regular rhythm  No murmur heard  Pulmonary/Chest: Effort normal and breath sounds normal  No respiratory distress     Abdominal: Soft  Bowel sounds are normal  He exhibits no distension  There is no tenderness  Musculoskeletal: Normal range of motion  He exhibits no edema  Neurological: He is alert and oriented to person, place, and time  Skin: Skin is warm and dry  Additional Data:     Labs:    Results from last 7 days   Lab Units 11/20/19  0442   WBC Thousand/uL 9 54   HEMOGLOBIN g/dL 8 7*   HEMATOCRIT % 27 7*   PLATELETS Thousands/uL 484*   NEUTROS PCT % 88*   LYMPHS PCT % 5*   MONOS PCT % 4   EOS PCT % 2     Results from last 7 days   Lab Units 11/20/19  0505   POTASSIUM mmol/L 4 5   CHLORIDE mmol/L 106   CO2 mmol/L 22   BUN mg/dL 20   CREATININE mg/dL 0 76   CALCIUM mg/dL 9 6   ALK PHOS U/L 119*   ALT U/L 71   AST U/L 35     Results from last 7 days   Lab Units 11/20/19  0500   INR  1 29*       * I Have Reviewed All Lab Data Listed Above  * Additional Pertinent Lab Tests Reviewed:  All Labs Within Last 24 Hours Reviewed        Recent Cultures (last 7 days):           Last 24 Hours Medication List:     Current Facility-Administered Medications:  acetaminophen 650 mg Oral Q6H PRN Robin Spence MD    amLODIPine 10 mg Oral Daily Milagros Nelson MD    aspirin 81 mg Oral Daily Robin Spence MD    atorvastatin 20 mg Oral After Dinner Evorn BANDAR Reyes-WENDY    bisacodyl 10 mg Rectal Daily PRN Robin Spence MD    chlorhexidine 15 mL Swish & Spit Q12H Isabel Eid MD    clopidogrel 75 mg Oral Daily Robin Spence MD    ferrous sulfate 325 mg Oral BID With Meals Steven Phillips MD    gabapentin 100 mg Oral Daily Robin Spence MD    heparin (porcine) 3-30 Units/kg/hr (Order-Specific) Intravenous Titrated Dorrene Fan, PA-C Last Rate: 14 Units/kg/hr (11/20/19 7754)   heparin (porcine) 2,000 Units Intravenous PRN Dorrene Fan, PA-C    heparin (porcine) 4,000 Units Intravenous PRN Dorrene Fan, PA-C    hydrALAZINE 10 mg Intravenous Q4H PRN Massiel Haskins, PA-C    lidocaine (PF) 0 5 mL Infiltration Once PRN Cira Chavez MD    melatonin 6 mg Oral HS Cira Chavez MD    multivitamin-minerals 1 tablet Oral Daily Renee Lockett MD    nicotine 1 patch Transdermal Daily Cira Chavez MD    OLANZapine 2 5 mg Intramuscular Once PRN Radha Ortiz PA-C    oxyCODONE 2 5 mg Oral Q4H PRN Cira Chavez MD    oxyCODONE 5 mg Oral Q4H PRN Cira Chavez MD    oxymetazoline 2 spray Each Nare Q12H PRN Radha Ortiz PA-C    polyethylene glycol 17 g Oral BID Cira Chavez MD    senna-docusate sodium 1 tablet Oral BID Cira Chavez MD    tamsulosin 0 4 mg Oral Daily With El Coates MD    thiamine 100 mg Oral Daily Cira Chavez MD    warfarin 5 mg Oral Daily (warfarin) Zari Webber DO         Today, Patient Was Seen By: Kenia Rubin DO    ** Please Note: Dictation voice to text software may have been used in the creation of this document   **

## 2019-11-21 LAB
APTT PPP: 66 SECONDS (ref 23–37)
INR PPP: 1.4 (ref 0.84–1.19)
PROTHROMBIN TIME: 16.7 SECONDS (ref 11.6–14.5)

## 2019-11-21 PROCEDURE — 85610 PROTHROMBIN TIME: CPT | Performed by: INTERNAL MEDICINE

## 2019-11-21 PROCEDURE — 97116 GAIT TRAINING THERAPY: CPT

## 2019-11-21 PROCEDURE — 99232 SBSQ HOSP IP/OBS MODERATE 35: CPT | Performed by: INTERNAL MEDICINE

## 2019-11-21 PROCEDURE — 97110 THERAPEUTIC EXERCISES: CPT

## 2019-11-21 PROCEDURE — 85730 THROMBOPLASTIN TIME PARTIAL: CPT | Performed by: INTERNAL MEDICINE

## 2019-11-21 RX ADMIN — GABAPENTIN 100 MG: 100 CAPSULE ORAL at 08:13

## 2019-11-21 RX ADMIN — POLYETHYLENE GLYCOL 3350 17 G: 17 POWDER, FOR SOLUTION ORAL at 08:10

## 2019-11-21 RX ADMIN — NICOTINE 1 PATCH: 14 PATCH TRANSDERMAL at 08:11

## 2019-11-21 RX ADMIN — SENNOSIDES AND DOCUSATE SODIUM 1 TABLET: 8.6; 5 TABLET ORAL at 18:49

## 2019-11-21 RX ADMIN — OXYCODONE HYDROCHLORIDE 2.5 MG: 5 TABLET ORAL at 14:49

## 2019-11-21 RX ADMIN — CHLORHEXIDINE GLUCONATE 0.12% ORAL RINSE 15 ML: 1.2 LIQUID ORAL at 22:09

## 2019-11-21 RX ADMIN — THIAMINE HCL TAB 100 MG 100 MG: 100 TAB at 08:12

## 2019-11-21 RX ADMIN — Medication 1 TABLET: at 08:13

## 2019-11-21 RX ADMIN — MELATONIN 6 MG: 3 TAB ORAL at 22:09

## 2019-11-21 RX ADMIN — ATORVASTATIN CALCIUM 20 MG: 20 TABLET, FILM COATED ORAL at 18:49

## 2019-11-21 RX ADMIN — WARFARIN SODIUM 2 MG: 1 TABLET ORAL at 18:49

## 2019-11-21 RX ADMIN — OXYCODONE HYDROCHLORIDE 5 MG: 5 TABLET ORAL at 16:45

## 2019-11-21 RX ADMIN — FERROUS SULFATE TAB 325 MG (65 MG ELEMENTAL FE) 325 MG: 325 (65 FE) TAB at 16:45

## 2019-11-21 RX ADMIN — SENNOSIDES AND DOCUSATE SODIUM 1 TABLET: 8.6; 5 TABLET ORAL at 08:14

## 2019-11-21 RX ADMIN — TAMSULOSIN HYDROCHLORIDE 0.4 MG: 0.4 CAPSULE ORAL at 16:45

## 2019-11-21 RX ADMIN — CHLORHEXIDINE GLUCONATE 0.12% ORAL RINSE 15 ML: 1.2 LIQUID ORAL at 08:12

## 2019-11-21 RX ADMIN — ASPIRIN 81 MG: 81 TABLET, COATED ORAL at 08:14

## 2019-11-21 RX ADMIN — FERROUS SULFATE TAB 325 MG (65 MG ELEMENTAL FE) 325 MG: 325 (65 FE) TAB at 08:13

## 2019-11-21 RX ADMIN — AMLODIPINE BESYLATE 10 MG: 10 TABLET ORAL at 08:16

## 2019-11-21 RX ADMIN — ACETAMINOPHEN 650 MG: 325 TABLET ORAL at 08:12

## 2019-11-21 RX ADMIN — ACETAMINOPHEN 650 MG: 325 TABLET ORAL at 14:49

## 2019-11-21 RX ADMIN — CLOPIDOGREL 75 MG: 75 TABLET, FILM COATED ORAL at 08:13

## 2019-11-21 NOTE — PROGRESS NOTES
Progress Note - Suraj Patrick 1950, 78 y o  male MRN: [id number]    Unit/Bed#: [facility] 430-01 Encounter: [id number]    Primary Care Provider: Jodee Gustafson MD   Date and time admitted to hospital: 11/2/2019  2:01 AM        * PAD (peripheral artery disease) ([facility] [address] )  Assessment & Plan  · PAD and left lower extremity claudication - status post left femoral endarterectomy, profundoplasty, SFA embolectomy and retrograde iliac stenting on 11/06/2019  · Has residual left SFA stenosis for which he underwent left lower extremity angiogram with treatment of SFA and popliteal arteries on 11/14/2019  · Developed thrombosis of small collaterals into the posterior tibial artery on the right - s/p tPA and SFA angioplasty  · Right foot cooler than left - motor/sensory intact in right foot - no intervention planned per vascular  · Noted to have left groin hematoma with concern for pseudoaneurysm when underwent CT for drop in Hb  · On heparin drip - continue per vascular  · Coumadin held till H/H stable and cleared by vascular  · F/u pseudoaneurysm study-appears to be negative  · Statin held due to worsening transaminitis which has resolved off statins  · Await vascular review  · Monitor H/H    Ambulatory dysfunction  Assessment & Plan  · PT recommends rehab  · Family refused initially but now agreeable    Coagulopathy [facility])  Assessment & Plan  · Was placed on heparin and was being bridged to Coumadin per vascular for PAD  · Had rapid rise in INR - seen by Hematology - likely from underlying vitamin K deficiency  · Coumadin started at lower dose of 2 mg on 11/15 as recommended by Hematology - now held due to acute blood loss anemia  · When H/H stable restart Coumadin at 2 mg     Acute blood loss anemia  Assessment & Plan  · Acute blood loss anemia from left groin hematoma  · H/H stable at present  · Tolerating heparin drip  · Monitor H/H  · Transfuse as needed   · Needs to continue anticoagulation per vascular    Dependence on nicotine from cigarettes  Assessment & Plan  · Still smokes despite diagnosis of throat cancer, per daughter  · She thinks he smokes 1ppd  · Nicotine patch  · Smoking cessation education    Hypertension  Assessment & Plan  · BP acceptable on Amlodipine 10 mg daily      History of cancer tonsil  Assessment & Plan  · 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 and ENT         Peripheral vascular disease [facility])  Assessment & Plan  · Pt presented with 2-month history of LLE pain with walking  Denies pain at rest, but pt is unable to walk more than a few feet without pain  · CTA: Severe peripheral vascular disease  Limited evaluation of the calf vessels due to extensive calcifications  Severe areas of narrowing and focal occlusion of the right superficial femoral artery and diffuse occlusion of the popliteal artery   Distal reconstitution of the right peroneal artery and scattered areas of the right posterior tibial artery  Occlusion of the left common femoral artery and left superficial femoral artery with distal reconstitution however severe narrowing of the mid to distal superficial femoral artery   Severe narrowing of the left popliteal artery  Patent left peroneal artery and scattered areas of the left posterior tibial artery  PLAN:  · Continue Heparin gtt, asa/statin-bridge to Coumadin  · Plan for left femoral endarterectomy with retrograde iliac intervention per vascular, tentatively planned for Friday  Appreciate vascular surgery input  · Cardiology on board for preop risk stratification, appreciate input    · Pain control per geriatrics pain management order set  · s/p left CFA endarterectomy, profundoplasty, embolectomy, arteriogram with retrograde iliac stenting   · Blood pressure under control  · Off of step down,  as his mental status improved  · Await an angiogram/intervention of left lower extremity to optimize left foot perfusion prior to discharge      VTE Pharmacologic Prophylaxis:   Pharmacologic: Heparin Drip  Mechanical VTE Prophylaxis in Place: No    Patient Centered Rounds: I have performed bedside rounds with nursing staff today  Time Spent for Care: 15 minutes  More than 50% of total time spent on counseling and coordination of care as described above  Current Length of Stay: 19 day(s)    Current Patient Status: Inpatient       Code Status: Level 1 - Full Code      Subjective:   nad    Objective:     Vitals:   Temp (24hrs), Av °F (36 7 °C), Min:97 8 °F (36 6 °C), Max:98 2 °F (36 8 °C)    Temp:  [97 8 °F (36 6 °C)-98 2 °F (36 8 °C)] 97 8 °F (36 6 °C)  HR:  [60-83] 81  Resp:  [16-20] 16  BP: (119-156)/(56-76) 133/61  SpO2:  [97 %-100 %] 98 %  Body mass index is 18 47 kg/m²  Input and Output Summary (last 24 hours): Intake/Output Summary (Last 24 hours) at 2019 0906  Last data filed at 2019 0849  Gross per 24 hour   Intake 1262 ml   Output 1750 ml   Net -488 ml       Physical Exam:     Physical Exam   Constitutional: He is oriented to person, place, and time  HENT:   Head: Normocephalic and atraumatic  Eyes: Pupils are equal, round, and reactive to light  EOM are normal    Neck: Normal range of motion  Neck supple  Pulmonary/Chest: Effort normal and breath sounds normal  No stridor  No respiratory distress  Abdominal: Soft  Bowel sounds are normal  He exhibits no distension  Musculoskeletal: He exhibits no edema  Neurological: He is alert and oriented to person, place, and time  Skin: Skin is warm         Additional Data:     Labs:    Results from last 7 days   Lab Units 11/20/19  0442   WBC Thousand/uL 9 54   HEMOGLOBIN g/dL 8 7*   HEMATOCRIT % 27 7*   PLATELETS Thousands/uL 484*   NEUTROS PCT % 88*   LYMPHS PCT % 5*   MONOS PCT % 4   EOS PCT % 2     Results from last 7 days   Lab Units 11/20/19  0505   POTASSIUM mmol/L 4 5   CHLORIDE mmol/L 106   CO2 mmol/L 22   BUN mg/dL 20   CREATININE mg/dL 0 76   CALCIUM mg/dL 9 6   ALK PHOS U/L 119*   ALT U/L 71   AST U/L 35     Results from last 7 days   Lab Units 11/21/19  0440   INR  1 40*       * I Have Reviewed All Lab Data Listed Above  * Additional Pertinent Lab Tests Reviewed:  All Labs Within Last 24 Hours Reviewed    Recent Cultures (last 7 days):           Last 24 Hours Medication List:     Current Facility-Administered Medications:  acetaminophen 650 mg Oral Q6H PRN Robin Spence MD    amLODIPine 10 mg Oral Daily Milagros Nelson MD    aspirin 81 mg Oral Daily Robin Spence MD    atorvastatin 20 mg Oral After Dinner Evorn Luana Jorge PA-C    bisacodyl 10 mg Rectal Daily PRN Robin Spence MD    chlorhexidine 15 mL Swish & Spit Q12H Isabel Eid MD    clopidogrel 75 mg Oral Daily Robin Spence MD    ferrous sulfate 325 mg Oral BID With Meals Steven Phillips MD    gabapentin 100 mg Oral Daily Robin Spence MD    heparin (porcine) 3-30 Units/kg/hr (Order-Specific) Intravenous Titrated Gogo Shi PA-C Last Rate: 14 Units/kg/hr (11/20/19 2221)   heparin (porcine) 2,000 Units Intravenous PRN BANDAR Schmitz-WENDY    heparin (porcine) 4,000 Units Intravenous PRN Gogo Shi PA-C    hydrALAZINE 10 mg Intravenous Q4H PRN Massiel Haskins PA-C    lidocaine (PF) 0 5 mL Infiltration Once PRN Robin Spence MD    melatonin 6 mg Oral HS Robin Spence MD    multivitamin-minerals 1 tablet Oral Daily Steven Phillips MD    nicotine 1 patch Transdermal Daily Robin Spence MD    OLANZapine 2 5 mg Intramuscular Once PRN Jose De La Cruz PA-C    oxyCODONE 2 5 mg Oral Q4H PRN Robin Spence MD oxyCODONE 5 mg Oral Q4H PRN Adriana Araiza MD    polyethylene glycol 17 g Oral BID Adriana Araiza MD    senna-docusate sodium 1 tablet Oral BID Adriana Araiza MD    tamsulosin 0 4 mg Oral Daily With Evelia Yao MD    thiamine 100 mg Oral Daily Adriana Araiza MD    warfarin 2 mg Oral Daily (warfarin) Karen Olvera PA-C         Today, Patient Was Seen By: Galdino Ribeiro DO    ** Please Note: Dictation voice to text software may have been used in the creation of this document   **

## 2019-11-21 NOTE — PHYSICIAN ADVISOR
Current patient class: Inpatient  The patient is currently on Hospital Day: 20 at [address] [place] [address]      The patient was admitted to the hospital at [facility] on 11/2/19 for the following diagnosis:  Femoral artery occlusion ([address] ) [I70 [id number]]       There is documentation in the medical record of an expected length of stay of at least 2 midnights  The patient is therefore expected to satisfy the 2 midnight benchmark and given the 2 midnight presumption is appropriate for INPATIENT ADMISSION  Given this expectation of a satisfying stay, CMS instructs us that the patient is most often appropriate for inpatient admission under part A provided medical necessity is documented in the chart  After review of the relevant documentation, labs, vital signs and test results, the patient is appropriate for INPATIENT ADMISSION  Admission to the hospital as an inpatient is a complex decision making process which requires the practitioner to consider the patients presenting complaint, history and physical examination and all relevant testing  With this in mind, in this case, the patient was deemed appropriate for INPATIENT ADMISSION  After review of the documentation and testing available at the time of the admission I concur with this clinical determination of medical necessity  Rationale is as follows: The patient is a 78 yrs old Male who presented to the ED at 11/2/2019  2:01 AM with a chief complaint of worsening left lower ext pain for 2 months  79-year-old male with past medical history significant for throat cancer not currently on treatment and apparent history of circulation problems presenting with worsening left lower extremity pain x2 months  Found on CTA with runoff to have extensive peripheral vascular disease bilaterally worse on left side  Unfortunately patient extremely poor historian which limits history intake   Patient also with possible history of dementia and on admission noted to have confusion  He was evaluated by vascular surgery 11/2/19-A venous duplex was performed which was negative for acute venous problems but arterial waveforms were difficult to obtain  A CTA abdomen with runoff had been obtained at [facility] which revealed an occluded left common femoral artery and origins of the SFA and PFA with reconstitution distally but significant stenosis in the SFA and popliteal artery  He has a patent peroneal artery runoff to the foot and was started on a heparin drip  Determined to need a left common femoral endarterectomy with profundaplasty to be performed the following week  Cardiology evaluated patient preop echo performed on 11/3/19 with normal ef 65%,    Patient had RRT for syncopal episode 11/2/19, patient reported dizziness with LOC for several seconds  Patient was bradycardic and hypotensive 60/40's and hr 40-50's with ekg type 1 2nd degree heart block  He was started on dopamine gtt and transferred to ICU-he was transferred to the medical floor 11/3/19-event thought to be due to vasovagal response to pain  He was evaluated by geriatrics for episodes of agitation, delirium and noted to have acute metabolic encephalopathy likely on basis of underlying vascular dementia  Ct head 11/5/19 without acute intracranial abnormalities  Pain medication adjustments and bowel regimen were recommended  11/7/19 patient underwnt L CFAE with profundoplasty, arteriogram with retrograde iliac stenting, SFA and profunda embolectomy  Received 2u packed cells following procedure  HE was maintained on heparin gtt  CTA chest was requested for embolic source and was negative 11/8/19     11/10/19 vascular surgery says Left foot appears well perfused  Questionable persistent intermittent rest pain  LEAD performed yesterday reveals an TACOS of 0 52  There are persistent SFA lesions present    Therefore will schedule for angiogram/intervention of left lower extremity to optimize left foot perfusion prior to discharge    11/11/19-heparin gtt discontinued as INR 4 83 and Plan for LLE agram/intervension to optimize left foot perfusion during this admission     11/12/19--vascular surgery note component of reperfusion pain and swelling versus continued severe claudication  Entire left leg is tender to touch  Left groin incision is c/d/I  Dopplerable PT signal  Plan for left leg angiogram with possible SFA intervention when INR <2  Currently INR is 2 78 after receiving coumadin  Hold coumadin  11/13/19-inr down to 1 98-vascular surgery Anticipated angiogram in the next 2 days for SFA intervention  GI consulted for elevated LFTs-; thought to be due to multiple medications started during hospitalization  Holding lipitor and zyprexa  11/14/19 to IR for possible SFA intervention showing  1  Successful angioplasty of moderate to severe stenoses along the left SFA and popliteal arteries  2   Successful angioplasty of moderate to severe stenoses along the right SFA      11/14/19 INR 1 52 resumed heparin gtt to therapeutic INR 11/15 1 6, inr 11/17 1 77, 11/18 1 79, 11/19 1 6, 11/20 1 3 11/21 1 4  And heparin drip continued, coumadin held for anemia-see below  Hgb dropped to 7 3 on 11/17 but rebounded to 8  1-pseudoaneurysm study done 11/18 without evidence pseudoaneurysm  Anticoagulation continued  Coumadin resumed      Family agreeable to Short term rehab            The patients vitals on arrival were ED Triage Vitals   Temperature Pulse Respirations Blood Pressure SpO2   11/02/19 0209 11/02/19 0209 11/02/19 0209 11/02/19 0209 11/02/19 0209   97 7 °F (36 5 °C) 60 18 (!) 196/80 100 %      Temp Source Heart Rate Source Patient Position - Orthostatic VS BP Location FiO2 (%)   11/03/19 0657 11/02/19 1834 11/03/19 1824 11/03/19 1824 --   Oral Monitor Lying Right arm       Pain Score       11/02/19 0213       No Pain           Past Medical History:   Diagnosis Date    Cancer ([facility] )     throat cancer    PAD (peripheral artery disease) ([facility] ) 11/2/2019    PEG (percutaneous endoscopic gastrostomy) status ([place] [address] )     Port-A-Cath in place      Past Surgical History:   Procedure Laterality Date    BYPASS FEMORAL-FEMORAL Right     ESOPHAGOSCOPY N/A 8/24/2017    Procedure: ESOPHAGOSCOPY FLEXIBLE;  Surgeon: Toan Francisco MD;  [facility];  Service: ENT    IR ABDOMINAL ANGIOGRAPHY / INTERVENTION  11/14/2019    IR ABDOMINAL ANGIOGRAPHY / INTERVENTION  11/7/2019    CO [address] N/A 8/24/2017    Procedure: Bronchoscopy;  Surgeon: Toan Francisco MD;  [facility];  Service: ENT    CO LARYNGOSCOPY,DIRCT,OP,BIOPSY N/A 8/24/2017    Procedure: LARYNGOSCOPY DIRECT;  Surgeon: Toan Francisco MD;  [facility];  Service: ENT    CO William Collins Left 11/7/2019    Procedure: LEFT ENDARTERECTOMY ARTERIAL FEMORAL; L CFAE WITH PROFUNDOPLASTY; ARTERIOGRAM;RETROGRADE ILIAC STENTING;  Surgeon: Daniel Leos MD;  [facility];  Service: Vascular    TOE AMPUTATION Right     2 toes           Consults have been placed to:   IP CONSULT TO CARDIOLOGY  IP CONSULT TO VASCULAR SURGERY  IP CONSULT TO GERONTOLOGY  IP CONSULT TO HEMATOLOGY  IP CONSULT TO GASTROENTEROLOGY  IP CONSULT TO HEMATOLOGY    Vitals:    11/20/19 1921 11/20/19 2330 11/21/19 0745 11/21/19 0816   BP: 140/65 127/76 156/69 133/61   BP Location: Right arm Right arm     Pulse: 60 83 81    Resp: 19 20 16    Temp: 98 °F (36 7 °C) 98 2 °F (36 8 °C) 97 8 °F (36 6 °C)    TempSrc: Oral Oral Oral    SpO2: 100% 99% 98%    Weight:       Height:           Most recent labs:    Recent Labs     11/20/19  0442  11/20/19  0505 11/21/19  0440   WBC 9 54  --   --   --    HGB 8 7*  --   --   --    HCT 27 7*  --   --   --    *  --   --   --    K  --   --  4 5  --    CALCIUM  --   --  9 6  --    BUN  --   --  20  --    CREATININE  --   --  0 76  --    INR  --    < >  --  1 40*   AST  --   -- 35  --    ALT  --   --  71  --    ALKPHOS  --   --  119*  --     < > = values in this interval not displayed         Scheduled Meds:  Current Facility-Administered Medications:  acetaminophen 650 mg Oral Q6H PRN Adamra Tawanas, MD    amLODIPine 10 mg Oral Daily Carlee Ruiz MD    aspirin 81 mg Oral Daily Soundra Oats, MD    atorvastatin 20 mg Oral After Dinner Daniel Jorge PA-C    bisacodyl 10 mg Rectal Daily PRN Raya Bergeron, MD    chlorhexidine 15 mL Swish & Spit Q12H Isabel U  79 , MD    clopidogrel 75 mg Oral Daily Soundra Oats, MD    ferrous sulfate 325 mg Oral BID With Meals Deny Bain MD    gabapentin 100 mg Oral Daily Adamra Oats, MD    heparin (porcine) 3-30 Units/kg/hr (Order-Specific) Intravenous Titrated Alida Villeda PA-C Last Rate: 14 Units/kg/hr (11/20/19 2221)   heparin (porcine) 2,000 Units Intravenous PRN Alida Villeda PA-C    heparin (porcine) 4,000 Units Intravenous PRN Alida Villeda PA-C    hydrALAZINE 10 mg Intravenous Q4H PRN Massiel Haskins PA-C    lidocaine (PF) 0 5 mL Infiltration Once PRN Raya Bergeron, MD    melatonin 6 mg Oral HS Soundra Oats, MD    multivitamin-minerals 1 tablet Oral Daily Deny Bain MD    nicotine 1 patch Transdermal Daily Raya Oats, MD    OLANZapine 2 5 mg Intramuscular Once PRN Lucinda Thapa PA-C    oxyCODONE 2 5 mg Oral Q4H PRN Soundra Oats, MD    oxyCODONE 5 mg Oral Q4H PRN Soundra Oats, MD    polyethylene glycol 17 g Oral BID Soundra Oats, MD    senna-docusate sodium 1 tablet Oral BID Adamra Oats, MD    tamsulosin 0 4 mg Oral Daily With Alex Armenta MD    thiamine 100 mg Oral Daily Soundra Oats, MD    warfarin 2 mg Oral Daily (warfarin) Alida Villeda PA-C      Continuous Infusions:  heparin (porcine) 3-30 Units/kg/hr (Order-Specific) Last Rate: 14 Units/kg/hr (11/20/19 2221)     PRN Meds:   acetaminophen    bisacodyl    heparin (porcine)    heparin (porcine)    hydrALAZINE    lidocaine (PF)    OLANZapine    oxyCODONE    oxyCODONE    Surgical procedures (if appropriate):  Procedure(s):  LEFT ENDARTERECTOMY ARTERIAL FEMORAL; L CFAE WITH PROFUNDOPLASTY; ARTERIOGRAM;RETROGRADE ILIAC STENTING

## 2019-11-21 NOTE — PLAN OF CARE
Problem: PHYSICAL THERAPY ADULT  Goal: Performs mobility at highest level of function for planned discharge setting  See evaluation for individualized goals  Description  Treatment/Interventions: Functional transfer training, LE strengthening/ROM, Elevations, Therapeutic exercise, Endurance training, Patient/family training, Equipment eval/education, Bed mobility, Gait training, Spoke to nursing, Family  Equipment Recommended: Walker(current use of RW for mobility)       See flowsheet documentation for full assessment, interventions and recommendations  Outcome: Progressing  Note:   Prognosis: Good  Problem List: Impaired balance, Decreased mobility, Decreased coordination, Impaired judgement, Decreased safety awareness, Pain, Decreased skin integrity  Assessment: The patient was limited in todays treatment session due to pain in his right foot  While ambulating to the bathroom he would not bear any weight on his right lower extremity due to the pain  The patient continues to have demonstrate limited mobilty while trying to ambulate with the rolling walker  The patient was able to tolerate some LE TE while in the supine position but the ankle pumps began to cause him some discomfort  At the conclussion of the treatment session the patient was left in the bed with the alarm on and with the call bell in his hand  Barriers to Discharge: Inaccessible home environment, Decreased caregiver support     Recommendation: Post acute IP rehab, Other (Comment)     PT - OK to Discharge: No    See flowsheet documentation for full assessment

## 2019-11-21 NOTE — ASSESSMENT & PLAN NOTE
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

## 2019-11-21 NOTE — PROGRESS NOTES
Progress Note - Geriatric Medicine   Sandro Tavares 78 y o  male MRN: [id number]  Unit/Bed#: [facility] 430-01 Encounter: [id number]      Assessment/Plan:    Acute metabolic encephalopathy   -significantly improved and appears almost back to baseline per sam at bedside  -etiology of most recent episode suspected to be pain and postprocedural  -continue acute pain control, consider increase gabapentin to 300mg HS for neuropathic pain component  -continue frequent orientation redirection  -encourage family at bedside as assist with patient remaining calmed, may need family at bedside on transition to short-term rehab to bridge when patient moved to new environment which is time of significant anxiety and increased risk of irritability    Probable vascular dementia  -continue risk factor reduction including blood pressure control, lipid control, quitting smoking  -continue frequent reorientation redirection as appropriate  -patient or advised not to travel internationally without direct supervision due to risk of confusion in new or busy environments    Deconditioning/debility/frailty  -continue to encourage nutritional supplementation  -patient taking boost/Ensure shakes between meals    Peripheral artery disease  -vascular surgery following  -currently on heparin GTT bridging to Coumadin    Ambulatory dysfunction  -continue to encourage good body mechanics and assistance with transfers  -continue fall precautions  -would likely benefit from short-term rehab at discharge    Subjective:   Patient seen examined the bedside where he is lying resting comfortably  No further episodes of agitation or irritation, patient is resting comfortably with his daughter and son-in-law at bedside  He reports this pain is now well controlled and he feels much less restless  Review of Systems   Constitutional: Negative for appetite change, chills and fever  HENT: Positive for hearing loss  Negative for trouble swallowing  Eyes: Negative for visual disturbance  Respiratory: Negative for shortness of breath and wheezing  Cardiovascular: Negative for chest pain, palpitations and leg swelling  Gastrointestinal: Negative for nausea and vomiting  Genitourinary: Negative for difficulty urinating  Musculoskeletal: Positive for arthralgias and back pain  Skin: Negative  Neurological: Negative for dizziness, light-headedness and headaches  Hematological: Bruises/bleeds easily  Psychiatric/Behavioral: Positive for confusion and decreased concentration  Negative for sleep disturbance  All other systems reviewed and are negative  Objective:     Vitals: Blood pressure 132/62, pulse 77, temperature 97 7 °F (36 5 °C), temperature source Oral, resp  rate 18, height 5' 5" (1 651 m), weight 50 3 kg (111 lb), SpO2 99 %  ,Body mass index is 18 47 kg/m²  Intake/Output Summary (Last 24 hours) at 11/21/2019 1810  Last data filed at 11/21/2019 1552  Gross per 24 hour   Intake 742 ml   Output 2250 ml   Net -1508 ml     Current Medications: Reviewed    Physical Exam:   Physical Exam   Constitutional: He appears well-developed  Appears older than stated age, less restless than prior days   HENT:   Head: Normocephalic and atraumatic  Eyes: Conjunctivae and EOM are normal  No scleral icterus  Neck: Normal range of motion  Neck supple  No JVD present  No tracheal deviation present  Cardiovascular: Normal rate  Unable to palpate right DP, right lower extremity cool and tender to palpation from tibial plateau downward which is not new per dtr at bedside, nursing reports primary team/vascular aware   Pulmonary/Chest: Effort normal and breath sounds normal  No respiratory distress  He has no wheezes  Abdominal: Soft  There is no tenderness  Musculoskeletal: He exhibits tenderness (RLE tibial plateau down to foot)  Neurological:   Alert and oriented only to self   Skin: Skin is dry  There is pallor     Psychiatric:   Flat affect, remains somewhat confused but more easily redirected   Nursing note and vitals reviewed       Invasive Devices     Peripheral Intravenous Line            Peripheral IV 11/20/19 Right;Ventral (anterior) Forearm 1 day              Lab, Imaging and other studies:   -INR 1 40

## 2019-11-21 NOTE — PHYSICAL THERAPY NOTE
Physical Therapy Progress Note       11/21/19 1300   Pain Assessment   Pain Assessment 0-10   Pain Score 6   Pain Type Chronic pain   Pain Location Leg   Pain Orientation Right   Pain Descriptors Aching;Discomfort   Pain Frequency Constant/continuous   Pain Onset Unable to tell   Clinical Progression Not changed   Hospital Pain Intervention(s) Elevated; Emotional support;Repositioned   Response to Interventions   (tolerated )   Restrictions/Precautions   Weight Bearing Precautions Per Order No   Other Precautions Bed Alarm; Chair Alarm; Fall Risk;Pain;Cognitive   General   Chart Reviewed Yes   Family/Caregiver Present No   Subjective   Subjective the patient was agreeabe to treatment session   Transfers   Sit to Stand 3  Moderate assistance   Additional items Assist x 1   Stand to Sit 3  Moderate assistance   Additional items Assist x 1   Ambulation/Elevation   Gait pattern Improper Weight shift; Antalgic; Forward Flexion;Decreased foot clearance; Short stride; Excessively slow   Gait Assistance 3  Moderate assist   Additional items Assist x 1   Assistive Device Rolling walker   Distance 10'x2   Balance   Static Sitting Good   Dynamic Sitting Fair   Static Standing Poor   Dynamic Standing Poor -   Ambulatory Poor -   Endurance Deficit   Endurance Deficit Yes   Endurance Deficit Description pain    Activity Tolerance   Activity Tolerance Patient limited by fatigue;Patient limited by pain   Nurse Tanika Zuñiga RN   Exercises   TKR Supine;10 reps;AROM; Bilateral  (Marches, ankle pumps, SLR, )   Assessment   Prognosis Good   Problem List Impaired balance;Decreased mobility; Decreased coordination; Impaired judgement;Decreased safety awareness;Pain;Decreased skin integrity   Assessment The patient was limited in todays treatment session due to pain in his right foot  While ambulating to the bathroom he would not bear any weight on his right lower extremity due to the pain   The patient continues to have demonstrate limited mobilty while trying to ambulate with the rolling walker  The patient was able to tolerate some LE TE while in the supine position but the ankle pumps began to cause him some discomfort  At the conclussion of the treatment session the patient was left in the bed with the alarm on and with the call bell in his hand  Goals   Patient Goals to reduce his pain   STG Expiration Date 11/22/19   PT Treatment Day 4   Plan   Treatment/Interventions Functional transfer training;LE strengthening/ROM; Elevations; Therapeutic exercise; Endurance training;Patient/family training;Equipment eval/education; Bed mobility;Gait training   Progress Slow progress, decreased activity tolerance   Recommendation   Recommendation Post acute IP rehab; Other (Comment)   Equipment Recommended Jeanette Grace, PTA Student

## 2019-11-22 LAB
ANION GAP SERPL CALCULATED.3IONS-SCNC: 9 MMOL/L (ref 4–13)
APTT PPP: 68 SECONDS (ref 23–37)
BASOPHILS # BLD AUTO: 0.06 THOUSANDS/ΜL (ref 0–0.1)
BASOPHILS NFR BLD AUTO: 1 % (ref 0–1)
BUN SERPL-MCNC: 17 MG/DL (ref 5–25)
CALCIUM SERPL-MCNC: 9 MG/DL (ref 8.3–10.1)
CHLORIDE SERPL-SCNC: 105 MMOL/L (ref 100–108)
CO2 SERPL-SCNC: 22 MMOL/L (ref 21–32)
CREAT SERPL-MCNC: 0.77 MG/DL (ref 0.6–1.3)
EOSINOPHIL # BLD AUTO: 0.19 THOUSAND/ΜL (ref 0–0.61)
EOSINOPHIL NFR BLD AUTO: 2 % (ref 0–6)
ERYTHROCYTE [DISTWIDTH] IN BLOOD BY AUTOMATED COUNT: 14.7 % (ref 11.6–15.1)
GFR SERPL CREATININE-BSD FRML MDRD: 92 ML/MIN/1.73SQ M
GLUCOSE SERPL-MCNC: 106 MG/DL (ref 65–140)
HCT VFR BLD AUTO: 25.3 % (ref 36.5–49.3)
HGB BLD-MCNC: 7.9 G/DL (ref 12–17)
IMM GRANULOCYTES # BLD AUTO: 0.06 THOUSAND/UL (ref 0–0.2)
IMM GRANULOCYTES NFR BLD AUTO: 1 % (ref 0–2)
INR PPP: 1.59 (ref 0.84–1.19)
LYMPHOCYTES # BLD AUTO: 0.55 THOUSANDS/ΜL (ref 0.6–4.47)
LYMPHOCYTES NFR BLD AUTO: 7 % (ref 14–44)
MCH RBC QN AUTO: 29.9 PG (ref 26.8–34.3)
MCHC RBC AUTO-ENTMCNC: 31.2 G/DL (ref 31.4–37.4)
MCV RBC AUTO: 96 FL (ref 82–98)
MONOCYTES # BLD AUTO: 0.53 THOUSAND/ΜL (ref 0.17–1.22)
MONOCYTES NFR BLD AUTO: 7 % (ref 4–12)
NEUTROPHILS # BLD AUTO: 6.62 THOUSANDS/ΜL (ref 1.85–7.62)
NEUTS SEG NFR BLD AUTO: 82 % (ref 43–75)
NRBC BLD AUTO-RTO: 0 /100 WBCS
PLATELET # BLD AUTO: 469 THOUSANDS/UL (ref 149–390)
PMV BLD AUTO: 10 FL (ref 8.9–12.7)
POTASSIUM SERPL-SCNC: 4.2 MMOL/L (ref 3.5–5.3)
PROTHROMBIN TIME: 18.5 SECONDS (ref 11.6–14.5)
RBC # BLD AUTO: 2.64 MILLION/UL (ref 3.88–5.62)
SODIUM SERPL-SCNC: 136 MMOL/L (ref 136–145)
WBC # BLD AUTO: 8.01 THOUSAND/UL (ref 4.31–10.16)

## 2019-11-22 PROCEDURE — 97116 GAIT TRAINING THERAPY: CPT

## 2019-11-22 PROCEDURE — 85730 THROMBOPLASTIN TIME PARTIAL: CPT | Performed by: INTERNAL MEDICINE

## 2019-11-22 PROCEDURE — 85025 COMPLETE CBC W/AUTO DIFF WBC: CPT | Performed by: INTERNAL MEDICINE

## 2019-11-22 PROCEDURE — 80048 BASIC METABOLIC PNL TOTAL CA: CPT | Performed by: INTERNAL MEDICINE

## 2019-11-22 PROCEDURE — 99232 SBSQ HOSP IP/OBS MODERATE 35: CPT | Performed by: INTERNAL MEDICINE

## 2019-11-22 PROCEDURE — 85610 PROTHROMBIN TIME: CPT | Performed by: INTERNAL MEDICINE

## 2019-11-22 RX ADMIN — THIAMINE HCL TAB 100 MG 100 MG: 100 TAB at 08:58

## 2019-11-22 RX ADMIN — ATORVASTATIN CALCIUM 20 MG: 20 TABLET, FILM COATED ORAL at 17:24

## 2019-11-22 RX ADMIN — OXYCODONE HYDROCHLORIDE 5 MG: 5 TABLET ORAL at 13:01

## 2019-11-22 RX ADMIN — NICOTINE 1 PATCH: 14 PATCH TRANSDERMAL at 09:00

## 2019-11-22 RX ADMIN — MELATONIN 6 MG: 3 TAB ORAL at 21:08

## 2019-11-22 RX ADMIN — Medication 1 TABLET: at 08:58

## 2019-11-22 RX ADMIN — OXYCODONE HYDROCHLORIDE 2.5 MG: 5 TABLET ORAL at 21:08

## 2019-11-22 RX ADMIN — WARFARIN SODIUM 2 MG: 1 TABLET ORAL at 17:24

## 2019-11-22 RX ADMIN — SENNOSIDES AND DOCUSATE SODIUM 1 TABLET: 8.6; 5 TABLET ORAL at 08:58

## 2019-11-22 RX ADMIN — TAMSULOSIN HYDROCHLORIDE 0.4 MG: 0.4 CAPSULE ORAL at 15:31

## 2019-11-22 RX ADMIN — GABAPENTIN 100 MG: 100 CAPSULE ORAL at 08:58

## 2019-11-22 RX ADMIN — ASPIRIN 81 MG: 81 TABLET, COATED ORAL at 08:57

## 2019-11-22 RX ADMIN — FERROUS SULFATE TAB 325 MG (65 MG ELEMENTAL FE) 325 MG: 325 (65 FE) TAB at 15:31

## 2019-11-22 RX ADMIN — OXYCODONE HYDROCHLORIDE 2.5 MG: 5 TABLET ORAL at 06:32

## 2019-11-22 RX ADMIN — CHLORHEXIDINE GLUCONATE 0.12% ORAL RINSE 15 ML: 1.2 LIQUID ORAL at 08:57

## 2019-11-22 RX ADMIN — CLOPIDOGREL 75 MG: 75 TABLET, FILM COATED ORAL at 08:58

## 2019-11-22 RX ADMIN — HEPARIN SODIUM 14 UNITS/KG/HR: 10000 INJECTION, SOLUTION INTRAVENOUS at 06:36

## 2019-11-22 RX ADMIN — FERROUS SULFATE TAB 325 MG (65 MG ELEMENTAL FE) 325 MG: 325 (65 FE) TAB at 06:32

## 2019-11-22 RX ADMIN — CHLORHEXIDINE GLUCONATE 0.12% ORAL RINSE 15 ML: 1.2 LIQUID ORAL at 21:08

## 2019-11-22 RX ADMIN — AMLODIPINE BESYLATE 10 MG: 10 TABLET ORAL at 08:58

## 2019-11-22 NOTE — PLAN OF CARE
Problem: PHYSICAL THERAPY ADULT  Goal: Performs mobility at highest level of function for planned discharge setting  See evaluation for individualized goals  Description  Treatment/Interventions: Functional transfer training, LE strengthening/ROM, Elevations, Therapeutic exercise, Endurance training, Patient/family training, Equipment eval/education, Bed mobility, Gait training, Spoke to nursing, Family  Equipment Recommended: Walker(current use of RW for mobility)       See flowsheet documentation for full assessment, interventions and recommendations  Outcome: Progressing  Note:   Prognosis: Good  Problem List: Impaired balance, Decreased mobility, Decreased coordination, Impaired judgement, Decreased safety awareness, Pain, Decreased skin integrity  Assessment: The patient was limited in todays treatment session due to increased pain in his right foot which he describes as a burning sensation  The patient was willing to participate in the treatment session and he was able to ambulate a limited distance without placing any weight on the right lower extremity  He was able to hop to the doorway while using the rolling walker and then he required a seated rest  The patient was instructed that he does not have any weight bearing precautions but he states it hurts to much to place foot on the floor  The patient was returned to the room where he was placed in his recliner with the chair alarm activated and his legs elevated with a pillow placed under his right leg  Barriers to Discharge: Inaccessible home environment     Recommendation: Post acute IP rehab, Other (Comment)     PT - OK to Discharge: No    See flowsheet documentation for full assessment

## 2019-11-22 NOTE — PROGRESS NOTES
Progress Note - Dominga Ruiz 1950, 78 y o  male MRN: [id number]    Unit/Bed#: [facility] 430-01 Encounter: [id number]    Primary Care Provider: Cecile Austin MD   Date and time admitted to hospital: 11/2/2019  2:01 AM        * PAD (peripheral artery disease) ([facility] )  Assessment & Plan  · PAD and left lower extremity claudication - status post left femoral endarterectomy, profundoplasty, SFA embolectomy and retrograde iliac stenting on 11/06/2019  · Has residual left SFA stenosis for which he underwent left lower extremity angiogram with treatment of SFA and popliteal arteries on 11/14/2019  · Developed thrombosis of small collaterals into the posterior tibial artery on the right - s/p tPA and SFA angioplasty  · Right foot cooler than left - motor/sensory intact in right foot - no intervention planned per vascular  · Noted to have left groin hematoma with concern for pseudoaneurysm when underwent CT for drop in Hb  · On heparin drip - continue per vascular  · Coumadin held till H/H stable and cleared by vascular  · F/u pseudoaneurysm study-appears to be negative  · Statin held due to worsening transaminitis which has resolved off statins  · Await vascular review  · Monitor H/H    Thrombocytopenia ([facility] )  Assessment & Plan  · Resolved  · Monitor    Ambulatory dysfunction  Assessment & Plan  · PT recommends rehab  · Family refused initially but now agreeable    Transaminitis  Assessment & Plan  · Not POA  · Noted on 11/12 - worsened till 11/14 and improving since then - now resolved    · Discussed with Dr Ramirez Client on 11/12 - likely medication induced  · RUQ US - normal  Acute Hepatitis panel - negative  · Continue to hold atorvastatin  · If LFT remains normal after discharge trial of Pravastatin with LFT monitoring    Coagulopathy ([facility] )  Assessment & Plan  · Was placed on heparin and was being bridged to Coumadin per vascular for PAD  · Had rapid rise in INR - seen by Hematology - likely from underlying vitamin K deficiency  · Coumadin started at lower dose of 2 mg on 11/15 as recommended by Hematology - now held due to acute blood loss anemia  · When H/H stable restart Coumadin at 2 mg     Acute blood loss anemia  Assessment & Plan  · Acute blood loss anemia from left groin hematoma  · H/H stable at present  · Tolerating heparin drip  · Monitor H/H  · Transfuse as needed   · Needs to continue anticoagulation per vascular    Dependence on nicotine from cigarettes  Assessment & Plan  · Still smokes despite diagnosis of throat cancer, per daughter  · She thinks he smokes 1ppd  · Nicotine patch  · Smoking cessation education    Hypertension  Assessment & Plan  · BP acceptable on Amlodipine 10 mg daily      History of cancer tonsil  Assessment & Plan  · 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 diagnosed in July 2017  · S/p concurrent chemoradiation with weekly Cetuximab from 10/26/17 to 12/15/17  Total 6750 cGy radiation to left tonsil  · Last seen by ENT on 8/20/19 and by oncology on 9/11/19 - no evidence of disease  · Ct outpatient follow-up with primary oncologist at [facility] and ENT         Peripheral vascular disease [facility])  Assessment & Plan  · Pt presented with 2-month history of LLE pain with walking  Denies pain at rest, but pt is unable to walk more than a few feet without pain  · CTA: Severe peripheral vascular disease  Limited evaluation of the calf vessels due to extensive calcifications  Severe areas of narrowing and focal occlusion of the right superficial femoral artery and diffuse occlusion of the popliteal artery   Distal reconstitution of the right peroneal artery and scattered areas of the right posterior tibial artery   Occlusion of the left common femoral artery and left superficial femoral artery with distal reconstitution however severe narrowing of the mid to distal superficial femoral artery   Severe narrowing of the left popliteal artery  Patent left peroneal artery and scattered areas of the left posterior tibial artery  PLAN:  · Continue Heparin gtt, asa/statin-bridge to Coumadin  · Plan for left femoral endarterectomy with retrograde iliac intervention per vascular, tentatively planned for Friday  Appreciate vascular surgery input  · Cardiology on board for preop risk stratification, appreciate input  · Pain control per geriatrics pain management order set  · s/p left CFA endarterectomy, profundoplasty, embolectomy, arteriogram with retrograde iliac stenting   · Blood pressure under control  · Off of step down,  as his mental status improved  · Await an angiogram/intervention of left lower extremity to optimize left foot perfusion prior to discharge        VTE Pharmacologic Prophylaxis:   Pharmacologic: Heparin Drip  Mechanical VTE Prophylaxis in Place: No    Patient Centered Rounds: I have performed bedside rounds with nursing staff today  Time Spent for Care: 15 minutes  More than 50% of total time spent on counseling and coordination of care as described above  Current Length of Stay: 20 day(s)    Current Patient Status: Inpatient   Certification Statement: The patient will continue to require additional inpatient hospital stay due to Need to monitor anticoagulation  Discharge Plan:  Tentative discharge over weekend  Code Status: Level 1 - Full Code      Subjective:   Patient more comfortable today  Objective:     Vitals:   Temp (24hrs), Av 1 °F (36 7 °C), Min:97 7 °F (36 5 °C), Max:98 6 °F (37 °C)    Temp:  [97 7 °F (36 5 °C)-98 6 °F (37 °C)] 98 6 °F (37 °C)  HR:  [77-86] 80  Resp:  [18-20] 18  BP: (101-135)/(57-68) 135/64  SpO2:  [92 %-100 %] 98 %  Body mass index is 18 47 kg/m²  Input and Output Summary (last 24 hours):        Intake/Output Summary (Last 24 hours) at 2019 1010  Last data filed at 2019 0401  Gross per 24 hour   Intake 359 55 ml   Output 1450 ml   Net -1090 45 ml       Physical Exam:     Physical Exam   Constitutional: He is oriented to person, place, and time  HENT:   Head: Normocephalic and atraumatic  Eyes: Pupils are equal, round, and reactive to light  EOM are normal    Cardiovascular: Normal rate  No murmur heard  Pulmonary/Chest: Effort normal and breath sounds normal    Abdominal: Soft  Bowel sounds are normal    Musculoskeletal: Normal range of motion  He exhibits no edema  Neurological: He is alert and oriented to person, place, and time  Skin: Skin is warm and dry  Additional Data:     Labs:    Results from last 7 days   Lab Units 11/22/19  0442   WBC Thousand/uL 8 01   HEMOGLOBIN g/dL 7 9*   HEMATOCRIT % 25 3*   PLATELETS Thousands/uL 469*   NEUTROS PCT % 82*   LYMPHS PCT % 7*   MONOS PCT % 7   EOS PCT % 2     Results from last 7 days   Lab Units 11/22/19  0442 11/20/19  0505   POTASSIUM mmol/L 4 2 4 5   CHLORIDE mmol/L 105 106   CO2 mmol/L 22 22   BUN mg/dL 17 20   CREATININE mg/dL 0 77 0 76   CALCIUM mg/dL 9 0 9 6   ALK PHOS U/L  --  119*   ALT U/L  --  71   AST U/L  --  35     Results from last 7 days   Lab Units 11/22/19  0442   INR  1 59*       * I Have Reviewed All Lab Data Listed Above  * Additional Pertinent Lab Tests Reviewed:  All Labs Within Last 24 Hours Reviewed        Recent Cultures (last 7 days):           Last 24 Hours Medication List:     Current Facility-Administered Medications:  acetaminophen 650 mg Oral Q6H PRN Olivia Negron MD    amLODIPine 10 mg Oral Daily Nicolasmichael Dixon MD    aspirin 81 mg Oral Daily Olivia Negron MD    atorvastatin 20 mg Oral After Dinner Orestes Jorge PA-C    bisacodyl 10 mg Rectal Daily PRN Olivia Negron MD    chlorhexidine 15 mL Swish & Spit Q12H Isabel Eid MD    clopidogrel 75 mg Oral Daily Olivia Negron MD    ferrous sulfate 325 mg Oral BID With Meals Jovita Roach MD    gabapentin 100 mg Oral Daily Olivia Negron MD    heparin (porcine) 3-30 Units/kg/hr (Order-Specific) Intravenous Titrated Liseth Fails, PA-C Last Rate: 14 Units/kg/hr (11/22/19 0636)   heparin (porcine) 2,000 Units Intravenous PRN Liseth Fails, PA-C    heparin (porcine) 4,000 Units Intravenous PRN Liseth Fails, PA-C    hydrALAZINE 10 mg Intravenous Q4H PRN Massiel Haskins PA-C    lidocaine (PF) 0 5 mL Infiltration Once PRN Lee Clancy MD    melatonin 6 mg Oral HS Lee Clancy MD    multivitamin-minerals 1 tablet Oral Daily Edie Peralta MD    nicotine 1 patch Transdermal Daily Lee Clancy MD    OLANZapine 2 5 mg Intramuscular Once PRN Milad Terrazas PA-C    oxyCODONE 2 5 mg Oral Q4H PRN Lee Clancy MD    oxyCODONE 5 mg Oral Q4H PRN Lee Clancy MD    polyethylene glycol 17 g Oral BID Lee Clancy MD    senna-docusate sodium 1 tablet Oral BID Lee Clancy MD    tamsulosin 0 4 mg Oral Daily With Vishnu Whitney MD    thiamine 100 mg Oral Daily Lee Clancy MD    warfarin 2 mg Oral Daily (warfarin) Liseth Fails, PA-C         Today, Patient Was Seen By: Jessica Loco DO    ** Please Note: Dictation voice to text software may have been used in the creation of this document   **

## 2019-11-22 NOTE — ASSESSMENT & PLAN NOTE
· Not POA  · Noted on 11/12 - worsened till 11/14 and improving since then - now resolved    · Discussed with Dr Toya Madera on 11/12 - likely medication induced  · RUQ US - normal  Acute Hepatitis panel - negative  · Continue to hold atorvastatin  · If LFT remains normal after discharge trial of Pravastatin with LFT monitoring

## 2019-11-22 NOTE — ASSESSMENT & PLAN NOTE
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

## 2019-11-22 NOTE — PHYSICAL THERAPY NOTE
Physical Therapy Progress Note     11/22/19 1105   Pain Assessment   Pain Assessment 0-10   Pain Score 6   Pain Type Chronic pain   Pain Location Foot   Pain Orientation Right   Pain Descriptors Burning   Pain Frequency Constant/continuous   Pain Onset Unable to tell   Clinical Progression Not changed   Effect of Pain on Daily Activities pain with ambulation   Hospital Pain Intervention(s) Repositioned;Elevated; Ambulation/increased activity   Restrictions/Precautions   Weight Bearing Precautions Per Order No   General   Chart Reviewed Yes   Family/Caregiver Present No   Subjective   Subjective the patient complaining of a burning sensation in his right foot   Bed Mobility   Supine to Sit 4  Minimal assistance   Additional items Assist x 1; Increased time required;Verbal cues   Transfers   Sit to Stand 3  Moderate assistance   Additional items Assist x 1   Stand to Sit 3  Moderate assistance   Additional items Assist x 1   Additional Comments patient does not want to bear any weight on the right foot when out of bed  Ambulation/Elevation   Gait pattern Improper Weight shift;Narrow DOMINICK; Decreased foot clearance; Inconsistent kusum; Foward flexed; Short stride; Step to   Gait Assistance 3  Moderate assist   Additional items Assist x 1   Assistive Device Rolling walker   Distance 12'   Balance   Static Sitting Good   Dynamic Sitting Fair   Static Standing Poor -   Dynamic Standing Poor -   Ambulatory Poor -   Endurance Deficit   Endurance Deficit Yes   Endurance Deficit Description pain   Activity Tolerance   Activity Tolerance Patient limited by pain   Nurse Harry Christianson RN   Assessment   Prognosis Good   Problem List Impaired balance;Decreased mobility; Decreased coordination; Impaired judgement;Decreased safety awareness;Pain;Decreased skin integrity   Assessment The patient was limited in todays treatment session due to increased pain in his right foot which he describes as a burning sensation   The patient was willing to participate in the treatment session and he was able to ambulate a limited distance without placing any weight on the right lower extremity  He was able to hop to the doorway while using the rolling walker and then he required a seated rest  The patient was instructed that he does not have any weight bearing precautions but he states it hurts to much to place foot on the floor  The patient was returned to the room where he was placed in his recliner with the chair alarm activated and his legs elevated with a pillow placed under his right leg  Barriers to Discharge Inaccessible home environment   Goals   Patient Goals to reduce his right leg pain   STG Expiration Date 11/22/19   PT Treatment Day 5   Plan   Treatment/Interventions Functional transfer training;LE strengthening/ROM; Elevations; Therapeutic exercise; Endurance training;Patient/family training;Equipment eval/education; Bed mobility;Gait training   Recommendation   Recommendation Post acute IP rehab; Other (Comment)   Equipment Recommended Juan Ocampo   PT - OK to Discharge No       Lola Hurtado, PTA Student

## 2019-11-23 LAB
ANION GAP SERPL CALCULATED.3IONS-SCNC: 8 MMOL/L (ref 4–13)
APTT PPP: 50 SECONDS (ref 23–37)
APTT PPP: 91 SECONDS (ref 23–37)
BASOPHILS # BLD AUTO: 0.07 THOUSANDS/ΜL (ref 0–0.1)
BASOPHILS NFR BLD AUTO: 1 % (ref 0–1)
BUN SERPL-MCNC: 20 MG/DL (ref 5–25)
CALCIUM SERPL-MCNC: 9.4 MG/DL (ref 8.3–10.1)
CHLORIDE SERPL-SCNC: 102 MMOL/L (ref 100–108)
CO2 SERPL-SCNC: 25 MMOL/L (ref 21–32)
CREAT SERPL-MCNC: 0.81 MG/DL (ref 0.6–1.3)
EOSINOPHIL # BLD AUTO: 0.15 THOUSAND/ΜL (ref 0–0.61)
EOSINOPHIL NFR BLD AUTO: 2 % (ref 0–6)
ERYTHROCYTE [DISTWIDTH] IN BLOOD BY AUTOMATED COUNT: 14.6 % (ref 11.6–15.1)
GFR SERPL CREATININE-BSD FRML MDRD: 90 ML/MIN/1.73SQ M
GLUCOSE SERPL-MCNC: 101 MG/DL (ref 65–140)
HCT VFR BLD AUTO: 29.3 % (ref 36.5–49.3)
HGB BLD-MCNC: 9.2 G/DL (ref 12–17)
IMM GRANULOCYTES # BLD AUTO: 0.05 THOUSAND/UL (ref 0–0.2)
IMM GRANULOCYTES NFR BLD AUTO: 1 % (ref 0–2)
INR PPP: 1.7 (ref 0.84–1.19)
LYMPHOCYTES # BLD AUTO: 0.42 THOUSANDS/ΜL (ref 0.6–4.47)
LYMPHOCYTES NFR BLD AUTO: 6 % (ref 14–44)
MCH RBC QN AUTO: 29.9 PG (ref 26.8–34.3)
MCHC RBC AUTO-ENTMCNC: 31.4 G/DL (ref 31.4–37.4)
MCV RBC AUTO: 95 FL (ref 82–98)
MONOCYTES # BLD AUTO: 0.49 THOUSAND/ΜL (ref 0.17–1.22)
MONOCYTES NFR BLD AUTO: 7 % (ref 4–12)
NEUTROPHILS # BLD AUTO: 5.4 THOUSANDS/ΜL (ref 1.85–7.62)
NEUTS SEG NFR BLD AUTO: 83 % (ref 43–75)
NRBC BLD AUTO-RTO: 0 /100 WBCS
PLATELET # BLD AUTO: 463 THOUSANDS/UL (ref 149–390)
PMV BLD AUTO: 9.8 FL (ref 8.9–12.7)
POTASSIUM SERPL-SCNC: 4.4 MMOL/L (ref 3.5–5.3)
PROTHROMBIN TIME: 19.5 SECONDS (ref 11.6–14.5)
RBC # BLD AUTO: 3.08 MILLION/UL (ref 3.88–5.62)
SODIUM SERPL-SCNC: 135 MMOL/L (ref 136–145)
WBC # BLD AUTO: 6.58 THOUSAND/UL (ref 4.31–10.16)

## 2019-11-23 PROCEDURE — 99232 SBSQ HOSP IP/OBS MODERATE 35: CPT | Performed by: INTERNAL MEDICINE

## 2019-11-23 PROCEDURE — 85025 COMPLETE CBC W/AUTO DIFF WBC: CPT | Performed by: INTERNAL MEDICINE

## 2019-11-23 PROCEDURE — 85610 PROTHROMBIN TIME: CPT | Performed by: INTERNAL MEDICINE

## 2019-11-23 PROCEDURE — 80048 BASIC METABOLIC PNL TOTAL CA: CPT | Performed by: INTERNAL MEDICINE

## 2019-11-23 PROCEDURE — 85730 THROMBOPLASTIN TIME PARTIAL: CPT | Performed by: INTERNAL MEDICINE

## 2019-11-23 RX ADMIN — ATORVASTATIN CALCIUM 20 MG: 20 TABLET, FILM COATED ORAL at 17:09

## 2019-11-23 RX ADMIN — THIAMINE HCL TAB 100 MG 100 MG: 100 TAB at 08:43

## 2019-11-23 RX ADMIN — FERROUS SULFATE TAB 325 MG (65 MG ELEMENTAL FE) 325 MG: 325 (65 FE) TAB at 17:09

## 2019-11-23 RX ADMIN — Medication 1 TABLET: at 08:43

## 2019-11-23 RX ADMIN — NICOTINE 1 PATCH: 14 PATCH TRANSDERMAL at 08:44

## 2019-11-23 RX ADMIN — CHLORHEXIDINE GLUCONATE 0.12% ORAL RINSE 15 ML: 1.2 LIQUID ORAL at 22:06

## 2019-11-23 RX ADMIN — ASPIRIN 81 MG: 81 TABLET, COATED ORAL at 08:43

## 2019-11-23 RX ADMIN — WARFARIN SODIUM 2 MG: 1 TABLET ORAL at 17:09

## 2019-11-23 RX ADMIN — OXYCODONE HYDROCHLORIDE 5 MG: 5 TABLET ORAL at 12:41

## 2019-11-23 RX ADMIN — TAMSULOSIN HYDROCHLORIDE 0.4 MG: 0.4 CAPSULE ORAL at 17:09

## 2019-11-23 RX ADMIN — OXYCODONE HYDROCHLORIDE 2.5 MG: 5 TABLET ORAL at 02:14

## 2019-11-23 RX ADMIN — ENOXAPARIN SODIUM 50 MG: 60 INJECTION SUBCUTANEOUS at 22:06

## 2019-11-23 RX ADMIN — OXYCODONE HYDROCHLORIDE 5 MG: 5 TABLET ORAL at 22:04

## 2019-11-23 RX ADMIN — CLOPIDOGREL 75 MG: 75 TABLET, FILM COATED ORAL at 08:43

## 2019-11-23 RX ADMIN — MELATONIN 6 MG: 3 TAB ORAL at 22:03

## 2019-11-23 RX ADMIN — GABAPENTIN 100 MG: 100 CAPSULE ORAL at 08:43

## 2019-11-23 RX ADMIN — AMLODIPINE BESYLATE 10 MG: 10 TABLET ORAL at 08:43

## 2019-11-23 RX ADMIN — CHLORHEXIDINE GLUCONATE 0.12% ORAL RINSE 15 ML: 1.2 LIQUID ORAL at 08:43

## 2019-11-23 NOTE — PROGRESS NOTES
Pt in bed, pulled iv out and under covers with no gown on  Called daughter because pt unwilling to use blue phone  He did not want to talk to her when I put the phone to his ear  She stated this is how he acted before and will probably pull the iv out if we put a new one in    Informed Dr Soha Miller and he stated to draw labs and will determine how we will move forward

## 2019-11-23 NOTE — PLAN OF CARE
Problem: Prexisting or High Potential for Compromised Skin Integrity  Goal: Skin integrity is maintained or improved  Description  INTERVENTIONS:  - Identify patients at risk for skin breakdown  - Assess and monitor skin integrity  - Assess and monitor nutrition and hydration status  - Monitor labs   - Turn and reposition patient  - Assist with mobility/ambulation  - Relieve pressure over bony prominences  - Avoid friction and shearing  - Provide appropriate hygiene as needed including keeping skin clean and dry  - Evaluate need for skin moisturizer/barrier cream  - Collaborate with interdisciplinary team   - Patient/family teaching   Outcome: Progressing     Problem: Potential for Falls  Goal: Patient will remain free of falls  Description  INTERVENTIONS:  - Assess patient frequently for physical needs  -  Identify cognitive and physical deficits and behaviors that affect risk of falls    -  [place] fall precautions as indicated by assessment   - Educate patient/family on patient safety including physical limitations  - Instruct patient to call for assistance with activity based on assessment  - Modify environment to reduce risk of injury  - Consider OT/PT consult to assist with strengthening/mobility  Outcome: Progressing     Problem: RESPIRATORY - ADULT  Goal: Achieves optimal ventilation and oxygenation  Description  INTERVENTIONS:  - Assess for changes in respiratory status  - Assess for changes in mentation and behavior  - Position to facilitate oxygenation and minimize respiratory effort  - Oxygen administered by appropriate delivery if ordered  - Initiate smoking cessation education as indicated  - Encourage broncho-pulmonary hygiene including cough, deep breathe, Incentive Spirometry  - Assess the need for suctioning and aspirate as needed  - Assess and instruct to report SOB or any respiratory difficulty  - Respiratory Therapy support as indicated  Outcome: Progressing     Problem: METABOLIC, FLUID AND ELECTROLYTES - ADULT  Goal: Fluid balance maintained  Description  INTERVENTIONS:  - Monitor labs   - Monitor I/O and WT  - Instruct patient on fluid and nutrition as appropriate  - Assess for signs & symptoms of volume excess or deficit  Outcome: Progressing     Problem: MUSCULOSKELETAL - ADULT  Goal: Maintain or return mobility to safest level of function  Description  INTERVENTIONS:  - Assess patient's ability to carry out ADLs; assess patient's baseline for ADL function and identify physical deficits which impact ability to perform ADLs (bathing, care of mouth/teeth, toileting, grooming, dressing, etc )  - Assess/evaluate cause of self-care deficits   - Assess range of motion  - Assess patient's mobility  - Assess patient's need for assistive devices and provide as appropriate  - Encourage maximum independence but intervene and supervise when necessary  - Involve family in performance of ADLs  - Assess for home care needs following discharge   - Consider OT consult to assist with ADL evaluation and planning for discharge  - Provide patient education as appropriate  Outcome: Progressing     Problem: PAIN - ADULT  Goal: Verbalizes/displays adequate comfort level or baseline comfort level  Description  Interventions:  - Encourage patient to monitor pain and request assistance  - Assess pain using appropriate pain scale  - Administer analgesics based on type and severity of pain and evaluate response  - Implement non-pharmacological measures as appropriate and evaluate response  - Consider cultural and social influences on pain and pain management  - Notify physician/advanced practitioner if interventions unsuccessful or patient reports new pain  Outcome: Progressing     Problem: INFECTION - ADULT  Goal: Absence or prevention of progression during hospitalization  Description  INTERVENTIONS:  - Assess and monitor for signs and symptoms of infection  - Monitor lab/diagnostic results  - Monitor all insertion sites, i e  indwelling lines, tubes, and drains  - Monitor endotracheal if appropriate and nasal secretions for changes in amount and color  - [place] appropriate cooling/warming therapies per order  - Administer medications as ordered  - Instruct and encourage patient and family to use good hand hygiene technique  - Identify and instruct in appropriate isolation precautions for identified infection/condition  Outcome: Progressing     Problem: SAFETY ADULT  Goal: Maintain or return to baseline ADL function  Description  INTERVENTIONS:  -  Assess patient's ability to carry out ADLs; assess patient's baseline for ADL function and identify physical deficits which impact ability to perform ADLs (bathing, care of mouth/teeth, toileting, grooming, dressing, etc )  - Assess/evaluate cause of self-care deficits   - Assess range of motion  - Assess patient's mobility; develop plan if impaired  - Assess patient's need for assistive devices and provide as appropriate  - Encourage maximum independence but intervene and supervise when necessary  - Involve family in performance of ADLs  - Assess for home care needs following discharge   - Consider OT consult to assist with ADL evaluation and planning for discharge  - Provide patient education as appropriate  Outcome: Progressing  Goal: Maintain or return mobility status to optimal level  Description  INTERVENTIONS:  - Assess patient's baseline mobility status (ambulation, transfers, stairs, etc )    - Identify cognitive and physical deficits and behaviors that affect mobility  - Identify mobility aids required to assist with transfers and/or ambulation (gait belt, sit-to-stand, lift, walker, cane, etc )  - [place] fall precautions as indicated by assessment  - Record patient progress and toleration of activity level on Mobility SBAR; progress patient to next Phase/Stage  - Instruct patient to call for assistance with activity based on assessment  - Consider rehabilitation consult to assist with strengthening/weightbearing, etc   Outcome: Progressing     Problem: DISCHARGE PLANNING  Goal: Discharge to home or other facility with appropriate resources  Description  INTERVENTIONS:  - Identify barriers to discharge w/patient and caregiver  - Arrange for needed discharge resources and transportation as appropriate  - Identify discharge learning needs (meds, wound care, etc )  - Arrange for interpretive services to assist at discharge as needed  - Refer to Case Management Department for coordinating discharge planning if the patient needs post-hospital services based on physician/advanced practitioner order or complex needs related to functional status, cognitive ability, or social support system  Outcome: Progressing     Problem: Knowledge Deficit  Goal: Patient/family/caregiver demonstrates understanding of disease process, treatment plan, medications, and discharge instructions  Description  Complete learning assessment and assess knowledge base  Interventions:  - Provide teaching at level of understanding  - Provide teaching via preferred learning methods  Outcome: Progressing     Problem: Nutrition/Hydration-ADULT  Goal: Nutrient/Hydration intake appropriate for improving, restoring or maintaining nutritional needs  Description  Monitor and assess patient's nutrition/hydration status for malnutrition  Collaborate with interdisciplinary team and initiate plan and interventions as ordered  Monitor patient's weight and dietary intake as ordered or per policy  Utilize nutrition screening tool and intervene as necessary  Determine patient's food preferences and provide high-protein, high-caloric foods as appropriate       INTERVENTIONS:  - Monitor oral intake, urinary output, labs, and treatment plans  - Assess nutrition and hydration status and recommend course of action  - Evaluate amount of meals eaten  - Assist patient with eating if necessary   - Allow adequate time for meals  - Recommend/ encourage appropriate diets, oral nutritional supplements, and vitamin/mineral supplements  - Order, calculate, and assess calorie counts as needed  - Recommend, monitor, and adjust tube feedings and TPN/PPN based on assessed needs  - Assess need for intravenous fluids  - Provide specific nutrition/hydration education as appropriate  - Include patient/family/caregiver in decisions related to nutrition  Outcome: Progressing

## 2019-11-23 NOTE — ASSESSMENT & PLAN NOTE
· PAD and left lower extremity claudication - status post left femoral endarterectomy, profundoplasty, SFA embolectomy and retrograde iliac stenting on 11/06/2019  · Has residual left SFA stenosis for which he underwent left lower extremity angiogram with treatment of SFA and popliteal arteries on 11/14/2019  · Developed thrombosis of small collaterals into the posterior tibial artery on the right - s/p tPA and SFA angioplasty  · Right foot cooler than left - motor/sensory intact in right foot - no intervention planned per vascular  · Noted to have left groin hematoma with concern for pseudoaneurysm when underwent CT for drop in Hb  · On heparin drip - continue per vascular  · Coumadin held till H/H stable and cleared by vascular  · F/u pseudoaneurysm study-appears to be negative  · Statin held due to worsening transaminitis which has resolved off statins

## 2019-11-23 NOTE — PROGRESS NOTES
Progress Note - Daljit Avery 1950, 78 y o  male MRN: [id number]    Unit/Bed#: [facility] 430-01 Encounter: [id number]    Primary Care Provider: Cecilio Caal MD   Date and time admitted to hospital: 11/2/2019  2:01 AM        * PAD (peripheral artery disease) ([address] )  Assessment & Plan  · PAD and left lower extremity claudication - status post left femoral endarterectomy, profundoplasty, SFA embolectomy and retrograde iliac stenting on 11/06/2019  · Has residual left SFA stenosis for which he underwent left lower extremity angiogram with treatment of SFA and popliteal arteries on 11/14/2019  · Developed thrombosis of small collaterals into the posterior tibial artery on the right - s/p tPA and SFA angioplasty  · Right foot cooler than left - motor/sensory intact in right foot - no intervention planned per vascular  · Noted to have left groin hematoma with concern for pseudoaneurysm when underwent CT for drop in Hb  · On heparin drip - continue per vascular  · Coumadin held till H/H stable and cleared by vascular  · F/u pseudoaneurysm study-appears to be negative  · Statin held due to worsening transaminitis which has resolved off statins  · Await vascular review  · Monitor H/H    Ambulatory dysfunction  Assessment & Plan  · PT recommends rehab  · Family refused initially but now agreeable    Coagulopathy [facility])  Assessment & Plan  · Was placed on heparin and was being bridged to Coumadin per vascular for PAD  · Had rapid rise in INR - seen by Hematology - likely from underlying vitamin K deficiency  · Coumadin started at lower dose of 2 mg on 11/15 as recommended by Hematology - now held due to acute blood loss anemia  · When H/H stable restart Coumadin at 2 mg   · Follow up on INR    Acute blood loss anemia  Assessment & Plan  · Acute blood loss anemia from left groin hematoma  · H/H stable at present  · Tolerating heparin drip  · Monitor H/H  · Transfuse as needed   · Needs to continue anticoagulation per vascular    VTE Pharmacologic Prophylaxis:   Pharmacologic: Heparin Drip  Mechanical VTE Prophylaxis in Place: No        Code Status: Level 1 - Full Code      Subjective:   nad    Objective:     Vitals:   Temp (24hrs), Av °F (36 7 °C), Min:97 5 °F (36 4 °C), Max:98 3 °F (36 8 °C)    Temp:  [97 5 °F (36 4 °C)-98 3 °F (36 8 °C)] 97 8 °F (36 6 °C)  HR:  [83-88] 83  Resp:  [18-20] 20  BP: (125-139)/(58-63) 125/58  SpO2:  [95 %-98 %] 95 %  Body mass index is 18 47 kg/m²  Input and Output Summary (last 24 hours): Intake/Output Summary (Last 24 hours) at 2019 0859  Last data filed at 2019 0600  Gross per 24 hour   Intake 212 52 ml   Output 955 ml   Net -742 48 ml       Physical Exam:     Physical Exam   HENT:   Head: Normocephalic and atraumatic  Eyes: Pupils are equal, round, and reactive to light  EOM are normal    Cardiovascular: Normal rate  Pulmonary/Chest: Effort normal and breath sounds normal    Musculoskeletal: Normal range of motion  He exhibits no edema  Additional Data:     Labs:    Results from last 7 days   Lab Units 19  0442   WBC Thousand/uL 8 01   HEMOGLOBIN g/dL 7 9*   HEMATOCRIT % 25 3*   PLATELETS Thousands/uL 469*   NEUTROS PCT % 82*   LYMPHS PCT % 7*   MONOS PCT % 7   EOS PCT % 2     Results from last 7 days   Lab Units 19  0442 19  0505   POTASSIUM mmol/L 4 2 4 5   CHLORIDE mmol/L 105 106   CO2 mmol/L 22 22   BUN mg/dL 17 20   CREATININE mg/dL 0 77 0 76   CALCIUM mg/dL 9 0 9 6   ALK PHOS U/L  --  119*   ALT U/L  --  71   AST U/L  --  35     Results from last 7 days   Lab Units 19  0442   INR  1 59*       * I Have Reviewed All Lab Data Listed Above  * Additional Pertinent Lab Tests Reviewed:  All Labs Within Last 24 Hours Reviewed      Recent Cultures (last 7 days):           Last 24 Hours Medication List:     Current Facility-Administered Medications:  acetaminophen 650 mg Oral Q6H PRN Ivan Rubinstein, MD    amLODIPine 10 mg Oral Daily Veda Sorensen MD    aspirin 81 mg Oral Daily Lizzie Melendez MD    atorvastatin 20 mg Oral After Mario Jorge PA-C    bisacodyl 10 mg Rectal Daily PRN Lizzie Melendez MD    chlorhexidine 15 mL Swish & Spit Q12H Isabel Eid MD    clopidogrel 75 mg Oral Daily Lizzie Melendez MD    ferrous sulfate 325 mg Oral BID With Meals Jose Kenny MD    gabapentin 100 mg Oral Daily Lizzie Melendez MD    heparin (porcine) 3-30 Units/kg/hr (Order-Specific) Intravenous Titrated Nancy Fontana PA-C Last Rate: 12 Units/kg/hr (11/23/19 0443)   heparin (porcine) 2,000 Units Intravenous PRN Nancy Fontana PA-C    heparin (porcine) 4,000 Units Intravenous PRN Nancy Fontana PA-C    hydrALAZINE 10 mg Intravenous Q4H PRN Massiel Haskins PA-C    lidocaine (PF) 0 5 mL Infiltration Once PRN Lizzie Melendez MD    melatonin 6 mg Oral HS Lizzie Melendez MD    multivitamin-minerals 1 tablet Oral Daily Jose Kenny MD    nicotine 1 patch Transdermal Daily Lizzie Melendez MD    OLANZapine 2 5 mg Intramuscular Once PRN Henrique Matias PA-C    oxyCODONE 2 5 mg Oral Q4H PRN Lizzie Melendez MD    oxyCODONE 5 mg Oral Q4H PRN Lizzie Melendez MD    polyethylene glycol 17 g Oral BID Lizzie Melendez MD    senna-docusate sodium 1 tablet Oral BID Lizzie Melendez MD    tamsulosin 0 4 mg Oral Daily With Shayna Calderon MD    thiamine 100 mg Oral Daily Lizzie Melendez MD    warfarin 2 mg Oral Daily (warfarin) Nancy Fontana PA-C         Today, Patient Was Seen By: Mayra Keating DO    ** Please Note: Dictation voice to text software may have been used in the creation of this document   **

## 2019-11-24 VITALS
RESPIRATION RATE: 20 BRPM | BODY MASS INDEX: 18.49 KG/M2 | OXYGEN SATURATION: 95 % | HEIGHT: 65 IN | HEART RATE: 80 BPM | WEIGHT: 111 LBS | DIASTOLIC BLOOD PRESSURE: 60 MMHG | TEMPERATURE: 98.2 F | SYSTOLIC BLOOD PRESSURE: 129 MMHG

## 2019-11-24 LAB
ANION GAP SERPL CALCULATED.3IONS-SCNC: 8 MMOL/L (ref 4–13)
BASOPHILS # BLD AUTO: 0.06 THOUSANDS/ΜL (ref 0–0.1)
BASOPHILS NFR BLD AUTO: 1 % (ref 0–1)
BUN SERPL-MCNC: 21 MG/DL (ref 5–25)
CALCIUM SERPL-MCNC: 9.4 MG/DL (ref 8.3–10.1)
CHLORIDE SERPL-SCNC: 103 MMOL/L (ref 100–108)
CO2 SERPL-SCNC: 25 MMOL/L (ref 21–32)
CREAT SERPL-MCNC: 0.87 MG/DL (ref 0.6–1.3)
EOSINOPHIL # BLD AUTO: 0.22 THOUSAND/ΜL (ref 0–0.61)
EOSINOPHIL NFR BLD AUTO: 3 % (ref 0–6)
ERYTHROCYTE [DISTWIDTH] IN BLOOD BY AUTOMATED COUNT: 14.7 % (ref 11.6–15.1)
GFR SERPL CREATININE-BSD FRML MDRD: 87 ML/MIN/1.73SQ M
GLUCOSE SERPL-MCNC: 107 MG/DL (ref 65–140)
HCT VFR BLD AUTO: 26.3 % (ref 36.5–49.3)
HGB BLD-MCNC: 8.3 G/DL (ref 12–17)
IMM GRANULOCYTES # BLD AUTO: 0.08 THOUSAND/UL (ref 0–0.2)
IMM GRANULOCYTES NFR BLD AUTO: 1 % (ref 0–2)
INR PPP: 2.14 (ref 0.84–1.19)
LYMPHOCYTES # BLD AUTO: 0.64 THOUSANDS/ΜL (ref 0.6–4.47)
LYMPHOCYTES NFR BLD AUTO: 9 % (ref 14–44)
MCH RBC QN AUTO: 30.2 PG (ref 26.8–34.3)
MCHC RBC AUTO-ENTMCNC: 31.6 G/DL (ref 31.4–37.4)
MCV RBC AUTO: 96 FL (ref 82–98)
MONOCYTES # BLD AUTO: 0.63 THOUSAND/ΜL (ref 0.17–1.22)
MONOCYTES NFR BLD AUTO: 9 % (ref 4–12)
NEUTROPHILS # BLD AUTO: 5.42 THOUSANDS/ΜL (ref 1.85–7.62)
NEUTS SEG NFR BLD AUTO: 77 % (ref 43–75)
NRBC BLD AUTO-RTO: 0 /100 WBCS
PLATELET # BLD AUTO: 434 THOUSANDS/UL (ref 149–390)
PMV BLD AUTO: 10 FL (ref 8.9–12.7)
POTASSIUM SERPL-SCNC: 4.3 MMOL/L (ref 3.5–5.3)
PROTHROMBIN TIME: 23.4 SECONDS (ref 11.6–14.5)
RBC # BLD AUTO: 2.75 MILLION/UL (ref 3.88–5.62)
SODIUM SERPL-SCNC: 136 MMOL/L (ref 136–145)
WBC # BLD AUTO: 7.05 THOUSAND/UL (ref 4.31–10.16)

## 2019-11-24 PROCEDURE — 80048 BASIC METABOLIC PNL TOTAL CA: CPT | Performed by: INTERNAL MEDICINE

## 2019-11-24 PROCEDURE — 85610 PROTHROMBIN TIME: CPT | Performed by: INTERNAL MEDICINE

## 2019-11-24 PROCEDURE — 85025 COMPLETE CBC W/AUTO DIFF WBC: CPT | Performed by: INTERNAL MEDICINE

## 2019-11-24 PROCEDURE — 99238 HOSP IP/OBS DSCHRG MGMT 30/<: CPT | Performed by: INTERNAL MEDICINE

## 2019-11-24 RX ORDER — OXYCODONE HYDROCHLORIDE 5 MG/1
2.5 TABLET ORAL EVERY 4 HOURS PRN
Qty: 18 TABLET | Refills: 0 | Status: SHIPPED | OUTPATIENT
Start: 2019-11-24 | End: 2019-11-27

## 2019-11-24 RX ORDER — CLOPIDOGREL BISULFATE 75 MG/1
75 TABLET ORAL DAILY
Qty: 30 TABLET | Refills: 0 | Status: SHIPPED | OUTPATIENT
Start: 2019-11-25 | End: 2020-01-02 | Stop reason: SDUPTHER

## 2019-11-24 RX ORDER — FERROUS SULFATE 325(65) MG
325 TABLET ORAL 2 TIMES DAILY WITH MEALS
Qty: 30 TABLET | Refills: 0 | Status: SHIPPED | OUTPATIENT
Start: 2019-11-24

## 2019-11-24 RX ORDER — AMOXICILLIN 250 MG
1 CAPSULE ORAL 2 TIMES DAILY
Qty: 30 TABLET | Refills: 0 | Status: SHIPPED | OUTPATIENT
Start: 2019-11-24

## 2019-11-24 RX ORDER — ASPIRIN 81 MG/1
81 TABLET ORAL DAILY
Qty: 30 TABLET | Refills: 0 | Status: SHIPPED | OUTPATIENT
Start: 2019-11-25

## 2019-11-24 RX ORDER — ATORVASTATIN CALCIUM 20 MG/1
20 TABLET, FILM COATED ORAL
Qty: 30 TABLET | Refills: 0 | Status: SHIPPED | OUTPATIENT
Start: 2019-11-24

## 2019-11-24 RX ORDER — WARFARIN SODIUM 2 MG/1
2 TABLET ORAL
Qty: 30 TABLET | Refills: 0 | Status: SHIPPED | OUTPATIENT
Start: 2019-11-24 | End: 2020-01-02 | Stop reason: SDUPTHER

## 2019-11-24 RX ORDER — POLYETHYLENE GLYCOL 3350 17 G/17G
17 POWDER, FOR SOLUTION ORAL 2 TIMES DAILY
Qty: 14 EACH | Refills: 0 | Status: SHIPPED | OUTPATIENT
Start: 2019-11-24

## 2019-11-24 RX ORDER — GABAPENTIN 100 MG/1
100 CAPSULE ORAL DAILY
Qty: 30 CAPSULE | Refills: 0 | Status: SHIPPED | OUTPATIENT
Start: 2019-11-25

## 2019-11-24 RX ORDER — LANOLIN ALCOHOL/MO/W.PET/CERES
100 CREAM (GRAM) TOPICAL DAILY
Qty: 30 TABLET | Refills: 0 | Status: SHIPPED | OUTPATIENT
Start: 2019-11-25

## 2019-11-24 RX ORDER — AMLODIPINE BESYLATE 10 MG/1
10 TABLET ORAL DAILY
Qty: 30 TABLET | Refills: 0 | Status: SHIPPED | OUTPATIENT
Start: 2019-11-25

## 2019-11-24 RX ORDER — TAMSULOSIN HYDROCHLORIDE 0.4 MG/1
0.4 CAPSULE ORAL
Qty: 30 CAPSULE | Refills: 0 | Status: SHIPPED | OUTPATIENT
Start: 2019-11-24

## 2019-11-24 RX ORDER — LANOLIN ALCOHOL/MO/W.PET/CERES
6 CREAM (GRAM) TOPICAL
Qty: 30 TABLET | Refills: 0 | Status: SHIPPED | OUTPATIENT
Start: 2019-11-24

## 2019-11-24 RX ADMIN — ENOXAPARIN SODIUM 50 MG: 60 INJECTION SUBCUTANEOUS at 09:36

## 2019-11-24 RX ADMIN — ASPIRIN 81 MG: 81 TABLET, COATED ORAL at 09:32

## 2019-11-24 RX ADMIN — CLOPIDOGREL 75 MG: 75 TABLET, FILM COATED ORAL at 09:33

## 2019-11-24 RX ADMIN — FERROUS SULFATE TAB 325 MG (65 MG ELEMENTAL FE) 325 MG: 325 (65 FE) TAB at 09:33

## 2019-11-24 RX ADMIN — THIAMINE HCL TAB 100 MG 100 MG: 100 TAB at 09:33

## 2019-11-24 RX ADMIN — POLYETHYLENE GLYCOL 3350 17 G: 17 POWDER, FOR SOLUTION ORAL at 09:33

## 2019-11-24 RX ADMIN — GABAPENTIN 100 MG: 100 CAPSULE ORAL at 09:33

## 2019-11-24 RX ADMIN — OXYCODONE HYDROCHLORIDE 5 MG: 5 TABLET ORAL at 15:49

## 2019-11-24 RX ADMIN — OXYCODONE HYDROCHLORIDE 5 MG: 5 TABLET ORAL at 06:42

## 2019-11-24 RX ADMIN — CHLORHEXIDINE GLUCONATE 0.12% ORAL RINSE 15 ML: 1.2 LIQUID ORAL at 09:35

## 2019-11-24 RX ADMIN — Medication 1 TABLET: at 09:33

## 2019-11-24 RX ADMIN — NICOTINE 1 PATCH: 14 PATCH TRANSDERMAL at 09:36

## 2019-11-24 RX ADMIN — AMLODIPINE BESYLATE 10 MG: 10 TABLET ORAL at 09:32

## 2019-11-24 RX ADMIN — SENNOSIDES AND DOCUSATE SODIUM 1 TABLET: 8.6; 5 TABLET ORAL at 09:33

## 2019-11-24 NOTE — SOCIAL WORK
ENRIQUE spoke w/Sera @ [facility] who confirmed facility is able to accept pt today  Per CM notes, family plans to transport  SLIM updated re: same  Report: [phone] Fax [phone]

## 2019-11-24 NOTE — ASSESSMENT & PLAN NOTE
· Pt presented with 2-month history of LLE pain with walking  Denies pain at rest, but pt is unable to walk more than a few feet without pain  · CTA: Severe peripheral vascular disease  Limited evaluation of the calf vessels due to extensive calcifications  Severe areas of narrowing and focal occlusion of the right superficial femoral artery and diffuse occlusion of the popliteal artery   Distal reconstitution of the right peroneal artery and scattered areas of the right posterior tibial artery  Occlusion of the left common femoral artery and left superficial femoral artery with distal reconstitution however severe narrowing of the mid to distal superficial femoral artery   Severe narrowing of the left popliteal artery  Patent left peroneal artery and scattered areas of the left posterior tibial artery  PLAN:  · Coumadin 2 mg daily  INR therapeutic  · Status post vascular intervention  Please see below

## 2019-11-24 NOTE — PLAN OF CARE
Problem: Prexisting or High Potential for Compromised Skin Integrity  Goal: Skin integrity is maintained or improved  Description  INTERVENTIONS:  - Identify patients at risk for skin breakdown  - Assess and monitor skin integrity  - Assess and monitor nutrition and hydration status  - Monitor labs   - Turn and reposition patient  - Assist with mobility/ambulation  - Relieve pressure over bony prominences  - Avoid friction and shearing  - Provide appropriate hygiene as needed including keeping skin clean and dry  - Evaluate need for skin moisturizer/barrier cream  - Collaborate with interdisciplinary team   - Patient/family teaching   Outcome: Progressing     Problem: Potential for Falls  Goal: Patient will remain free of falls  Description  INTERVENTIONS:  - Assess patient frequently for physical needs  -  Identify cognitive and physical deficits and behaviors that affect risk of falls    -  [place] fall precautions as indicated by assessment   - Educate patient/family on patient safety including physical limitations  - Instruct patient to call for assistance with activity based on assessment  - Modify environment to reduce risk of injury  - Consider OT/PT consult to assist with strengthening/mobility  Outcome: Progressing     Problem: RESPIRATORY - ADULT  Goal: Achieves optimal ventilation and oxygenation  Description  INTERVENTIONS:  - Assess for changes in respiratory status  - Assess for changes in mentation and behavior  - Position to facilitate oxygenation and minimize respiratory effort  - Oxygen administered by appropriate delivery if ordered  - Initiate smoking cessation education as indicated  - Encourage broncho-pulmonary hygiene including cough, deep breathe, Incentive Spirometry  - Assess the need for suctioning and aspirate as needed  - Assess and instruct to report SOB or any respiratory difficulty  - Respiratory Therapy support as indicated  Outcome: Progressing     Problem: METABOLIC, FLUID AND ELECTROLYTES - ADULT  Goal: Fluid balance maintained  Description  INTERVENTIONS:  - Monitor labs   - Monitor I/O and WT  - Instruct patient on fluid and nutrition as appropriate  - Assess for signs & symptoms of volume excess or deficit  Outcome: Progressing     Problem: MUSCULOSKELETAL - ADULT  Goal: Maintain or return mobility to safest level of function  Description  INTERVENTIONS:  - Assess patient's ability to carry out ADLs; assess patient's baseline for ADL function and identify physical deficits which impact ability to perform ADLs (bathing, care of mouth/teeth, toileting, grooming, dressing, etc )  - Assess/evaluate cause of self-care deficits   - Assess range of motion  - Assess patient's mobility  - Assess patient's need for assistive devices and provide as appropriate  - Encourage maximum independence but intervene and supervise when necessary  - Involve family in performance of ADLs  - Assess for home care needs following discharge   - Consider OT consult to assist with ADL evaluation and planning for discharge  - Provide patient education as appropriate  Outcome: Progressing     Problem: PAIN - ADULT  Goal: Verbalizes/displays adequate comfort level or baseline comfort level  Description  Interventions:  - Encourage patient to monitor pain and request assistance  - Assess pain using appropriate pain scale  - Administer analgesics based on type and severity of pain and evaluate response  - Implement non-pharmacological measures as appropriate and evaluate response  - Consider cultural and social influences on pain and pain management  - Notify physician/advanced practitioner if interventions unsuccessful or patient reports new pain  Outcome: Progressing     Problem: INFECTION - ADULT  Goal: Absence or prevention of progression during hospitalization  Description  INTERVENTIONS:  - Assess and monitor for signs and symptoms of infection  - Monitor lab/diagnostic results  - Monitor all insertion sites, i e  indwelling lines, tubes, and drains  - Monitor endotracheal if appropriate and nasal secretions for changes in amount and color  - [place] appropriate cooling/warming therapies per order  - Administer medications as ordered  - Instruct and encourage patient and family to use good hand hygiene technique  - Identify and instruct in appropriate isolation precautions for identified infection/condition  Outcome: Progressing     Problem: SAFETY ADULT  Goal: Maintain or return to baseline ADL function  Description  INTERVENTIONS:  -  Assess patient's ability to carry out ADLs; assess patient's baseline for ADL function and identify physical deficits which impact ability to perform ADLs (bathing, care of mouth/teeth, toileting, grooming, dressing, etc )  - Assess/evaluate cause of self-care deficits   - Assess range of motion  - Assess patient's mobility; develop plan if impaired  - Assess patient's need for assistive devices and provide as appropriate  - Encourage maximum independence but intervene and supervise when necessary  - Involve family in performance of ADLs  - Assess for home care needs following discharge   - Consider OT consult to assist with ADL evaluation and planning for discharge  - Provide patient education as appropriate  Outcome: Progressing  Goal: Maintain or return mobility status to optimal level  Description  INTERVENTIONS:  - Assess patient's baseline mobility status (ambulation, transfers, stairs, etc )    - Identify cognitive and physical deficits and behaviors that affect mobility  - Identify mobility aids required to assist with transfers and/or ambulation (gait belt, sit-to-stand, lift, walker, cane, etc )  - [place] fall precautions as indicated by assessment  - Record patient progress and toleration of activity level on Mobility SBAR; progress patient to next Phase/Stage  - Instruct patient to call for assistance with activity based on assessment  - Consider rehabilitation consult to assist with strengthening/weightbearing, etc   Outcome: Progressing     Problem: DISCHARGE PLANNING  Goal: Discharge to home or other facility with appropriate resources  Description  INTERVENTIONS:  - Identify barriers to discharge w/patient and caregiver  - Arrange for needed discharge resources and transportation as appropriate  - Identify discharge learning needs (meds, wound care, etc )  - Arrange for interpretive services to assist at discharge as needed  - Refer to Case Management Department for coordinating discharge planning if the patient needs post-hospital services based on physician/advanced practitioner order or complex needs related to functional status, cognitive ability, or social support system  Outcome: Progressing     Problem: Knowledge Deficit  Goal: Patient/family/caregiver demonstrates understanding of disease process, treatment plan, medications, and discharge instructions  Description  Complete learning assessment and assess knowledge base  Interventions:  - Provide teaching at level of understanding  - Provide teaching via preferred learning methods  Outcome: Progressing     Problem: Nutrition/Hydration-ADULT  Goal: Nutrient/Hydration intake appropriate for improving, restoring or maintaining nutritional needs  Description  Monitor and assess patient's nutrition/hydration status for malnutrition  Collaborate with interdisciplinary team and initiate plan and interventions as ordered  Monitor patient's weight and dietary intake as ordered or per policy  Utilize nutrition screening tool and intervene as necessary  Determine patient's food preferences and provide high-protein, high-caloric foods as appropriate       INTERVENTIONS:  - Monitor oral intake, urinary output, labs, and treatment plans  - Assess nutrition and hydration status and recommend course of action  - Evaluate amount of meals eaten  - Assist patient with eating if necessary   - Allow adequate time for meals  - Recommend/ encourage appropriate diets, oral nutritional supplements, and vitamin/mineral supplements  - Order, calculate, and assess calorie counts as needed  - Recommend, monitor, and adjust tube feedings and TPN/PPN based on assessed needs  - Assess need for intravenous fluids  - Provide specific nutrition/hydration education as appropriate  - Include patient/family/caregiver in decisions related to nutrition  Outcome: Progressing

## 2019-11-24 NOTE — SOCIAL WORK
CM confirmed w/pt's son Peter Care he will transport pt to [facility] @ [place]  Peter [facility] will arrive in approx 1 hour to transport  Facility updated re:pickup time via [employer]

## 2019-11-24 NOTE — DISCHARGE SUMMARY
Discharge- Kayla Nones 1950, 78 y o  male MRN: [id number]    Unit/Bed#: [facility] 430-01 Encounter: [id number]    Primary Care Provider: Yisel Nation MD   Date and time admitted to hospital: 11/2/2019  2:01 AM        * PAD (peripheral artery disease) ([facility] )  Assessment & Plan  · PAD and left lower extremity claudication - status post left femoral endarterectomy, profundoplasty, SFA embolectomy and retrograde iliac stenting on 11/06/2019  · Has residual left SFA stenosis for which he underwent left lower extremity angiogram with treatment of SFA and popliteal arteries on 11/14/2019  · Developed thrombosis of small collaterals into the posterior tibial artery on the right - s/p tPA and SFA angioplasty  · Right foot cooler than left - motor/sensory intact in right foot - no intervention planned per vascular  · Noted to have left groin hematoma with concern for pseudoaneurysm when underwent CT for drop in Hb  · On heparin drip - continue per vascular  · Coumadin held till H/H stable and cleared by vascular  · F/u pseudoaneurysm study-appears to be negative  · Statin held due to worsening transaminitis which has resolved off statins      Thrombocytopenia ([facility])  Assessment & Plan  · Resolved  · Monitor    Ambulatory dysfunction  Assessment & Plan  · PT recommends rehab  · Family refused initially but now agreeable    Coagulopathy ([facility] )  Assessment & Plan  · Coumadin 2  · inr tx    Acute blood loss anemia  Assessment & Plan  · Acute blood loss anemia from left groin hematoma-stable  · H/H stable at present  · inr tx      Dependence on nicotine from cigarettes  Assessment & Plan  · Still smokes despite diagnosis of throat cancer, per daughter  · She thinks he smokes 1ppd  · Nicotine patch  · Smoking cessation education    Hypertension  Assessment & Plan  · BP acceptable on Amlodipine 10 mg daily      Peripheral vascular disease ([facility] )  Assessment & Plan  · Pt presented with 2-month history of LLE pain with walking  Denies pain at rest, but pt is unable to walk more than a few feet without pain  · CTA: Severe peripheral vascular disease  Limited evaluation of the calf vessels due to extensive calcifications  Severe areas of narrowing and focal occlusion of the right superficial femoral artery and diffuse occlusion of the popliteal artery   Distal reconstitution of the right peroneal artery and scattered areas of the right posterior tibial artery  Occlusion of the left common femoral artery and left superficial femoral artery with distal reconstitution however severe narrowing of the mid to distal superficial femoral artery   Severe narrowing of the left popliteal artery  Patent left peroneal artery and scattered areas of the left posterior tibial artery  PLAN:  · Coumadin 2 mg daily  INR therapeutic  · Status post vascular intervention  Please see below  Resolved Problems  Date Reviewed: 11/11/2019          Resolved    Confusion 11/7/2019     Resolved by  Elisa Theodore MD    Constipation 11/9/2019     Resolved by  Elisa Theodore MD          Admission Date:   Admission Orders (From admission, onward)     Ordered        11/02/19 0244  Inpatient Admission  Once                     Admitting Diagnosis: Femoral artery occlusion ([facility] [address] ) [[id number]]      Procedures Performed: No orders of the defined types were placed in this encounter  Summary of Hospital Course: This is this is a 78-year-old gentleman who presented with left lower extremity claudication -requiring extensive vascular intervention on November 6th, 2019  [facility] He was reported to have residual SFA stenosis and underwent repeat treatment on 11/14/2019 - during that procedure he also developed thrombosis of collaterals into posterior tibial artery on the right for which he was given tPA and treated with angioplasty but right foot still cooler than left for which no intervention is planned per vascular    No further interventions as per vascular surgery  He was noted to have an acute drop in hemoglobin and was noted to have a small hematoma in the left groin with possible pseudoaneurysm; however, follow-up ultrasound was negative for pseudoaneurysm  Anticoagulation was originally recommended by vascular and he was being bridged from heparin to Coumadin  Coumadin was held in the setting of acute blood loss anemia  Patient was cleared to restart Coumadin with bridge by vascular surgery as per my conversation last week  Condition at Discharge: fair         Discharge instructions/Information to patient and family:   See after visit summary for information provided to patient and family  Provisions for Follow-Up Care:  See after visit summary for information related to follow-up care and any pertinent home health orders  PCP: Martina Winchester MD    Disposition: Home    Planned Readmission: No    Discharge Statement   I spent 35 minutes discharging the patient  This time was spent on the day of discharge  I had direct contact with the patient on the day of discharge  Additional documentation is required if more than 30 minutes were spent on discharge  Discharge Medications:  See after visit summary for reconciled discharge medications provided to patient and family

## 2019-12-03 ENCOUNTER — HOSPITAL ENCOUNTER (INPATIENT)
Facility: HOSPITAL | Age: 69
LOS: 1 days | DRG: 301 | End: 2019-12-04
Attending: EMERGENCY MEDICINE | Admitting: ANESTHESIOLOGY
Payer: MEDICARE

## 2019-12-03 ENCOUNTER — APPOINTMENT (EMERGENCY)
Dept: ULTRASOUND IMAGING | Facility: HOSPITAL | Age: 69
DRG: 301 | End: 2019-12-03
Payer: MEDICARE

## 2019-12-03 ENCOUNTER — APPOINTMENT (EMERGENCY)
Dept: CT IMAGING | Facility: HOSPITAL | Age: 69
DRG: 301 | End: 2019-12-03
Payer: MEDICARE

## 2019-12-03 DIAGNOSIS — I74.9 ARTERIAL THROMBOSIS (HCC): ICD-10-CM

## 2019-12-03 DIAGNOSIS — I73.9 PAD (PERIPHERAL ARTERY DISEASE) (HCC): Primary | ICD-10-CM

## 2019-12-03 DIAGNOSIS — I99.8 ISCHEMIC PAIN OF RIGHT FOOT: ICD-10-CM

## 2019-12-03 DIAGNOSIS — M79.671 ISCHEMIC PAIN OF RIGHT FOOT: ICD-10-CM

## 2019-12-03 PROBLEM — R79.1 SUPRATHERAPEUTIC INR: Status: ACTIVE | Noted: 2019-12-03

## 2019-12-03 PROBLEM — I70.90 ARTERIAL OCCLUSION: Status: ACTIVE | Noted: 2019-12-03

## 2019-12-03 LAB
ALBUMIN SERPL BCP-MCNC: 2.5 G/DL (ref 3.5–5)
ALP SERPL-CCNC: 91 U/L (ref 46–116)
ALT SERPL W P-5'-P-CCNC: 65 U/L (ref 12–78)
ANION GAP SERPL CALCULATED.3IONS-SCNC: 11 MMOL/L (ref 4–13)
APTT PPP: 105 SECONDS (ref 23–37)
AST SERPL W P-5'-P-CCNC: 33 U/L (ref 5–45)
BASOPHILS # BLD AUTO: 0.05 THOUSANDS/ΜL (ref 0–0.1)
BASOPHILS NFR BLD AUTO: 1 % (ref 0–1)
BILIRUB SERPL-MCNC: 0.2 MG/DL (ref 0.2–1)
BUN SERPL-MCNC: 22 MG/DL (ref 5–25)
CALCIUM SERPL-MCNC: 8.8 MG/DL (ref 8.3–10.1)
CHLORIDE SERPL-SCNC: 102 MMOL/L (ref 100–108)
CO2 SERPL-SCNC: 23 MMOL/L (ref 21–32)
CREAT SERPL-MCNC: 0.93 MG/DL (ref 0.6–1.3)
EOSINOPHIL # BLD AUTO: 0.16 THOUSAND/ΜL (ref 0–0.61)
EOSINOPHIL NFR BLD AUTO: 2 % (ref 0–6)
ERYTHROCYTE [DISTWIDTH] IN BLOOD BY AUTOMATED COUNT: 14.6 % (ref 11.6–15.1)
GFR SERPL CREATININE-BSD FRML MDRD: 83 ML/MIN/1.73SQ M
GLUCOSE SERPL-MCNC: 121 MG/DL (ref 65–140)
HCT VFR BLD AUTO: 22.8 % (ref 36.5–49.3)
HGB BLD-MCNC: 7.1 G/DL (ref 12–17)
IMM GRANULOCYTES # BLD AUTO: 0.06 THOUSAND/UL (ref 0–0.2)
IMM GRANULOCYTES NFR BLD AUTO: 1 % (ref 0–2)
INR PPP: 7.28 (ref 0.84–1.19)
LACTATE SERPL-SCNC: 1.7 MMOL/L (ref 0.5–2)
LYMPHOCYTES # BLD AUTO: 0.63 THOUSANDS/ΜL (ref 0.6–4.47)
LYMPHOCYTES NFR BLD AUTO: 8 % (ref 14–44)
MCH RBC QN AUTO: 29.1 PG (ref 26.8–34.3)
MCHC RBC AUTO-ENTMCNC: 31.1 G/DL (ref 31.4–37.4)
MCV RBC AUTO: 93 FL (ref 82–98)
MONOCYTES # BLD AUTO: 0.47 THOUSAND/ΜL (ref 0.17–1.22)
MONOCYTES NFR BLD AUTO: 6 % (ref 4–12)
NEUTROPHILS # BLD AUTO: 7.05 THOUSANDS/ΜL (ref 1.85–7.62)
NEUTS SEG NFR BLD AUTO: 82 % (ref 43–75)
NRBC BLD AUTO-RTO: 0 /100 WBCS
PLATELET # BLD AUTO: 311 THOUSANDS/UL (ref 149–390)
PMV BLD AUTO: 9.1 FL (ref 8.9–12.7)
POTASSIUM SERPL-SCNC: 4.7 MMOL/L (ref 3.5–5.3)
PROT SERPL-MCNC: 7 G/DL (ref 6.4–8.2)
PROTHROMBIN TIME: 63.8 SECONDS (ref 11.6–14.5)
RBC # BLD AUTO: 2.44 MILLION/UL (ref 3.88–5.62)
SODIUM SERPL-SCNC: 136 MMOL/L (ref 136–145)
WBC # BLD AUTO: 8.42 THOUSAND/UL (ref 4.31–10.16)

## 2019-12-03 PROCEDURE — 99291 CRITICAL CARE FIRST HOUR: CPT | Performed by: PHYSICIAN ASSISTANT

## 2019-12-03 PROCEDURE — 99285 EMERGENCY DEPT VISIT HI MDM: CPT

## 2019-12-03 PROCEDURE — 85610 PROTHROMBIN TIME: CPT | Performed by: PHYSICIAN ASSISTANT

## 2019-12-03 PROCEDURE — 73706 CT ANGIO LWR EXTR W/O&W/DYE: CPT

## 2019-12-03 PROCEDURE — 96360 HYDRATION IV INFUSION INIT: CPT

## 2019-12-03 PROCEDURE — 85730 THROMBOPLASTIN TIME PARTIAL: CPT | Performed by: PHYSICIAN ASSISTANT

## 2019-12-03 PROCEDURE — 93926 LOWER EXTREMITY STUDY: CPT

## 2019-12-03 PROCEDURE — 85025 COMPLETE CBC W/AUTO DIFF WBC: CPT | Performed by: PHYSICIAN ASSISTANT

## 2019-12-03 PROCEDURE — 36415 COLL VENOUS BLD VENIPUNCTURE: CPT | Performed by: PHYSICIAN ASSISTANT

## 2019-12-03 PROCEDURE — 80053 COMPREHEN METABOLIC PANEL: CPT | Performed by: PHYSICIAN ASSISTANT

## 2019-12-03 PROCEDURE — 93926 LOWER EXTREMITY STUDY: CPT | Performed by: SURGERY

## 2019-12-03 PROCEDURE — 96361 HYDRATE IV INFUSION ADD-ON: CPT

## 2019-12-03 PROCEDURE — 99292 CRITICAL CARE ADDL 30 MIN: CPT | Performed by: PHYSICIAN ASSISTANT

## 2019-12-03 PROCEDURE — 93922 UPR/L XTREMITY ART 2 LEVELS: CPT | Performed by: SURGERY

## 2019-12-03 PROCEDURE — 83605 ASSAY OF LACTIC ACID: CPT | Performed by: PHYSICIAN ASSISTANT

## 2019-12-03 RX ORDER — SODIUM CHLORIDE 9 MG/ML
125 INJECTION, SOLUTION INTRAVENOUS CONTINUOUS
Status: DISCONTINUED | OUTPATIENT
Start: 2019-12-03 | End: 2019-12-04 | Stop reason: HOSPADM

## 2019-12-03 RX ORDER — HEPARIN SODIUM 5000 [USP'U]/ML
5000 INJECTION, SOLUTION INTRAVENOUS; SUBCUTANEOUS EVERY 8 HOURS SCHEDULED
Status: DISCONTINUED | OUTPATIENT
Start: 2019-12-03 | End: 2019-12-03

## 2019-12-03 RX ADMIN — IOHEXOL 110 ML: 350 INJECTION, SOLUTION INTRAVENOUS at 14:31

## 2019-12-03 RX ADMIN — SODIUM CHLORIDE 125 ML/HR: 0.9 INJECTION, SOLUTION INTRAVENOUS at 11:58

## 2019-12-03 NOTE — QUICK NOTE
Interventional Radiology Brief Communication Note:    I was called to review a CTA on this patient who presented with acute right foot pain with onset this morning  Comparison was made to previous CTA dated 11/01/2019  Patient has a chronic right popliteal occlusion  Severe tibial disease  Likely peroneal runoff only  The only change involving the right lower extremity since the prior study is an acute segmental occlusion of the profunda femoral artery on the right  A full CTA report will follow shortly      Yousif Griffith MD

## 2019-12-03 NOTE — EMTALA/ACUTE CARE TRANSFER
[address] [facility] [address]  [address]  [place] [address]  Dept: [address]    NAME Ladarius Castañeda                                         1950                              MRN [id number]    I have been informed of my rights regarding examination, treatment, and transfer   by Dr Betina You MD    Benefits:  Access to Vascular Surgery Service    Risks:   Heart Attack, Stroke, acute and chronic pain, Loss of Limb, Infection/Sepsis, bleeding, Motor Vehicle Crash    Consent for Transfer:  I acknowledge that my medical condition has been evaluated and explained to me by the emergency department physician or other qualified medical person and/or my attending physician, who has recommended that I be transferred to the service of    at    The above potential benefits of such transfer, the potential risks associated with such transfer, and the probable risks of not being transferred have been explained to me, and I fully understand them  The doctor has explained that, in my case, the benefits of transfer outweigh the risks  I agree to be transferred  I authorize the performance of emergency medical procedures and treatments upon me in both transit and upon arrival at the receiving facility  Additionally, I authorize the release of any and all medical records to the receiving facility and request they be transported with me, if possible  I understand that the safest mode of transportation during a medical emergency is an ambulance and that the Hospital advocates the use of this mode of transport  Risks of traveling to the receiving facility by car, including absence of medical control, life sustaining equipment, such as oxygen, and medical personnel has been explained to me and I fully understand them  (BORIS CORRECT BOX BELOW)  [  ]  I consent to the stated transfer and to be transported by ambulance/helicopter    [  ]  I consent to the stated transfer, but refuse transportation by ambulance and accept full responsibility for my transportation by car  I understand the risks of non-ambulance transfers and I exonerate the Hospital and its staff from any deterioration in my condition that results from this refusal     X___________________________________________    DATE  19  TIME________  Signature of patient or legally responsible individual signing on patient behalf           RELATIONSHIP TO PATIENT_________________________          Provider [address] [place] [address]                                         1950                              MRN [id number]    A medical screening exam was performed on the above named patient  Based on the examination:    Condition Necessitating Transfer The primary encounter diagnosis was PAD (peripheral artery disease) ([facility] [address] )  Diagnoses of Arterial thrombosis (HCC) and Ischemic pain of right foot were also pertinent to this visit  Patient Condition:      Reason for Transfer:      Transfer Requirements: Facility     · Space available and qualified personnel available for treatment as acknowledged by    · Agreed to accept transfer and to provide appropriate medical treatment as acknowledged by          · Appropriate medical records of the examination and treatment of the patient are provided at the time of transfer   [address], [address] _______  · Transfer will be performed by qualified personnel from    and appropriate transfer equipment as required, including the use of necessary and appropriate life support measures      Provider Certification: I have examined the patient and explained the following risks and benefits of being transferred/refusing transfer to the patient/family:         Based on these reasonable risks and benefits to the patient and/or the unborn child(justin), and based upon the information available at the time of the patients examination, I certify that the medical benefits reasonably to be expected from the provision of appropriate medical treatments at another medical facility outweigh the increasing risks, if any, to the individuals medical condition, and in the case of labor to the unborn child, from effecting the transfer      X____________________________________________ DATE 12/03/19        TIME_______      ORIGINAL - SEND TO MEDICAL RECORDS   COPY - SEND WITH PATIENT DURING TRANSFER

## 2019-12-03 NOTE — ED PROVIDER NOTES
History  Chief Complaint   Patient presents with    Foot Pain     Pt presents to ED from [facility] with pain and swelling in his right foot  Hx of vascular stenting in both feet  2 toes removed from his right foot  78year old male with past medical history significant for PAD and hypertension with history of left femoral endarterectomy with superficial femoral artery embolectomy and iliac stenting on November 6, 2019 that resulted in residual left superficial femoral artery stenosis in addition to the development of thrombus in the small collateral posterior tibial artery on the right requiring tPA and superficial femoral artery angiography on the right presents to the emergency department with chief complaint of increasing right foot pain  Onset of symptoms reported as this morning  Location of symptoms reported as the right foot  Quality is reported as severe pain  Severity is reported as severe  Associated symptoms:  Positive for discoloration of the right foot  Positive for decreased sensation to the right foot  Positive for right foot pain  Denies fevers  Denies chest pain  Denies shortness of breath  Modifying factors:  No known aggravating or alleviating factors  Movement exacerbates pain  Context:  Patient is Belarusian speaking  Details obtained from family members at bedside  The patient resides at  [facility] - and recently underwent procedures all on his lower extremities  He is on chronic anticoagulation with Coumadin and Plavix  He reported increasing pain in his foot this morning prompting his family to become concerned there may be a problem with his right lower extremity blood vessels  He denies any acute fall, injury or trauma to the foot  Reviewed past visits via [facility], patient was last seen in ed on 11/1/2019 for left femoral artery occlusion          History provided by:  Patient and relative   used: Yes        Prior to Admission Medications   Prescriptions Last Dose Informant Patient Reported? Taking?    amLODIPine (NORVASC) 10 mg tablet   No No   Sig: Take 1 tablet (10 mg total) by mouth daily   aspirin (ECOTRIN LOW STRENGTH) 81 mg EC tablet   No No   Sig: Take 1 tablet (81 mg total) by mouth daily   atorvastatin (LIPITOR) 20 mg tablet   No No   Sig: Take 1 tablet (20 mg total) by mouth daily after dinner   clopidogrel (PLAVIX) 75 mg tablet   No No   Sig: Take 1 tablet (75 mg total) by mouth daily   ferrous sulfate 325 (65 Fe) mg tablet   No No   Sig: Take 1 tablet (325 mg total) by mouth 2 (two) times a day with meals   gabapentin (NEURONTIN) 100 mg capsule   No No   Sig: Take 1 capsule (100 mg total) by mouth daily   hydrocortisone 2 5 % cream   Yes No   Sig: Apply to to affected areas 2-3 times daily   melatonin 3 mg   No No   Sig: Take 2 tablets (6 mg total) by mouth daily at bedtime   neomycin-bacitracin-polymyxin b (NEOSPORIN) ointment   No No   Sig: Apply topically 2 (two) times a day   Patient not taking: Reported on 11/2/2019   polyethylene glycol (MIRALAX) 17 g packet   No No   Sig: Take 17 g by mouth 2 (two) times a day   senna-docusate sodium (SENOKOT S) 8 6-50 mg per tablet   No No   Sig: Take 1 tablet by mouth 2 (two) times a day   tamsulosin (FLOMAX) 0 4 mg   No No   Sig: Take 1 capsule (0 4 mg total) by mouth daily with dinner   thiamine 100 MG tablet   No No   Sig: Take 1 tablet (100 mg total) by mouth daily   warfarin (COUMADIN) 2 mg tablet   No No   Sig: Take 1 tablet (2 mg total) by mouth daily      Facility-Administered Medications: None       Past Medical History:   Diagnosis Date    Cancer ([facility] )     throat cancer    PAD (peripheral artery disease) ([facility] ) 11/2/2019    PEG (percutaneous endoscopic gastrostomy) status ([facility] )     Port-A-Cath in place        Past Surgical History:   Procedure Laterality Date    BYPASS FEMORAL-FEMORAL Right     ESOPHAGOSCOPY N/A 8/24/2017    Procedure: ESOPHAGOSCOPY FLEXIBLE; Surgeon: Natalia Heller MD;  Location: [facility];  Service: ENT    IR ABDOMINAL ANGIOGRAPHY / INTERVENTION  11/14/2019    IR ABDOMINAL ANGIOGRAPHY / INTERVENTION  11/7/2019    ME [address] N/A 8/24/2017    Procedure: Bronchoscopy;  Surgeon: Natalia Heller MD;  Location: [facility];  Service: ENT    ME LARYNGOSCOPY,DIRCT,OP,BIOPSY N/A 8/24/2017    Procedure: LARYNGOSCOPY DIRECT;  Surgeon: Natalia Heller MD;  Location: [facility];  Service: ENT    ME William Collins Left 11/7/2019    Procedure: LEFT ENDARTERECTOMY ARTERIAL FEMORAL; L CFAE WITH PROFUNDOPLASTY; ARTERIOGRAM;RETROGRADE ILIAC STENTING;  Surgeon: Saravanan Rosado MD;  [facility]: [facility];  Service: Vascular    TOE AMPUTATION Right     2 toes       History reviewed  No pertinent family history  I have reviewed and agree with the history as documented  Social History     Tobacco Use    Smoking status: Current Every Day Smoker     Packs/day: 1 00     Years: 15 00     Pack years: 15 00     Types: Cigarettes    Smokeless tobacco: Never Used    Tobacco comment: 50+ years   Substance Use Topics    Alcohol use: Never     Alcohol/week: 0 0 standard drinks     Frequency: Patient refused     Drinks per session: Patient refused     Binge frequency: Patient refused    Drug use: No        Review of Systems   Constitutional: Negative for activity change, appetite change, chills, diaphoresis, fatigue, fever and unexpected weight change  HENT: Negative for congestion, dental problem, drooling, ear discharge, ear pain, facial swelling, hearing loss, mouth sores, nosebleeds, postnasal drip, rhinorrhea, sinus pressure, sinus pain, sneezing, sore throat, tinnitus, trouble swallowing and voice change  Eyes: Negative for photophobia, pain, discharge, redness, itching and visual disturbance  Respiratory: Negative for cough, chest tightness, shortness of breath and wheezing      Cardiovascular: Negative for chest pain, palpitations and leg swelling  Gastrointestinal: Negative for abdominal distention, abdominal pain, constipation, diarrhea, nausea and vomiting  Endocrine: Negative for cold intolerance, heat intolerance, polydipsia, polyphagia and polyuria  Genitourinary: Negative for difficulty urinating, dysuria, flank pain, frequency, hematuria and urgency  Musculoskeletal: Positive for arthralgias and joint swelling  Negative for back pain, myalgias, neck pain and neck stiffness  Skin: Positive for color change  Negative for pallor, rash and wound  Allergic/Immunologic: Negative for environmental allergies, food allergies and immunocompromised state  Neurological: Negative for dizziness, tremors, seizures, syncope, facial asymmetry, speech difficulty, weakness, light-headedness, numbness and headaches  Hematological: Negative for adenopathy  Does not bruise/bleed easily  Psychiatric/Behavioral: Negative for agitation, confusion and hallucinations  The patient is not nervous/anxious  All other systems reviewed and are negative  Physical Exam  Physical Exam   Constitutional: He is oriented to person, place, and time  He appears well-developed and well-nourished  No distress  /58 (BP Location: Right arm)   Pulse 84   Temp 98 1 °F (36 7 °C) (Oral)   Resp 18   Wt 52 6 kg (115 lb 15 4 oz)   SpO2 100%   BMI 19 30 kg/m²    HENT:   Head: Normocephalic and atraumatic  Right Ear: External ear normal    Left Ear: External ear normal    Nose: Nose normal    Mouth/Throat: Oropharynx is clear and moist  No oropharyngeal exudate  Eyes: Pupils are equal, round, and reactive to light  Conjunctivae and EOM are normal  Right eye exhibits no discharge  Left eye exhibits no discharge  No scleral icterus  Neck: Normal range of motion  Neck supple  No tracheal deviation present  No thyromegaly present  Cardiovascular: Normal rate, regular rhythm and intact distal pulses     Pulmonary/Chest: Effort normal and breath sounds normal  No stridor  No respiratory distress  He has no wheezes  He has no rales  He exhibits no tenderness  Abdominal: Soft  Bowel sounds are normal  He exhibits no distension and no mass  There is no tenderness  There is no rebound and no guarding  Musculoskeletal: Normal range of motion  He exhibits tenderness  He exhibits no edema or deformity  Lymphadenopathy:     He has no cervical adenopathy  Neurological: He is alert and oriented to person, place, and time  He displays normal reflexes  No cranial nerve deficit  He exhibits normal muscle tone  Coordination normal    Skin: Skin is warm and dry  Capillary refill takes less than 2 seconds  No rash noted  He is not diaphoretic  No erythema  There is pallor  Right foot - abnormal appearance - brawny discoloration with purple discoloration consistent with vascular insufficiency,  Cap refill greater than 3 seconds to all toes  Dorsalis pedis and posterior tibial pulses are not palpable  Patient is able to wiggle toes  He does have generalized pain to all surfaces of the foot  Psychiatric: He has a normal mood and affect  His behavior is normal  Judgment and thought content normal    Nursing note and vitals reviewed        Vital Signs  ED Triage Vitals   Temperature Pulse Respirations Blood Pressure SpO2   12/03/19 1127 12/03/19 1127 12/03/19 1127 12/03/19 1127 12/03/19 1127   98 1 °F (36 7 °C) 83 18 118/58 100 %      Temp Source Heart Rate Source Patient Position - Orthostatic VS BP Location FiO2 (%)   12/03/19 1127 12/03/19 1127 12/03/19 1127 12/03/19 1127 --   Oral Monitor Lying Right arm       Pain Score       12/03/19 1144       Worst Possible Pain           Vitals:    12/03/19 1127 12/03/19 1356 12/03/19 1830 12/03/19 1845   BP: 118/58 122/58 121/61 126/61   Pulse: 83 84 79 80   Patient Position - Orthostatic VS: Lying Lying Lying Lying         Visual Acuity      ED Medications  Medications   sodium chloride 0 9 % infusion (125 mL/hr Intravenous New Bag 12/3/19 1158)   iohexol (OMNIPAQUE) 350 MG/ML injection (MULTI-DOSE) 110 mL (110 mL Intravenous Given 12/3/19 1431)       Diagnostic Studies  Results Reviewed     Procedure Component Value Units Date/Time    Lactic acid, plasma [[id number]]  (Normal) Collected:  12/03/19 1156    Lab Status:  Final result Specimen:  Blood from Arm, Left Updated:  12/03/19 1230     LACTIC ACID 1 7 mmol/L     Narrative:       Result may be elevated if tourniquet was used during collection      Protime-INR [[id number]]  (Abnormal) Collected:  12/03/19 1156    Lab Status:  Final result Specimen:  Blood from Arm, Left Updated:  12/03/19 1230     Protime 63 8 seconds      INR 7 28    APTT [[id number]]  (Abnormal) Collected:  12/03/19 1156    Lab Status:  Final result Specimen:  Blood from Arm, Left Updated:  12/03/19 1230      seconds     Comprehensive metabolic panel [[id number]]  (Abnormal) Collected:  12/03/19 1156    Lab Status:  Final result Specimen:  Blood from Arm, Left Updated:  12/03/19 1227     Sodium 136 mmol/L      Potassium 4 7 mmol/L      Chloride 102 mmol/L      CO2 23 mmol/L      ANION GAP 11 mmol/L      BUN 22 mg/dL      Creatinine 0 93 mg/dL      Glucose 121 mg/dL      Calcium 8 8 mg/dL      AST 33 U/L      ALT 65 U/L      Alkaline Phosphatase 91 U/L      Total Protein 7 0 g/dL      Albumin 2 5 g/dL      Total Bilirubin 0 20 mg/dL      eGFR 83 ml/min/1 73sq m     Narrative:       Aj guidelines for Chronic Kidney Disease (CKD):     Stage 1 with normal or high GFR (GFR > 90 mL/min/1 73 square meters)    Stage 2 Mild CKD (GFR = 60-89 mL/min/1 73 square meters)    Stage 3A Moderate CKD (GFR = 45-59 mL/min/1 73 square meters)    Stage 3B Moderate CKD (GFR = 30-44 mL/min/1 73 square meters)    Stage 4 Severe CKD (GFR = 15-29 mL/min/1 73 square meters)    Stage 5 End Stage CKD (GFR <15 mL/min/1 73 square meters)  Note: GFR calculation is accurate only with a steady state creatinine    CBC and differential [[id number]]  (Abnormal) Collected:  12/03/19 1156    Lab Status:  Final result Specimen:  Blood from Arm, Left Updated:  12/03/19 1207     WBC 8 42 Thousand/uL      RBC 2 44 Million/uL      Hemoglobin 7 1 g/dL      Hematocrit 22 8 %      MCV 93 fL      MCH 29 1 pg      MCHC 31 1 g/dL      RDW 14 6 %      MPV 9 1 fL      Platelets 114 Thousands/uL      nRBC 0 /100 WBCs      Neutrophils Relative 82 %      Immat GRANS % 1 %      Lymphocytes Relative 8 %      Monocytes Relative 6 %      Eosinophils Relative 2 %      Basophils Relative 1 %      Neutrophils Absolute 7 05 Thousands/µL      Immature Grans Absolute 0 06 Thousand/uL      Lymphocytes Absolute 0 63 Thousands/µL      Monocytes Absolute 0 47 Thousand/µL      Eosinophils Absolute 0 16 Thousand/µL      Basophils Absolute 0 05 Thousands/µL                  VAS lower limb arterial duplex, limited, unilateral   Final Result by Cristina Bautista MD (12/03 1344)      CTA lower extremity right w wo contrast    (Results Pending)              Procedures  Procedures         ED Course  ED Course as of Dec 03 1903   Tue Dec 03, 2019   1233 Called spoke to vascular office - on call provider paged  [address] with [address]as vascular - will contact Dr Nuris Mendoza for further evalation       [phone] Cta results called to me  Spoke with dr Joshua Section regarding results  MDM  Number of Diagnoses or Management Options  Arterial thrombosis ([facility] [address] ): new and requires workup  Ischemic pain of right foot: new and requires workup  PAD (peripheral artery disease) ([address] ): new and requires workup  Diagnosis management comments: Differential diagnosis includes but is not limited to arterial obstruction, peripheral arterial disease, peripheral vascular disease, claudication, acute arterial obstruction, chronic venous stasis  Plan will check arterial duplex for evaluation of arterial evaluation     Lab results reviewed  CBC demonstrates normal white blood cell count 8 4, hemoglobin of 7 1 is low, hematocrit 22 8 is low  INR is supratherapeutic at 7 28  Lactic acid normal at 1 7   cmp reviewed - bun 22, creatinine 0 93  No renal failure  Arterial duplex RLE results reviewed: CONCLUSION:  Impression:  RIGHT LOWER LIMB:  The profunda artery appears acutely occluded  The popliteal is chronically  occluded  Diffuse disease noted throughout the femoral artery and tibioperoneal  disease  Unable to document a dorsalis pedis artery signal      Tech note: ABIs not performed due to patient's pain/intolerance  The patient is  also getting a CTA run off of his right leg  Technical findings given to [employer] at time of study  Case discussed with Mir Alas, vascular nurse practitioner who discussed case with Dr Alisha Zhang, recommended CTA of the lower extremity with IT runoff  Spoke with CT tech regarding ct scan results still pending/not read/reportedly paging vascular md to read ct - results should be available shortly  Spoke with IR (Estefania)  Dicussed cta LE results including new profunda short segment clot on the right which is new  Spoke with vascular surgery (oskin) - recommends transfer to [facility] for further vascular evaluation and management and possible lysis of profunda clot with improvement of coagulopathy  No heparin given inr 7 2  Spoke with transfer center - they will call back regarding details of transfer  Will need regular neurovascular checks during admission to foot  EMTALA forms completed  Patient signed out to WENDY Huerta PA-C pending call back from PACS for transfer details  The critical care time is separate of other billable procedures, treating other patients, and any teaching time      The critical care time was for: obtaining history from patient or surrogate, development of treatment plan with patient or surrogate, discussion with any applicable consultants, examination of the patient,ordering and performing treatments and interventions, ordering and reviewing any applicable laboratory or radiographic studies, reassessment of the patient for response to treatment, reviewing any pertinent and available old records, documentation time  The critical care time was necessary to prevent deterioration of the following organ systems: vascular system/acute left arterial occlusion (profunda)/perpheral arterial disease    Time spent (in minutes):140               Amount and/or Complexity of Data Reviewed  Clinical lab tests: ordered and reviewed  Tests in the radiology section of CPT®: ordered and reviewed  Tests in the medicine section of CPT®: ordered and reviewed  Discussion of test results with the performing providers: yes  Obtain history from someone other than the patient: yes (Children at bedside  )  Review and summarize past medical records: yes  Discuss the patient with other providers: yes  Independent visualization of images, tracings, or specimens: yes          Disposition  Final diagnoses:   PAD (peripheral artery disease) ([facility] )   Arterial thrombosis ([place] [address] )   Ischemic pain of right foot     Time reflects when diagnosis was documented in both MDM as applicable and the Disposition within this note     Time User Action Codes Description Comment    12/3/2019  6:35 PM Deitra Sharps Add [I73 9] PAD (peripheral artery disease) ([facility] )     12/3/2019  6:35 PM Deitra Sharps Add [I74 9] Arterial thrombosis ([facility] )     12/3/2019  6:36 PM Deitra Sharps Add Stefanie Veliz,  I99 9] Ischemic pain of right foot       ED Disposition     ED Disposition Condition Date/Time Comment    Transfer to Another Facility-In Network  Tue Dec 3, 2019  6:38 PM Pancho Cheney should be transferred out to [facility]  Follow-up Information    None         Patient's Medications   Discharge Prescriptions    No medications on file     No discharge procedures on file      ED Provider  Electronically Signed by           Amber Lyons PA-C  12/03/19 8861

## 2019-12-04 ENCOUNTER — HOSPITAL ENCOUNTER (INPATIENT)
Facility: HOSPITAL | Age: 69
LOS: 27 days | Discharge: NURSING, REHAB OR LONG-TERM CARE | DRG: 239 | End: 2019-12-31
Attending: SURGERY | Admitting: SURGERY
Payer: MEDICARE

## 2019-12-04 VITALS
WEIGHT: 116.4 LBS | DIASTOLIC BLOOD PRESSURE: 64 MMHG | SYSTOLIC BLOOD PRESSURE: 138 MMHG | HEIGHT: 65 IN | BODY MASS INDEX: 19.39 KG/M2 | RESPIRATION RATE: 14 BRPM | OXYGEN SATURATION: 100 % | HEART RATE: 72 BPM | TEMPERATURE: 97.8 F

## 2019-12-04 DIAGNOSIS — S88.111A BELOW-KNEE AMPUTATION OF RIGHT LOWER EXTREMITY (HCC): ICD-10-CM

## 2019-12-04 DIAGNOSIS — K92.1 MELENA: ICD-10-CM

## 2019-12-04 DIAGNOSIS — K92.1 HEMATOCHEZIA: Primary | ICD-10-CM

## 2019-12-04 DIAGNOSIS — D64.9 ANEMIA, UNSPECIFIED TYPE: ICD-10-CM

## 2019-12-04 DIAGNOSIS — I73.9 PAD (PERIPHERAL ARTERY DISEASE) (HCC): ICD-10-CM

## 2019-12-04 DIAGNOSIS — I70.229 CRITICAL LOWER LIMB ISCHEMIA (HCC): ICD-10-CM

## 2019-12-04 LAB
ABO GROUP BLD: NORMAL
ABO GROUP BLD: NORMAL
ANION GAP SERPL CALCULATED.3IONS-SCNC: 10 MMOL/L (ref 4–13)
ANION GAP SERPL CALCULATED.3IONS-SCNC: 5 MMOL/L (ref 4–13)
BASOPHILS # BLD AUTO: 0.05 THOUSANDS/ΜL (ref 0–0.1)
BASOPHILS # BLD AUTO: 0.06 THOUSANDS/ΜL (ref 0–0.1)
BASOPHILS NFR BLD AUTO: 1 % (ref 0–1)
BASOPHILS NFR BLD AUTO: 1 % (ref 0–1)
BLD GP AB SCN SERPL QL: NEGATIVE
BLD GP AB SCN SERPL QL: NEGATIVE
BUN SERPL-MCNC: 22 MG/DL (ref 5–25)
BUN SERPL-MCNC: 26 MG/DL (ref 5–25)
CALCIUM SERPL-MCNC: 8.4 MG/DL (ref 8.3–10.1)
CALCIUM SERPL-MCNC: 8.5 MG/DL (ref 8.3–10.1)
CHLORIDE SERPL-SCNC: 105 MMOL/L (ref 100–108)
CHLORIDE SERPL-SCNC: 111 MMOL/L (ref 100–108)
CO2 SERPL-SCNC: 22 MMOL/L (ref 21–32)
CO2 SERPL-SCNC: 25 MMOL/L (ref 21–32)
CREAT SERPL-MCNC: 0.73 MG/DL (ref 0.6–1.3)
CREAT SERPL-MCNC: 0.85 MG/DL (ref 0.6–1.3)
EOSINOPHIL # BLD AUTO: 0.12 THOUSAND/ΜL (ref 0–0.61)
EOSINOPHIL # BLD AUTO: 0.15 THOUSAND/ΜL (ref 0–0.61)
EOSINOPHIL NFR BLD AUTO: 2 % (ref 0–6)
EOSINOPHIL NFR BLD AUTO: 2 % (ref 0–6)
ERYTHROCYTE [DISTWIDTH] IN BLOOD BY AUTOMATED COUNT: 14.8 % (ref 11.6–15.1)
ERYTHROCYTE [DISTWIDTH] IN BLOOD BY AUTOMATED COUNT: 14.9 % (ref 11.6–15.1)
GFR SERPL CREATININE-BSD FRML MDRD: 88 ML/MIN/1.73SQ M
GFR SERPL CREATININE-BSD FRML MDRD: 94 ML/MIN/1.73SQ M
GLUCOSE SERPL-MCNC: 115 MG/DL (ref 65–140)
GLUCOSE SERPL-MCNC: 126 MG/DL (ref 65–140)
HCT VFR BLD AUTO: 19.6 % (ref 36.5–49.3)
HCT VFR BLD AUTO: 21.7 % (ref 36.5–49.3)
HGB BLD-MCNC: 6.3 G/DL (ref 12–17)
HGB BLD-MCNC: 6.9 G/DL (ref 12–17)
IMM GRANULOCYTES # BLD AUTO: 0.05 THOUSAND/UL (ref 0–0.2)
IMM GRANULOCYTES # BLD AUTO: 0.08 THOUSAND/UL (ref 0–0.2)
IMM GRANULOCYTES NFR BLD AUTO: 1 % (ref 0–2)
IMM GRANULOCYTES NFR BLD AUTO: 1 % (ref 0–2)
INR PPP: 6.29 (ref 0.84–1.19)
INR PPP: 7.2 (ref 0.84–1.19)
LYMPHOCYTES # BLD AUTO: 0.73 THOUSANDS/ΜL (ref 0.6–4.47)
LYMPHOCYTES # BLD AUTO: 0.86 THOUSANDS/ΜL (ref 0.6–4.47)
LYMPHOCYTES NFR BLD AUTO: 12 % (ref 14–44)
LYMPHOCYTES NFR BLD AUTO: 8 % (ref 14–44)
MCH RBC QN AUTO: 30.3 PG (ref 26.8–34.3)
MCH RBC QN AUTO: 30.3 PG (ref 26.8–34.3)
MCHC RBC AUTO-ENTMCNC: 31.8 G/DL (ref 31.4–37.4)
MCHC RBC AUTO-ENTMCNC: 32.1 G/DL (ref 31.4–37.4)
MCV RBC AUTO: 94 FL (ref 82–98)
MCV RBC AUTO: 95 FL (ref 82–98)
MONOCYTES # BLD AUTO: 0.38 THOUSAND/ΜL (ref 0.17–1.22)
MONOCYTES # BLD AUTO: 0.52 THOUSAND/ΜL (ref 0.17–1.22)
MONOCYTES NFR BLD AUTO: 5 % (ref 4–12)
MONOCYTES NFR BLD AUTO: 6 % (ref 4–12)
NEUTROPHILS # BLD AUTO: 5.96 THOUSANDS/ΜL (ref 1.85–7.62)
NEUTROPHILS # BLD AUTO: 7.16 THOUSANDS/ΜL (ref 1.85–7.62)
NEUTS SEG NFR BLD AUTO: 79 % (ref 43–75)
NEUTS SEG NFR BLD AUTO: 82 % (ref 43–75)
NRBC BLD AUTO-RTO: 0 /100 WBCS
NRBC BLD AUTO-RTO: 0 /100 WBCS
PLATELET # BLD AUTO: 254 THOUSANDS/UL (ref 149–390)
PLATELET # BLD AUTO: 315 THOUSANDS/UL (ref 149–390)
PMV BLD AUTO: 9.1 FL (ref 8.9–12.7)
PMV BLD AUTO: 9.2 FL (ref 8.9–12.7)
POTASSIUM SERPL-SCNC: 4.1 MMOL/L (ref 3.5–5.3)
POTASSIUM SERPL-SCNC: 4.2 MMOL/L (ref 3.5–5.3)
PROTHROMBIN TIME: 55.1 SECONDS (ref 11.6–14.5)
PROTHROMBIN TIME: 63.2 SECONDS (ref 11.6–14.5)
RBC # BLD AUTO: 2.08 MILLION/UL (ref 3.88–5.62)
RBC # BLD AUTO: 2.28 MILLION/UL (ref 3.88–5.62)
RH BLD: POSITIVE
RH BLD: POSITIVE
SODIUM SERPL-SCNC: 137 MMOL/L (ref 136–145)
SODIUM SERPL-SCNC: 141 MMOL/L (ref 136–145)
SPECIMEN EXPIRATION DATE: NORMAL
SPECIMEN EXPIRATION DATE: NORMAL
WBC # BLD AUTO: 7.42 THOUSAND/UL (ref 4.31–10.16)
WBC # BLD AUTO: 8.7 THOUSAND/UL (ref 4.31–10.16)

## 2019-12-04 PROCEDURE — 85025 COMPLETE CBC W/AUTO DIFF WBC: CPT | Performed by: PHYSICIAN ASSISTANT

## 2019-12-04 PROCEDURE — 99222 1ST HOSP IP/OBS MODERATE 55: CPT | Performed by: ANESTHESIOLOGY

## 2019-12-04 PROCEDURE — 86900 BLOOD TYPING SEROLOGIC ABO: CPT | Performed by: NURSE PRACTITIONER

## 2019-12-04 PROCEDURE — 86850 RBC ANTIBODY SCREEN: CPT | Performed by: NURSE PRACTITIONER

## 2019-12-04 PROCEDURE — 86850 RBC ANTIBODY SCREEN: CPT | Performed by: SURGERY

## 2019-12-04 PROCEDURE — 30233N1 TRANSFUSION OF NONAUTOLOGOUS RED BLOOD CELLS INTO PERIPHERAL VEIN, PERCUTANEOUS APPROACH: ICD-10-PCS | Performed by: ANESTHESIOLOGY

## 2019-12-04 PROCEDURE — ND001 PR NO DOCUMENTATION: Performed by: SURGERY

## 2019-12-04 PROCEDURE — P9058 RBC, L/R, CMV-NEG, IRRAD: HCPCS

## 2019-12-04 PROCEDURE — 86900 BLOOD TYPING SEROLOGIC ABO: CPT | Performed by: SURGERY

## 2019-12-04 PROCEDURE — 80048 BASIC METABOLIC PNL TOTAL CA: CPT | Performed by: PHYSICIAN ASSISTANT

## 2019-12-04 PROCEDURE — 86901 BLOOD TYPING SEROLOGIC RH(D): CPT | Performed by: NURSE PRACTITIONER

## 2019-12-04 PROCEDURE — 86901 BLOOD TYPING SEROLOGIC RH(D): CPT | Performed by: SURGERY

## 2019-12-04 PROCEDURE — 85025 COMPLETE CBC W/AUTO DIFF WBC: CPT | Performed by: SURGERY

## 2019-12-04 PROCEDURE — 80048 BASIC METABOLIC PNL TOTAL CA: CPT | Performed by: SURGERY

## 2019-12-04 PROCEDURE — 86920 COMPATIBILITY TEST SPIN: CPT

## 2019-12-04 PROCEDURE — 85610 PROTHROMBIN TIME: CPT | Performed by: PHYSICIAN ASSISTANT

## 2019-12-04 PROCEDURE — 86923 COMPATIBILITY TEST ELECTRIC: CPT

## 2019-12-04 PROCEDURE — 85610 PROTHROMBIN TIME: CPT | Performed by: SURGERY

## 2019-12-04 RX ORDER — GABAPENTIN 100 MG/1
100 CAPSULE ORAL DAILY
Status: DISCONTINUED | OUTPATIENT
Start: 2019-12-05 | End: 2019-12-31 | Stop reason: HOSPADM

## 2019-12-04 RX ORDER — THIAMINE MONONITRATE (VIT B1) 100 MG
100 TABLET ORAL DAILY
Status: DISCONTINUED | OUTPATIENT
Start: 2019-12-04 | End: 2019-12-04 | Stop reason: HOSPADM

## 2019-12-04 RX ORDER — HYDROMORPHONE HCL/PF 1 MG/ML
0.2 SYRINGE (ML) INJECTION
Status: DISCONTINUED | OUTPATIENT
Start: 2019-12-04 | End: 2019-12-31 | Stop reason: HOSPADM

## 2019-12-04 RX ORDER — ATORVASTATIN CALCIUM 40 MG/1
20 TABLET, FILM COATED ORAL
Status: DISCONTINUED | OUTPATIENT
Start: 2019-12-04 | End: 2019-12-04 | Stop reason: HOSPADM

## 2019-12-04 RX ORDER — LANOLIN ALCOHOL/MO/W.PET/CERES
6 CREAM (GRAM) TOPICAL
Status: DISCONTINUED | OUTPATIENT
Start: 2019-12-04 | End: 2019-12-04 | Stop reason: HOSPADM

## 2019-12-04 RX ORDER — OXYCODONE HYDROCHLORIDE 5 MG/1
5 TABLET ORAL EVERY 4 HOURS PRN
Status: DISCONTINUED | OUTPATIENT
Start: 2019-12-04 | End: 2019-12-31 | Stop reason: HOSPADM

## 2019-12-04 RX ORDER — AMOXICILLIN 250 MG
1 CAPSULE ORAL 2 TIMES DAILY
Status: DISCONTINUED | OUTPATIENT
Start: 2019-12-04 | End: 2019-12-31 | Stop reason: HOSPADM

## 2019-12-04 RX ORDER — AMLODIPINE BESYLATE 10 MG/1
10 TABLET ORAL DAILY
Status: DISCONTINUED | OUTPATIENT
Start: 2019-12-05 | End: 2019-12-31 | Stop reason: HOSPADM

## 2019-12-04 RX ORDER — SODIUM CHLORIDE, SODIUM LACTATE, POTASSIUM CHLORIDE, CALCIUM CHLORIDE 600; 310; 30; 20 MG/100ML; MG/100ML; MG/100ML; MG/100ML
75 INJECTION, SOLUTION INTRAVENOUS CONTINUOUS
Status: DISCONTINUED | OUTPATIENT
Start: 2019-12-04 | End: 2019-12-24

## 2019-12-04 RX ORDER — CLOPIDOGREL BISULFATE 75 MG/1
75 TABLET ORAL DAILY
Status: DISCONTINUED | OUTPATIENT
Start: 2019-12-04 | End: 2019-12-04 | Stop reason: HOSPADM

## 2019-12-04 RX ORDER — AMLODIPINE BESYLATE 10 MG/1
10 TABLET ORAL DAILY
Status: DISCONTINUED | OUTPATIENT
Start: 2019-12-04 | End: 2019-12-04 | Stop reason: HOSPADM

## 2019-12-04 RX ORDER — POLYETHYLENE GLYCOL 3350 17 G/17G
17 POWDER, FOR SOLUTION ORAL 2 TIMES DAILY
Status: DISCONTINUED | OUTPATIENT
Start: 2019-12-04 | End: 2019-12-04 | Stop reason: HOSPADM

## 2019-12-04 RX ORDER — OXYCODONE HYDROCHLORIDE 5 MG/1
2.5 TABLET ORAL EVERY 4 HOURS PRN
Status: DISCONTINUED | OUTPATIENT
Start: 2019-12-04 | End: 2019-12-31 | Stop reason: HOSPADM

## 2019-12-04 RX ORDER — ONDANSETRON 2 MG/ML
4 INJECTION INTRAMUSCULAR; INTRAVENOUS EVERY 4 HOURS PRN
Status: DISCONTINUED | OUTPATIENT
Start: 2019-12-04 | End: 2019-12-31 | Stop reason: HOSPADM

## 2019-12-04 RX ORDER — NICOTINE 21 MG/24HR
1 PATCH, TRANSDERMAL 24 HOURS TRANSDERMAL DAILY
Status: DISCONTINUED | OUTPATIENT
Start: 2019-12-05 | End: 2019-12-31 | Stop reason: HOSPADM

## 2019-12-04 RX ORDER — TAMSULOSIN HYDROCHLORIDE 0.4 MG/1
0.4 CAPSULE ORAL
Status: DISCONTINUED | OUTPATIENT
Start: 2019-12-04 | End: 2019-12-04 | Stop reason: HOSPADM

## 2019-12-04 RX ORDER — HYDROMORPHONE HCL/PF 1 MG/ML
0.5 SYRINGE (ML) INJECTION EVERY 4 HOURS PRN
Status: DISCONTINUED | OUTPATIENT
Start: 2019-12-04 | End: 2019-12-04 | Stop reason: HOSPADM

## 2019-12-04 RX ORDER — LANOLIN ALCOHOL/MO/W.PET/CERES
6 CREAM (GRAM) TOPICAL
Status: DISCONTINUED | OUTPATIENT
Start: 2019-12-04 | End: 2019-12-31 | Stop reason: HOSPADM

## 2019-12-04 RX ORDER — ASPIRIN 81 MG/1
81 TABLET ORAL DAILY
Status: DISCONTINUED | OUTPATIENT
Start: 2019-12-05 | End: 2019-12-05

## 2019-12-04 RX ORDER — ASPIRIN 81 MG/1
81 TABLET ORAL DAILY
Status: DISCONTINUED | OUTPATIENT
Start: 2019-12-04 | End: 2019-12-04 | Stop reason: HOSPADM

## 2019-12-04 RX ORDER — CLOPIDOGREL BISULFATE 75 MG/1
75 TABLET ORAL DAILY
Status: DISCONTINUED | OUTPATIENT
Start: 2019-12-05 | End: 2019-12-31 | Stop reason: HOSPADM

## 2019-12-04 RX ORDER — THIAMINE MONONITRATE (VIT B1) 100 MG
100 TABLET ORAL DAILY
Status: DISCONTINUED | OUTPATIENT
Start: 2019-12-05 | End: 2019-12-31 | Stop reason: HOSPADM

## 2019-12-04 RX ORDER — AMOXICILLIN 250 MG
1 CAPSULE ORAL 2 TIMES DAILY
Status: DISCONTINUED | OUTPATIENT
Start: 2019-12-04 | End: 2019-12-04 | Stop reason: HOSPADM

## 2019-12-04 RX ORDER — OXYCODONE HYDROCHLORIDE 5 MG/1
5 TABLET ORAL EVERY 4 HOURS PRN
Status: DISCONTINUED | OUTPATIENT
Start: 2019-12-04 | End: 2019-12-04 | Stop reason: HOSPADM

## 2019-12-04 RX ORDER — ATORVASTATIN CALCIUM 20 MG/1
20 TABLET, FILM COATED ORAL
Status: DISCONTINUED | OUTPATIENT
Start: 2019-12-05 | End: 2019-12-31 | Stop reason: HOSPADM

## 2019-12-04 RX ORDER — GABAPENTIN 100 MG/1
100 CAPSULE ORAL DAILY
Status: DISCONTINUED | OUTPATIENT
Start: 2019-12-04 | End: 2019-12-04 | Stop reason: HOSPADM

## 2019-12-04 RX ORDER — OXYCODONE HYDROCHLORIDE 10 MG/1
10 TABLET ORAL EVERY 4 HOURS PRN
Status: DISCONTINUED | OUTPATIENT
Start: 2019-12-04 | End: 2019-12-04 | Stop reason: HOSPADM

## 2019-12-04 RX ORDER — FERROUS SULFATE 325(65) MG
325 TABLET ORAL 2 TIMES DAILY WITH MEALS
Status: DISCONTINUED | OUTPATIENT
Start: 2019-12-04 | End: 2019-12-04 | Stop reason: HOSPADM

## 2019-12-04 RX ORDER — TAMSULOSIN HYDROCHLORIDE 0.4 MG/1
0.4 CAPSULE ORAL
Status: DISCONTINUED | OUTPATIENT
Start: 2019-12-05 | End: 2019-12-31 | Stop reason: HOSPADM

## 2019-12-04 RX ADMIN — SODIUM CHLORIDE, SODIUM LACTATE, POTASSIUM CHLORIDE, AND CALCIUM CHLORIDE 75 ML/HR: .6; .31; .03; .02 INJECTION, SOLUTION INTRAVENOUS at 22:18

## 2019-12-04 RX ADMIN — SENNOSIDES AND DOCUSATE SODIUM 1 TABLET: 8.6; 5 TABLET ORAL at 18:59

## 2019-12-04 RX ADMIN — OXYCODONE HYDROCHLORIDE 10 MG: 10 TABLET ORAL at 15:18

## 2019-12-04 RX ADMIN — ASPIRIN 81 MG: 81 TABLET, COATED ORAL at 09:21

## 2019-12-04 RX ADMIN — FERROUS SULFATE TAB 325 MG (65 MG ELEMENTAL FE) 325 MG: 325 (65 FE) TAB at 18:34

## 2019-12-04 RX ADMIN — MELATONIN 6 MG: 3 TAB ORAL at 22:15

## 2019-12-04 RX ADMIN — POLYETHYLENE GLYCOL 3350 17 G: 17 POWDER, FOR SOLUTION ORAL at 08:30

## 2019-12-04 RX ADMIN — TAMSULOSIN HYDROCHLORIDE 0.4 MG: 0.4 CAPSULE ORAL at 18:34

## 2019-12-04 RX ADMIN — SENNOSIDES AND DOCUSATE SODIUM 1 TABLET: 8.6; 5 TABLET ORAL at 22:15

## 2019-12-04 RX ADMIN — SODIUM CHLORIDE 125 ML/HR: 0.9 INJECTION, SOLUTION INTRAVENOUS at 00:30

## 2019-12-04 RX ADMIN — HYDROMORPHONE HYDROCHLORIDE 0.5 MG: 1 INJECTION, SOLUTION INTRAMUSCULAR; INTRAVENOUS; SUBCUTANEOUS at 05:40

## 2019-12-04 RX ADMIN — OXYCODONE HYDROCHLORIDE 10 MG: 10 TABLET ORAL at 00:25

## 2019-12-04 RX ADMIN — THIAMINE HCL TAB 100 MG 100 MG: 100 TAB at 08:26

## 2019-12-04 RX ADMIN — CLOPIDOGREL BISULFATE 75 MG: 75 TABLET ORAL at 09:21

## 2019-12-04 RX ADMIN — FERROUS SULFATE TAB 325 MG (65 MG ELEMENTAL FE) 325 MG: 325 (65 FE) TAB at 08:26

## 2019-12-04 RX ADMIN — POLYETHYLENE GLYCOL 3350 17 G: 17 POWDER, FOR SOLUTION ORAL at 18:34

## 2019-12-04 RX ADMIN — SODIUM CHLORIDE 125 ML/HR: 0.9 INJECTION, SOLUTION INTRAVENOUS at 10:45

## 2019-12-04 RX ADMIN — ATORVASTATIN CALCIUM 20 MG: 40 TABLET, FILM COATED ORAL at 18:33

## 2019-12-04 RX ADMIN — HYDROMORPHONE HYDROCHLORIDE 0.5 MG: 1 INJECTION, SOLUTION INTRAMUSCULAR; INTRAVENOUS; SUBCUTANEOUS at 00:26

## 2019-12-04 RX ADMIN — AMLODIPINE BESYLATE 10 MG: 10 TABLET ORAL at 08:25

## 2019-12-04 RX ADMIN — OXYCODONE HYDROCHLORIDE 5 MG: 5 TABLET ORAL at 08:28

## 2019-12-04 RX ADMIN — SENNOSIDES AND DOCUSATE SODIUM 1 TABLET: 8.6; 5 TABLET ORAL at 08:26

## 2019-12-04 RX ADMIN — GABAPENTIN 100 MG: 100 CAPSULE ORAL at 08:26

## 2019-12-04 RX ADMIN — MELATONIN 6 MG: 3 TAB ORAL at 00:37

## 2019-12-04 NOTE — ASSESSMENT & PLAN NOTE
- mercedez IIa, threatened ischemia  - pending transfer to [facility] when beds available  - neurovascular checks Q1  - holding heparin until INR normalizes   - continue asa / plavix

## 2019-12-04 NOTE — ASSESSMENT & PLAN NOTE
- chronic  - did have ABLA during last admission from groin hematoma  - monitor daily H/H  - ferrous sulfate  - did have black BM in ED (noted while room being cleaned)  - check FOBT

## 2019-12-04 NOTE — PLAN OF CARE
Problem: Potential for Falls  Goal: Patient will remain free of falls  Description  INTERVENTIONS:  - Assess patient frequently for physical needs  -  Identify cognitive and physical deficits and behaviors that affect risk of falls    -  [place] fall precautions as indicated by assessment   - Educate patient/family on patient safety including physical limitations  - Instruct patient to call for assistance with activity based on assessment  - Modify environment to reduce risk of injury  - Consider OT/PT consult to assist with strengthening/mobility  Outcome: Progressing     Problem: Prexisting or High Potential for Compromised Skin Integrity  Goal: Skin integrity is maintained or improved  Description  INTERVENTIONS:  - Identify patients at risk for skin breakdown  - Assess and monitor skin integrity  - Assess and monitor nutrition and hydration status  - Monitor labs   - Assess for incontinence   - Turn and reposition patient  - Assist with mobility/ambulation  - Relieve pressure over bony prominences  - Avoid friction and shearing  - Provide appropriate hygiene as needed including keeping skin clean and dry  - Evaluate need for skin moisturizer/barrier cream  - Collaborate with interdisciplinary team   - Patient/family teaching  - Consider wound care consult   Outcome: Progressing     Problem: DISCHARGE PLANNING - CARE MANAGEMENT  Goal: Discharge to post-acute care or home with appropriate resources  Description  INTERVENTIONS:  - Conduct assessment to determine patient/family and health care team treatment goals, and need for post-acute services based on payer coverage, community resources, and patient preferences, and barriers to discharge  - Address psychosocial, clinical, and financial barriers to discharge as identified in assessment in conjunction with the patient/family and health care team  - Arrange appropriate level of post-acute services according to patients   needs and preference and payer coverage in collaboration with the physician and health care team  - Communicate with and update the patient/family, physician, and health care team regarding progress on the discharge plan  - Arrange appropriate transportation to post-acute venues  Outcome: Progressing     Problem: PAIN - ADULT  Goal: Verbalizes/displays adequate comfort level or baseline comfort level  Description  Interventions:  - Encourage patient to monitor pain and request assistance  - Assess pain using appropriate pain scale  - Administer analgesics based on type and severity of pain and evaluate response  - Implement non-pharmacological measures as appropriate and evaluate response  - Consider cultural and social influences on pain and pain management  - Notify physician/advanced practitioner if interventions unsuccessful or patient reports new pain  Outcome: Progressing     Problem: SAFETY ADULT  Goal: Maintain or return to baseline ADL function  Description  INTERVENTIONS:  -  Assess patient's ability to carry out ADLs; assess patient's baseline for ADL function and identify physical deficits which impact ability to perform ADLs (bathing, care of mouth/teeth, toileting, grooming, dressing, etc )  - Assess/evaluate cause of self-care deficits   - Assess range of motion  - Assess patient's mobility; develop plan if impaired  - Assess patient's need for assistive devices and provide as appropriate  - Encourage maximum independence but intervene and supervise when necessary  - Involve family in performance of ADLs  - Assess for home care needs following discharge   - Consider OT consult to assist with ADL evaluation and planning for discharge  - Provide patient education as appropriate  Outcome: Progressing  Goal: Maintain or return mobility status to optimal level  Description  INTERVENTIONS:  - Assess patient's baseline mobility status (ambulation, transfers, stairs, etc )    - Identify cognitive and physical deficits and behaviors that affect mobility  - Identify mobility aids required to assist with transfers and/or ambulation (gait belt, sit-to-stand, lift, walker, cane, etc )  - [place] fall precautions as indicated by assessment  - Record patient progress and toleration of activity level on Mobility SBAR; progress patient to next Phase/Stage  - Instruct patient to call for assistance with activity based on assessment  - Consider rehabilitation consult to assist with strengthening/weightbearing, etc   Outcome: Progressing     Problem: DISCHARGE PLANNING  Goal: Discharge to home or other facility with appropriate resources  Description  INTERVENTIONS:  - Identify barriers to discharge w/patient and caregiver  - Arrange for needed discharge resources and transportation as appropriate  - Identify discharge learning needs (meds, wound care, etc )  - Arrange for interpretive services to assist at discharge as needed  - Refer to Case Management Department for coordinating discharge planning if the patient needs post-hospital services based on physician/advanced practitioner order or complex needs related to functional status, cognitive ability, or social support system  Outcome: Progressing     Problem: Knowledge Deficit  Goal: Patient/family/caregiver demonstrates understanding of disease process, treatment plan, medications, and discharge instructions  Description  Complete learning assessment and assess knowledge base    Interventions:  - Provide teaching at level of understanding  - Provide teaching via preferred learning methods  Outcome: Progressing     Problem: CARDIOVASCULAR - ADULT  Goal: Maintains optimal cardiac output and hemodynamic stability  Description  INTERVENTIONS:  - Monitor I/O, vital signs and rhythm  - Monitor for S/S and trends of decreased cardiac output  - Administer and titrate ordered vasoactive medications to optimize hemodynamic stability  - Assess quality of pulses, skin color and temperature  - Assess for signs of decreased coronary artery perfusion  - Instruct patient to report change in severity of symptoms  Outcome: Progressing  Goal: Absence of cardiac dysrhythmias or at baseline rhythm  Description  INTERVENTIONS:  - Continuous cardiac monitoring, vital signs, obtain 12 lead EKG if ordered  - Administer antiarrhythmic and heart rate control medications as ordered  - Monitor electrolytes and administer replacement therapy as ordered  Outcome: Progressing     Problem: SKIN/TISSUE INTEGRITY - ADULT  Goal: Skin integrity remains intact  Description  INTERVENTIONS  - Identify patients at risk for skin breakdown  - Assess and monitor skin integrity  - Assess and monitor nutrition and hydration status  - Monitor labs (i e  albumin)  - Assess for incontinence   - Turn and reposition patient  - Assist with mobility/ambulation  - Relieve pressure over bony prominences  - Avoid friction and shearing  - Provide appropriate hygiene as needed including keeping skin clean and dry  - Evaluate need for skin moisturizer/barrier cream  - Collaborate with interdisciplinary team (i e  Nutrition, Rehabilitation, etc )   - Patient/family teaching  Outcome: Progressing  Goal: Incision(s), wounds(s) or drain site(s) healing without S/S of infection  Description  INTERVENTIONS  - Assess and document risk factors for skin impairment   - Assess and document dressing, incision, wound bed, drain sites and surrounding tissue  - Consider nutrition services referral as needed  - Oral mucous membranes remain intact  - Provide patient/ family education  Outcome: Progressing  Goal: Oral mucous membranes remain intact  Description  INTERVENTIONS  - Assess oral mucosa and hygiene practices  - Implement preventative oral hygiene regimen  - Implement oral medicated treatments as ordered  - Initiate Nutrition services referral as needed  Outcome: Progressing     Problem: HEMATOLOGIC - ADULT  Goal: Maintains hematologic stability  Description  INTERVENTIONS  - Assess for signs and symptoms of bleeding or hemorrhage  - Monitor labs  - Administer supportive blood products/factors as ordered and appropriate  Outcome: Progressing     Problem: MUSCULOSKELETAL - ADULT  Goal: Maintain or return mobility to safest level of function  Description  INTERVENTIONS:  - Assess patient's ability to carry out ADLs; assess patient's baseline for ADL function and identify physical deficits which impact ability to perform ADLs (bathing, care of mouth/teeth, toileting, grooming, dressing, etc )  - Assess/evaluate cause of self-care deficits   - Assess range of motion  - Assess patient's mobility  - Assess patient's need for assistive devices and provide as appropriate  - Encourage maximum independence but intervene and supervise when necessary  - Involve family in performance of ADLs  - Assess for home care needs following discharge   - Consider OT consult to assist with ADL evaluation and planning for discharge  - Provide patient education as appropriate  Outcome: Progressing  Goal: Maintain proper alignment of affected body part  Description  INTERVENTIONS:  - Support, maintain and protect limb and body alignment  - Provide patient/ family with appropriate education  Outcome: Progressing

## 2019-12-04 NOTE — H&P
History and Physical- Mar Batista 1950, 78 y o  male MRN: [id number]    Unit/Bed#:  Encounter: [id number]    Primary Care Provider: Dylan Denis MD   Date and time admitted to hospital: 12/3/2019 11:24 AM    77 yo M w PMH HTN, HLD, PAD, tobacco abuse, anemia who is maintained for frequent neurovascular checks for an acute R PFA occlusion w threatened limb ischemia, pending transfer to [facility] for vascular evaluation      Anemia  Assessment & Plan  - chronic  - did have ABLA during last admission from groin hematoma  - monitor daily H/H  - ferrous sulfate  - did have black BM in ED (noted while room being cleaned)  - check FOBT    Arterial occlusion, R PFA, acute, segmental  Assessment & Plan  - mercedez IIa, threatened ischemia  - pending transfer to [facility] when beds available  - neurovascular checks Q1  - holding heparin until INR normalizes   - continue asa / plavix    Supratherapeutic INR  Assessment & Plan  - recheck in AM  - hold coumadin  - hold DVT ppx, heparin gtt until INR below 3 per vascular    PAD (peripheral artery disease) ([address] )  Assessment & Plan  - L endarterectomy, profundoplasty, SFA embolectomy and retrograde iliac stenting (11/6)  - LLE angio and treatment of residual L SFA stenosis (11/14)  - TPA and SFA angioplasty for thrombus of R posterior tibial collaterals  - d/c w R foot slightly cooler than L but intact m/s function  - d/c on coumadin, asa, plavix    Dependence on nicotine from cigarettes  Assessment & Plan  - encourage smoking cessation  - nicotine patch if needed    History of cancer tonsil  Assessment & Plan  - prior PEG and port, now removed    -------------------------------------------------------------------------------------------------------------  Chief Complaint: R foot pain     History of Present Illness   HX and PE limited by: [place] speaking predominately and requires translation  Mar Batista is a 78 y o  male w PMH PAD, HTN, tobacco abuse, PVD who presents from rehab with c/o R foot pain  He has a history of a L femoral endarterectomy, profundoplasty, SFA embolectomy and iliac stenting on 11/6/19  Had residual L SFA stenosis and developed thrombus in R collateral posterior tibial artery for which he received TPA and SFA angioplasty  Daughter reports in 2012 he had two vascular surgeries to his R leg and at this time he had his R 34d and 4th toes amputated  Daughter reports since his recent discharge from the hospital in November he has had R foot pain however today she noted a color changes and edema to the R foot  In the ED he had a LEAD and RLE CTA that revealed acute occlusion of the R PFA w chronic R popliteal occlusion  No PT / DP doppler pulses detected in the ED, has R popliteal Doppler signal  The ED discussed the case w vascular surgery  Because there were no beds at [facility] patient will be admitted to [facility] ICU for frequent neurovascular checks overnight and will be transferred to [facility] for vascular evaluation once beds are available  History obtained from chart review, the patient and daughter, Johnna Murphy ([phone])   -------------------------------------------------------------------------------------------------------------  Dispo: Admit to ICU    Code Status: Level 1 - Full Code  --------------------------------------------------------------------------------------------------------------  Review of Systems   Constitutional: Negative for activity change, chills, diaphoresis, fatigue and fever  HENT: Negative for congestion and rhinorrhea  Eyes: Negative for pain  Respiratory: Negative for cough, chest tightness, shortness of breath and wheezing  Cardiovascular: Negative for chest pain and palpitations  Gastrointestinal: Negative for abdominal distention, constipation, diarrhea, nausea and vomiting  Genitourinary: Negative for difficulty urinating and dysuria  Musculoskeletal: Positive for arthralgias (R foot pain )  Negative for myalgias  Neurological: Negative for dizziness, weakness, light-headedness and headaches  Psychiatric/Behavioral: The patient is not nervous/anxious  A 12-point, complete review of systems was reviewed and negative except as stated above     Physical Exam   Constitutional: He is oriented to person, place, and time  He appears well-developed  No distress  Cachectic, thin appearance   HENT:   Head: Normocephalic and atraumatic  Eyes: Pupils are equal, round, and reactive to light  Neck: Normal range of motion  Neck supple  No tracheal deviation present  Cardiovascular: Normal rate, regular rhythm, normal heart sounds and intact distal pulses  Exam reveals no gallop and no friction rub  No murmur heard  Pulmonary/Chest: Effort normal and breath sounds normal  No respiratory distress  He has no wheezes  He has no rales  Abdominal: Soft  Bowel sounds are normal  He exhibits no distension and no mass  There is no tenderness  There is no guarding  Musculoskeletal: He exhibits no edema or deformity  S/p amputation R 3rd and 4th toes  The R great toe and 2nd toe are ecchymotic w no sensation and no cap refill, decreased motor function at toes but motor function of foot is intact   R dorsum and solar aspect of foot are exquisitely tender to palpation and cool to touch in comparison to the L side  No DP or PT doppler signals, has doppler signal to R popliteal   Neurological: He is alert and oriented to person, place, and time  Skin: Skin is warm and dry  He is not diaphoretic  Psychiatric: He has a normal mood and affect  His behavior is normal    Nursing note and vitals reviewed      --------------------------------------------------------------------------------------------------------------  Historical Information   Past Medical History:   Diagnosis Date    Cancer ([place] [address] )     throat cancer    PAD (peripheral artery disease) ([place] [address] ) 11/2/2019    PEG (percutaneous endoscopic gastrostomy) status [address] [address]     Port-A-Cath in place      Past Surgical History:   Procedure Laterality Date    BYPASS FEMORAL-FEMORAL Right     ESOPHAGOSCOPY N/A 8/24/2017    Procedure: ESOPHAGOSCOPY FLEXIBLE;  Surgeon: Patsy Cotter MD;  [facility];  Service: ENT    IR ABDOMINAL ANGIOGRAPHY / INTERVENTION  11/14/2019    IR ABDOMINAL ANGIOGRAPHY / INTERVENTION  11/7/2019    [address] N/A 8/24/2017    Procedure: Bronchoscopy;  Surgeon: Patsy Cotter MD;  [facility];  Service: ENT    NC LARYNGOSCOPY,DIRCT,OP,BIOPSY N/A 8/24/2017    Procedure: LARYNGOSCOPY DIRECT;  Surgeon: Patsy Cotter MD;  [facility];  Service: ENT    NC [place] Left 11/7/2019    Procedure: LEFT ENDARTERECTOMY ARTERIAL FEMORAL; L CFAE WITH PROFUNDOPLASTY; ARTERIOGRAM;RETROGRADE ILIAC STENTING;  Surgeon: Mickey Jeffries MD;  [facility];  Service: Vascular    TOE AMPUTATION Right     2 toes     Social History   Social History     Substance and Sexual Activity   Alcohol Use Never    Alcohol/week: 0 0 standard drinks    Frequency: Patient refused    Drinks per session: Patient refused    Binge frequency: Patient refused     Social History     Substance and Sexual Activity   Drug Use No     Social History     Tobacco Use   Smoking Status Current Every Day Smoker    Packs/day: 1 00    Years: 15 00    Pack years: 15 00    Types: Cigarettes   Smokeless Tobacco Never Used   Tobacco Comment    50+ years     Exercise History: unknown  Family History:   History reviewed  No pertinent family history    I have reviewed this patient's family history and commented on sigificant items within the HPI    Vitals:   Vitals:    12/03/19 1845 12/03/19 2046 12/03/19 2330 12/04/19 0024   BP: 126/61 125/58 120/56 119/59   BP Location: Right arm Left arm Left arm Left arm   Pulse: 80 78 86 81   Resp: 18 17 18 (!) 26   Temp:    98 3 °F (36 8 °C)   TempSrc:    Oral SpO2: 99% 99% 97% 99%   Weight:    52 8 kg (116 lb 6 5 oz)   Height:    5' 5" (1 651 m)     Temp  Min: 98 1 °F (36 7 °C)  Max: 98 3 °F (36 8 °C)  IBW: 61 5 kg  Height: 5' 5" (165 1 cm)  Body mass index is 19 37 kg/m²  Medications:  Current Facility-Administered Medications   Medication Dose Route Frequency    amLODIPine (NORVASC) tablet 10 mg  10 mg Oral Daily    aspirin (ECOTRIN LOW STRENGTH) EC tablet 81 mg  81 mg Oral Daily    atorvastatin (LIPITOR) tablet 20 mg  20 mg Oral After Dinner    clopidogrel (PLAVIX) tablet 75 mg  75 mg Oral Daily    ferrous sulfate tablet 325 mg  325 mg Oral BID With Meals    gabapentin (NEURONTIN) capsule 100 mg  100 mg Oral Daily    HYDROmorphone (DILAUDID) injection 0 5 mg  0 5 mg Intravenous Q4H PRN    melatonin tablet 6 mg  6 mg Oral HS    oxyCODONE (ROXICODONE) immediate release tablet 10 mg  10 mg Oral Q4H PRN    oxyCODONE (ROXICODONE) IR tablet 5 mg  5 mg Oral Q4H PRN    polyethylene glycol (MIRALAX) packet 17 g  17 g Oral BID    senna-docusate sodium (SENOKOT S) 8 6-50 mg per tablet 1 tablet  1 tablet Oral BID    sodium chloride 0 9 % infusion  125 mL/hr Intravenous Continuous    tamsulosin (FLOMAX) capsule 0 4 mg  0 4 mg Oral Daily With Dinner    thiamine (VITAMIN B1) tablet 100 mg  100 mg Oral Daily     Home medications:  Prior to Admission Medications   Prescriptions Last Dose Informant Patient Reported? Taking?    amLODIPine (NORVASC) 10 mg tablet   No No   Sig: Take 1 tablet (10 mg total) by mouth daily   aspirin (ECOTRIN LOW STRENGTH) 81 mg EC tablet   No No   Sig: Take 1 tablet (81 mg total) by mouth daily   atorvastatin (LIPITOR) 20 mg tablet   No No   Sig: Take 1 tablet (20 mg total) by mouth daily after dinner   clopidogrel (PLAVIX) 75 mg tablet   No No   Sig: Take 1 tablet (75 mg total) by mouth daily   ferrous sulfate 325 (65 Fe) mg tablet   No No   Sig: Take 1 tablet (325 mg total) by mouth 2 (two) times a day with meals   gabapentin (NEURONTIN) 100 mg capsule   No No   Sig: Take 1 capsule (100 mg total) by mouth daily   hydrocortisone 2 5 % cream   Yes No   Sig: Apply to to affected areas 2-3 times daily   melatonin 3 mg   No No   Sig: Take 2 tablets (6 mg total) by mouth daily at bedtime   neomycin-bacitracin-polymyxin b (NEOSPORIN) ointment   No No   Sig: Apply topically 2 (two) times a day   Patient not taking: Reported on 11/2/2019   polyethylene glycol (MIRALAX) 17 g packet   No No   Sig: Take 17 g by mouth 2 (two) times a day   senna-docusate sodium (SENOKOT S) 8 6-50 mg per tablet   No No   Sig: Take 1 tablet by mouth 2 (two) times a day   tamsulosin (FLOMAX) 0 4 mg   No No   Sig: Take 1 capsule (0 4 mg total) by mouth daily with dinner   thiamine 100 MG tablet   No No   Sig: Take 1 tablet (100 mg total) by mouth daily   warfarin (COUMADIN) 2 mg tablet   No No   Sig: Take 1 tablet (2 mg total) by mouth daily      Facility-Administered Medications: None     Allergies:  No Known Allergies      Laboratory and Diagnostics:  Results from last 7 days   Lab Units 12/03/19  1156   WBC Thousand/uL 8 42   HEMOGLOBIN g/dL 7 1*   HEMATOCRIT % 22 8*   PLATELETS Thousands/uL 311   NEUTROS PCT % 82*   MONOS PCT % 6     Results from last 7 days   Lab Units 12/03/19  1156   SODIUM mmol/L 136   POTASSIUM mmol/L 4 7   CHLORIDE mmol/L 102   CO2 mmol/L 23   ANION GAP mmol/L 11   BUN mg/dL 22   CREATININE mg/dL 0 93   CALCIUM mg/dL 8 8   GLUCOSE RANDOM mg/dL 121   ALT U/L 65   AST U/L 33   ALK PHOS U/L 91   ALBUMIN g/dL 2 5*   TOTAL BILIRUBIN mg/dL 0 20          Results from last 7 days   Lab Units 12/03/19  1156   INR  7 28*   PTT seconds 105*          Results from last 7 days   Lab Units 12/03/19  1156   LACTIC ACID mmol/L 1 7     ABG:    VBG:          Micro:          EKG: as per tele nsr no ectopy   Imaging: I have personally reviewed pertinent reports  ------------------------------------------------------------------------------------------------------------  Advance Directive and Living Will:      Power of :    POLST:    ------------------------------------------------------------------------------------------------------------  Anticipated Length of Stay is > 2 midnights    Counseling / [address], [place]-        Portions of the record may have been created with voice recognition software  Occasional wrong word or "sound a like" substitutions may have occurred due to the inherent limitations of voice recognition software    Read the chart carefully and recognize, using context, where substitutions have occurred

## 2019-12-04 NOTE — PLAN OF CARE
Problem: Potential for Falls  Goal: Patient will remain free of falls  Description  INTERVENTIONS:  - Assess patient frequently for physical needs  -  Identify cognitive and physical deficits and behaviors that affect risk of falls    -  [place] fall precautions as indicated by assessment   - Educate patient/family on patient safety including physical limitations  - Instruct patient to call for assistance with activity based on assessment  - Modify environment to reduce risk of injury  - Consider OT/PT consult to assist with strengthening/mobility  Outcome: Progressing     Problem: Prexisting or High Potential for Compromised Skin Integrity  Goal: Skin integrity is maintained or improved  Description  INTERVENTIONS:  - Identify patients at risk for skin breakdown  - Assess and monitor skin integrity  - Assess and monitor nutrition and hydration status  - Monitor labs   - Assess for incontinence   - Turn and reposition patient  - Assist with mobility/ambulation  - Relieve pressure over bony prominences  - Avoid friction and shearing  - Provide appropriate hygiene as needed including keeping skin clean and dry  - Evaluate need for skin moisturizer/barrier cream  - Collaborate with interdisciplinary team   - Patient/family teaching  - Consider wound care consult   Outcome: Progressing     Problem: DISCHARGE PLANNING - CARE MANAGEMENT  Goal: Discharge to post-acute care or home with appropriate resources  Description  INTERVENTIONS:  - Conduct assessment to determine patient/family and health care team treatment goals, and need for post-acute services based on payer coverage, community resources, and patient preferences, and barriers to discharge  - Address psychosocial, clinical, and financial barriers to discharge as identified in assessment in conjunction with the patient/family and health care team  - Arrange appropriate level of post-acute services according to patients   needs and preference and payer coverage in collaboration with the physician and health care team  - Communicate with and update the patient/family, physician, and health care team regarding progress on the discharge plan  - Arrange appropriate transportation to post-acute venues  Outcome: Progressing     Problem: PAIN - ADULT  Goal: Verbalizes/displays adequate comfort level or baseline comfort level  Description  Interventions:  - Encourage patient to monitor pain and request assistance  - Assess pain using appropriate pain scale  - Administer analgesics based on type and severity of pain and evaluate response  - Implement non-pharmacological measures as appropriate and evaluate response  - Consider cultural and social influences on pain and pain management  - Notify physician/advanced practitioner if interventions unsuccessful or patient reports new pain  Outcome: Progressing     Problem: SAFETY ADULT  Goal: Maintain or return to baseline ADL function  Description  INTERVENTIONS:  -  Assess patient's ability to carry out ADLs; assess patient's baseline for ADL function and identify physical deficits which impact ability to perform ADLs (bathing, care of mouth/teeth, toileting, grooming, dressing, etc )  - Assess/evaluate cause of self-care deficits   - Assess range of motion  - Assess patient's mobility; develop plan if impaired  - Assess patient's need for assistive devices and provide as appropriate  - Encourage maximum independence but intervene and supervise when necessary  - Involve family in performance of ADLs  - Assess for home care needs following discharge   - Consider OT consult to assist with ADL evaluation and planning for discharge  - Provide patient education as appropriate  Outcome: Progressing  Goal: Maintain or return mobility status to optimal level  Description  INTERVENTIONS:  - Assess patient's baseline mobility status (ambulation, transfers, stairs, etc )    - Identify cognitive and physical deficits and behaviors that affect mobility  - Identify mobility aids required to assist with transfers and/or ambulation (gait belt, sit-to-stand, lift, walker, cane, etc )  - [place] fall precautions as indicated by assessment  - Record patient progress and toleration of activity level on Mobility SBAR; progress patient to next Phase/Stage  - Instruct patient to call for assistance with activity based on assessment  - Consider rehabilitation consult to assist with strengthening/weightbearing, etc   Outcome: Progressing     Problem: DISCHARGE PLANNING  Goal: Discharge to home or other facility with appropriate resources  Description  INTERVENTIONS:  - Identify barriers to discharge w/patient and caregiver  - Arrange for needed discharge resources and transportation as appropriate  - Identify discharge learning needs (meds, wound care, etc )  - Arrange for interpretive services to assist at discharge as needed  - Refer to Case Management Department for coordinating discharge planning if the patient needs post-hospital services based on physician/advanced practitioner order or complex needs related to functional status, cognitive ability, or social support system  Outcome: Progressing     Problem: Knowledge Deficit  Goal: Patient/family/caregiver demonstrates understanding of disease process, treatment plan, medications, and discharge instructions  Description  Complete learning assessment and assess knowledge base    Interventions:  - Provide teaching at level of understanding  - Provide teaching via preferred learning methods  Outcome: Progressing     Problem: CARDIOVASCULAR - ADULT  Goal: Maintains optimal cardiac output and hemodynamic stability  Description  INTERVENTIONS:  - Monitor I/O, vital signs and rhythm  - Monitor for S/S and trends of decreased cardiac output  - Administer and titrate ordered vasoactive medications to optimize hemodynamic stability  - Assess quality of pulses, skin color and temperature  - Assess for signs of decreased coronary artery perfusion  - Instruct patient to report change in severity of symptoms  Outcome: Progressing  Goal: Absence of cardiac dysrhythmias or at baseline rhythm  Description  INTERVENTIONS:  - Continuous cardiac monitoring, vital signs, obtain 12 lead EKG if ordered  - Administer antiarrhythmic and heart rate control medications as ordered  - Monitor electrolytes and administer replacement therapy as ordered  Outcome: Progressing     Problem: SKIN/TISSUE INTEGRITY - ADULT  Goal: Skin integrity remains intact  Description  INTERVENTIONS  - Identify patients at risk for skin breakdown  - Assess and monitor skin integrity  - Assess and monitor nutrition and hydration status  - Monitor labs (i e  albumin)  - Assess for incontinence   - Turn and reposition patient  - Assist with mobility/ambulation  - Relieve pressure over bony prominences  - Avoid friction and shearing  - Provide appropriate hygiene as needed including keeping skin clean and dry  - Evaluate need for skin moisturizer/barrier cream  - Collaborate with interdisciplinary team (i e  Nutrition, Rehabilitation, etc )   - Patient/family teaching  Outcome: Progressing  Goal: Incision(s), wounds(s) or drain site(s) healing without S/S of infection  Description  INTERVENTIONS  - Assess and document risk factors for skin impairment   - Assess and document dressing, incision, wound bed, drain sites and surrounding tissue  - Consider nutrition services referral as needed  - Oral mucous membranes remain intact  - Provide patient/ family education  Outcome: Progressing  Goal: Oral mucous membranes remain intact  Description  INTERVENTIONS  - Assess oral mucosa and hygiene practices  - Implement preventative oral hygiene regimen  - Implement oral medicated treatments as ordered  - Initiate Nutrition services referral as needed  Outcome: Progressing     Problem: HEMATOLOGIC - ADULT  Goal: Maintains hematologic stability  Description  INTERVENTIONS  - Assess for signs and symptoms of bleeding or hemorrhage  - Monitor labs  - Administer supportive blood products/factors as ordered and appropriate  Outcome: Progressing     Problem: MUSCULOSKELETAL - ADULT  Goal: Maintain or return mobility to safest level of function  Description  INTERVENTIONS:  - Assess patient's ability to carry out ADLs; assess patient's baseline for ADL function and identify physical deficits which impact ability to perform ADLs (bathing, care of mouth/teeth, toileting, grooming, dressing, etc )  - Assess/evaluate cause of self-care deficits   - Assess range of motion  - Assess patient's mobility  - Assess patient's need for assistive devices and provide as appropriate  - Encourage maximum independence but intervene and supervise when necessary  - Involve family in performance of ADLs  - Assess for home care needs following discharge   - Consider OT consult to assist with ADL evaluation and planning for discharge  - Provide patient education as appropriate  Outcome: Progressing  Goal: Maintain proper alignment of affected body part  Description  INTERVENTIONS:  - Support, maintain and protect limb and body alignment  - Provide patient/ family with appropriate education  Outcome: Progressing     Problem: Nutrition/Hydration-ADULT  Goal: Nutrient/Hydration intake appropriate for improving, restoring or maintaining nutritional needs  Description  Monitor and assess patient's nutrition/hydration status for malnutrition  Collaborate with interdisciplinary team and initiate plan and interventions as ordered  Monitor patient's weight and dietary intake as ordered or per policy  Utilize nutrition screening tool and intervene as necessary  Determine patient's food preferences and provide high-protein, high-caloric foods as appropriate       INTERVENTIONS:  - Monitor oral intake, urinary output, labs, and treatment plans  - Assess nutrition and hydration status and recommend course of action  - Evaluate amount of meals eaten  - Assist patient with eating if necessary   - Allow adequate time for meals  - Recommend/ encourage appropriate diets, oral nutritional supplements, and vitamin/mineral supplements  - Order, calculate, and assess calorie counts as needed  - Recommend, monitor, and adjust tube feedings and TPN/PPN based on assessed needs  - Assess need for intravenous fluids  - Provide specific nutrition/hydration education as appropriate  - Include patient/family/caregiver in decisions related to nutrition  Outcome: Progressing

## 2019-12-04 NOTE — PHYSICAL THERAPY NOTE
Physical Therapy Cancellation Note    PT orders received + chart review completed  Pt unstable for PT eval at this time 2* INR 7 20 + Hgb  6 9  Pt pending possible transfer [facility] further [facility] management  Will cont to follow + perform PT eval as appropriate       Efraín Shah, PT, DPT

## 2019-12-04 NOTE — ASSESSMENT & PLAN NOTE
- L endarterectomy, profundoplasty, SFA embolectomy and retrograde iliac stenting (11/6)  - LLE angio and treatment of residual L SFA stenosis (11/14)  - TPA and SFA angioplasty for thrombus of R posterior tibial collaterals  - d/c w R foot slightly cooler than L but intact m/s function  - d/c on coumadin, asa, plavix

## 2019-12-05 PROBLEM — I70.229 CRITICAL LOWER LIMB ISCHEMIA (HCC): Status: ACTIVE | Noted: 2019-12-05

## 2019-12-05 LAB
ABO GROUP BLD BPU: NORMAL
ALBUMIN SERPL BCP-MCNC: 2.4 G/DL (ref 3.5–5)
ALP SERPL-CCNC: 71 U/L (ref 46–116)
ALT SERPL W P-5'-P-CCNC: 38 U/L (ref 12–78)
ANION GAP SERPL CALCULATED.3IONS-SCNC: 4 MMOL/L (ref 4–13)
AST SERPL W P-5'-P-CCNC: 22 U/L (ref 5–45)
BASOPHILS # BLD AUTO: 0.04 THOUSANDS/ΜL (ref 0–0.1)
BASOPHILS # BLD AUTO: 0.09 THOUSANDS/ΜL (ref 0–0.1)
BASOPHILS NFR BLD AUTO: 0 % (ref 0–1)
BASOPHILS NFR BLD AUTO: 1 % (ref 0–1)
BILIRUB SERPL-MCNC: 1.24 MG/DL (ref 0.2–1)
BPU ID: NORMAL
BUN SERPL-MCNC: 18 MG/DL (ref 5–25)
CALCIUM SERPL-MCNC: 8.4 MG/DL (ref 8.3–10.1)
CHLORIDE SERPL-SCNC: 111 MMOL/L (ref 100–108)
CO2 SERPL-SCNC: 23 MMOL/L (ref 21–32)
CREAT SERPL-MCNC: 0.69 MG/DL (ref 0.6–1.3)
CROSSMATCH: NORMAL
EOSINOPHIL # BLD AUTO: 0.19 THOUSAND/ΜL (ref 0–0.61)
EOSINOPHIL # BLD AUTO: 0.3 THOUSAND/ΜL (ref 0–0.61)
EOSINOPHIL NFR BLD AUTO: 2 % (ref 0–6)
EOSINOPHIL NFR BLD AUTO: 3 % (ref 0–6)
ERYTHROCYTE [DISTWIDTH] IN BLOOD BY AUTOMATED COUNT: 14.2 % (ref 11.6–15.1)
ERYTHROCYTE [DISTWIDTH] IN BLOOD BY AUTOMATED COUNT: 14.9 % (ref 11.6–15.1)
GFR SERPL CREATININE-BSD FRML MDRD: 96 ML/MIN/1.73SQ M
GLUCOSE SERPL-MCNC: 112 MG/DL (ref 65–140)
HCT VFR BLD AUTO: 25.6 % (ref 36.5–49.3)
HCT VFR BLD AUTO: 28 % (ref 36.5–49.3)
HGB BLD-MCNC: 8.7 G/DL (ref 12–17)
HGB BLD-MCNC: 9.3 G/DL (ref 12–17)
IMM GRANULOCYTES # BLD AUTO: 0.1 THOUSAND/UL (ref 0–0.2)
IMM GRANULOCYTES # BLD AUTO: 0.15 THOUSAND/UL (ref 0–0.2)
IMM GRANULOCYTES NFR BLD AUTO: 1 % (ref 0–2)
IMM GRANULOCYTES NFR BLD AUTO: 2 % (ref 0–2)
INR PPP: 5.82 (ref 0.84–1.19)
LYMPHOCYTES # BLD AUTO: 0.74 THOUSANDS/ΜL (ref 0.6–4.47)
LYMPHOCYTES # BLD AUTO: 0.78 THOUSANDS/ΜL (ref 0.6–4.47)
LYMPHOCYTES NFR BLD AUTO: 7 % (ref 14–44)
LYMPHOCYTES NFR BLD AUTO: 8 % (ref 14–44)
MCH RBC QN AUTO: 30.4 PG (ref 26.8–34.3)
MCH RBC QN AUTO: 30.4 PG (ref 26.8–34.3)
MCHC RBC AUTO-ENTMCNC: 33.2 G/DL (ref 31.4–37.4)
MCHC RBC AUTO-ENTMCNC: 34 G/DL (ref 31.4–37.4)
MCV RBC AUTO: 90 FL (ref 82–98)
MCV RBC AUTO: 92 FL (ref 82–98)
MONOCYTES # BLD AUTO: 0.52 THOUSAND/ΜL (ref 0.17–1.22)
MONOCYTES # BLD AUTO: 0.58 THOUSAND/ΜL (ref 0.17–1.22)
MONOCYTES NFR BLD AUTO: 5 % (ref 4–12)
MONOCYTES NFR BLD AUTO: 6 % (ref 4–12)
NEUTROPHILS # BLD AUTO: 7.62 THOUSANDS/ΜL (ref 1.85–7.62)
NEUTROPHILS # BLD AUTO: 8.84 THOUSANDS/ΜL (ref 1.85–7.62)
NEUTS SEG NFR BLD AUTO: 80 % (ref 43–75)
NEUTS SEG NFR BLD AUTO: 85 % (ref 43–75)
NRBC BLD AUTO-RTO: 0 /100 WBCS
NRBC BLD AUTO-RTO: 0 /100 WBCS
PLATELET # BLD AUTO: 210 THOUSANDS/UL (ref 149–390)
PLATELET # BLD AUTO: 235 THOUSANDS/UL (ref 149–390)
PMV BLD AUTO: 9.1 FL (ref 8.9–12.7)
PMV BLD AUTO: 9.2 FL (ref 8.9–12.7)
POTASSIUM SERPL-SCNC: 4.6 MMOL/L (ref 3.5–5.3)
PROT SERPL-MCNC: 6 G/DL (ref 6.4–8.2)
PROTHROMBIN TIME: 51.8 SECONDS (ref 11.6–14.5)
RBC # BLD AUTO: 2.86 MILLION/UL (ref 3.88–5.62)
RBC # BLD AUTO: 3.06 MILLION/UL (ref 3.88–5.62)
SODIUM SERPL-SCNC: 138 MMOL/L (ref 136–145)
UNIT DISPENSE STATUS: NORMAL
UNIT PRODUCT CODE: NORMAL
UNIT RH: NORMAL
WBC # BLD AUTO: 10.43 THOUSAND/UL (ref 4.31–10.16)
WBC # BLD AUTO: 9.52 THOUSAND/UL (ref 4.31–10.16)

## 2019-12-05 PROCEDURE — P9058 RBC, L/R, CMV-NEG, IRRAD: HCPCS

## 2019-12-05 PROCEDURE — G8988 SELF CARE GOAL STATUS: HCPCS

## 2019-12-05 PROCEDURE — 99222 1ST HOSP IP/OBS MODERATE 55: CPT | Performed by: SURGERY

## 2019-12-05 PROCEDURE — 80053 COMPREHEN METABOLIC PANEL: CPT | Performed by: SURGERY

## 2019-12-05 PROCEDURE — 30233N1 TRANSFUSION OF NONAUTOLOGOUS RED BLOOD CELLS INTO PERIPHERAL VEIN, PERCUTANEOUS APPROACH: ICD-10-PCS | Performed by: SURGERY

## 2019-12-05 PROCEDURE — 85025 COMPLETE CBC W/AUTO DIFF WBC: CPT | Performed by: SURGERY

## 2019-12-05 PROCEDURE — G8987 SELF CARE CURRENT STATUS: HCPCS

## 2019-12-05 PROCEDURE — 97163 PT EVAL HIGH COMPLEX 45 MIN: CPT

## 2019-12-05 PROCEDURE — 97167 OT EVAL HIGH COMPLEX 60 MIN: CPT

## 2019-12-05 PROCEDURE — G8978 MOBILITY CURRENT STATUS: HCPCS

## 2019-12-05 PROCEDURE — 85610 PROTHROMBIN TIME: CPT | Performed by: SURGERY

## 2019-12-05 PROCEDURE — 99223 1ST HOSP IP/OBS HIGH 75: CPT | Performed by: INTERNAL MEDICINE

## 2019-12-05 PROCEDURE — G8979 MOBILITY GOAL STATUS: HCPCS

## 2019-12-05 RX ADMIN — OXYCODONE HYDROCHLORIDE 5 MG: 5 TABLET ORAL at 16:43

## 2019-12-05 RX ADMIN — SENNOSIDES AND DOCUSATE SODIUM 1 TABLET: 8.6; 5 TABLET ORAL at 08:25

## 2019-12-05 RX ADMIN — OXYCODONE HYDROCHLORIDE 5 MG: 5 TABLET ORAL at 21:24

## 2019-12-05 RX ADMIN — HYDROMORPHONE HYDROCHLORIDE 0.2 MG: 1 INJECTION, SOLUTION INTRAMUSCULAR; INTRAVENOUS; SUBCUTANEOUS at 23:27

## 2019-12-05 RX ADMIN — THIAMINE HCL TAB 100 MG 100 MG: 100 TAB at 08:25

## 2019-12-05 RX ADMIN — ATORVASTATIN CALCIUM 20 MG: 20 TABLET, FILM COATED ORAL at 17:02

## 2019-12-05 RX ADMIN — NICOTINE 1 PATCH: 14 PATCH TRANSDERMAL at 08:25

## 2019-12-05 RX ADMIN — MELATONIN 6 MG: 3 TAB ORAL at 21:24

## 2019-12-05 RX ADMIN — TAMSULOSIN HYDROCHLORIDE 0.4 MG: 0.4 CAPSULE ORAL at 17:01

## 2019-12-05 RX ADMIN — SENNOSIDES AND DOCUSATE SODIUM 1 TABLET: 8.6; 5 TABLET ORAL at 17:01

## 2019-12-05 RX ADMIN — SODIUM CHLORIDE, SODIUM LACTATE, POTASSIUM CHLORIDE, AND CALCIUM CHLORIDE 75 ML/HR: .6; .31; .03; .02 INJECTION, SOLUTION INTRAVENOUS at 16:43

## 2019-12-05 RX ADMIN — GABAPENTIN 100 MG: 100 CAPSULE ORAL at 08:24

## 2019-12-05 RX ADMIN — CLOPIDOGREL BISULFATE 75 MG: 75 TABLET ORAL at 08:25

## 2019-12-05 RX ADMIN — OXYCODONE HYDROCHLORIDE 5 MG: 5 TABLET ORAL at 12:28

## 2019-12-05 RX ADMIN — ASPIRIN 81 MG: 81 TABLET ORAL at 08:25

## 2019-12-05 RX ADMIN — AMLODIPINE BESYLATE 10 MG: 10 TABLET ORAL at 08:24

## 2019-12-05 NOTE — OCCUPATIONAL THERAPY NOTE
Occupational Therapy Evaluation      Richard Shanta    12/5/2019    Principal Problem:    Critical lower limb ischemia      Past Medical History:   Diagnosis Date    Cancer ([facility] )     throat cancer    PAD (peripheral artery disease) ([facility] ) 11/2/2019    PEG (percutaneous endoscopic gastrostomy) status ([facility] )     Port-A-Cath in place        Past Surgical History:   Procedure Laterality Date    BYPASS FEMORAL-FEMORAL Right     ESOPHAGOSCOPY N/A 8/24/2017    Procedure: ESOPHAGOSCOPY FLEXIBLE;  Surgeon: Ilir Farmer MD;  [facility];  Service: ENT    IR ABDOMINAL ANGIOGRAPHY / INTERVENTION  11/14/2019    IR ABDOMINAL ANGIOGRAPHY / INTERVENTION  11/7/2019    MA [address] N/A 8/24/2017    Procedure: Bronchoscopy;  Surgeon: Ilir Farmer MD;  [facility];  Service: ENT    MA LARYNGOSCOPY,DIRCT,OP,BIOPSY N/A 8/24/2017    Procedure: LARYNGOSCOPY DIRECT;  Surgeon: Ilir Farmer MD;  [facility];  Service: ENT    MA [place] Left 11/7/2019    Procedure: LEFT ENDARTERECTOMY ARTERIAL FEMORAL; L CFAE WITH PROFUNDOPLASTY; ARTERIOGRAM;RETROGRADE ILIAC STENTING;  Surgeon: Cheri Zhao MD;  [facility];  Service: Vascular    TOE AMPUTATION Right     2 toes        12/05/19 1151   Note Type   Note type Eval/Treat   Restrictions/Precautions   Weight Bearing Precautions Per Order No   Other Precautions Chair Alarm; Bed Alarm;Multiple lines;Telemetry; Fall Risk;Pain  ([place]akut-speaking only)   Pain Assessment   Pain Assessment FLACC   Pain Type Acute pain   Pain Location Foot   Pain Orientation Right   Pain Rating: FLACC (Rest) - Face 1   Pain Rating: FLACC (Rest) - Legs 0   Pain Rating: FLACC (Rest) - Activity 0   Pain Rating: FLACC (Rest) - Cry 1   Pain Rating: FLACC (Rest) - Consolability 0   Score: FLACC (Rest) 2   Pain Rating: FLACC (Activity) - Face 2   Pain Rating: FLACC (Activity) - Legs 1   Pain Rating: FLACC (Activity) - Activity 1 Pain Rating: FLACC (Activity) - Cry 1   Pain Rating: FLACC (Activity) - Consolability 1   Score: FLACC (Activity) 6   Home Living   Type of Home House   Home Layout Two level;Bed/bath upstairs;Stairs to enter with rails  ([address]; 10[address] and FF to 2nd flr "main level")   Bathroom Shower/Tub Tub/shower unit   Bathroom Toilet Standard   Bathroom Equipment Other (Comment)  (denies)   Bathroom Accessibility Accessible   Home Equipment Walker;Cane   Additional Comments Per son, Pt refuses to utilize DME PTA  Prior Function   Level of [place] Needs assistance with ADLs and functional mobility; Needs assistance with IADLs   Lives With Spouse   Receives Help From Family   ADL Assistance Needs assistance   IADLs Needs assistance   Falls in the last 6 months 0   Vocational Retired   Lifestyle   Autonomy Per son, Pt requires A w/ all ADLS and IADLS from family; (-) drives PTA  Son reports he plans on carrying Pt up/down the stairs as son does not wish Pt to go to rehab  Reciprocal Relationships Per son, Pt lives w/ multiple family members  Son reports a family member will be home at all times to provide A as needed  Service to Others Pt is retired  Intrinsic Gratification Pt reports enjoying spending time w/ family  Psychosocial   Psychosocial (WDL) WDL   ADL   Where Assessed Edge of bed   Eating Assistance 5  Supervision/Setup   Grooming Assistance 4  Minimal Assistance   UB Bathing Assistance 3  Moderate Assistance   LB Bathing Assistance 2  Maximal Assistance   UB Dressing Assistance 3  Moderate Assistance   LB Dressing Assistance 2  Maximal [address]  2  Maximal Assistance   Bed Mobility   Supine to Sit 5  Supervision   Additional items Assist x 1; Increased time required;Verbal cues; Bedrails;HOB elevated   Sit to Supine Unable to assess   Additional Comments Pt laying supine in bed upon OT arrival  Pt seated OOB in chair w/ chair alarm activated and all needs in reach s/p OT sesson  Transfers   Sit to Stand 4  Minimal assistance   Additional items Assist x 1; Increased time required;Verbal cues;Armrests   Stand to Sit 4  Minimal assistance   Additional items Assist x 1; Increased time required;Verbal cues;Armrests   Additional Comments Transfers w/ RW  VC and TC required for safe hand placement  Functional Mobility   Functional Mobility 4  Minimal assistance   Additional Comments Pt demonstrated short distance mobility hopping ~2ft w/ RW w/ Min A  Pt unable to weight bear through RLE 2' pain  Additional items Rolling walker   Balance   Static Sitting Fair   Dynamic Sitting Fair -   Static Standing Poor +   Dynamic Standing Poor +   Ambulatory Poor +   Activity Tolerance   Activity Tolerance Patient limited by fatigue;Patient limited by pain;Treatment limited secondary to medical complications (Comment)   Medical Staff Made Aware PT Judi Brown; ENRIQUE for safe dipso planning    Nurse Made Aware RN cleared Pt for OT eval   RUE Assessment   RUE Assessment X  (AROM WFL; Gross strength 3+/5)   LUE Assessment   LUE Assessment X  (AROM WFL; Gross strength 3+/5)   Hand Function   Gross Motor Coordination Functional   Fine Motor Coordination Functional   Sensation   Light Touch Partial deficits in the RLE;Partial deficits in the LLE   Cognition   Overall Cognitive Status Unable to assess  (2' language barrier)   Arousal/Participation Alert; Cooperative   Attention Attends with cues to redirect   Orientation Level Oriented to person   Memory Decreased recall of precautions;Decreased recall of recent events   Following Commands Follows one step commands with increased time or repetition   Comments Pt presents coopertive to participate in therapy w/ increased encouragement and education  Pt's son present to A w/ translation t/o eval  Pt presents w/ decreased insight into deficits and decreased safety awareness  Assessment   Limitation Decreased ADL status; Decreased UE strength;Decreased Safe judgement during ADL;Decreased endurance;Decreased cognition;Decreased sensation;Decreased high-level ADLs   Prognosis Fair   Assessment Pt is a 79 yo male seen for OT eval s/p adm to [facility] w/ pain, color changes, and edema in R foot dx'd w/ critical lower limb ischemia  He has a history of a L femoral endarterectomy, profundoplasty, SFA embolectomy and iliac stenting on 11/6/19  Had residual L SFA stenosis and developed thrombus in R collateral posterior tibial artery for which he received TPA and SFA angioplasty  Pt lives w/ multiple family members in [facility] w/ 10 [address] and additional FF to bed/bath  Per son, Pt always has a family member that is home to provide A as needed  Comorbidities include a h/o PAD, HTN, tobacco abuse, PVD, tonsil CA, syncope and collapse, thrombocytopenia, anemia  Pt with active OT orders  Pt is [place]ic-speaking only and son present t/o eval to A w/ translation  Per son, Pt required A w/ all ADLS and IADLS, does not drive, & required use of DME PTA, however refused to use AD for mobility  Pt is currently demonstrating the following occupational deficits: Mod A UB bathing/dressing, Max A LB bathing/dressing and toileting, S bed mobility, Min A functional transfers and mobility (hopping) w/ RW  These deficits that are impacting pt's baseline areas of occupation are a result of the following impairments: pain, endurance, activity tolerance, functional mobility, forward functional reach, balance, decreased I w/ ADLS/IADLS, strength, cognitive impairments, decreased safety awareness and decreased insight into deficits  The following Occupational Performance Areas to address include: grooming, bathing/shower, toilet hygiene, dressing, health maintenance, functional mobility and clothing management  Pt scored overall 40/100 on the Barthel Index  Based on the aforementioned OT evaluation, functional performance deficits, and assessments, pt has been identified as a high complexity evaluation  Recommend STR upon D/C   Pt to continue to benefit from acute immediate OT services to address the following goals 3-5x/week to  w/in 7-10 days:    Goals   Patient Goals To return home   LTG Time Frame 7-10   Long Term Goal #1 Refer to goals below   Plan   Treatment Interventions ADL retraining;Functional transfer training;UE strengthening/ROM; Endurance training;Cognitive reorientation;Patient/family training;Equipment evaluation/education; Compensatory technique education; Activityengagement; Energy conservation   Goal Expiration Date 12/15/19   OT Frequency 3-5x/wk   Recommendation   OT Discharge Recommendation Short Term Rehab   OT - OK to Discharge Yes  (when medically cleared)   Barthel Index   Feeding 5   Bathing 0   Grooming Score 0   Dressing Score 0   Bladder Score 10   Bowels Score 10   Toilet Use Score 5   Transfers (Bed/Chair) Score 10   Mobility (Level Surface) Score 0   Stairs Score 0   Barthel Index Score 40   Modified [place] Scale   Modified [place] Scale 4       GOALS    1) Pt will improve activity tolerance to G for min 30 min txment sessions for increase engagement in functional tasks    2) Pt will complete UB/LB dressing/self care w/ Min A using adaptive device and DME as needed    3) Pt will complete bathing w/ Min A w/ use of AE and DME as needed    4) Pt will complete toileting w/ Min A w/ G hygiene/thoroughness using DME as needed    5) Pt will improve functional transfers to S on/off all surfaces using DME as needed w/ G balance/safety     6) Pt will improve functional mobility during ADL/IADL/leisure tasks to S using DME as needed w/ G balance/safety     8) Pt will be attentive 100% of the time during ongoing cognitive assessment w/ G participation to assist w/ safe d/c planning/recommendations    9) Pt will demonstrate G carryover of pt/caregiver education and training as appropriate w/o cues w/ good tolerance to increase safety during functional tasks    10) Pt will demonstrate 100% carryover of energy conservation techniques t/o functional I/ADL/leisure tasks w/o cues s/p skilled education to increase endurance during functional tasks           Lonnie Hendricks MS, OTR/L

## 2019-12-05 NOTE — PHYSICAL THERAPY NOTE
Physical Therapy Evaluation    Patient Name: Aliyah Wilder    HVPAR'I Date: 12/5/2019     Problem List  Principal Problem:    Critical lower limb ischemia       Past Medical History  Past Medical History:   Diagnosis Date    Cancer ([facility] )     throat cancer    [facility]per[facility] 11/2/2019    PEG (percutaneous endoscopic gastrostomy) status ([place] [id number] )     Port-A-Cath in place         Past Surgical History  Past Surgical History:   Procedure Laterality Date    BYPASS FEMORAL-FEMORAL Right     ESOPHAGOSCOPY N/A 8/24/2017    Procedure: ESOPHAGOSCOPY FLEXIBLE;  Surgeon: Humberto Terrazas MD;  [facility];  Service: ENT    IR ABDOMINAL ANGIOGRAPHY / INTERVENTION  11/14/2019    IR ABDOMINAL ANGIOGRAPHY / INTERVENTION  11/7/2019    NE [address] N/A 8/24/2017    Procedure: Bronchoscopy;  Surgeon: Humberto Terrazas MD;  [facility];  Service: ENT    NE LARYNGOSCOPY,DIRCT,OP,BIOPSY N/A 8/24/2017    Procedure: LARYNGOSCOPY DIRECT;  Surgeon: Humberto Terrazas MD;  [facility];  Service: ENT    NE [place] Left 11/7/2019    Procedure: LEFT ENDARTERECTOMY ARTERIAL FEMORAL; L CFAE WITH PROFUNDOPLASTY; ARTERIOGRAM;RETROGRADE ILIAC STENTING;  Surgeon: Paul Portillo MD;  [facility];  Service: Vascular    TOE AMPUTATION Right     2 toes           12/05/19 1150   Note Type   Note type Eval only   Pain Assessment   Pain Assessment FLACC   Pain Type Acute pain   Pain Location Foot   Pain Orientation Right   Pain Rating: FLACC (Rest) - Face 1   Pain Rating: FLACC (Rest) - Legs 0   Pain Rating: FLACC (Rest) - Activity 0   Pain Rating: FLACC (Rest) - Cry 1   Pain Rating: FLACC (Rest) - Consolability 0   Score: FLACC (Rest) 2   Pain Rating: FLACC (Activity) - Face 2   Pain Rating: FLACC (Activity) - Legs 1   Pain Rating: FLACC (Activity) - Activity 1   Pain Rating: FLACC (Activity) - Cry 1   Pain Rating: FLACC (Activity) - Consolability 1   Score: FLACC (Activity) 6   Home Living   Type of [address] [facility] Two level;Bed/bath upstairs;Stairs to enter with rails  (10 [address]AKOT[address] + full flight to living area)   [address]  (pt son reports pt was not using RW PTA)   Additional Comments pt primarily [place] speaking, pt son present to assist with translation; per pt son, pt was at rehab PTA and does not want to return, current plan is to take patient home and carry patient up the stairs if necessary, pt son reports pt will have somone present to assist 24/7 at home   Prior Function   Level of [place] Needs assistance with IADLs; Needs assistance with ADLs and functional mobility   Lives With Spouse   Receives Help From Family  (2 sons and daughter in law)   ADL Assistance Needs assistance   IADLs Needs assistance   Falls in the last 6 months 0   Vocational Retired   Restrictions/Precautions   Wells Bryon Bearing Precautions Per Order No   Other Precautions Chair Alarm; Bed Alarm;Multiple lines;Telemetry; Fall Risk;Pain  ([place] speaking)   General   Family/Caregiver Present Yes  (pt son)   Cognition   Arousal/Participation Cooperative   Following Commands Follows one step commands with increased time or repetition   RUE Assessment   RUE Assessment WFL   LUE Assessment   LUE Assessment WFL   RLE Assessment   RLE Assessment X   Strength RLE   RLE Overall Strength 2/5  (grossly; pt declining to weight bear secondary to pain)   LLE Assessment   LLE Assessment X   Strength LLE   LLE Overall Strength 4/5  (grossly)   Coordination   Movements are Fluid and Coordinated 0   Coordination and Movement Description slow movements, guarded posture   Bed Mobility   Supine to Sit 5  Supervision   Additional items Increased time required;Verbal cues   Additional Comments Pt left sitting in bedside chair at termination of session, all needs within reach   Transfers   Sit to Stand 4  Minimal assistance Additional items Increased time required;Verbal cues  (steadying assist)   Stand to Sit 4  Minimal assistance   Additional items Increased time required;Verbal cues  (steadying assist)   Ambulation/Elevation   Gait pattern Improper Weight shift;Narrow DOMINICK; Decreased foot clearance; Short stride; Excessively slow  (hopping gait pattern)   Gait Assistance 4  Minimal assist   Additional items Verbal cues; Tactile cues  (steadying assist)   Assistive Device Rolling walker   Distance 3' from bed to bedside chair   Balance   Static Sitting Good   Dynamic Sitting Fair +   Static Standing Fair -  (RW)   Dynamic Standing Poor +  (RW)   Ambulatory Poor +  (RW)   Endurance Deficit   Endurance Deficit Yes   Endurance Deficit Description weakness, pain   Activity Tolerance   Activity Tolerance Patient limited by fatigue;Patient limited by pain   Medical Staff Made Aware Laura GUSMAN   Nurse Made Aware RN cleared patient for PT evaluation   Assessment   Prognosis Fair   Problem List Decreased strength;Decreased range of motion;Decreased endurance; Impaired balance;Decreased mobility; Impaired judgement;Decreased safety awareness; Impaired sensation;Decreased skin integrity;Pain   Assessment Pt is a 78 y o  male seen for PT evaluation s/p [facility] on [facility]  Two pt identifiers were used to confirm  Pt presented with R foot pain and color change on 12/4/19  Pt was admitted with a primary dx of: vascular problem  PT now consulted for assessment of mobility and d/c needs  Pts current co morbidities effecting treatment include: PVD, HTN, PAD, anemia and personal factors including 10 DAKOT[address] home environment and full flight to living area  Pt currently lives in a two story home with his spouse, immediately PTA pt was at subacute rehabilitation   Pts current clinical presentation is Unstable/ Unpredictable (high complexity) due to Ongoing medical management for primary dx, Increased reliance on more restrictive AD compared to baseline, decreased activity tolerance compared to baseline, fall risk, increased assistance needed from caregiver at current time, continuous telemetry monitoring, abnormal lab values, multiple lines, decline in overall functional mobility status  Prior to admission, pt required assistance with ambulation without the use of an AD as per pt son  Upon evaluation, pt currently is requiring supervision assist for bed mobility; min A for transfers and min A for ambulation w/ RW  Pt displays narrow DOMINICK, hopping gait pattern secondary to not putting weight on RLE secondary to pain, decreased step length, decreased foot clearance  Pt presents at PT eval functioning below baseline and currently w/ overall mobility deficits secondary to: decreased strength, decreased endurance, impaired balance, impaired coordination, decreased insight into deficits, pain  Pt currently at a fall risk secondary to impairments listed above  Based on PT evaluation, pt will continue to benefit from skilled acute PT interventions to address stated impairments; to maximize functional mobility; for ongoing pt/ family training; and DME needs  At conclusion of PT session pt was left seated in bedside chair, all needs within reach  Provided pt education regarding PT plan to improve functional mobility status  PT is currently recommending post acute inpatient rehab  Pt agreeable to plan and goals as stated on evaluation  PT will continue to follow during hospital stay  Barriers to Discharge Inaccessible home environment   Goals   Patient Goals go home   STG Expiration Date 12/19/19   Short Term Goal #1 1  Pt will increased strength by 1 grade in order to increase functional independence with transfers  2  Pt will increase balance grade by 1 in order to improve safety  3  Pt will improve transfer ability to mod I to increase functional independence and decrease caregiver burden   4  Pt will perform bed mobility independently to decrease caregiver burden  5  Pt will be able to amb >50 ft with RW and mod I to increase functional independence in home and community environments  6  Pt will be able to ascend and descend 12 stairs mod I to increase functional independence within the home environment  PT Treatment Day 0   Plan   Treatment/Interventions Functional transfer training;LE strengthening/ROM; Elevations; Therapeutic exercise; Endurance training;Patient/family training;Equipment eval/education; Bed mobility;Gait training; Compensatory technique education;Spoke to nursing;Spoke to case management;OT;Family   PT Frequency Other (Comment)  (3-5x/wk)   Recommendation   Recommendation Post acute IP rehab   Equipment Recommended Walker  (RW)   PT - OK to Discharge Yes   Additional Comments to rehab when medically stable   Modified Sunflower Scale   Modified Sunflower Scale 4   Barthel Index   Feeding 10   Bathing 0   Grooming Score 5   Dressing Score 5   Bladder Score 10   Bowels Score 10   Toilet Use Score 5   Transfers (Bed/Chair) Score 10   Mobility (Level Surface) Score 0   Stairs Score 0   Barthel Index Score 55       Brendan Butler PT, DPT

## 2019-12-05 NOTE — PLAN OF CARE
Problem: PHYSICAL THERAPY ADULT  Goal: Performs mobility at highest level of function for planned discharge setting  See evaluation for individualized goals  Description  Treatment/Interventions: Functional transfer training, LE strengthening/ROM, Elevations, Therapeutic exercise, Endurance training, Patient/family training, Equipment eval/education, Bed mobility, Gait training, Compensatory technique education, Spoke to nursing, Spoke to case management, OT, Family  Equipment Recommended: Walker(RW)       See flowsheet documentation for full assessment, interventions and recommendations  Note:   Prognosis: Fair  Problem List: Decreased strength, Decreased range of motion, Decreased endurance, Impaired balance, Decreased mobility, Impaired judgement, Decreased safety awareness, Impaired sensation, Decreased skin integrity, Pain  Assessment: Pt is a 78 y o  male seen for PT evaluation s/p admit to [facility] on 12/4/19  Two pt identifiers were used to confirm  Pt presented with R foot pain and color change on 12/4/19  Pt was admitted with a primary dx of: vascular problem  PT now consulted for assessment of mobility and d/c needs  Pts current co morbidities effecting treatment include: PVD, HTN, PAD, anemia and personal factors including 10 DAKOT[address] home environment and full flight to living area  Pt currently lives in a two story home with his spouse, immediately PTA pt was at subacute rehabilitation  Pts current clinical presentation is Unstable/ Unpredictable (high complexity) due to Ongoing medical management for primary dx, Increased reliance on more restrictive AD compared to baseline, decreased activity tolerance compared to baseline, fall risk, increased assistance needed from caregiver at current time, continuous telemetry monitoring, abnormal lab values, multiple lines, decline in overall functional mobility status   Prior to admission, pt required assistance with ambulation without the use of an AD as per pt son  Upon evaluation, pt currently is requiring supervision assist for bed mobility; min A for transfers and min A for ambulation w/ RW  Pt displays narrow DOMINICK, hopping gait pattern secondary to not putting weight on RLE secondary to pain, decreased step length, decreased foot clearance  Pt presents at PT eval functioning below baseline and currently w/ overall mobility deficits secondary to: decreased strength, decreased endurance, impaired balance, impaired coordination, decreased insight into deficits, pain  Pt currently at a fall risk secondary to impairments listed above  Based on PT evaluation, pt will continue to benefit from skilled acute PT interventions to address stated impairments; to maximize functional mobility; for ongoing pt/ family training; and DME needs  At conclusion of PT session pt was left seated in bedside chair, all needs within reach  Provided pt education regarding PT plan to improve functional mobility status  PT is currently recommending post acute inpatient rehab  Pt agreeable to plan and goals as stated on evaluation  PT will continue to follow during hospital stay  Barriers to Discharge: Inaccessible home environment     Recommendation: Post acute IP rehab     PT - OK to Discharge: Yes    See flowsheet documentation for full assessment

## 2019-12-05 NOTE — NURSING NOTE
Pt leaving VIA slets to [facility] for vascular sx  Report given to [facility] 5th floor  Right AC intact

## 2019-12-05 NOTE — CONSULTS
Consultation - 126 [facility]  Kulwant Stockton 78 y o  male MRN: [id number]  Unit/Bed#: [facility] 530-01 Encounter: [id number]        Inpatient consult to gastroenterology     Performed by  Dianna Stark MD     Authorized by Ermias Koroma PA-C          Reason for Consult / Principal Problem:   Anemia    ASSESSMENT AND PLAN:    Kulwant Stockton is a 78 y o  old male with PMH: chronic PAD, HTN, tobacco abuse who presents for critical limb ischemia with associated supratheraputic INR and anemia  #Acute Normocytic Anemia  #Supratheraputic INR  Patient with acute drop in his hemoglobin over the last 48 hours requiring transfusion  Currently patient is hemodynamically stable with no overt signs of GI blood  If patient develops any evidence luminal bleed, we will consider endoscopic evaluation  However at this time with completely brown stool in the vault on rectal exam today and no associated abdominal pain, suspect non-luminal etiology for anemia  No evidence of hematemesis, melena, hematochezia endorsed by patient  Patient with iron panel in November 2019 which showed evidence of anemia of chronic disease with low iron saturation as well as low TIBC  Patient also known to have a small hematoma in the left common femoral artery bifurcation seen on CT during last hospitalization  Patient denies any new hematomas  Patient states he had a colonoscopy over 10 years ago at which time was normal  Has not had EGD done before       Plan:  -Continue holding heparin drip per now, okay to resume per vascular team for PAD  -start protonix 40mg IV qdaily (order placed for you)  -qdaily INR  -Check q8hr CBCs, recommend goal HGB >7  -Avoid NSAIDS  -Consider holding BP meds in setting of GI bleed if patient becomes hypotensive    ______________________________________________________________________    HPI:    Patient is a 18-year-old male who presented to the emergency department from rehab in the setting of worsening right foot pain  Patient has previous history of left femoral and at arterectomy, Pro fundoplasty, superficial femoral artery embolectomy and iliac stenting that was in November 2019  Patient has multiple vascular surgeries done on his leg and has his 3rd and 4th right digits amputated  Patient presented with hemoglobin of 7 1 down to 6 3 requiring transfusion up to 9 3  Per discussion with patient and his son who was at bedside translating he denies any hematemesis, melena, or hematochezia  Has not had many significant bowel movements this is not been eating as much the most recent bowel movement was brown in color  Patient will need to remain on anticoagulation for his PAD  Patient came in with supratheraputic INR up to 7 28  Patient's previous colonoscopy over 10 years ago which was normal   Patient has not had any type of upper endoscopies done previously  REVIEW OF SYSTEMS:    CONSTITUTIONAL: Denies any fever, chills, rigors, and weight loss  HEENT: No earache or tinnitus  Denies hearing loss or visual disturbances  CARDIOVASCULAR: No chest pain or palpitations  RESPIRATORY: Denies any cough, hemoptysis, shortness of breath or dyspnea on exertion  GASTROINTESTINAL: As noted in the History of Present Illness  GENITOURINARY: No problems with urination  Denies any hematuria or dysuria  NEUROLOGIC: No dizziness or vertigo, denies headaches  MUSCULOSKELETAL: +Right leg pain  SKIN: Denies skin rashes or itching  ENDOCRINE: Denies excessive thirst  Denies intolerance to heat or cold  PSYCHOSOCIAL: Denies depression or anxiety  Denies any recent memory loss       Historical Information   Past Medical History:   Diagnosis Date    Cancer ([facility] )     throat cancer    PAD (peripheral artery disease) ([facility] ) 11/2/2019    PEG (percutaneous endoscopic gastrostomy) status ([facility] )     Port-A-Cath in place      Past Surgical History:   Procedure Laterality Date    BYPASS FEMORAL-FEMORAL Right     ESOPHAGOSCOPY N/A 8/24/2017    Procedure: [place];  Surgeon: Sedrick Hyman MD;  [facility];  Service: ENT    IR ABDOMINAL ANGIOGRAPHY / INTERVENTION  11/14/2019    IR ABDOMINAL ANGIOGRAPHY / INTERVENTION  11/7/2019    TN [place] [address] N/A 8/24/2017    Procedure: Bronchoscopy;  Surgeon: Sedrick Hyman MD;  [facility];  Service: ENT    TN LARYNGOSCOPY,DIRCT,OP,BIOPSY N/A 8/24/2017    Procedure: LARYNGOSCOPY DIRECT;  Surgeon: Sedrick Hyman MD;  [facility];  Service: ENT    TN [place] Left 11/7/2019    Procedure: LEFT ENDARTERECTOMY ARTERIAL FEMORAL; L CFAE WITH PROFUNDOPLASTY; ARTERIOGRAM;RETROGRADE ILIAC STENTING;  Surgeon: Marlen Roberts MD;  [facility];  Service: Vascular    TOE AMPUTATION Right     2 toes     Social History   Social History     Substance and Sexual Activity   Alcohol Use Never    Alcohol/week: 0 0 standard drinks    Frequency: Patient refused    Drinks per session: Patient refused    Binge frequency: Patient refused     Social History     Substance and Sexual Activity   Drug Use No     Social History     Tobacco Use   Smoking Status Current Every Day Smoker    Packs/day: 1 00    Years: 15 00    Pack years: 15 00    Types: Cigarettes   Smokeless Tobacco Never Used   Tobacco Comment    50+ years     No family history on file      Meds/Allergies     Medications Prior to Admission   Medication    amLODIPine (NORVASC) 10 mg tablet    aspirin (ECOTRIN LOW STRENGTH) 81 mg EC tablet    atorvastatin (LIPITOR) 20 mg tablet    clopidogrel (PLAVIX) 75 mg tablet    ferrous sulfate 325 (65 Fe) mg tablet    gabapentin (NEURONTIN) 100 mg capsule    hydrocortisone 2 5 % cream    melatonin 3 mg    neomycin-bacitracin-polymyxin b (NEOSPORIN) ointment    polyethylene glycol (MIRALAX) 17 g packet    senna-docusate sodium (SENOKOT S) 8 6-50 mg per tablet    tamsulosin (FLOMAX) 0 4 mg    thiamine 100 MG tablet    warfarin (COUMADIN) 2 mg tablet     Current Facility-Administered Medications   Medication Dose Route Frequency    amLODIPine (NORVASC) tablet 10 mg  10 mg Oral Daily    atorvastatin (LIPITOR) tablet 20 mg  20 mg Oral After Dinner    clopidogrel (PLAVIX) tablet 75 mg  75 mg Oral Daily    gabapentin (NEURONTIN) capsule 100 mg  100 mg Oral Daily    HYDROmorphone (DILAUDID) injection 0 2 mg  0 2 mg Intravenous Q3H PRN    lactated ringers infusion  75 mL/hr Intravenous Continuous    melatonin tablet 6 mg  6 mg Oral HS    nicotine (NICODERM CQ) 14 mg/24hr TD 24 hr patch 1 patch  1 patch Transdermal Daily    ondansetron (ZOFRAN) injection 4 mg  4 mg Intravenous Q4H PRN    oxyCODONE (ROXICODONE) IR tablet 2 5 mg  2 5 mg Oral Q4H PRN    oxyCODONE (ROXICODONE) IR tablet 5 mg  5 mg Oral Q4H PRN    senna-docusate sodium (SENOKOT S) 8 6-50 mg per tablet 1 tablet  1 tablet Oral BID    tamsulosin (FLOMAX) capsule 0 4 mg  0 4 mg Oral Daily With Dinner    thiamine (VITAMIN B1) tablet 100 mg  100 mg Oral Daily     No Known Allergies    Objective     Blood pressure 129/60, pulse 79, temperature 98 3 °F (36 8 °C), temperature source Oral, resp  rate 17, height 5' 5" (1 651 m), weight 51 4 kg (113 lb 5 1 oz), SpO2 99 %  Body mass index is 18 86 kg/m²  Intake/Output Summary (Last 24 hours) at 12/5/2019 1247  Last data filed at 12/5/2019 0800  Gross per 24 hour   Intake 1232 5 ml   Output 875 ml   Net 357 5 ml       PHYSICAL EXAM:      General Appearance:   Alert, cooperative, no distress   HEENT:   Normocephalic, atraumatic, anicteric  Neck:   Supple, symmetrical, trachea midline   Lungs:   Clear to auscultation bilaterally; no rales, rhonchi or wheezing; respirations unlabored    Heart:   Regular rate and rhythm; no murmur, rub, or gallop     Abdomen:   Soft, non-tender, non-distended; normal bowel sounds; no masses, no organomegaly    Genitalia:   Deferred    Rectal: Brown large stool burden in rectal vault, no blood visualized   Extremities:  Amputated 3rd,4th digit on R foot   Pulses:  2+ and symmetric all extremities    Skin:  No jaundice, rashes, or lesions    Lymph nodes:  No palpable cervical lymphadenopathy      Lab Results:   Admission on 12/04/2019   Component Date Value    ABO Grouping 12/04/2019 A     Rh Factor 12/04/2019 Positive     Antibody Screen 12/04/2019 Negative     Specimen Expiration Date 12/04/2019 [id number]     WBC 12/04/2019 8 70     RBC 12/04/2019 2 08*    Hemoglobin 12/04/2019 6 3*    Hematocrit 12/04/2019 19 6*    MCV 12/04/2019 94     MCH 12/04/2019 30 3     MCHC 12/04/2019 32 1     RDW 12/04/2019 14 8     MPV 12/04/2019 9 2     Platelets 73/19/8107 254     nRBC 12/04/2019 0     Neutrophils Relative 12/04/2019 82*    Immat GRANS % 12/04/2019 1     Lymphocytes Relative 12/04/2019 8*    Monocytes Relative 12/04/2019 6     Eosinophils Relative 12/04/2019 2     Basophils Relative 12/04/2019 1     Neutrophils Absolute 12/04/2019 7 16     Immature Grans Absolute 12/04/2019 0 08     Lymphocytes Absolute 12/04/2019 0 73     Monocytes Absolute 12/04/2019 0 52     Eosinophils Absolute 12/04/2019 0 15     Basophils Absolute 12/04/2019 0 06     Sodium 12/04/2019 141     Potassium 12/04/2019 4 2     Chloride 12/04/2019 111*    CO2 12/04/2019 25     ANION GAP 12/04/2019 5     BUN 12/04/2019 22     Creatinine 12/04/2019 0 73     Glucose 12/04/2019 126     Calcium 12/04/2019 8 4     eGFR 12/04/2019 94     Protime 12/04/2019 55 1*    INR 12/04/2019 6 29*    Protime 12/05/2019 51 8*    INR 12/05/2019 5 82*    Unit Product Code 12/05/2019 C0677G11     Unit Number 12/05/2019 [id number]     Unit ABO 12/05/2019 A     Unit [facility] 12/05/2019 POS     Crossmatch 12/05/2019 Compatible     Unit Dispense Status 12/05/2019 Presumed Trans     Unit Product Code 12/05/2019 C2811G97     Unit Number 12/05/2019 [id number]     Unit ABO 12/05/2019 A     Unit RH 12/05/2019 POS     Crossmatch 12/05/2019 Compatible     Unit Dispense Status 12/05/2019 Presumed Trans     Sodium 12/05/2019 138     Potassium 12/05/2019 4 6     Chloride 12/05/2019 111*    CO2 12/05/2019 23     ANION GAP 12/05/2019 4     BUN 12/05/2019 18     Creatinine 12/05/2019 0 69     Glucose 12/05/2019 112     Calcium 12/05/2019 8 4     AST 12/05/2019 22     ALT 12/05/2019 38     Alkaline Phosphatase 12/05/2019 71     Total Protein 12/05/2019 6 0*    Albumin 12/05/2019 2 4*    Total Bilirubin 12/05/2019 1 24*    eGFR 12/05/2019 96     WBC 12/05/2019 9 52     RBC 12/05/2019 3 06*    Hemoglobin 12/05/2019 9 3*    Hematocrit 12/05/2019 28 0*    MCV 12/05/2019 92     MCH 12/05/2019 30 4     MCHC 12/05/2019 33 2     RDW 12/05/2019 14 2     MPV 12/05/2019 9 2     Platelets 08/18/2490 235     nRBC 12/05/2019 0     Neutrophils Relative 12/05/2019 80*    Immat GRANS % 12/05/2019 2     Lymphocytes Relative 12/05/2019 8*    Monocytes Relative 12/05/2019 6     Eosinophils Relative 12/05/2019 3     Basophils Relative 12/05/2019 1     Neutrophils Absolute 12/05/2019 7 62     Immature Grans Absolute 12/05/2019 0 15     Lymphocytes Absolute 12/05/2019 0 78     Monocytes Absolute 12/05/2019 0 58     Eosinophils Absolute 12/05/2019 0 30     Basophils Absolute 12/05/2019 0 09        Imaging Studies: I have personally reviewed pertinent imaging studies  Ct Head Wo Contrast    Result Date: 11/7/2019  Narrative: CT BRAIN - WITHOUT CONTRAST INDICATION:   Left sided weakness  COMPARISON:  CT head dated 11/5/2019 TECHNIQUE:  CT examination of the brain was performed  In addition to axial images, coronal 2D reformatted images were created and submitted for interpretation  Radiation dose length product (DLP) for this visit:  843 mGy-cm     This examination, like all CT scans performed in the [facility], was performed utilizing techniques to minimize radiation dose exposure, including the use of iterative reconstruction and automated exposure control  IMAGE QUALITY:  Diagnostic  FINDINGS: PARENCHYMA: Decreased attenuation is noted in periventricular and subcortical white matter demonstrating an appearance that is statistically most likely to represent mild microangiopathic change  Gray-white differentiation appears maintained  No CT signs of acute territorial infarction  Small old lacunar infarct in the right basal ganglia  No intracranial mass, mass effect or midline shift  No acute parenchymal hemorrhage  Mild parenchymal atrophy  VENTRICLES AND EXTRA-AXIAL SPACES:  Ventricles and extra-axial CSF spaces are prominent commensurate with the degree of volume loss  No hydrocephalus  No acute extra-axial hemorrhage  VISUALIZED ORBITS AND PARANASAL SINUSES:  Mild to moderate mucosal thickening in the bilateral ethmoid sinuses with opacification of several ethmoid air cells  There is mild mucosal thickening in the bilateral maxillary sinuses  Left-sided mastoid effusion is redemonstrated  Paranasal sinuses otherwise are grossly clear  The orbits appear intact  CALVARIUM AND EXTRACRANIAL SOFT TISSUES:  Normal      Impression: No acute intracranial abnormality is seen  Other findings as above  Workstation performed: [id number]     Ct Head Wo Contrast    Result Date: 11/5/2019  Narrative: CT BRAIN - WITHOUT CONTRAST INDICATION:   Altered mental status  COMPARISON:  None  TECHNIQUE:  CT examination of the brain was performed  In addition to axial images, coronal 2D reformatted images were created and submitted for interpretation  Radiation dose length product (DLP) for this visit:  966 93 mGy-cm   This examination, like all CT scans performed in the [facility], was performed utilizing techniques to minimize radiation dose exposure, including the use of iterative  reconstruction and automated exposure control    IMAGE QUALITY: Diagnostic  FINDINGS: PARENCHYMA: Decreased attenuation is noted in periventricular and subcortical white matter demonstrating an appearance that is statistically most likely to represent moderate microangiopathic change  No CT signs of acute infarction  No intracranial mass, mass effect or midline shift  No acute parenchymal hemorrhage  VENTRICLES AND EXTRA-AXIAL SPACES:  Normal for the patient's age  VISUALIZED ORBITS AND PARANASAL SINUSES:  Left mastoid effusion CALVARIUM AND EXTRACRANIAL SOFT TISSUES:  Normal      Impression: No acute intracranial abnormality  Microangiopathic changes  Left mastoid effusion  Workstation performed: [id number]     Cta Chest Wo W Contrast    Result Date: 11/14/2019  Narrative: CT ANGIOGRAM OF THE CHEST, WITH AND WITHOUT IV CONTRAST INDICATION:  Evaluate for embolic source COMPARISON: None  TECHNIQUE:  CT angiogram examination of the chest was performed according to standard protocol  Contrast as well as noncontrast images were obtained  This examination, like all CT scans performed in the [facility], was performed utilizing techniques to minimize radiation dose exposure, including the use of iterative reconstruction and automated exposure control  3D reconstructions were performed an independent workstation, and are supplied for review  Rad dose 428 mGy-cm IV Contrast:  85 mL of iohexol (OMNIPAQUE)  FINDINGS: VASCULAR STRUCTURES:  The ascending aorta is nonaneurysmal and normal in appearance without significant atherosclerosis  The aortic arch is minimally atherosclerotic without an ulcerated plaque or source for emboli  There is classic branching anatomy of the aortic arch  The right brachiocephalic artery and visualized common carotid artery are patent  The right subclavian artery is patent  The left common carotid artery has calcifications at its origin with a possible stenosis  Evaluation is difficult due to artifact from the calcification    The remainder of the common carotid artery is patent  The left subclavian artery has a mild ostial stenosis, but is otherwise patent  The descending thoracic aorta is minimally atherosclerotic with scattered calcified plaque, but no source for distal emboli  The visualized abdominal aorta is unremarkable  OTHER FINDINGS: HEART:  Normal cardiac size  No pericardial effusions  LUNGS:  Centrilobular emphysematous changes upper lobe prominence and biapical bullae  2 mm right upper lobe pulmonary nodule (series 3 image 47)  Biapical pleural thickening  Bibasilar subsegmental atelectasis  PLEURA:  Tiny bilateral pleural effusions  MEDIASTINUM AND ARTURO:  No mass or significant lymphadenopathy  CHEST WALL AND LOWER NECK:  Bilateral gynecomastia  VISUALIZED STRUCTURES IN THE UPPER ABDOMEN:  Unremarkable  Impression: Mild atherosclerosis of the thoracic aorta without a source for distal emboli  Possible left common carotid artery stenosis with limited evaluation due to artifact  Mild left subclavian artery ostial stenosis  Emphysema  2 mm right upper lobe pulmonary nodule without a prior study for comparison  Based on current Fleischner Society 2017 Guidelines on incidental pulmonary nodule, because the patient is considered high risk for lung cancer, 12 month follow-up non-contrast chest CT is recommended  Tiny bilateral pleural effusions and subsegmental atelectasis  Workstation performed: [id number]     Cta Lower Extremity Right W Wo Contrast    Result Date: 12/3/2019  Narrative: CT ANGIOGRAM OF THE LOWER EXTREMITIES WITH IV CONTRAST INDICATION: New onset right foot pain  Previous CT demonstrated popliteal occlusion  COMPARISON: Previous CTA dated 11/1/2019  TECHNIQUE:  This examination, like all CT scans performed in the [facility], was performed utilizing techniques to minimize radiation dose exposure, including the use of iterative reconstruction and automated exposure control   CT angiogram examination of the pelvis, and lower extremities was performed according to standard protocol with intravenous contrast  100 ml of Omnipaque 350 was injected  3D reconstructions were performed an independent workstation, and are supplied for  review  3-D reconstructions were only possible above the level of the proximal tibia  FINDINGS: VASCULAR STRUCTURES: The examination  demonstrates bilateral common and external iliac disease  Stents are present on the left which are widely patent  On the right, the common iliac artery is ectatic, measuring 11 mm  The external iliac artery has diffuse disease with no focal stenosis  Common femoral artery has a moderate stenosis within approximately 50% reduction in caliber  This is chronic, seen previously  Examination of the right lower extremity demonstrates moderate stenoses of the origins of the superficial femoral and profunda femoris arteries  There is an acute occlusion of the profunda femoris artery approximately 2 cm from the origin, spanning approximately 2 5 cm of the main trunk of the vessel  Distal branches are noted to be reconstituting  The superficial femoral artery is patent, but with diffuse calcific disease and multiple moderate stenoses throughout its length  The popliteal artery is similarly diseased with heavy calcification to the level of the femoral condyle  At the level of the femoral condyle is a chronic popliteal occlusion which was seen previously and does not appear changed  There is reconstitution of single vessel peroneal runoff which appears patent into the foot  The anterior tibial and posterior tibial artery are both severely calcified, and appear occluded  These findings are chronic  Examination of the left lower extremity demonstrates interval performance of a left femoral endarterectomy to treat a previously seen left common femoral artery occlusion    Postsurgical changes are noted, but the patient now has a widely patent left common femoral artery which is continuous into his diseased profunda femoris and superficial femoral arteries  There is diffuse calcification of the superficial femoral artery throughout its length with multiple moderate stenoses  Similar disease is present within the popliteal artery a focal area of aneurysmal dilatation in the popliteal artery is present above the knee which measures 8 5 mm, but there is no mural thrombus  The remainder of the popliteal artery is patent, but tapers to a small caliber below the knee  Distally, there appears to be continuous peroneal runoff  The anterior tibial and posterior tibial arteries are occluded  OTHER FINDINGS: PELVIS: REPRODUCTIVE ORGANS: Unremarkable for patient's age  URINARY BLADDER: Unremarkable  VISUALIZED BOWEL: Unremarkable  ABDOMINOPELVIC CAVITY: No pathologically enlarged mesenteric or retroperitoneal lymph nodes  No ascites or free intraperitoneal air  ABDOMINAL WALL/INGUINAL REGIONS: Evidence of prior surgery  OSSEOUS STRUCTURES: No acute fracture or destructive osseous lesion  Diffuse osteopenia is present  Prior amputation of the 3rd and 4th toes on the right foot  Impression: 1  Acute segmental right profunda femoris artery occlusion  2   Chronic right popliteal artery occlusion superimposed on diffuse disease  Single vessel peroneal runoff reconstituted  3   Status post left common femoral artery endarterectomy  Left lower extremity has continuous single vessel runoff into the foot via the peroneal artery  4   Focal area of aneurysmal dilatation of the left popliteal artery above-the-knee measuring 8 5 mm with no mural thrombus present  5   Prior amputation of the right 3rd and 4th toes  The acute findings on this scan were discussed with the [facility] emergency department at approximately 6:00 PM this evening, and subsequently with Dr Gary Jaquez from vascular surgery at 6:20 PM this evening   Workstation performed: [id number]     Vas Carotid Complete Study    Result Date: 11/15/2019  Narrative:  THE [facility]ULAR [facility] REPORT CLINICAL: Indications: Follow up evaluation to CTA which shows questionable stenosis of left common carotid artery and left subclavian artery  Physician wants to rule out B/L ICA stenosis  Operative History: 2019-11-08 Left Common Femoral endarterectomy, primary closure 2019-11-08 Left Extended profundoplasty 2019-11-08 Left Iliac thrombembolectomy, leg incision 2019-11-08 Left Transluminal balloon angioplasty, lower limb artery Fem Fem BPG Right 2nd Toe amputation Peg Tube Risk Factors The patient has history of HTN, Diabetes, throat cancer, left femoral artery occlusion, HLD, PAD, and smoking (current) 1-2 ppd  The patient's current BMI is 18 47, Weight is 111 lb and height is 65 in  Brachial BP: Right:  126/60 mmHg  Left:  IV site  FINDINGS:  Right        Impression         PSV  EDV (cm/s)  Direction of Flow  Ratio  Dist  ICA                       118          17                      0 58  Mid  ICA                         82          25                      0 40  Prox  ICA    1 - 49%             77          14                      0 38  Dist CCA                        170          15                            Mid CCA                         204          34                      1 94  Prox CCA                        105          15                            Ext Carotid                     110           0                      0 54  Prox Vert                       120          23  Antegrade                 Subclavian   Moderate stenosis  284          11                             Left         Impression         PSV  EDV (cm/s)  Direction of Flow  Ratio  Dist  ICA                       113          58                      0 75  Mid  ICA     70%+               357          81                      2 36  Prox   ICA    70 - 99%           368         100                      2 43  Dist CCA     moderate stenosis  211          35 Mid CCA                         151          30                      1 38  Prox CCA                        109          21                            Ext Carotid  moderate stenosis  277          18                      1 83  Prox Vert                        82          25  Antegrade                 Subclavian   moderate stenosis  275          13                               CONCLUSION: Impression RIGHT: There is <50% stenosis noted in the internal carotid artery  Plaque is heterogenous/calcified and irregular  Vertebral artery flow is antegrade  A moderate stenosis was identified in the proximal subclavian artery  LEFT: There is 70-99% stenosis noted in the internal carotid artery  Plaque is heterogenous/calcified and irregular  Vertebral artery flow is antegrade  A moderate stenosis was identified in the proximal subclavian artery  A moderate stenosis was identified in the proximal external carotid artery and distal common carotid artery  Technically difficult study secondary to severe B/L calcified plaque and arterial shadowing  Technical findings called to  TERRA Hamm and faxed to chart  Internal carotid artery stenosis determination by consensus criteria from: Nivia Jordan et al  Carotid Artery Stenosis: Gray-Scale and Doppler US Diagnosis - [employer] in [address] [place] [address] Drive, Radiology 2003; [id number]  SIGNATURE: Electronically Signed by: Zafar Nascimento MD, [address] on 2019-11-15 11:33:55 PM    Vas Lower Limb Arterial Duplex, Complete Bilateral    Result Date: 11/10/2019  Narrative:  [facility] REPORT [facility]INICAL: Indications:  Patient complains of left leg pain  Left iliofemoral, fem-pop thromboembolectomy, along with femoral endarterectomy with extensive profundaplasty and stenting of common and external iliac artery  Risk Factors The patient has history of HTN, Diabetes (Yes), HLD and PAD    FINDINGS:  Segment                Right                 Left Impression  PSV  EDV  Impression  PSV  Common Femoral Artery              274                    94  Prox Profunda                      134                    82  Prox SFA                            26       50-75%      272  Mid SFA                             26                   127  Dist SFA                            19       50-75%      326  Proximal Pop           Occluded      1                   153  Distal Pop             Occluded      0                   104  Dist Post Tibial                     7                    13  Dist  Ant  Tibial                   12    8               44  Dist Peroneal                       17                    50     CONCLUSION: Impression: RIGHT LOWER LIMB: The popliteal artery is occluded  Diffuse disease noted throughout the femoral artery and tibioperoneal disease  Ankle/Brachial index:  0 15 PVR/ PPG tracings are dampened  Metatarsal and Great toe pressure are unobtainable secondary to attenuated PPG waveform  LEFT LOWER LIMB: Common femoral arterectomy site appears patent, although it was difficult to visualize vessel secondary to edema  Residual 50-75% stenosis is noted in multiple segments of the femoral artery  There is tibioperoneal disease  Ankle/Brachial index:  0 52 PVR/ PPG tracings are dampened   Metatarsal pressure of 45mmHg Great toe pressure of 36mmHg, within the healing range  No previous study to compare  SIGNATURE: Electronically Signed by: Maurisio Singer on 2019-11-10 11:40:50 AM    Vas Lower Limb Arterial Duplex, Limited, Unilateral    Result Date: 12/3/2019  Narrative:  [facility] REPORT [facility]INICAL: Operative History: 2019-11-14 IR angiography with intervention 2019-11-08 Left Common Femoral endarterectomy, primary closure 2019-11-08 Left Extended profundoplasty 2019-11-08 Left Iliac thromboembolectomy, leg incision 2019-11-08 Left Transluminal balloon angioplasty, lower limb artery Right 2nd toe amputation BPG right LE Iliac stent Peg Tube Port A Cath Risk Factors The patient has history of HTN, Diabetes (Yes), HLD, PAD and smoking (current) 1-2 ppd  FINDINGS:  Right                  Impression  PSV  EDV  Dist EIA                            94    9  Common Femoral Artery  <50%        225   12  Prox Profunda          Occluded     21    2  Dist  Profunda         Occluded              Prox SFA                           122    8  Mid SFA                             85    5  Dist SFA                            71    2  Proximal Pop           Occluded     16    4  Prox Post Tibial                     6    3  Dist Post Tibial                    20   10  Prox  Ant  Tibial                   13    7  Dist  Ant  Tibial                   15    9  Prox Peroneal                       36   13  Dist Peroneal                       20   10     CONCLUSION: Impression: RIGHT LOWER LIMB: The profunda artery appears acutely occluded  The popliteal is chronically occluded  Diffuse disease noted throughout the femoral artery and tibioperoneal disease  Unable to document a dorsalis pedis artery signal   Tech note: ABIs not performed due to patient's pain/intolerance  The patient is also getting a CTA run off of his right leg  Technical findings given to [employer] at time of study  SIGNATURE: Electronically Signed by: Cherry Stafford MD on 2019-12-03 01:44:30 PM    Vas Pseudoaneurysm Study    Result Date: 11/18/2019  Narrative:  [facility] REPORT CLINICAL: Indications: PT C/O left groin hematoma  He is S/P left femoral endarterectomy with iliac stenting and IR angiography with intervention via right CFA access  Physician wants to rule out pseudoaneurysm of left CFA   Operative History: 2019-11-14 IR angiography with intervention 2019-11-08 Left Common Femoral endarterectomy, primary closure 2019-11-08 Left Extended profundoplasty 2019-11-08 Left Iliac thromboembolectomy, leg incision 2019-11-08 Left Transluminal balloon angioplasty, lower limb artery Right 2nd to amputation BPG right LE Iliac stent Peg Tube Port A Cath Risk Factors The patient has history of HTN, Diabetes, renal stone, tonsil cancer, HLD, PAD, and smoking (current) 1-2 ppd  The patient's current BMI is 18 47, Weight is 111 lb and height is 65 in  Clinical Brachial BP: Right Pressure:  146/67 mm Hg  FINDINGS:  Left                   Impression  PSV  Dist EIA                           115  Common Femoral Artery              181  Prox Profunda                       36  Prox SFA                           117  Mid SFA                            130  Dist SFA                            83  Dist Post Tibial       Occluded         Dist  Ant  Tibial                   45  Dist Peroneal                      104     CONCLUSION:  Impression:  LEFT SIDE: No evidence of a pseudoaneurysm in the femoral artery  The common femoral vein is patent with no evidence of an arteriovenous fistula noted  Tech Note: There is an echogenic structure located in the inguinal region  This structure measures approximately 2 11 cm and is consistent with enlarged lymph node and channels  Multiple collateral arteries were identified in the groin  A non-vascular structure measuring 2 60 cm X 1 63 cm was identified in the groin  Technically difficult study secondary to hematoma and patient's inability to tolerate probe pressure at groin  Technical findings faxed to chart  SIGNATURE: Electronically Signed by: Akin Skelton MD on 2019-11-18 08:58:18 PM    Ir Abdominal Angiography / Intervention    Result Date: 11/20/2019  Narrative: Please see complete report in patients chart under exam notes  Ir Abdominal Angiography / Intervention    Result Date: 11/14/2019  Narrative: EXAMINATION: 1  Abdominal aortogram with left lower extremity arterial runoff  2   Left SFA and popliteal artery angioplasty 3    Right SFA angioplasty INDICATION: 71-year-old male with left lower extremity claudication status post left femoral endarterectomy, profundoplasty, SFA embolectomy, and iliac artery stenting is referred for left lower extremity arteriogram with possible intervention  CONTRAST: 85 mL Visipaque 320 intra-arterial FLUOROSCOPY TIME:   21 5 MINUTES IMAGES:  694 ANESTHESIA: Moderate sedation and local lidocaine PROCEDURE:  The patient was identified verbally and by wristband  Timeout was performed  Informed consent was obtained from healthcare proxy  Following obtaining informed consent, the patient was prepped and draped in the usual sterile fashion  All elements of maximal sterile barrier technique, cap and mask and sterile gown and sterile gloves and sterile full-body drape and hand hygiene and 2% chlorhexidine for cutaneous antisepsis  Sterile ultrasound technique with sterile gel and sterile probe covers was also utilized  Under ultrasound guidance, a 21-gauge needle was used to gain access into the right common femoral artery  Access was secured using a 5-Czech vascular sheath  An Omni Flush catheter was advanced into the abdominal aorta and an abdominal aortogram with  bilateral iliac artery runoff was performed  The Omni Flush catheter was used to across the aortic bifurcation and advanced into the left common femoral artery  A left lower extremity arterial runoff was performed  A 0 035 inch Glidewire advantage was advanced into the left popliteal artery  Angioplasty of the stenoses along the left SFA and popliteal arteries was performed using 6 mm x 220 mm conventional angioplasty balloon  Additional angioplasty of the proximal left SFA was performed using 7 mm x 40 mm conventional angioplasty balloon  Post angioplasty angiogram of the left leg was performed  All wires, catheters, balloons, and sheath were removed  The right CFA arteriotomy site was closed using manual pressure  Sterile dressing was applied  Following the case, a previously dopplerable right PT pulse was found to be no longer dopplerable  Therefore decision was made to perform a right lower extremity arteriogram  Under ultrasound guidance, a 21-gauge needle was used to gain access into the right proximal SFA  Access was secured using a 5-Guyanese transitional sheath and right lower extremity arterial runoff was performed  The 5-Guyanese transitional sheath was exchanged for a 6-Guyanese vascular sheath  A 0 018 inch Glidewire advantage was advanced into the right popliteal artery and angioplasty of the right SFA was performed using 5 mm x 220 mm conventional angioplasty balloon  At this point, the arteries distal to the right SFA was noted to be chronically occluded  After discussion with the vascular surgeon, decision was made not to perform further intervention  4 mg TPA was injected through the sheath in the right SFA for additional thrombolysis  All wires, catheters, balloons, and sheath were removed  Hemostasis at the right SFA puncture site was obtained using manual pressure  Sterile dressing was applied  The patient tolerated the procedure well without complication  The patient left the IR department in stable condition  Findings: 1  Abdominal aorta, and bilateral iliac arteries were patent with atherosclerotic calcifications  2   The left common iliac to external iliac artery stents were patent  3   The left deep femoral artery was patent  4   There were diffuse moderate to severe luminal narrowing of the left SFA and popliteal arteries which were successfully treated using 6 mm and 7 mm balloons  5   There was one vessel runoff of the left lower extremity through the peroneal artery  6   The left AT and PT were chronically occluded  The left dorsalis pedis and plantar arteries reconstituted through through collaterals  7   There were diffuse moderate to severe narrowing of the right SFA which was successfully angioplastied using 5 mm balloon   8   The right popliteal artery and tibial arteries were chronically occluded with faint distal reconstitution of the right posterior tibial artery  9   The right peroneal artery appeared to reconstitute distally through collaterals  Impression: Impression: 1  Successful angioplasty of moderate to severe stenoses along the left SFA and popliteal arteries  2   Successful angioplasty of moderate to severe stenoses along the right SFA  Workstation performed: [id number]     Ct Abdomen Pelvis W Contrast    Result Date: 11/17/2019  Narrative: CT ABDOMEN AND PELVIS WITH IV CONTRAST INDICATION:   Worsening anemia post abdominal aortogram, bilateral lower extremity vascular intervention  On heparin drip/Coumadin  Rule out hematoma  COMPARISON:  CTA chest on 11/8/2019 TECHNIQUE:  CT examination of the abdomen and pelvis was performed  Axial, sagittal, and coronal 2D reformatted images were created from the source data and submitted for interpretation  Radiation dose length product (DLP) for this visit:  634 22 mGy-cm   This examination, like all CT scans performed in the [facility], was performed utilizing techniques to minimize radiation dose exposure, including the use of iterative  reconstruction and automated exposure control  IV Contrast:  100 mL of iohexol (OMNIPAQUE) Enteric Contrast:  Enteric contrast was not administered  FINDINGS: Images are partly degraded due to motion artifact  ABDOMEN LOWER CHEST:  Small bilateral pleural effusions  Patchy opacities in the lower lobes suspicious for pneumonia  LIVER/BILIARY TREE:  Unremarkable  GALLBLADDER:  Distended with maximal transverse diameter of 4 3 cm  No calcified gallstones  Layering hyperdense material in the gallbladder possibly sludge  No pericholecystic inflammatory change  SPLEEN:  Unremarkable  PANCREAS:  Unremarkable  ADRENAL GLANDS:  Unremarkable  KIDNEYS/URETERS:  Punctate nonobstructing bilateral renal calculi versus vascular calcifications  No hydronephrosis   STOMACH AND BOWEL:  Moderate volume of fecal retention in the colon   No bowel obstruction  APPENDIX:  No findings to suggest appendicitis  ABDOMINOPELVIC CAVITY:  Trace amount of pelvic ascites  No free intraperitoneal air  No lymphadenopathy  VESSELS:  Advanced atherosclerotic changes are present  Left common iliac stent to external iliac stent noted  PELVIS REPRODUCTIVE ORGANS:  The prostate is enlarged  URINARY BLADDER:  7 mm calculus in the posterolateral wall of the right aspect bladder  ABDOMINAL WALL/INGUINAL REGIONS:  Small hematoma in the region of the left common femoral artery bifurcation measuring 3 8 x 4 1 cm in maximal axial dimensions (series 2, image 87)  Caudad extent of the hematoma is partially imaged  There is a small hyperdense focus in the region of hematoma measuring 8 mm, cannot exclude a small pseudoaneurysm (series 2, image 70)  Surgical clips in the left inguinal region  There is stranding about the right femoral vessels  Postsurgical changes in the ventral  abdominal wall  OSSEOUS STRUCTURES:  No acute fracture or destructive osseous lesion  Degenerative changes throughout the lumbar spine  Impression: 1  Small hematoma in the region of the left common femoral artery bifurcation as above  There is a hyperdense focus measuring 8mm in this region, cannot exclude a small pseudoaneurysm  Correlate with vascular ultrasound  2   Patchy opacities in the lower lobes suspicious for pneumonia  Small bilateral pleural effusions  I personally discussed this study with Daya Yang on 11/17/2019 at 4:36 PM  Workstation performed: [id number]     Us Abdomen Complete With Doppler    Result Date: 11/13/2019  Narrative: ABDOMEN ULTRASOUND, COMPLETE WITH DOPPLER INDICATION: Elevated LFTs  COMPARISON:  CT 11/1/2019 TECHNIQUE:   Real-time ultrasound of the abdomen was performed with a curvilinear transducer with both volumetric sweeps and still imaging techniques  FINDINGS: PANCREAS:  Visualized portions of the pancreas are within normal limits  AORTA AND IVC:  Visualized portions are normal for patient age  LIVER: Size:  Within normal range  The liver measures 14 7 cm in the midclavicular line  Contour:  Surface contour is smooth  Parenchyma:  Echogenicity and echotexture are within normal limits  No evidence of suspicious mass  LIVER DOPPLER: The main portal vein and primary branch segments are patent and hepatopetal with normal spectral waveform  Hepatic veins are patent  Spectral waveforms within normal limits  Main hepatic artery appears normal size, patent with normal spectral waveform  BILIARY: The gallbladder is decompressed  No pericholecystic fluid  No stones or sludge identified  No sonographic Parson's sign  No intrahepatic biliary dilatation  CBD measures 5 mm  No choledocholithiasis  KIDNEY: Right kidney measures 10 x 4 3 cm  Within normal limits  Left kidney measures 10 1 x 6 6 cm  Within normal limits  SPLEEN: Measures 9 8 x 11 2 x 4 1 cm  Within normal limits  ASCITES:  None  Impression: No significant sonographic abnormality  Patent hepatic vasculature with normal directional blood flow   Workstation performed: [id number]     ---------------------------------------------------  Note Electronically Signed By:    MD Loco Medina 72 Gastroenterology Fellow PGY-4  [address] #: [id number]

## 2019-12-05 NOTE — NURSING NOTE
Pt was stuck by multiple nurses over night for IV after pt had became confused and pulled LAC out  Pt has 20 gauge right AC that is functional  Pt was stuck mult times with site rite and was unsuccessful      Nathalie Severe, RN

## 2019-12-05 NOTE — H&P
H&P- Vascular Surgery  Kulwant Stockton 78 y o  male MRN: [id number]  Unit/Bed#: [facility] 530-01 Encounter: [id number]        Assessment/Plan     Assessment:  69M w/ acute Right Profunda Artery occlusion in setting of chronic PAD, critical limb ischemia of RLE complicated by coumadin induced coagulopathy with supratherapeutic INR > 6 5 and anemia w/ presenting Hgb to [facility] of 6 3    - RLE is chronically without sensation, new finding of decreased motor function with patient only able to partially move toes however not able to dorsi/plantarflex right foot ankle    - Pulse Exam: b/l Palp Fem's  LLE - Doppler PT  RLE - Doppler Pop/PT    Plan:  Admit to Vascular Surgery, Level 1 SD  Continue to hold heparin gtt at this time   - Supratherapeutic INR, will start Heparin gtt when INR <3  Consult to IR for potential embolectomy  Neurovascular checks every 4 hours  F/u Fecal Occult blood test  Transfuse pRBC PRN for Hgb < 7    History of Present Illness     HPI:  Kulwant Stockton is a 78 y o  male significant for peripheral arterial disease, hypertension, tobacco abuse the and multiple vascular interventions who presents to the emergency department of [facility] [place] from he rehab complaining of right foot pain and daughter describing it color changes to right lower extremity  Patient's past surgical history is significant for a left femoral endarterectomy, profundoplasty, superficial femoral artery embolectomy and iliac stenting on 11/06/2019  Per the daughter's report patient had 2 vascular surgeries to his right leg and 2012 and at this time he also had his right 3rd and 4th toes amputated  Daughter reports that since his discharge from [address] [place] [address] in November he has had right foot pain however yesterday 12/03/2019 she noted color changes to the right foot    In the emergency department right lower extremity CTA revealed an acute occlusion of the right profunda femoral artery with a chronic right popliteal occlusion  He was also supratherapeutic on his Coumadin with an INR greater than 7 at this time  He has also noticed well patient was changing that he had dark black stool however patient does take ferrous sulfate  In the [facility] department it was noted that the patient had no posterior tibial or dorsal pedalis Doppler signals in given the findings on CTA vascular surgery was called who recommended a transfer to [facility], however due to a lack of appropriate beds, patient was admitted to the ICU at [facility]  Upon arrival from transfer the following day 12/04/2019 there were no further noted changes to patient's clinical exam except for finding of a monophasic posterior tibial Doppler signal     Review of Systems   Constitutional: Negative  HENT: Negative  Eyes: Negative  Respiratory: Negative  Cardiovascular: Negative  Gastrointestinal: Negative  Endocrine: Negative  Genitourinary: Negative  Musculoskeletal: Negative  Skin: Positive for color change (to RLE toes)  Allergic/Immunologic: Negative  Neurological: Negative  Hematological: Negative  Psychiatric/Behavioral: Negative          Historical Information   Past Medical History:   Diagnosis Date    Cancer ([facility] )     throat cancer    PAD (peripheral artery disease) ([facility] [address] [facility] ) 11/2/2019    PEG (percutaneous endoscopic gastrostomy) status ([facility] )     Port-A-Cath in place      Past Surgical History:   Procedure Laterality Date    BYPASS FEMORAL-FEMORAL Right     ESOPHAGOSCOPY N/A 8/24/2017    Procedure: ESOPHAGOSCOPY FLEXIBLE;  Surgeon: Margo Edwards MD;  [facility];  Service: ENT    IR ABDOMINAL ANGIOGRAPHY / INTERVENTION  11/14/2019    IR ABDOMINAL ANGIOGRAPHY / INTERVENTION  11/7/2019    [place] [address] N/A 8/24/2017    Procedure: Bronchoscopy;  Surgeon: Margo Edwards MD;  [facility];  Service: ENT    TN LARYNGOSCOPY,DIRCT,OP,BIOPSY N/A 8/24/2017    Procedure: LARYNGOSCOPY DIRECT;  Surgeon: Yan Barnard MD;  [facility];  Service: ENT    AL [place] Left 11/7/2019    Procedure: LEFT ENDARTERECTOMY ARTERIAL FEMORAL; L CFAE WITH PROFUNDOPLASTY; ARTERIOGRAM;RETROGRADE ILIAC STENTING;  Surgeon: Buzz Monge MD;  Location:  MAIN OR;  Service: Vascular    TOE AMPUTATION Right     2 toes     Social History   Social History     Substance and Sexual Activity   Alcohol Use Never    Alcohol/week: 0 0 standard drinks    Frequency: Patient refused    Drinks per session: Patient refused    Binge frequency: Patient refused     Social History     Substance and Sexual Activity   Drug Use No     Social History     Tobacco Use   Smoking Status Current Every Day Smoker    Packs/day: 1 00    Years: 15 00    Pack years: 15 00    Types: Cigarettes   Smokeless Tobacco Never Used   Tobacco Comment    50+ years     Family History: non-contributory    Meds/Allergies   all medications and allergies reviewed  No Known Allergies    Objective   First Vitals:   Blood Pressure: 132/61 (12/04/19 2127)  Pulse: 76 (12/04/19 2127)  Temperature: 97 9 °F (36 6 °C) (12/04/19 2127)  Temp Source: Oral (12/04/19 2127)  Respirations: 16 (12/04/19 2127)  Height: 5' 5" (165 1 cm) (12/04/19 2127)  Weight - Scale: 51 4 kg (113 lb 5 1 oz) (12/04/19 2127)  SpO2: 99 % (12/04/19 2045)    Current Vitals:   Blood Pressure: 132/61 (12/04/19 2127)  Pulse: 76 (12/04/19 2127)  Temperature: 97 9 °F (36 6 °C) (12/04/19 2127)  Temp Source: Oral (12/04/19 2127)  Respirations: 16 (12/04/19 2127)  Height: 5' 5" (165 1 cm) (12/04/19 2127)  Weight - Scale: 51 4 kg (113 lb 5 1 oz) (12/04/19 2127)  SpO2: 99 % (12/04/19 2045)    No intake or output data in the 24 hours ending 12/04/19 2227    Invasive Devices     Peripheral Intravenous Line            Peripheral IV 12/04/19 Right Antecubital less than 1 day                Physical Exam   Constitutional: He appears well-developed  HENT:   Head: Normocephalic and atraumatic  Eyes: EOM are normal    Neck: No tracheal deviation present  Cardiovascular: Normal rate and regular rhythm  Pulmonary/Chest: Effort normal  No respiratory distress  Abdominal: Soft  There is no tenderness  Genitourinary:   Genitourinary Comments: deferred   Musculoskeletal:   RLE with 3rd and 4th toes amputated  Mottling to Right toes and distal portion of foot    RLE minimal to no motor function  RLE with no sensation however described by patient as chronic   Neurological: He is alert  Skin: Skin is warm  Psychiatric: He has a normal mood and affect  VASCULAR EXAM:  Pulses: RLE Palp femoral pulse, Doppler Popliteal and monophasic PT signal  LLE Palp femoral pulse, Doppler PT signal    RLE with minimal to no motor function, sensory absent (described as chronic)      Lab Results:   I have personally reviewed pertinent lab results  , CBC:   Lab Results   Component Value Date    WBC 8 70 12/04/2019    HGB 6 3 (LL) 12/04/2019    HCT 19 6 (L) 12/04/2019    MCV 94 12/04/2019     12/04/2019    MCH 30 3 12/04/2019    MCHC 32 1 12/04/2019    RDW 14 8 12/04/2019    MPV 9 2 12/04/2019    NRBC 0 12/04/2019   , CMP:   Lab Results   Component Value Date    SODIUM 141 12/04/2019    K 4 2 12/04/2019     (H) 12/04/2019    CO2 25 12/04/2019    BUN 22 12/04/2019    CREATININE 0 73 12/04/2019    CALCIUM 8 4 12/04/2019    EGFR 94 12/04/2019     Imaging: I have personally reviewed pertinent reports  EKG, Pathology, and Other Studies: I have personally reviewed pertinent reports        Code Status: Level 1 - Full Code  Advance Directive and Living Will:      Power of :    POLST:

## 2019-12-05 NOTE — PLAN OF CARE
Problem: Prexisting or High Potential for Compromised Skin Integrity  Goal: Skin integrity is maintained or improved  Description  INTERVENTIONS:  - Identify patients at risk for skin breakdown  - Assess and monitor skin integrity  - Assess and monitor nutrition and hydration status  - Monitor labs   - Assess for incontinence   - Turn and reposition patient  - Assist with mobility/ambulation  - Relieve pressure over bony prominences  - Avoid friction and shearing  - Provide appropriate hygiene as needed including keeping skin clean and dry  - Evaluate need for skin moisturizer/barrier cream  - Collaborate with interdisciplinary team   - Patient/family teaching  - Consider wound care consult   Outcome: Progressing     Problem: Potential for Falls  Goal: Patient will remain free of falls  Description  INTERVENTIONS:  - Assess patient frequently for physical needs  -  Identify cognitive and physical deficits and behaviors that affect risk of falls    -  [place] fall precautions as indicated by assessment   - Educate patient/family on patient safety including physical limitations  - Instruct patient to call for assistance with activity based on assessment  - Modify environment to reduce risk of injury  - Consider OT/PT consult to assist with strengthening/mobility  Outcome: Progressing     Problem: PAIN - ADULT  Goal: Verbalizes/displays adequate comfort level or baseline comfort level  Description  Interventions:  - Encourage patient to monitor pain and request assistance  - Assess pain using appropriate pain scale  - Administer analgesics based on type and severity of pain and evaluate response  - Implement non-pharmacological measures as appropriate and evaluate response  - Consider cultural and social influences on pain and pain management  - Notify physician/advanced practitioner if interventions unsuccessful or patient reports new pain  Outcome: Progressing     Problem: INFECTION - ADULT  Goal: Absence or prevention of progression during hospitalization  Description  INTERVENTIONS:  - Assess and monitor for signs and symptoms of infection  - Monitor lab/diagnostic results  - Monitor all insertion sites, i e  indwelling lines, tubes, and drains  - Monitor endotracheal if appropriate and nasal secretions for changes in amount and color  - [place] appropriate cooling/warming therapies per order  - Administer medications as ordered  - Instruct and encourage patient and family to use good hand hygiene technique  - Identify and instruct in appropriate isolation precautions for identified infection/condition  Outcome: Progressing  Goal: Absence of fever/infection during neutropenic period  Description  INTERVENTIONS:  - Monitor WBC    Outcome: Progressing     Problem: SAFETY ADULT  Goal: Patient will remain free of falls  Description  INTERVENTIONS:  - Assess patient frequently for physical needs  -  Identify cognitive and physical deficits and behaviors that affect risk of falls    -  [place] fall precautions as indicated by assessment   - Educate patient/family on patient safety including physical limitations  - Instruct patient to call for assistance with activity based on assessment  - Modify environment to reduce risk of injury  - Consider OT/PT consult to assist with strengthening/mobility  Outcome: Progressing  Goal: Maintain or return to baseline ADL function  Description  INTERVENTIONS:  -  Assess patient's ability to carry out ADLs; assess patient's baseline for ADL function and identify physical deficits which impact ability to perform ADLs (bathing, care of mouth/teeth, toileting, grooming, dressing, etc )  - Assess/evaluate cause of self-care deficits   - Assess range of motion  - Assess patient's mobility; develop plan if impaired  - Assess patient's need for assistive devices and provide as appropriate  - Encourage maximum independence but intervene and supervise when necessary  - Involve family in performance of ADLs  - Assess for home care needs following discharge   - Consider OT consult to assist with ADL evaluation and planning for discharge  - Provide patient education as appropriate  Outcome: Progressing  Goal: Maintain or return mobility status to optimal level  Description  INTERVENTIONS:  - Assess patient's baseline mobility status (ambulation, transfers, stairs, etc )    - Identify cognitive and physical deficits and behaviors that affect mobility  - Identify mobility aids required to assist with transfers and/or ambulation (gait belt, sit-to-stand, lift, walker, cane, etc )  - [place] fall precautions as indicated by assessment  - Record patient progress and toleration of activity level on Mobility SBAR; progress patient to next Phase/Stage  - Instruct patient to call for assistance with activity based on assessment  - Consider rehabilitation consult to assist with strengthening/weightbearing, etc   Outcome: Progressing     Problem: DISCHARGE PLANNING  Goal: Discharge to home or other facility with appropriate resources  Description  INTERVENTIONS:  - Identify barriers to discharge w/patient and caregiver  - Arrange for needed discharge resources and transportation as appropriate  - Identify discharge learning needs (meds, wound care, etc )  - Arrange for interpretive services to assist at discharge as needed  - Refer to Case Management Department for coordinating discharge planning if the patient needs post-hospital services based on physician/advanced practitioner order or complex needs related to functional status, cognitive ability, or social support system  Outcome: Progressing     Problem: Knowledge Deficit  Goal: Patient/family/caregiver demonstrates understanding of disease process, treatment plan, medications, and discharge instructions  Description  Complete learning assessment and assess knowledge base    Interventions:  - Provide teaching at level of understanding  - Provide teaching via preferred learning methods  Outcome: Progressing

## 2019-12-05 NOTE — PROGRESS NOTES
Progress Note - Vascular Surgery   Lee Velazquez 78 y o  male MRN: [id number]  Unit/Bed#: [facility] 530-01 Encounter: [id number]    Assessment:  69M w/ acute Right Profunda Artery occlusion in setting of chronic PAD, critical limb ischemia of RLE complicated by coumadin induced coagulopathy with supratherapeutic INR > 6 5 and anemia w/ presenting Hgb to [facility] of 6 3    - s/p transfusion 2U pRBC overnight    Plan:  NPO  Continue hold Heparin until INR <3  F/u IR consult for possible intervention   - may need to reverse coumadin coagulopathy prior to intervention  Pain control PRN    Subjective/Objective   Chief Complaint:     Subjective: No acute events  Patient is resting in bed  He received 2U pRBC overnight for anemia  Objective:     Blood pressure 151/67, pulse 71, temperature 98 °F (36 7 °C), resp  rate 16, height 5' 5" (1 651 m), weight 51 4 kg (113 lb 5 1 oz), SpO2 100 %  ,Body mass index is 18 86 kg/m²  Intake/Output Summary (Last 24 hours) at 12/5/2019 6770  Last data filed at 12/5/2019 0515  Gross per 24 hour   Intake 1180 ml   Output 700 ml   Net 480 ml       Invasive Devices     Peripheral Intravenous Line            Peripheral IV 12/04/19 Right Antecubital 1 day                Physical Exam: General: AAOx3  Head: normocephalic, atraumatic  Neck: supple, trachea midline  Respiratory: BS b/l  Abdomen: Soft, NT, no rebound/guarding  Heart: RRR, S1s2  Ext: Cool right foot with amputated 3/4 toes, motor decreased, chronic minimal sensation  Pulse:   Monophasic PT signal on RLE  Palpable Fem RLE  Doppler Pop on RLE      Lab, Imaging and other studies:  I have personally reviewed pertinent lab results    , CBC:   Lab Results   Component Value Date    WBC 8 70 12/04/2019    HGB 6 3 (LL) 12/04/2019    HCT 19 6 (L) 12/04/2019    MCV 94 12/04/2019     12/04/2019    MCH 30 3 12/04/2019    MCHC 32 1 12/04/2019    RDW 14 8 12/04/2019    MPV 9 2 12/04/2019    NRBC 0 12/04/2019   , CMP:   Lab Results Component Value Date    SODIUM 141 12/04/2019    K 4 2 12/04/2019     (H) 12/04/2019    CO2 25 12/04/2019    BUN 22 12/04/2019    CREATININE 0 73 12/04/2019    CALCIUM 8 4 12/04/2019    EGFR 94 12/04/2019     VTE Pharmacologic Prophylaxis: Warfarin (Coumadin)  VTE Mechanical Prophylaxis: sequential compression device

## 2019-12-05 NOTE — PLAN OF CARE
Problem: OCCUPATIONAL THERAPY ADULT  Goal: Performs self-care activities at highest level of function for planned discharge setting  See evaluation for individualized goals  Description  Treatment Interventions: ADL retraining, Functional transfer training, UE strengthening/ROM, Endurance training, Cognitive reorientation, Patient/family training, Equipment evaluation/education, Compensatory technique education, Activityengagement, Energy conservation          See flowsheet documentation for full assessment, interventions and recommendations  Note:   Limitation: Decreased ADL status, Decreased UE strength, Decreased Safe judgement during ADL, Decreased endurance, Decreased cognition, Decreased sensation, Decreased high-level ADLs  Prognosis: Fair  Assessment: (P) Pt is a 77 yo male seen for OT eval s/p adm to [facility] w/ pain, color changes, and edema in R foot dx'd w/ critical lower limb ischemia  He has a history of a L femoral endarterectomy, profundoplasty, SFA embolectomy and iliac stenting on 11/6/19  Had residual L SFA stenosis and developed thrombus in R collateral posterior tibial artery for which he received TPA and SFA angioplasty  Pt lives w/ multiple family members in [facility] w/ 10 D[address]OT[address] and additional FF to bed/bath  Per son, Pt always has a family member that is home to provide A as needed  Comorbidities include a h/o PAD, HTN, tobacco abuse, PVD, tonsil CA, syncope and collapse, thrombocytopenia, anemia  Pt with active OT orders  Pt is Slovak-speaking only and son present t/o eval to A w/ translation  Per son, Pt required A w/ all ADLS and IADLS, does not drive, & required use of DME PTA, however refused to use AD for mobility  Pt is currently demonstrating the following occupational deficits: Mod A UB bathing/dressing, Max A LB bathing/dressing and toileting, S bed mobility, Min A functional transfers and mobility (hopping) w/ RW    These deficits that are impacting pt's baseline areas of occupation are a result of the following impairments: pain, endurance, activity tolerance, functional mobility, forward functional reach, balance, decreased I w/ ADLS/IADLS, strength, cognitive impairments, decreased safety awareness and decreased insight into deficits  The following Occupational Performance Areas to address include: grooming, bathing/shower, toilet hygiene, dressing, health maintenance, functional mobility and clothing management  Pt scored overall 40/100 on the Barthel Index  Based on the aforementioned OT evaluation, functional performance deficits, and assessments, pt has been identified as a high complexity evaluation  Recommend STR upon D/C   Pt to continue to benefit from acute immediate OT services to address the following goals 3-5x/week to  w/in 7-10 days:      OT Discharge Recommendation: Short Term Rehab  OT - OK to Discharge: Yes(when medically cleared)

## 2019-12-05 NOTE — SOCIAL WORK
CM met with Pt and son Shanda Robin with an introduction and explanation of role  Adi reported the Pt was admitted from the [facility] where he doesn't want the Pt to return upon d/c  Adi reported he wants the Pt to be d/c to the home environment with him and his sister Nga Kerr  Adi reported residing in a 2 story home with a second floor set up for the Pt with 10 steps to enter the home  Adi reported the Pt uses a roller walker and requires assistance with ADLs which can done by him and his sister  Adi reported the Pt had [facility]NA in the past but can't remember the name of provider agency but has it at home, and will follow up with CM with the name for a [facility] referral for home PT and OT services  Adi denied the Pt having a hx of mental health or drug/alcohol placements  Adi reported Nga Kerr is in the process of working on the Pt's living will  Pt uses [facility] in [facility] and has Dr Boogie Bautista as a PCP  Family to transport upon d/c     CM reviewed d/c planning process including the following: identifying help at home, patient preference for d/c planning needs, Discharge Lounge, [facility] Meds to Bed program, availability of treatment team to discuss questions or concerns patient and/or family may have regarding understanding medications and recognizing signs and symptoms once discharged  CM also encouraged patient to follow up with all recommended appointments after discharge  Patient advised of importance for patient and family to participate in managing patients medical well being

## 2019-12-06 ENCOUNTER — ANESTHESIA EVENT (INPATIENT)
Dept: ANESTHESIOLOGY | Facility: HOSPITAL | Age: 69
DRG: 239 | End: 2019-12-06
Payer: MEDICARE

## 2019-12-06 ENCOUNTER — ANESTHESIA (INPATIENT)
Dept: ANESTHESIOLOGY | Facility: HOSPITAL | Age: 69
DRG: 239 | End: 2019-12-06
Payer: MEDICARE

## 2019-12-06 ENCOUNTER — APPOINTMENT (INPATIENT)
Dept: SURGERY | Facility: HOSPITAL | Age: 69
DRG: 239 | End: 2019-12-06
Payer: MEDICARE

## 2019-12-06 LAB
ANION GAP SERPL CALCULATED.3IONS-SCNC: 8 MMOL/L (ref 4–13)
BASOPHILS # BLD AUTO: 0.05 THOUSANDS/ΜL (ref 0–0.1)
BASOPHILS NFR BLD AUTO: 1 % (ref 0–1)
BUN SERPL-MCNC: 20 MG/DL (ref 5–25)
CALCIUM SERPL-MCNC: 9.1 MG/DL (ref 8.3–10.1)
CHLORIDE SERPL-SCNC: 109 MMOL/L (ref 100–108)
CO2 SERPL-SCNC: 22 MMOL/L (ref 21–32)
CREAT SERPL-MCNC: 0.76 MG/DL (ref 0.6–1.3)
EOSINOPHIL # BLD AUTO: 0.15 THOUSAND/ΜL (ref 0–0.61)
EOSINOPHIL NFR BLD AUTO: 1 % (ref 0–6)
ERYTHROCYTE [DISTWIDTH] IN BLOOD BY AUTOMATED COUNT: 15.2 % (ref 11.6–15.1)
GFR SERPL CREATININE-BSD FRML MDRD: 92 ML/MIN/1.73SQ M
GLUCOSE SERPL-MCNC: 118 MG/DL (ref 65–140)
HCT VFR BLD AUTO: 26.9 % (ref 36.5–49.3)
HGB BLD-MCNC: 9.1 G/DL (ref 12–17)
IMM GRANULOCYTES # BLD AUTO: 0.11 THOUSAND/UL (ref 0–0.2)
IMM GRANULOCYTES NFR BLD AUTO: 1 % (ref 0–2)
INR PPP: 5.27 (ref 0.84–1.19)
LYMPHOCYTES # BLD AUTO: 1.04 THOUSANDS/ΜL (ref 0.6–4.47)
LYMPHOCYTES NFR BLD AUTO: 10 % (ref 14–44)
MCH RBC QN AUTO: 30.3 PG (ref 26.8–34.3)
MCHC RBC AUTO-ENTMCNC: 33.8 G/DL (ref 31.4–37.4)
MCV RBC AUTO: 90 FL (ref 82–98)
MONOCYTES # BLD AUTO: 0.5 THOUSAND/ΜL (ref 0.17–1.22)
MONOCYTES NFR BLD AUTO: 5 % (ref 4–12)
NEUTROPHILS # BLD AUTO: 9 THOUSANDS/ΜL (ref 1.85–7.62)
NEUTS SEG NFR BLD AUTO: 82 % (ref 43–75)
NRBC BLD AUTO-RTO: 0 /100 WBCS
PLATELET # BLD AUTO: 273 THOUSANDS/UL (ref 149–390)
PMV BLD AUTO: 9.7 FL (ref 8.9–12.7)
POTASSIUM SERPL-SCNC: 3.6 MMOL/L (ref 3.5–5.3)
PROTHROMBIN TIME: 47.9 SECONDS (ref 11.6–14.5)
RBC # BLD AUTO: 3 MILLION/UL (ref 3.88–5.62)
SODIUM SERPL-SCNC: 139 MMOL/L (ref 136–145)
WBC # BLD AUTO: 10.85 THOUSAND/UL (ref 4.31–10.16)

## 2019-12-06 PROCEDURE — 85610 PROTHROMBIN TIME: CPT | Performed by: STUDENT IN AN ORGANIZED HEALTH CARE EDUCATION/TRAINING PROGRAM

## 2019-12-06 PROCEDURE — 80048 BASIC METABOLIC PNL TOTAL CA: CPT | Performed by: STUDENT IN AN ORGANIZED HEALTH CARE EDUCATION/TRAINING PROGRAM

## 2019-12-06 PROCEDURE — 85025 COMPLETE CBC W/AUTO DIFF WBC: CPT | Performed by: STUDENT IN AN ORGANIZED HEALTH CARE EDUCATION/TRAINING PROGRAM

## 2019-12-06 PROCEDURE — 99232 SBSQ HOSP IP/OBS MODERATE 35: CPT | Performed by: PHYSICIAN ASSISTANT

## 2019-12-06 PROCEDURE — NC001 PR NO CHARGE: Performed by: STUDENT IN AN ORGANIZED HEALTH CARE EDUCATION/TRAINING PROGRAM

## 2019-12-06 RX ORDER — BUPIVACAINE HYDROCHLORIDE 5 MG/ML
INJECTION, SOLUTION PERINEURAL
Status: COMPLETED | OUTPATIENT
Start: 2019-12-06 | End: 2019-12-06

## 2019-12-06 RX ADMIN — THIAMINE HCL TAB 100 MG 100 MG: 100 TAB at 08:38

## 2019-12-06 RX ADMIN — SENNOSIDES AND DOCUSATE SODIUM 1 TABLET: 8.6; 5 TABLET ORAL at 17:34

## 2019-12-06 RX ADMIN — OXYCODONE HYDROCHLORIDE 5 MG: 5 TABLET ORAL at 06:59

## 2019-12-06 RX ADMIN — SODIUM CHLORIDE, SODIUM LACTATE, POTASSIUM CHLORIDE, AND CALCIUM CHLORIDE 75 ML/HR: .6; .31; .03; .02 INJECTION, SOLUTION INTRAVENOUS at 21:26

## 2019-12-06 RX ADMIN — CLOPIDOGREL BISULFATE 75 MG: 75 TABLET ORAL at 08:38

## 2019-12-06 RX ADMIN — NICOTINE 1 PATCH: 14 PATCH TRANSDERMAL at 08:40

## 2019-12-06 RX ADMIN — SODIUM CHLORIDE, SODIUM LACTATE, POTASSIUM CHLORIDE, AND CALCIUM CHLORIDE 75 ML/HR: .6; .31; .03; .02 INJECTION, SOLUTION INTRAVENOUS at 06:04

## 2019-12-06 RX ADMIN — SENNOSIDES AND DOCUSATE SODIUM 1 TABLET: 8.6; 5 TABLET ORAL at 08:38

## 2019-12-06 RX ADMIN — MELATONIN 6 MG: 3 TAB ORAL at 21:21

## 2019-12-06 RX ADMIN — ROPIVACAINE HYDROCHLORIDE 10 ML/HR: 10 INJECTION, SOLUTION EPIDURAL at 15:22

## 2019-12-06 RX ADMIN — BUPIVACAINE HYDROCHLORIDE 20 ML: 5 INJECTION, SOLUTION PERINEURAL at 12:02

## 2019-12-06 RX ADMIN — ATORVASTATIN CALCIUM 20 MG: 20 TABLET, FILM COATED ORAL at 17:34

## 2019-12-06 RX ADMIN — OXYCODONE HYDROCHLORIDE 5 MG: 5 TABLET ORAL at 21:22

## 2019-12-06 RX ADMIN — TAMSULOSIN HYDROCHLORIDE 0.4 MG: 0.4 CAPSULE ORAL at 17:34

## 2019-12-06 RX ADMIN — GABAPENTIN 100 MG: 100 CAPSULE ORAL at 08:38

## 2019-12-06 RX ADMIN — AMLODIPINE BESYLATE 10 MG: 10 TABLET ORAL at 08:38

## 2019-12-06 NOTE — PROGRESS NOTES
Vascular Surgery    Progress Note - Maida Villalta 1950, 78 y o  male MRN: [id number]    Unit/Bed#: [facility] 530-01 Encounter: [id number]    Primary Care Provider: Den Marie MD   Date and time admitted to hospital: 12/4/2019  8:33 PM        * Critical lower limb ischemia  Assessment & Plan  79 y/o M presents with Right lower extremity rest pain, anemia, concern for GI bleed, supratherapeutic INR  He is s/p left femoral endarterectomy with profundoplasty, SFA embolectomy and retrograde iliac stenting 11/6/19 and arteriogram with left SFA/popliteal angioplasty and right SFA angioplasty 11/14/19  Plan:  Follow-up INR  IR Consult and plan for arteriogram possible lysis when INR improved  Anticoagulation held will resume heparin drip when INR appropriate  NPO/IVF  Pain control; will obtain APS consult  Neurovascular checks      Subjective:  Pt c/o pain Right foot, M/S intact    Vitals:  /67 (BP Location: Right arm)   Pulse 95   Temp 97 9 °F (36 6 °C) (Oral)   Resp (!) 29 Comment: moitor  Ht 5' 5" (1 651 m)   Wt 51 4 kg (113 lb 5 1 oz)   SpO2 99%   BMI 18 86 kg/m²     I/Os:  I/O last 3 completed shifts: In: 3127 5 [P O :720; I V :1707 5; Blood:700]  Out: 1900 [Urine:1900]  No intake/output data recorded      Lab Results and Cultures:   Lab Results   Component Value Date    WBC 10 85 (H) 12/06/2019    HGB 9 1 (L) 12/06/2019    HCT 26 9 (L) 12/06/2019    MCV 90 12/06/2019     12/06/2019     Lab Results   Component Value Date    GLUCOSE 128 11/07/2019    CALCIUM 8 4 12/05/2019    K 4 6 12/05/2019    CO2 23 12/05/2019     (H) 12/05/2019    BUN 18 12/05/2019    CREATININE 0 69 12/05/2019     Lab Results   Component Value Date    INR 5 82 (HH) 12/05/2019    INR 6 29 (HH) 12/04/2019    INR 7 20 (HH) 12/04/2019    PROTIME 51 8 (H) 12/05/2019    PROTIME 55 1 (H) 12/04/2019    PROTIME 63 2 (H) 12/04/2019        Blood Culture: No results found for: BLOODCX,   Urinalysis: No results found for: Florinda Pollen, SPECGRAV, PHUR, LEUKOCYTESUR, NITRITE, PROTEINUA, GLUCOSEU, KETONESU, BILIRUBINUR, BLOODU,   Urine Culture: No results found for: URINECX,   Wound Culure: No results found for: WOUNDCULT    Medications:  Current Facility-Administered Medications   Medication Dose Route Frequency    amLODIPine (NORVASC) tablet 10 mg  10 mg Oral Daily    atorvastatin (LIPITOR) tablet 20 mg  20 mg Oral After Dinner    clopidogrel (PLAVIX) tablet 75 mg  75 mg Oral Daily    gabapentin (NEURONTIN) capsule 100 mg  100 mg Oral Daily    HYDROmorphone (DILAUDID) injection 0 2 mg  0 2 mg Intravenous Q3H PRN    lactated ringers infusion  75 mL/hr Intravenous Continuous    melatonin tablet 6 mg  6 mg Oral HS    nicotine (NICODERM CQ) 14 mg/24hr TD 24 hr patch 1 patch  1 patch Transdermal Daily    ondansetron (ZOFRAN) injection 4 mg  4 mg Intravenous Q4H PRN    oxyCODONE (ROXICODONE) IR tablet 2 5 mg  2 5 mg Oral Q4H PRN    oxyCODONE (ROXICODONE) IR tablet 5 mg  5 mg Oral Q4H PRN    senna-docusate sodium (SENOKOT S) 8 6-50 mg per tablet 1 tablet  1 tablet Oral BID    tamsulosin (FLOMAX) capsule 0 4 mg  0 4 mg Oral Daily With Dinner    thiamine (VITAMIN B1) tablet 100 mg  100 mg Oral Daily       Physical Exam:    General appearance: alert and oriented, in no acute distress  Lungs: clear to auscultation bilaterally  Heart: regular rate and rhythm, S1, S2 normal, no murmur, click, rub or gallop  Abdomen: soft, non-tender; bowel sounds normal; no masses,  no organomegaly  Extremities: Right foot cool, toes dusky, motor intact ankle, sensation intact    Pulse exam:  Femoral: Right: 2+ Left: 2+  Popliteal: Right: doppler signal Left: doppler signal  DP: Right: 0 Left: 0  PT: Right: doppler signal Left: doppler signal      Ross Torres PA-C  12/6/2019

## 2019-12-06 NOTE — ASSESSMENT & PLAN NOTE
77 y/o M presents with Right lower extremity rest pain, anemia, concern for GI bleed, supratherapeutic INR  He is s/p left femoral endarterectomy with profundoplasty, SFA embolectomy and retrograde iliac stenting 11/6/19 and arteriogram with left SFA/popliteal angioplasty and right SFA angioplasty 11/14/19      Plan:  Follow-up INR  IR Consult and plan for arteriogram possible lysis when INR improved  Anticoagulation held will resume heparin drip when INR appropriate  NPO/IVF  Pain control; will obtain APS consult  Neurovascular checks

## 2019-12-06 NOTE — ANESTHESIA PROCEDURE NOTES
Peripheral Block    Patient location during procedure: holding area  Start time: 12/6/2019 12:02 PM  Reason for block: procedure for pain and at surgeon's request  Staffing  Anesthesiologist: Nia Fermin MD  Performed: anesthesiologist   Preanesthetic Checklist  Completed: patient identified, site marked, surgical consent, pre-op evaluation, timeout performed, IV checked, risks and benefits discussed and monitors and equipment checked  Peripheral Block  Patient position: left lateral decubitus  Prep: ChloraPrep  Patient monitoring: continuous pulse ox  Block type: popliteal  Laterality: right  Injection technique: catheter  Procedures: ultrasound guided, Ultrasound guidance required for the procedure to increase accuracy and safety of medication placement and decrease risk of complications    Ultrasound permanent image savedbupivacaine (MARCAINE) 0 5 % perineural infiltration, 20 mL  Needle  Needle type: Stimuplex   Needle gauge: 22 G  Needle length: 10 cm  Needle localization: ultrasound guidance  Needle insertion depth: 6 cm  Catheter type: open end  Catheter size: 18 G  Catheter at skin depth: 6 cm  Test dose: negative  Assessment  Injection assessment: transient paresthesias, local visualized surrounding nerve on ultrasound, negative aspiration for heme and incremental injection  Paresthesia pain: immediately resolved  Heart rate change: no  Slow fractionated injection: yes  Post-procedure:  sterile dressing applied  patient tolerated the procedure well with no immediate complications

## 2019-12-06 NOTE — CONSULTS
Consultation - Acute Pain Service   Aliyah Wilder 78 y o  male MRN: [id number]  Unit/Bed#: [facility] 530-01 Encounter: [id number]               Assessment/Plan     Assessment:   79 yo male with right lower extremity rest pain and elevated INR  He is not currently a candidate for addressing his ischemic leg due to elevated INR  He has required transfusion for anemia likely secondary to a GI source  His son states that he has dementia and that the medications for pain can lead to further disorientation  Plan:   1  Place popliteal nerve catheter  This can serve as a bridge to definitive management when his INR normalizes  APS will continue to follow; please contact APS ( [phone]) with any further questions    History of Present Illness    Admit Date:  12/4/2019  Hospital Day:  2 days  Primary Service:  Vascular Surgery  Attending Provider:  Lizabeth Jang MD  Reason for Consult / Principal Problem: rest pain in right lower extremity  Hx and PE limited by: language, dementia  HPI: Aliyah Wilder is a 78y o  year old male who presents with rest pain, elevated INR, positive stool guaiac, and anemia  He was transfused  He has had periods of altered mental status, delirium  He has dementia at baseline  He has had prior vascular surgeries on the right leg and any further intervention has been delayed due to the elevated INR        Inpatient consult to Acute Pain Service  Consult performed by: Micaela Clayton MD  Consult ordered by: Deepak Field PA-C          Review of Systems    Historical Information   Past Medical History:   Diagnosis Date    Cancer [facility])     throat cancer    PAD [facility]peripheral artery disease) ([facility] ) 11/2/2019    PEG (percutaneous endoscopic gastrostomy) status ([facility] )     Port-A-Cath in place      Past Surgical History:   Procedure Laterality Date    BYPASS FEMORAL-FEMORAL Right     ESOPHAGOSCOPY N/A 8/24/2017    Procedure: ESOPHAGOSCOPY FLEXIBLE;  Surgeon: Humberto Terrazas MD; Location: [facility];  Service: ENT    IR ABDOMINAL ANGIOGRAPHY / INTERVENTION  11/14/2019    IR ABDOMINAL ANGIOGRAPHY / INTERVENTION  11/7/2019    CO BRONCHOSCOPY,DIAGNOSTIC N/A 8/24/2017    Procedure: Bronchoscopy;  Surgeon: Patsy Cotter MD;  Location: [facility];  Service: ENT    CO LARYNGOSCOPY,DIRCT,OP,BIOPSY N/A 8/24/2017    Procedure: LARYNGOSCOPY DIRECT;  Surgeon: Patsy Cotter MD;  Location: [facility];  Service: ENT    CO [place] Left 11/7/2019    Procedure: LEFT ENDARTERECTOMY ARTERIAL FEMORAL; L CFAE WITH PROFUNDOPLASTY; ARTERIOGRAM;RETROGRADE ILIAC STENTING;  Surgeon: Mickey Jeffries MD;  [facility];  Service: Vascular    TOE AMPUTATION Right     2 toes     Social History   Social History     Substance and Sexual Activity   Alcohol Use Never    Alcohol/week: 0 0 standard drinks    Frequency: Patient refused    Drinks per session: Patient refused    Binge frequency: Patient refused     Social History     Substance and Sexual Activity   Drug Use No     Social History     Tobacco Use   Smoking Status Current Every Day Smoker    Packs/day: 1 00    Years: 15 00    Pack years: 15 00    Types: Cigarettes   Smokeless Tobacco Never Used   Tobacco Comment    50+ years     Family History: non-contributory    Meds/Allergies   all current active meds have been reviewed    No Known Allergies    Objective   Temp:  [97 5 °F (36 4 °C)-97 9 °F (36 6 °C)] 97 8 °F (36 6 °C)  HR:  [76-95] 82  Resp:  [14-29] 16  BP: (124-140)/(60-80) 132/63    Intake/Output Summary (Last 24 hours) at 12/6/2019 1357  Last data filed at 12/6/2019 0842  Gross per 24 hour   Intake 1895 ml   Output 1575 ml   Net 320 ml       Physical Exam   Constitutional: No distress  Pulmonary/Chest: Effort normal    Neurological: He is alert  Lab Results: I have personally reviewed pertinent labs  Imaging Studies: I have personally reviewed pertinent reports      EKG, Pathology, and Other Studies: I have personally reviewed pertinent reports  Counseling / Coordination of Care  Total floor / unit time spent today Level 1 = 20 minutes  Greater than 50% of total time was spent with the patient and / or family counseling and / or coordination of care   A description of the counseling / coordination of care: risks and benefits of popliteal nerve catheter

## 2019-12-06 NOTE — PROGRESS NOTES
Progress Note- Daljit Avery 78 y o  male MRN: [id number]  Unit/Bed#: [facility] 530-01 Encounter: [id number]    Assessment and Plan:  Daljit Avery is a 78 y o  old male with PMH: chronic PAD, HTN, tobacco abuse who presents for critical limb ischemia with associated supratheraputic INR and anemia       #Acute Normocytic Anemia  #Supratheraputic INR  Currently patient is hemodynamically stable with no overt signs of GI blood  Hemoglobin remained stable for 36 hours  Would not recommend endoscopy at this time with completely brown stool noted  Patient with no associated abdominal pain, suspect non-luminal etiology for anemia  No evidence of hematemesis, melena, hematochezia endorsed by patient  Patient with iron panel in November 2019 which showed evidence of anemia of chronic disease with low iron saturation as well as low TIBC  Patient states he had a colonoscopy over 10 years ago at which time was normal, will need repeat outpatient colonoscopy       Plan:  -Patient will need outpatient screening colonoscopy, we will have patient follow up in GI clinic for evaluation for repeat colonoscopy  -Continue holding heparin drip per now, okay to resume per vascular team for PAD  -continue protonix 40mg IV qdaily (order placed for you)  -Transfuse for HGB >7      GI team will sign off at this time, please do not hesitate to contact us with any further questions or concerns about the patient at any point     ______________________________________________________________________    Subjective:     Patient no acute complaints today  Denies any abdominal pain  Has not noticed any blood in his stool      Medication Administration - last 24 hours from 12/05/2019 1152 to 12/06/2019 1152       Date/Time Order Dose Route Action Action by     12/06/2019 0604 lactated ringers infusion 75 mL/hr Intravenous New 1555 [facility] Cristiano Melara RN     12/05/2019 1643 lactated ringers infusion 75 mL/hr Intravenous New Bag Tonia Mireles RN 12/06/2019 0840 nicotine (NICODERM CQ) 14 mg/24hr TD 24 hr patch 1 patch 1 patch Transdermal Medication Applied Margoth Coon RN     12/06/2019 0838 nicotine (NICODERM CQ) 14 mg/24hr TD 24 hr patch 1 patch 1 patch Transdermal Patch Removed Margoth Coon RN     12/06/2019 0659 oxyCODONE (ROXICODONE) IR tablet 5 mg 5 mg Oral Given Teresa Fu RN     12/05/2019 2124 oxyCODONE (ROXICODONE) IR tablet 5 mg 5 mg Oral Given Travis Tom RN     12/05/2019 1643 oxyCODONE (ROXICODONE) IR tablet 5 mg 5 mg Oral Given Roberto Mcdonald RN     12/05/2019 1228 oxyCODONE (ROXICODONE) IR tablet 5 mg 5 mg Oral Given Roberto Mcdonald RN     12/05/2019 2327 HYDROmorphone (DILAUDID) injection 0 2 mg 0 2 mg Intravenous Given Jose Daniel Alejandre RN     12/06/2019 0838 amLODIPine (NORVASC) tablet 10 mg 10 mg Oral Given Margoth Coon RN     12/05/2019 1702 atorvastatin (LIPITOR) tablet 20 mg 20 mg Oral Given Roberto Mcdonald RN     12/06/2019 7354 gabapentin (NEURONTIN) capsule 100 mg 100 mg Oral Given Margoth Coon RN     12/05/2019 2124 melatonin tablet 6 mg 6 mg Oral Given Travis Tom RN     12/06/2019 0838 senna-docusate sodium (SENOKOT S) 8 6-50 mg per tablet 1 tablet 1 tablet Oral Given Margoth Coon RN     12/05/2019 1701 senna-docusate sodium (SENOKOT S) 8 6-50 mg per tablet 1 tablet 1 tablet Oral Given Roberto Mcdonald RN     12/05/2019 1701 tamsulosin (FLOMAX) capsule 0 4 mg 0 4 mg Oral Given Roberto Mcdonald RN     12/06/2019 9888 thiamine (VITAMIN B1) tablet 100 mg 100 mg Oral Given Margoth Coon RN     12/06/2019 8012 clopidogrel (PLAVIX) tablet 75 mg 75 mg Oral Given Margoth Coon RN          Objective:     Vitals: Blood pressure 135/60, pulse 87, temperature 97 8 °F (36 6 °C), temperature source Oral, resp  rate 14, height 5' 5" (1 651 m), weight 51 4 kg (113 lb 5 1 oz), SpO2 98 %  ,Body mass index is 18 86 kg/m²        Intake/Output Summary (Last 24 hours) at 12/6/2019 1152  Last data filed at 12/6/2019 0842  Gross per 24 hour Intake 1895 ml   Output 1575 ml   Net 320 ml       Physical Exam:   General Appearance:   Alert, cooperative, no distress   HEENT:   Normocephalic, atraumatic, anicteric  Neck:  Supple, symmetrical, trachea midline   Lungs:   Clear to auscultation bilaterally; no rales, rhonchi or wheezing; respirations unlabored    Heart[de-identified]   Regular rate and rhythm; no murmur, rub, or gallop     Abdomen:    soft with no pain on deep palpation   Genitalia:   Deferred    Rectal:   Deferred    Extremities:    Right toe amputations clean dry intact    Pulses:  2+ and symmetric all extremities    Skin:  No jaundice, rashes, or lesions    Lymph nodes:  No palpable cervical lymphadenopathy        Invasive Devices     Peripheral Intravenous Line            Peripheral IV 12/04/19 Right Antecubital 2 days                Lab Results:  Admission on 12/04/2019   Component Date Value    ABO Grouping 12/04/2019 A     Rh Factor 12/04/2019 Positive     Antibody Screen 12/04/2019 Negative     Specimen Expiration Date 12/04/2019 [id number]     WBC 12/04/2019 8 70     RBC 12/04/2019 2 08*    Hemoglobin 12/04/2019 6 3*    Hematocrit 12/04/2019 19 6*    MCV 12/04/2019 94     MCH 12/04/2019 30 3     MCHC 12/04/2019 32 1     RDW 12/04/2019 14 8     MPV 12/04/2019 9 2     Platelets 41/61/6337 254     nRBC 12/04/2019 0     Neutrophils Relative 12/04/2019 82*    Immat GRANS % 12/04/2019 1     Lymphocytes Relative 12/04/2019 8*    Monocytes Relative 12/04/2019 6     Eosinophils Relative 12/04/2019 2     Basophils Relative 12/04/2019 1     Neutrophils Absolute 12/04/2019 7 16     Immature Grans Absolute 12/04/2019 0 08     Lymphocytes Absolute 12/04/2019 0 73     Monocytes Absolute 12/04/2019 0 52     Eosinophils Absolute 12/04/2019 0 15     Basophils Absolute 12/04/2019 0 06     Sodium 12/04/2019 141     Potassium 12/04/2019 4 2     Chloride 12/04/2019 111*    CO2 12/04/2019 25     ANION GAP 12/04/2019 5     BUN 12/04/2019 22     Creatinine 12/04/2019 0 73     Glucose 12/04/2019 126     Calcium 12/04/2019 8 4     eGFR 12/04/2019 94     Protime 12/04/2019 55 1*    INR 12/04/2019 6 29*    Protime 12/05/2019 51 8*    INR 12/05/2019 5 82*    Unit Product Code 12/05/2019 U6119V83     Unit Number 12/05/2019 [id number]     Unit ABO 12/05/2019 A     Unit RH 12/05/2019 POS     Crossmatch 12/05/2019 Compatible     Unit Dispense Status 12/05/2019 Presumed Trans     Unit Product Code 12/05/2019 [id number]     Unit Number 12/05/2019 [id number]     Unit ABO 12/05/2019 A     Unit RH 12/05/2019 POS     Crossmatch 12/05/2019 Compatible     Unit Dispense Status 12/05/2019 Presumed Trans     Sodium 12/05/2019 138     Potassium 12/05/2019 4 6     Chloride 12/05/2019 111*    CO2 12/05/2019 23     ANION GAP 12/05/2019 4     BUN 12/05/2019 18     Creatinine 12/05/2019 0 69     Glucose 12/05/2019 112     Calcium 12/05/2019 8 4     AST 12/05/2019 22     ALT 12/05/2019 38     Alkaline Phosphatase 12/05/2019 71     Total Protein 12/05/2019 6 0*    Albumin 12/05/2019 2 4*    Total Bilirubin 12/05/2019 1 24*    eGFR 12/05/2019 96     WBC 12/05/2019 9 52     RBC 12/05/2019 3 06*    Hemoglobin 12/05/2019 9 3*    Hematocrit 12/05/2019 28 0*    MCV 12/05/2019 92     MCH 12/05/2019 30 4     MCHC 12/05/2019 33 2     RDW 12/05/2019 14 2     MPV 12/05/2019 9 2     Platelets 02/96/4571 235     nRBC 12/05/2019 0     Neutrophils Relative 12/05/2019 80*    Immat GRANS % 12/05/2019 2     Lymphocytes Relative 12/05/2019 8*    Monocytes Relative 12/05/2019 6     Eosinophils Relative 12/05/2019 3     Basophils Relative 12/05/2019 1     Neutrophils Absolute 12/05/2019 7 62     Immature Grans Absolute 12/05/2019 0 15     Lymphocytes Absolute 12/05/2019 0 78     Monocytes Absolute 12/05/2019 0 58     Eosinophils Absolute 12/05/2019 0 30     Basophils Absolute 12/05/2019 0 09     WBC 12/05/2019 10 43*    RBC 12/05/2019 2 86*    Hemoglobin 12/05/2019 8 7*    Hematocrit 12/05/2019 25 6*    MCV 12/05/2019 90     MCH 12/05/2019 30 4     MCHC 12/05/2019 34 0     RDW 12/05/2019 14 9     MPV 12/05/2019 9 1     Platelets 73/29/5212 210     nRBC 12/05/2019 0     Neutrophils Relative 12/05/2019 85*    Immat GRANS % 12/05/2019 1     Lymphocytes Relative 12/05/2019 7*    Monocytes Relative 12/05/2019 5     Eosinophils Relative 12/05/2019 2     Basophils Relative 12/05/2019 0     Neutrophils Absolute 12/05/2019 8 84*    Immature Grans Absolute 12/05/2019 0 10     Lymphocytes Absolute 12/05/2019 0 74     Monocytes Absolute 12/05/2019 0 52     Eosinophils Absolute 12/05/2019 0 19     Basophils Absolute 12/05/2019 0 04     WBC 12/06/2019 10 85*    RBC 12/06/2019 3 00*    Hemoglobin 12/06/2019 9 1*    Hematocrit 12/06/2019 26 9*    MCV 12/06/2019 90     MCH 12/06/2019 30 3     MCHC 12/06/2019 33 8     RDW 12/06/2019 15 2*    MPV 12/06/2019 9 7     Platelets 42/99/2494 273     nRBC 12/06/2019 0     Neutrophils Relative 12/06/2019 82*    Immat GRANS % 12/06/2019 1     Lymphocytes Relative 12/06/2019 10*    Monocytes Relative 12/06/2019 5     Eosinophils Relative 12/06/2019 1     Basophils Relative 12/06/2019 1     Neutrophils Absolute 12/06/2019 9 00*    Immature Grans Absolute 12/06/2019 0 11     Lymphocytes Absolute 12/06/2019 1 04     Monocytes Absolute 12/06/2019 0 50     Eosinophils Absolute 12/06/2019 0 15     Basophils Absolute 12/06/2019 0 05     Sodium 12/06/2019 139     Potassium 12/06/2019 3 6     Chloride 12/06/2019 109*    CO2 12/06/2019 22     ANION GAP 12/06/2019 8     BUN 12/06/2019 20     Creatinine 12/06/2019 0 76     Glucose 12/06/2019 118     Calcium 12/06/2019 9 1     eGFR 12/06/2019 92     Protime 12/06/2019 47 9*    INR 12/06/2019 5 27*       Imaging Studies: I have personally reviewed pertinent imaging studies  ---------------------------------------------------  Note Electronically Signed By:    MD Annabel Mays Gastroenterology Fellow PGY-4  PI #: [id number]

## 2019-12-07 LAB
INR PPP: 4.16 (ref 0.84–1.19)
PROTHROMBIN TIME: 39.7 SECONDS (ref 11.6–14.5)

## 2019-12-07 PROCEDURE — 99232 SBSQ HOSP IP/OBS MODERATE 35: CPT | Performed by: INTERNAL MEDICINE

## 2019-12-07 PROCEDURE — 99232 SBSQ HOSP IP/OBS MODERATE 35: CPT | Performed by: SURGERY

## 2019-12-07 PROCEDURE — 85610 PROTHROMBIN TIME: CPT | Performed by: STUDENT IN AN ORGANIZED HEALTH CARE EDUCATION/TRAINING PROGRAM

## 2019-12-07 RX ADMIN — GABAPENTIN 100 MG: 100 CAPSULE ORAL at 08:26

## 2019-12-07 RX ADMIN — TAMSULOSIN HYDROCHLORIDE 0.4 MG: 0.4 CAPSULE ORAL at 17:23

## 2019-12-07 RX ADMIN — SODIUM CHLORIDE, SODIUM LACTATE, POTASSIUM CHLORIDE, AND CALCIUM CHLORIDE 75 ML/HR: .6; .31; .03; .02 INJECTION, SOLUTION INTRAVENOUS at 13:00

## 2019-12-07 RX ADMIN — AMLODIPINE BESYLATE 10 MG: 10 TABLET ORAL at 08:25

## 2019-12-07 RX ADMIN — NICOTINE 1 PATCH: 14 PATCH TRANSDERMAL at 08:27

## 2019-12-07 RX ADMIN — MELATONIN 6 MG: 3 TAB ORAL at 21:19

## 2019-12-07 RX ADMIN — ATORVASTATIN CALCIUM 20 MG: 20 TABLET, FILM COATED ORAL at 17:23

## 2019-12-07 RX ADMIN — CLOPIDOGREL BISULFATE 75 MG: 75 TABLET ORAL at 08:26

## 2019-12-07 RX ADMIN — THIAMINE HCL TAB 100 MG 100 MG: 100 TAB at 08:26

## 2019-12-07 RX ADMIN — SENNOSIDES AND DOCUSATE SODIUM 1 TABLET: 8.6; 5 TABLET ORAL at 08:25

## 2019-12-07 RX ADMIN — SENNOSIDES AND DOCUSATE SODIUM 1 TABLET: 8.6; 5 TABLET ORAL at 17:23

## 2019-12-07 NOTE — NURSING NOTE
Patient found to have severed Qball pump tubing  Blue surgery made aware of patient's confusion  Acute Pain paged and coming to bedside to remove rest of catheter

## 2019-12-07 NOTE — ADDENDUM NOTE
Addendum  created 12/06/19 2008 by Todd Muñoz MD    Intraprocedure LDAs edited, LDA properties accepted

## 2019-12-07 NOTE — PLAN OF CARE
Problem: Prexisting or High Potential for Compromised Skin Integrity  Goal: Skin integrity is maintained or improved  Description  INTERVENTIONS:  - Identify patients at risk for skin breakdown  - Assess and monitor skin integrity  - Assess and monitor nutrition and hydration status  - Monitor labs   - Assess for incontinence   - Turn and reposition patient  - Assist with mobility/ambulation  - Relieve pressure over bony prominences  - Avoid friction and shearing  - Provide appropriate hygiene as needed including keeping skin clean and dry  - Evaluate need for skin moisturizer/barrier cream  - Collaborate with interdisciplinary team   - Patient/family teaching  - Consider wound care consult   Outcome: Progressing     Problem: Potential for Falls  Goal: Patient will remain free of falls  Description  INTERVENTIONS:  - Assess patient frequently for physical needs  -  Identify cognitive and physical deficits and behaviors that affect risk of falls    -  [place] fall precautions as indicated by assessment   - Educate patient/family on patient safety including physical limitations  - Instruct patient to call for assistance with activity based on assessment  - Modify environment to reduce risk of injury  - Consider OT/PT consult to assist with strengthening/mobility  Outcome: Progressing     Problem: PAIN - ADULT  Goal: Verbalizes/displays adequate comfort level or baseline comfort level  Description  Interventions:  - Encourage patient to monitor pain and request assistance  - Assess pain using appropriate pain scale  - Administer analgesics based on type and severity of pain and evaluate response  - Implement non-pharmacological measures as appropriate and evaluate response  - Consider cultural and social influences on pain and pain management  - Notify physician/advanced practitioner if interventions unsuccessful or patient reports new pain  Outcome: Progressing     Problem: INFECTION - ADULT  Goal: Absence or prevention of progression during hospitalization  Description  INTERVENTIONS:  - Assess and monitor for signs and symptoms of infection  - Monitor lab/diagnostic results  - Monitor all insertion sites, i e  indwelling lines, tubes, and drains  - Monitor endotracheal if appropriate and nasal secretions for changes in amount and color  - [place] appropriate cooling/warming therapies per order  - Administer medications as ordered  - Instruct and encourage patient and family to use good hand hygiene technique  - Identify and instruct in appropriate isolation precautions for identified infection/condition  Outcome: Progressing  Goal: Absence of fever/infection during neutropenic period  Description  INTERVENTIONS:  - Monitor WBC    Outcome: Progressing     Problem: SAFETY ADULT  Goal: Patient will remain free of falls  Description  INTERVENTIONS:  - Assess patient frequently for physical needs  -  Identify cognitive and physical deficits and behaviors that affect risk of falls    -  [place] fall precautions as indicated by assessment   - Educate patient/family on patient safety including physical limitations  - Instruct patient to call for assistance with activity based on assessment  - Modify environment to reduce risk of injury  - Consider OT/PT consult to assist with strengthening/mobility  Outcome: Progressing  Goal: Maintain or return to baseline ADL function  Description  INTERVENTIONS:  -  Assess patient's ability to carry out ADLs; assess patient's baseline for ADL function and identify physical deficits which impact ability to perform ADLs (bathing, care of mouth/teeth, toileting, grooming, dressing, etc )  - Assess/evaluate cause of self-care deficits   - Assess range of motion  - Assess patient's mobility; develop plan if impaired  - Assess patient's need for assistive devices and provide as appropriate  - Encourage maximum independence but intervene and supervise when necessary  - Involve family in performance of ADLs  - Assess for home care needs following discharge   - Consider OT consult to assist with ADL evaluation and planning for discharge  - Provide patient education as appropriate  Outcome: Progressing  Goal: Maintain or return mobility status to optimal level  Description  INTERVENTIONS:  - Assess patient's baseline mobility status (ambulation, transfers, stairs, etc )    - Identify cognitive and physical deficits and behaviors that affect mobility  - Identify mobility aids required to assist with transfers and/or ambulation (gait belt, sit-to-stand, lift, walker, cane, etc )  - [place] fall precautions as indicated by assessment  - Record patient progress and toleration of activity level on Mobility SBAR; progress patient to next Phase/Stage  - Instruct patient to call for assistance with activity based on assessment  - Consider rehabilitation consult to assist with strengthening/weightbearing, etc   Outcome: Progressing     Problem: DISCHARGE PLANNING  Goal: Discharge to home or other facility with appropriate resources  Description  INTERVENTIONS:  - Identify barriers to discharge w/patient and caregiver  - Arrange for needed discharge resources and transportation as appropriate  - Identify discharge learning needs (meds, wound care, etc )  - Arrange for interpretive services to assist at discharge as needed  - Refer to Case Management Department for coordinating discharge planning if the patient needs post-hospital services based on physician/advanced practitioner order or complex needs related to functional status, cognitive ability, or social support system  Outcome: Progressing     Problem: Knowledge Deficit  Goal: Patient/family/caregiver demonstrates understanding of disease process, treatment plan, medications, and discharge instructions  Description  Complete learning assessment and assess knowledge base    Interventions:  - Provide teaching at level of understanding  - Provide teaching via preferred learning methods  Outcome: Progressing     Problem: Nutrition/Hydration-ADULT  Goal: Nutrient/Hydration intake appropriate for improving, restoring or maintaining nutritional needs  Description  Monitor and assess patient's nutrition/hydration status for malnutrition  Collaborate with interdisciplinary team and initiate plan and interventions as ordered  Monitor patient's weight and dietary intake as ordered or per policy  Utilize nutrition screening tool and intervene as necessary  Determine patient's food preferences and provide high-protein, high-caloric foods as appropriate       INTERVENTIONS:  - Monitor oral intake, urinary output, labs, and treatment plans  - Assess nutrition and hydration status and recommend course of action  - Evaluate amount of meals eaten  - Assist patient with eating if necessary   - Allow adequate time for meals  - Recommend/ encourage appropriate diets, oral nutritional supplements, and vitamin/mineral supplements  - Order, calculate, and assess calorie counts as needed  - Recommend, monitor, and adjust tube feedings and TPN/PPN based on assessed needs  - Assess need for intravenous fluids  - Provide specific nutrition/hydration education as appropriate  - Include patient/family/caregiver in decisions related to nutrition  Outcome: Progressing

## 2019-12-07 NOTE — PROGRESS NOTES
Progress Note - Vascular Surgery   Ciara Gaviria 78 y o  male MRN: [id number]  Unit/Bed#: [facility] 530-01 Encounter: [id number]    Assessment:  78 y o  M w hx of PAD s/p L femoral endarterectomy w profundaplasty, SFA embolectomy and retrograde iliac stenting 11/6/19, then Agram with L SFA/popliteal and R SFA angioplasty 11/14/19, presented w RLE rest pain, anemia and a supratherapeutic INR     AVSS  INR yesterday was 5 27, labs for today still pending    Plan:  Plan for Agram when INR is improved  Anticoagulation is held until INR is less than 3  At that time we will start a heparin gtt  Dysphagia 3 diet + Ensure  Prn analgesia -> APS following - patient did pull out his his popliteal catheter overnight  Subjective/Objective     Subjective: Patient was slightly more sleepy last night according to the night nurse  The patient this morning was awake and alert and answering questions appropriately  He denied any pain this morning  Tolerating diet without nausea/vomiting  No fevers, chills  Objective:     Blood pressure 122/61, pulse 79, temperature 98 1 °F (36 7 °C), temperature source Oral, resp  rate 20, height 5' 5" (1 651 m), weight 51 4 kg (113 lb 5 1 oz), SpO2 98 %  ,Body mass index is 18 86 kg/m²  Intake/Output Summary (Last 24 hours) at 12/7/2019 0726  Last data filed at 12/7/2019 0024  Gross per 24 hour   Intake 1345 ml   Output 1150 ml   Net 195 ml       Invasive Devices     Peripheral Intravenous Line            Peripheral IV 12/04/19 Right Antecubital 3 days                  NAD, alert   Normocephalic, atraumatic  MMM, EOMI, PERRLA  Norm resp effort on RA  RRR  Abd soft, NT/ND  Pulses: L: 2+ palpable femoral, Dopp pop/PT, absent DP  R: L: 2+ palpable femoral, Dopp pop/PT, absent DP  Right foot is cool to the touch  Motor is intact at the ankle, sensation intact    CN grossly intact  -rash/lesions      Lab, Imaging and other studies:CBC: No results found for: WBC, HGB, HCT, MCV, PLT, ADJUSTEDWBC, MCH, MCHC, RDW, MPV, NRBC, CMP: No results found for: SODIUM, K, CL, CO2, ANIONGAP, BUN, CREATININE, GLUCOSE, CALCIUM, AST, ALT, ALKPHOS, PROT, BILITOT, EGFR    VTE Mechanical Prophylaxis: sequential compression device

## 2019-12-07 NOTE — PROGRESS NOTES
Anesthesiology Brief Note    Dominga Ruiz MRN: [id number]  Unit/Bed#: [facility] 530-01 Encounter: [id number]      Nursing called secondary to patient pulling out popliteal catheter and breaking line  catheter removed and catheter tip intact  Site  clean, dry without erythema or pus  Pain service to follow up with patient tomorrow    Patient currently comfortable

## 2019-12-08 LAB
ANION GAP SERPL CALCULATED.3IONS-SCNC: 4 MMOL/L (ref 4–13)
BASOPHILS # BLD AUTO: 0.03 THOUSANDS/ΜL (ref 0–0.1)
BASOPHILS NFR BLD AUTO: 0 % (ref 0–1)
BUN SERPL-MCNC: 15 MG/DL (ref 5–25)
CALCIUM SERPL-MCNC: 8.7 MG/DL (ref 8.3–10.1)
CHLORIDE SERPL-SCNC: 109 MMOL/L (ref 100–108)
CO2 SERPL-SCNC: 26 MMOL/L (ref 21–32)
CREAT SERPL-MCNC: 0.62 MG/DL (ref 0.6–1.3)
EOSINOPHIL # BLD AUTO: 0.13 THOUSAND/ΜL (ref 0–0.61)
EOSINOPHIL NFR BLD AUTO: 2 % (ref 0–6)
ERYTHROCYTE [DISTWIDTH] IN BLOOD BY AUTOMATED COUNT: 15.9 % (ref 11.6–15.1)
GFR SERPL CREATININE-BSD FRML MDRD: 101 ML/MIN/1.73SQ M
GLUCOSE SERPL-MCNC: 103 MG/DL (ref 65–140)
HCT VFR BLD AUTO: 22.3 % (ref 36.5–49.3)
HCT VFR BLD AUTO: 22.7 % (ref 36.5–49.3)
HEMOCCULT STL QL: POSITIVE
HGB BLD-MCNC: 7.3 G/DL (ref 12–17)
HGB BLD-MCNC: 7.4 G/DL (ref 12–17)
IMM GRANULOCYTES # BLD AUTO: 0.04 THOUSAND/UL (ref 0–0.2)
IMM GRANULOCYTES NFR BLD AUTO: 1 % (ref 0–2)
INR PPP: 2.37 (ref 0.84–1.19)
LYMPHOCYTES # BLD AUTO: 0.51 THOUSANDS/ΜL (ref 0.6–4.47)
LYMPHOCYTES NFR BLD AUTO: 6 % (ref 14–44)
MCH RBC QN AUTO: 31.1 PG (ref 26.8–34.3)
MCHC RBC AUTO-ENTMCNC: 32.7 G/DL (ref 31.4–37.4)
MCV RBC AUTO: 95 FL (ref 82–98)
MONOCYTES # BLD AUTO: 0.43 THOUSAND/ΜL (ref 0.17–1.22)
MONOCYTES NFR BLD AUTO: 5 % (ref 4–12)
NEUTROPHILS # BLD AUTO: 6.95 THOUSANDS/ΜL (ref 1.85–7.62)
NEUTS SEG NFR BLD AUTO: 86 % (ref 43–75)
NRBC BLD AUTO-RTO: 0 /100 WBCS
PLATELET # BLD AUTO: 258 THOUSANDS/UL (ref 149–390)
PMV BLD AUTO: 9.9 FL (ref 8.9–12.7)
POTASSIUM SERPL-SCNC: 3.3 MMOL/L (ref 3.5–5.3)
PROTHROMBIN TIME: 25.4 SECONDS (ref 11.6–14.5)
RBC # BLD AUTO: 2.35 MILLION/UL (ref 3.88–5.62)
SODIUM SERPL-SCNC: 139 MMOL/L (ref 136–145)
WBC # BLD AUTO: 8.09 THOUSAND/UL (ref 4.31–10.16)

## 2019-12-08 PROCEDURE — 85025 COMPLETE CBC W/AUTO DIFF WBC: CPT | Performed by: SURGERY

## 2019-12-08 PROCEDURE — 85014 HEMATOCRIT: CPT | Performed by: STUDENT IN AN ORGANIZED HEALTH CARE EDUCATION/TRAINING PROGRAM

## 2019-12-08 PROCEDURE — 82272 OCCULT BLD FECES 1-3 TESTS: CPT | Performed by: SURGERY

## 2019-12-08 PROCEDURE — 99232 SBSQ HOSP IP/OBS MODERATE 35: CPT | Performed by: INTERNAL MEDICINE

## 2019-12-08 PROCEDURE — 99232 SBSQ HOSP IP/OBS MODERATE 35: CPT | Performed by: SURGERY

## 2019-12-08 PROCEDURE — 80048 BASIC METABOLIC PNL TOTAL CA: CPT | Performed by: SURGERY

## 2019-12-08 PROCEDURE — 85018 HEMOGLOBIN: CPT | Performed by: STUDENT IN AN ORGANIZED HEALTH CARE EDUCATION/TRAINING PROGRAM

## 2019-12-08 PROCEDURE — 85610 PROTHROMBIN TIME: CPT | Performed by: SURGERY

## 2019-12-08 RX ORDER — POTASSIUM CHLORIDE 20 MEQ/1
20 TABLET, EXTENDED RELEASE ORAL ONCE
Status: COMPLETED | OUTPATIENT
Start: 2019-12-08 | End: 2019-12-08

## 2019-12-08 RX ADMIN — THIAMINE HCL TAB 100 MG 100 MG: 100 TAB at 08:51

## 2019-12-08 RX ADMIN — SENNOSIDES AND DOCUSATE SODIUM 1 TABLET: 8.6; 5 TABLET ORAL at 17:33

## 2019-12-08 RX ADMIN — BISACODYL 20 MG: 5 TABLET, COATED ORAL at 17:33

## 2019-12-08 RX ADMIN — SODIUM CHLORIDE, SODIUM LACTATE, POTASSIUM CHLORIDE, AND CALCIUM CHLORIDE 75 ML/HR: .6; .31; .03; .02 INJECTION, SOLUTION INTRAVENOUS at 16:00

## 2019-12-08 RX ADMIN — GABAPENTIN 100 MG: 100 CAPSULE ORAL at 08:51

## 2019-12-08 RX ADMIN — OXYCODONE HYDROCHLORIDE 5 MG: 5 TABLET ORAL at 13:48

## 2019-12-08 RX ADMIN — POTASSIUM CHLORIDE 20 MEQ: 1500 TABLET, EXTENDED RELEASE ORAL at 08:51

## 2019-12-08 RX ADMIN — NICOTINE 1 PATCH: 14 PATCH TRANSDERMAL at 08:52

## 2019-12-08 RX ADMIN — POLYETHYLENE GLYCOL 3350, SODIUM SULFATE ANHYDROUS, SODIUM BICARBONATE, SODIUM CHLORIDE, POTASSIUM CHLORIDE 4000 ML: 236; 22.74; 6.74; 5.86; 2.97 POWDER, FOR SOLUTION ORAL at 18:34

## 2019-12-08 RX ADMIN — TAMSULOSIN HYDROCHLORIDE 0.4 MG: 0.4 CAPSULE ORAL at 17:33

## 2019-12-08 RX ADMIN — CLOPIDOGREL BISULFATE 75 MG: 75 TABLET ORAL at 08:51

## 2019-12-08 RX ADMIN — ATORVASTATIN CALCIUM 20 MG: 20 TABLET, FILM COATED ORAL at 17:33

## 2019-12-08 RX ADMIN — AMLODIPINE BESYLATE 10 MG: 10 TABLET ORAL at 08:51

## 2019-12-08 RX ADMIN — SODIUM CHLORIDE, SODIUM LACTATE, POTASSIUM CHLORIDE, AND CALCIUM CHLORIDE 75 ML/HR: .6; .31; .03; .02 INJECTION, SOLUTION INTRAVENOUS at 02:32

## 2019-12-08 RX ADMIN — SENNOSIDES AND DOCUSATE SODIUM 1 TABLET: 8.6; 5 TABLET ORAL at 08:51

## 2019-12-08 RX ADMIN — OXYCODONE HYDROCHLORIDE 5 MG: 5 TABLET ORAL at 23:45

## 2019-12-08 RX ADMIN — OXYCODONE HYDROCHLORIDE 5 MG: 5 TABLET ORAL at 09:22

## 2019-12-08 NOTE — PROGRESS NOTES
Progress Note- Stella Pain 78 y o  male MRN: [id number]    Unit/Bed#: [facility] 530-01 Encounter: [id number]      Assessment and Plan:    71-year-old with extensive peripheral arterial disease, hypertension currently admitted with right lower extremity pain concern for worsening peripheral arterial disease  GI was reconsulted for worsening anemia and melena/hematochezia today  1  Hematochezia  Hemoglobin dropped overnight but has remained stable today  Vitals are stable  No other GI symptoms  Shared with primary team that we are willing to do EGD and colonoscopy if the goal is to optimize patient before any procedure for right lower extremity vascular problems  Also that patient can be restarted on heparin drip if hemoglobin remains stable today  The heparin drip will need to be stopped 6 hours before endoscopic procedure  EGD and colonoscopy planned for tomorrow  Patient willing to get procedures done  Prep is also ordered  2  Atrial fibrillation  Patient on Coumadin  3  Peripheral arterial disease  Patient on aspirin, Plavix and Coumadin at home  He can be continued on these medications if he is high risk for thrombosis and vitals are stable from any GI bleed  Patient seen and staffed with attending, Dr Tabatha Wyatt MD  Gastroenterology Fellow  [address]  Date: December 8, 2019      ______________________________________________________________________    Subjective:     71-year-old with  Atrial fibrillation, extensive peripheral arterial disease, hypertension currently admitted with right lower extremity pain concern for worsening peripheral arterial disease  GI was reconsulted for worsening anemia and melena/hematochezia today  Patient is asymptomatic from GI standpoint  Hemoglobin was 7 4 today which dropped from 9 1 on 12/06/2019  INR was supratherapeutic in the last 2-3 days but now down to 2 34   Rectal exam showed dark brown stool with some blood   The nurse reported black bowel movement earlier in the day  Admitting  team planning would like optimization of patient before  Any for further treatment for right lower extremity vascular problems  Patient agreeable to get endoscopy done          Medication Administration - last 24 hours from 12/07/2019 1832 to 12/08/2019 1832       Date/Time Order Dose Route Action Action by     12/08/2019 1600 lactated ringers infusion 75 mL/hr Intravenous [address] [id number] [facility]     12/08/2019 0232 lactated ringers infusion 75 mL/hr Intravenous New [address] [facility] Shanda Po, RN     12/08/2019 0470 nicotine (NICODERM CQ) 14 mg/24hr TD 24 hr patch 1 patch 1 patch Transdermal Medication Applied [facility]     12/08/2019 0850 nicotine (NICODERM CQ) 14 mg/24hr TD 24 hr patch 1 patch 1 patch Transdermal Patch Removed [facility]     12/08/2019 1348 oxyCODONE (ROXICODONE) IR tablet 5 mg 5 mg Oral Given [facility]     12/08/2019 2207 oxyCODONE (ROXICODONE) IR tablet 5 mg 5 mg Oral Given [facility]     12/08/2019 0851 amLODIPine (NORVASC) tablet 10 mg 10 mg Oral Given [facility]     12/08/2019 1733 atorvastatin (LIPITOR) tablet 20 mg 20 mg Oral Given [facility]     12/08/2019 0851 gabapentin (NEURONTIN) capsule 100 mg 100 mg Oral Given [facility]     12/07/2019 2119 melatonin tablet 6 mg 6 mg Oral Given Shanda Po, RN     12/08/2019 1733 senna-docusate sodium (SENOKOT S) 8 6-50 mg per tablet 1 tablet 1 tablet Oral Given [facility]     12/08/2019 0851 senna-docusate sodium (SENOKOT S) 8 6-50 mg per tablet 1 tablet 1 tablet Oral Given [facility]     12/08/2019 1733 tamsulosin (FLOMAX) capsule 0 4 mg 0 4 mg Oral Given [facility]     12/08/2019 0851 thiamine (VITAMIN B1) tablet 100 mg 100 mg Oral Given [facility]     12/08/2019 0851 clopidogrel (PLAVIX) tablet 75 mg 75 mg Oral Given [facility]     12/08/2019 0703 ropivacaine (NAROPIN) 0 2% in elastomeric infiltration pump (ON-Q* PUMP) 0 mL/hr Infiltration Stopped Ashwini Corey RN     12/08/2019 0851 potassium chloride (K-DUR,KLOR-CON) CR tablet 20 mEq 20 mEq Oral Given Ashwini Corey RN     12/08/2019 1733 bisacodyl (DULCOLAX) EC tablet 20 mg 20 mg Oral Given Ashwini Corey RN          Objective:     Vitals: Blood pressure 150/68, pulse 87, temperature 99 °F (37 2 °C), temperature source Oral, resp  rate 21, height 5' 5" (1 651 m), weight 51 4 kg (113 lb 5 1 oz), SpO2 100 %  ,Body mass index is 18 86 kg/m²        Intake/Output Summary (Last 24 hours) at 12/8/2019 1832  Last data filed at 12/8/2019 1200  Gross per 24 hour   Intake 1851 25 ml   Output 1225 ml   Net 626 25 ml       Physical Exam:   General Appearance: Awake and alert, in no acute distress  Abdomen: Soft, non-tender, non-distended; bowel sounds normal; no masses or no organomegaly    Invasive Devices     Peripheral Intravenous Line            Peripheral IV 12/08/19 Left;Ventral (anterior) Hand less than 1 day                Lab Results:  Admission on 12/04/2019   Component Date Value    ABO Grouping 12/04/2019 A     Rh Factor 12/04/2019 Positive     Antibody Screen 12/04/2019 Negative     Specimen Expiration Date 12/04/2019 28702705     WBC 12/04/2019 8 70     RBC 12/04/2019 2 08*    Hemoglobin 12/04/2019 6 3*    Hematocrit 12/04/2019 19 6*    MCV 12/04/2019 94     MCH 12/04/2019 30 3     MCHC 12/04/2019 32 1     RDW 12/04/2019 14 8     MPV 12/04/2019 9 2     Platelets 76/90/9919 254     nRBC 12/04/2019 0     Neutrophils Relative 12/04/2019 82*    Immat GRANS % 12/04/2019 1     Lymphocytes Relative 12/04/2019 8*    Monocytes Relative 12/04/2019 6     Eosinophils Relative 12/04/2019 2     Basophils Relative 12/04/2019 1     Neutrophils Absolute 12/04/2019 7 16     Immature Grans Absolute 12/04/2019 0 08     Lymphocytes Absolute 12/04/2019 0 73     Monocytes Absolute 12/04/2019 0 52     Eosinophils Absolute 12/04/2019 0 15     Basophils Absolute 12/04/2019 0 06     Sodium 12/04/2019 141     Potassium 12/04/2019 4 2     Chloride 12/04/2019 111*    CO2 12/04/2019 25     ANION GAP 12/04/2019 5     BUN 12/04/2019 22     Creatinine 12/04/2019 0 73     Glucose 12/04/2019 126     Calcium 12/04/2019 8 4     eGFR 12/04/2019 94     Protime 12/04/2019 55 1*    INR 12/04/2019 6 29*    Protime 12/05/2019 51 8*    INR 12/05/2019 5 82*    Unit Product Code 12/05/2019 L8180A18     Unit Number 12/05/2019 [id number]     Unit ABO 12/05/2019 A     Unit RH 12/05/2019 POS     Crossmatch 12/05/2019 Compatible     Unit Dispense Status 12/05/2019 Presumed Trans     Unit Product Code 12/05/2019 P9952T21     Unit Number 12/05/2019 [id number]     Unit ABO 12/05/2019 A     Unit RH 12/05/2019 POS     Crossmatch 12/05/2019 Compatible     Unit Dispense Status 12/05/2019 Presumed Trans     Fecal Occult Blood Diagn* 12/08/2019 Positive*    Sodium 12/05/2019 138     Potassium 12/05/2019 4 6     Chloride 12/05/2019 111*    CO2 12/05/2019 23     ANION GAP 12/05/2019 4     BUN 12/05/2019 18     Creatinine 12/05/2019 0 69     Glucose 12/05/2019 112     Calcium 12/05/2019 8 4     AST 12/05/2019 22     ALT 12/05/2019 38     Alkaline Phosphatase 12/05/2019 71     Total Protein 12/05/2019 6 0*    Albumin 12/05/2019 2 4*    Total Bilirubin 12/05/2019 1 24*    eGFR 12/05/2019 96     WBC 12/05/2019 9 52     RBC 12/05/2019 3 06*    Hemoglobin 12/05/2019 9 3*    Hematocrit 12/05/2019 28 0*    MCV 12/05/2019 92     MCH 12/05/2019 30 4     MCHC 12/05/2019 33 2     RDW 12/05/2019 14 2     MPV 12/05/2019 9 2     Platelets 32/97/3492 235     nRBC 12/05/2019 0     Neutrophils Relative 12/05/2019 80*    Immat GRANS % 12/05/2019 2     Lymphocytes Relative 12/05/2019 8*    Monocytes Relative 12/05/2019 6     Eosinophils Relative 12/05/2019 3     Basophils Relative 12/05/2019 1     Neutrophils Absolute 12/05/2019 7 62     Immature Grans Absolute 12/05/2019 0 15     Lymphocytes Absolute 12/05/2019 0 78     Monocytes Absolute 12/05/2019 0 58     Eosinophils Absolute 12/05/2019 0 30     Basophils Absolute 12/05/2019 0 09     WBC 12/05/2019 10 43*    RBC 12/05/2019 2 86*    Hemoglobin 12/05/2019 8 7*    Hematocrit 12/05/2019 25 6*    MCV 12/05/2019 90     MCH 12/05/2019 30 4     MCHC 12/05/2019 34 0     RDW 12/05/2019 14 9     MPV 12/05/2019 9 1     Platelets 80/42/4586 210     nRBC 12/05/2019 0     Neutrophils Relative 12/05/2019 85*    Immat GRANS % 12/05/2019 1     Lymphocytes Relative 12/05/2019 7*    Monocytes Relative 12/05/2019 5     Eosinophils Relative 12/05/2019 2     Basophils Relative 12/05/2019 0     Neutrophils Absolute 12/05/2019 8 84*    Immature Grans Absolute 12/05/2019 0 10     Lymphocytes Absolute 12/05/2019 0 74     Monocytes Absolute 12/05/2019 0 52     Eosinophils Absolute 12/05/2019 0 19     Basophils Absolute 12/05/2019 0 04     WBC 12/06/2019 10 85*    RBC 12/06/2019 3 00*    Hemoglobin 12/06/2019 9 1*    Hematocrit 12/06/2019 26 9*    MCV 12/06/2019 90     MCH 12/06/2019 30 3     MCHC 12/06/2019 33 8     RDW 12/06/2019 15 2*    MPV 12/06/2019 9 7     Platelets 84/24/7423 273     nRBC 12/06/2019 0     Neutrophils Relative 12/06/2019 82*    Immat GRANS % 12/06/2019 1     Lymphocytes Relative 12/06/2019 10*    Monocytes Relative 12/06/2019 5     Eosinophils Relative 12/06/2019 1     Basophils Relative 12/06/2019 1     Neutrophils Absolute 12/06/2019 9 00*    Immature Grans Absolute 12/06/2019 0 11     Lymphocytes Absolute 12/06/2019 1 04     Monocytes Absolute 12/06/2019 0 50     Eosinophils Absolute 12/06/2019 0 15     Basophils Absolute 12/06/2019 0 05     Sodium 12/06/2019 139     Potassium 12/06/2019 3 6     Chloride 12/06/2019 109*    CO2 12/06/2019 22     ANION GAP 12/06/2019 8     BUN 12/06/2019 20     Creatinine 12/06/2019 0 76     Glucose 12/06/2019 118     Calcium 12/06/2019 9 1     eGFR 12/06/2019 92     Protime 12/06/2019 47 9*    INR 12/06/2019 5 27*    Protime 12/07/2019 39 7*    INR 12/07/2019 4 16*    WBC 12/08/2019 8 09     RBC 12/08/2019 2 35*    Hemoglobin 12/08/2019 7 3*    Hematocrit 12/08/2019 22 3*    MCV 12/08/2019 95     MCH 12/08/2019 31 1     MCHC 12/08/2019 32 7     RDW 12/08/2019 15 9*    MPV 12/08/2019 9 9     Platelets 37/92/6494 258     nRBC 12/08/2019 0     Neutrophils Relative 12/08/2019 86*    Immat GRANS % 12/08/2019 1     Lymphocytes Relative 12/08/2019 6*    Monocytes Relative 12/08/2019 5     Eosinophils Relative 12/08/2019 2     Basophils Relative 12/08/2019 0     Neutrophils Absolute 12/08/2019 6 95     Immature Grans Absolute 12/08/2019 0 04     Lymphocytes Absolute 12/08/2019 0 51*    Monocytes Absolute 12/08/2019 0 43     Eosinophils Absolute 12/08/2019 0 13     Basophils Absolute 12/08/2019 0 03     Sodium 12/08/2019 139     Potassium 12/08/2019 3 3*    Chloride 12/08/2019 109*    CO2 12/08/2019 26     ANION GAP 12/08/2019 4     BUN 12/08/2019 15     Creatinine 12/08/2019 0 62     Glucose 12/08/2019 103     Calcium 12/08/2019 8 7     eGFR 12/08/2019 101     Protime 12/08/2019 25 4*    INR 12/08/2019 2 37*    Hemoglobin 12/08/2019 7 4*    Hematocrit 12/08/2019 22 7*       Imaging Studies: I have personally reviewed pertinent imaging studies

## 2019-12-08 NOTE — PLAN OF CARE
Problem: Prexisting or High Potential for Compromised Skin Integrity  Goal: Skin integrity is maintained or improved  Description  INTERVENTIONS:  - Identify patients at risk for skin breakdown  - Assess and monitor skin integrity  - Assess and monitor nutrition and hydration status  - Monitor labs   - Assess for incontinence   - Turn and reposition patient  - Assist with mobility/ambulation  - Relieve pressure over bony prominences  - Avoid friction and shearing  - Provide appropriate hygiene as needed including keeping skin clean and dry  - Evaluate need for skin moisturizer/barrier cream  - Collaborate with interdisciplinary team   - Patient/family teaching  - Consider wound care consult   Outcome: Progressing     Problem: Potential for Falls  Goal: Patient will remain free of falls  Description  INTERVENTIONS:  - Assess patient frequently for physical needs  -  Identify cognitive and physical deficits and behaviors that affect risk of falls    -  [place] fall precautions as indicated by assessment   - Educate patient/family on patient safety including physical limitations  - Instruct patient to call for assistance with activity based on assessment  - Modify environment to reduce risk of injury  - Consider OT/PT consult to assist with strengthening/mobility  Outcome: Progressing     Problem: PAIN - ADULT  Goal: Verbalizes/displays adequate comfort level or baseline comfort level  Description  Interventions:  - Encourage patient to monitor pain and request assistance  - Assess pain using appropriate pain scale  - Administer analgesics based on type and severity of pain and evaluate response  - Implement non-pharmacological measures as appropriate and evaluate response  - Consider cultural and social influences on pain and pain management  - Notify physician/advanced practitioner if interventions unsuccessful or patient reports new pain  Outcome: Progressing     Problem: INFECTION - ADULT  Goal: Absence or prevention of progression during hospitalization  Description  INTERVENTIONS:  - Assess and monitor for signs and symptoms of infection  - Monitor lab/diagnostic results  - Monitor all insertion sites, i e  indwelling lines, tubes, and drains  - Monitor endotracheal if appropriate and nasal secretions for changes in amount and color  - [place] appropriate cooling/warming therapies per order  - Administer medications as ordered  - Instruct and encourage patient and family to use good hand hygiene technique  - Identify and instruct in appropriate isolation precautions for identified infection/condition  Outcome: Progressing  Goal: Absence of fever/infection during neutropenic period  Description  INTERVENTIONS:  - Monitor WBC    Outcome: Progressing     Problem: SAFETY ADULT  Goal: Patient will remain free of falls  Description  INTERVENTIONS:  - Assess patient frequently for physical needs  -  Identify cognitive and physical deficits and behaviors that affect risk of falls    -  [place] fall precautions as indicated by assessment   - Educate patient/family on patient safety including physical limitations  - Instruct patient to call for assistance with activity based on assessment  - Modify environment to reduce risk of injury  - Consider OT/PT consult to assist with strengthening/mobility  Outcome: Progressing  Goal: Maintain or return to baseline ADL function  Description  INTERVENTIONS:  -  Assess patient's ability to carry out ADLs; assess patient's baseline for ADL function and identify physical deficits which impact ability to perform ADLs (bathing, care of mouth/teeth, toileting, grooming, dressing, etc )  - Assess/evaluate cause of self-care deficits   - Assess range of motion  - Assess patient's mobility; develop plan if impaired  - Assess patient's need for assistive devices and provide as appropriate  - Encourage maximum independence but intervene and supervise when necessary  - Involve family in performance of ADLs  - Assess for home care needs following discharge   - Consider OT consult to assist with ADL evaluation and planning for discharge  - Provide patient education as appropriate  Outcome: Progressing  Goal: Maintain or return mobility status to optimal level  Description  INTERVENTIONS:  - Assess patient's baseline mobility status (ambulation, transfers, stairs, etc )    - Identify cognitive and physical deficits and behaviors that affect mobility  - Identify mobility aids required to assist with transfers and/or ambulation (gait belt, sit-to-stand, lift, walker, cane, etc )  - [place] fall precautions as indicated by assessment  - Record patient progress and toleration of activity level on Mobility SBAR; progress patient to next Phase/Stage  - Instruct patient to call for assistance with activity based on assessment  - Consider rehabilitation consult to assist with strengthening/weightbearing, etc   Outcome: Progressing     Problem: DISCHARGE PLANNING  Goal: Discharge to home or other facility with appropriate resources  Description  INTERVENTIONS:  - Identify barriers to discharge w/patient and caregiver  - Arrange for needed discharge resources and transportation as appropriate  - Identify discharge learning needs (meds, wound care, etc )  - Arrange for interpretive services to assist at discharge as needed  - Refer to Case Management Department for coordinating discharge planning if the patient needs post-hospital services based on physician/advanced practitioner order or complex needs related to functional status, cognitive ability, or social support system  Outcome: Progressing     Problem: Knowledge Deficit  Goal: Patient/family/caregiver demonstrates understanding of disease process, treatment plan, medications, and discharge instructions  Description  Complete learning assessment and assess knowledge base    Interventions:  - Provide teaching at level of understanding  - Provide teaching via preferred learning methods  Outcome: Progressing     Problem: Nutrition/Hydration-ADULT  Goal: Nutrient/Hydration intake appropriate for improving, restoring or maintaining nutritional needs  Description  Monitor and assess patient's nutrition/hydration status for malnutrition  Collaborate with interdisciplinary team and initiate plan and interventions as ordered  Monitor patient's weight and dietary intake as ordered or per policy  Utilize nutrition screening tool and intervene as necessary  Determine patient's food preferences and provide high-protein, high-caloric foods as appropriate       INTERVENTIONS:  - Monitor oral intake, urinary output, labs, and treatment plans  - Assess nutrition and hydration status and recommend course of action  - Evaluate amount of meals eaten  - Assist patient with eating if necessary   - Allow adequate time for meals  - Recommend/ encourage appropriate diets, oral nutritional supplements, and vitamin/mineral supplements  - Order, calculate, and assess calorie counts as needed  - Recommend, monitor, and adjust tube feedings and TPN/PPN based on assessed needs  - Assess need for intravenous fluids  - Provide specific nutrition/hydration education as appropriate  - Include patient/family/caregiver in decisions related to nutrition  Outcome: Progressing

## 2019-12-08 NOTE — PROGRESS NOTES
Progress Note - Vascular Surgery   Dominga Ruiz 78 y o  male MRN: [id number]  Unit/Bed#: [facility] 530-01 Encounter: [id number]    Assessment:  78 y o  M w hx of PAD s/p L femoral endarterectomy w profundaplasty, SFA embolectomy and retrograde iliac stenting 11/6/19, then Agram with L SFA/popliteal and R SFA angioplasty 11/14/19, presented w RLE rest pain, anemia and a supratherapeutic INR     AVSS  INR 2 37  Cr 0 62    Plan:  Plan for Agram when INR is less than 2  Will start heparin gtt now that the INR is less than 3  Dysphagia 3 diet + Ensure  Prn analgesia -> APS following      Subjective/Objective     Subjective: Patient was slightly more sleepy last night according to the night nurse  The patient this morning was awake and alert and answering questions appropriately  He denied any pain this morning  Tolerating diet without nausea/vomiting  No fevers, chills  Objective:     Blood pressure 114/57, pulse 76, temperature 98 °F (36 7 °C), temperature source Oral, resp  rate 18, height 5' 5" (1 651 m), weight 51 4 kg (113 lb 5 1 oz), SpO2 98 %  ,Body mass index is 18 86 kg/m²  Intake/Output Summary (Last 24 hours) at 12/8/2019 0649  Last data filed at 12/8/2019 0521  Gross per 24 hour   Intake 2901 25 ml   Output 1875 ml   Net 1026 25 ml       Invasive Devices     Peripheral Intravenous Line            Peripheral IV 12/08/19 Left;Ventral (anterior) Hand less than 1 day                  NAD, alert   Normocephalic, atraumatic  MMM, EOMI, PERRLA  Norm resp effort on RA  RRR  Abd soft, NT/ND  Pulses: L: 2+ palpable femoral, Dopp pop/PT, absent DP  R: L: 2+ palpable femoral, Dopp pop/PT, absent DP  Right foot is cool to the touch  Motor is intact at the ankle, sensation intact    CN grossly intact  -rash/lesions      Lab, Imaging and other studies:CBC:   Lab Results   Component Value Date    WBC 8 09 12/08/2019    HGB 7 3 (L) 12/08/2019    HCT 22 3 (L) 12/08/2019    MCV 95 12/08/2019     12/08/2019 MCH 31 1 12/08/2019    MCHC 32 7 12/08/2019    RDW 15 9 (H) 12/08/2019    MPV 9 9 12/08/2019    NRBC 0 12/08/2019   , CMP:   Lab Results   Component Value Date    SODIUM 139 12/08/2019    K 3 3 (L) 12/08/2019     (H) 12/08/2019    CO2 26 12/08/2019    BUN 15 12/08/2019    CREATININE 0 62 12/08/2019    CALCIUM 8 7 12/08/2019    EGFR 101 12/08/2019       VTE Mechanical Prophylaxis: sequential compression device

## 2019-12-09 ENCOUNTER — ANESTHESIA EVENT (OUTPATIENT)
Dept: ANESTHESIOLOGY | Facility: HOSPITAL | Age: 69
End: 2019-12-09

## 2019-12-09 ENCOUNTER — APPOINTMENT (INPATIENT)
Dept: RADIOLOGY | Facility: HOSPITAL | Age: 69
DRG: 239 | End: 2019-12-09
Payer: MEDICARE

## 2019-12-09 ENCOUNTER — ANESTHESIA (OUTPATIENT)
Dept: ANESTHESIOLOGY | Facility: HOSPITAL | Age: 69
End: 2019-12-09

## 2019-12-09 LAB
ANION GAP SERPL CALCULATED.3IONS-SCNC: 7 MMOL/L (ref 4–13)
APTT PPP: 26 SECONDS (ref 23–37)
APTT PPP: 51 SECONDS (ref 23–37)
BUN SERPL-MCNC: 13 MG/DL (ref 5–25)
CALCIUM SERPL-MCNC: 8.4 MG/DL (ref 8.3–10.1)
CHLORIDE SERPL-SCNC: 106 MMOL/L (ref 100–108)
CO2 SERPL-SCNC: 27 MMOL/L (ref 21–32)
CREAT SERPL-MCNC: 0.66 MG/DL (ref 0.6–1.3)
ERYTHROCYTE [DISTWIDTH] IN BLOOD BY AUTOMATED COUNT: 15.7 % (ref 11.6–15.1)
ERYTHROCYTE [DISTWIDTH] IN BLOOD BY AUTOMATED COUNT: 15.9 % (ref 11.6–15.1)
ERYTHROCYTE [DISTWIDTH] IN BLOOD BY AUTOMATED COUNT: 15.9 % (ref 11.6–15.1)
GFR SERPL CREATININE-BSD FRML MDRD: 98 ML/MIN/1.73SQ M
GLUCOSE SERPL-MCNC: 130 MG/DL (ref 65–140)
HCT VFR BLD AUTO: 23.5 % (ref 36.5–49.3)
HCT VFR BLD AUTO: 24.3 % (ref 36.5–49.3)
HCT VFR BLD AUTO: 24.8 % (ref 36.5–49.3)
HGB BLD-MCNC: 7.5 G/DL (ref 12–17)
HGB BLD-MCNC: 7.7 G/DL (ref 12–17)
HGB BLD-MCNC: 7.9 G/DL (ref 12–17)
INR PPP: 1.56 (ref 0.84–1.19)
MCH RBC QN AUTO: 30.3 PG (ref 26.8–34.3)
MCH RBC QN AUTO: 30.4 PG (ref 26.8–34.3)
MCH RBC QN AUTO: 30.7 PG (ref 26.8–34.3)
MCHC RBC AUTO-ENTMCNC: 31.7 G/DL (ref 31.4–37.4)
MCHC RBC AUTO-ENTMCNC: 31.9 G/DL (ref 31.4–37.4)
MCHC RBC AUTO-ENTMCNC: 31.9 G/DL (ref 31.4–37.4)
MCV RBC AUTO: 95 FL (ref 82–98)
MCV RBC AUTO: 96 FL (ref 82–98)
MCV RBC AUTO: 96 FL (ref 82–98)
PLATELET # BLD AUTO: 247 THOUSANDS/UL (ref 149–390)
PLATELET # BLD AUTO: 251 THOUSANDS/UL (ref 149–390)
PLATELET # BLD AUTO: 265 THOUSANDS/UL (ref 149–390)
PMV BLD AUTO: 9.4 FL (ref 8.9–12.7)
PMV BLD AUTO: 9.4 FL (ref 8.9–12.7)
PMV BLD AUTO: 9.6 FL (ref 8.9–12.7)
POTASSIUM SERPL-SCNC: 3.5 MMOL/L (ref 3.5–5.3)
PROTHROMBIN TIME: 18.2 SECONDS (ref 11.6–14.5)
RBC # BLD AUTO: 2.44 MILLION/UL (ref 3.88–5.62)
RBC # BLD AUTO: 2.54 MILLION/UL (ref 3.88–5.62)
RBC # BLD AUTO: 2.6 MILLION/UL (ref 3.88–5.62)
SODIUM SERPL-SCNC: 140 MMOL/L (ref 136–145)
WBC # BLD AUTO: 8.69 THOUSAND/UL (ref 4.31–10.16)
WBC # BLD AUTO: 8.75 THOUSAND/UL (ref 4.31–10.16)
WBC # BLD AUTO: 9.36 THOUSAND/UL (ref 4.31–10.16)

## 2019-12-09 PROCEDURE — 85730 THROMBOPLASTIN TIME PARTIAL: CPT | Performed by: SURGERY

## 2019-12-09 PROCEDURE — 93005 ELECTROCARDIOGRAM TRACING: CPT

## 2019-12-09 PROCEDURE — 85027 COMPLETE CBC AUTOMATED: CPT | Performed by: PHYSICIAN ASSISTANT

## 2019-12-09 PROCEDURE — 80048 BASIC METABOLIC PNL TOTAL CA: CPT | Performed by: STUDENT IN AN ORGANIZED HEALTH CARE EDUCATION/TRAINING PROGRAM

## 2019-12-09 PROCEDURE — 99232 SBSQ HOSP IP/OBS MODERATE 35: CPT | Performed by: SURGERY

## 2019-12-09 PROCEDURE — 85610 PROTHROMBIN TIME: CPT | Performed by: STUDENT IN AN ORGANIZED HEALTH CARE EDUCATION/TRAINING PROGRAM

## 2019-12-09 PROCEDURE — 85730 THROMBOPLASTIN TIME PARTIAL: CPT | Performed by: PHYSICIAN ASSISTANT

## 2019-12-09 PROCEDURE — 85027 COMPLETE CBC AUTOMATED: CPT | Performed by: STUDENT IN AN ORGANIZED HEALTH CARE EDUCATION/TRAINING PROGRAM

## 2019-12-09 PROCEDURE — 71045 X-RAY EXAM CHEST 1 VIEW: CPT

## 2019-12-09 RX ORDER — HEPARIN SODIUM 1000 [USP'U]/ML
1500 INJECTION, SOLUTION INTRAVENOUS; SUBCUTANEOUS AS NEEDED
Status: DISCONTINUED | OUTPATIENT
Start: 2019-12-09 | End: 2019-12-27

## 2019-12-09 RX ORDER — HEPARIN SODIUM 1000 [USP'U]/ML
3000 INJECTION, SOLUTION INTRAVENOUS; SUBCUTANEOUS AS NEEDED
Status: DISCONTINUED | OUTPATIENT
Start: 2019-12-09 | End: 2019-12-27

## 2019-12-09 RX ORDER — HEPARIN SODIUM 10000 [USP'U]/100ML
3-20 INJECTION, SOLUTION INTRAVENOUS
Status: DISCONTINUED | OUTPATIENT
Start: 2019-12-09 | End: 2019-12-27

## 2019-12-09 RX ADMIN — TAMSULOSIN HYDROCHLORIDE 0.4 MG: 0.4 CAPSULE ORAL at 17:15

## 2019-12-09 RX ADMIN — NICOTINE 1 PATCH: 14 PATCH TRANSDERMAL at 08:33

## 2019-12-09 RX ADMIN — SODIUM CHLORIDE, SODIUM LACTATE, POTASSIUM CHLORIDE, AND CALCIUM CHLORIDE 75 ML/HR: .6; .31; .03; .02 INJECTION, SOLUTION INTRAVENOUS at 05:31

## 2019-12-09 RX ADMIN — BISACODYL 20 MG: 5 TABLET, DELAYED RELEASE ORAL at 11:16

## 2019-12-09 RX ADMIN — HEPARIN SODIUM AND DEXTROSE 12 UNITS/KG/HR: 10000; 5 INJECTION INTRAVENOUS at 14:28

## 2019-12-09 RX ADMIN — OXYCODONE HYDROCHLORIDE 5 MG: 5 TABLET ORAL at 21:09

## 2019-12-09 RX ADMIN — THIAMINE HCL TAB 100 MG 100 MG: 100 TAB at 08:32

## 2019-12-09 RX ADMIN — AMLODIPINE BESYLATE 10 MG: 10 TABLET ORAL at 08:33

## 2019-12-09 RX ADMIN — SENNOSIDES AND DOCUSATE SODIUM 1 TABLET: 8.6; 5 TABLET ORAL at 17:15

## 2019-12-09 RX ADMIN — ATORVASTATIN CALCIUM 20 MG: 20 TABLET, FILM COATED ORAL at 17:15

## 2019-12-09 RX ADMIN — GABAPENTIN 100 MG: 100 CAPSULE ORAL at 08:33

## 2019-12-09 RX ADMIN — MELATONIN 6 MG: 3 TAB ORAL at 21:06

## 2019-12-09 RX ADMIN — SENNOSIDES AND DOCUSATE SODIUM 1 TABLET: 8.6; 5 TABLET ORAL at 08:32

## 2019-12-09 RX ADMIN — HEPARIN SODIUM 1500 UNITS: 1000 INJECTION INTRAVENOUS; SUBCUTANEOUS at 21:42

## 2019-12-09 RX ADMIN — SODIUM CHLORIDE, SODIUM LACTATE, POTASSIUM CHLORIDE, AND CALCIUM CHLORIDE 75 ML/HR: .6; .31; .03; .02 INJECTION, SOLUTION INTRAVENOUS at 19:26

## 2019-12-09 RX ADMIN — OXYCODONE HYDROCHLORIDE 5 MG: 5 TABLET ORAL at 12:42

## 2019-12-09 RX ADMIN — CLOPIDOGREL BISULFATE 75 MG: 75 TABLET ORAL at 08:33

## 2019-12-09 NOTE — ASSESSMENT & PLAN NOTE
79 y/o M presents with Right lower extremity rest pain, anemia, concern for GI bleed, supratherapeutic INR  He is s/p left femoral endarterectomy with profundoplasty, SFA embolectomy and retrograde iliac stenting 11/6/19 and arteriogram with left SFA/popliteal angioplasty and right SFA angioplasty 11/14/19  Plan:  INR 1 52  AC on hold due to GI bleed  EGD/Cscope today  NPO/IVF/Bowel prep  Pain control; APS following  Vascular procedure on hold due to GI bleed;  Pt high risk for limb loss/amputation

## 2019-12-09 NOTE — ANESTHESIA PREPROCEDURE EVALUATION
Review of Systems/Medical History  Patient summary reviewed        Cardiovascular  Negative cardio ROS Hypertension , Dysrhythmias , atrial fibrillation, PVD (p/w acute R PFA occlusion, h/o L femoral endarterectomy and SFA PTA/stent),    Pulmonary  Negative pulmonary ROS        GI/Hepatic    GI bleeding ,             Endo/Other  Negative endo/other ROS      GYN  Negative gynecology ROS          Hematology  Anemia ,     Musculoskeletal  Negative musculoskeletal ROS        Neurology      Comment: Carotid stenosis L>R Psychology   Negative psychology ROS          TTE 11/2019:  LEFT VENTRICLE:  Systolic function was normal  Ejection fraction was estimated to be 65 %  There were no regional wall motion abnormalities  Wall thickness was at the upper limits of normal      ATRIAL SEPTUM:  There was redundancy of the septum, with borderline criteria for aneurysm  No defect or patent foramen ovale was identified      MITRAL VALVE:  There was mild regurgitation      TRICUSPID VALVE:  There was trace regurgitation  Pulmonary artery systolic pressure was within the normal range  Physical Exam    Airway    Mallampati score: III  TM Distance: >3 FB  Neck ROM: full     Dental   No notable dental hx     Cardiovascular  Comment: Negative ROS, Rhythm: regular, Rate: normal, Cardiovascular exam normal    Pulmonary  Pulmonary exam normal Breath sounds clear to auscultation,     Other Findings        Anesthesia Plan  ASA Score- 3     Anesthesia Type- IV sedation with anesthesia with ASA Monitors  Additional Monitors:   Airway Plan:         Plan Factors- Patient instructed to abstain from smoking on day of procedure  Patient did not smoke on day of surgery  Induction- intravenous  Postoperative Plan-     Informed Consent- Anesthetic plan and risks discussed with patient  I personally reviewed this patient with the CRNA  Discussed and agreed on the Anesthesia Plan with the CRNA  Casie Mcgee

## 2019-12-09 NOTE — PROGRESS NOTES
Vascular Surgery    Progress Note - Darral Borne 1950, 78 y o  male MRN: [id number]    Unit/Bed#: [facility] 530-01 Encounter: [id number]    Primary Care Provider: Prince Osorio MD   Date and time admitted to hospital: 12/4/2019  8:33 PM    * Critical lower limb ischemia  Assessment & Plan  79 y/o M presents with Right lower extremity rest pain, anemia, concern for GI bleed, supratherapeutic INR  He is s/p left femoral endarterectomy with profundoplasty, SFA embolectomy and retrograde iliac stenting 11/6/19 and arteriogram with left SFA/popliteal angioplasty and right SFA angioplasty 11/14/19  Plan:  INR 1 52  AC on hold due to GI bleed  EGD/Cscope today  NPO/IVF/Bowel prep  Pain control; APS following  Vascular procedure on hold due to GI bleed; Pt high risk for limb loss/amputation      Subjective:  PT confused, c/o right foot pain, receiving bowel prep overnight    Vitals:  /70   Pulse 83   Temp 98 4 °F (36 9 °C)   Resp 18   Ht 5' 5" (1 651 m)   Wt 51 4 kg (113 lb 5 1 oz)   SpO2 100%   BMI 18 86 kg/m²     I/Os:  I/O last 3 completed shifts: In: 1 [P O :1400;  I V :2700]  Out: 2525 [Urine:2525]  I/O this shift:  In: 1068 8 [I V :1068 8]  Out: 400 [Urine:400]    Lab Results and Cultures:   Lab Results   Component Value Date    WBC 9 36 12/09/2019    HGB 7 7 (L) 12/09/2019    HCT 24 3 (L) 12/09/2019    MCV 96 12/09/2019     12/09/2019     Lab Results   Component Value Date    GLUCOSE 128 11/07/2019    CALCIUM 8 4 12/09/2019    K 3 5 12/09/2019    CO2 27 12/09/2019     12/09/2019    BUN 13 12/09/2019    CREATININE 0 66 12/09/2019     Lab Results   Component Value Date    INR 1 56 (H) 12/09/2019    INR 2 37 (H) 12/08/2019    INR 4 16 (H) 12/07/2019    PROTIME 18 2 (H) 12/09/2019    PROTIME 25 4 (H) 12/08/2019    PROTIME 39 7 (H) 12/07/2019        Blood Culture: No results found for: BLOODCX,   Urinalysis: No results found for: TREASURE [place] PHUR, LEUKOCYTESUR, NITRITE, PROTEINUA, GLUCOSEU, KETONESU, BILIRUBINUR, BLOODU,   Urine Culture: No results found for: URINECX,   Wound Culure: No results found for: WOUNDCULT    Medications:  Current Facility-Administered Medications   Medication Dose Route Frequency    amLODIPine (NORVASC) tablet 10 mg  10 mg Oral Daily    atorvastatin (LIPITOR) tablet 20 mg  20 mg Oral After Dinner    clopidogrel (PLAVIX) tablet 75 mg  75 mg Oral Daily    gabapentin (NEURONTIN) capsule 100 mg  100 mg Oral Daily    HYDROmorphone (DILAUDID) injection 0 2 mg  0 2 mg Intravenous Q3H PRN    lactated ringers infusion  75 mL/hr Intravenous Continuous    melatonin tablet 6 mg  6 mg Oral HS    nicotine (NICODERM CQ) 14 mg/24hr TD 24 hr patch 1 patch  1 patch Transdermal Daily    ondansetron (ZOFRAN) injection 4 mg  4 mg Intravenous Q4H PRN    oxyCODONE (ROXICODONE) IR tablet 2 5 mg  2 5 mg Oral Q4H PRN    oxyCODONE (ROXICODONE) IR tablet 5 mg  5 mg Oral Q4H PRN    ropivacaine (NAROPIN) 0 2% in elastomeric infiltration pump (ON-Q* PUMP)   Infiltration Continuous    senna-docusate sodium (SENOKOT S) 8 6-50 mg per tablet 1 tablet  1 tablet Oral BID    tamsulosin (FLOMAX) capsule 0 4 mg  0 4 mg Oral Daily With Dinner    thiamine (VITAMIN B1) tablet 100 mg  100 mg Oral Daily     Physical Exam:    General appearance: alert and delirious  Lungs: clear to auscultation bilaterally  Chest wall: no tenderness  Heart: regular rate and rhythm, S1, S2 normal, no murmur, click, rub or gallop  Abdomen: soft, non-tender; bowel sounds normal; no masses,  no organomegaly  Extremities: Right foot cool, diminshed M/S but intact, toe cyanotic, heel dusky, blister noted base hallux dorsally      Pulse exam:  DP: Right: 0  Left: 0   PT: Right: doppler signal and monophasic/venous Left: doppler signal      Malou Angel PA-C  12/9/2019

## 2019-12-09 NOTE — PLAN OF CARE
Problem: Prexisting or High Potential for Compromised Skin Integrity  Goal: Skin integrity is maintained or improved  Description  INTERVENTIONS:  - Identify patients at risk for skin breakdown  - Assess and monitor skin integrity  - Assess and monitor nutrition and hydration status  - Monitor labs   - Assess for incontinence   - Turn and reposition patient  - Assist with mobility/ambulation  - Relieve pressure over bony prominences  - Avoid friction and shearing  - Provide appropriate hygiene as needed including keeping skin clean and dry  - Evaluate need for skin moisturizer/barrier cream  - Collaborate with interdisciplinary team   - Patient/family teaching  - Consider wound care consult   Outcome: Progressing     Problem: Potential for Falls  Goal: Patient will remain free of falls  Description  INTERVENTIONS:  - Assess patient frequently for physical needs  -  Identify cognitive and physical deficits and behaviors that affect risk of falls    -  [place] fall precautions as indicated by assessment   - Educate patient/family on patient safety including physical limitations  - Instruct patient to call for assistance with activity based on assessment  - Modify environment to reduce risk of injury  - Consider OT/PT consult to assist with strengthening/mobility  Outcome: Progressing     Problem: PAIN - ADULT  Goal: Verbalizes/displays adequate comfort level or baseline comfort level  Description  Interventions:  - Encourage patient to monitor pain and request assistance  - Assess pain using appropriate pain scale  - Administer analgesics based on type and severity of pain and evaluate response  - Implement non-pharmacological measures as appropriate and evaluate response  - Consider cultural and social influences on pain and pain management  - Notify physician/advanced practitioner if interventions unsuccessful or patient reports new pain  Outcome: Progressing     Problem: INFECTION - ADULT  Goal: Absence or prevention of progression during hospitalization  Description  INTERVENTIONS:  - Assess and monitor for signs and symptoms of infection  - Monitor lab/diagnostic results  - Monitor all insertion sites, i e  indwelling lines, tubes, and drains  - Monitor endotracheal if appropriate and nasal secretions for changes in amount and color  - [place] appropriate cooling/warming therapies per order  - Administer medications as ordered  - Instruct and encourage patient and family to use good hand hygiene technique  - Identify and instruct in appropriate isolation precautions for identified infection/condition  Outcome: Progressing  Goal: Absence of fever/infection during neutropenic period  Description  INTERVENTIONS:  - Monitor WBC    Outcome: Progressing     Problem: SAFETY ADULT  Goal: Patient will remain free of falls  Description  INTERVENTIONS:  - Assess patient frequently for physical needs  -  Identify cognitive and physical deficits and behaviors that affect risk of falls    -  [place] fall precautions as indicated by assessment   - Educate patient/family on patient safety including physical limitations  - Instruct patient to call for assistance with activity based on assessment  - Modify environment to reduce risk of injury  - Consider OT/PT consult to assist with strengthening/mobility  Outcome: Progressing  Goal: Maintain or return to baseline ADL function  Description  INTERVENTIONS:  -  Assess patient's ability to carry out ADLs; assess patient's baseline for ADL function and identify physical deficits which impact ability to perform ADLs (bathing, care of mouth/teeth, toileting, grooming, dressing, etc )  - Assess/evaluate cause of self-care deficits   - Assess range of motion  - Assess patient's mobility; develop plan if impaired  - Assess patient's need for assistive devices and provide as appropriate  - Encourage maximum independence but intervene and supervise when necessary  - Involve family in performance of ADLs  - Assess for home care needs following discharge   - Consider OT consult to assist with ADL evaluation and planning for discharge  - Provide patient education as appropriate  Outcome: Progressing  Goal: Maintain or return mobility status to optimal level  Description  INTERVENTIONS:  - Assess patient's baseline mobility status (ambulation, transfers, stairs, etc )    - Identify cognitive and physical deficits and behaviors that affect mobility  - Identify mobility aids required to assist with transfers and/or ambulation (gait belt, sit-to-stand, lift, walker, cane, etc )  - [place] fall precautions as indicated by assessment  - Record patient progress and toleration of activity level on Mobility SBAR; progress patient to next Phase/Stage  - Instruct patient to call for assistance with activity based on assessment  - Consider rehabilitation consult to assist with strengthening/weightbearing, etc   Outcome: Progressing     Problem: DISCHARGE PLANNING  Goal: Discharge to home or other facility with appropriate resources  Description  INTERVENTIONS:  - Identify barriers to discharge w/patient and caregiver  - Arrange for needed discharge resources and transportation as appropriate  - Identify discharge learning needs (meds, wound care, etc )  - Arrange for interpretive services to assist at discharge as needed  - Refer to Case Management Department for coordinating discharge planning if the patient needs post-hospital services based on physician/advanced practitioner order or complex needs related to functional status, cognitive ability, or social support system  Outcome: Progressing     Problem: Knowledge Deficit  Goal: Patient/family/caregiver demonstrates understanding of disease process, treatment plan, medications, and discharge instructions  Description  Complete learning assessment and assess knowledge base    Interventions:  - Provide teaching at level of understanding  - Provide teaching via preferred learning methods  Outcome: Progressing     Problem: Nutrition/Hydration-ADULT  Goal: Nutrient/Hydration intake appropriate for improving, restoring or maintaining nutritional needs  Description  Monitor and assess patient's nutrition/hydration status for malnutrition  Collaborate with interdisciplinary team and initiate plan and interventions as ordered  Monitor patient's weight and dietary intake as ordered or per policy  Utilize nutrition screening tool and intervene as necessary  Determine patient's food preferences and provide high-protein, high-caloric foods as appropriate       INTERVENTIONS:  - Monitor oral intake, urinary output, labs, and treatment plans  - Assess nutrition and hydration status and recommend course of action  - Evaluate amount of meals eaten  - Assist patient with eating if necessary   - Allow adequate time for meals  - Recommend/ encourage appropriate diets, oral nutritional supplements, and vitamin/mineral supplements  - Order, calculate, and assess calorie counts as needed  - Recommend, monitor, and adjust tube feedings and TPN/PPN based on assessed needs  - Assess need for intravenous fluids  - Provide specific nutrition/hydration education as appropriate  - Include patient/family/caregiver in decisions related to nutrition  Outcome: Progressing

## 2019-12-10 ENCOUNTER — ANESTHESIA (INPATIENT)
Dept: GASTROENTEROLOGY | Facility: HOSPITAL | Age: 69
DRG: 239 | End: 2019-12-10
Payer: MEDICARE

## 2019-12-10 ENCOUNTER — APPOINTMENT (INPATIENT)
Dept: GASTROENTEROLOGY | Facility: HOSPITAL | Age: 69
DRG: 239 | End: 2019-12-10
Payer: MEDICARE

## 2019-12-10 ENCOUNTER — ANESTHESIA EVENT (INPATIENT)
Dept: GASTROENTEROLOGY | Facility: HOSPITAL | Age: 69
DRG: 239 | End: 2019-12-10
Payer: MEDICARE

## 2019-12-10 LAB
ABO GROUP BLD: NORMAL
ANION GAP SERPL CALCULATED.3IONS-SCNC: 6 MMOL/L (ref 4–13)
APTT PPP: 44 SECONDS (ref 23–37)
APTT PPP: 56 SECONDS (ref 23–37)
APTT PPP: 87 SECONDS (ref 23–37)
ATRIAL RATE: 80 BPM
BLD GP AB SCN SERPL QL: NEGATIVE
BUN SERPL-MCNC: 5 MG/DL (ref 5–25)
CALCIUM SERPL-MCNC: 8.3 MG/DL (ref 8.3–10.1)
CHLORIDE SERPL-SCNC: 108 MMOL/L (ref 100–108)
CO2 SERPL-SCNC: 25 MMOL/L (ref 21–32)
CREAT SERPL-MCNC: 0.56 MG/DL (ref 0.6–1.3)
ERYTHROCYTE [DISTWIDTH] IN BLOOD BY AUTOMATED COUNT: 15.7 % (ref 11.6–15.1)
ERYTHROCYTE [DISTWIDTH] IN BLOOD BY AUTOMATED COUNT: 15.9 % (ref 11.6–15.1)
ERYTHROCYTE [DISTWIDTH] IN BLOOD BY AUTOMATED COUNT: 17.7 % (ref 11.6–15.1)
ERYTHROCYTE [DISTWIDTH] IN BLOOD BY AUTOMATED COUNT: 18 % (ref 11.6–15.1)
GFR SERPL CREATININE-BSD FRML MDRD: 105 ML/MIN/1.73SQ M
GLUCOSE SERPL-MCNC: 111 MG/DL (ref 65–140)
HCT VFR BLD AUTO: 20.7 % (ref 36.5–49.3)
HCT VFR BLD AUTO: 22.3 % (ref 36.5–49.3)
HCT VFR BLD AUTO: 30.3 % (ref 36.5–49.3)
HCT VFR BLD AUTO: 31.1 % (ref 36.5–49.3)
HGB BLD-MCNC: 6.7 G/DL (ref 12–17)
HGB BLD-MCNC: 7 G/DL (ref 12–17)
HGB BLD-MCNC: 9.7 G/DL (ref 12–17)
HGB BLD-MCNC: 9.8 G/DL (ref 12–17)
MCH RBC QN AUTO: 29.1 PG (ref 26.8–34.3)
MCH RBC QN AUTO: 29.2 PG (ref 26.8–34.3)
MCH RBC QN AUTO: 30.3 PG (ref 26.8–34.3)
MCH RBC QN AUTO: 31 PG (ref 26.8–34.3)
MCHC RBC AUTO-ENTMCNC: 31.4 G/DL (ref 31.4–37.4)
MCHC RBC AUTO-ENTMCNC: 31.5 G/DL (ref 31.4–37.4)
MCHC RBC AUTO-ENTMCNC: 32 G/DL (ref 31.4–37.4)
MCHC RBC AUTO-ENTMCNC: 32.4 G/DL (ref 31.4–37.4)
MCV RBC AUTO: 91 FL (ref 82–98)
MCV RBC AUTO: 92 FL (ref 82–98)
MCV RBC AUTO: 96 FL (ref 82–98)
MCV RBC AUTO: 97 FL (ref 82–98)
P AXIS: 43 DEGREES
PLATELET # BLD AUTO: 221 THOUSANDS/UL (ref 149–390)
PLATELET # BLD AUTO: 251 THOUSANDS/UL (ref 149–390)
PLATELET # BLD AUTO: 266 THOUSANDS/UL (ref 149–390)
PLATELET # BLD AUTO: 280 THOUSANDS/UL (ref 149–390)
PMV BLD AUTO: 9.2 FL (ref 8.9–12.7)
PMV BLD AUTO: 9.5 FL (ref 8.9–12.7)
PMV BLD AUTO: 9.5 FL (ref 8.9–12.7)
PMV BLD AUTO: 9.6 FL (ref 8.9–12.7)
POTASSIUM SERPL-SCNC: 3 MMOL/L (ref 3.5–5.3)
PR INTERVAL: 138 MS
QRS AXIS: -5 DEGREES
QRSD INTERVAL: 86 MS
QT INTERVAL: 380 MS
QTC INTERVAL: 438 MS
RBC # BLD AUTO: 2.16 MILLION/UL (ref 3.88–5.62)
RBC # BLD AUTO: 2.31 MILLION/UL (ref 3.88–5.62)
RBC # BLD AUTO: 3.32 MILLION/UL (ref 3.88–5.62)
RBC # BLD AUTO: 3.37 MILLION/UL (ref 3.88–5.62)
RH BLD: POSITIVE
SODIUM SERPL-SCNC: 139 MMOL/L (ref 136–145)
SPECIMEN EXPIRATION DATE: NORMAL
T WAVE AXIS: 44 DEGREES
VENTRICULAR RATE: 80 BPM
WBC # BLD AUTO: 7.5 THOUSAND/UL (ref 4.31–10.16)
WBC # BLD AUTO: 7.8 THOUSAND/UL (ref 4.31–10.16)
WBC # BLD AUTO: 8.3 THOUSAND/UL (ref 4.31–10.16)
WBC # BLD AUTO: 8.86 THOUSAND/UL (ref 4.31–10.16)

## 2019-12-10 PROCEDURE — 85027 COMPLETE CBC AUTOMATED: CPT | Performed by: PHYSICIAN ASSISTANT

## 2019-12-10 PROCEDURE — 80048 BASIC METABOLIC PNL TOTAL CA: CPT | Performed by: SURGERY

## 2019-12-10 PROCEDURE — 0DB68ZX EXCISION OF STOMACH, VIA NATURAL OR ARTIFICIAL OPENING ENDOSCOPIC, DIAGNOSTIC: ICD-10-PCS | Performed by: STUDENT IN AN ORGANIZED HEALTH CARE EDUCATION/TRAINING PROGRAM

## 2019-12-10 PROCEDURE — 86900 BLOOD TYPING SEROLOGIC ABO: CPT | Performed by: SURGERY

## 2019-12-10 PROCEDURE — 88342 IMHCHEM/IMCYTCHM 1ST ANTB: CPT | Performed by: PATHOLOGY

## 2019-12-10 PROCEDURE — 93010 ELECTROCARDIOGRAM REPORT: CPT | Performed by: INTERNAL MEDICINE

## 2019-12-10 PROCEDURE — 86901 BLOOD TYPING SEROLOGIC RH(D): CPT | Performed by: SURGERY

## 2019-12-10 PROCEDURE — 86850 RBC ANTIBODY SCREEN: CPT | Performed by: SURGERY

## 2019-12-10 PROCEDURE — 86923 COMPATIBILITY TEST ELECTRIC: CPT

## 2019-12-10 PROCEDURE — 88112 CYTOPATH CELL ENHANCE TECH: CPT | Performed by: PATHOLOGY

## 2019-12-10 PROCEDURE — 88305 TISSUE EXAM BY PATHOLOGIST: CPT | Performed by: PATHOLOGY

## 2019-12-10 PROCEDURE — P9058 RBC, L/R, CMV-NEG, IRRAD: HCPCS

## 2019-12-10 PROCEDURE — 99232 SBSQ HOSP IP/OBS MODERATE 35: CPT | Performed by: PHYSICIAN ASSISTANT

## 2019-12-10 PROCEDURE — 0DD28ZX EXTRACTION OF MIDDLE ESOPHAGUS, VIA NATURAL OR ARTIFICIAL OPENING ENDOSCOPIC, DIAGNOSTIC: ICD-10-PCS | Performed by: STUDENT IN AN ORGANIZED HEALTH CARE EDUCATION/TRAINING PROGRAM

## 2019-12-10 PROCEDURE — 0DJD8ZZ INSPECTION OF LOWER INTESTINAL TRACT, VIA NATURAL OR ARTIFICIAL OPENING ENDOSCOPIC: ICD-10-PCS | Performed by: STUDENT IN AN ORGANIZED HEALTH CARE EDUCATION/TRAINING PROGRAM

## 2019-12-10 PROCEDURE — 0DD18ZX EXTRACTION OF UPPER ESOPHAGUS, VIA NATURAL OR ARTIFICIAL OPENING ENDOSCOPIC, DIAGNOSTIC: ICD-10-PCS | Performed by: STUDENT IN AN ORGANIZED HEALTH CARE EDUCATION/TRAINING PROGRAM

## 2019-12-10 PROCEDURE — 85730 THROMBOPLASTIN TIME PARTIAL: CPT | Performed by: SURGERY

## 2019-12-10 RX ORDER — POTASSIUM CHLORIDE 20 MEQ/1
40 TABLET, EXTENDED RELEASE ORAL
Status: COMPLETED | OUTPATIENT
Start: 2019-12-10 | End: 2019-12-10

## 2019-12-10 RX ORDER — SODIUM CHLORIDE 9 MG/ML
INJECTION, SOLUTION INTRAVENOUS CONTINUOUS PRN
Status: DISCONTINUED | OUTPATIENT
Start: 2019-12-10 | End: 2019-12-10 | Stop reason: SURG

## 2019-12-10 RX ORDER — MAGNESIUM CARB/ALUMINUM HYDROX 105-160MG
296 TABLET,CHEWABLE ORAL ONCE
Status: DISCONTINUED | OUTPATIENT
Start: 2019-12-10 | End: 2019-12-10

## 2019-12-10 RX ORDER — LIDOCAINE HYDROCHLORIDE 10 MG/ML
INJECTION, SOLUTION EPIDURAL; INFILTRATION; INTRACAUDAL; PERINEURAL AS NEEDED
Status: DISCONTINUED | OUTPATIENT
Start: 2019-12-10 | End: 2019-12-10 | Stop reason: SURG

## 2019-12-10 RX ORDER — PROPOFOL 10 MG/ML
INJECTION, EMULSION INTRAVENOUS AS NEEDED
Status: DISCONTINUED | OUTPATIENT
Start: 2019-12-10 | End: 2019-12-10 | Stop reason: SURG

## 2019-12-10 RX ORDER — PROPOFOL 10 MG/ML
INJECTION, EMULSION INTRAVENOUS CONTINUOUS PRN
Status: DISCONTINUED | OUTPATIENT
Start: 2019-12-10 | End: 2019-12-10 | Stop reason: SURG

## 2019-12-10 RX ADMIN — HYDROMORPHONE HYDROCHLORIDE 0.2 MG: 1 INJECTION, SOLUTION INTRAMUSCULAR; INTRAVENOUS; SUBCUTANEOUS at 23:02

## 2019-12-10 RX ADMIN — SENNOSIDES AND DOCUSATE SODIUM 1 TABLET: 8.6; 5 TABLET ORAL at 17:23

## 2019-12-10 RX ADMIN — PROPOFOL 100 MG: 10 INJECTION, EMULSION INTRAVENOUS at 14:29

## 2019-12-10 RX ADMIN — POTASSIUM CHLORIDE 40 MEQ: 1500 TABLET, EXTENDED RELEASE ORAL at 06:17

## 2019-12-10 RX ADMIN — SODIUM CHLORIDE: 0.9 INJECTION, SOLUTION INTRAVENOUS at 14:13

## 2019-12-10 RX ADMIN — SENNOSIDES AND DOCUSATE SODIUM 1 TABLET: 8.6; 5 TABLET ORAL at 08:14

## 2019-12-10 RX ADMIN — OXYCODONE HYDROCHLORIDE 5 MG: 5 TABLET ORAL at 19:35

## 2019-12-10 RX ADMIN — THIAMINE HCL TAB 100 MG 100 MG: 100 TAB at 08:14

## 2019-12-10 RX ADMIN — POTASSIUM CHLORIDE 40 MEQ: 1500 TABLET, EXTENDED RELEASE ORAL at 08:14

## 2019-12-10 RX ADMIN — AMLODIPINE BESYLATE 10 MG: 10 TABLET ORAL at 08:14

## 2019-12-10 RX ADMIN — HEPARIN SODIUM AND DEXTROSE 14 UNITS/KG/HR: 10000; 5 INJECTION INTRAVENOUS at 16:29

## 2019-12-10 RX ADMIN — HYDROMORPHONE HYDROCHLORIDE 0.2 MG: 1 INJECTION, SOLUTION INTRAMUSCULAR; INTRAVENOUS; SUBCUTANEOUS at 02:30

## 2019-12-10 RX ADMIN — MELATONIN 6 MG: 3 TAB ORAL at 21:12

## 2019-12-10 RX ADMIN — NICOTINE 1 PATCH: 14 PATCH TRANSDERMAL at 08:15

## 2019-12-10 RX ADMIN — TAMSULOSIN HYDROCHLORIDE 0.4 MG: 0.4 CAPSULE ORAL at 17:23

## 2019-12-10 RX ADMIN — PROPOFOL 20 MG: 10 INJECTION, EMULSION INTRAVENOUS at 14:31

## 2019-12-10 RX ADMIN — LIDOCAINE HYDROCHLORIDE 50 MG: 10 INJECTION, SOLUTION EPIDURAL; INFILTRATION; INTRACAUDAL; PERINEURAL at 14:29

## 2019-12-10 RX ADMIN — PROPOFOL 120 MCG/KG/MIN: 10 INJECTION, EMULSION INTRAVENOUS at 14:29

## 2019-12-10 RX ADMIN — HEPARIN SODIUM 1500 UNITS: 1000 INJECTION INTRAVENOUS; SUBCUTANEOUS at 21:12

## 2019-12-10 RX ADMIN — GABAPENTIN 100 MG: 100 CAPSULE ORAL at 08:14

## 2019-12-10 RX ADMIN — OXYCODONE HYDROCHLORIDE 5 MG: 5 TABLET ORAL at 01:32

## 2019-12-10 RX ADMIN — ATORVASTATIN CALCIUM 20 MG: 20 TABLET, FILM COATED ORAL at 17:23

## 2019-12-10 RX ADMIN — CLOPIDOGREL BISULFATE 75 MG: 75 TABLET ORAL at 08:14

## 2019-12-10 RX ADMIN — SODIUM CHLORIDE, SODIUM LACTATE, POTASSIUM CHLORIDE, AND CALCIUM CHLORIDE 75 ML/HR: .6; .31; .03; .02 INJECTION, SOLUTION INTRAVENOUS at 17:36

## 2019-12-10 NOTE — OCCUPATIONAL THERAPY NOTE
Occupational Therapy Cancellation Note    Pt is currently off of the unit at GI lab  Will continue to follow to be seen for OT treatment as appropriate/when medically cleared           Jasiel Macedo MS, OTR/L

## 2019-12-10 NOTE — ANESTHESIA PREPROCEDURE EVALUATION
Review of Systems/Medical History  Patient summary reviewed  Chart reviewed  No history of anesthetic complications     Cardiovascular  Negative cardio ROS Exercise tolerance (METS): <4,  Hypertension , Dysrhythmias , atrial fibrillation, PVD (p/w acute R PFA occlusion, h/o L femoral endarterectomy and SFA PTA/stent),    Pulmonary  Negative pulmonary ROS        GI/Hepatic    GI bleeding (Hematochezia/melena) ,             Endo/Other  Negative endo/other ROS      GYN  Negative gynecology ROS          Hematology  Anemia ,     Musculoskeletal  Negative musculoskeletal ROS        Neurology      Comment: Carotid stenosis L>R    O/N delerious Psychology   Negative psychology ROS          TTE 11/2019:  LEFT VENTRICLE:  Systolic function was normal  Ejection fraction was estimated to be 65 %  There were no regional wall motion abnormalities  Wall thickness was at the upper limits of normal      ATRIAL SEPTUM:  There was redundancy of the septum, with borderline criteria for aneurysm  No defect or patent foramen ovale was identified      MITRAL VALVE:  There was mild regurgitation      TRICUSPID VALVE:  There was trace regurgitation  Pulmonary artery systolic pressure was within the normal range  Physical Exam    Airway    Mallampati score: III  TM Distance: >3 FB  Neck ROM: full     Dental   lower dentures and upper dentures,     Cardiovascular  Comment: Negative ROS,     Pulmonary      Other Findings        Anesthesia Plan  ASA Score- 3     Anesthesia Type- IV sedation with anesthesia with ASA Monitors  Additional Monitors:   Airway Plan:     Comment: Discussed with pt's daughter risk of IV sedation  Questions answered  Consent signed   Plan Factors- Patient instructed to abstain from smoking on day of procedure  Patient did not smoke on day of surgery  Induction- intravenous  Postoperative Plan-     Informed Consent- Anesthetic plan and risks discussed with patient and daughter    I personally reviewed this patient with the CRNA  Discussed and agreed on the Anesthesia Plan with the TAYA Bryant

## 2019-12-10 NOTE — PLAN OF CARE
Problem: Prexisting or High Potential for Compromised Skin Integrity  Goal: Skin integrity is maintained or improved  Description  INTERVENTIONS:  - Identify patients at risk for skin breakdown  - Assess and monitor skin integrity  - Assess and monitor nutrition and hydration status  - Monitor labs   - Assess for incontinence   - Turn and reposition patient  - Assist with mobility/ambulation  - Relieve pressure over bony prominences  - Avoid friction and shearing  - Provide appropriate hygiene as needed including keeping skin clean and dry  - Evaluate need for skin moisturizer/barrier cream  - Collaborate with interdisciplinary team   - Patient/family teaching  - Consider wound care consult   Outcome: Progressing     Problem: Potential for Falls  Goal: Patient will remain free of falls  Description  INTERVENTIONS:  - Assess patient frequently for physical needs  -  Identify cognitive and physical deficits and behaviors that affect risk of falls    -  [place] fall precautions as indicated by assessment   - Educate patient/family on patient safety including physical limitations  - Instruct patient to call for assistance with activity based on assessment  - Modify environment to reduce risk of injury  - Consider OT/PT consult to assist with strengthening/mobility  Outcome: Progressing     Problem: PAIN - ADULT  Goal: Verbalizes/displays adequate comfort level or baseline comfort level  Description  Interventions:  - Encourage patient to monitor pain and request assistance  - Assess pain using appropriate pain scale  - Administer analgesics based on type and severity of pain and evaluate response  - Implement non-pharmacological measures as appropriate and evaluate response  - Consider cultural and social influences on pain and pain management  - Notify physician/advanced practitioner if interventions unsuccessful or patient reports new pain  Outcome: Progressing     Problem: INFECTION - ADULT  Goal: Absence or prevention of progression during hospitalization  Description  INTERVENTIONS:  - Assess and monitor for signs and symptoms of infection  - Monitor lab/diagnostic results  - Monitor all insertion sites, i e  indwelling lines, tubes, and drains  - Monitor endotracheal if appropriate and nasal secretions for changes in amount and color  - [place] appropriate cooling/warming therapies per order  - Administer medications as ordered  - Instruct and encourage patient and family to use good hand hygiene technique  - Identify and instruct in appropriate isolation precautions for identified infection/condition  Outcome: Progressing  Goal: Absence of fever/infection during neutropenic period  Description  INTERVENTIONS:  - Monitor WBC    Outcome: Progressing     Problem: SAFETY ADULT  Goal: Patient will remain free of falls  Description  INTERVENTIONS:  - Assess patient frequently for physical needs  -  Identify cognitive and physical deficits and behaviors that affect risk of falls    -  [place] fall precautions as indicated by assessment   - Educate patient/family on patient safety including physical limitations  - Instruct patient to call for assistance with activity based on assessment  - Modify environment to reduce risk of injury  - Consider OT/PT consult to assist with strengthening/mobility  Outcome: Progressing  Goal: Maintain or return to baseline ADL function  Description  INTERVENTIONS:  -  Assess patient's ability to carry out ADLs; assess patient's baseline for ADL function and identify physical deficits which impact ability to perform ADLs (bathing, care of mouth/teeth, toileting, grooming, dressing, etc )  - Assess/evaluate cause of self-care deficits   - Assess range of motion  - Assess patient's mobility; develop plan if impaired  - Assess patient's need for assistive devices and provide as appropriate  - Encourage maximum independence but intervene and supervise when necessary  - Involve family in performance of ADLs  - Assess for home care needs following discharge   - Consider OT consult to assist with ADL evaluation and planning for discharge  - Provide patient education as appropriate  Outcome: Progressing  Goal: Maintain or return mobility status to optimal level  Description  INTERVENTIONS:  - Assess patient's baseline mobility status (ambulation, transfers, stairs, etc )    - Identify cognitive and physical deficits and behaviors that affect mobility  - Identify mobility aids required to assist with transfers and/or ambulation (gait belt, sit-to-stand, lift, walker, cane, etc )  - [place] fall precautions as indicated by assessment  - Record patient progress and toleration of activity level on Mobility SBAR; progress patient to next Phase/Stage  - Instruct patient to call for assistance with activity based on assessment  - Consider rehabilitation consult to assist with strengthening/weightbearing, etc   Outcome: Progressing     Problem: DISCHARGE PLANNING  Goal: Discharge to home or other facility with appropriate resources  Description  INTERVENTIONS:  - Identify barriers to discharge w/patient and caregiver  - Arrange for needed discharge resources and transportation as appropriate  - Identify discharge learning needs (meds, wound care, etc )  - Arrange for interpretive services to assist at discharge as needed  - Refer to Case Management Department for coordinating discharge planning if the patient needs post-hospital services based on physician/advanced practitioner order or complex needs related to functional status, cognitive ability, or social support system  Outcome: Progressing     Problem: Knowledge Deficit  Goal: Patient/family/caregiver demonstrates understanding of disease process, treatment plan, medications, and discharge instructions  Description  Complete learning assessment and assess knowledge base    Interventions:  - Provide teaching at level of understanding  - Provide teaching via preferred learning methods  Outcome: Progressing     Problem: Nutrition/Hydration-ADULT  Goal: Nutrient/Hydration intake appropriate for improving, restoring or maintaining nutritional needs  Description  Monitor and assess patient's nutrition/hydration status for malnutrition  Collaborate with interdisciplinary team and initiate plan and interventions as ordered  Monitor patient's weight and dietary intake as ordered or per policy  Utilize nutrition screening tool and intervene as necessary  Determine patient's food preferences and provide high-protein, high-caloric foods as appropriate       INTERVENTIONS:  - Monitor oral intake, urinary output, labs, and treatment plans  - Assess nutrition and hydration status and recommend course of action  - Evaluate amount of meals eaten  - Assist patient with eating if necessary   - Allow adequate time for meals  - Recommend/ encourage appropriate diets, oral nutritional supplements, and vitamin/mineral supplements  - Order, calculate, and assess calorie counts as needed  - Recommend, monitor, and adjust tube feedings and TPN/PPN based on assessed needs  - Assess need for intravenous fluids  - Provide specific nutrition/hydration education as appropriate  - Include patient/family/caregiver in decisions related to nutrition  Outcome: Progressing

## 2019-12-10 NOTE — ANESTHESIA POSTPROCEDURE EVALUATION
Post-Op Assessment Note    CV Status:  Stable    Pain management: adequate     Mental Status:  Sleepy and arousable   Hydration Status:  Euvolemic   PONV Controlled:  Controlled   Airway Patency:  Patent   Post Op Vitals Reviewed: Yes      Staff: CRNA           /62 (12/10/19 1534)    Temp     Pulse 71 (12/10/19 1534)   Resp 18 (12/10/19 1534)    SpO2 100 % (12/10/19 1534)

## 2019-12-10 NOTE — PROGRESS NOTES
Vascular Surgery    Progress Note - Bello Lopez 1950, 78 y o  male MRN: [id number]    Unit/Bed#: [facility] 530-01 Encounter: [id number]    Primary Care Provider: Maria Dolores Negrete MD   Date and time admitted to hospital: 12/4/2019  8:33 PM    * Critical lower limb ischemia  Assessment & Plan  77 y/o M presents with Right lower extremity rest pain, anemia, concern for GI bleed, supratherapeutic INR  He is s/p left femoral endarterectomy with profundoplasty, SFA embolectomy and retrograde iliac stenting 11/6/19 and arteriogram with left SFA/popliteal angioplasty and right SFA angioplasty 11/14/19  Plan:  INR 1 52  Continue heparin drip; hold 6 hours prior to GI procedures today  EGD/Cscope today  NPO/IVF/Bowel prep  Pain control; APS following  Vascular procedure on hold due to GI bleed; Pt high risk for limb loss/amputation      Subjective:  Pt confused overnight, c/o right foot pain    Vitals:  /67   Pulse 80   Temp 98 2 °F (36 8 °C) (Oral)   Resp 20   Ht 5' 5" (1 651 m)   Wt 51 4 kg (113 lb 5 1 oz)   SpO2 99%   BMI 18 86 kg/m²     I/Os:  I/O last 3 completed shifts: In: 2267 5 [P O :700; I V :1567 5]  Out: 4310 [Urine:2522]  I/O this shift:  In: 2122 7 [P O :240;  I V :1882 7]  Out: 350 [Urine:350]    Lab Results and Cultures:   Lab Results   Component Value Date    WBC 7 50 12/10/2019    HGB 7 0 (L) 12/10/2019    HCT 22 3 (L) 12/10/2019    MCV 97 12/10/2019     12/10/2019     Lab Results   Component Value Date    GLUCOSE 128 11/07/2019    CALCIUM 8 3 12/10/2019    K 3 0 (L) 12/10/2019    CO2 25 12/10/2019     12/10/2019    BUN 5 12/10/2019    CREATININE 0 56 (L) 12/10/2019     Lab Results   Component Value Date    INR 1 56 (H) 12/09/2019    INR 2 37 (H) 12/08/2019    INR 4 16 (H) 12/07/2019    PROTIME 18 2 (H) 12/09/2019    PROTIME 25 4 (H) 12/08/2019    PROTIME 39 7 (H) 12/07/2019        Blood Culture: No results found for: BLOODCX,   Urinalysis: No results found for: COLORU, CLARITYU, SPECGRAV, PHUR, LEUKOCYTESUR, NITRITE, PROTEINUA, GLUCOSEU, KETONESU, BILIRUBINUR, BLOODU,   Urine Culture: No results found for: URINECX,   Wound Culure: No results found for: WOUNDCULT    Medications:  Current Facility-Administered Medications   Medication Dose Route Frequency    amLODIPine (NORVASC) tablet 10 mg  10 mg Oral Daily    atorvastatin (LIPITOR) tablet 20 mg  20 mg Oral After Dinner    clopidogrel (PLAVIX) tablet 75 mg  75 mg Oral Daily    gabapentin (NEURONTIN) capsule 100 mg  100 mg Oral Daily    heparin (porcine) 25,000 units in 250 mL infusion (premix)  3-20 Units/kg/hr (Order-Specific) Intravenous Titrated    heparin (porcine) injection 1,500 Units  1,500 Units Intravenous PRN    heparin (porcine) injection 3,000 Units  3,000 Units Intravenous PRN    HYDROmorphone (DILAUDID) injection 0 2 mg  0 2 mg Intravenous Q3H PRN    lactated ringers infusion  75 mL/hr Intravenous Continuous    melatonin tablet 6 mg  6 mg Oral HS    nicotine (NICODERM CQ) 14 mg/24hr TD 24 hr patch 1 patch  1 patch Transdermal Daily    ondansetron (ZOFRAN) injection 4 mg  4 mg Intravenous Q4H PRN    oxyCODONE (ROXICODONE) IR tablet 2 5 mg  2 5 mg Oral Q4H PRN    oxyCODONE (ROXICODONE) IR tablet 5 mg  5 mg Oral Q4H PRN    potassium chloride (K-DUR,KLOR-CON) CR tablet 40 mEq  40 mEq Oral Q2H    ropivacaine (NAROPIN) 0 2% in elastomeric infiltration pump (ON-Q* PUMP)   Infiltration Continuous    senna-docusate sodium (SENOKOT S) 8 6-50 mg per tablet 1 tablet  1 tablet Oral BID    tamsulosin (FLOMAX) capsule 0 4 mg  0 4 mg Oral Daily With Dinner    thiamine (VITAMIN B1) tablet 100 mg  100 mg Oral Daily       Physical Exam:    General appearance: alert and oriented, in no acute distress  Lungs: clear to auscultation bilaterally  Heart: regular rate and rhythm, S1, S2 normal, no murmur, click, rub or gallop  Abdomen: soft, non-tender; bowel sounds normal; no masses,  no organomegaly  Extremities: Right foot cold, diminished motor, intact sensation, painful to touch, toes cyanotic, ischemic blister, he will with dark skin changes    Pulse exam:  PT: Right: doppler signal and Monophasic/venous Left: doppler signal      David Whiteside PA-C  12/10/2019

## 2019-12-10 NOTE — PHYSICAL THERAPY NOTE
Physical Therapy Cancellation Note    The pt  Is off of the floor at the GI lab  Will continue to follow      Juan Carlos Feldman PTA

## 2019-12-10 NOTE — ASSESSMENT & PLAN NOTE
79 y/o M presents with Right lower extremity rest pain, anemia, concern for GI bleed, supratherapeutic INR  He is s/p left femoral endarterectomy with profundoplasty, SFA embolectomy and retrograde iliac stenting 11/6/19 and arteriogram with left SFA/popliteal angioplasty and right SFA angioplasty 11/14/19  Plan:  INR 1 52  Continue heparin drip; hold 6 hours prior to GI procedures today  EGD/Cscope today  NPO/IVF/Bowel prep  Pain control; APS following  Vascular procedure on hold due to GI bleed;  Pt high risk for limb loss/amputation

## 2019-12-11 LAB
ANION GAP SERPL CALCULATED.3IONS-SCNC: 10 MMOL/L (ref 4–13)
APTT PPP: 62 SECONDS (ref 23–37)
APTT PPP: 68 SECONDS (ref 23–37)
BASOPHILS # BLD AUTO: 0.04 THOUSANDS/ΜL (ref 0–0.1)
BASOPHILS NFR BLD AUTO: 1 % (ref 0–1)
BUN SERPL-MCNC: 6 MG/DL (ref 5–25)
CALCIUM SERPL-MCNC: 9.1 MG/DL (ref 8.3–10.1)
CHLORIDE SERPL-SCNC: 108 MMOL/L (ref 100–108)
CO2 SERPL-SCNC: 21 MMOL/L (ref 21–32)
CREAT SERPL-MCNC: 0.56 MG/DL (ref 0.6–1.3)
EOSINOPHIL # BLD AUTO: 0.19 THOUSAND/ΜL (ref 0–0.61)
EOSINOPHIL NFR BLD AUTO: 3 % (ref 0–6)
ERYTHROCYTE [DISTWIDTH] IN BLOOD BY AUTOMATED COUNT: 18.5 % (ref 11.6–15.1)
GFR SERPL CREATININE-BSD FRML MDRD: 105 ML/MIN/1.73SQ M
GLUCOSE SERPL-MCNC: 122 MG/DL (ref 65–140)
GLUCOSE SERPL-MCNC: 95 MG/DL (ref 65–140)
HCT VFR BLD AUTO: 30.4 % (ref 36.5–49.3)
HGB BLD-MCNC: 9.9 G/DL (ref 12–17)
IMM GRANULOCYTES # BLD AUTO: 0.02 THOUSAND/UL (ref 0–0.2)
IMM GRANULOCYTES NFR BLD AUTO: 0 % (ref 0–2)
INR PPP: 1.6 (ref 0.84–1.19)
LYMPHOCYTES # BLD AUTO: 0.79 THOUSANDS/ΜL (ref 0.6–4.47)
LYMPHOCYTES NFR BLD AUTO: 10 % (ref 14–44)
MCH RBC QN AUTO: 29.4 PG (ref 26.8–34.3)
MCHC RBC AUTO-ENTMCNC: 32.6 G/DL (ref 31.4–37.4)
MCV RBC AUTO: 90 FL (ref 82–98)
MONOCYTES # BLD AUTO: 0.46 THOUSAND/ΜL (ref 0.17–1.22)
MONOCYTES NFR BLD AUTO: 6 % (ref 4–12)
NEUTROPHILS # BLD AUTO: 6.06 THOUSANDS/ΜL (ref 1.85–7.62)
NEUTS SEG NFR BLD AUTO: 80 % (ref 43–75)
NRBC BLD AUTO-RTO: 0 /100 WBCS
PLATELET # BLD AUTO: 284 THOUSANDS/UL (ref 149–390)
PMV BLD AUTO: 9.6 FL (ref 8.9–12.7)
POTASSIUM SERPL-SCNC: 4.3 MMOL/L (ref 3.5–5.3)
PROTHROMBIN TIME: 18.6 SECONDS (ref 11.6–14.5)
RBC # BLD AUTO: 3.37 MILLION/UL (ref 3.88–5.62)
SODIUM SERPL-SCNC: 139 MMOL/L (ref 136–145)
WBC # BLD AUTO: 7.56 THOUSAND/UL (ref 4.31–10.16)

## 2019-12-11 PROCEDURE — 85025 COMPLETE CBC W/AUTO DIFF WBC: CPT | Performed by: SURGERY

## 2019-12-11 PROCEDURE — 82948 REAGENT STRIP/BLOOD GLUCOSE: CPT

## 2019-12-11 PROCEDURE — 80048 BASIC METABOLIC PNL TOTAL CA: CPT | Performed by: SURGERY

## 2019-12-11 PROCEDURE — 85730 THROMBOPLASTIN TIME PARTIAL: CPT | Performed by: SURGERY

## 2019-12-11 PROCEDURE — 85610 PROTHROMBIN TIME: CPT | Performed by: SURGERY

## 2019-12-11 PROCEDURE — 99232 SBSQ HOSP IP/OBS MODERATE 35: CPT | Performed by: PHYSICIAN ASSISTANT

## 2019-12-11 PROCEDURE — 99231 SBSQ HOSP IP/OBS SF/LOW 25: CPT | Performed by: ANESTHESIOLOGY

## 2019-12-11 RX ORDER — ASPIRIN 81 MG/1
81 TABLET ORAL DAILY
Status: DISCONTINUED | OUTPATIENT
Start: 2019-12-11 | End: 2019-12-31 | Stop reason: HOSPADM

## 2019-12-11 RX ADMIN — GABAPENTIN 100 MG: 100 CAPSULE ORAL at 12:38

## 2019-12-11 RX ADMIN — OXYCODONE HYDROCHLORIDE 2.5 MG: 5 TABLET ORAL at 20:21

## 2019-12-11 RX ADMIN — HEPARIN SODIUM AND DEXTROSE 16 UNITS/KG/HR: 10000; 5 INJECTION INTRAVENOUS at 20:23

## 2019-12-11 RX ADMIN — ATORVASTATIN CALCIUM 20 MG: 20 TABLET, FILM COATED ORAL at 18:17

## 2019-12-11 RX ADMIN — AMLODIPINE BESYLATE 10 MG: 10 TABLET ORAL at 12:39

## 2019-12-11 RX ADMIN — SENNOSIDES AND DOCUSATE SODIUM 1 TABLET: 8.6; 5 TABLET ORAL at 12:39

## 2019-12-11 RX ADMIN — SENNOSIDES AND DOCUSATE SODIUM 1 TABLET: 8.6; 5 TABLET ORAL at 18:17

## 2019-12-11 RX ADMIN — THIAMINE HCL TAB 100 MG 100 MG: 100 TAB at 12:38

## 2019-12-11 RX ADMIN — SODIUM CHLORIDE, SODIUM LACTATE, POTASSIUM CHLORIDE, AND CALCIUM CHLORIDE 75 ML/HR: .6; .31; .03; .02 INJECTION, SOLUTION INTRAVENOUS at 20:24

## 2019-12-11 RX ADMIN — ASPIRIN 81 MG: 81 TABLET ORAL at 12:38

## 2019-12-11 RX ADMIN — OXYCODONE HYDROCHLORIDE 2.5 MG: 5 TABLET ORAL at 12:49

## 2019-12-11 RX ADMIN — TAMSULOSIN HYDROCHLORIDE 0.4 MG: 0.4 CAPSULE ORAL at 15:43

## 2019-12-11 RX ADMIN — MELATONIN 6 MG: 3 TAB ORAL at 21:47

## 2019-12-11 RX ADMIN — CLOPIDOGREL BISULFATE 75 MG: 75 TABLET ORAL at 12:38

## 2019-12-11 RX ADMIN — NICOTINE 1 PATCH: 14 PATCH TRANSDERMAL at 10:11

## 2019-12-11 RX ADMIN — HYDROMORPHONE HYDROCHLORIDE 0.2 MG: 1 INJECTION, SOLUTION INTRAMUSCULAR; INTRAVENOUS; SUBCUTANEOUS at 15:43

## 2019-12-11 NOTE — PROGRESS NOTES
Vascular Surgery    Progress Note - Maida Villalta 1950, 78 y o  male MRN: [id number]    Unit/Bed#: [facility] 530-01 Encounter: [id number]    Primary Care Provider: Den Marie MD   Date and time admitted to hospital: 12/4/2019  8:33 PM      * Critical lower limb ischemia  Assessment & Plan  77 y/o M presents with Right lower extremity rest pain, anemia, concern for GI bleed, supratherapeutic INR  He is s/p left femoral endarterectomy with profundoplasty, SFA embolectomy and retrograde iliac stenting 11/6/19 and arteriogram with left SFA/popliteal angioplasty and right SFA angioplasty 11/14/19  S/P EGD/Colonoscopy 12/10 (EGD clean based ulcers with no active bleeding, colonoscopy poor prep no active bleeding)    Plan:  Consult IR for RLE arteriogram possible intervention, pt remains high risk for limb loss  INR 1 6  Continue heparin drip  NPO/IVF  Pain control; APS following        Subjective:  No events overnight    Vitals:  /76 (BP Location: Left arm)   Pulse 75   Temp 97 9 °F (36 6 °C) (Oral)   Resp 18   Ht 5' 5" (1 651 m)   Wt 51 4 kg (113 lb 5 1 oz)   SpO2 99%   BMI 18 86 kg/m²     I/Os:  I/O last 3 completed shifts: In: 2822 7 [P O :240; I V :2332 7;  Blood:250]  Out: 1962 [YUXFN:7283]  I/O this shift:  In: 1426 9 [I V :1426 9]  Out: 800 [Urine:800]    Lab Results and Cultures:   Lab Results   Component Value Date    WBC 7 56 12/11/2019    HGB 9 9 (L) 12/11/2019    HCT 30 4 (L) 12/11/2019    MCV 90 12/11/2019     12/11/2019     Lab Results   Component Value Date    GLUCOSE 128 11/07/2019    CALCIUM 9 1 12/11/2019    K 4 3 12/11/2019    CO2 21 12/11/2019     12/11/2019    BUN 6 12/11/2019    CREATININE 0 56 (L) 12/11/2019     Lab Results   Component Value Date    INR 1 60 (H) 12/11/2019    INR 1 56 (H) 12/09/2019    INR 2 37 (H) 12/08/2019    PROTIME 18 6 (H) 12/11/2019    PROTIME 18 2 (H) 12/09/2019    PROTIME 25 4 (H) 12/08/2019        Blood Culture: No results found for: BLOODCX,   Urinalysis: No results found for: COLORU, CLARITYU, SPECGRAV, PHUR, LEUKOCYTESUR, NITRITE, PROTEINUA, GLUCOSEU, KETONESU, BILIRUBINUR, BLOODU,   Urine Culture: No results found for: URINECX,   Wound Culure: No results found for: WOUNDCULT    Medications:  Current Facility-Administered Medications   Medication Dose Route Frequency    amLODIPine (NORVASC) tablet 10 mg  10 mg Oral Daily    atorvastatin (LIPITOR) tablet 20 mg  20 mg Oral After Dinner    clopidogrel (PLAVIX) tablet 75 mg  75 mg Oral Daily    gabapentin (NEURONTIN) capsule 100 mg  100 mg Oral Daily    heparin (porcine) 25,000 units in 250 mL infusion (premix)  3-20 Units/kg/hr (Order-Specific) Intravenous Titrated    heparin (porcine) injection 1,500 Units  1,500 Units Intravenous PRN    heparin (porcine) injection 3,000 Units  3,000 Units Intravenous PRN    HYDROmorphone (DILAUDID) injection 0 2 mg  0 2 mg Intravenous Q3H PRN    lactated ringers infusion  75 mL/hr Intravenous Continuous    melatonin tablet 6 mg  6 mg Oral HS    nicotine (NICODERM CQ) 14 mg/24hr TD 24 hr patch 1 patch  1 patch Transdermal Daily    ondansetron (ZOFRAN) injection 4 mg  4 mg Intravenous Q4H PRN    oxyCODONE (ROXICODONE) IR tablet 2 5 mg  2 5 mg Oral Q4H PRN    oxyCODONE (ROXICODONE) IR tablet 5 mg  5 mg Oral Q4H PRN    senna-docusate sodium (SENOKOT S) 8 6-50 mg per tablet 1 tablet  1 tablet Oral BID    tamsulosin (FLOMAX) capsule 0 4 mg  0 4 mg Oral Daily With Dinner    thiamine (VITAMIN B1) tablet 100 mg  100 mg Oral Daily       Physical Exam:    General appearance: alert and distracted  Lungs: clear to auscultation bilaterally  Heart: regular rate and rhythm, S1, S2 normal, no murmur, click, rub or gallop  Abdomen: soft, non-tender; bowel sounds normal; no masses,  no organomegaly  Extremities: Right foot cool, right hallux and dorsal foot cyanosis with ischemic blister, right heel with dusky lesion, motor and sensation intact but diminished    Pulse exam:  DP: Right: 0 Left: 0  PT: Right: Monophasic/venous Left: doppler signal      Annette Lee PA-C  12/11/2019

## 2019-12-11 NOTE — PROGRESS NOTES
Progress Note - Anesthesia Acute Pain Management    Pam Sendshannon 78 y o  male MRN: [id number]  Unit/Bed#: [facility] 530-01 Encounter: [id number]        Assessment:   Principal Problem:    Critical lower limb ischemia    Patient sleeping comfortably  Nurse states patient has received only one dose of prn medications this AM   Flow sheet documents patient being pain free most of  This morning  Plan:  Continue same  APS to sign off        Pain History  Current pain location(s): none  Pain Scale:   0/10  Current Analgesic regimen:  See below      Meds/Allergies   current meds:   Current Facility-Administered Medications   Medication Dose Route Frequency    amLODIPine (NORVASC) tablet 10 mg  10 mg Oral Daily    aspirin (ECOTRIN LOW STRENGTH) EC tablet 81 mg  81 mg Oral Daily    atorvastatin (LIPITOR) tablet 20 mg  20 mg Oral After Dinner    clopidogrel (PLAVIX) tablet 75 mg  75 mg Oral Daily    gabapentin (NEURONTIN) capsule 100 mg  100 mg Oral Daily    heparin (porcine) 25,000 units in 250 mL infusion (premix)  3-20 Units/kg/hr (Order-Specific) Intravenous Titrated    heparin (porcine) injection 1,500 Units  1,500 Units Intravenous PRN    heparin (porcine) injection 3,000 Units  3,000 Units Intravenous PRN    HYDROmorphone (DILAUDID) injection 0 2 mg  0 2 mg Intravenous Q3H PRN    lactated ringers infusion  75 mL/hr Intravenous Continuous    melatonin tablet 6 mg  6 mg Oral HS    nicotine (NICODERM CQ) 14 mg/24hr TD 24 hr patch 1 patch  1 patch Transdermal Daily    ondansetron (ZOFRAN) injection 4 mg  4 mg Intravenous Q4H PRN    oxyCODONE (ROXICODONE) IR tablet 2 5 mg  2 5 mg Oral Q4H PRN    oxyCODONE (ROXICODONE) IR tablet 5 mg  5 mg Oral Q4H PRN    senna-docusate sodium (SENOKOT S) 8 6-50 mg per tablet 1 tablet  1 tablet Oral BID    tamsulosin (FLOMAX) capsule 0 4 mg  0 4 mg Oral Daily With Dinner    thiamine (VITAMIN B1) tablet 100 mg  100 mg Oral Daily         No Known Allergies    Objective Temp:  [97 °F (36 1 °C)-98 7 °F (37 1 °C)] 98 °F (36 7 °C)  HR:  [71-90] 79  Resp:  [18-22] 18  BP: (124-164)/(60-76) 124/60      Intake/Output Summary (Last 24 hours) at 12/11/2019 1251  Last data filed at 12/11/2019 0847  Gross per 24 hour   Intake 1876 94 ml   Output 800 ml   Net 1076 94 ml       PE  Patient sleeping comfortably  CV - RRR  Pulm - CTA      Counseling / Coordination of Care  Total floor / unit time spent today 15 minutes   Greater than 50% of total time was spent with the patient and / or family counseling and / or coordination of care      SIGNATURE: Zoran Velasco MD  DATE: December 11, 2019  TIME: 12:51 PM

## 2019-12-11 NOTE — ASSESSMENT & PLAN NOTE
77 y/o M presents with Right lower extremity rest pain, anemia, concern for GI bleed, supratherapeutic INR  He is s/p left femoral endarterectomy with profundoplasty, SFA embolectomy and retrograde iliac stenting 11/6/19 and arteriogram with left SFA/popliteal angioplasty and right SFA angioplasty 11/14/19      S/P EGD/Colonoscopy 12/10 (EGD clean based ulcers with no active bleeding, colonoscopy poor prep no active bleeding)    Plan:  Consult IR for RLE arteriogram possible intervention, pt remains high risk for limb loss  INR 1 6  Continue heparin drip  NPO/IVF  Pain control; APS following

## 2019-12-11 NOTE — PLAN OF CARE
Problem: PAIN - ADULT  Goal: Verbalizes/displays adequate comfort level or baseline comfort level  Description  Interventions:  - Encourage patient to monitor pain and request assistance  - Assess pain using appropriate pain scale  - Administer analgesics based on type and severity of pain and evaluate response  - Implement non-pharmacological measures as appropriate and evaluate response  - Consider cultural and social influences on pain and pain management  - Notify physician/advanced practitioner if interventions unsuccessful or patient reports new pain  Outcome: Progressing     Problem: Nutrition/Hydration-ADULT  Goal: Nutrient/Hydration intake appropriate for improving, restoring or maintaining nutritional needs  Description  Monitor and assess patient's nutrition/hydration status for malnutrition  Collaborate with interdisciplinary team and initiate plan and interventions as ordered  Monitor patient's weight and dietary intake as ordered or per policy  Utilize nutrition screening tool and intervene as necessary  Determine patient's food preferences and provide high-protein, high-caloric foods as appropriate       INTERVENTIONS:  - Monitor oral intake, urinary output, labs, and treatment plans  - Assess nutrition and hydration status and recommend course of action  - Evaluate amount of meals eaten  - Assist patient with eating if necessary   - Allow adequate time for meals  - Recommend/ encourage appropriate diets, oral nutritional supplements, and vitamin/mineral supplements  - Order, calculate, and assess calorie counts as needed  - Recommend, monitor, and adjust tube feedings and TPN/PPN based on assessed needs  - Assess need for intravenous fluids  - Provide specific nutrition/hydration education as appropriate  - Include patient/family/caregiver in decisions related to nutrition  Outcome: Progressing     Problem: SAFETY,RESTRAINT: NV/NON-SELF DESTRUCTIVE BEHAVIOR  Goal: Remains free of harm/injury (restraint for non violent/non self-detsructive behavior)  Description  INTERVENTIONS:  - Instruct patient/family regarding restraint use   - Assess and monitor physiologic and psychological status   - Provide interventions and comfort measures to meet assessed patient needs   - Identify and implement measures to help patient regain control  - Assess readiness for release of restraint   Outcome: Progressing  Goal: Returns to optimal restraint-free functioning  Description  INTERVENTIONS:  - Assess the patient's behavior and symptoms that indicate continued need for restraint  - Identify and implement measures to help patient regain control  - Assess readiness for release of restraint   Outcome: Progressing

## 2019-12-12 LAB
ANION GAP SERPL CALCULATED.3IONS-SCNC: 7 MMOL/L (ref 4–13)
APTT PPP: 77 SECONDS (ref 23–37)
BUN SERPL-MCNC: 7 MG/DL (ref 5–25)
CALCIUM SERPL-MCNC: 8.9 MG/DL (ref 8.3–10.1)
CHLORIDE SERPL-SCNC: 106 MMOL/L (ref 100–108)
CO2 SERPL-SCNC: 25 MMOL/L (ref 21–32)
CREAT SERPL-MCNC: 0.7 MG/DL (ref 0.6–1.3)
ERYTHROCYTE [DISTWIDTH] IN BLOOD BY AUTOMATED COUNT: 17.4 % (ref 11.6–15.1)
GFR SERPL CREATININE-BSD FRML MDRD: 96 ML/MIN/1.73SQ M
GLUCOSE SERPL-MCNC: 110 MG/DL (ref 65–140)
HCT VFR BLD AUTO: 27.5 % (ref 36.5–49.3)
HGB BLD-MCNC: 8.8 G/DL (ref 12–17)
INR PPP: 1.38 (ref 0.84–1.19)
MCH RBC QN AUTO: 29 PG (ref 26.8–34.3)
MCHC RBC AUTO-ENTMCNC: 32 G/DL (ref 31.4–37.4)
MCV RBC AUTO: 91 FL (ref 82–98)
PLATELET # BLD AUTO: 310 THOUSANDS/UL (ref 149–390)
PMV BLD AUTO: 9.3 FL (ref 8.9–12.7)
POTASSIUM SERPL-SCNC: 3.7 MMOL/L (ref 3.5–5.3)
PROTHROMBIN TIME: 16.5 SECONDS (ref 11.6–14.5)
RBC # BLD AUTO: 3.03 MILLION/UL (ref 3.88–5.62)
SODIUM SERPL-SCNC: 138 MMOL/L (ref 136–145)
WBC # BLD AUTO: 6.81 THOUSAND/UL (ref 4.31–10.16)

## 2019-12-12 PROCEDURE — 80048 BASIC METABOLIC PNL TOTAL CA: CPT | Performed by: SURGERY

## 2019-12-12 PROCEDURE — 85730 THROMBOPLASTIN TIME PARTIAL: CPT | Performed by: SURGERY

## 2019-12-12 PROCEDURE — 85610 PROTHROMBIN TIME: CPT | Performed by: PHYSICIAN ASSISTANT

## 2019-12-12 PROCEDURE — 85027 COMPLETE CBC AUTOMATED: CPT | Performed by: SURGERY

## 2019-12-12 PROCEDURE — 99232 SBSQ HOSP IP/OBS MODERATE 35: CPT | Performed by: SURGERY

## 2019-12-12 RX ORDER — FLUCONAZOLE 100 MG/1
100 TABLET ORAL DAILY
Status: COMPLETED | OUTPATIENT
Start: 2019-12-13 | End: 2019-12-26

## 2019-12-12 RX ORDER — FLUCONAZOLE 200 MG/1
200 TABLET ORAL ONCE
Status: COMPLETED | OUTPATIENT
Start: 2019-12-12 | End: 2019-12-12

## 2019-12-12 RX ORDER — PANTOPRAZOLE SODIUM 40 MG/1
40 TABLET, DELAYED RELEASE ORAL
Status: DISCONTINUED | OUTPATIENT
Start: 2019-12-12 | End: 2019-12-31 | Stop reason: HOSPADM

## 2019-12-12 RX ADMIN — CLOPIDOGREL BISULFATE 75 MG: 75 TABLET ORAL at 08:30

## 2019-12-12 RX ADMIN — ASPIRIN 81 MG: 81 TABLET ORAL at 08:30

## 2019-12-12 RX ADMIN — PANTOPRAZOLE SODIUM 40 MG: 40 TABLET, DELAYED RELEASE ORAL at 17:12

## 2019-12-12 RX ADMIN — SENNOSIDES AND DOCUSATE SODIUM 1 TABLET: 8.6; 5 TABLET ORAL at 08:30

## 2019-12-12 RX ADMIN — HEPARIN SODIUM AND DEXTROSE 16 UNITS/KG/HR: 10000; 5 INJECTION INTRAVENOUS at 08:17

## 2019-12-12 RX ADMIN — SENNOSIDES AND DOCUSATE SODIUM 1 TABLET: 8.6; 5 TABLET ORAL at 17:12

## 2019-12-12 RX ADMIN — THIAMINE HCL TAB 100 MG 100 MG: 100 TAB at 08:30

## 2019-12-12 RX ADMIN — GABAPENTIN 100 MG: 100 CAPSULE ORAL at 08:30

## 2019-12-12 RX ADMIN — SODIUM CHLORIDE, SODIUM LACTATE, POTASSIUM CHLORIDE, AND CALCIUM CHLORIDE 75 ML/HR: .6; .31; .03; .02 INJECTION, SOLUTION INTRAVENOUS at 08:20

## 2019-12-12 RX ADMIN — AMLODIPINE BESYLATE 10 MG: 10 TABLET ORAL at 08:30

## 2019-12-12 RX ADMIN — OXYCODONE HYDROCHLORIDE 2.5 MG: 5 TABLET ORAL at 02:46

## 2019-12-12 RX ADMIN — SODIUM CHLORIDE, SODIUM LACTATE, POTASSIUM CHLORIDE, AND CALCIUM CHLORIDE 75 ML/HR: .6; .31; .03; .02 INJECTION, SOLUTION INTRAVENOUS at 21:51

## 2019-12-12 RX ADMIN — TAMSULOSIN HYDROCHLORIDE 0.4 MG: 0.4 CAPSULE ORAL at 17:11

## 2019-12-12 RX ADMIN — ATORVASTATIN CALCIUM 20 MG: 20 TABLET, FILM COATED ORAL at 17:12

## 2019-12-12 RX ADMIN — FLUCONAZOLE 200 MG: 200 TABLET ORAL at 17:11

## 2019-12-12 RX ADMIN — OXYCODONE HYDROCHLORIDE 2.5 MG: 5 TABLET ORAL at 09:59

## 2019-12-12 NOTE — PLAN OF CARE
Problem: DISCHARGE PLANNING  Goal: Discharge to home or other facility with appropriate resources  Description  INTERVENTIONS:  - Identify barriers to discharge w/patient and caregiver  - Arrange for needed discharge resources and transportation as appropriate  - Identify discharge learning needs (meds, wound care, etc )  - Arrange for interpretive services to assist at discharge as needed  - Refer to Case Management Department for coordinating discharge planning if the patient needs post-hospital services based on physician/advanced practitioner order or complex needs related to functional status, cognitive ability, or social support system  12/12/2019 0032 by Rylee Yañez RN  Outcome: Progressing  12/12/2019 0031 by Rylee Yañez RN  Outcome: Progressing  12/12/2019 0031 by Rylee Yañez RN  Outcome: Progressing     Problem: SAFETY,RESTRAINT: NV/NON-SELF DESTRUCTIVE BEHAVIOR  Goal: Remains free of harm/injury (restraint for non violent/non self-detsructive behavior)  Description  INTERVENTIONS:  - Instruct patient/family regarding restraint use   - Assess and monitor physiologic and psychological status   - Provide interventions and comfort measures to meet assessed patient needs   - Identify and implement measures to help patient regain control  - Assess readiness for release of restraint   12/12/2019 0032 by Rylee Yañez RN  Outcome: Completed  12/12/2019 0031 by Rylee Yañez RN  Outcome: Progressing  12/12/2019 0031 by Rylee Yañez RN  Outcome: Progressing  Goal: Returns to optimal restraint-free functioning  Description  INTERVENTIONS:  - Assess the patient's behavior and symptoms that indicate continued need for restraint  - Identify and implement measures to help patient regain control  - Assess readiness for release of restraint   12/12/2019 0032 by Rylee Yañez RN  Outcome: Completed  12/12/2019 0031 by Rylee Yañez RN  Outcome: Progressing  12/12/2019 0031 by Jose Guadalupe Leslie Russell, RN  Outcome: Progressing

## 2019-12-13 ENCOUNTER — ANESTHESIA EVENT (INPATIENT)
Dept: RADIOLOGY | Facility: HOSPITAL | Age: 69
DRG: 239 | End: 2019-12-13
Payer: MEDICARE

## 2019-12-13 ENCOUNTER — APPOINTMENT (INPATIENT)
Dept: RADIOLOGY | Facility: HOSPITAL | Age: 69
DRG: 239 | End: 2019-12-13
Payer: MEDICARE

## 2019-12-13 ENCOUNTER — ANESTHESIA (INPATIENT)
Dept: RADIOLOGY | Facility: HOSPITAL | Age: 69
DRG: 239 | End: 2019-12-13
Payer: MEDICARE

## 2019-12-13 LAB
ABO GROUP BLD BPU: NORMAL
ABO GROUP BLD BPU: NORMAL
APTT PPP: 70 SECONDS (ref 23–37)
BPU ID: NORMAL
BPU ID: NORMAL
CROSSMATCH: NORMAL
CROSSMATCH: NORMAL
INR PPP: 1.29 (ref 0.84–1.19)
PROTHROMBIN TIME: 15.7 SECONDS (ref 11.6–14.5)
UNIT DISPENSE STATUS: NORMAL
UNIT DISPENSE STATUS: NORMAL
UNIT PRODUCT CODE: NORMAL
UNIT PRODUCT CODE: NORMAL
UNIT RH: NORMAL
UNIT RH: NORMAL

## 2019-12-13 PROCEDURE — C1769 GUIDE WIRE: HCPCS

## 2019-12-13 PROCEDURE — B41FYZZ FLUOROSCOPY OF RIGHT LOWER EXTREMITY ARTERIES USING OTHER CONTRAST: ICD-10-PCS | Performed by: RADIOLOGY

## 2019-12-13 PROCEDURE — 85610 PROTHROMBIN TIME: CPT | Performed by: PHYSICIAN ASSISTANT

## 2019-12-13 PROCEDURE — C1725 CATH, TRANSLUMIN NON-LASER: HCPCS

## 2019-12-13 PROCEDURE — 047M3Z1 DILATION OF RIGHT POPLITEAL ARTERY USING DRUG-COATED BALLOON, PERCUTANEOUS APPROACH: ICD-10-PCS | Performed by: RADIOLOGY

## 2019-12-13 PROCEDURE — 75710 ARTERY X-RAYS ARM/LEG: CPT

## 2019-12-13 PROCEDURE — B41CYZZ FLUOROSCOPY OF PELVIC ARTERIES USING OTHER CONTRAST: ICD-10-PCS | Performed by: RADIOLOGY

## 2019-12-13 PROCEDURE — 85347 COAGULATION TIME ACTIVATED: CPT

## 2019-12-13 PROCEDURE — C1894 INTRO/SHEATH, NON-LASER: HCPCS

## 2019-12-13 PROCEDURE — C1887 CATHETER, GUIDING: HCPCS

## 2019-12-13 PROCEDURE — 99232 SBSQ HOSP IP/OBS MODERATE 35: CPT | Performed by: SURGERY

## 2019-12-13 PROCEDURE — 85730 THROMBOPLASTIN TIME PARTIAL: CPT | Performed by: SURGERY

## 2019-12-13 PROCEDURE — 37224 PR REVSC OPN/PRG FEM/POP W/ANGIOPLASTY UNI: CPT | Performed by: RADIOLOGY

## 2019-12-13 PROCEDURE — 37224 HB FEM/POPL REVAS W/TLA: CPT

## 2019-12-13 PROCEDURE — 76937 US GUIDE VASCULAR ACCESS: CPT

## 2019-12-13 PROCEDURE — B410YZZ FLUOROSCOPY OF ABDOMINAL AORTA USING OTHER CONTRAST: ICD-10-PCS | Performed by: RADIOLOGY

## 2019-12-13 PROCEDURE — C2623 CATH, TRANSLUMIN, DRUG-COAT: HCPCS

## 2019-12-13 PROCEDURE — C1760 CLOSURE DEV, VASC: HCPCS

## 2019-12-13 RX ORDER — MIDAZOLAM HYDROCHLORIDE 2 MG/2ML
INJECTION, SOLUTION INTRAMUSCULAR; INTRAVENOUS AS NEEDED
Status: DISCONTINUED | OUTPATIENT
Start: 2019-12-13 | End: 2019-12-13 | Stop reason: SURG

## 2019-12-13 RX ORDER — HEPARIN SODIUM 1000 [USP'U]/ML
INJECTION, SOLUTION INTRAVENOUS; SUBCUTANEOUS AS NEEDED
Status: DISCONTINUED | OUTPATIENT
Start: 2019-12-13 | End: 2019-12-13 | Stop reason: SURG

## 2019-12-13 RX ORDER — FENTANYL CITRATE/PF 50 MCG/ML
25 SYRINGE (ML) INJECTION
Status: DISCONTINUED | OUTPATIENT
Start: 2019-12-13 | End: 2019-12-13 | Stop reason: HOSPADM

## 2019-12-13 RX ORDER — CEFAZOLIN SODIUM 1 G/3ML
INJECTION, POWDER, FOR SOLUTION INTRAMUSCULAR; INTRAVENOUS AS NEEDED
Status: DISCONTINUED | OUTPATIENT
Start: 2019-12-13 | End: 2019-12-13 | Stop reason: SURG

## 2019-12-13 RX ORDER — NITROGLYCERIN 20 MG/100ML
INJECTION INTRAVENOUS
Status: COMPLETED | OUTPATIENT
Start: 2019-12-13 | End: 2019-12-13

## 2019-12-13 RX ORDER — ONDANSETRON 2 MG/ML
4 INJECTION INTRAMUSCULAR; INTRAVENOUS ONCE AS NEEDED
Status: DISCONTINUED | OUTPATIENT
Start: 2019-12-13 | End: 2019-12-13 | Stop reason: HOSPADM

## 2019-12-13 RX ORDER — VERAPAMIL HYDROCHLORIDE 2.5 MG/ML
INJECTION, SOLUTION INTRAVENOUS CODE/TRAUMA/SEDATION MEDICATION
Status: COMPLETED | OUTPATIENT
Start: 2019-12-13 | End: 2019-12-13

## 2019-12-13 RX ORDER — PROPOFOL 10 MG/ML
INJECTION, EMULSION INTRAVENOUS CONTINUOUS PRN
Status: DISCONTINUED | OUTPATIENT
Start: 2019-12-13 | End: 2019-12-13 | Stop reason: SURG

## 2019-12-13 RX ORDER — KETAMINE HYDROCHLORIDE 50 MG/ML
INJECTION, SOLUTION, CONCENTRATE INTRAMUSCULAR; INTRAVENOUS AS NEEDED
Status: DISCONTINUED | OUTPATIENT
Start: 2019-12-13 | End: 2019-12-13 | Stop reason: SURG

## 2019-12-13 RX ORDER — SODIUM CHLORIDE 9 MG/ML
INJECTION, SOLUTION INTRAVENOUS CONTINUOUS PRN
Status: DISCONTINUED | OUTPATIENT
Start: 2019-12-13 | End: 2019-12-13 | Stop reason: SURG

## 2019-12-13 RX ORDER — FENTANYL CITRATE 50 UG/ML
INJECTION, SOLUTION INTRAMUSCULAR; INTRAVENOUS AS NEEDED
Status: DISCONTINUED | OUTPATIENT
Start: 2019-12-13 | End: 2019-12-13 | Stop reason: SURG

## 2019-12-13 RX ADMIN — CEFAZOLIN 1000 MG: 1 INJECTION, POWDER, FOR SOLUTION INTRAVENOUS at 16:25

## 2019-12-13 RX ADMIN — PROPOFOL 100 MCG/KG/MIN: 10 INJECTION, EMULSION INTRAVENOUS at 13:30

## 2019-12-13 RX ADMIN — FENTANYL CITRATE 25 MCG: 50 INJECTION, SOLUTION INTRAMUSCULAR; INTRAVENOUS at 13:30

## 2019-12-13 RX ADMIN — IODIXANOL 110 ML: 320 INJECTION, SOLUTION INTRAVASCULAR at 17:01

## 2019-12-13 RX ADMIN — PANTOPRAZOLE SODIUM 40 MG: 40 TABLET, DELAYED RELEASE ORAL at 19:35

## 2019-12-13 RX ADMIN — MELATONIN 6 MG: 3 TAB ORAL at 21:41

## 2019-12-13 RX ADMIN — MIDAZOLAM 1 MG: 1 INJECTION INTRAMUSCULAR; INTRAVENOUS at 13:24

## 2019-12-13 RX ADMIN — PHENYLEPHRINE HYDROCHLORIDE 20 MCG/MIN: 10 INJECTION INTRAVENOUS at 13:31

## 2019-12-13 RX ADMIN — KETAMINE HYDROCHLORIDE 10 MG: 50 INJECTION, SOLUTION INTRAMUSCULAR; INTRAVENOUS at 15:16

## 2019-12-13 RX ADMIN — THIAMINE HCL TAB 100 MG 100 MG: 100 TAB at 09:23

## 2019-12-13 RX ADMIN — SODIUM CHLORIDE: 0.9 INJECTION, SOLUTION INTRAVENOUS at 13:19

## 2019-12-13 RX ADMIN — NITROGLYCERIN 200 MCG: 20 INJECTION INTRAVENOUS at 14:16

## 2019-12-13 RX ADMIN — HEPARIN SODIUM 2000 UNITS: 1000 INJECTION INTRAVENOUS; SUBCUTANEOUS at 16:10

## 2019-12-13 RX ADMIN — SENNOSIDES AND DOCUSATE SODIUM 1 TABLET: 8.6; 5 TABLET ORAL at 19:35

## 2019-12-13 RX ADMIN — OXYCODONE HYDROCHLORIDE 2.5 MG: 5 TABLET ORAL at 01:20

## 2019-12-13 RX ADMIN — TAMSULOSIN HYDROCHLORIDE 0.4 MG: 0.4 CAPSULE ORAL at 19:35

## 2019-12-13 RX ADMIN — FLUCONAZOLE 100 MG: 100 TABLET ORAL at 19:35

## 2019-12-13 RX ADMIN — KETAMINE HYDROCHLORIDE 10 MG: 50 INJECTION, SOLUTION INTRAMUSCULAR; INTRAVENOUS at 13:38

## 2019-12-13 RX ADMIN — SODIUM CHLORIDE, SODIUM LACTATE, POTASSIUM CHLORIDE, AND CALCIUM CHLORIDE 75 ML/HR: .6; .31; .03; .02 INJECTION, SOLUTION INTRAVENOUS at 09:50

## 2019-12-13 RX ADMIN — PANTOPRAZOLE SODIUM 40 MG: 40 TABLET, DELAYED RELEASE ORAL at 06:00

## 2019-12-13 RX ADMIN — CLOPIDOGREL BISULFATE 75 MG: 75 TABLET ORAL at 09:23

## 2019-12-13 RX ADMIN — VERAPAMIL HYDROCHLORIDE 2.5 MG: 2.5 INJECTION INTRAVENOUS at 14:16

## 2019-12-13 RX ADMIN — FENTANYL CITRATE 25 MCG: 50 INJECTION, SOLUTION INTRAMUSCULAR; INTRAVENOUS at 15:16

## 2019-12-13 RX ADMIN — FENTANYL CITRATE 25 MCG: 50 INJECTION, SOLUTION INTRAMUSCULAR; INTRAVENOUS at 13:56

## 2019-12-13 RX ADMIN — GABAPENTIN 100 MG: 100 CAPSULE ORAL at 09:23

## 2019-12-13 RX ADMIN — ATORVASTATIN CALCIUM 20 MG: 20 TABLET, FILM COATED ORAL at 19:35

## 2019-12-13 RX ADMIN — ASPIRIN 81 MG: 81 TABLET ORAL at 09:22

## 2019-12-13 RX ADMIN — KETAMINE HYDROCHLORIDE 20 MG: 50 INJECTION, SOLUTION INTRAMUSCULAR; INTRAVENOUS at 13:30

## 2019-12-13 RX ADMIN — HEPARIN SODIUM 2500 UNITS: 1000 INJECTION INTRAVENOUS; SUBCUTANEOUS at 14:09

## 2019-12-13 RX ADMIN — HYDROMORPHONE HYDROCHLORIDE 0.2 MG: 1 INJECTION, SOLUTION INTRAMUSCULAR; INTRAVENOUS; SUBCUTANEOUS at 02:48

## 2019-12-13 RX ADMIN — SODIUM CHLORIDE: 0.9 INJECTION, SOLUTION INTRAVENOUS at 16:22

## 2019-12-13 RX ADMIN — HEPARIN SODIUM AND DEXTROSE 16 UNITS/KG/HR: 10000; 5 INJECTION INTRAVENOUS at 09:55

## 2019-12-13 RX ADMIN — AMLODIPINE BESYLATE 10 MG: 10 TABLET ORAL at 09:23

## 2019-12-13 NOTE — PHYSICAL THERAPY NOTE
Physical Therapy Cancellation Note    The pt  Is off of the floor at IR for angiogram  Will continue to follow      Manolo Arredondo PTA

## 2019-12-13 NOTE — OCCUPATIONAL THERAPY NOTE
Occupational Therapy Cancellation Note    Pt currently off of the unit in IR for RLE arteriogram w/ possible intervention  Will continue to follow to be seen for OT treatment as appropriate/when medically cleared         Storm Rm, MS, OTR/L

## 2019-12-13 NOTE — ASSESSMENT & PLAN NOTE
79 y/o M presents with Right lower extremity rest pain, anemia, concern for GI bleed, supratherapeutic INR  He is s/p left femoral endarterectomy with profundoplasty, SFA embolectomy and retrograde iliac stenting 11/6/19 and arteriogram with left SFA/popliteal angioplasty and right SFA angioplasty 11/14/19      S/P EGD/Colonoscopy 12/10 (EGD clean based ulcers with no active bleeding, colonoscopy poor prep no active bleeding)  S/p 12/13 RLE Agram- popliteal recanualization, AT/PT occluded- IR    Plan:  Diet as tolerated  Will discuss need for a BKA  Prn pain control  Continue heparin drip  Pain control

## 2019-12-13 NOTE — ANESTHESIA POSTPROCEDURE EVALUATION
Post-Op Assessment Note    CV Status:  Stable  Pain Score: 0    Pain management: adequate     Mental Status:  Arousable   Hydration Status:  Stable   PONV Controlled:  None   Airway Patency:  Patent   Post Op Vitals Reviewed: Yes      Staff: CRNA           BP   111/62   Temp   97   Pulse 63 (12/13/19 1653)   Resp   16   SpO2   96

## 2019-12-13 NOTE — ANESTHESIA PREPROCEDURE EVALUATION
Review of Systems/Medical History  Patient summary reviewed  Chart reviewed  No history of anesthetic complications     Cardiovascular  EKG reviewed, Exercise tolerance (METS): <4,  Hypertension controlled, No dysrhythmias , PVD,    Pulmonary  Smoker cigarette smoker  ,        GI/Hepatic       Kidney stones,        Endo/Other  Negative endo/other ROS   Comment: Throat cancer with previous radiation and chemo    GYN  Negative gynecology ROS          Hematology  Anemia anemia of chronic disease,     Musculoskeletal       Neurology   Psychology       TTE 11/3/19: LVEF 65%, no rWMA, RV fn nl, mild MR, trace TR    EKG 12/9/19: NSR, normal ECG    Recent Results (from the past 48 hour(s))   Fingerstick Glucose (POCT)    Collection Time: 12/11/19  4:21 PM   Result Value Ref Range    POC Glucose 122 65 - 140 mg/dl   Protime-INR    Collection Time: 12/12/19  6:07 AM   Result Value Ref Range    Protime 16 5 (H) 11 6 - 14 5 seconds    INR 1 38 (H) 0 84 - 1 19   APTT    Collection Time: 12/12/19  6:07 AM   Result Value Ref Range    PTT 77 (H) 23 - 37 seconds   Basic metabolic panel    Collection Time: 12/12/19  6:07 AM   Result Value Ref Range    Sodium 138 136 - 145 mmol/L    Potassium 3 7 3 5 - 5 3 mmol/L    Chloride 106 100 - 108 mmol/L    CO2 25 21 - 32 mmol/L    ANION GAP 7 4 - 13 mmol/L    BUN 7 5 - 25 mg/dL    Creatinine 0 70 0 60 - 1 30 mg/dL    Glucose 110 65 - 140 mg/dL    Calcium 8 9 8 3 - 10 1 mg/dL    eGFR 96 ml/min/1 73sq m   CBC    Collection Time: 12/12/19  6:07 AM   Result Value Ref Range    WBC 6 81 4 31 - 10 16 Thousand/uL    RBC 3 03 (L) 3 88 - 5 62 Million/uL    Hemoglobin 8 8 (L) 12 0 - 17 0 g/dL    Hematocrit 27 5 (L) 36 5 - 49 3 %    MCV 91 82 - 98 fL    MCH 29 0 26 8 - 34 3 pg    MCHC 32 0 31 4 - 37 4 g/dL    RDW 17 4 (H) 11 6 - 15 1 %    Platelets 892 546 - 608 Thousands/uL    MPV 9 3 8 9 - 12 7 fL   Protime-INR    Collection Time: 12/13/19  5:37 AM   Result Value Ref Range    Protime 15 7 (H) 11 6 - 14 5 seconds    INR 1 29 (H) 0 84 - 1 19   APTT    Collection Time: 12/13/19  5:37 AM   Result Value Ref Range    PTT 70 (H) 23 - 37 seconds   Prepare Leukoreduced RBC:Has consent been obtained? Yes, 2 Units, Irradiated, CMV Negative, Leukoreduced    Collection Time: 12/13/19  6:59 AM   Result Value Ref Range    Unit Product Code C5746A74     Unit Number [id number]     Unit ABO A     Unit RH POS     Crossmatch Compatible     Unit Dispense Status Return to Inv     Unit Product Code E6959H68     Unit Number [id number]     Unit ABO A     Unit RH POS     Crossmatch Compatible     Unit Dispense Status Presumed Trans        Physical Exam    Airway    Mallampati score: III  TM Distance: <3 FB  Neck ROM: full     Dental   upper dentures and lower dentures,     Cardiovascular  Rhythm: regular, Rate: normal,     Pulmonary  Breath sounds clear to auscultation,     Other Findings  Dentures removed      Anesthesia Plan  ASA Score- 3     Anesthesia Type- IV sedation with anesthesia with ASA Monitors  Additional Monitors:   Airway Plan:         Plan Factors-    Induction- intravenous  Postoperative Plan-     Informed Consent- Anesthetic plan and risks discussed with patient and son  I personally reviewed this patient with the CRNA  Discussed and agreed on the Anesthesia Plan with the CRNA  Alicia Kamara

## 2019-12-13 NOTE — BRIEF OP NOTE (RAD/CATH)
INTERVENTIONAL RADIOLOGY PROCEDURE NOTE    Date: 12/13/2019    Procedure: IR ABDOMINAL ANGIOGRAPHY / INTERVENTION     Preoperative diagnosis:   1  Hematochezia    2  Anemia, unspecified type    3  Melena         Postoperative diagnosis: Same  Surgeon: Debby Perez MD     Assistant: None  No qualified resident was available  Blood loss: minimal    Specimens: none    Findings: Successful recanalization of the occluded left popliteal artery  No flow to the anterior tibial or posterior tibial arteries in the lower leg or dorsalis pedis or posterior tibial arteries in the foot  Unable to recanalize the occluded tibial vessels  Complications: None immediate      Anesthesia: MAC sedation

## 2019-12-13 NOTE — ASSESSMENT & PLAN NOTE
79 y/o M presents with Right lower extremity rest pain, anemia, concern for GI bleed, supratherapeutic INR  He is s/p left femoral endarterectomy with profundoplasty, SFA embolectomy and retrograde iliac stenting 11/6/19 and arteriogram with left SFA/popliteal angioplasty and right SFA angioplasty 11/14/19  S/P EGD/Colonoscopy 12/10 (EGD clean based ulcers with no active bleeding, colonoscopy poor prep no active bleeding)    Plan:   To IR today for RLE arteriogram possible intervention, pt remains high risk for limb loss  NPO/IVF  Continue heparin drip  Pain control

## 2019-12-13 NOTE — PLAN OF CARE
Problem: Prexisting or High Potential for Compromised Skin Integrity  Goal: Skin integrity is maintained or improved  Description  INTERVENTIONS:  - Identify patients at risk for skin breakdown  - Assess and monitor skin integrity  - Assess and monitor nutrition and hydration status  - Monitor labs   - Assess for incontinence   - Turn and reposition patient  - Assist with mobility/ambulation  - Relieve pressure over bony prominences  - Avoid friction and shearing  - Provide appropriate hygiene as needed including keeping skin clean and dry  - Evaluate need for skin moisturizer/barrier cream  - Collaborate with interdisciplinary team   - Patient/family teaching  - Consider wound care consult   Outcome: Progressing     Problem: Potential for Falls  Goal: Patient will remain free of falls  Description  INTERVENTIONS:  - Assess patient frequently for physical needs  -  Identify cognitive and physical deficits and behaviors that affect risk of falls    -  [place] fall precautions as indicated by assessment   - Educate patient/family on patient safety including physical limitations  - Instruct patient to call for assistance with activity based on assessment  - Modify environment to reduce risk of injury  - Consider OT/PT consult to assist with strengthening/mobility  Outcome: Progressing     Problem: PAIN - ADULT  Goal: Verbalizes/displays adequate comfort level or baseline comfort level  Description  Interventions:  - Encourage patient to monitor pain and request assistance  - Assess pain using appropriate pain scale  - Administer analgesics based on type and severity of pain and evaluate response  - Implement non-pharmacological measures as appropriate and evaluate response  - Consider cultural and social influences on pain and pain management  - Notify physician/advanced practitioner if interventions unsuccessful or patient reports new pain  Outcome: Progressing     Problem: INFECTION - ADULT  Goal: Absence or prevention of progression during hospitalization  Description  INTERVENTIONS:  - Assess and monitor for signs and symptoms of infection  - Monitor lab/diagnostic results  - Monitor all insertion sites, i e  indwelling lines, tubes, and drains  - Monitor endotracheal if appropriate and nasal secretions for changes in amount and color  - [place] appropriate cooling/warming therapies per order  - Administer medications as ordered  - Instruct and encourage patient and family to use good hand hygiene technique  - Identify and instruct in appropriate isolation precautions for identified infection/condition  Outcome: Progressing  Goal: Absence of fever/infection during neutropenic period  Description  INTERVENTIONS:  - Monitor WBC    Outcome: Progressing     Problem: SAFETY ADULT  Goal: Patient will remain free of falls  Description  INTERVENTIONS:  - Assess patient frequently for physical needs  -  Identify cognitive and physical deficits and behaviors that affect risk of falls    -  [place] fall precautions as indicated by assessment   - Educate patient/family on patient safety including physical limitations  - Instruct patient to call for assistance with activity based on assessment  - Modify environment to reduce risk of injury  - Consider OT/PT consult to assist with strengthening/mobility  Outcome: Progressing     Problem: DISCHARGE PLANNING  Goal: Discharge to home or other facility with appropriate resources  Description  INTERVENTIONS:  - Identify barriers to discharge w/patient and caregiver  - Arrange for needed discharge resources and transportation as appropriate  - Identify discharge learning needs (meds, wound care, etc )  - Arrange for interpretive services to assist at discharge as needed  - Refer to Case Management Department for coordinating discharge planning if the patient needs post-hospital services based on physician/advanced practitioner order or complex needs related to functional status, cognitive ability, or social support system  Outcome: Progressing     Problem: Nutrition/Hydration-ADULT  Goal: Nutrient/Hydration intake appropriate for improving, restoring or maintaining nutritional needs  Description  Monitor and assess patient's nutrition/hydration status for malnutrition  Collaborate with interdisciplinary team and initiate plan and interventions as ordered  Monitor patient's weight and dietary intake as ordered or per policy  Utilize nutrition screening tool and intervene as necessary  Determine patient's food preferences and provide high-protein, high-caloric foods as appropriate       INTERVENTIONS:  - Monitor oral intake, urinary output, labs, and treatment plans  - Assess nutrition and hydration status and recommend course of action  - Evaluate amount of meals eaten  - Assist patient with eating if necessary   - Allow adequate time for meals  - Recommend/ encourage appropriate diets, oral nutritional supplements, and vitamin/mineral supplements  - Order, calculate, and assess calorie counts as needed  - Recommend, monitor, and adjust tube feedings and TPN/PPN based on assessed needs  - Assess need for intravenous fluids  - Provide specific nutrition/hydration education as appropriate  - Include patient/family/caregiver in decisions related to nutrition  Outcome: Progressing     Problem: SAFETY ADULT  Goal: Maintain or return to baseline ADL function  Description  INTERVENTIONS:  -  Assess patient's ability to carry out ADLs; assess patient's baseline for ADL function and identify physical deficits which impact ability to perform ADLs (bathing, care of mouth/teeth, toileting, grooming, dressing, etc )  - Assess/evaluate cause of self-care deficits   - Assess range of motion  - Assess patient's mobility; develop plan if impaired  - Assess patient's need for assistive devices and provide as appropriate  - Encourage maximum independence but intervene and supervise when necessary  - Involve family in performance of ADLs  - Assess for home care needs following discharge   - Consider OT consult to assist with ADL evaluation and planning for discharge  - Provide patient education as appropriate  Outcome: Not Progressing  Goal: Maintain or return mobility status to optimal level  Description  INTERVENTIONS:  - Assess patient's baseline mobility status (ambulation, transfers, stairs, etc )    - Identify cognitive and physical deficits and behaviors that affect mobility  - Identify mobility aids required to assist with transfers and/or ambulation (gait belt, sit-to-stand, lift, walker, cane, etc )  - [place] fall precautions as indicated by assessment  - Record patient progress and toleration of activity level on Mobility SBAR; progress patient to next Phase/Stage  - Instruct patient to call for assistance with activity based on assessment  - Consider rehabilitation consult to assist with strengthening/weightbearing, etc   Outcome: Not Progressing     Problem: Knowledge Deficit  Goal: Patient/family/caregiver demonstrates understanding of disease process, treatment plan, medications, and discharge instructions  Description  Complete learning assessment and assess knowledge base    Interventions:  - Provide teaching at level of understanding  - Provide teaching via preferred learning methods  Outcome: Not Progressing

## 2019-12-13 NOTE — SEDATION DOCUMENTATION
IR Procedure Bedrest Start Time is [id number]  Heparin gtt to start in 2hrs from 1630 with no bolus at previous rate per Dr Madelyn Silva   Report called to RN and pt transported to PACU

## 2019-12-13 NOTE — PROGRESS NOTES
Vascular Surgery    Progress Note - Karlene Long 1950, 78 y o  male MRN: [id number]    Unit/Bed#: [facility] 530-01 Encounter: [id number]    Primary Care Provider: Soniya Patricia MD   Date and time admitted to hospital: 12/4/2019  8:33 PM    * Critical lower limb ischemia  Assessment & Plan  77 y/o M presents with Right lower extremity rest pain, anemia, concern for GI bleed, supratherapeutic INR  He is s/p left femoral endarterectomy with profundoplasty, SFA embolectomy and retrograde iliac stenting 11/6/19 and arteriogram with left SFA/popliteal angioplasty and right SFA angioplasty 11/14/19  S/P EGD/Colonoscopy 12/10 (EGD clean based ulcers with no active bleeding, colonoscopy poor prep no active bleeding)    Plan: To IR today for RLE arteriogram possible intervention, pt remains high risk for limb loss  NPO/IVF  Continue heparin drip  Pain control        Subjective:  Pt resting comfortably this AM    Vitals:  /59 (BP Location: Right arm)   Pulse 80   Temp 98 7 °F (37 1 °C) (Oral)   Resp 18   Ht 5' 5" (1 651 m)   Wt 51 4 kg (113 lb 5 1 oz)   SpO2 98%   BMI 18 86 kg/m²     I/Os:  I/O last 3 completed shifts: In: 2874 2 [P O :480; I V :2394 2]  Out: 3850 [Urine:3850]  No intake/output data recorded      Lab Results and Cultures:   Lab Results   Component Value Date    WBC 6 81 12/12/2019    HGB 8 8 (L) 12/12/2019    HCT 27 5 (L) 12/12/2019    MCV 91 12/12/2019     12/12/2019     Lab Results   Component Value Date    GLUCOSE 128 11/07/2019    CALCIUM 8 9 12/12/2019    K 3 7 12/12/2019    CO2 25 12/12/2019     12/12/2019    BUN 7 12/12/2019    CREATININE 0 70 12/12/2019     Lab Results   Component Value Date    INR 1 29 (H) 12/13/2019    INR 1 38 (H) 12/12/2019    INR 1 60 (H) 12/11/2019    PROTIME 15 7 (H) 12/13/2019    PROTIME 16 5 (H) 12/12/2019    PROTIME 18 6 (H) 12/11/2019        Blood Culture: No results found for: BLOODCX,   Urinalysis: No results found for: COLORU, CLARITYU, SPECGRAV, PHUR, LEUKOCYTESUR, NITRITE, PROTEINUA, GLUCOSEU, KETONESU, BILIRUBINUR, BLOODU,   Urine Culture: No results found for: URINECX,   Wound Culure: No results found for: WOUNDCULT    Medications:  Current Facility-Administered Medications   Medication Dose Route Frequency    amLODIPine (NORVASC) tablet 10 mg  10 mg Oral Daily    aspirin (ECOTRIN LOW STRENGTH) EC tablet 81 mg  81 mg Oral Daily    atorvastatin (LIPITOR) tablet 20 mg  20 mg Oral After Dinner    clopidogrel (PLAVIX) tablet 75 mg  75 mg Oral Daily    fluconazole (DIFLUCAN) tablet 100 mg  100 mg Oral Daily    gabapentin (NEURONTIN) capsule 100 mg  100 mg Oral Daily    heparin (porcine) 25,000 units in 250 mL infusion (premix)  3-20 Units/kg/hr (Order-Specific) Intravenous Titrated    heparin (porcine) injection 1,500 Units  1,500 Units Intravenous PRN    heparin (porcine) injection 3,000 Units  3,000 Units Intravenous PRN    HYDROmorphone (DILAUDID) injection 0 2 mg  0 2 mg Intravenous Q3H PRN    lactated ringers infusion  75 mL/hr Intravenous Continuous    melatonin tablet 6 mg  6 mg Oral HS    nicotine (NICODERM CQ) 14 mg/24hr TD 24 hr patch 1 patch  1 patch Transdermal Daily    ondansetron (ZOFRAN) injection 4 mg  4 mg Intravenous Q4H PRN    oxyCODONE (ROXICODONE) IR tablet 2 5 mg  2 5 mg Oral Q4H PRN    oxyCODONE (ROXICODONE) IR tablet 5 mg  5 mg Oral Q4H PRN    pantoprazole (PROTONIX) EC tablet 40 mg  40 mg Oral BID AC    senna-docusate sodium (SENOKOT S) 8 6-50 mg per tablet 1 tablet  1 tablet Oral BID    tamsulosin (FLOMAX) capsule 0 4 mg  0 4 mg Oral Daily With Dinner    thiamine (VITAMIN B1) tablet 100 mg  100 mg Oral Daily     Physical Exam:    General appearance: alert and oriented, in no acute distress  Lungs: clear to auscultation bilaterally  Heart: regular rate and rhythm, S1, S2 normal, no murmur, click, rub or gallop  Abdomen: soft, non-tender; bowel sounds normal; no masses,  no organomegaly  Extremities: Right foot cool, toe cyanotic, ischemic blisters noted at hallux and dorsal forefoot, dusky heel lesion, limited motor and sensation intact, tender to palpation    Pulse exam:  DP: Right: 0 Left: doppler signal  PT: Right: Monophasic/venous signal Left: doppler signal      Lilli Field PA-C  12/13/2019

## 2019-12-13 NOTE — PLAN OF CARE
Problem: Prexisting or High Potential for Compromised Skin Integrity  Goal: Skin integrity is maintained or improved  Description  INTERVENTIONS:  - Identify patients at risk for skin breakdown  - Assess and monitor skin integrity  - Assess and monitor nutrition and hydration status  - Monitor labs   - Assess for incontinence   - Turn and reposition patient  - Assist with mobility/ambulation  - Relieve pressure over bony prominences  - Avoid friction and shearing  - Provide appropriate hygiene as needed including keeping skin clean and dry  - Evaluate need for skin moisturizer/barrier cream  - Collaborate with interdisciplinary team   - Patient/family teaching  - Consider wound care consult   Outcome: Progressing     Problem: Potential for Falls  Goal: Patient will remain free of falls  Description  INTERVENTIONS:  - Assess patient frequently for physical needs  -  Identify cognitive and physical deficits and behaviors that affect risk of falls    -  [place] fall precautions as indicated by assessment   - Educate patient/family on patient safety including physical limitations  - Instruct patient to call for assistance with activity based on assessment  - Modify environment to reduce risk of injury  - Consider OT/PT consult to assist with strengthening/mobility  Outcome: Progressing     Problem: PAIN - ADULT  Goal: Verbalizes/displays adequate comfort level or baseline comfort level  Description  Interventions:  - Encourage patient to monitor pain and request assistance  - Assess pain using appropriate pain scale  - Administer analgesics based on type and severity of pain and evaluate response  - Implement non-pharmacological measures as appropriate and evaluate response  - Consider cultural and social influences on pain and pain management  - Notify physician/advanced practitioner if interventions unsuccessful or patient reports new pain  Outcome: Progressing     Problem: INFECTION - ADULT  Goal: Absence or prevention of progression during hospitalization  Description  INTERVENTIONS:  - Assess and monitor for signs and symptoms of infection  - Monitor lab/diagnostic results  - Monitor all insertion sites, i e  indwelling lines, tubes, and drains  - Monitor endotracheal if appropriate and nasal secretions for changes in amount and color  - [place] appropriate cooling/warming therapies per order  - Administer medications as ordered  - Instruct and encourage patient and family to use good hand hygiene technique  - Identify and instruct in appropriate isolation precautions for identified infection/condition  Outcome: Progressing  Goal: Absence of fever/infection during neutropenic period  Description  INTERVENTIONS:  - Monitor WBC    Outcome: Progressing     Problem: SAFETY ADULT  Goal: Patient will remain free of falls  Description  INTERVENTIONS:  - Assess patient frequently for physical needs  -  Identify cognitive and physical deficits and behaviors that affect risk of falls    -  [place] fall precautions as indicated by assessment   - Educate patient/family on patient safety including physical limitations  - Instruct patient to call for assistance with activity based on assessment  - Modify environment to reduce risk of injury  - Consider OT/PT consult to assist with strengthening/mobility  Outcome: Progressing  Goal: Maintain or return to baseline ADL function  Description  INTERVENTIONS:  -  Assess patient's ability to carry out ADLs; assess patient's baseline for ADL function and identify physical deficits which impact ability to perform ADLs (bathing, care of mouth/teeth, toileting, grooming, dressing, etc )  - Assess/evaluate cause of self-care deficits   - Assess range of motion  - Assess patient's mobility; develop plan if impaired  - Assess patient's need for assistive devices and provide as appropriate  - Encourage maximum independence but intervene and supervise when necessary  - Involve family in performance of ADLs  - Assess for home care needs following discharge   - Consider OT consult to assist with ADL evaluation and planning for discharge  - Provide patient education as appropriate  Outcome: Progressing  Goal: Maintain or return mobility status to optimal level  Description  INTERVENTIONS:  - Assess patient's baseline mobility status (ambulation, transfers, stairs, etc )    - Identify cognitive and physical deficits and behaviors that affect mobility  - Identify mobility aids required to assist with transfers and/or ambulation (gait belt, sit-to-stand, lift, walker, cane, etc )  - [place] fall precautions as indicated by assessment  - Record patient progress and toleration of activity level on Mobility SBAR; progress patient to next Phase/Stage  - Instruct patient to call for assistance with activity based on assessment  - Consider rehabilitation consult to assist with strengthening/weightbearing, etc   Outcome: Progressing     Problem: DISCHARGE PLANNING  Goal: Discharge to home or other facility with appropriate resources  Description  INTERVENTIONS:  - Identify barriers to discharge w/patient and caregiver  - Arrange for needed discharge resources and transportation as appropriate  - Identify discharge learning needs (meds, wound care, etc )  - Arrange for interpretive services to assist at discharge as needed  - Refer to Case Management Department for coordinating discharge planning if the patient needs post-hospital services based on physician/advanced practitioner order or complex needs related to functional status, cognitive ability, or social support system  Outcome: Progressing     Problem: Knowledge Deficit  Goal: Patient/family/caregiver demonstrates understanding of disease process, treatment plan, medications, and discharge instructions  Description  Complete learning assessment and assess knowledge base    Interventions:  - Provide teaching at level of understanding  - Provide teaching via preferred learning methods  Outcome: Progressing     Problem: Nutrition/Hydration-ADULT  Goal: Nutrient/Hydration intake appropriate for improving, restoring or maintaining nutritional needs  Description  Monitor and assess patient's nutrition/hydration status for malnutrition  Collaborate with interdisciplinary team and initiate plan and interventions as ordered  Monitor patient's weight and dietary intake as ordered or per policy  Utilize nutrition screening tool and intervene as necessary  Determine patient's food preferences and provide high-protein, high-caloric foods as appropriate       INTERVENTIONS:  - Monitor oral intake, urinary output, labs, and treatment plans  - Assess nutrition and hydration status and recommend course of action  - Evaluate amount of meals eaten  - Assist patient with eating if necessary   - Allow adequate time for meals  - Recommend/ encourage appropriate diets, oral nutritional supplements, and vitamin/mineral supplements  - Order, calculate, and assess calorie counts as needed  - Recommend, monitor, and adjust tube feedings and TPN/PPN based on assessed needs  - Assess need for intravenous fluids  - Provide specific nutrition/hydration education as appropriate  - Include patient/family/caregiver in decisions related to nutrition  Outcome: Progressing

## 2019-12-14 LAB
APTT PPP: 102 SECONDS (ref 23–37)
APTT PPP: 108 SECONDS (ref 23–37)
APTT PPP: 130 SECONDS (ref 23–37)
BASOPHILS # BLD AUTO: 0.03 THOUSANDS/ΜL (ref 0–0.1)
BASOPHILS NFR BLD AUTO: 0 % (ref 0–1)
EOSINOPHIL # BLD AUTO: 0.12 THOUSAND/ΜL (ref 0–0.61)
EOSINOPHIL NFR BLD AUTO: 2 % (ref 0–6)
ERYTHROCYTE [DISTWIDTH] IN BLOOD BY AUTOMATED COUNT: 16.3 % (ref 11.6–15.1)
HCT VFR BLD AUTO: 25.1 % (ref 36.5–49.3)
HGB BLD-MCNC: 8 G/DL (ref 12–17)
IMM GRANULOCYTES # BLD AUTO: 0.04 THOUSAND/UL (ref 0–0.2)
IMM GRANULOCYTES NFR BLD AUTO: 1 % (ref 0–2)
INR PPP: 1.38 (ref 0.84–1.19)
LYMPHOCYTES # BLD AUTO: 0.49 THOUSANDS/ΜL (ref 0.6–4.47)
LYMPHOCYTES NFR BLD AUTO: 6 % (ref 14–44)
MCH RBC QN AUTO: 29.4 PG (ref 26.8–34.3)
MCHC RBC AUTO-ENTMCNC: 31.9 G/DL (ref 31.4–37.4)
MCV RBC AUTO: 92 FL (ref 82–98)
MONOCYTES # BLD AUTO: 0.41 THOUSAND/ΜL (ref 0.17–1.22)
MONOCYTES NFR BLD AUTO: 5 % (ref 4–12)
NEUTROPHILS # BLD AUTO: 6.83 THOUSANDS/ΜL (ref 1.85–7.62)
NEUTS SEG NFR BLD AUTO: 86 % (ref 43–75)
NRBC BLD AUTO-RTO: 0 /100 WBCS
PLATELET # BLD AUTO: 283 THOUSANDS/UL (ref 149–390)
PMV BLD AUTO: 9.1 FL (ref 8.9–12.7)
PROTHROMBIN TIME: 16.5 SECONDS (ref 11.6–14.5)
RBC # BLD AUTO: 2.72 MILLION/UL (ref 3.88–5.62)
WBC # BLD AUTO: 7.92 THOUSAND/UL (ref 4.31–10.16)

## 2019-12-14 PROCEDURE — 85730 THROMBOPLASTIN TIME PARTIAL: CPT | Performed by: SURGERY

## 2019-12-14 PROCEDURE — 85610 PROTHROMBIN TIME: CPT | Performed by: PHYSICIAN ASSISTANT

## 2019-12-14 PROCEDURE — 99232 SBSQ HOSP IP/OBS MODERATE 35: CPT | Performed by: SURGERY

## 2019-12-14 PROCEDURE — 85025 COMPLETE CBC W/AUTO DIFF WBC: CPT | Performed by: STUDENT IN AN ORGANIZED HEALTH CARE EDUCATION/TRAINING PROGRAM

## 2019-12-14 RX ORDER — POTASSIUM CHLORIDE 20 MEQ/1
40 TABLET, EXTENDED RELEASE ORAL ONCE
Status: COMPLETED | OUTPATIENT
Start: 2019-12-14 | End: 2019-12-14

## 2019-12-14 RX ADMIN — ASPIRIN 81 MG: 81 TABLET ORAL at 08:06

## 2019-12-14 RX ADMIN — HEPARIN SODIUM AND DEXTROSE 12 UNITS/KG/HR: 10000; 5 INJECTION INTRAVENOUS at 22:46

## 2019-12-14 RX ADMIN — NICOTINE 1 PATCH: 14 PATCH TRANSDERMAL at 08:09

## 2019-12-14 RX ADMIN — TAMSULOSIN HYDROCHLORIDE 0.4 MG: 0.4 CAPSULE ORAL at 15:36

## 2019-12-14 RX ADMIN — OXYCODONE HYDROCHLORIDE 5 MG: 5 TABLET ORAL at 11:49

## 2019-12-14 RX ADMIN — AMLODIPINE BESYLATE 10 MG: 10 TABLET ORAL at 08:06

## 2019-12-14 RX ADMIN — FLUCONAZOLE 100 MG: 100 TABLET ORAL at 08:07

## 2019-12-14 RX ADMIN — CLOPIDOGREL BISULFATE 75 MG: 75 TABLET ORAL at 08:06

## 2019-12-14 RX ADMIN — PANTOPRAZOLE SODIUM 40 MG: 40 TABLET, DELAYED RELEASE ORAL at 06:29

## 2019-12-14 RX ADMIN — SENNOSIDES AND DOCUSATE SODIUM 1 TABLET: 8.6; 5 TABLET ORAL at 08:06

## 2019-12-14 RX ADMIN — ATORVASTATIN CALCIUM 20 MG: 20 TABLET, FILM COATED ORAL at 17:34

## 2019-12-14 RX ADMIN — POTASSIUM CHLORIDE 40 MEQ: 1500 TABLET, EXTENDED RELEASE ORAL at 15:36

## 2019-12-14 RX ADMIN — PANTOPRAZOLE SODIUM 40 MG: 40 TABLET, DELAYED RELEASE ORAL at 15:36

## 2019-12-14 RX ADMIN — SENNOSIDES AND DOCUSATE SODIUM 1 TABLET: 8.6; 5 TABLET ORAL at 17:34

## 2019-12-14 RX ADMIN — MELATONIN 6 MG: 3 TAB ORAL at 22:46

## 2019-12-14 RX ADMIN — SODIUM CHLORIDE, SODIUM LACTATE, POTASSIUM CHLORIDE, AND CALCIUM CHLORIDE 75 ML/HR: .6; .31; .03; .02 INJECTION, SOLUTION INTRAVENOUS at 05:46

## 2019-12-14 RX ADMIN — SODIUM CHLORIDE, SODIUM LACTATE, POTASSIUM CHLORIDE, AND CALCIUM CHLORIDE 75 ML/HR: .6; .31; .03; .02 INJECTION, SOLUTION INTRAVENOUS at 19:00

## 2019-12-14 RX ADMIN — THIAMINE HCL TAB 100 MG 100 MG: 100 TAB at 08:06

## 2019-12-14 RX ADMIN — GABAPENTIN 100 MG: 100 CAPSULE ORAL at 08:06

## 2019-12-14 NOTE — PROGRESS NOTES
Progress Note - Chinedu Drop 1950, 78 y o  male MRN: [id number]    Unit/Bed#: [facility] 530-01 Encounter: [id number]    Primary Care Provider: Ricky Homans, MD   Date and time admitted to hospital: 12/4/2019  8:33 PM        * Critical lower limb ischemia  Assessment & Plan  79 y/o M presents with Right lower extremity rest pain, anemia, concern for GI bleed, supratherapeutic INR  He is s/p left femoral endarterectomy with profundoplasty, SFA embolectomy and retrograde iliac stenting 11/6/19 and arteriogram with left SFA/popliteal angioplasty and right SFA angioplasty 11/14/19  S/P EGD/Colonoscopy 12/10 (EGD clean based ulcers with no active bleeding, colonoscopy poor prep no active bleeding)  S/p 12/13 RLE Agram- popliteal recanualization, AT/PT occluded- IR    Plan:  Diet as tolerated  Will discuss need for a BKA  Prn pain control  Continue heparin drip  Pain control              Subjective/Objective   Chief Complaint:     Subjective: GIOVANI  Right leg warmer, distal foot still cool    Objective:     Blood pressure 137/63, pulse 64, temperature 99 3 °F (37 4 °C), temperature source Oral, resp  rate 16, height 5' 5" (1 651 m), weight 51 4 kg (113 lb 5 1 oz), SpO2 98 %  ,Body mass index is 18 86 kg/m²        Intake/Output Summary (Last 24 hours) at 12/14/2019 0436  Last data filed at 12/13/2019 2322  Gross per 24 hour   Intake 1364 ml   Output 1405 ml   Net -41 ml       Invasive Devices     Peripheral Intravenous Line            Peripheral IV 12/12/19 Right;Ventral (anterior) Forearm 1 day    Peripheral IV 12/13/19 Right Hand less than 1 day                Physical Exam: NAD  Atraumatic/normocephalic  Awake, altert  Normal mood and affect  Normal respiratory effort  Soft, non tender, ND  Skin warm/dry  RLE: toes cyanotic, blistering on top of foot, faint monophasic PT  LLE: warm, dopp PT/DP  Right groin c/d/i      Lab, Imaging and other studies:I have personally reviewed pertinent lab results    , CBC: No results found for: WBC, HGB, HCT, MCV, PLT, ADJUSTEDWBC, MCH, MCHC, RDW, MPV, NRBC, CMP: No results found for: SODIUM, K, CL, CO2, ANIONGAP, BUN, CREATININE, GLUCOSE, CALCIUM, AST, ALT, ALKPHOS, PROT, BILITOT, EGFR  VTE Pharmacologic Prophylaxis: Heparin  VTE Mechanical Prophylaxis: sequential compression device

## 2019-12-15 LAB
APTT PPP: 71 SECONDS (ref 23–37)
APTT PPP: 90 SECONDS (ref 23–37)
ERYTHROCYTE [DISTWIDTH] IN BLOOD BY AUTOMATED COUNT: 16.2 % (ref 11.6–15.1)
HCT VFR BLD AUTO: 26 % (ref 36.5–49.3)
HGB BLD-MCNC: 8.2 G/DL (ref 12–17)
INR PPP: 1.29 (ref 0.84–1.19)
MCH RBC QN AUTO: 29.3 PG (ref 26.8–34.3)
MCHC RBC AUTO-ENTMCNC: 31.5 G/DL (ref 31.4–37.4)
MCV RBC AUTO: 93 FL (ref 82–98)
PLATELET # BLD AUTO: 285 THOUSANDS/UL (ref 149–390)
PMV BLD AUTO: 9.9 FL (ref 8.9–12.7)
PROTHROMBIN TIME: 15.7 SECONDS (ref 11.6–14.5)
RBC # BLD AUTO: 2.8 MILLION/UL (ref 3.88–5.62)
WBC # BLD AUTO: 7.62 THOUSAND/UL (ref 4.31–10.16)

## 2019-12-15 PROCEDURE — 85730 THROMBOPLASTIN TIME PARTIAL: CPT | Performed by: SURGERY

## 2019-12-15 PROCEDURE — 99232 SBSQ HOSP IP/OBS MODERATE 35: CPT | Performed by: SURGERY

## 2019-12-15 PROCEDURE — 85610 PROTHROMBIN TIME: CPT | Performed by: PHYSICIAN ASSISTANT

## 2019-12-15 PROCEDURE — 85027 COMPLETE CBC AUTOMATED: CPT | Performed by: SURGERY

## 2019-12-15 RX ADMIN — TAMSULOSIN HYDROCHLORIDE 0.4 MG: 0.4 CAPSULE ORAL at 18:03

## 2019-12-15 RX ADMIN — MELATONIN 6 MG: 3 TAB ORAL at 21:10

## 2019-12-15 RX ADMIN — FLUCONAZOLE 100 MG: 100 TABLET ORAL at 09:06

## 2019-12-15 RX ADMIN — SODIUM CHLORIDE, SODIUM LACTATE, POTASSIUM CHLORIDE, AND CALCIUM CHLORIDE 75 ML/HR: .6; .31; .03; .02 INJECTION, SOLUTION INTRAVENOUS at 20:43

## 2019-12-15 RX ADMIN — GABAPENTIN 100 MG: 100 CAPSULE ORAL at 08:18

## 2019-12-15 RX ADMIN — ASPIRIN 81 MG: 81 TABLET ORAL at 08:18

## 2019-12-15 RX ADMIN — AMLODIPINE BESYLATE 10 MG: 10 TABLET ORAL at 08:18

## 2019-12-15 RX ADMIN — PANTOPRAZOLE SODIUM 40 MG: 40 TABLET, DELAYED RELEASE ORAL at 06:02

## 2019-12-15 RX ADMIN — SENNOSIDES AND DOCUSATE SODIUM 1 TABLET: 8.6; 5 TABLET ORAL at 18:03

## 2019-12-15 RX ADMIN — SODIUM CHLORIDE, SODIUM LACTATE, POTASSIUM CHLORIDE, AND CALCIUM CHLORIDE 75 ML/HR: .6; .31; .03; .02 INJECTION, SOLUTION INTRAVENOUS at 07:56

## 2019-12-15 RX ADMIN — NICOTINE 1 PATCH: 14 PATCH TRANSDERMAL at 08:18

## 2019-12-15 RX ADMIN — CLOPIDOGREL BISULFATE 75 MG: 75 TABLET ORAL at 08:18

## 2019-12-15 RX ADMIN — PANTOPRAZOLE SODIUM 40 MG: 40 TABLET, DELAYED RELEASE ORAL at 18:03

## 2019-12-15 RX ADMIN — SENNOSIDES AND DOCUSATE SODIUM 1 TABLET: 8.6; 5 TABLET ORAL at 08:17

## 2019-12-15 RX ADMIN — THIAMINE HCL TAB 100 MG 100 MG: 100 TAB at 08:17

## 2019-12-15 RX ADMIN — ATORVASTATIN CALCIUM 20 MG: 20 TABLET, FILM COATED ORAL at 18:03

## 2019-12-15 RX ADMIN — HEPARIN SODIUM AND DEXTROSE 10 UNITS/KG/HR: 10000; 5 INJECTION INTRAVENOUS at 21:00

## 2019-12-15 NOTE — PROGRESS NOTES
Progress Note - Pancho Cheney 1950, 78 y o  male MRN: [id number]    Unit/Bed#: [facility] 530-01 Encounter: [id number]    Primary Care Provider: Shayy Baca MD   Date and time admitted to hospital: 12/4/2019  8:33 PM        * Critical lower limb ischemia  Assessment & Plan  77 y/o M presents with Right lower extremity rest pain, anemia, concern for GI bleed, supratherapeutic INR  He is s/p left femoral endarterectomy with profundoplasty, SFA embolectomy and retrograde iliac stenting 11/6/19 and arteriogram with left SFA/popliteal angioplasty and right SFA angioplasty 11/14/19  S/P EGD/Colonoscopy 12/10 (EGD clean based ulcers with no active bleeding, colonoscopy poor prep no active bleeding)  S/p 12/13 RLE Agram- popliteal recanualization, AT/PT occluded- IR    Plan:  · Diet as tolerated  · Plan for R BKA pending further discussion with patient and family--timing TBD  · Prn pain control  · Continue heparin drip  · Pain control                  Subjective/Objective     Subjective:   Patient agitated with nursing staff overnight, repeatedly trying to get out of bed and urinate on floor  Soft restraints were placed for patient safety  Objective:     Blood pressure 152/67, pulse 73, temperature 98 2 °F (36 8 °C), temperature source Oral, resp  rate 18, height 5' 5" (1 651 m), weight 51 4 kg (113 lb 5 1 oz), SpO2 98 %  ,Body mass index is 18 86 kg/m²        Intake/Output Summary (Last 24 hours) at 12/15/2019 0703  Last data filed at 12/15/2019 0542  Gross per 24 hour   Intake 840 ml   Output 1600 ml   Net -760 ml       Invasive Devices     Peripheral Intravenous Line            Peripheral IV 12/12/19 Right;Ventral (anterior) Forearm 2 days    Peripheral IV 12/13/19 Right Hand 1 day                Physical Exam:   Gen: NAD  CV: RRR, refusing pulse exam  Lungs: nl effort  Abd: soft, NT/ND  Groin: R groin site C/D/I   Ext: no CCE, R toes with cyanosis, blistering  Skin: no rashes  Neuro: A&Ox3 Lab, Imaging and other studies:  CBC:   Lab Results   Component Value Date    WBC 7 92 12/14/2019    HGB 8 0 (L) 12/14/2019    HCT 25 1 (L) 12/14/2019    MCV 92 12/14/2019     12/14/2019    MCH 29 4 12/14/2019    MCHC 31 9 12/14/2019    RDW 16 3 (H) 12/14/2019    MPV 9 1 12/14/2019    NRBC 0 12/14/2019   , CMP: No results found for: SODIUM, K, CL, CO2, ANIONGAP, BUN, CREATININE, GLUCOSE, CALCIUM, AST, ALT, ALKPHOS, PROT, BILITOT, EGFR  VTE Pharmacologic Prophylaxis: Heparin  VTE Mechanical Prophylaxis: sequential compression device

## 2019-12-15 NOTE — ASSESSMENT & PLAN NOTE
77 y/o M presents with Right lower extremity rest pain, anemia, concern for GI bleed, supratherapeutic INR  He is s/p left femoral endarterectomy with profundoplasty, SFA embolectomy and retrograde iliac stenting 11/6/19 and arteriogram with left SFA/popliteal angioplasty and right SFA angioplasty 11/14/19      S/P EGD/Colonoscopy 12/10 (EGD clean based ulcers with no active bleeding, colonoscopy poor prep no active bleeding)  S/p 12/13 RLE Agram- popliteal recanualization, AT/PT occluded- IR    Plan:  Diet as tolerated  Plan for R BKA pending further discussion with patient and family--timing TBD  Prn pain control  Continue heparin drip  Pain control

## 2019-12-15 NOTE — PLAN OF CARE
Problem: Prexisting or High Potential for Compromised Skin Integrity  Goal: Skin integrity is maintained or improved  Description  INTERVENTIONS:  - Identify patients at risk for skin breakdown  - Assess and monitor skin integrity  - Assess and monitor nutrition and hydration status  - Monitor labs   - Assess for incontinence   - Turn and reposition patient  - Assist with mobility/ambulation  - Relieve pressure over bony prominences  - Avoid friction and shearing  - Provide appropriate hygiene as needed including keeping skin clean and dry  - Evaluate need for skin moisturizer/barrier cream  - Collaborate with interdisciplinary team   - Patient/family teaching  - Consider wound care consult   Outcome: Progressing     Problem: Potential for Falls  Goal: Patient will remain free of falls  Description  INTERVENTIONS:  - Assess patient frequently for physical needs  -  Identify cognitive and physical deficits and behaviors that affect risk of falls    -  [place] fall precautions as indicated by assessment   - Educate patient/family on patient safety including physical limitations  - Instruct patient to call for assistance with activity based on assessment  - Modify environment to reduce risk of injury  - Consider OT/PT consult to assist with strengthening/mobility  Outcome: Progressing     Problem: PAIN - ADULT  Goal: Verbalizes/displays adequate comfort level or baseline comfort level  Description  Interventions:  - Encourage patient to monitor pain and request assistance  - Assess pain using appropriate pain scale  - Administer analgesics based on type and severity of pain and evaluate response  - Implement non-pharmacological measures as appropriate and evaluate response  - Consider cultural and social influences on pain and pain management  - Notify physician/advanced practitioner if interventions unsuccessful or patient reports new pain  Outcome: Progressing     Problem: INFECTION - ADULT  Goal: Absence or prevention of progression during hospitalization  Description  INTERVENTIONS:  - Assess and monitor for signs and symptoms of infection  - Monitor lab/diagnostic results  - Monitor all insertion sites, i e  indwelling lines, tubes, and drains  - Monitor endotracheal if appropriate and nasal secretions for changes in amount and color  - [place] appropriate cooling/warming therapies per order  - Administer medications as ordered  - Instruct and encourage patient and family to use good hand hygiene technique  - Identify and instruct in appropriate isolation precautions for identified infection/condition  Outcome: Progressing  Goal: Absence of fever/infection during neutropenic period  Description  INTERVENTIONS:  - Monitor WBC    Outcome: Progressing     Problem: SAFETY ADULT  Goal: Patient will remain free of falls  Description  INTERVENTIONS:  - Assess patient frequently for physical needs  -  Identify cognitive and physical deficits and behaviors that affect risk of falls    -  [place] fall precautions as indicated by assessment   - Educate patient/family on patient safety including physical limitations  - Instruct patient to call for assistance with activity based on assessment  - Modify environment to reduce risk of injury  - Consider OT/PT consult to assist with strengthening/mobility  Outcome: Progressing  Goal: Maintain or return to baseline ADL function  Description  INTERVENTIONS:  -  Assess patient's ability to carry out ADLs; assess patient's baseline for ADL function and identify physical deficits which impact ability to perform ADLs (bathing, care of mouth/teeth, toileting, grooming, dressing, etc )  - Assess/evaluate cause of self-care deficits   - Assess range of motion  - Assess patient's mobility; develop plan if impaired  - Assess patient's need for assistive devices and provide as appropriate  - Encourage maximum independence but intervene and supervise when necessary  - Involve family in performance of ADLs  - Assess for home care needs following discharge   - Consider OT consult to assist with ADL evaluation and planning for discharge  - Provide patient education as appropriate  Outcome: Progressing  Goal: Maintain or return mobility status to optimal level  Description  INTERVENTIONS:  - Assess patient's baseline mobility status (ambulation, transfers, stairs, etc )    - Identify cognitive and physical deficits and behaviors that affect mobility  - Identify mobility aids required to assist with transfers and/or ambulation (gait belt, sit-to-stand, lift, walker, cane, etc )  - [place] fall precautions as indicated by assessment  - Record patient progress and toleration of activity level on Mobility SBAR; progress patient to next Phase/Stage  - Instruct patient to call for assistance with activity based on assessment  - Consider rehabilitation consult to assist with strengthening/weightbearing, etc   Outcome: Progressing     Problem: DISCHARGE PLANNING  Goal: Discharge to home or other facility with appropriate resources  Description  INTERVENTIONS:  - Identify barriers to discharge w/patient and caregiver  - Arrange for needed discharge resources and transportation as appropriate  - Identify discharge learning needs (meds, wound care, etc )  - Arrange for interpretive services to assist at discharge as needed  - Refer to Case Management Department for coordinating discharge planning if the patient needs post-hospital services based on physician/advanced practitioner order or complex needs related to functional status, cognitive ability, or social support system  Outcome: Progressing     Problem: Knowledge Deficit  Goal: Patient/family/caregiver demonstrates understanding of disease process, treatment plan, medications, and discharge instructions  Description  Complete learning assessment and assess knowledge base    Interventions:  - Provide teaching at level of understanding  - Provide teaching via preferred learning methods  Outcome: Progressing     Problem: Nutrition/Hydration-ADULT  Goal: Nutrient/Hydration intake appropriate for improving, restoring or maintaining nutritional needs  Description  Monitor and assess patient's nutrition/hydration status for malnutrition  Collaborate with interdisciplinary team and initiate plan and interventions as ordered  Monitor patient's weight and dietary intake as ordered or per policy  Utilize nutrition screening tool and intervene as necessary  Determine patient's food preferences and provide high-protein, high-caloric foods as appropriate       INTERVENTIONS:  - Monitor oral intake, urinary output, labs, and treatment plans  - Assess nutrition and hydration status and recommend course of action  - Evaluate amount of meals eaten  - Assist patient with eating if necessary   - Allow adequate time for meals  - Recommend/ encourage appropriate diets, oral nutritional supplements, and vitamin/mineral supplements  - Order, calculate, and assess calorie counts as needed  - Recommend, monitor, and adjust tube feedings and TPN/PPN based on assessed needs  - Assess need for intravenous fluids  - Provide specific nutrition/hydration education as appropriate  - Include patient/family/caregiver in decisions related to nutrition  Outcome: Progressing

## 2019-12-15 NOTE — PLAN OF CARE
Problem: Prexisting or High Potential for Compromised Skin Integrity  Goal: Skin integrity is maintained or improved  Description  INTERVENTIONS:  - Identify patients at risk for skin breakdown  - Assess and monitor skin integrity  - Assess and monitor nutrition and hydration status  - Monitor labs   - Assess for incontinence   - Turn and reposition patient  - Assist with mobility/ambulation  - Relieve pressure over bony prominences  - Avoid friction and shearing  - Provide appropriate hygiene as needed including keeping skin clean and dry  - Evaluate need for skin moisturizer/barrier cream  - Collaborate with interdisciplinary team   - Patient/family teaching  - Consider wound care consult   12/15/2019 0029 by Tabatha Kruse RN  Outcome: Progressing  12/14/2019 2355 by Tabatha Kruse RN  Outcome: Progressing     Problem: Potential for Falls  Goal: Patient will remain free of falls  Description  INTERVENTIONS:  - Assess patient frequently for physical needs  -  Identify cognitive and physical deficits and behaviors that affect risk of falls    -  [place] fall precautions as indicated by assessment   - Educate patient/family on patient safety including physical limitations  - Instruct patient to call for assistance with activity based on assessment  - Modify environment to reduce risk of injury  - Consider OT/PT consult to assist with strengthening/mobility  12/15/2019 0029 by Tabatha Kruse RN  Outcome: Progressing  12/14/2019 2355 by Tabatha Kruse RN  Outcome: Progressing     Problem: PAIN - ADULT  Goal: Verbalizes/displays adequate comfort level or baseline comfort level  Description  Interventions:  - Encourage patient to monitor pain and request assistance  - Assess pain using appropriate pain scale  - Administer analgesics based on type and severity of pain and evaluate response  - Implement non-pharmacological measures as appropriate and evaluate response  - Consider cultural and social influences on pain and pain management  - Notify physician/advanced practitioner if interventions unsuccessful or patient reports new pain  12/15/2019 0029 by Armando Delong RN  Outcome: Progressing  12/14/2019 2355 by Armando Delong RN  Outcome: Progressing     Problem: INFECTION - ADULT  Goal: Absence or prevention of progression during hospitalization  Description  INTERVENTIONS:  - Assess and monitor for signs and symptoms of infection  - Monitor lab/diagnostic results  - Monitor all insertion sites, i e  indwelling lines, tubes, and drains  - Monitor endotracheal if appropriate and nasal secretions for changes in amount and color  - [place] appropriate cooling/warming therapies per order  - Administer medications as ordered  - Instruct and encourage patient and family to use good hand hygiene technique  - Identify and instruct in appropriate isolation precautions for identified infection/condition  12/15/2019 0029 by Armando Delong RN  Outcome: Progressing  12/14/2019 2355 by Armando Delong RN  Outcome: Progressing  Goal: Absence of fever/infection during neutropenic period  Description  INTERVENTIONS:  - Monitor WBC    12/15/2019 0029 by Armando Delong RN  Outcome: Progressing  12/14/2019 2355 by Armando Delong RN  Outcome: Progressing     Problem: SAFETY ADULT  Goal: Patient will remain free of falls  Description  INTERVENTIONS:  - Assess patient frequently for physical needs  -  Identify cognitive and physical deficits and behaviors that affect risk of falls    -  [place] fall precautions as indicated by assessment   - Educate patient/family on patient safety including physical limitations  - Instruct patient to call for assistance with activity based on assessment  - Modify environment to reduce risk of injury  - Consider OT/PT consult to assist with strengthening/mobility  12/15/2019 0029 by Armando Delong RN  Outcome: Progressing  12/14/2019 2355 by Armando Delong RN  Outcome: Progressing  Goal: Maintain or return to baseline ADL function  Description  INTERVENTIONS:  -  Assess patient's ability to carry out ADLs; assess patient's baseline for ADL function and identify physical deficits which impact ability to perform ADLs (bathing, care of mouth/teeth, toileting, grooming, dressing, etc )  - Assess/evaluate cause of self-care deficits   - Assess range of motion  - Assess patient's mobility; develop plan if impaired  - Assess patient's need for assistive devices and provide as appropriate  - Encourage maximum independence but intervene and supervise when necessary  - Involve family in performance of ADLs  - Assess for home care needs following discharge   - Consider OT consult to assist with ADL evaluation and planning for discharge  - Provide patient education as appropriate  12/15/2019 0029 by Armando Delong RN  Outcome: Progressing  12/14/2019 2355 by Armando Delong RN  Outcome: Progressing  Goal: Maintain or return mobility status to optimal level  Description  INTERVENTIONS:  - Assess patient's baseline mobility status (ambulation, transfers, stairs, etc )    - Identify cognitive and physical deficits and behaviors that affect mobility  - Identify mobility aids required to assist with transfers and/or ambulation (gait belt, sit-to-stand, lift, walker, cane, etc )  - [place] fall precautions as indicated by assessment  - Record patient progress and toleration of activity level on Mobility SBAR; progress patient to next Phase/Stage  - Instruct patient to call for assistance with activity based on assessment  - Consider rehabilitation consult to assist with strengthening/weightbearing, etc   12/15/2019 0029 by Armando Delong RN  Outcome: Progressing  12/14/2019 2355 by Armando Delong RN  Outcome: Progressing     Problem: DISCHARGE PLANNING  Goal: Discharge to home or other facility with appropriate resources  Description  INTERVENTIONS:  - Identify barriers to discharge w/patient and caregiver  - Arrange for needed discharge resources and transportation as appropriate  - Identify discharge learning needs (meds, wound care, etc )  - Arrange for interpretive services to assist at discharge as needed  - Refer to Case Management Department for coordinating discharge planning if the patient needs post-hospital services based on physician/advanced practitioner order or complex needs related to functional status, cognitive ability, or social support system  12/15/2019 0029 by Santo Davey RN  Outcome: Progressing  12/14/2019 2355 by Santo Davey RN  Outcome: Progressing     Problem: Knowledge Deficit  Goal: Patient/family/caregiver demonstrates understanding of disease process, treatment plan, medications, and discharge instructions  Description  Complete learning assessment and assess knowledge base  Interventions:  - Provide teaching at level of understanding  - Provide teaching via preferred learning methods  12/15/2019 0029 by Santo Davey RN  Outcome: Progressing  12/14/2019 2355 by Santo Davey RN  Outcome: Progressing     Problem: Nutrition/Hydration-ADULT  Goal: Nutrient/Hydration intake appropriate for improving, restoring or maintaining nutritional needs  Description  Monitor and assess patient's nutrition/hydration status for malnutrition  Collaborate with interdisciplinary team and initiate plan and interventions as ordered  Monitor patient's weight and dietary intake as ordered or per policy  Utilize nutrition screening tool and intervene as necessary  Determine patient's food preferences and provide high-protein, high-caloric foods as appropriate       INTERVENTIONS:  - Monitor oral intake, urinary output, labs, and treatment plans  - Assess nutrition and hydration status and recommend course of action  - Evaluate amount of meals eaten  - Assist patient with eating if necessary   - Allow adequate time for meals  - Recommend/ encourage appropriate diets, oral nutritional supplements, and vitamin/mineral supplements  - Order, calculate, and assess calorie counts as needed  - Recommend, monitor, and adjust tube feedings and TPN/PPN based on assessed needs  - Assess need for intravenous fluids  - Provide specific nutrition/hydration education as appropriate  - Include patient/family/caregiver in decisions related to nutrition  12/15/2019 0029 by Nuris Pack, NATHAN  Outcome: Progressing  12/14/2019 8775 by Nuris Pack RN  Outcome: Progressing

## 2019-12-16 LAB
ABO GROUP BLD: NORMAL
ANION GAP SERPL CALCULATED.3IONS-SCNC: 8 MMOL/L (ref 4–13)
APTT PPP: 52 SECONDS (ref 23–37)
APTT PPP: 59 SECONDS (ref 23–37)
APTT PPP: 69 SECONDS (ref 23–37)
BASOPHILS # BLD AUTO: 0.03 THOUSANDS/ΜL (ref 0–0.1)
BASOPHILS NFR BLD AUTO: 1 % (ref 0–1)
BLD GP AB SCN SERPL QL: NEGATIVE
BUN SERPL-MCNC: 7 MG/DL (ref 5–25)
CALCIUM SERPL-MCNC: 8.7 MG/DL (ref 8.3–10.1)
CHLORIDE SERPL-SCNC: 109 MMOL/L (ref 100–108)
CO2 SERPL-SCNC: 25 MMOL/L (ref 21–32)
CREAT SERPL-MCNC: 0.79 MG/DL (ref 0.6–1.3)
EOSINOPHIL # BLD AUTO: 0.13 THOUSAND/ΜL (ref 0–0.61)
EOSINOPHIL NFR BLD AUTO: 2 % (ref 0–6)
ERYTHROCYTE [DISTWIDTH] IN BLOOD BY AUTOMATED COUNT: 16.1 % (ref 11.6–15.1)
GFR SERPL CREATININE-BSD FRML MDRD: 91 ML/MIN/1.73SQ M
GLUCOSE SERPL-MCNC: 112 MG/DL (ref 65–140)
HCT VFR BLD AUTO: 25.1 % (ref 36.5–49.3)
HCT VFR BLD AUTO: 25.7 % (ref 36.5–49.3)
HGB BLD-MCNC: 7.7 G/DL (ref 12–17)
HGB BLD-MCNC: 8.1 G/DL (ref 12–17)
IMM GRANULOCYTES # BLD AUTO: 0.02 THOUSAND/UL (ref 0–0.2)
IMM GRANULOCYTES NFR BLD AUTO: 0 % (ref 0–2)
INR PPP: 1.25 (ref 0.84–1.19)
LYMPHOCYTES # BLD AUTO: 0.51 THOUSANDS/ΜL (ref 0.6–4.47)
LYMPHOCYTES NFR BLD AUTO: 8 % (ref 14–44)
MCH RBC QN AUTO: 28.7 PG (ref 26.8–34.3)
MCHC RBC AUTO-ENTMCNC: 30.7 G/DL (ref 31.4–37.4)
MCV RBC AUTO: 94 FL (ref 82–98)
MONOCYTES # BLD AUTO: 0.32 THOUSAND/ΜL (ref 0.17–1.22)
MONOCYTES NFR BLD AUTO: 5 % (ref 4–12)
NEUTROPHILS # BLD AUTO: 5.27 THOUSANDS/ΜL (ref 1.85–7.62)
NEUTS SEG NFR BLD AUTO: 84 % (ref 43–75)
NRBC BLD AUTO-RTO: 0 /100 WBCS
PLATELET # BLD AUTO: 261 THOUSANDS/UL (ref 149–390)
PMV BLD AUTO: 9.5 FL (ref 8.9–12.7)
POTASSIUM SERPL-SCNC: 3.7 MMOL/L (ref 3.5–5.3)
PROTHROMBIN TIME: 15.3 SECONDS (ref 11.6–14.5)
RBC # BLD AUTO: 2.68 MILLION/UL (ref 3.88–5.62)
RH BLD: POSITIVE
SODIUM SERPL-SCNC: 142 MMOL/L (ref 136–145)
SPECIMEN EXPIRATION DATE: NORMAL
WBC # BLD AUTO: 6.28 THOUSAND/UL (ref 4.31–10.16)

## 2019-12-16 PROCEDURE — 85014 HEMATOCRIT: CPT | Performed by: STUDENT IN AN ORGANIZED HEALTH CARE EDUCATION/TRAINING PROGRAM

## 2019-12-16 PROCEDURE — 85025 COMPLETE CBC W/AUTO DIFF WBC: CPT | Performed by: STUDENT IN AN ORGANIZED HEALTH CARE EDUCATION/TRAINING PROGRAM

## 2019-12-16 PROCEDURE — 85730 THROMBOPLASTIN TIME PARTIAL: CPT | Performed by: SURGERY

## 2019-12-16 PROCEDURE — 86901 BLOOD TYPING SEROLOGIC RH(D): CPT | Performed by: STUDENT IN AN ORGANIZED HEALTH CARE EDUCATION/TRAINING PROGRAM

## 2019-12-16 PROCEDURE — 86900 BLOOD TYPING SEROLOGIC ABO: CPT | Performed by: STUDENT IN AN ORGANIZED HEALTH CARE EDUCATION/TRAINING PROGRAM

## 2019-12-16 PROCEDURE — 86923 COMPATIBILITY TEST ELECTRIC: CPT

## 2019-12-16 PROCEDURE — 99232 SBSQ HOSP IP/OBS MODERATE 35: CPT | Performed by: PHYSICIAN ASSISTANT

## 2019-12-16 PROCEDURE — 86850 RBC ANTIBODY SCREEN: CPT | Performed by: STUDENT IN AN ORGANIZED HEALTH CARE EDUCATION/TRAINING PROGRAM

## 2019-12-16 PROCEDURE — 85610 PROTHROMBIN TIME: CPT | Performed by: STUDENT IN AN ORGANIZED HEALTH CARE EDUCATION/TRAINING PROGRAM

## 2019-12-16 PROCEDURE — 80048 BASIC METABOLIC PNL TOTAL CA: CPT | Performed by: STUDENT IN AN ORGANIZED HEALTH CARE EDUCATION/TRAINING PROGRAM

## 2019-12-16 PROCEDURE — 85730 THROMBOPLASTIN TIME PARTIAL: CPT | Performed by: STUDENT IN AN ORGANIZED HEALTH CARE EDUCATION/TRAINING PROGRAM

## 2019-12-16 PROCEDURE — 85018 HEMOGLOBIN: CPT | Performed by: STUDENT IN AN ORGANIZED HEALTH CARE EDUCATION/TRAINING PROGRAM

## 2019-12-16 RX ADMIN — HEPARIN SODIUM AND DEXTROSE 12 UNITS/KG/HR: 10000; 5 INJECTION INTRAVENOUS at 08:56

## 2019-12-16 RX ADMIN — TAMSULOSIN HYDROCHLORIDE 0.4 MG: 0.4 CAPSULE ORAL at 16:50

## 2019-12-16 RX ADMIN — OXYCODONE HYDROCHLORIDE 2.5 MG: 5 TABLET ORAL at 16:51

## 2019-12-16 RX ADMIN — SENNOSIDES AND DOCUSATE SODIUM 1 TABLET: 8.6; 5 TABLET ORAL at 08:35

## 2019-12-16 RX ADMIN — PANTOPRAZOLE SODIUM 40 MG: 40 TABLET, DELAYED RELEASE ORAL at 16:41

## 2019-12-16 RX ADMIN — SODIUM CHLORIDE, SODIUM LACTATE, POTASSIUM CHLORIDE, AND CALCIUM CHLORIDE 75 ML/HR: .6; .31; .03; .02 INJECTION, SOLUTION INTRAVENOUS at 08:46

## 2019-12-16 RX ADMIN — MELATONIN 6 MG: 3 TAB ORAL at 21:20

## 2019-12-16 RX ADMIN — THIAMINE HCL TAB 100 MG 100 MG: 100 TAB at 08:35

## 2019-12-16 RX ADMIN — ASPIRIN 81 MG: 81 TABLET ORAL at 08:35

## 2019-12-16 RX ADMIN — HEPARIN SODIUM 1500 UNITS: 1000 INJECTION INTRAVENOUS; SUBCUTANEOUS at 18:01

## 2019-12-16 RX ADMIN — GABAPENTIN 100 MG: 100 CAPSULE ORAL at 08:35

## 2019-12-16 RX ADMIN — NICOTINE 1 PATCH: 14 PATCH TRANSDERMAL at 08:36

## 2019-12-16 RX ADMIN — SODIUM CHLORIDE, SODIUM LACTATE, POTASSIUM CHLORIDE, AND CALCIUM CHLORIDE 75 ML/HR: .6; .31; .03; .02 INJECTION, SOLUTION INTRAVENOUS at 22:53

## 2019-12-16 RX ADMIN — CLOPIDOGREL BISULFATE 75 MG: 75 TABLET ORAL at 08:35

## 2019-12-16 RX ADMIN — PANTOPRAZOLE SODIUM 40 MG: 40 TABLET, DELAYED RELEASE ORAL at 05:55

## 2019-12-16 RX ADMIN — FLUCONAZOLE 100 MG: 100 TABLET ORAL at 08:34

## 2019-12-16 RX ADMIN — ATORVASTATIN CALCIUM 20 MG: 20 TABLET, FILM COATED ORAL at 17:55

## 2019-12-16 RX ADMIN — AMLODIPINE BESYLATE 10 MG: 10 TABLET ORAL at 08:35

## 2019-12-16 NOTE — ASSESSMENT & PLAN NOTE
77 y/o M presents with Right lower extremity rest pain, anemia, concern for GI bleed, supratherapeutic INR  He is s/p left femoral endarterectomy with profundoplasty, SFA embolectomy and retrograde iliac stenting 11/6/19 and arteriogram with left SFA/popliteal angioplasty and right SFA angioplasty 11/14/19  S/P EGD/Colonoscopy 12/10 (EGD clean based ulcers with no active bleeding, colonoscopy poor prep no active bleeding)  S/P RLE Agram- popliteal recanualization, AT/PT occluded- IR 12/13    Plan:  Events overnight noted  Currently with no bleeding or hematoma at Right groin puncture site    D/C knee immobilizer  Plan for R BKA pending further discussion with patient and family--timing TBD  Continue heparin drip  Pain control

## 2019-12-16 NOTE — PROGRESS NOTES
Pt leads noted to be off on telemetry monitor  Upon entering room PIV noted on floor  Patients blanket moved--right groin saturated with fresh sanguinous blood  Pt started to sit up and blood started to squirt in the air  Manual pressure immediately applied--patient very agitated and trying to sit up  RR called  See RR charting

## 2019-12-16 NOTE — PROGRESS NOTES
Vascular Surgery    Progress Note - Tommy  1950, 78 y o  male MRN: [id number]    Unit/Bed#: [facility] 530-01 Encounter: [id number]    Primary Care Provider: Beth Mortensen MD   Date and time admitted to hospital: 2019  8:33 PM        * Critical lower limb ischemia  Assessment & Plan  79 y/o M presents with Right lower extremity rest pain, anemia, concern for GI bleed, supratherapeutic INR  He is s/p left femoral endarterectomy with profundoplasty, SFA embolectomy and retrograde iliac stenting 19 and arteriogram with left SFA/popliteal angioplasty and right SFA angioplasty 19  S/P EGD/Colonoscopy 12/10 (EGD clean based ulcers with no active bleeding, colonoscopy poor prep no active bleeding)  S/P RLE Agram- popliteal recanualization, AT/PT occluded- IR     Plan:  Events overnight noted  Currently with no bleeding or hematoma at Right groin puncture site  D/C knee immobilizer  Plan for R BKA pending further discussion with patient and family--timing TBD  Continue heparin drip  Pain control        Subjective:  Pt c/o right foot pain    Vitals:  /65 (BP Location: Left arm)   Pulse 80   Temp 97 9 °F (36 6 °C) (Axillary)   Resp 20   Ht 5' 5" (1 651 m)   Wt 51 4 kg (113 lb 5 1 oz)   SpO2 99%   BMI 18 86 kg/m²     I/Os:  I/O last 3 completed shifts: In: 3451 3 [P O :1490; I V :1961 3]  Out: 9009 [Urine:2925]  I/O this shift:  In: 1988 8 [P O :120;  I V :1868 8]  Out: 700 [Urine:700]    Lab Results and Cultures:   Lab Results   Component Value Date    WBC 7 62 12/15/2019    HGB 8 1 (L) 2019    HCT 25 7 (L) 2019    MCV 93 12/15/2019     12/15/2019     Lab Results   Component Value Date    GLUCOSE 128 2019    CALCIUM 8 9 2019    K 3 7 2019    CO2 25 2019     2019    BUN 7 2019    CREATININE 0 70 2019     Lab Results   Component Value Date    INR 1 25 (H) 2019    INR 1 29 (H) 12/15/2019    INR 1 38 (H) 12/14/2019    PROTIME 15 3 (H) 12/16/2019    PROTIME 15 7 (H) 12/15/2019    PROTIME 16 5 (H) 12/14/2019        Blood Culture: No results found for: BLOODCX,   Urinalysis: No results found for: COLORU, CLARITYU, SPECGRAV, PHUR, LEUKOCYTESUR, NITRITE, PROTEINUA, GLUCOSEU, KETONESU, BILIRUBINUR, BLOODU,   Urine Culture: No results found for: URINECX,   Wound Culure: No results found for: WOUNDCULT    Medications:  Current Facility-Administered Medications   Medication Dose Route Frequency    amLODIPine (NORVASC) tablet 10 mg  10 mg Oral Daily    aspirin (ECOTRIN LOW STRENGTH) EC tablet 81 mg  81 mg Oral Daily    atorvastatin (LIPITOR) tablet 20 mg  20 mg Oral After Dinner    clopidogrel (PLAVIX) tablet 75 mg  75 mg Oral Daily    fluconazole (DIFLUCAN) tablet 100 mg  100 mg Oral Daily    gabapentin (NEURONTIN) capsule 100 mg  100 mg Oral Daily    heparin (porcine) 25,000 units in 250 mL infusion (premix)  3-20 Units/kg/hr (Order-Specific) Intravenous Titrated    heparin (porcine) injection 1,500 Units  1,500 Units Intravenous PRN    heparin (porcine) injection 3,000 Units  3,000 Units Intravenous PRN    HYDROmorphone (DILAUDID) injection 0 2 mg  0 2 mg Intravenous Q3H PRN    lactated ringers infusion  75 mL/hr Intravenous Continuous    melatonin tablet 6 mg  6 mg Oral HS    nicotine (NICODERM CQ) 14 mg/24hr TD 24 hr patch 1 patch  1 patch Transdermal Daily    ondansetron (ZOFRAN) injection 4 mg  4 mg Intravenous Q4H PRN    oxyCODONE (ROXICODONE) IR tablet 2 5 mg  2 5 mg Oral Q4H PRN    oxyCODONE (ROXICODONE) IR tablet 5 mg  5 mg Oral Q4H PRN    pantoprazole (PROTONIX) EC tablet 40 mg  40 mg Oral BID AC    senna-docusate sodium (SENOKOT S) 8 6-50 mg per tablet 1 tablet  1 tablet Oral BID    tamsulosin (FLOMAX) capsule 0 4 mg  0 4 mg Oral Daily With Dinner    thiamine (VITAMIN B1) tablet 100 mg  100 mg Oral Daily       Physical Exam:    General appearance: alert and oriented, in no acute distress  Lungs: clear to auscultation bilaterally  Heart: regular rate and rhythm, S1, S2 normal, no murmur, click, rub or gallop  Abdomen: soft, non-tender; bowel sounds normal; no masses,  no organomegaly  Extremities:  Right foot cool, toes and forefoot dusky, diminished motor at toes limited an ankle, tender to palpation    Wound/Incision:  Right groin puncture dressing clean, dry, and intact    Pulse exam:  DP: Right: non-palpable Left: non-palpable  PT: Right: non-palpable Left: non-palpable      Aliyah Loop, PA-C  12/16/2019

## 2019-12-16 NOTE — PROGRESS NOTES
Vascular Surgery Update    Rapid response called  Went to evaluate patient at bedside  According to nursing patient was agitated and ripping off EKG leads when his right groin access started to have pulsatile bright red bleeding which shot into the air  He is s/p RLE agram with right groin access 2 days ago  Pressure was immediately held and continued to be held for 15minutes  Bleeding stopped, theres no hematoma present, no abdominal pain, non distended  Vital signs stable  There is only a small amount of blood on his sheets       Plan  Stat H&H  T&S  If Hg drops will get CTAP to r/o retroperitoneal hematoma  Heparin held until Hg back   Will keep right leg straight for 6 hours  Will continue to monitor closely    Shannan Durand MD  PGY-3 General Surgery

## 2019-12-16 NOTE — PROGRESS NOTES
Hemostasis maintained to right groin  Manual pressure held by Floyd Santos with [facility]  Doppler right pedal pulse  Heparin drip remains on hold until H+H back  Knee immobilizer in place to RLE  HOB 10 degrees

## 2019-12-16 NOTE — PROGRESS NOTES
Pastoral Care Progress Note    2019  Patient: Jenny Go : 1950  Admission Date & Time: 2019  MRN: [id number] [facility]: [id number]                     Chaplaincy Interventions Utilized:   Empowerment: Clarified, confirmed, or reviewed information from treatment team         Collaboration: Consulted with interdisciplinary team    Relationship Building: Provided silent and supportive presence            -Juan Pablo Steel

## 2019-12-16 NOTE — PROGRESS NOTES
[place] up to see patient- HGB stable  Ok to resume to heparin  Follow protocol, but no bolus with restart   Hemostasis maintained to right groin

## 2019-12-17 LAB
APTT PPP: 62 SECONDS (ref 23–37)
APTT PPP: 65 SECONDS (ref 23–37)
APTT PPP: 73 SECONDS (ref 23–37)
APTT PPP: 92 SECONDS (ref 23–37)
KCT BLD-ACNC: 175 SEC (ref 89–137)
SPECIMEN SOURCE: ABNORMAL

## 2019-12-17 PROCEDURE — 99232 SBSQ HOSP IP/OBS MODERATE 35: CPT | Performed by: SURGERY

## 2019-12-17 PROCEDURE — 85730 THROMBOPLASTIN TIME PARTIAL: CPT | Performed by: SURGERY

## 2019-12-17 RX ADMIN — FLUCONAZOLE 100 MG: 100 TABLET ORAL at 09:39

## 2019-12-17 RX ADMIN — TAMSULOSIN HYDROCHLORIDE 0.4 MG: 0.4 CAPSULE ORAL at 17:33

## 2019-12-17 RX ADMIN — AMLODIPINE BESYLATE 10 MG: 10 TABLET ORAL at 09:39

## 2019-12-17 RX ADMIN — HEPARIN SODIUM AND DEXTROSE 12 UNITS/KG/HR: 10000; 5 INJECTION INTRAVENOUS at 19:12

## 2019-12-17 RX ADMIN — GABAPENTIN 100 MG: 100 CAPSULE ORAL at 09:39

## 2019-12-17 RX ADMIN — THIAMINE HCL TAB 100 MG 100 MG: 100 TAB at 09:40

## 2019-12-17 RX ADMIN — SENNOSIDES AND DOCUSATE SODIUM 1 TABLET: 8.6; 5 TABLET ORAL at 17:33

## 2019-12-17 RX ADMIN — ATORVASTATIN CALCIUM 20 MG: 20 TABLET, FILM COATED ORAL at 17:33

## 2019-12-17 RX ADMIN — NICOTINE 1 PATCH: 14 PATCH TRANSDERMAL at 09:36

## 2019-12-17 RX ADMIN — MELATONIN 6 MG: 3 TAB ORAL at 21:01

## 2019-12-17 RX ADMIN — CLOPIDOGREL BISULFATE 75 MG: 75 TABLET ORAL at 09:39

## 2019-12-17 RX ADMIN — PANTOPRAZOLE SODIUM 40 MG: 40 TABLET, DELAYED RELEASE ORAL at 17:33

## 2019-12-17 RX ADMIN — SENNOSIDES AND DOCUSATE SODIUM 1 TABLET: 8.6; 5 TABLET ORAL at 09:40

## 2019-12-17 RX ADMIN — ASPIRIN 81 MG: 81 TABLET ORAL at 09:40

## 2019-12-17 RX ADMIN — SODIUM CHLORIDE, SODIUM LACTATE, POTASSIUM CHLORIDE, AND CALCIUM CHLORIDE 75 ML/HR: .6; .31; .03; .02 INJECTION, SOLUTION INTRAVENOUS at 12:14

## 2019-12-17 RX ADMIN — OXYCODONE HYDROCHLORIDE 5 MG: 5 TABLET ORAL at 21:01

## 2019-12-17 RX ADMIN — OXYCODONE HYDROCHLORIDE 5 MG: 5 TABLET ORAL at 11:50

## 2019-12-17 NOTE — SOCIAL WORK
Met with daughter, Nga Kerr and son, Shanda Robin to discuss d/c planning  A post acute care recommendation was made by your care team for Tamra Choudhary  Discussed Freedom of Choice with caregiver  List of agencies given to both patient and caregiver via in person  caregiver aware the list is custom filtered for them by zip code location and that [facility] post acute providers are designated  A post acute care recommendation was made by your care team for [facility]  Discussed Freedom of Choice with caregiver  List of facilities given to caregiver via in person  caregiver aware the list is custom filtered for them by zip code location and that [facility] post acute providers are designated  Provided both SNF list and [facility]  agency list   Family do not want patient to go back to rehab after surgery  They want to take him home with home care and 24 hour supervision  They will need to be trained for transfers/safety p/t discharge  Pt has had home care in past but children could not recall name  They need to choose [facility]  agency for VN, PT & OT  They will  Need prescriptions for WC and BSC  Also discussed family obtaining shower bench  Family will transport upon d/c

## 2019-12-17 NOTE — OCCUPATIONAL THERAPY NOTE
Occupational Therapy Cancellation Note    Orders received and chart reviewed  Per vascular sx note, Pt planned for R BKA Thursday 12/19/19  Will D/C OT orders at this time  Please re-consult post-op w/ updated WBS, activity, and OT orders        Babar Jenkins MS, OTR/L

## 2019-12-17 NOTE — PROGRESS NOTES
Vascular Surgery    Progress Note - Tommy  1950, 78 y o  male MRN: [id number]    Unit/Bed#: [facility] 530-01 Encounter: [id number]    Primary Care Provider: Beth Mortensen MD   Date and time admitted to hospital: 2019  8:33 PM    * Critical lower limb ischemia  Assessment & Plan  77 y/o M presents with Right lower extremity rest pain, anemia, concern for GI bleed, supratherapeutic INR  He is s/p left femoral endarterectomy with profundoplasty, SFA embolectomy and retrograde iliac stenting 19 and arteriogram with left SFA/popliteal angioplasty and right SFA angioplasty 19  S/P EGD/Colonoscopy 12/10 (EGD clean based ulcers with no active bleeding, colonoscopy poor prep no active bleeding)  S/P RLE Agram- popliteal recanualization, AT/PT occluded- IR     Plan:  Plan for R BKA   Continue heparin drip  Pain control        Subjective:  Pt c/o right foot pain    Vitals:  /67   Pulse 85   Temp 98 7 °F (37 1 °C)   Resp 19   Ht 5' 5" (1 651 m)   Wt 51 4 kg (113 lb 5 1 oz)   SpO2 98%   BMI 18 86 kg/m²     I/Os:  I/O last 3 completed shifts: In: 7995 [P O :1250; I V :4185]  Out: 2325 [Urine:2325]  I/O this shift:  In: 1178 8 [P O :120;  I V :1058 8]  Out: 1300 [Urine:1300]    Lab Results and Cultures:   Lab Results   Component Value Date    WBC 6 28 2019    HGB 7 7 (L) 2019    HCT 25 1 (L) 2019    MCV 94 2019     2019     Lab Results   Component Value Date    GLUCOSE 128 2019    CALCIUM 8 7 2019    K 3 7 2019    CO2 25 2019     (H) 2019    BUN 7 2019    CREATININE 0 79 2019     Lab Results   Component Value Date    INR 1 25 (H) 2019    INR 1 29 (H) 12/15/2019    INR 1 38 (H) 2019    PROTIME 15 3 (H) 2019    PROTIME 15 7 (H) 12/15/2019    PROTIME 16 5 (H) 2019        Blood Culture: No results found for: BLOODCX,   Urinalysis: No results found for: COLORU, CLARITYU, SPECGRAV, PHUR, LEUKOCYTESUR, NITRITE, PROTEINUA, GLUCOSEU, KETONESU, BILIRUBINUR, BLOODU,   Urine Culture: No results found for: URINECX,   Wound Culure: No results found for: WOUNDCULT    Medications:  Current Facility-Administered Medications   Medication Dose Route Frequency    amLODIPine (NORVASC) tablet 10 mg  10 mg Oral Daily    aspirin (ECOTRIN LOW STRENGTH) EC tablet 81 mg  81 mg Oral Daily    atorvastatin (LIPITOR) tablet 20 mg  20 mg Oral After Dinner    clopidogrel (PLAVIX) tablet 75 mg  75 mg Oral Daily    fluconazole (DIFLUCAN) tablet 100 mg  100 mg Oral Daily    gabapentin (NEURONTIN) capsule 100 mg  100 mg Oral Daily    heparin (porcine) 25,000 units in 250 mL infusion (premix)  3-20 Units/kg/hr (Order-Specific) Intravenous Titrated    heparin (porcine) injection 1,500 Units  1,500 Units Intravenous PRN    heparin (porcine) injection 3,000 Units  3,000 Units Intravenous PRN    HYDROmorphone (DILAUDID) injection 0 2 mg  0 2 mg Intravenous Q3H PRN    lactated ringers infusion  75 mL/hr Intravenous Continuous    melatonin tablet 6 mg  6 mg Oral HS    nicotine (NICODERM CQ) 14 mg/24hr TD 24 hr patch 1 patch  1 patch Transdermal Daily    ondansetron (ZOFRAN) injection 4 mg  4 mg Intravenous Q4H PRN    oxyCODONE (ROXICODONE) IR tablet 2 5 mg  2 5 mg Oral Q4H PRN    oxyCODONE (ROXICODONE) IR tablet 5 mg  5 mg Oral Q4H PRN    pantoprazole (PROTONIX) EC tablet 40 mg  40 mg Oral BID AC    senna-docusate sodium (SENOKOT S) 8 6-50 mg per tablet 1 tablet  1 tablet Oral BID    tamsulosin (FLOMAX) capsule 0 4 mg  0 4 mg Oral Daily With Dinner    thiamine (VITAMIN B1) tablet 100 mg  100 mg Oral Daily       Physical Exam:    General appearance: alert and oriented, in no acute distress  Lungs: clear to auscultation bilaterally  Heart: regular rate and rhythm, S1, S2 normal, no murmur, click, rub or gallop  Abdomen: soft, non-tender; bowel sounds normal; no masses,  no organomegaly  Extremities: Right foot cool, toes and forefoot cyanotic, ttp, limited motor at ankle    Wound/Incision:  Right groin puncture incision clean, dry, and intact        Cristhian Wagner PA-C  12/17/2019

## 2019-12-17 NOTE — PHYSICAL THERAPY NOTE
Physical Therapy Cancellation Note    Chart reviewed  Per vascular note, pt is planned for R BKA Thursday 12/19  Will D/C PT orders at this time  Please re-consult post-op w/ updated WB status and activity orders as appropriate      Sneha Macedo, PT, DPT

## 2019-12-18 ENCOUNTER — ANESTHESIA EVENT (INPATIENT)
Dept: PERIOP | Facility: HOSPITAL | Age: 69
DRG: 239 | End: 2019-12-18
Payer: MEDICARE

## 2019-12-18 LAB
ABO GROUP BLD: NORMAL
APTT PPP: 73 SECONDS (ref 23–37)
BLD GP AB SCN SERPL QL: NEGATIVE
RH BLD: POSITIVE
SPECIMEN EXPIRATION DATE: NORMAL

## 2019-12-18 PROCEDURE — 86923 COMPATIBILITY TEST ELECTRIC: CPT

## 2019-12-18 PROCEDURE — 85730 THROMBOPLASTIN TIME PARTIAL: CPT | Performed by: SURGERY

## 2019-12-18 PROCEDURE — 86900 BLOOD TYPING SEROLOGIC ABO: CPT | Performed by: PHYSICIAN ASSISTANT

## 2019-12-18 PROCEDURE — 86850 RBC ANTIBODY SCREEN: CPT | Performed by: PHYSICIAN ASSISTANT

## 2019-12-18 PROCEDURE — 86901 BLOOD TYPING SEROLOGIC RH(D): CPT | Performed by: PHYSICIAN ASSISTANT

## 2019-12-18 PROCEDURE — 99232 SBSQ HOSP IP/OBS MODERATE 35: CPT | Performed by: SURGERY

## 2019-12-18 RX ORDER — CHLORHEXIDINE GLUCONATE 0.12 MG/ML
15 RINSE ORAL ONCE
Status: DISCONTINUED | OUTPATIENT
Start: 2019-12-19 | End: 2019-12-20

## 2019-12-18 RX ADMIN — ASPIRIN 81 MG: 81 TABLET ORAL at 09:46

## 2019-12-18 RX ADMIN — AMLODIPINE BESYLATE 10 MG: 10 TABLET ORAL at 09:46

## 2019-12-18 RX ADMIN — GABAPENTIN 100 MG: 100 CAPSULE ORAL at 09:46

## 2019-12-18 RX ADMIN — THIAMINE HCL TAB 100 MG 100 MG: 100 TAB at 09:46

## 2019-12-18 RX ADMIN — NICOTINE 1 PATCH: 14 PATCH TRANSDERMAL at 09:46

## 2019-12-18 RX ADMIN — SENNOSIDES AND DOCUSATE SODIUM 1 TABLET: 8.6; 5 TABLET ORAL at 18:05

## 2019-12-18 RX ADMIN — PANTOPRAZOLE SODIUM 40 MG: 40 TABLET, DELAYED RELEASE ORAL at 18:05

## 2019-12-18 RX ADMIN — FLUCONAZOLE 100 MG: 100 TABLET ORAL at 09:47

## 2019-12-18 RX ADMIN — PANTOPRAZOLE SODIUM 40 MG: 40 TABLET, DELAYED RELEASE ORAL at 09:46

## 2019-12-18 RX ADMIN — CLOPIDOGREL BISULFATE 75 MG: 75 TABLET ORAL at 09:44

## 2019-12-18 RX ADMIN — SODIUM CHLORIDE, SODIUM LACTATE, POTASSIUM CHLORIDE, AND CALCIUM CHLORIDE 75 ML/HR: .6; .31; .03; .02 INJECTION, SOLUTION INTRAVENOUS at 01:08

## 2019-12-18 RX ADMIN — MELATONIN 6 MG: 3 TAB ORAL at 22:53

## 2019-12-18 RX ADMIN — ATORVASTATIN CALCIUM 20 MG: 20 TABLET, FILM COATED ORAL at 18:05

## 2019-12-18 RX ADMIN — TAMSULOSIN HYDROCHLORIDE 0.4 MG: 0.4 CAPSULE ORAL at 18:04

## 2019-12-18 RX ADMIN — SODIUM CHLORIDE, SODIUM LACTATE, POTASSIUM CHLORIDE, AND CALCIUM CHLORIDE 75 ML/HR: .6; .31; .03; .02 INJECTION, SOLUTION INTRAVENOUS at 15:40

## 2019-12-18 RX ADMIN — OXYCODONE HYDROCHLORIDE 5 MG: 5 TABLET ORAL at 09:53

## 2019-12-18 RX ADMIN — SENNOSIDES AND DOCUSATE SODIUM 1 TABLET: 8.6; 5 TABLET ORAL at 09:46

## 2019-12-18 NOTE — ASSESSMENT & PLAN NOTE
79 y/o M presents with Right lower extremity rest pain, anemia, concern for GI bleed, supratherapeutic INR  He is s/p left femoral endarterectomy with profundoplasty, SFA embolectomy and retrograde iliac stenting 11/6/19 and arteriogram with left SFA/popliteal angioplasty and right SFA angioplasty 11/14/19      S/P EGD/Colonoscopy 12/10 (EGD clean based ulcers with no active bleeding, colonoscopy poor prep no active bleeding)  S/P RLE Agram- popliteal recanualization, AT/PT occluded- IR 12/13    Plan:  Plan for R BKA Thursday 12/19  NPO after midnight tonight  Type and cross preop  Continue heparin drip  Pain control

## 2019-12-18 NOTE — PROGRESS NOTES
Vascular Surgery    Progress Note - Chinedu Drop 1950, 78 y o  male MRN: [id number]    Unit/Bed#: [facility] 530-01 Encounter: [id number]    Primary Care Provider: Ricky Homans, MD   Date and time admitted to hospital: 12/4/2019  8:33 PM    * Critical lower limb ischemia  Assessment & Plan  79 y/o M presents with Right lower extremity rest pain, anemia, concern for GI bleed, supratherapeutic INR  He is s/p left femoral endarterectomy with profundoplasty, SFA embolectomy and retrograde iliac stenting 11/6/19 and arteriogram with left SFA/popliteal angioplasty and right SFA angioplasty 11/14/19  S/P EGD/Colonoscopy 12/10 (EGD clean based ulcers with no active bleeding, colonoscopy poor prep no active bleeding)  S/P RLE Agram- popliteal recanualization, AT/PT occluded- IR 12/13    Plan:  Plan for R BKA Thursday 12/19  NPO after midnight tonight  Type and cross preop  Continue heparin drip  Pain control        Subjective:  No events overnight, continues to have pain right foot    Vitals:  /66 (BP Location: Left arm)   Pulse 87   Temp 98 5 °F (36 9 °C) (Oral)   Resp 16   Ht 5' 5" (1 651 m)   Wt 51 4 kg (113 lb 5 1 oz)   SpO2 99%   BMI 18 86 kg/m²     I/Os:  I/O last 3 completed shifts: In: 3446 3 [P O :420; I V :3026 3]  Out: 3275 [Urine:3275]  No intake/output data recorded      Lab Results and Cultures:   Lab Results   Component Value Date    WBC 6 28 12/16/2019    HGB 7 7 (L) 12/16/2019    HCT 25 1 (L) 12/16/2019    MCV 94 12/16/2019     12/16/2019     Lab Results   Component Value Date    GLUCOSE 128 11/07/2019    CALCIUM 8 7 12/16/2019    K 3 7 12/16/2019    CO2 25 12/16/2019     (H) 12/16/2019    BUN 7 12/16/2019    CREATININE 0 79 12/16/2019     Lab Results   Component Value Date    INR 1 25 (H) 12/16/2019    INR 1 29 (H) 12/15/2019    INR 1 38 (H) 12/14/2019    PROTIME 15 3 (H) 12/16/2019    PROTIME 15 7 (H) 12/15/2019    PROTIME 16 5 (H) 12/14/2019        Blood Culture: No results found for: BLOODCX,   Urinalysis: No results found for: COLORU, CLARITYU, SPECGRAV, PHUR, LEUKOCYTESUR, NITRITE, PROTEINUA, GLUCOSEU, KETONESU, BILIRUBINUR, BLOODU,   Urine Culture: No results found for: URINECX,   Wound Culure: No results found for: WOUNDCULT    Medications:  Current Facility-Administered Medications   Medication Dose Route Frequency    amLODIPine (NORVASC) tablet 10 mg  10 mg Oral Daily    aspirin (ECOTRIN LOW STRENGTH) EC tablet 81 mg  81 mg Oral Daily    atorvastatin (LIPITOR) tablet 20 mg  20 mg Oral After Dinner    clopidogrel (PLAVIX) tablet 75 mg  75 mg Oral Daily    fluconazole (DIFLUCAN) tablet 100 mg  100 mg Oral Daily    gabapentin (NEURONTIN) capsule 100 mg  100 mg Oral Daily    heparin (porcine) 25,000 units in 250 mL infusion (premix)  3-20 Units/kg/hr (Order-Specific) Intravenous Titrated    heparin (porcine) injection 1,500 Units  1,500 Units Intravenous PRN    heparin (porcine) injection 3,000 Units  3,000 Units Intravenous PRN    HYDROmorphone (DILAUDID) injection 0 2 mg  0 2 mg Intravenous Q3H PRN    lactated ringers infusion  75 mL/hr Intravenous Continuous    melatonin tablet 6 mg  6 mg Oral HS    nicotine (NICODERM CQ) 14 mg/24hr TD 24 hr patch 1 patch  1 patch Transdermal Daily    ondansetron (ZOFRAN) injection 4 mg  4 mg Intravenous Q4H PRN    oxyCODONE (ROXICODONE) IR tablet 2 5 mg  2 5 mg Oral Q4H PRN    oxyCODONE (ROXICODONE) IR tablet 5 mg  5 mg Oral Q4H PRN    pantoprazole (PROTONIX) EC tablet 40 mg  40 mg Oral BID AC    senna-docusate sodium (SENOKOT S) 8 6-50 mg per tablet 1 tablet  1 tablet Oral BID    tamsulosin (FLOMAX) capsule 0 4 mg  0 4 mg Oral Daily With Dinner    thiamine (VITAMIN B1) tablet 100 mg  100 mg Oral Daily       Physical Exam:    General appearance: alert and oriented, in no acute distress  Lungs: clear to auscultation bilaterally  Heart: regular rate and rhythm, S1, S2 normal, no murmur, click, rub or gallop  Abdomen: soft, non-tender; bowel sounds normal; no masses,  no organomegaly  Extremities: Right foot cool, toes and forefoot cyanotic, limited motor ankle, tender to palpation    Wound/Incision:  Right groin incision clean, dry, and intact        Annette Lee PA-C  12/18/2019

## 2019-12-19 ENCOUNTER — ANESTHESIA (INPATIENT)
Dept: ANESTHESIOLOGY | Facility: HOSPITAL | Age: 69
DRG: 239 | End: 2019-12-19
Payer: MEDICARE

## 2019-12-19 ENCOUNTER — ANESTHESIA EVENT (INPATIENT)
Dept: ANESTHESIOLOGY | Facility: HOSPITAL | Age: 69
DRG: 239 | End: 2019-12-19
Payer: MEDICARE

## 2019-12-19 ENCOUNTER — ANESTHESIA (INPATIENT)
Dept: PERIOP | Facility: HOSPITAL | Age: 69
DRG: 239 | End: 2019-12-19
Payer: MEDICARE

## 2019-12-19 LAB
ANION GAP SERPL CALCULATED.3IONS-SCNC: 7 MMOL/L (ref 4–13)
APTT PPP: 49 SECONDS (ref 23–37)
BUN SERPL-MCNC: 12 MG/DL (ref 5–25)
CALCIUM SERPL-MCNC: 9.1 MG/DL (ref 8.3–10.1)
CHLORIDE SERPL-SCNC: 107 MMOL/L (ref 100–108)
CO2 SERPL-SCNC: 26 MMOL/L (ref 21–32)
CREAT SERPL-MCNC: 0.86 MG/DL (ref 0.6–1.3)
ERYTHROCYTE [DISTWIDTH] IN BLOOD BY AUTOMATED COUNT: 16.2 % (ref 11.6–15.1)
GFR SERPL CREATININE-BSD FRML MDRD: 88 ML/MIN/1.73SQ M
GLUCOSE SERPL-MCNC: 118 MG/DL (ref 65–140)
GLUCOSE SERPL-MCNC: 140 MG/DL (ref 65–140)
HCT VFR BLD AUTO: 28.6 % (ref 36.5–49.3)
HGB BLD-MCNC: 8.8 G/DL (ref 12–17)
MCH RBC QN AUTO: 28.9 PG (ref 26.8–34.3)
MCHC RBC AUTO-ENTMCNC: 30.8 G/DL (ref 31.4–37.4)
MCV RBC AUTO: 94 FL (ref 82–98)
PLATELET # BLD AUTO: 333 THOUSANDS/UL (ref 149–390)
PMV BLD AUTO: 10 FL (ref 8.9–12.7)
POTASSIUM SERPL-SCNC: 4.2 MMOL/L (ref 3.5–5.3)
RBC # BLD AUTO: 3.04 MILLION/UL (ref 3.88–5.62)
SODIUM SERPL-SCNC: 140 MMOL/L (ref 136–145)
WBC # BLD AUTO: 9.67 THOUSAND/UL (ref 4.31–10.16)

## 2019-12-19 PROCEDURE — 88311 DECALCIFY TISSUE: CPT | Performed by: PATHOLOGY

## 2019-12-19 PROCEDURE — 0Y6H0Z2 DETACHMENT AT RIGHT LOWER LEG, MID, OPEN APPROACH: ICD-10-PCS | Performed by: SURGERY

## 2019-12-19 PROCEDURE — 80048 BASIC METABOLIC PNL TOTAL CA: CPT | Performed by: PHYSICIAN ASSISTANT

## 2019-12-19 PROCEDURE — 85730 THROMBOPLASTIN TIME PARTIAL: CPT | Performed by: SURGERY

## 2019-12-19 PROCEDURE — 99024 POSTOP FOLLOW-UP VISIT: CPT | Performed by: SURGERY

## 2019-12-19 PROCEDURE — 99223 1ST HOSP IP/OBS HIGH 75: CPT | Performed by: PHYSICAL MEDICINE & REHABILITATION

## 2019-12-19 PROCEDURE — 85027 COMPLETE CBC AUTOMATED: CPT | Performed by: PHYSICIAN ASSISTANT

## 2019-12-19 PROCEDURE — 88307 TISSUE EXAM BY PATHOLOGIST: CPT | Performed by: PATHOLOGY

## 2019-12-19 PROCEDURE — 82948 REAGENT STRIP/BLOOD GLUCOSE: CPT

## 2019-12-19 PROCEDURE — 27880 AMPUTATION OF LOWER LEG: CPT | Performed by: SURGERY

## 2019-12-19 RX ORDER — EPHEDRINE SULFATE 50 MG/ML
INJECTION INTRAVENOUS AS NEEDED
Status: DISCONTINUED | OUTPATIENT
Start: 2019-12-19 | End: 2019-12-19 | Stop reason: SURG

## 2019-12-19 RX ORDER — LIDOCAINE HYDROCHLORIDE 10 MG/ML
0.5 INJECTION, SOLUTION EPIDURAL; INFILTRATION; INTRACAUDAL; PERINEURAL ONCE AS NEEDED
Status: CANCELLED | OUTPATIENT
Start: 2019-12-19

## 2019-12-19 RX ORDER — LIDOCAINE HYDROCHLORIDE 10 MG/ML
INJECTION, SOLUTION EPIDURAL; INFILTRATION; INTRACAUDAL; PERINEURAL AS NEEDED
Status: DISCONTINUED | OUTPATIENT
Start: 2019-12-19 | End: 2019-12-19 | Stop reason: SURG

## 2019-12-19 RX ORDER — BUPIVACAINE HYDROCHLORIDE 5 MG/ML
INJECTION, SOLUTION PERINEURAL
Status: COMPLETED | OUTPATIENT
Start: 2019-12-19 | End: 2019-12-19

## 2019-12-19 RX ORDER — SODIUM CHLORIDE, SODIUM LACTATE, POTASSIUM CHLORIDE, CALCIUM CHLORIDE 600; 310; 30; 20 MG/100ML; MG/100ML; MG/100ML; MG/100ML
125 INJECTION, SOLUTION INTRAVENOUS CONTINUOUS
Status: CANCELLED | OUTPATIENT
Start: 2019-12-19

## 2019-12-19 RX ORDER — CEFAZOLIN SODIUM 1 G/50ML
SOLUTION INTRAVENOUS AS NEEDED
Status: DISCONTINUED | OUTPATIENT
Start: 2019-12-19 | End: 2019-12-19 | Stop reason: SURG

## 2019-12-19 RX ORDER — PROPOFOL 10 MG/ML
INJECTION, EMULSION INTRAVENOUS AS NEEDED
Status: DISCONTINUED | OUTPATIENT
Start: 2019-12-19 | End: 2019-12-19 | Stop reason: SURG

## 2019-12-19 RX ORDER — CEFAZOLIN SODIUM 1 G/50ML
1000 SOLUTION INTRAVENOUS ONCE
Status: DISCONTINUED | OUTPATIENT
Start: 2019-12-19 | End: 2019-12-23

## 2019-12-19 RX ADMIN — FLUCONAZOLE 100 MG: 100 TABLET ORAL at 15:51

## 2019-12-19 RX ADMIN — ASPIRIN 81 MG: 81 TABLET ORAL at 15:50

## 2019-12-19 RX ADMIN — EPHEDRINE SULFATE 15 MG: 50 INJECTION, SOLUTION INTRAVENOUS at 11:11

## 2019-12-19 RX ADMIN — EPHEDRINE SULFATE 10 MG: 50 INJECTION, SOLUTION INTRAVENOUS at 11:08

## 2019-12-19 RX ADMIN — TAMSULOSIN HYDROCHLORIDE 0.4 MG: 0.4 CAPSULE ORAL at 15:53

## 2019-12-19 RX ADMIN — PANTOPRAZOLE SODIUM 40 MG: 40 TABLET, DELAYED RELEASE ORAL at 15:53

## 2019-12-19 RX ADMIN — CLOPIDOGREL BISULFATE 75 MG: 75 TABLET ORAL at 15:51

## 2019-12-19 RX ADMIN — SODIUM CHLORIDE, SODIUM LACTATE, POTASSIUM CHLORIDE, AND CALCIUM CHLORIDE: .6; .31; .03; .02 INJECTION, SOLUTION INTRAVENOUS at 12:19

## 2019-12-19 RX ADMIN — GABAPENTIN 100 MG: 100 CAPSULE ORAL at 15:51

## 2019-12-19 RX ADMIN — ATORVASTATIN CALCIUM 20 MG: 20 TABLET, FILM COATED ORAL at 17:51

## 2019-12-19 RX ADMIN — ROPIVACAINE HYDROCHLORIDE 10 ML/HR: 10 INJECTION, SOLUTION EPIDURAL at 13:11

## 2019-12-19 RX ADMIN — PROPOFOL 100 MG: 10 INJECTION, EMULSION INTRAVENOUS at 10:55

## 2019-12-19 RX ADMIN — PROPOFOL 50 MG: 10 INJECTION, EMULSION INTRAVENOUS at 10:58

## 2019-12-19 RX ADMIN — EPHEDRINE SULFATE 5 MG: 50 INJECTION, SOLUTION INTRAVENOUS at 11:05

## 2019-12-19 RX ADMIN — PHENYLEPHRINE HYDROCHLORIDE 100 MCG: 10 INJECTION INTRAVENOUS at 10:55

## 2019-12-19 RX ADMIN — CEFAZOLIN SODIUM 1000 MG: 1 SOLUTION INTRAVENOUS at 10:39

## 2019-12-19 RX ADMIN — HEPARIN SODIUM AND DEXTROSE 12 UNITS/KG/HR: 10000; 5 INJECTION INTRAVENOUS at 22:59

## 2019-12-19 RX ADMIN — BUPIVACAINE HYDROCHLORIDE 10 ML: 5 INJECTION, SOLUTION PERINEURAL at 09:59

## 2019-12-19 RX ADMIN — MELATONIN 6 MG: 3 TAB ORAL at 20:34

## 2019-12-19 RX ADMIN — BUPIVACAINE HYDROCHLORIDE 10 ML: 5 INJECTION, SOLUTION PERINEURAL at 10:07

## 2019-12-19 RX ADMIN — LIDOCAINE HYDROCHLORIDE 50 MG: 10 INJECTION, SOLUTION EPIDURAL; INFILTRATION; INTRACAUDAL; PERINEURAL at 10:55

## 2019-12-19 RX ADMIN — PHENYLEPHRINE HYDROCHLORIDE 20 MCG/MIN: 10 INJECTION INTRAVENOUS at 11:00

## 2019-12-19 NOTE — PROGRESS NOTES
Vascular Surgery    Progress Note - Darral Borne 1950, 78 y o  male MRN: [id number]    Unit/Bed#: [facility] 530-01 Encounter: [id number]    Primary Care Provider: Prince Osorio MD   Date and time admitted to hospital: 12/4/2019  8:33 PM    * Critical lower limb ischemia  Assessment & Plan  79 y/o M presents with Right lower extremity rest pain, anemia, concern for GI bleed, supratherapeutic INR  He is s/p left femoral endarterectomy with profundoplasty, SFA embolectomy and retrograde iliac stenting 11/6/19 and arteriogram with left SFA/popliteal angioplasty and right SFA angioplasty 11/14/19  S/P EGD/Colonoscopy 12/10 (EGD clean based ulcers with no active bleeding, colonoscopy poor prep no active bleeding)  S/P RLE Agram- popliteal recanualization, AT/PT occluded- IR 12/13    Plan:  Plan for R BKA today  NPO   Type and cross  Hold heparin drip  Pain control        Subjective:  No events overnight    Vitals:  /57   Pulse 60   Temp 97 6 °F (36 4 °C) (Oral)   Resp 18   Ht 5' 5" (1 651 m)   Wt 51 4 kg (113 lb 5 1 oz)   SpO2 100%   BMI 18 86 kg/m²     I/Os:  I/O last 3 completed shifts:   In: 2847 5 [P O :880; I V :1967 5]  Out: 1883 [Urine:3020]  I/O this shift:  In: 2611 9 [I V :2611 9]  Out: 475 [Urine:475]    Lab Results and Cultures:   Lab Results   Component Value Date    WBC 6 28 12/16/2019    HGB 7 7 (L) 12/16/2019    HCT 25 1 (L) 12/16/2019    MCV 94 12/16/2019     12/16/2019     Lab Results   Component Value Date    GLUCOSE 128 11/07/2019    CALCIUM 8 7 12/16/2019    K 3 7 12/16/2019    CO2 25 12/16/2019     (H) 12/16/2019    BUN 7 12/16/2019    CREATININE 0 79 12/16/2019     Lab Results   Component Value Date    INR 1 25 (H) 12/16/2019    INR 1 29 (H) 12/15/2019    INR 1 38 (H) 12/14/2019    PROTIME 15 3 (H) 12/16/2019    PROTIME 15 7 (H) 12/15/2019    PROTIME 16 5 (H) 12/14/2019        Blood Culture: No results found for: BLOODCX,   Urinalysis: No results found for:  COLORU, CLARITYU, SPECGRAV, PHUR, LEUKOCYTESUR, NITRITE, PROTEINUA, GLUCOSEU, KETONESU, BILIRUBINUR, BLOODU,   Urine Culture: No results found for: URINECX,   Wound Culure: No results found for: WOUNDCULT    Medications:  Current Facility-Administered Medications   Medication Dose Route Frequency    amLODIPine (NORVASC) tablet 10 mg  10 mg Oral Daily    aspirin (ECOTRIN LOW STRENGTH) EC tablet 81 mg  81 mg Oral Daily    atorvastatin (LIPITOR) tablet 20 mg  20 mg Oral After Dinner    chlorhexidine (PERIDEX) 0 12 % oral rinse 15 mL  15 mL Swish & Spit Once    clopidogrel (PLAVIX) tablet 75 mg  75 mg Oral Daily    fluconazole (DIFLUCAN) tablet 100 mg  100 mg Oral Daily    gabapentin (NEURONTIN) capsule 100 mg  100 mg Oral Daily    heparin (porcine) 25,000 units in 250 mL infusion (premix)  3-20 Units/kg/hr (Order-Specific) Intravenous Titrated    heparin (porcine) injection 1,500 Units  1,500 Units Intravenous PRN    heparin (porcine) injection 3,000 Units  3,000 Units Intravenous PRN    HYDROmorphone (DILAUDID) injection 0 2 mg  0 2 mg Intravenous Q3H PRN    lactated ringers infusion  75 mL/hr Intravenous Continuous    melatonin tablet 6 mg  6 mg Oral HS    nicotine (NICODERM CQ) 14 mg/24hr TD 24 hr patch 1 patch  1 patch Transdermal Daily    ondansetron (ZOFRAN) injection 4 mg  4 mg Intravenous Q4H PRN    oxyCODONE (ROXICODONE) IR tablet 2 5 mg  2 5 mg Oral Q4H PRN    oxyCODONE (ROXICODONE) IR tablet 5 mg  5 mg Oral Q4H PRN    pantoprazole (PROTONIX) EC tablet 40 mg  40 mg Oral BID AC    senna-docusate sodium (SENOKOT S) 8 6-50 mg per tablet 1 tablet  1 tablet Oral BID    tamsulosin (FLOMAX) capsule 0 4 mg  0 4 mg Oral Daily With Dinner    thiamine (VITAMIN B1) tablet 100 mg  100 mg Oral Daily     Physical Exam:    General appearance: alert and oriented, in no acute distress  Lungs: clear to auscultation bilaterally  Heart: regular rate and rhythm, S1, S2 normal, no murmur, click, rub or gallop  Abdomen: soft, non-tender; bowel sounds normal; no masses,  no organomegaly  Extremities: Right foot cool, forefoot/toes cyanotic, limited M/S      Mary Jo Duarte PA-C  12/19/2019

## 2019-12-19 NOTE — ANESTHESIA PREPROCEDURE EVALUATION
Review of Systems/Medical History  Patient summary reviewed  Chart reviewed      Cardiovascular  Negative cardio ROS Exercise tolerance (METS): >4,  Hypertension , PVD,   Comment: R PFA occulusion with failed bypass,  Pulmonary  Negative pulmonary ROS        GI/Hepatic  Negative GI/hepatic ROS          Negative  ROS        Endo/Other  Negative endo/other ROS      GYN  Negative gynecology ROS          Hematology  Negative hematology ROS      Musculoskeletal  Negative musculoskeletal ROS        Neurology  Negative neurology ROS      Psychology   Negative psychology ROS              Physical Exam    Airway      TM Distance: >3 FB  Neck ROM: full     Dental   No notable dental hx     Cardiovascular  Comment: Negative ROS, Rhythm: regular, Rate: normal, Cardiovascular exam normal    Pulmonary  Pulmonary exam normal Breath sounds clear to auscultation,     Other Findings        Anesthesia Plan  ASA Score- 2     Anesthesia Type- general and regional with ASA Monitors  Additional Monitors:   Airway Plan: LMA  Plan Factors- Patient instructed to abstain from smoking on day of procedure  Patient did not smoke on day of surgery  Induction- intravenous  Postoperative Plan- Plan for postoperative opioid use  Planned trial extubation    Informed Consent- Anesthetic plan and risks discussed with patient  I personally reviewed this patient with the CRNA  Discussed and agreed on the Anesthesia Plan with the CRNA  Arun Bishop

## 2019-12-19 NOTE — OP NOTE
OPERATIVE REPORT  PATIENT NAME: Arnav Deng    :  1950  MRN: [id number]  Pt Location: BE OR ROOM 05    SURGERY DATE: 2019    Surgeon(s) and Role:     * Laurie Gonzales MD - Primary     * Halle Eller MD - Assisting    Preop Diagnosis:  PAD (peripheral artery disease) ([facility] [address] ) [I73 9]    Post-Op Diagnosis Codes:     * PAD (peripheral artery disease) ([facility] [address] ) [I73 9]    Procedure(s) (LRB):  AMPUTATION BELOW KNEE (BKA) (Right)    Specimen(s):  ID Type Source Tests Collected by Time Destination   1 :  Tissue Leg, Right TISSUE EXAM Laurie Gonzales MD 2019 1133        Estimated Blood Loss:   Minimal    Drains:  Urethral Catheter Latex 16 Fr  (Active)   Site Assessment Clean;Skin intact;Pink;Patent 2019 12:52 PM   Collection Container Standard drainage bag 2019  1:49 PM   Securement Method Securing device (Describe) 2019  1:49 PM   Output (mL) 450 mL 2019  1:58 PM   Number of days: 0       Anesthesia Type:   General    Operative Indications:  PAD (peripheral artery disease) ([facility]) [I73 9]  Gangrene of right foot and rest pain    Operative Findings:  Healthy bleeding at skin edges     Complications:   None    Procedure and Technique:  The procedure was performed under  General anesthesia  The patient was placed supine  The right lower extremity was prepped and draped in the usual sterile fashion  An occlusive dressing was applied to the foot up to the level of the ankle  A tourniquet was applied and Eschmarch bandage was used to   exanguinate the leg and then tourniquet was inflated to 300 mmHG  Anterior and posterior skin incisions were outlined with a  marking pen  The anterior skin incision was made 10 cm below  the tibial tuberosity and extended medially and laterally toward  the edges of the gastrocnemius muscle  The skin incision was then  extended distally on either side parallel to the tibia for 12  cm,  creating a posterior flap   The skin and subcutaneous tissues were  incised down to the fascia  The greater and short saphenous veins  on the medial and posterior aspects of the leg, respectively, were  ligated and divided  The fascia and the muscles were then divided  with the electrocautery at the same level of the anterior skin incision  The muscles in the anterior and lateral compartment were  divided, exposing the anterior tibial vessels, which were ligated  and divided  The interosseous membrane was then incised  The  tibial periosteum was incised circumferentially with the electrocautery  at the same level of the skin and muscle division  Using a  periosteal elevator, the tibial periosteum was stripped proximally  for 2 cm  The tibia was then transected with a electric saw  2 cm proximal to the skin incision with an anterior bevel  The fibula  was then exposed, dissected circumferentially, and transected  with a bone cutter 2 cm proximal to the tibial division  The amputation was then completed with an amputation knife,  transecting the soleus muscle obliquely and the gastrocnemius  muscle at the same level as the posterior flap  Bleeding soleal  veins and posterior tibial and peroneal vessels were clamped  and oversewn with 0 silk sutures  Sharp bony edges were then  filed, eliminating any bony prominences over the anterior aspect  of the tibia  The fascia of the anterior and posterior muscle flaps  were approximated with interrupted absorbable sutures  The skin  was approximated with skin staples  and dressed with 4 × 4 gauze, Kerlix, and an stockinet bandage and knee immobilizer    The patient tolerated the procedure well and was taken to the  postanesthesia care       I was present for the entire procedure    Patient Disposition:  PACU     SIGNATURE: Ella Beavers MD  DATE: December 19, 2019  TIME: 2:57 PM

## 2019-12-19 NOTE — PERIOPERATIVE NURSING NOTE
As per patient's family, present at pacu bedside, and as per dr Nicole Albarado md, patient is normally confused after anesthesia and takes a day (as per family) to return to baseline orientation status  Ok to d/c patient to the floor at this time as per dr Nicole Albarado md   Pt  Remains stable at this time  Will continue to monitor

## 2019-12-19 NOTE — ANESTHESIA POSTPROCEDURE EVALUATION
Post-Op Assessment Note    CV Status:  Stable  Pain Score: 0       Mental Status:  Awake   Hydration Status:  Stable   PONV Controlled:  None   Airway Patency:  Patent   Post Op Vitals Reviewed: Yes      Staff: Anesthesiologist, with CRNAs     Post-op block assessment: n/a        BP   103/51   Temp   97   Pulse  80   Resp   20   SpO2   100

## 2019-12-19 NOTE — ANESTHESIA PROCEDURE NOTES
Peripheral Block    Patient location during procedure: pre-op  Start time: 12/19/2019 9:59 AM  Reason for block: at surgeon's request and post-op pain management  Staffing  Anesthesiologist: Michael Arana MD  Performed: anesthesiologist   Preanesthetic Checklist  Completed: patient identified, site marked, surgical consent, pre-op evaluation, timeout performed, IV checked, risks and benefits discussed and monitors and equipment checked  Peripheral Block  Patient position: supine  Prep: ChloraPrep  Patient monitoring: continuous pulse ox  Block type: popliteal  Laterality: right  Injection technique: catheter  Procedures: ultrasound guided, Ultrasound guidance required for the procedure to increase accuracy and safety of medication placement and decrease risk of complications    Ultrasound permanent image savedbupivacaine (MARCAINE) 0 5 % perineural infiltration, 10 mL  Needle  Needle type: Stimuplex   Needle gauge: 22 G  Needle length: 10 cm  Needle localization: ultrasound guidance  Needle insertion depth: 6 cm  Catheter type: open end  Catheter size: 18 G  Catheter at skin depth: 6 cm  Test dose: negative  Assessment  Injection assessment: incremental injection, local visualized surrounding nerve on ultrasound, negative aspiration for heme and no paresthesia on injection  Paresthesia pain: none  Heart rate change: no  Slow fractionated injection: yes  Post-procedure:  sterile dressing applied  patient tolerated the procedure well with no immediate complications

## 2019-12-19 NOTE — ASSESSMENT & PLAN NOTE
79 y/o M presents with Right lower extremity rest pain, anemia, concern for GI bleed, supratherapeutic INR  He is s/p left femoral endarterectomy with profundoplasty, SFA embolectomy and retrograde iliac stenting 11/6/19 and arteriogram with left SFA/popliteal angioplasty and right SFA angioplasty 11/14/19      S/P EGD/Colonoscopy 12/10 (EGD clean based ulcers with no active bleeding, colonoscopy poor prep no active bleeding)  S/P RLE Agram- popliteal recanualization, AT/PT occluded- IR 12/13    Plan:  Plan for R BKA today  NPO   Type and cross  Hold heparin drip  Pain control

## 2019-12-19 NOTE — ANESTHESIA PROCEDURE NOTES
Peripheral Block    Patient location during procedure: pre-op  Start time: 12/19/2019 9:50 AM  Staffing  Anesthesiologist: Primo Lin MD  Performed: anesthesiologist   Preanesthetic Checklist  Completed: patient identified, site marked, surgical consent, pre-op evaluation, timeout performed, IV checked, risks and benefits discussed and monitors and equipment checked  Peripheral Block  Patient position: supine  Prep: ChloraPrep  Patient monitoring: continuous pulse ox  Block type: femoral  Laterality: right  Injection technique: single-shot  Procedures: ultrasound guided, Ultrasound guidance required for the procedure to increase accuracy and safety of medication placement and decrease risk of complications    Ultrasound permanent image savedbupivacaine (MARCAINE) 0 5 % perineural infiltration, 10 mL  Needle  Needle type: Stimuplex   Needle gauge: 22 G  Needle length: 10 cm  Needle localization: ultrasound guidance  Needle insertion depth: 4 cm  Assessment  Injection assessment: incremental injection, local visualized surrounding nerve on ultrasound, negative aspiration for heme and no paresthesia on injection  Paresthesia pain: none  Heart rate change: no  Slow fractionated injection: yes  Post-procedure:  site cleaned  patient tolerated the procedure well with no immediate complications

## 2019-12-20 LAB
ABO GROUP BLD BPU: NORMAL
ABO GROUP BLD BPU: NORMAL
APTT PPP: 47 SECONDS (ref 23–37)
APTT PPP: 69 SECONDS (ref 23–37)
APTT PPP: 75 SECONDS (ref 23–37)
BPU ID: NORMAL
BPU ID: NORMAL
CROSSMATCH: NORMAL
CROSSMATCH: NORMAL
INR PPP: 1.35 (ref 0.84–1.19)
PROTHROMBIN TIME: 16.2 SECONDS (ref 11.6–14.5)
UNIT DISPENSE STATUS: NORMAL
UNIT DISPENSE STATUS: NORMAL
UNIT PRODUCT CODE: NORMAL
UNIT PRODUCT CODE: NORMAL
UNIT RH: NORMAL
UNIT RH: NORMAL

## 2019-12-20 PROCEDURE — G8987 SELF CARE CURRENT STATUS: HCPCS

## 2019-12-20 PROCEDURE — G8988 SELF CARE GOAL STATUS: HCPCS

## 2019-12-20 PROCEDURE — NC001 PR NO CHARGE: Performed by: ANESTHESIOLOGY

## 2019-12-20 PROCEDURE — 97168 OT RE-EVAL EST PLAN CARE: CPT

## 2019-12-20 PROCEDURE — 85730 THROMBOPLASTIN TIME PARTIAL: CPT | Performed by: SURGERY

## 2019-12-20 PROCEDURE — 99024 POSTOP FOLLOW-UP VISIT: CPT | Performed by: SURGERY

## 2019-12-20 PROCEDURE — 99232 SBSQ HOSP IP/OBS MODERATE 35: CPT | Performed by: ANESTHESIOLOGY

## 2019-12-20 PROCEDURE — NC001 PR NO CHARGE: Performed by: NURSE PRACTITIONER

## 2019-12-20 PROCEDURE — 85610 PROTHROMBIN TIME: CPT | Performed by: PHYSICIAN ASSISTANT

## 2019-12-20 PROCEDURE — 97164 PT RE-EVAL EST PLAN CARE: CPT

## 2019-12-20 RX ORDER — ACETAMINOPHEN 325 MG/1
650 TABLET ORAL EVERY 6 HOURS PRN
Status: DISCONTINUED | OUTPATIENT
Start: 2019-12-20 | End: 2019-12-31 | Stop reason: HOSPADM

## 2019-12-20 RX ORDER — WARFARIN SODIUM 1 MG/1
1 TABLET ORAL
Status: DISCONTINUED | OUTPATIENT
Start: 2019-12-20 | End: 2019-12-23

## 2019-12-20 RX ADMIN — SENNOSIDES AND DOCUSATE SODIUM 1 TABLET: 8.6; 5 TABLET ORAL at 19:14

## 2019-12-20 RX ADMIN — HEPARIN SODIUM 1500 UNITS: 1000 INJECTION INTRAVENOUS; SUBCUTANEOUS at 01:41

## 2019-12-20 RX ADMIN — MELATONIN 6 MG: 3 TAB ORAL at 20:27

## 2019-12-20 RX ADMIN — ASPIRIN 81 MG: 81 TABLET ORAL at 09:56

## 2019-12-20 RX ADMIN — PANTOPRAZOLE SODIUM 40 MG: 40 TABLET, DELAYED RELEASE ORAL at 16:33

## 2019-12-20 RX ADMIN — GABAPENTIN 100 MG: 100 CAPSULE ORAL at 09:56

## 2019-12-20 RX ADMIN — CLOPIDOGREL BISULFATE 75 MG: 75 TABLET ORAL at 09:56

## 2019-12-20 RX ADMIN — HYDROMORPHONE HYDROCHLORIDE 0.2 MG: 1 INJECTION, SOLUTION INTRAMUSCULAR; INTRAVENOUS; SUBCUTANEOUS at 11:26

## 2019-12-20 RX ADMIN — WARFARIN SODIUM 1 MG: 1 TABLET ORAL at 20:27

## 2019-12-20 RX ADMIN — SODIUM CHLORIDE, SODIUM LACTATE, POTASSIUM CHLORIDE, AND CALCIUM CHLORIDE 75 ML/HR: .6; .31; .03; .02 INJECTION, SOLUTION INTRAVENOUS at 12:07

## 2019-12-20 RX ADMIN — ACETAMINOPHEN 650 MG: 325 TABLET ORAL at 16:33

## 2019-12-20 RX ADMIN — OXYCODONE HYDROCHLORIDE 5 MG: 5 TABLET ORAL at 04:17

## 2019-12-20 RX ADMIN — NICOTINE 1 PATCH: 14 PATCH TRANSDERMAL at 09:56

## 2019-12-20 RX ADMIN — HYDROMORPHONE HYDROCHLORIDE 0.2 MG: 1 INJECTION, SOLUTION INTRAMUSCULAR; INTRAVENOUS; SUBCUTANEOUS at 05:58

## 2019-12-20 RX ADMIN — PANTOPRAZOLE SODIUM 40 MG: 40 TABLET, DELAYED RELEASE ORAL at 05:58

## 2019-12-20 RX ADMIN — SENNOSIDES AND DOCUSATE SODIUM 1 TABLET: 8.6; 5 TABLET ORAL at 09:56

## 2019-12-20 RX ADMIN — ATORVASTATIN CALCIUM 20 MG: 20 TABLET, FILM COATED ORAL at 19:14

## 2019-12-20 RX ADMIN — AMLODIPINE BESYLATE 10 MG: 10 TABLET ORAL at 09:56

## 2019-12-20 RX ADMIN — OXYCODONE HYDROCHLORIDE 5 MG: 5 TABLET ORAL at 09:56

## 2019-12-20 RX ADMIN — THIAMINE HCL TAB 100 MG 100 MG: 100 TAB at 09:56

## 2019-12-20 RX ADMIN — FLUCONAZOLE 100 MG: 100 TABLET ORAL at 09:57

## 2019-12-20 RX ADMIN — TAMSULOSIN HYDROCHLORIDE 0.4 MG: 0.4 CAPSULE ORAL at 16:33

## 2019-12-20 NOTE — PHYSICAL THERAPY NOTE
Physical Therapy Re-Evaluation       12/20/19 1301   Note Type   Note type Re-eval   Pain Assessment   Pain Assessment FLACC   Pain Rating: FLACC (Rest) - Face 1   Pain Rating: FLACC (Rest) - Legs 1   Pain Rating: FLACC (Rest) - Activity 1   Pain Rating: FLACC (Rest) - Cry 1   Pain Rating: FLACC (Rest) - Consolability 2   Score: FLACC (Rest) 6   Pain Rating: FLACC (Activity) - Face 1   Pain Rating: FLACC (Activity) - Legs 1   Pain Rating: FLACC (Activity) - Activity 1   Pain Rating: FLACC (Activity) - Cry 1   Pain Rating: FLACC (Activity) - Consolability 2   Score: FLACC (Activity) 6   Home Living   Additional Comments See initial evaluation for details re: home setup and baseline   Prior Function   Level of [place] Needs assistance with ADLs and functional mobility   Falls in the last 6 months 0   Restrictions/Precautions   Weight Bearing Precautions Per Order Yes   RLE Weight Bearing Per Order NWB   Braces or Orthoses LE Immobilizer   Other Precautions Cognitive; Chair Alarm; Bed Alarm;Multiple lines; Fall Risk;Pain   General   Family/Caregiver Present No   Cognition   Overall Cognitive Status Impaired   Arousal/Participation Responsive   Attention Difficulty attending to directions   Orientation Level Oriented to person   Memory Unable to assess   Following Commands Follows one step commands inconsistently   Comments confused, agitated w/ mobility attempts  Strength RLE   RLE Overall Strength   (limited- in knee immobilizer- strength 2 to 3/5)   LLE Assessment   LLE Assessment   (strength grossly 4-/5)   Coordination   Movements are Fluid and Coordinated 0   Bed Mobility   Supine to Sit 4  Minimal assistance   Additional items Assist x 1   Sit to Supine 3  Moderate assistance   Additional items Assist x 1   Additional Comments sat EOB x 10 min, maintains w/ CGA  Limited by pain and impulsivity    refuses to stand despite education as to importance of trying   Balance   Static Sitting Fair -   Dynamic Sitting Fair -   Activity Tolerance   Activity Tolerance Patient limited by fatigue;Patient limited by pain;Treatment limited secondary to medical complications (Comment); Treatment limited secondary to agitation  (cog)   Nurse Made Aware yes   Assessment   Prognosis Fair   Problem List Decreased strength;Decreased endurance;Decreased mobility; Impaired balance;Decreased coordination;Decreased cognition;Decreased safety awareness; Impaired judgement   Assessment Pt seen for re-evaluation 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  Pt presents w/ decreased bed mob, sitting balance, endurance, B LE strength  Most limuted presently by cog, motivation and pain  Will benefit from skilled PT to correct for the above problems, as well as to be a PT to see next session to attempt transfers/standing and gait  Recommend rehab at d/c   Goals   Patient Goals to go BTB   [facility] Expiration Date 01/03/20   Short Term Goal #1 1-2 wks: bed mob w/ indep, sitting balance to good/normal, increase B LE strength by 1/2 -1 grade, PT to see next session to assess transfers, standing and gait if appropriate   PT Treatment Day 0   Plan   Treatment/Interventions Functional transfer training;LE strengthening/ROM; Therapeutic exercise; Endurance training;Patient/family training;Equipment eval/education; Bed mobility;Gait training   PT Frequency   (3-5x/wk)   Recommendation   Recommendation Post acute IP rehab  (recommend rehab at d/c)   Equipment Recommended Tang Joaquín; Wheelchair   PT - OK to Discharge Yes  (when stable to rehab)   Modified Flora Scale   Modified Flora Scale 5   Barthel Index   Feeding 10   Bathing 0   Grooming Score 0   Dressing Score 5   Bladder Score 0   Bowels Score 5   Toilet Use Score 5   Transfers (Bed/Chair) Score 0   Mobility (Level Surface) Score 0   Stairs Score 0   Barthel Index Score 25   Kathy Logan PT, DPT CSRS

## 2019-12-20 NOTE — OCCUPATIONAL THERAPY NOTE
OccupationalTherapy Re-Evaluation     Patient Name: Marcel Rivera  [id number] Date: 12/20/2019  Problem List  Principal Problem:    Critical lower limb ischemia  Active Problems:    PAD (peripheral artery disease) [facility])    Past Medical History  Past Medical History:   Diagnosis Date    Cancer ([address] )     throat cancer    PAD (peripheral artery disease) ([address] ) 11/2/2019    PEG (percutaneous endoscopic gastrostomy) status ([place] [id number] )     Port-A-Cath in place      Past Surgical History  Past Surgical History:   Procedure Laterality Date    BYPASS FEMORAL-FEMORAL Right     ESOPHAGOSCOPY N/A 8/24/2017    Procedure: ESOPHAGOSCOPY FLEXIBLE;  Surgeon: Margo Edwards MD;  [facility];  Service: ENT    IR ABDOMINAL ANGIOGRAPHY / INTERVENTION  11/14/2019    IR ABDOMINAL ANGIOGRAPHY / INTERVENTION  11/7/2019    IR ABDOMINAL ANGIOGRAPHY / INTERVENTION  12/13/2019    LEG AMPUTATION THROUGH LOWER TIBIA AND FIBULA Right 12/19/2019    Procedure: AMPUTATION BELOW KNEE (BKA); Surgeon: Rosibel Mustafa MD;  [facility];  Service: Vascular    [place] BRONCHOSCOPY,DIAGNOSTIC N/A 8/24/2017    Procedure: Bronchoscopy;  Surgeon: Margo Edwards MD;  [facility];  Service: ENT    [place] LARYNGOSCOPY,DIRCT,OP,BIOPSY N/A 8/24/2017    Procedure: LARYNGOSCOPY DIRECT;  Surgeon: Margo Edwards MD;  [facility];  Service: ENT    [place] [facility] Left 11/7/2019    Procedure: LEFT ENDARTERECTOMY ARTERIAL FEMORAL; L CFAE WITH PROFUNDOPLASTY; ARTERIOGRAM;RETROGRADE ILIAC STENTING;  Surgeon: Miles Miller MD;  [facility];  Service: Vascular    TOE AMPUTATION Right     2 toes        12/20/19 1257   Note Type   Note type Re-eval   Restrictions/Precautions   Weight Bearing Precautions Per Order Yes   RLE Weight Bearing Per Order NWB  (BKA in knee immobilizer)   Braces or Orthoses LE Immobilizer   Other Precautions Cognitive; Chair Alarm; Bed Alarm; Impulsive;Multiple lines;Telemetry; Fall Risk  (Romanian speaking)   Pain Assessment   Pain Assessment FLACC   Pain Location Leg   Pain Orientation Right   Pain Rating: FLACC (Rest) - Face 1   Pain Rating: FLACC (Rest) - Legs 0   Pain Rating: FLACC (Rest) - Activity 1   Pain Rating: FLACC (Rest) - Cry 1   Pain Rating: FLACC (Rest) - Consolability 1   Score: FLACC (Rest) 4   Pain Rating: FLACC (Activity) - Face 1   Pain Rating: FLACC (Activity) - Legs 1   Pain Rating: FLACC (Activity) - Activity 1   Pain Rating: FLACC (Activity) - Cry 1   Pain Rating: FLACC (Activity) - Consolability 1   Score: FLACC (Activity) 5   Home Living   Additional Comments see initial OT evaluation   Prior Function   Comments see initial OT evaluation   Lifestyle   Autonomy Per son, Pt requires A w/ all ADLS and IADLS from family; (-) drives PTA  Son reports he plans on carrying Pt up/down the stairs as son does not wish Pt to go to rehab  Reciprocal Relationships Per son, Pt lives w/ multiple family members  Son reports a family member will be home at all times to provide A as needed  Service to Others Pt is retired  Intrinsic Gratification Pt reports enjoying spending time w/ family  Psychosocial   Psychosocial (WDL) X   Patient Behaviors/Mood Uncooperative   Subjective   Subjective Pt is Romanian speaking, but per RN does not appear to understand [place] or [place]  Answers appropriately to very simple Romanian phrases during OT evaluation   ADL   Eating Assistance 5  Supervision/Setup   Grooming Assistance 5  Supervision/Setup   UB Bathing Assistance 4  Minimal Assistance   LB Bathing Assistance 3  Moderate Assistance   [address] 4  Minimal Assistance   LB Dressing Assistance 2  Maximal [address]  1  Total Assistance   Bed Mobility   Supine to Sit 4  Minimal assistance   Sit to Supine 4  Minimal assistance   Additional Comments Pt sat EOB for ~10 minutes with CGA  Impulsive, poor safety     Transfers Additional Comments Pt unwilling to trial STS transfer despite max encouragement   Balance   Static Sitting Fair -   Dynamic Sitting Fair -   Activity Tolerance   Activity Tolerance Patient limited by fatigue  (limited by pt's self-limiting behaviors and cognition)   Medical Staff Made Aware PT, CM   Nurse Made Aware Pt cleared by RN for OT re-evaluation and mobility   RUE Assessment   RUE Assessment X   LUE Assessment   LUE Assessment X   Cognition   Overall Cognitive Status Impaired   Arousal/Participation Alert; Responsive; Uncooperative   Attention Difficulty attending to directions   Orientation Level Oriented to person  (responds to name)   Memory Decreased recall of precautions;Decreased recall of recent events;Decreased short term memory;Decreased recall of biographical information   Following Commands Follows one step commands with increased time or repetition   Comments Pt is verbal and appears to understand very basic [place]ian phrases, but appears confused  Uncooperative  Per chart, baseline early dementia, unclear if this is pt's cognitive baseline   Assessment   Limitation Decreased ADL status; Decreased Safe judgement during ADL;Decreased cognition;Decreased endurance;Decreased self-care trans;Decreased high-level ADLs   Prognosis Fair   Assessment Pt is a 78year old male seen for OT re-evaluation 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  Additional active problems include: ABLA, PAD, HTN, BPH and early dementia  Pt ordered to wear knee immobilizer at all times  See initial OT evaluation for home set-up and PLOF  Pt is currently demonstrating the following occupational deficits: eating with set-up, grooming with set-up, UB bathing with Robyn, LB bathing with modA, UB dressing with Robyn, LB dressing with maxA, toileting with totalA, bed mobility with Robyn    Factors currently limiting pt's independence in these areas include: cognitive deficits, self-limiting behaviors, functional mobility, impulsivity, balance, and unsupportive home environment  Overall, pt scored 30/100 on the Barthel Index  From an OT standpoint, recommend STR upon d/c  Pt is to continue to benefit from skilled occupational therapy services while in the hospital to maximize functioning and independence with daily activities  See below for OT goals to be addressed 3-5x/wk  Goals   Patient Goals to not stand   Plan   Treatment Interventions ADL retraining;Functional transfer training; Endurance training;Cognitive reorientation;Equipment evaluation/education;Patient/family training;Neuromuscular reeducation; Compensatory technique education;Continued evaluation; Activityengagement   Goal Expiration Date 12/30/19   OT Treatment Day 1   OT Frequency 3-5x/wk   Recommendation   OT Discharge Recommendation Short Term Rehab   OT - OK to Discharge   (yes to STR, no to home)   Barthel Index   Feeding 10   Bathing 0   Grooming Score 0   Dressing Score 5   Bladder Score 0   Bowels Score 5   Toilet Use Score 5   Transfers (Bed/Chair) Score 5   Mobility (Level Surface) Score 0   Stairs Score 0   Barthel Index Score 30     Goals:  Pt will complete all self-care tasks with supervision with use of DME/AD as appropriate  Pt will complete bed mobility I'ly  Pt will follow 100% of simple one step commands in order to complete a functional activity  Pt will be attentive 100% of the time during ongoing cognitive assessment w/ G participation to assist w/ safe d/c planning/recommendations    Pt will demonstrate G carryover of pt/caregiver education and training as appropriate w/o cues w/ good tolerance to increase safety during functional tasks    Pt will maintain sitting I'ly for at least 20 minutes while completing a dynamic functional activity with good balance and endurance  **OTR to assess functional transfers as pt allows      HYUN Abdi, OTR/L

## 2019-12-20 NOTE — PLAN OF CARE
Problem: PHYSICAL THERAPY ADULT  Goal: Performs mobility at highest level of function for planned discharge setting  See evaluation for individualized goals  Description  Treatment/Interventions: Functional transfer training, LE strengthening/ROM, Elevations, Therapeutic exercise, Endurance training, Patient/family training, Equipment eval/education, Bed mobility, Gait training, Compensatory technique education, Spoke to nursing, Spoke to case management, OT, Family  Equipment Recommended: Walker(RW)       See flowsheet documentation for full assessment, interventions and recommendations  Note:   Prognosis: Fair  Problem List: Decreased strength, Decreased endurance, Decreased mobility, Impaired balance, Decreased coordination, Decreased cognition, Decreased safety awareness, Impaired judgement  Assessment: Pt seen for re-evaluation 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  Pt presents w/ decreased bed mob, sitting balance, endurance, B LE strength  Most limuted presently by cog, motivation and pain  Will benefit from skilled PT to correct for the above problems, as well as to be a PT to see next session to attempt transfers/standing and gait  Recommend rehab at d/c  Barriers to Discharge: Inaccessible home environment     Recommendation: Post acute IP rehab(recommend rehab at d/c)     PT - OK to Discharge: (S) Yes(when stable to rehab)    See flowsheet documentation for full assessment

## 2019-12-20 NOTE — PLAN OF CARE
Problem: OCCUPATIONAL THERAPY ADULT  Goal: Performs self-care activities at highest level of function for planned discharge setting  See evaluation for individualized goals  Description  Treatment Interventions: ADL retraining, Functional transfer training, Endurance training, Cognitive reorientation, Equipment evaluation/education, Patient/family training, Neuromuscular reeducation, Compensatory technique education, Continued evaluation, Activityengagement          See flowsheet documentation for full assessment, interventions and recommendations  Note:   Limitation: Decreased ADL status, Decreased Safe judgement during ADL, Decreased cognition, Decreased endurance, Decreased self-care trans, Decreased high-level ADLs  Prognosis: Fair  Assessment: Pt is a 78year old male seen for OT re-evaluation 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  Additional active problems include: ABLA, PAD, HTN, BPH and early dementia  Pt ordered to wear knee immobilizer at all times  See initial OT evaluation for home set-up and PLOF  Pt is currently demonstrating the following occupational deficits: eating with set-up, grooming with set-up, UB bathing with Robyn, LB bathing with modA, UB dressing with Robyn, LB dressing with maxA, toileting with totalA, bed mobility with Robyn  Factors currently limiting pt's independence in these areas include: cognitive deficits, self-limiting behaviors, functional mobility, impulsivity, balance, and unsupportive home environment  Overall, pt scored 30/100 on the Barthel Index  From an OT standpoint, recommend STR upon d/c  Pt is to continue to benefit from skilled occupational therapy services while in the hospital to maximize functioning and independence with daily activities  See below for OT goals to be addressed 3-5x/wk       OT Discharge Recommendation: Short Term Rehab  OT - OK to Discharge: (yes to STR, no to home)

## 2019-12-20 NOTE — ASSESSMENT & PLAN NOTE
79 y/o M presents with Right lower extremity rest pain, anemia, concern for GI bleed, supratherapeutic INR  He is s/p left femoral endarterectomy with profundoplasty, SFA embolectomy and retrograde iliac stenting 11/6/19 and arteriogram with left SFA/popliteal angioplasty and right SFA angioplasty 11/14/19      S/P EGD/Colonoscopy 12/10 (EGD clean based ulcers with no active bleeding, colonoscopy poor prep no active bleeding)  S/P RLE Agram- popliteal recanualization, AT/PT occluded- IR 12/13  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    Plan:  Continue Heparin gtt, will discuss plan for [facility] [place]   Continue knee immobilizer  Keep surgical dressings in place today  Pain control

## 2019-12-20 NOTE — PROGRESS NOTES
Progress Note - Jonah Ibrahim 78 y o  male MRN: [id number]    Unit/Bed#: [facility] 530-01 Encounter: [id number]      Assessment:  8year-old male status post BKA yesterday  Vital signs stable  Afebrile  Right stump clean dry and intact  Plan:  Regular diet  Pain control  Nausea control  PT OT    Subjective:   Denied fever, chills, chest pain, shortness of breath, nausea, vomiting, or abdominal pain this morning  Objective:     Vitals: Blood pressure 114/54, pulse 84, temperature 98 8 °F (37 1 °C), temperature source Probe, resp  rate 16, height 5' 5" (1 651 m), weight 51 4 kg (113 lb 5 1 oz), SpO2 96 %  ,Body mass index is 18 86 kg/m²  Intake/Output Summary (Last 24 hours) at 12/20/2019 0213  Last data filed at 12/19/2019 2300  Gross per 24 hour   Intake 577 ml   Output 1250 ml   Net -673 ml       Physical Exam  General: NAD  HEENT: NC/AT  MMM  Cv: RRR  Lungs: normal effort  Ab: Soft, NT/ND  Ex: no CCE right stump clean dry and intact    Neuro: AAOx3      Invasive Devices     Peripheral Intravenous Line            Peripheral IV 12/16/19 Left Forearm 3 days          Epidural Line            Nerve Block Catheter 12/19/19 less than 1 day          Drain            Urethral Catheter Latex 16 Fr  less than 1 day              Scheduled Meds:  Current Facility-Administered Medications:  amLODIPine 10 mg Oral Daily Nikita Bedolla MD    aspirin 81 mg Oral Daily Nikita Bedolla MD    atorvastatin 20 mg Oral After Micah Leslie MD    cefazolin 1,000 mg Intravenous Once Nikita Bedolla MD    chlorhexidine 15 mL Swish & Spit Once Nikita Bedolla MD    clopidogrel 75 mg Oral Daily Nikita Aid, MD    fluconazole 100 mg Oral Daily Nikita Aid, MD    gabapentin 100 mg Oral Daily Nikita Aid, MD    heparin (porcine) 3-20 Units/kg/hr (Order-Specific) Intravenous Titrated Nikita Bedolla MD Last Rate: 14 Units/kg/hr (12/20/19 0141)   heparin (porcine) 1,500 Units Intravenous PRN Joana Rhodes MD    heparin (porcine) 3,000 Units Intravenous PRN Joana Rhodes MD    HYDROmorphone 0 2 mg Intravenous Q3H PRN Joana Rhodes MD    lactated ringers 75 mL/hr Intravenous Continuous Joana Rhodes MD Last Rate: 75 mL/hr (12/18/19 1540)   melatonin 6 mg Oral HS Joana Rhodes MD    nicotine 1 patch Transdermal Daily Joana Rhodes MD    ondansetron 4 mg Intravenous Q4H PRN Joana Rhodes MD    oxyCODONE 2 5 mg Oral Q4H PRN Joana Rhodes MD    oxyCODONE 5 mg Oral Q4H PRN Joana Rhodes MD    pantoprazole 40 mg Oral BID AC Joana Rhodes MD    ropivacaine  Infiltration Continuous Joana Rhodes MD Last Rate: 10 mL/hr (12/19/19 1311)   senna-docusate sodium 1 tablet Oral BID Joana Rhodes MD    tamsulosin 0 4 mg Oral Daily With Chrissy Garcia MD    thiamine 100 mg Oral Daily Joana Rhodes MD      Continuous Infusions:  heparin (porcine) 3-20 Units/kg/hr (Order-Specific) Last Rate: 14 Units/kg/hr (12/20/19 0141)   lactated ringers 75 mL/hr Last Rate: 75 mL/hr (12/18/19 1540)   ropivacaine  Last Rate: 10 mL/hr (12/19/19 1311)     PRN Meds: heparin (porcine)    heparin (porcine)    HYDROmorphone    ondansetron    oxyCODONE    oxyCODONE    Lab, Imaging and other studies: I have personally reviewed pertinent reports      VTE Pharmacologic Prophylaxis: Heparin  VTE Mechanical Prophylaxis: sequential compression device

## 2019-12-20 NOTE — SOCIAL WORK
CM spoke with admissions at Acute rehab facility referral to [facility] [phone] to follow up on acceptance  Admissions requested an updated referral on Monday, states patient was just in the OR yesterday  CM will continue to follow  CM spoke with daughter Roel Rhodes for more rehab choices  Roel Rhodes requested choices be emailed to her at [email or web address]  Acute rehab facility choices emailed as requested       CM to follow up

## 2019-12-20 NOTE — CONSULTS
PHYSICAL MEDICINE AND REHABILITATION CONSULT NOTE  Sandro Tavares 78 y o  male MRN: [id number]  Unit/Bed#: [facility] 530-01 Encounter: [id number]    Requested by (Physician/Service): Nicki Varela MD  Reason for Consultation:  Assessment of rehabilitation needs    Assessment:  Rehabilitation Diagnosis: Right transtibial amputation (R BKA) 12/19/19     Recommendations:  Rehabilitation Plan:  · Continue PT/OT while on acute care  · Amputation precautions: To avoid knee flexion contracture, avoid elevating edge of bed, maintain knee in extension especially while sleeping/napping  Ok to remove knee immobilizer periodically while patient is awake especially if he has excessive pain  Figure of 8 wraps  · Personally discussed with patient and his son regarding post amputation phases (healing, pre-prosthetic training, initial prosthetic training, and final prosthetic training) with their understanding  Patient himself may require assistance during the prosthetic phase education period due to baseline cognitive deficits - son and daughter state they are available to assist in entire process  · When medically stable, patient is an appropriate candidate for acute inpatient rehabilitation for pre-prosthetic training/education  He will need PT/OT  ELOS 10-12 days  [facility] choice is [facility] in [place], [place]     · Phantom sensations/pain: currently not an issue, however can utilize gabapentin gradually increase  Avoid narcotics as patient is very sensitive and at high risk for delirium  Follow-up in outpatient physiatry clinic - added to AVS    Medical Co-morbidities Plan:  R BKA  ABLA  ? previous GIB concern - testing negative  Acute mild pain  PAD  HTN  BPH  Early dementia    Thank you for this consultation  Do not hesitate to contact service with further questions        Diaz Leo MD  PM&R    History of Present Illness:  Sandro Tavares is a 78 y o  male with  has a past medical history of Cancer [facility]), PAD (peripheral artery disease) ([facility] ) (11/2/2019), PEG (percutaneous endoscopic gastrostomy) status ([facility] ), and Port-A-Cath in place  Nayely Allen is a 78 y o  male with a PMH of PAD, left femoral endarterectomy, SFA PTA stent 11/19/2019, right foot rest pain, right 3rd and 4th toe ambutations and HTN presented to the [address] on 12/4/2019 with critical limb ischemia of the RLE  CTA showed acute occlusion of the right profunda femoral artery with a chronic right popliteal occlusion  There was no posterior tibial or dorsal pedalis pulse  He was on coumadin and found to be supratherapeutic with INR of 7  He also reported black stool for which GI was consulted  He underwent an EGD and colonoscopy which showed ulcers with no active bleeding  RLE agram showed popliteal recanalization, AT/PT were occluded  Ultimately amputation was recommended by vascular surgery  Patient was taken to the OR on 12/191/9 for right transtibial amputation ( R BKA ) by Dr Rachel Javed  Patient seen face to face with his son at the bedside  Patient is awake and answering questions in Iranian, son is translating  Currently no complaints of pain or phantom pains  Review of Systems: 10 point ROS negative except for what is noted in HPI    Function:  Prior level of function and living situation:  Patient resides with his daughter in [facility] in a bilevel home with 2STE and 8steps to the main floor with his bed and bath  Daughter is home 24/7  Son is also available but lives near [place], [place]  Prior to admission patient was independent for mobility and self care however son states he is forgetful and has a diagnosis of early possible dementia       Current level of function:  Physical Therapy: pending   Occupational Therapy: pending     Physical Exam:  BP (!) 102/45 (BP Location: Right arm)   Pulse 84   Temp 98 8 °F (37 1 °C) (Oral)   Resp 16   Ht 5' 5" (1 651 m)   Wt 51 4 kg (113 lb 5 1 oz)   SpO2 96%   BMI 18 86 kg/m²        Intake/Output Summary (Last 24 hours) at 12/19/2019 2150  Last data filed at 12/19/2019 2100  Gross per 24 hour   Intake 901 ml   Output 1725 ml   Net -824 ml       Body mass index is 18 86 kg/m²  Physical Exam   Constitutional: He is oriented to person, place, and time  He appears well-developed  Thin appearing male    HENT:   Head: Normocephalic and atraumatic  Eyes: Pupils are equal, round, and reactive to light  EOM are normal    Cardiovascular: Normal rate and regular rhythm  Pulmonary/Chest: Effort normal  He has no wheezes  He has no rales  Decreased at bases    Abdominal: Soft  Bowel sounds are normal  He exhibits no distension  There is no tenderness  Musculoskeletal:   Right residual limb:  Surgical dressings in place   Neurological: He is alert and oriented to person, place, and time  Sensation to light touch intact  Motor exam:  RLE deferred  LLE: 4/5 throughout  BUE: 4/5 throughout   Skin: Skin is warm  Incision covered  Psychiatric: He has a normal mood and affect  Nursing note and vitals reviewed             Social History:    Social History     Socioeconomic History    Marital status: Single     Spouse name: None    Number of children: None    Years of education: None    Highest education level: None   Occupational History    None   Social Needs    Financial resource strain: None    Food insecurity:     Worry: None     Inability: None    Transportation needs:     Medical: None     Non-medical: None   Tobacco Use    Smoking status: Current Every Day Smoker     Packs/day: 1 00     Years: 15 00     Pack years: 15 00     Types: Cigarettes    Smokeless tobacco: Never Used    Tobacco comment: 50+ years   Substance and Sexual Activity    Alcohol use: Never     Alcohol/week: 0 0 standard drinks     Frequency: Patient refused     Drinks per session: Patient refused     Binge frequency: Patient refused    Drug use: No    Sexual activity: Not Currently     Birth control/protection: None   Lifestyle    Physical activity:     Days per week: None     Minutes per session: None    Stress: None   Relationships    Social connections:     Talks on phone: None     Gets together: None     Attends Sabianist service: None     Active member of club or organization: None     Attends meetings of clubs or organizations: None     Relationship status: None    Intimate partner violence:     Fear of current or ex partner: None     Emotionally abused: None     Physically abused: None     Forced sexual activity: None   Other Topics Concern    None   Social History Narrative    None        Family History:    History reviewed  No pertinent family history        Medications:     Current Facility-Administered Medications:     amLODIPine (NORVASC) tablet 10 mg, 10 mg, Oral, Daily, Rashaad Domínguez MD, 10 mg at 12/18/19 0946    aspirin (ECOTRIN LOW STRENGTH) EC tablet 81 mg, 81 mg, Oral, Daily, Rashaad Domínguez MD, 81 mg at 12/19/19 1550    atorvastatin (LIPITOR) tablet 20 mg, 20 mg, Oral, After aKren Brooks MD, 20 mg at 12/19/19 1751    ceFAZolin (ANCEF) IVPB (premix) 1,000 mg, 1,000 mg, Intravenous, Once, Rashaad Domínguez MD    chlorhexidine (PERIDEX) 0 12 % oral rinse 15 mL, 15 mL, Swish & Spit, Once, Rashaad Domínguez MD    clopidogrel (PLAVIX) tablet 75 mg, 75 mg, Oral, Daily, Rashaad Domínguez MD, 75 mg at 12/19/19 1551    fluconazole (DIFLUCAN) tablet 100 mg, 100 mg, Oral, Daily, Rashaad Domínguez MD, 100 mg at 12/19/19 1551    gabapentin (NEURONTIN) capsule 100 mg, 100 mg, Oral, Daily, Rashaad Domínguez MD, 100 mg at 12/19/19 1551    heparin (porcine) 25,000 units in 250 mL infusion (premix), 3-20 Units/kg/hr (Order-Specific), Intravenous, Titrated, Rashaad Domínguez MD, Last Rate: 6 mL/hr at 12/19/19 1749, 12 Units/kg/hr at 12/19/19 1749    heparin (porcine) injection 1,500 Units, 1,500 Units, Intravenous, PRN, [facility] Santiago Mittal MD, 1,500 Units at 12/16/19 1801    heparin (porcine) injection 3,000 Units, 3,000 Units, Intravenous, PRN, Halle Eller MD    HYDROmorphone (DILAUDID) injection 0 2 mg, 0 2 mg, Intravenous, Q3H PRN, Halle Eller MD, 0 2 mg at 12/13/19 0248    lactated ringers infusion, 75 mL/hr, Intravenous, Continuous, Halle Eller MD, Last Rate: 75 mL/hr at 12/18/19 1540    melatonin tablet 6 mg, 6 mg, Oral, HS, Halle Eller MD, 6 mg at 12/19/19 2034    nicotine (NICODERM CQ) 14 mg/24hr TD 24 hr patch 1 patch, 1 patch, Transdermal, Daily, Halle Eller MD, Stopped at 12/19/19 1812    ondansetron (ZOFRAN) injection 4 mg, 4 mg, Intravenous, Q4H PRN, Halle Eller MD    oxyCODONE (ROXICODONE) IR tablet 2 5 mg, 2 5 mg, Oral, Q4H PRN, Halle Eller MD, 2 5 mg at 12/16/19 1651    oxyCODONE (ROXICODONE) IR tablet 5 mg, 5 mg, Oral, Q4H PRN, Halle Eller MD, 5 mg at 12/18/19 0953    pantoprazole (PROTONIX) EC tablet 40 mg, 40 mg, Oral, BID AC, Halle Eller MD, 40 mg at 12/19/19 1553    ropivacaine (NAROPIN) 0 2% in elastomeric infiltration pump (ON-Q* PUMP), , Infiltration, Continuous, Halle Eller MD, Last Rate: 10 mL/hr at 12/19/19 1311, 10 mL/hr at 12/19/19 1311    senna-docusate sodium (SENOKOT S) 8 6-50 mg per tablet 1 tablet, 1 tablet, Oral, BID, Halle Eller MD, 1 tablet at 12/18/19 1805    tamsulosin (FLOMAX) capsule 0 4 mg, 0 4 mg, Oral, Daily With Talat Bonner MD, 0 4 mg at 12/19/19 1553    thiamine (VITAMIN B1) tablet 100 mg, 100 mg, Oral, Daily, Halle Eller MD, 100 mg at 12/18/19 0903    Past Medical History:     Past Medical History:   Diagnosis Date    Cancer [facility])     throat cancer    [facility] 11/[facility]    PEG (percutaneous endoscopic gastrostomy) status ([facility] )     Port-A-Cath in place         Past Surgical History:     Past Surgical History:   Procedure Laterality Date    BYPASS FEMORAL-FEMORAL Right     ESOPHAGOSCOPY N/A 8/24/2017    Procedure: Fausto Jorge;  Surgeon: Natalia Heller MD;  Location: [facility];  Service: ENT    IR ABDOMINAL ANGIOGRAPHY / INTERVENTION  11/14/2019    IR ABDOMINAL ANGIOGRAPHY / INTERVENTION  11/7/2019    IR ABDOMINAL ANGIOGRAPHY / INTERVENTION  12/13/2019    [place] BRONCHOSCOPY,DIAGNOSTIC N/A 8/24/2017    Procedure: Bronchoscopy;  Surgeon: Natalia Heller MD;  Location: [facility];  Service: ENT    [place] LARYNGOSCOPY,DIRCT,OP,BIOPSY N/A 8/24/2017    Procedure: LARYNGOSCOPY DIRECT;  Surgeon: Natalia Heller MD;  Location: [facility];  Service: ENT    [place] Left 11/7/2019    Procedure: LEFT ENDARTERECTOMY ARTERIAL FEMORAL; L CFAE WITH PROFUNDOPLASTY; ARTERIOGRAM;RETROGRADE ILIAC STENTING;  Surgeon: Saravanan Rosado MD;  [facility];  Service: Vascular    TOE AMPUTATION Right     2 toes         Allergies:     No Known Allergies        LABORATORY RESULTS:      Lab Results   Component Value Date    HGB 8 8 (L) 12/19/2019    HCT 28 6 (L) 12/19/2019    WBC 9 67 12/19/2019     Lab Results   Component Value Date    BUN 12 12/19/2019    K 4 2 12/19/2019     12/19/2019    GLUCOSE 128 11/07/2019    CREATININE 0 86 12/19/2019     Lab Results   Component Value Date    PROTIME 15 3 (H) 12/16/2019    INR 1 25 (H) 12/16/2019        DIAGNOSTIC STUDIES: Reviewed  Xr Chest Portable    Result Date: 12/10/2019  Impression: No acute cardiopulmonary disease   Workstation performed: [id number]

## 2019-12-20 NOTE — PROGRESS NOTES
Vascular Surgery    Progress Note - Tommy  1950, 78 y o  male MRN: [id number]    Unit/Bed#: [facility] 131-28 Encounter: [id number]    Primary Care Provider: Beth Mortensen MD   Date and time admitted to hospital: 2019  8:33 PM    * Critical lower limb ischemia  Assessment & Plan  77 y/o M presents with Right lower extremity rest pain, anemia, concern for GI bleed, supratherapeutic INR  He is s/p left femoral endarterectomy with profundoplasty, SFA embolectomy and retrograde iliac stenting 19 and arteriogram with left SFA/popliteal angioplasty and right SFA angioplasty 19  S/P EGD/Colonoscopy 12/10 (EGD clean based ulcers with no active bleeding, colonoscopy poor prep no active bleeding)  S/P RLE Agram- popliteal recanualization, AT/PT occluded- IR   S/P Right BKA     Plan:  Continue Heparin gtt, will discuss plan for [facility]   Continue knee immobilizer  Keep surgical dressings in place today  Pain control        Subjective:  Pt c/o pain at stump site    Vitals:  /54 (BP Location: Right arm)   Pulse 84   Temp 98 8 °F (37 1 °C) (Probe)   Resp 16   Ht 5' 5" (1 651 m)   Wt 51 4 kg (113 lb 5 1 oz)   SpO2 96%   BMI 18 86 kg/m²     I/Os:  I/O last 3 completed shifts:   In: 3291 9 [P O :580; I V :2711 9]  Out: 2145 [Urine:2145]  I/O this shift:  In: 240 [P O :240]  Out: 1300 [Urine:1300]    Lab Results and Cultures:   Lab Results   Component Value Date    WBC 9 67 2019    HGB 8 8 (L) 2019    HCT 28 6 (L) 2019    MCV 94 2019     2019     Lab Results   Component Value Date    GLUCOSE 128 2019    CALCIUM 9 1 2019    K 4 2 2019    CO2 26 2019     2019    BUN 12 2019    CREATININE 0 86 2019     Lab Results   Component Value Date    INR 1 25 (H) 2019    INR 1 29 (H) 12/15/2019    INR 1 38 (H) 2019    PROTIME 15 3 (H) 2019    PROTIME 15 7 (H) 12/15/2019    PROTIME 16 5 (H) 12/14/2019        Blood Culture: No results found for: BLOODCX,   Urinalysis: No results found for: COLORU, CLARITYU, SPECGRAV, PHUR, LEUKOCYTESUR, NITRITE, PROTEINUA, GLUCOSEU, KETONESU, BILIRUBINUR, BLOODU,   Urine Culture: No results found for: URINECX,   Wound Culure: No results found for: WOUNDCULT    Medications:  Current Facility-Administered Medications   Medication Dose Route Frequency    amLODIPine (NORVASC) tablet 10 mg  10 mg Oral Daily    aspirin (ECOTRIN LOW STRENGTH) EC tablet 81 mg  81 mg Oral Daily    atorvastatin (LIPITOR) tablet 20 mg  20 mg Oral After Dinner    ceFAZolin (ANCEF) IVPB (premix) 1,000 mg  1,000 mg Intravenous Once    chlorhexidine (PERIDEX) 0 12 % oral rinse 15 mL  15 mL Swish & Spit Once    clopidogrel (PLAVIX) tablet 75 mg  75 mg Oral Daily    fluconazole (DIFLUCAN) tablet 100 mg  100 mg Oral Daily    gabapentin (NEURONTIN) capsule 100 mg  100 mg Oral Daily    heparin (porcine) 25,000 units in 250 mL infusion (premix)  3-20 Units/kg/hr (Order-Specific) Intravenous Titrated    heparin (porcine) injection 1,500 Units  1,500 Units Intravenous PRN    heparin (porcine) injection 3,000 Units  3,000 Units Intravenous PRN    HYDROmorphone (DILAUDID) injection 0 2 mg  0 2 mg Intravenous Q3H PRN    lactated ringers infusion  75 mL/hr Intravenous Continuous    melatonin tablet 6 mg  6 mg Oral HS    nicotine (NICODERM CQ) 14 mg/24hr TD 24 hr patch 1 patch  1 patch Transdermal Daily    ondansetron (ZOFRAN) injection 4 mg  4 mg Intravenous Q4H PRN    oxyCODONE (ROXICODONE) IR tablet 2 5 mg  2 5 mg Oral Q4H PRN    oxyCODONE (ROXICODONE) IR tablet 5 mg  5 mg Oral Q4H PRN    pantoprazole (PROTONIX) EC tablet 40 mg  40 mg Oral BID AC    ropivacaine (NAROPIN) 0 2% in elastomeric infiltration pump (ON-Q* PUMP)   Infiltration Continuous    senna-docusate sodium (SENOKOT S) 8 6-50 mg per tablet 1 tablet  1 tablet Oral BID    tamsulosin (FLOMAX) capsule 0 4 mg  0 4 mg Oral Daily With Dinner    thiamine (VITAMIN B1) tablet 100 mg  100 mg Oral Daily       Physical Exam:    General appearance: alert and oriented, in no acute distress  Lungs: clear to auscultation bilaterally  Heart: regular rate and rhythm, S1, S2 normal, no murmur, click, rub or gallop  Abdomen: soft, non-tender; bowel sounds normal; no masses,  no organomegaly  Extremities: S/p right BKA, LLE no CCE    Wound/Incision:  Right BKA dressing clean, dry, and intact        Elvin Hamm PA-C  12/20/2019

## 2019-12-20 NOTE — PLAN OF CARE
Problem: Prexisting or High Potential for Compromised Skin Integrity  Goal: Skin integrity is maintained or improved  Description  INTERVENTIONS:  - Identify patients at risk for skin breakdown  - Assess and monitor skin integrity  - Assess and monitor nutrition and hydration status  - Monitor labs   - Assess for incontinence   - Turn and reposition patient  - Assist with mobility/ambulation  - Relieve pressure over bony prominences  - Avoid friction and shearing  - Provide appropriate hygiene as needed including keeping skin clean and dry  - Evaluate need for skin moisturizer/barrier cream  - Collaborate with interdisciplinary team   - Patient/family teaching  - Consider wound care consult   Outcome: Progressing     Problem: Potential for Falls  Goal: Patient will remain free of falls  Description  INTERVENTIONS:  - Assess patient frequently for physical needs  -  Identify cognitive and physical deficits and behaviors that affect risk of falls    -  [place] fall precautions as indicated by assessment   - Educate patient/family on patient safety including physical limitations  - Instruct patient to call for assistance with activity based on assessment  - Modify environment to reduce risk of injury  - Consider OT/PT consult to assist with strengthening/mobility  Outcome: Progressing     Problem: PAIN - ADULT  Goal: Verbalizes/displays adequate comfort level or baseline comfort level  Description  Interventions:  - Encourage patient to monitor pain and request assistance  - Assess pain using appropriate pain scale  - Administer analgesics based on type and severity of pain and evaluate response  - Implement non-pharmacological measures as appropriate and evaluate response  - Consider cultural and social influences on pain and pain management  - Notify physician/advanced practitioner if interventions unsuccessful or patient reports new pain  Outcome: Progressing     Problem: INFECTION - ADULT  Goal: Absence or prevention of progression during hospitalization  Description  INTERVENTIONS:  - Assess and monitor for signs and symptoms of infection  - Monitor lab/diagnostic results  - Monitor all insertion sites, i e  indwelling lines, tubes, and drains  - Monitor endotracheal if appropriate and nasal secretions for changes in amount and color  - [place] appropriate cooling/warming therapies per order  - Administer medications as ordered  - Instruct and encourage patient and family to use good hand hygiene technique  - Identify and instruct in appropriate isolation precautions for identified infection/condition  Outcome: Progressing  Goal: Absence of fever/infection during neutropenic period  Description  INTERVENTIONS:  - Monitor WBC    Outcome: Progressing     Problem: SAFETY ADULT  Goal: Patient will remain free of falls  Description  INTERVENTIONS:  - Assess patient frequently for physical needs  -  Identify cognitive and physical deficits and behaviors that affect risk of falls    -  [place] fall precautions as indicated by assessment   - Educate patient/family on patient safety including physical limitations  - Instruct patient to call for assistance with activity based on assessment  - Modify environment to reduce risk of injury  - Consider OT/PT consult to assist with strengthening/mobility  Outcome: Progressing  Goal: Maintain or return to baseline ADL function  Description  INTERVENTIONS:  -  Assess patient's ability to carry out ADLs; assess patient's baseline for ADL function and identify physical deficits which impact ability to perform ADLs (bathing, care of mouth/teeth, toileting, grooming, dressing, etc )  - Assess/evaluate cause of self-care deficits   - Assess range of motion  - Assess patient's mobility; develop plan if impaired  - Assess patient's need for assistive devices and provide as appropriate  - Encourage maximum independence but intervene and supervise when necessary  - Involve family in performance of ADLs  - Assess for home care needs following discharge   - Consider OT consult to assist with ADL evaluation and planning for discharge  - Provide patient education as appropriate  Outcome: Progressing  Goal: Maintain or return mobility status to optimal level  Description  INTERVENTIONS:  - Assess patient's baseline mobility status (ambulation, transfers, stairs, etc )    - Identify cognitive and physical deficits and behaviors that affect mobility  - Identify mobility aids required to assist with transfers and/or ambulation (gait belt, sit-to-stand, lift, walker, cane, etc )  - [place] fall precautions as indicated by assessment  - Record patient progress and toleration of activity level on Mobility SBAR; progress patient to next Phase/Stage  - Instruct patient to call for assistance with activity based on assessment  - Consider rehabilitation consult to assist with strengthening/weightbearing, etc   Outcome: Progressing     Problem: DISCHARGE PLANNING  Goal: Discharge to home or other facility with appropriate resources  Description  INTERVENTIONS:  - Identify barriers to discharge w/patient and caregiver  - Arrange for needed discharge resources and transportation as appropriate  - Identify discharge learning needs (meds, wound care, etc )  - Arrange for interpretive services to assist at discharge as needed  - Refer to Case Management Department for coordinating discharge planning if the patient needs post-hospital services based on physician/advanced practitioner order or complex needs related to functional status, cognitive ability, or social support system  Outcome: Progressing     Problem: Knowledge Deficit  Goal: Patient/family/caregiver demonstrates understanding of disease process, treatment plan, medications, and discharge instructions  Description  Complete learning assessment and assess knowledge base    Interventions:  - Provide teaching at level of understanding  - Provide teaching via preferred learning methods  Outcome: Progressing     Problem: Nutrition/Hydration-ADULT  Goal: Nutrient/Hydration intake appropriate for improving, restoring or maintaining nutritional needs  Description  Monitor and assess patient's nutrition/hydration status for malnutrition  Collaborate with interdisciplinary team and initiate plan and interventions as ordered  Monitor patient's weight and dietary intake as ordered or per policy  Utilize nutrition screening tool and intervene as necessary  Determine patient's food preferences and provide high-protein, high-caloric foods as appropriate       INTERVENTIONS:  - Monitor oral intake, urinary output, labs, and treatment plans  - Assess nutrition and hydration status and recommend course of action  - Evaluate amount of meals eaten  - Assist patient with eating if necessary   - Allow adequate time for meals  - Recommend/ encourage appropriate diets, oral nutritional supplements, and vitamin/mineral supplements  - Order, calculate, and assess calorie counts as needed  - Recommend, monitor, and adjust tube feedings and TPN/PPN based on assessed needs  - Assess need for intravenous fluids  - Provide specific nutrition/hydration education as appropriate  - Include patient/family/caregiver in decisions related to nutrition  Outcome: Progressing

## 2019-12-20 NOTE — PROGRESS NOTES
Progress Note - Anesthesia Acute Pain Management    Jasmin Cummings 78 y o  male MRN: [id number]  Unit/Bed#: [facility] 665-29 Encounter: [id number]      SURGERY DATE: 12/19/2019  Post-Op Diagnosis Codes:     * PAD (peripheral artery disease) ([facility] ) [I73 9]    Assessment:   78 y o  male status post Procedure(s):  AMPUTATION BELOW KNEE (BKA) POD# 1      Principal Problem:    Critical lower limb ischemia  Active Problems:    PAD (peripheral artery disease) ([facility] )      Plan:   1  Catheter appears to be working well  Patient is a difficult historian,  recommend delirium precautions  2   Overnight change in self reported pain score is likely due to the effect of the higher concentrations single shot component of the catheter placement wearing off  APS will continue to follow; please call  / [phone] ( [phone]) with any questions      Pain Course:    24 hour history:    No significant events noted overnight  Patient a difficult historian, but currently denies pain        Meds/Allergies   current meds:   Current Facility-Administered Medications   Medication Dose Route Frequency    amLODIPine (NORVASC) tablet 10 mg  10 mg Oral Daily    aspirin (ECOTRIN LOW STRENGTH) EC tablet 81 mg  81 mg Oral Daily    atorvastatin (LIPITOR) tablet 20 mg  20 mg Oral After Dinner    ceFAZolin (ANCEF) IVPB (premix) 1,000 mg  1,000 mg Intravenous Once    chlorhexidine (PERIDEX) 0 12 % oral rinse 15 mL  15 mL Swish & Spit Once    clopidogrel (PLAVIX) tablet 75 mg  75 mg Oral Daily    fluconazole (DIFLUCAN) tablet 100 mg  100 mg Oral Daily    gabapentin (NEURONTIN) capsule 100 mg  100 mg Oral Daily    heparin (porcine) 25,000 units in 250 mL infusion (premix)  3-20 Units/kg/hr (Order-Specific) Intravenous Titrated    heparin (porcine) injection 1,500 Units  1,500 Units Intravenous PRN    heparin (porcine) injection 3,000 Units  3,000 Units Intravenous PRN    HYDROmorphone (DILAUDID) injection 0 2 mg  0 2 mg Intravenous Q3H PRN    lactated ringers infusion  75 mL/hr Intravenous Continuous    melatonin tablet 6 mg  6 mg Oral HS    nicotine (NICODERM CQ) 14 mg/24hr TD 24 hr patch 1 patch  1 patch Transdermal Daily    ondansetron (ZOFRAN) injection 4 mg  4 mg Intravenous Q4H PRN    oxyCODONE (ROXICODONE) IR tablet 2 5 mg  2 5 mg Oral Q4H PRN    oxyCODONE (ROXICODONE) IR tablet 5 mg  5 mg Oral Q4H PRN    pantoprazole (PROTONIX) EC tablet 40 mg  40 mg Oral BID AC    ropivacaine (NAROPIN) 0 2% in elastomeric infiltration pump (ON-Q* PUMP)   Infiltration Continuous    senna-docusate sodium (SENOKOT S) 8 6-50 mg per tablet 1 tablet  1 tablet Oral BID    tamsulosin (FLOMAX) capsule 0 4 mg  0 4 mg Oral Daily With Dinner    thiamine (VITAMIN B1) tablet 100 mg  100 mg Oral Daily       No Known Allergies    Objective     Physical Exam: /54 (BP Location: Right arm)   Pulse 84   Temp 98 8 °F (37 1 °C) (Probe)   Resp 16   Ht 5' 5" (1 651 m)   Wt 51 4 kg (113 lb 5 1 oz)   SpO2 96%   BMI 18 86 kg/m²   Gen: Well appearing and well nourished, NAD  Skin: Warm, Dry   HEENT:  PERRLA, EOMI  Ext:   Dressing and brace to right lower extremity  Dressings appear intact  Unable to examine catheter insertion site due to location below dressing  Neuro: CN II-XII grossly intact; no gross focal neurologic deficits; Alert to person  Difficult to assess other orientations  Labs:        COAG - PT/INR/PTT: --/--/75* (12/20 0144)         SIGNATURE: Smiley Killian MD  DATE: December 20, 2019  TIME: 10:47 AM    Please note that the APS provides consultative services regarding pain management only  With the exception of ketamine and epidural infusions and except when indicated, final decisions regarding starting or changing doses of analgesic medications are at the discretion of the consulting service  Off hours consultation and/or medication management is generally not available

## 2019-12-21 LAB
ANION GAP SERPL CALCULATED.3IONS-SCNC: 6 MMOL/L (ref 4–13)
APTT PPP: 60 SECONDS (ref 23–37)
BUN SERPL-MCNC: 11 MG/DL (ref 5–25)
CALCIUM SERPL-MCNC: 8.3 MG/DL (ref 8.3–10.1)
CHLORIDE SERPL-SCNC: 104 MMOL/L (ref 100–108)
CO2 SERPL-SCNC: 25 MMOL/L (ref 21–32)
CREAT SERPL-MCNC: 0.71 MG/DL (ref 0.6–1.3)
ERYTHROCYTE [DISTWIDTH] IN BLOOD BY AUTOMATED COUNT: 17 % (ref 11.6–15.1)
GFR SERPL CREATININE-BSD FRML MDRD: 95 ML/MIN/1.73SQ M
GLUCOSE SERPL-MCNC: 124 MG/DL (ref 65–140)
HCT VFR BLD AUTO: 21.7 % (ref 36.5–49.3)
HCT VFR BLD AUTO: 25 % (ref 36.5–49.3)
HGB BLD-MCNC: 6.7 G/DL (ref 12–17)
HGB BLD-MCNC: 7.8 G/DL (ref 12–17)
INR PPP: 1.48 (ref 0.84–1.19)
MCH RBC QN AUTO: 29.1 PG (ref 26.8–34.3)
MCHC RBC AUTO-ENTMCNC: 30.9 G/DL (ref 31.4–37.4)
MCV RBC AUTO: 94 FL (ref 82–98)
PLATELET # BLD AUTO: 264 THOUSANDS/UL (ref 149–390)
PMV BLD AUTO: 10.1 FL (ref 8.9–12.7)
POTASSIUM SERPL-SCNC: 3.7 MMOL/L (ref 3.5–5.3)
PROTHROMBIN TIME: 17.5 SECONDS (ref 11.6–14.5)
RBC # BLD AUTO: 2.3 MILLION/UL (ref 3.88–5.62)
SODIUM SERPL-SCNC: 135 MMOL/L (ref 136–145)
WBC # BLD AUTO: 10.22 THOUSAND/UL (ref 4.31–10.16)

## 2019-12-21 PROCEDURE — 85610 PROTHROMBIN TIME: CPT | Performed by: PHYSICIAN ASSISTANT

## 2019-12-21 PROCEDURE — 99024 POSTOP FOLLOW-UP VISIT: CPT | Performed by: SURGERY

## 2019-12-21 PROCEDURE — 85730 THROMBOPLASTIN TIME PARTIAL: CPT | Performed by: SURGERY

## 2019-12-21 PROCEDURE — 85027 COMPLETE CBC AUTOMATED: CPT | Performed by: PHYSICIAN ASSISTANT

## 2019-12-21 PROCEDURE — 99232 SBSQ HOSP IP/OBS MODERATE 35: CPT | Performed by: ANESTHESIOLOGY

## 2019-12-21 PROCEDURE — P9058 RBC, L/R, CMV-NEG, IRRAD: HCPCS

## 2019-12-21 PROCEDURE — 80048 BASIC METABOLIC PNL TOTAL CA: CPT | Performed by: PHYSICIAN ASSISTANT

## 2019-12-21 PROCEDURE — 85018 HEMOGLOBIN: CPT | Performed by: SURGERY

## 2019-12-21 PROCEDURE — 85014 HEMATOCRIT: CPT | Performed by: SURGERY

## 2019-12-21 RX ADMIN — AMLODIPINE BESYLATE 10 MG: 10 TABLET ORAL at 10:38

## 2019-12-21 RX ADMIN — ATORVASTATIN CALCIUM 20 MG: 20 TABLET, FILM COATED ORAL at 17:36

## 2019-12-21 RX ADMIN — ASPIRIN 81 MG: 81 TABLET ORAL at 10:37

## 2019-12-21 RX ADMIN — CLOPIDOGREL BISULFATE 75 MG: 75 TABLET ORAL at 10:39

## 2019-12-21 RX ADMIN — SENNOSIDES AND DOCUSATE SODIUM 1 TABLET: 8.6; 5 TABLET ORAL at 10:37

## 2019-12-21 RX ADMIN — SODIUM CHLORIDE, SODIUM LACTATE, POTASSIUM CHLORIDE, AND CALCIUM CHLORIDE 75 ML/HR: .6; .31; .03; .02 INJECTION, SOLUTION INTRAVENOUS at 17:39

## 2019-12-21 RX ADMIN — WARFARIN SODIUM 1 MG: 1 TABLET ORAL at 17:51

## 2019-12-21 RX ADMIN — NICOTINE 1 PATCH: 14 PATCH TRANSDERMAL at 10:39

## 2019-12-21 RX ADMIN — SENNOSIDES AND DOCUSATE SODIUM 1 TABLET: 8.6; 5 TABLET ORAL at 17:36

## 2019-12-21 RX ADMIN — MELATONIN 6 MG: 3 TAB ORAL at 21:28

## 2019-12-21 RX ADMIN — PANTOPRAZOLE SODIUM 40 MG: 40 TABLET, DELAYED RELEASE ORAL at 17:36

## 2019-12-21 RX ADMIN — PANTOPRAZOLE SODIUM 40 MG: 40 TABLET, DELAYED RELEASE ORAL at 05:32

## 2019-12-21 RX ADMIN — THIAMINE HCL TAB 100 MG 100 MG: 100 TAB at 10:37

## 2019-12-21 RX ADMIN — TAMSULOSIN HYDROCHLORIDE 0.4 MG: 0.4 CAPSULE ORAL at 17:36

## 2019-12-21 RX ADMIN — ACETAMINOPHEN 650 MG: 325 TABLET ORAL at 11:40

## 2019-12-21 RX ADMIN — HEPARIN SODIUM AND DEXTROSE 14 UNITS/KG/HR: 10000; 5 INJECTION INTRAVENOUS at 14:24

## 2019-12-21 RX ADMIN — FLUCONAZOLE 100 MG: 100 TABLET ORAL at 10:39

## 2019-12-21 RX ADMIN — GABAPENTIN 100 MG: 100 CAPSULE ORAL at 10:37

## 2019-12-21 NOTE — CODE DOCUMENTATION
Pt fell OOB found on floor  C-collar applied  C/o neck pain  Pt will go to CT for clearing of c-spine and CTH  BG 96

## 2019-12-21 NOTE — PROGRESS NOTES
Progress Note - Vascular Surgery   Aliyah Wilder 78 y o  male MRN: [id number]  Unit/Bed#: [facility] 718-01 Encounter: [id number]    Assessment:  79 y/o M presents with Right lower extremity rest pain, anemia, concern for GI bleed, supratherapeutic INR  He is s/p left femoral endarterectomy with profundoplasty, SFA embolectomy and retrograde iliac stenting 11/6/19 and arteriogram with left SFA/popliteal angioplasty and right SFA angioplasty 11/14/19  · S/P EGD/Colonoscopy 12/10 (EGD clean based ulcers with no active bleeding, colonoscopy poor prep no active bleeding)  · S/P RLE Agram- popliteal recanualization, AT/PT occluded- IR 12/13  · 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    Plan:  · Transfuse 1u pRBC this morning for hgb 6 8  · Repeat H&H following transfusion  · Dressing taken down earlier today, okay for daily dressing changes by RN from now on  · Maintain R knee immobilizer  · Continue heparin gtt, currently bridging to coumadin  · Continue ASA/statin/plavix    Subjective/Objective     Subjective:   Patient c/o pain at R stump  Objective:     Blood pressure (!) 201/138, pulse 90, temperature 99 4 °F (37 4 °C), resp  rate 18, height 5' 5" (1 651 m), weight 51 4 kg (113 lb 5 1 oz), SpO2 98 %  ,Body mass index is 18 86 kg/m²        Intake/Output Summary (Last 24 hours) at 12/21/2019 0858  Last data filed at 12/21/2019 9230  Gross per 24 hour   Intake 725 ml   Output 3400 ml   Net -2675 ml       Invasive Devices     Peripheral Intravenous Line            Peripheral IV 12/21/19 Distal;Right;Ventral (anterior) Forearm less than 1 day          Epidural Line            Nerve Block Catheter 12/19/19 1 day          Drain            Urethral Catheter Latex 16 Fr  1 day                Physical Exam:   Gen: NAD  CV: RRR  Lungs: nl effort  Abd: soft, NT/ND  Ext: no CCE, R BKA with dressing in place C/D/I and knee immobilizer  Skin: no rashes  Neuro: A&Ox3     Lab, Imaging and other studies:  CBC:   Lab Results   Component Value Date WBC 10 22 (H) 12/21/2019    HGB 6 7 (LL) 12/21/2019    HCT 21 7 (L) 12/21/2019    MCV 94 12/21/2019     12/21/2019    MCH 29 1 12/21/2019    MCHC 30 9 (L) 12/21/2019    RDW 17 0 (H) 12/21/2019    MPV 10 1 12/21/2019   , CMP:   Lab Results   Component Value Date    SODIUM 135 (L) 12/21/2019    K 3 7 12/21/2019     12/21/2019    CO2 25 12/21/2019    BUN 11 12/21/2019    CREATININE 0 71 12/21/2019    CALCIUM 8 3 12/21/2019    EGFR 95 12/21/2019     VTE Pharmacologic Prophylaxis: Heparin gtt  VTE Mechanical Prophylaxis: sequential compression device

## 2019-12-21 NOTE — PROGRESS NOTES
Patient was found with R BKA dressing bleeding and soaked onto sheets  [facility] called and changed dressing  Will continue to monitor

## 2019-12-21 NOTE — PROGRESS NOTES
Baylee initially called on this patient  Started charting on this pt and was found out to be the incorrect patient  Please disregard all rapid response charting related to this patient at this time  It was documented on the wrong patient

## 2019-12-21 NOTE — PLAN OF CARE
Problem: Prexisting or High Potential for Compromised Skin Integrity  Goal: Skin integrity is maintained or improved  Description  INTERVENTIONS:  - Identify patients at risk for skin breakdown  - Assess and monitor skin integrity  - Assess and monitor nutrition and hydration status  - Monitor labs   - Assess for incontinence   - Turn and reposition patient  - Assist with mobility/ambulation  - Relieve pressure over bony prominences  - Avoid friction and shearing  - Provide appropriate hygiene as needed including keeping skin clean and dry  - Evaluate need for skin moisturizer/barrier cream  - Collaborate with interdisciplinary team   - Patient/family teaching  - Consider wound care consult   Outcome: Progressing     Problem: Potential for Falls  Goal: Patient will remain free of falls  Description  INTERVENTIONS:  - Assess patient frequently for physical needs  -  Identify cognitive and physical deficits and behaviors that affect risk of falls    -  [place] fall precautions as indicated by assessment   - Educate patient/family on patient safety including physical limitations  - Instruct patient to call for assistance with activity based on assessment  - Modify environment to reduce risk of injury  - Consider OT/PT consult to assist with strengthening/mobility  Outcome: Progressing     Problem: PAIN - ADULT  Goal: Verbalizes/displays adequate comfort level or baseline comfort level  Description  Interventions:  - Encourage patient to monitor pain and request assistance  - Assess pain using appropriate pain scale  - Administer analgesics based on type and severity of pain and evaluate response  - Implement non-pharmacological measures as appropriate and evaluate response  - Consider cultural and social influences on pain and pain management  - Notify physician/advanced practitioner if interventions unsuccessful or patient reports new pain  Outcome: Progressing     Problem: INFECTION - ADULT  Goal: Absence or prevention of progression during hospitalization  Description  INTERVENTIONS:  - Assess and monitor for signs and symptoms of infection  - Monitor lab/diagnostic results  - Monitor all insertion sites, i e  indwelling lines, tubes, and drains  - Max appropriate cooling/warming therapies per order  - Administer medications as ordered  - Instruct and encourage patient and family to use good hand hygiene technique  - Identify and instruct in appropriate isolation precautions for identified infection/condition   Outcome: Progressing  Goal: Absence of fever/infection during neutropenic period  Description  INTERVENTIONS:  - Monitor WBC    Outcome: Progressing     Problem: SAFETY ADULT  Goal: Patient will remain free of falls  Description  INTERVENTIONS:  - Assess patient frequently for physical needs  -  Identify cognitive and physical deficits and behaviors that affect risk of falls    -  [place] fall precautions as indicated by assessment   - Educate patient/family on patient safety including physical limitations  - Instruct patient to call for assistance with activity based on assessment  - Modify environment to reduce risk of injury  - Consider OT/PT consult to assist with strengthening/mobility  Outcome: Progressing  Goal: Maintain or return to baseline ADL function  Description  INTERVENTIONS:  -  Assess patient's ability to carry out ADLs; assess patient's baseline for ADL function and identify physical deficits which impact ability to perform ADLs (bathing, care of mouth/teeth, toileting, grooming, dressing, etc )  - Assess/evaluate cause of self-care deficits   - Assess range of motion  - Assess patient's mobility; develop plan if impaired  - Assess patient's need for assistive devices and provide as appropriate  - Encourage maximum independence but intervene and supervise when necessary  - Involve family in performance of ADLs  - Assess for home care needs following discharge   - Consider OT consult to assist with ADL evaluation and planning for discharge  - Provide patient education as appropriate  Outcome: Progressing  Goal: Maintain or return mobility status to optimal level  Description  INTERVENTIONS:  - Assess patient's baseline mobility status (ambulation, transfers, stairs, etc )    - Identify cognitive and physical deficits and behaviors that affect mobility  - Identify mobility aids required to assist with transfers and/or ambulation (gait belt, sit-to-stand, lift, walker, cane, etc )  - [place] fall precautions as indicated by assessment  - Record patient progress and toleration of activity level on Mobility SBAR; progress patient to next Phase/Stage  - Instruct patient to call for assistance with activity based on assessment  - Consider rehabilitation consult to assist with strengthening/weightbearing, etc   Outcome: Progressing     Problem: DISCHARGE PLANNING  Goal: Discharge to home or other facility with appropriate resources  Description  INTERVENTIONS:  - Identify barriers to discharge w/patient and caregiver  - Arrange for needed discharge resources and transportation as appropriate  - Identify discharge learning needs (meds, wound care, etc )  - Arrange for interpretive services to assist at discharge as needed  - Refer to Case Management Department for coordinating discharge planning if the patient needs post-hospital services based on physician/advanced practitioner order or complex needs related to functional status, cognitive ability, or social support system  Outcome: Progressing     Problem: Knowledge Deficit  Goal: Patient/family/caregiver demonstrates understanding of disease process, treatment plan, medications, and discharge instructions  Description  Complete learning assessment and assess knowledge base    Interventions:  - Provide teaching at level of understanding  - Provide teaching via preferred learning methods  Outcome: Progressing     Problem: Nutrition/Hydration-ADULT  Goal: Nutrient/Hydration intake appropriate for improving, restoring or maintaining nutritional needs  Description  Monitor and assess patient's nutrition/hydration status for malnutrition  Collaborate with interdisciplinary team and initiate plan and interventions as ordered  Monitor patient's weight and dietary intake as ordered or per policy  Utilize nutrition screening tool and intervene as necessary  Determine patient's food preferences and provide high-protein, high-caloric foods as appropriate       INTERVENTIONS:  - Monitor oral intake, urinary output, labs, and treatment plans  - Assess nutrition and hydration status and recommend course of action  - Evaluate amount of meals eaten  - Assist patient with eating if necessary   - Allow adequate time for meals  - Recommend/ encourage appropriate diets, oral nutritional supplements, and vitamin/mineral supplements  - Order, calculate, and assess calorie counts as needed  - Assess need for intravenous fluids  - Provide specific nutrition/hydration education as appropriate  - Include patient/family/caregiver in decisions related to nutrition   Outcome: Progressing

## 2019-12-21 NOTE — PROGRESS NOTES
Progress Note - Anesthesia Acute Pain Management    Bello Lopez 78 y o  male MRN: [id number]  Unit/Bed#: [facility] 718-01 Encounter: [id number]        Assessment:   Principal Problem:    Critical lower limb ischemia  Active Problems:    PAD (peripheral artery disease) ([address] )    Patient sitting up, comfortable, eating lunch    No pain    Plan:  Continue same      Pain History  Current pain location(s): none  Pain Scale:   0/10  Current Analgesic regimen:     Tylenol 650 mg q6hprn   Gabapentin 100 mg QD   Dilaudid 0 2 mg q3h prn   Oxycodone 2 5-5 mg q4h prn   0 2% Ropivacaine 100 cc/hr through popliteal catheter         Meds/Allergies   current meds:   Current Facility-Administered Medications   Medication Dose Route Frequency    acetaminophen (TYLENOL) tablet 650 mg  650 mg Oral Q6H PRN    amLODIPine (NORVASC) tablet 10 mg  10 mg Oral Daily    aspirin (ECOTRIN LOW STRENGTH) EC tablet 81 mg  81 mg Oral Daily    atorvastatin (LIPITOR) tablet 20 mg  20 mg Oral After Dinner    ceFAZolin (ANCEF) IVPB (premix) 1,000 mg  1,000 mg Intravenous Once    clopidogrel (PLAVIX) tablet 75 mg  75 mg Oral Daily    fluconazole (DIFLUCAN) tablet 100 mg  100 mg Oral Daily    gabapentin (NEURONTIN) capsule 100 mg  100 mg Oral Daily    heparin (porcine) 25,000 units in 250 mL infusion (premix)  3-20 Units/kg/hr (Order-Specific) Intravenous Titrated    heparin (porcine) injection 1,500 Units  1,500 Units Intravenous PRN    heparin (porcine) injection 3,000 Units  3,000 Units Intravenous PRN    HYDROmorphone (DILAUDID) injection 0 2 mg  0 2 mg Intravenous Q3H PRN    lactated ringers infusion  75 mL/hr Intravenous Continuous    melatonin tablet 6 mg  6 mg Oral HS    nicotine (NICODERM CQ) 14 mg/24hr TD 24 hr patch 1 patch  1 patch Transdermal Daily    ondansetron (ZOFRAN) injection 4 mg  4 mg Intravenous Q4H PRN    oxyCODONE (ROXICODONE) IR tablet 2 5 mg  2 5 mg Oral Q4H PRN    oxyCODONE (ROXICODONE) IR tablet 5 mg  5 mg Oral Q4H PRN    pantoprazole (PROTONIX) EC tablet 40 mg  40 mg Oral BID AC    ropivacaine (NAROPIN) 0 2% in elastomeric infiltration pump (ON-Q* PUMP)   Infiltration Continuous    senna-docusate sodium (SENOKOT S) 8 6-50 mg per tablet 1 tablet  1 tablet Oral BID    tamsulosin (FLOMAX) capsule 0 4 mg  0 4 mg Oral Daily With Dinner    thiamine (VITAMIN B1) tablet 100 mg  100 mg Oral Daily    warfarin (COUMADIN) tablet 1 mg  1 mg Oral Daily (warfarin)         No Known Allergies    Objective     Temp:  [99 4 °F (37 4 °C)-101 5 °F (38 6 °C)] 101 1 °F (38 4 °C)  HR:  [84-90] 89  Resp:  [16-18] 16  BP: (110-201)/() 128/53      Intake/Output Summary (Last 24 hours) at 12/21/2019 1239  Last data filed at 12/21/2019 9874  Gross per 24 hour   Intake 689 37 ml   Output 2450 ml   Net -1760 63 ml       PE  A&O*3  CV - RRR  Pulm - CTA  Block Site -  C/D      Counseling / Coordination of Care  Total floor / unit time spent today 15 minutes   Greater than 50% of total time was spent with the patient and / or family counseling and / or coordination of care      SIGNATURE: Kath Gibson MD  DATE: December 21, 2019  TIME: 12:39 PM

## 2019-12-22 LAB
ABO GROUP BLD BPU: NORMAL
ABO GROUP BLD BPU: NORMAL
ANION GAP SERPL CALCULATED.3IONS-SCNC: 6 MMOL/L (ref 4–13)
APTT PPP: 45 SECONDS (ref 23–37)
APTT PPP: 49 SECONDS (ref 23–37)
APTT PPP: 82 SECONDS (ref 23–37)
BPU ID: NORMAL
BPU ID: NORMAL
BUN SERPL-MCNC: 13 MG/DL (ref 5–25)
CALCIUM SERPL-MCNC: 8.5 MG/DL (ref 8.3–10.1)
CHLORIDE SERPL-SCNC: 111 MMOL/L (ref 100–108)
CO2 SERPL-SCNC: 24 MMOL/L (ref 21–32)
CREAT SERPL-MCNC: 0.74 MG/DL (ref 0.6–1.3)
CROSSMATCH: NORMAL
CROSSMATCH: NORMAL
ERYTHROCYTE [DISTWIDTH] IN BLOOD BY AUTOMATED COUNT: 17.2 % (ref 11.6–15.1)
GFR SERPL CREATININE-BSD FRML MDRD: 93 ML/MIN/1.73SQ M
GLUCOSE SERPL-MCNC: 108 MG/DL (ref 65–140)
HCT VFR BLD AUTO: 25.3 % (ref 36.5–49.3)
HGB BLD-MCNC: 7.8 G/DL (ref 12–17)
INR PPP: 1.32 (ref 0.84–1.19)
MCH RBC QN AUTO: 28.7 PG (ref 26.8–34.3)
MCHC RBC AUTO-ENTMCNC: 30.8 G/DL (ref 31.4–37.4)
MCV RBC AUTO: 93 FL (ref 82–98)
PLATELET # BLD AUTO: 280 THOUSANDS/UL (ref 149–390)
PMV BLD AUTO: 10.2 FL (ref 8.9–12.7)
POTASSIUM SERPL-SCNC: 3.7 MMOL/L (ref 3.5–5.3)
PROTHROMBIN TIME: 16 SECONDS (ref 11.6–14.5)
RBC # BLD AUTO: 2.72 MILLION/UL (ref 3.88–5.62)
SODIUM SERPL-SCNC: 141 MMOL/L (ref 136–145)
UNIT DISPENSE STATUS: NORMAL
UNIT DISPENSE STATUS: NORMAL
UNIT PRODUCT CODE: NORMAL
UNIT PRODUCT CODE: NORMAL
UNIT RH: NORMAL
UNIT RH: NORMAL
WBC # BLD AUTO: 8.02 THOUSAND/UL (ref 4.31–10.16)

## 2019-12-22 PROCEDURE — 85730 THROMBOPLASTIN TIME PARTIAL: CPT | Performed by: PHYSICAL MEDICINE & REHABILITATION

## 2019-12-22 PROCEDURE — 80048 BASIC METABOLIC PNL TOTAL CA: CPT | Performed by: SURGERY

## 2019-12-22 PROCEDURE — 85730 THROMBOPLASTIN TIME PARTIAL: CPT | Performed by: SURGERY

## 2019-12-22 PROCEDURE — 99024 POSTOP FOLLOW-UP VISIT: CPT | Performed by: SURGERY

## 2019-12-22 PROCEDURE — 85027 COMPLETE CBC AUTOMATED: CPT | Performed by: SURGERY

## 2019-12-22 PROCEDURE — 97535 SELF CARE MNGMENT TRAINING: CPT

## 2019-12-22 PROCEDURE — 99232 SBSQ HOSP IP/OBS MODERATE 35: CPT | Performed by: ANESTHESIOLOGY

## 2019-12-22 PROCEDURE — 85610 PROTHROMBIN TIME: CPT | Performed by: PHYSICIAN ASSISTANT

## 2019-12-22 RX ADMIN — ATORVASTATIN CALCIUM 20 MG: 20 TABLET, FILM COATED ORAL at 17:39

## 2019-12-22 RX ADMIN — SODIUM CHLORIDE, SODIUM LACTATE, POTASSIUM CHLORIDE, AND CALCIUM CHLORIDE 75 ML/HR: .6; .31; .03; .02 INJECTION, SOLUTION INTRAVENOUS at 08:27

## 2019-12-22 RX ADMIN — HEPARIN SODIUM 1500 UNITS: 1000 INJECTION INTRAVENOUS; SUBCUTANEOUS at 13:25

## 2019-12-22 RX ADMIN — THIAMINE HCL TAB 100 MG 100 MG: 100 TAB at 09:33

## 2019-12-22 RX ADMIN — SENNOSIDES AND DOCUSATE SODIUM 1 TABLET: 8.6; 5 TABLET ORAL at 17:39

## 2019-12-22 RX ADMIN — CLOPIDOGREL BISULFATE 75 MG: 75 TABLET ORAL at 09:26

## 2019-12-22 RX ADMIN — PANTOPRAZOLE SODIUM 40 MG: 40 TABLET, DELAYED RELEASE ORAL at 17:41

## 2019-12-22 RX ADMIN — WARFARIN SODIUM 1 MG: 1 TABLET ORAL at 17:41

## 2019-12-22 RX ADMIN — SENNOSIDES AND DOCUSATE SODIUM 1 TABLET: 8.6; 5 TABLET ORAL at 09:29

## 2019-12-22 RX ADMIN — TAMSULOSIN HYDROCHLORIDE 0.4 MG: 0.4 CAPSULE ORAL at 17:39

## 2019-12-22 RX ADMIN — PANTOPRAZOLE SODIUM 40 MG: 40 TABLET, DELAYED RELEASE ORAL at 06:02

## 2019-12-22 RX ADMIN — SODIUM CHLORIDE, SODIUM LACTATE, POTASSIUM CHLORIDE, AND CALCIUM CHLORIDE 75 ML/HR: .6; .31; .03; .02 INJECTION, SOLUTION INTRAVENOUS at 23:56

## 2019-12-22 RX ADMIN — NICOTINE 1 PATCH: 14 PATCH TRANSDERMAL at 09:31

## 2019-12-22 RX ADMIN — HEPARIN SODIUM 1500 UNITS: 1000 INJECTION INTRAVENOUS; SUBCUTANEOUS at 06:34

## 2019-12-22 RX ADMIN — AMLODIPINE BESYLATE 10 MG: 10 TABLET ORAL at 09:26

## 2019-12-22 RX ADMIN — GABAPENTIN 100 MG: 100 CAPSULE ORAL at 09:26

## 2019-12-22 RX ADMIN — ASPIRIN 81 MG: 81 TABLET ORAL at 09:30

## 2019-12-22 RX ADMIN — FLUCONAZOLE 100 MG: 100 TABLET ORAL at 13:23

## 2019-12-22 RX ADMIN — MELATONIN 6 MG: 3 TAB ORAL at 21:32

## 2019-12-22 NOTE — PLAN OF CARE
Problem: Prexisting or High Potential for Compromised Skin Integrity  Goal: Skin integrity is maintained or improved  Description  INTERVENTIONS:  - Identify patients at risk for skin breakdown  - Assess and monitor skin integrity  - Assess and monitor nutrition and hydration status  - Monitor labs   - Assess for incontinence   - Turn and reposition patient  - Assist with mobility/ambulation  - Relieve pressure over bony prominences  - Avoid friction and shearing  - Provide appropriate hygiene as needed including keeping skin clean and dry  - Evaluate need for skin moisturizer/barrier cream  - Collaborate with interdisciplinary team   - Patient/family teaching  - Consider wound care consult   Outcome: Progressing     Problem: Potential for Falls  Goal: Patient will remain free of falls  Description  INTERVENTIONS:  - Assess patient frequently for physical needs  -  Identify cognitive and physical deficits and behaviors that affect risk of falls    -  [place] fall precautions as indicated by assessment   - Educate patient/family on patient safety including physical limitations  - Instruct patient to call for assistance with activity based on assessment  - Modify environment to reduce risk of injury  - Consider OT/PT consult to assist with strengthening/mobility  Outcome: Progressing     Problem: PAIN - ADULT  Goal: Verbalizes/displays adequate comfort level or baseline comfort level  Description  Interventions:  - Encourage patient to monitor pain and request assistance  - Assess pain using appropriate pain scale  - Administer analgesics based on type and severity of pain and evaluate response  - Implement non-pharmacological measures as appropriate and evaluate response  - Consider cultural and social influences on pain and pain management  - Notify physician/advanced practitioner if interventions unsuccessful or patient reports new pain  Outcome: Progressing     Problem: INFECTION - ADULT  Goal: Absence or prevention of progression during hospitalization  Description  INTERVENTIONS:  - Assess and monitor for signs and symptoms of infection  - Monitor lab/diagnostic results  - Monitor all insertion sites, i e  indwelling lines, tubes, and drains  - [place] appropriate cooling/warming therapies per order  - Administer medications as ordered  - Instruct and encourage patient and family to use good hand hygiene technique  - Identify and instruct in appropriate isolation precautions for identified infection/condition   Outcome: Progressing  Goal: Absence of fever/infection during neutropenic period  Description  INTERVENTIONS:  - Monitor WBC    Outcome: Progressing     Problem: SAFETY ADULT  Goal: Patient will remain free of falls  Description  INTERVENTIONS:  - Assess patient frequently for physical needs  -  Identify cognitive and physical deficits and behaviors that affect risk of falls    -  [place] fall precautions as indicated by assessment   - Educate patient/family on patient safety including physical limitations  - Instruct patient to call for assistance with activity based on assessment  - Modify environment to reduce risk of injury  - Consider OT/PT consult to assist with strengthening/mobility  Outcome: Progressing  Goal: Maintain or return to baseline ADL function  Description  INTERVENTIONS:  -  Assess patient's ability to carry out ADLs; assess patient's baseline for ADL function and identify physical deficits which impact ability to perform ADLs (bathing, care of mouth/teeth, toileting, grooming, dressing, etc )  - Assess/evaluate cause of self-care deficits   - Assess range of motion  - Assess patient's mobility; develop plan if impaired  - Assess patient's need for assistive devices and provide as appropriate  - Encourage maximum independence but intervene and supervise when necessary  - Involve family in performance of ADLs  - Assess for home care needs following discharge   - Consider OT consult to assist with ADL evaluation and planning for discharge  - Provide patient education as appropriate  Outcome: Progressing  Goal: Maintain or return mobility status to optimal level  Description  INTERVENTIONS:  - Assess patient's baseline mobility status (ambulation, transfers, stairs, etc )    - Identify cognitive and physical deficits and behaviors that affect mobility  - Identify mobility aids required to assist with transfers and/or ambulation (gait belt, sit-to-stand, lift, walker, cane, etc )  - [place] fall precautions as indicated by assessment  - Record patient progress and toleration of activity level on Mobility SBAR; progress patient to next Phase/Stage  - Instruct patient to call for assistance with activity based on assessment  - Consider rehabilitation consult to assist with strengthening/weightbearing, etc   Outcome: Progressing     Problem: DISCHARGE PLANNING  Goal: Discharge to home or other facility with appropriate resources  Description  INTERVENTIONS:  - Identify barriers to discharge w/patient and caregiver  - Arrange for needed discharge resources and transportation as appropriate  - Identify discharge learning needs (meds, wound care, etc )  - Arrange for interpretive services to assist at discharge as needed  - Refer to Case Management Department for coordinating discharge planning if the patient needs post-hospital services based on physician/advanced practitioner order or complex needs related to functional status, cognitive ability, or social support system  Outcome: Progressing     Problem: Knowledge Deficit  Goal: Patient/family/caregiver demonstrates understanding of disease process, treatment plan, medications, and discharge instructions  Description  Complete learning assessment and assess knowledge base    Interventions:  - Provide teaching at level of understanding  - Provide teaching via preferred learning methods  Outcome: Progressing     Problem: Nutrition/Hydration-ADULT  Goal: Nutrient/Hydration intake appropriate for improving, restoring or maintaining nutritional needs  Description  Monitor and assess patient's nutrition/hydration status for malnutrition  Collaborate with interdisciplinary team and initiate plan and interventions as ordered  Monitor patient's weight and dietary intake as ordered or per policy  Utilize nutrition screening tool and intervene as necessary  Determine patient's food preferences and provide high-protein, high-caloric foods as appropriate  INTERVENTIONS:  - Monitor oral intake, urinary output, labs, and treatment plans  - Assess nutrition and hydration status and recommend course of action  - Evaluate amount of meals eaten  - Assist patient with eating if necessary   - Allow adequate time for meals  - Recommend/ encourage appropriate diets, oral nutritional supplements, and vitamin/mineral supplements  - Order, calculate, and assess calorie counts as needed  - Assess need for intravenous fluids  - Provide specific nutrition/hydration education as appropriate  - Include patient/family/caregiver in decisions related to nutrition   Outcome: Progressing     Problem: Nutrition/Hydration-ADULT  Goal: Nutrient/Hydration intake appropriate for improving, restoring or maintaining nutritional needs  Description  Monitor and assess patient's nutrition/hydration status for malnutrition  Collaborate with interdisciplinary team and initiate plan and interventions as ordered  Monitor patient's weight and dietary intake as ordered or per policy  Utilize nutrition screening tool and intervene as necessary  Determine patient's food preferences and provide high-protein, high-caloric foods as appropriate       INTERVENTIONS:  - Monitor oral intake, urinary output, labs, and treatment plans  - Assess nutrition and hydration status and recommend course of action  - Evaluate amount of meals eaten  - Assist patient with eating if necessary   - Allow adequate time for meals  - Recommend/ encourage appropriate diets, oral nutritional supplements, and vitamin/mineral supplements  - Order, calculate, and assess calorie counts as needed  - Assess need for intravenous fluids  - Provide specific nutrition/hydration education as appropriate  - Include patient/family/caregiver in decisions related to nutrition   Outcome: Progressing

## 2019-12-22 NOTE — OCCUPATIONAL THERAPY NOTE
OccupationalTherapy Progress Note     Patient Name: Aliyah Wilder  YXJAZ'L Date: 12/22/2019  Problem List  Principal Problem:    Critical lower limb ischemia  Active Problems:    [facility]          12/22/19 1020   Restrictions/Precautions   Weight Bearing Precautions Per Order Yes   RLE Weight Bearing Per Order NWB   Braces or Orthoses LE Immobilizer   Other Precautions WBS; Fall Risk;Cognitive; Chair Alarm; Bed Alarm;Multiple lines;Pain   Lifestyle   Autonomy Per son, Pt requires A w/ all ADLS and IADLS from family; (-) drives PTA  Son reports he plans on carrying Pt up/down the stairs as son does not wish Pt to go to rehab  Reciprocal Relationships Per son, Pt lives w/ multiple family members  Son reports a family member will be home at all times to provide A as needed  Service to Others Pt is retired  Intrinsic Gratification Pt reports enjoying spending time w/ family  Pain Assessment   Pain Assessment FLACC   Pain Rating: FLACC (Rest) - Face 1   Pain Rating: FLACC (Rest) - Legs 0   Pain Rating: FLACC (Rest) - Activity 0   Pain Rating: FLACC (Rest) - Cry 0   Pain Rating: FLACC (Rest) - Consolability 0   Score: FLACC (Rest) 1   Pain Rating: FLACC (Activity) - Face 1   Pain Rating: FLACC (Activity) - Legs 1   Pain Rating: FLACC (Activity) - Activity 1   Pain Rating: FLACC (Activity) - Cry 1   Pain Rating: FLACC (Activity) - Consolability 2   Score: FLACC (Activity) 6   ADL   Where Assessed Supine, bed   UB Dressing Assistance 4  Minimal Assistance   UB Dressing Deficit Thread RUE; Thread LUE; Increased time to complete; Fasteners;Verbal cueing;Setup   UB Dressing Comments Required Min A for readjustment to sidney/doff gown  Pt not interested in donning fresh gown   LB Dressing Comments Refused participation in LB dressing   Bed Mobility   Rolling R 3  Moderate assistance   Additional items Assist x 1; Increased time required;Verbal cues;LE management   Rolling L 3  Moderate assistance   Additional items Assist x 1; Increased time required;Verbal cues;LE management   Supine to Sit Unable to assess   Additional Comments Pt refused to sit EOB or transfer to chair throughout today's session saying he only wants to remain laying down due to pain in R LE  Wound care team present during session to address sacral and heel pressure sores   Cognition   Overall Cognitive Status Impaired   Arousal/Participation Alert; Responsive; Uncooperative   Attention Difficulty attending to directions   Orientation Level Oriented to person   Memory Unable to assess   Following Commands Follows one step commands inconsistently   Comments Pt verbal and demonstrated ability to understand/respond to basic english directions and commands  Early dementia per chart review - unclear of pt's cognitive baseline  Unwilling to participate in 90% of requested tasks  Activity Tolerance   Activity Tolerance Patient limited by pain   Medical Staff Made Aware Appropriate to see per RN   Assessment   Assessment Pt participated in skilled OT session on 12/22/19 with interventions consisting of ADL retraining, bed mobility, cognitive reorientation, patient/family training, compensatory technique education, energy conservation and activity engagement  Pt agreeable to OT treatment session  Upon arrival pt found supine in bed  Pt required Min A x 1 for UB ADLs to readjust gown, Min A x 1 for bed mobility roll L and roll R  Pt required increased VCs for motivation to participate in therapy, safety/awareness of multiple lines, and cognitive deficits  Despite motivating efforts, pt refusing to sit EOB or complete functional transfers to chair at this time and c/o pain with movement of R LE  Attempts to educate pt on benefits of participation in rehabilitative activities and changing of upright positioning, however pt continued to refuse to complete more than bed mobility activities   Continued recommendation for OTR to make further attempts to assess functional transfers as pt allows  Pt made improvements in bed mobility, however continues to be functioning below baseline level with an increased risk for falls and injury and decreased occupational performance  From OT perspective, recommendation at time of D/C would be short-term rehab when medically stable  Pt to benefit from continued skilled OT services while in the hospital to further address goals and increase level of fx'l independence with ADLs, bed mobility, transfers, and fx'l mobility  Plan   Treatment Interventions ADL retraining;Functional transfer training; Endurance training;Cognitive reorientation;Equipment evaluation/education;Patient/family training;Neuromuscular reeducation; Compensatory technique education;Continued evaluation; Activityengagement   Goal Expiration Date 12/30/19   OT Treatment Day 2   OT Frequency 3-5x/wk   Recommendation   OT Discharge Recommendation Short Term Rehab   OT - OK to Discharge   (Yes - rehab when medically stable)   Barthel Index   Feeding 10   Bathing 0   Grooming Score 0   Dressing Score 5   Bladder Score 0   Bowels Score 5   Toilet Use Score 5   Transfers (Bed/Chair) Score 0   Mobility (Level Surface) Score 0   Stairs Score 0   Barthel Index Score 25       Documentation completed by Annamaria Peng OTR/L

## 2019-12-22 NOTE — PLAN OF CARE
Problem: OCCUPATIONAL THERAPY ADULT  Goal: Performs self-care activities at highest level of function for planned discharge setting  See evaluation for individualized goals  Description  Treatment Interventions: ADL retraining, Functional transfer training, UE strengthening/ROM, Endurance training, Cognitive reorientation, Patient/family training, Equipment evaluation/education, Compensatory technique education, Activityengagement, Energy conservation          See flowsheet documentation for full assessment, interventions and recommendations  Outcome: Progressing  Note:   Limitation: Decreased ADL status, Decreased Safe judgement during ADL, Decreased cognition, Decreased endurance, Decreased self-care trans, Decreased high-level ADLs  Prognosis: Fair  Assessment: Pt participated in skilled OT session on 12/22/19 with interventions consisting of ADL retraining, bed mobility, cognitive reorientation, patient/family training, compensatory technique education, energy conservation and activity engagement  Pt agreeable to OT treatment session  Upon arrival pt found supine in bed  Pt required Min A x 1 for UB ADLs to readjust gown, Min A x 1 for bed mobility roll L and roll R  Pt required increased VCs for motivation to participate in therapy, safety/awareness of multiple lines, and cognitive deficits  Despite motivating efforts, pt refusing to sit EOB or complete functional transfers to chair at this time and c/o pain with movement of R LE  Attempts to educate pt on benefits of participation in rehabilitative activities and changing of upright positioning, however pt continued to refuse to complete more than bed mobility activities  Continued recommendation for OTR to make further attempts to assess functional transfers as pt allows  Pt made improvements in bed mobility, however continues to be functioning below baseline level with an increased risk for falls and injury and decreased occupational performance   From OT perspective, recommendation at time of D/C would be short-term rehab when medically stable  Pt to benefit from continued skilled OT services while in the hospital to further address goals and increase level of fx'l independence with ADLs, bed mobility, transfers, and fx'l mobility       OT Discharge Recommendation: Short Term Rehab  OT - OK to Discharge: (Yes - rehab when medically stable)

## 2019-12-22 NOTE — CONSULTS
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   Noted that the patient Hg is 7 8   The patient has a knee immobilizer on the Rt BKA   Patient has the blue phone at the bedside and did not speak during the assessment   Assessment Findings:   1  Left heel - blanchable redness noted   Intact skin   2  Right buttocks - stage 2 hospital acquired area small round and pink in color   3  Left buttocks - unstageable hospital acquired area - 90 % slough on the wound bed   Small round area   Skin care plans:  1Cleanse sacrum buttocks with soap and water then apply allevyn foam harry with a T and date check skin integrity every shift and change every 3 days or if soiled   2-Elevate heels to offload pressure  3-[place] cushion in chair when out of bed  4-Moisturize skin daily with skin nourishing cream   5-Turn/reposition q2h or when medically stable for pressure re-distribution on skin  6  P- 500 low air loss bed   7  Allevyn foam to left heel - harry with a P and date and check skin every shift change every 3 days         Vitals: Blood pressure 121/55, pulse 85, temperature 99 5 °F (37 5 °C), resp  rate 18, height 5' 5" (1 651 m), weight 51 4 kg (113 lb 5 1 oz), SpO2 97 %  ,Body mass index is 18 86 kg/m²  Wound 12/21/19 Pressure Injury Buttocks Left (Active)   Wound Description [facility] 12/22/2019 11:00 AM   Staging Unstagable 12/22/2019 11:00 AM   [place]se-wound Assessment Clean;Dry; Intact 12/22/2019 11:00 AM   Wound Length (cm) 0 5 cm 12/22/2019 11:00 AM   Wound Width (cm) 0 5 cm 12/22/2019 11:00 AM   Wound Depth (cm) 0 1 12/22/2019 11:00 AM   Calculated Wound Area (cm^2) 0 25 cm^2 12/22/2019 11:00 AM Calculated Wound Volume (cm^3) 0 02 cm^3 12/22/2019 11:00 AM   Drainage Amount None 12/22/2019 11:00 AM   Non-staged Wound Description Not applicable 79/02/4578 62:05 AM   Treatments Cleansed;Site care 12/22/2019 11:00 AM   Dressing Foam, Silicon (eg  Allevyn, etc) 12/22/2019 11:00 AM   Patient Tolerance Tolerated well 12/22/2019 11:00 AM       Wound 12/21/19 Pressure Injury Buttocks Right (Active)   Wound Image   12/22/2019 10:03 AM   Wound Description Clean;Dry;Pink 12/22/2019 11:00 AM   Staging Stage II 12/22/2019 11:00 AM   Therese-wound Assessment Clean;Dry; Intact 12/22/2019 11:00 AM   Wound Length (cm) 0 4 cm 12/22/2019 11:00 AM   Wound Width (cm) 0 4 cm 12/22/2019 11:00 AM   Wound Depth (cm) 0 1 12/22/2019 11:00 AM   Calculated Wound Area (cm^2) 0 16 cm^2 12/22/2019 11:00 AM   Calculated Wound Volume (cm^3) 0 02 cm^3 12/22/2019 11:00 AM   Drainage Amount None 12/22/2019 11:00 AM   Non-staged Wound Description Not applicable 46/60/5035 44:49 AM   Treatments Cleansed;Site care 12/22/2019 11:00 AM   Dressing Foam, Silicon (eg   Allevyn, etc) 12/22/2019 11:00 AM   Patient Tolerance Tolerated well 12/22/2019 11:00 AM       Wound care will follow weekly call with questions or concerns at [address] [place] [address]

## 2019-12-22 NOTE — PROGRESS NOTES
Progress Note - Vascular Surgery   Stella Pain 78 y o  male MRN: [id number]  Unit/Bed#: [facility] 731-01 Encounter: [id number]    Assessment:  79 y/o M presents with Right lower extremity rest pain, anemia, concern for GI bleed, supratherapeutic INR   He is s/p left femoral endarterectomy with profundoplasty, SFA embolectomy and retrograde iliac stenting 11/6/19 and arteriogram with left SFA/popliteal angioplasty and right SFA angioplasty 11/14/19   -S/P EGD/Colonoscopy 12/10 (EGD clean based ulcers with no active bleeding, colonoscopy poor prep no active bleeding)  -S/P RLE Agram- popliteal recanualization, AT/PT occluded- IR 12/13  -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 12/21 1uPRBCs for Hg 6 7 -> 7 8  Hg this morning is 7 8  INR: 1 32    Plan:  Regular diet  Continue heparin gtt  Continue Coumadin -> monitor INR  Daily dressing changes to R BKA stump  Continue ASA/Plavix/Statin     Subjective/Objective     Subjective: No acute events overnight  Pain is controlled on prn analgesia  Tolerating diet without nausea/vomiting  No fevers, chills  Objective:     Blood pressure 125/54, pulse 85, temperature 99 8 °F (37 7 °C), resp  rate 18, height 5' 5" (1 651 m), weight 51 4 kg (113 lb 5 1 oz), SpO2 97 %  ,Body mass index is 18 86 kg/m²        Intake/Output Summary (Last 24 hours) at 12/22/2019 0815  Last data filed at 12/22/2019 0882  Gross per 24 hour   Intake 890 ml   Output 3950 ml   Net -3060 ml       Invasive Devices     Peripheral Intravenous Line            Peripheral IV 12/21/19 Right Antecubital less than 1 day          Epidural Line            Nerve Block Catheter 12/19/19 2 days          Drain            Urethral Catheter Latex 16 Fr  2 days                Physical Exam:  NAD, alert   Normocephalic, atraumatic  MMM, EOMI, PERRLA  Norm resp effort on RA  RRR  Abd soft, NT/ND  No calf tenderness or peripheral edema  Motor/sensation intact in distal extremities  R BKA  CN grossly intact  -rash/lesions      Lab, Imaging and other studies:  CBC:   Lab Results   Component Value Date    WBC 8 02 12/22/2019    HGB 7 8 (L) 12/22/2019    HCT 25 3 (L) 12/22/2019    MCV 93 12/22/2019     12/22/2019    MCH 28 7 12/22/2019    MCHC 30 8 (L) 12/22/2019    RDW 17 2 (H) 12/22/2019    MPV 10 2 12/22/2019   , CMP:   Lab Results   Component Value Date    SODIUM 141 12/22/2019    K 3 7 12/22/2019     (H) 12/22/2019    CO2 24 12/22/2019    BUN 13 12/22/2019    CREATININE 0 74 12/22/2019    CALCIUM 8 5 12/22/2019    EGFR 93 12/22/2019   , Coagulation:   Lab Results   Component Value Date    INR 1 32 (H) 12/22/2019     VTE Pharmacologic Prophylaxis: Coumadin   VTE Mechanical Prophylaxis: sequential compression device prior major surgery

## 2019-12-22 NOTE — PROGRESS NOTES
Progress Note - Acute Pain Service    Sherri José 78 y o  male MRN: [id number]  Unit/Bed#: [facility] 731-01 Encounter: [id number]      Assessment:   79 yo male s/p BKA doing well  Popliteal OnQ ball still with some fluid  Patient on phone, no acute distress, denies pain  Plan:   1  Continue OnQ one more day until empty  APS will continue to follow; please contact [facility]PS ( btwn [phone]) with any further questions    Pain History  24 hour history: no acute events  24 hour opoid requirement: none    Meds/Allergies   all current active meds have been reviewed    No Known Allergies    Objective     Temp:  [97 8 °F (36 6 °C)-101 5 °F (38 6 °C)] 99 8 °F (37 7 °C)  HR:  [76-89] 85  Resp:  [16-18] 18  BP: (125-133)/(53-54) 125/54    Physical Exam   Pulmonary/Chest: Effort normal    Neurological: He is alert  Skin: Skin is warm  Lab Results: I have personally reviewed pertinent labs  Imaging Studies: I have personally reviewed pertinent reports  EKG, Pathology, and Other Studies: I have personally reviewed pertinent reports        Levels of Care: Level 1 = 15 minutes

## 2019-12-23 LAB
APTT PPP: 85 SECONDS (ref 23–37)
ERYTHROCYTE [DISTWIDTH] IN BLOOD BY AUTOMATED COUNT: 16.7 % (ref 11.6–15.1)
HCT VFR BLD AUTO: 24.8 % (ref 36.5–49.3)
HGB BLD-MCNC: 7.7 G/DL (ref 12–17)
INR PPP: 1.29 (ref 0.84–1.19)
MCH RBC QN AUTO: 28.6 PG (ref 26.8–34.3)
MCHC RBC AUTO-ENTMCNC: 31 G/DL (ref 31.4–37.4)
MCV RBC AUTO: 92 FL (ref 82–98)
PLATELET # BLD AUTO: 288 THOUSANDS/UL (ref 149–390)
PMV BLD AUTO: 9.9 FL (ref 8.9–12.7)
PROTHROMBIN TIME: 15.7 SECONDS (ref 11.6–14.5)
RBC # BLD AUTO: 2.69 MILLION/UL (ref 3.88–5.62)
WBC # BLD AUTO: 6.09 THOUSAND/UL (ref 4.31–10.16)

## 2019-12-23 PROCEDURE — 99024 POSTOP FOLLOW-UP VISIT: CPT | Performed by: SURGERY

## 2019-12-23 PROCEDURE — 85027 COMPLETE CBC AUTOMATED: CPT | Performed by: SURGERY

## 2019-12-23 PROCEDURE — 85730 THROMBOPLASTIN TIME PARTIAL: CPT | Performed by: SURGERY

## 2019-12-23 PROCEDURE — 85610 PROTHROMBIN TIME: CPT | Performed by: SURGERY

## 2019-12-23 RX ORDER — WARFARIN SODIUM 2 MG/1
2 TABLET ORAL
Status: DISCONTINUED | OUTPATIENT
Start: 2019-12-23 | End: 2019-12-27

## 2019-12-23 RX ADMIN — OXYCODONE HYDROCHLORIDE 5 MG: 5 TABLET ORAL at 08:55

## 2019-12-23 RX ADMIN — MELATONIN 6 MG: 3 TAB ORAL at 21:28

## 2019-12-23 RX ADMIN — GABAPENTIN 100 MG: 100 CAPSULE ORAL at 08:31

## 2019-12-23 RX ADMIN — TAMSULOSIN HYDROCHLORIDE 0.4 MG: 0.4 CAPSULE ORAL at 17:41

## 2019-12-23 RX ADMIN — PANTOPRAZOLE SODIUM 40 MG: 40 TABLET, DELAYED RELEASE ORAL at 17:41

## 2019-12-23 RX ADMIN — SENNOSIDES AND DOCUSATE SODIUM 1 TABLET: 8.6; 5 TABLET ORAL at 17:39

## 2019-12-23 RX ADMIN — SODIUM CHLORIDE, SODIUM LACTATE, POTASSIUM CHLORIDE, AND CALCIUM CHLORIDE 75 ML/HR: .6; .31; .03; .02 INJECTION, SOLUTION INTRAVENOUS at 14:12

## 2019-12-23 RX ADMIN — HEPARIN SODIUM AND DEXTROSE 18 UNITS/KG/HR: 10000; 5 INJECTION INTRAVENOUS at 02:22

## 2019-12-23 RX ADMIN — THIAMINE HCL TAB 100 MG 100 MG: 100 TAB at 08:32

## 2019-12-23 RX ADMIN — SENNOSIDES AND DOCUSATE SODIUM 1 TABLET: 8.6; 5 TABLET ORAL at 08:32

## 2019-12-23 RX ADMIN — AMLODIPINE BESYLATE 10 MG: 10 TABLET ORAL at 08:31

## 2019-12-23 RX ADMIN — ATORVASTATIN CALCIUM 20 MG: 20 TABLET, FILM COATED ORAL at 17:38

## 2019-12-23 RX ADMIN — ASPIRIN 81 MG: 81 TABLET ORAL at 08:34

## 2019-12-23 RX ADMIN — WARFARIN SODIUM 2 MG: 2 TABLET ORAL at 19:38

## 2019-12-23 RX ADMIN — FLUCONAZOLE 100 MG: 100 TABLET ORAL at 08:33

## 2019-12-23 RX ADMIN — PANTOPRAZOLE SODIUM 40 MG: 40 TABLET, DELAYED RELEASE ORAL at 05:28

## 2019-12-23 RX ADMIN — CLOPIDOGREL BISULFATE 75 MG: 75 TABLET ORAL at 08:32

## 2019-12-23 NOTE — PHYSICIAN ADVISOR
Current patient class: Inpatient  The patient is currently on Hospital Day: 20      The patient was admitted to the hospital at 2033 on 12/4/19 for the following diagnosis:  Vascular problem [I99 9]     CMS OUTLIER STAY REVIEW    After review of the relevant documentation, labs, vital signs and test results, the patient is appropriate for CONTINUED INPATIENT ADMISSION  The patient continues to remain hospitalized receiving acute medical care  The patient has surpassed the expected duration of stay, however given the clinical condition, need for further acute care management, the patient is appropriate to remain in an inpatient status  The patient still being actively managed, and does have unresolved medical issues requiring further hospitalization  This review is conducted at 10 day intervals, to help satisfy the requirements for significant outlier stay review as per CMS  Given the current condition of this patient, the patient satisfies this review was determination for continued inpatient stay  Rationale is as follows: The patient is a 78 yrs old Male who presented to the ED at 12/4/2019  8:33 PM with a chief complaint of right profundus artery occlusion in the setting of chronic peripheral arterial disease with critical limb ischemia complicated by a Coumadin induced coagulopathy with an INR of greater than 6 5  Patient came to [facility] for this reason  They wanted to consult IR for potential embolectomy  Patient was made NPO and they were waiting for the INR to come down for the patient could get intervention  Patient was also noted to have melanotic stools for which they were recommending GI to evaluate secondary to patient likely needing to continue his anticoagulation  Patient went to Interventional Radiology on 12/13 6 and had successful recannulization of the occluded left popliteal artery  There was no fluid to the anterior tibial or posterior tibial arteries    On 12/16/2019 a rapid response was called secondary to the patient having pulsatile bright red bleeding in the right groin  Patient eventually went on to have a right below-the-knee amputation during the hospitalization  Postoperatively the patient also required a transfusion secondary to acute blood loss anemia  Patient is also currently receiving active acute pain management and is still on a heparin drip bridging with Coumadin  Given the need for all of the above and the patient still needs a heparin bridge he is still inpatient appropriate  The patients vitals on arrival were ED Triage Vitals   Temperature Pulse Respirations Blood Pressure SpO2   12/04/19 2127 12/04/19 2127 12/04/19 2127 12/04/19 2127 12/04/19 2045   97 9 °F (36 6 °C) 76 16 132/61 99 %      Temp Source Heart Rate Source Patient Position - Orthostatic VS BP Location FiO2 (%)   12/04/19 2127 12/04/19 2127 12/05/19 0001 12/05/19 0001 --   Oral Monitor Lying Left arm       Pain Score       12/04/19 2127       No Pain           Past Medical History:   Diagnosis Date    Cancer [facility])     throat cancer    PAD (peripheral artery disease) ([facility] [address] ) 11/2/2019    PEG (percutaneous endoscopic gastrostomy) status ([facility] [address] )     Port-A-Cath in place      Past Surgical History:   Procedure Laterality Date    BYPASS FEMORAL-FEMORAL Right     ESOPHAGOSCOPY N/A 8/24/2017    Procedure: Eric Nascimento;  Surgeon: Coty Cano MD;  [facility]: [facility];  Service: ENT    IR ABDOMINAL ANGIOGRAPHY / INTERVENTION  11/14/2019    IR ABDOMINAL ANGIOGRAPHY / INTERVENTION  11/7/2019    IR ABDOMINAL ANGIOGRAPHY / INTERVENTION  12/13/2019    LEG AMPUTATION THROUGH LOWER TIBIA AND FIBULA Right 12/19/2019    Procedure: AMPUTATION BELOW KNEE (BKA);   Surgeon: Tanya Johns MD;  [facility];  Service: Vascular    AR BRONCHOSCOPY,DIAGNOSTIC N/A 8/24/2017    Procedure: Bronchoscopy;  Surgeon: Coty Cano MD;  [facility];  Service: ENT    AR LARYNGOSCOPY,DIRCT,OP,BIOPSY N/A 8/24/2017    Procedure: LARYNGOSCOPY DIRECT;  Surgeon: Iva Skaggs MD;  [facility];  Service: ENT    FL William Collins Left 11/7/2019    Procedure: LEFT ENDARTERECTOMY ARTERIAL FEMORAL; L CFAE WITH PROFUNDOPLASTY; ARTERIOGRAM;RETROGRADE ILIAC STENTING;  Surgeon: Vickie Barbosa MD;  [facility];  Service: Vascular    TOE AMPUTATION Right     2 toes           Consults have been placed to:   IP CONSULT TO CASE MANAGEMENT  IP CONSULT TO GASTROENTEROLOGY  IP CONSULT TO ACUTE PAIN SERVICE  IP CONSULT TO PHYSICAL MEDICINE REHAB  IP CONSULT TO CASE MANAGEMENT  IP CONSULT TO NEUROPSYCHOLOGY  IP CONSULT TO PHYSICAL MEDICINE REHAB  IP CONSULT TO WOUND CARE    Vitals:    12/22/19 1941 12/22/19 1943 12/22/19 2300 12/23/19 0734   BP: 104/65 104/65 108/65 138/73   BP Location:       Pulse:  88 87 85   Resp:   18 18   Temp: 99 9 °F (37 7 °C) 99 9 °F (37 7 °C) 98 9 °F (37 2 °C) 98 4 °F (36 9 °C)   TempSrc:       SpO2:  99% 100% 99%   Weight:       Height:           Most recent labs:    Recent Labs     12/22/19  0449 12/23/19  0418 12/23/19  0419   WBC 8 02  --  6 09   HGB 7 8*  --  7 7*   HCT 25 3*  --  24 8*     --  288   K 3 7  --   --    CALCIUM 8 5  --   --    BUN 13  --   --    CREATININE 0 74  --   --    INR 1 32* 1 29*  --        Scheduled Meds:  Current Facility-Administered Medications:  acetaminophen 650 mg Oral Q6H PRN Sridevi Gastelum MD    amLODIPine 10 mg Oral Daily Sridevi Gastelum MD    aspirin 81 mg Oral Daily Sridevi Gastelum MD    atorvastatin 20 mg Oral After Lucas Abarca MD    cefazolin 1,000 mg Intravenous Once Sridevi Gastelum MD    clopidogrel 75 mg Oral Daily Sridevi Gastelum MD    fluconazole 100 mg Oral Daily Sridevi Gastelum MD    gabapentin 100 mg Oral Daily Sridevi Gastelum MD    heparin (porcine) 3-20 Units/kg/hr (Order-Specific) Intravenous Titrated Sridevi Gastelum MD Last Rate: 18 Units/kg/hr (12/23/19 0222)   heparin (porcine) 1,500 Units Intravenous PRN Estefani Kimball MD    heparin (porcine) 3,000 Units Intravenous PRN Estefani Kimball MD    HYDROmorphone 0 2 mg Intravenous Q3H PRN Estefani Kimball MD    lactated ringers 75 mL/hr Intravenous Continuous Estefani Kimball MD Last Rate: 75 mL/hr (12/23/19 1412)   melatonin 6 mg Oral HS Estefani Kimball MD    nicotine 1 patch Transdermal Daily Estefani Kimball MD    ondansetron 4 mg Intravenous Q4H PRN Estefani Kimball MD    oxyCODONE 2 5 mg Oral Q4H PRN Estefani Kimball MD    oxyCODONE 5 mg Oral Q4H PRN Estefani Kimball MD    pantoprazole 40 mg Oral BID AC Estefani Kimball MD    ropivacaine  Infiltration Continuous Estefani Kimball MD Last Rate: 10 mL/hr (12/19/19 1311)   senna-docusate sodium 1 tablet Oral BID Estefani Kimball MD    tamsulosin 0 4 mg Oral Daily With Ninfa Mejia MD    thiamine 100 mg Oral Daily Estefani Kimball MD    warfarin 1 mg Oral Daily (warfarin) Kaela Quezada PA-C      Continuous Infusions:  heparin (porcine) 3-20 Units/kg/hr (Order-Specific) Last Rate: 18 Units/kg/hr (12/23/19 0222)   lactated ringers 75 mL/hr Last Rate: 75 mL/hr (12/23/19 1412)   ropivacaine  Last Rate: 10 mL/hr (12/19/19 1311)     PRN Meds:   acetaminophen    heparin (porcine)    heparin (porcine)    HYDROmorphone    ondansetron    oxyCODONE    oxyCODONE    Surgical procedures (if appropriate):  Procedure(s):  AMPUTATION BELOW KNEE (BKA)

## 2019-12-23 NOTE — PROGRESS NOTES
Progress Note - Acute Pain Service    Glorine Serve 78 y o  male MRN: [id number]  Unit/Bed#: [facility] 731-01 Encounter: [id number]      Assessment:   77 yo male s/p BKA doing well  Popliteal OnQ ball still infusing, patient obtaining relief from numbing medications  Plan:   1  Continue OnQ, likely will finish overnight  2  Remove catheter tomorrow, 12/24    APS will continue to follow; please contact APS ([phone]) with any further questions    Pain History  24 hour history: no acute events  24 hour opoid requirement: none    Meds/Allergies   all current active meds have been reviewed    No Known Allergies    Objective     Temp:  [98 4 °F (36 9 °C)-100 7 °F (38 2 °C)] 98 4 °F (36 9 °C)  HR:  [85-88] 85  Resp:  [17-18] 18  BP: ()/(53-73) 138/73    Physical Exam   Constitutional: He is oriented to person, place, and time  He appears well-developed and well-nourished  HENT:   Head: Normocephalic and atraumatic  Eyes: EOM are normal    Cardiovascular: Normal rate and regular rhythm  Pulmonary/Chest: Effort normal and breath sounds normal    Abdominal: Soft  He exhibits no distension  There is no tenderness  Musculoskeletal: Normal range of motion  Right LE catheter site c/d/i   Neurological: He is alert and oriented to person, place, and time  Skin: Skin is warm and dry  Psychiatric: He has a normal mood and affect  His behavior is normal    Vitals reviewed  Lab Results: I have personally reviewed pertinent labs  Imaging Studies: I have personally reviewed pertinent reports  EKG, Pathology, and Other Studies: I have personally reviewed pertinent reports        Levels of Care: Level 1 = 15 minutes

## 2019-12-23 NOTE — PLAN OF CARE
Problem: Prexisting or High Potential for Compromised Skin Integrity  Goal: Skin integrity is maintained or improved  Description  INTERVENTIONS:  - Identify patients at risk for skin breakdown  - Assess and monitor skin integrity  - Assess and monitor nutrition and hydration status  - Monitor labs   - Assess for incontinence   - Turn and reposition patient  - Assist with mobility/ambulation  - Relieve pressure over bony prominences  - Avoid friction and shearing  - Provide appropriate hygiene as needed including keeping skin clean and dry  - Evaluate need for skin moisturizer/barrier cream  - Collaborate with interdisciplinary team   - Patient/family teaching  - Consider wound care consult   Outcome: Progressing     Problem: Potential for Falls  Goal: Patient will remain free of falls  Description  INTERVENTIONS:  - Assess patient frequently for physical needs  -  Identify cognitive and physical deficits and behaviors that affect risk of falls    -  [place] fall precautions as indicated by assessment   - Educate patient/family on patient safety including physical limitations  - Instruct patient to call for assistance with activity based on assessment  - Modify environment to reduce risk of injury  - Consider OT/PT consult to assist with strengthening/mobility  Outcome: Progressing     Problem: PAIN - ADULT  Goal: Verbalizes/displays adequate comfort level or baseline comfort level  Description  Interventions:  - Encourage patient to monitor pain and request assistance  - Assess pain using appropriate pain scale  - Administer analgesics based on type and severity of pain and evaluate response  - Implement non-pharmacological measures as appropriate and evaluate response  - Consider cultural and social influences on pain and pain management  - Notify physician/advanced practitioner if interventions unsuccessful or patient reports new pain  Outcome: Progressing     Problem: INFECTION - ADULT  Goal: Absence or prevention of progression during hospitalization  Description  INTERVENTIONS:  - Assess and monitor for signs and symptoms of infection  - Monitor lab/diagnostic results  - Monitor all insertion sites, i e  indwelling lines, tubes, and drains  - [place] appropriate cooling/warming therapies per order  - Administer medications as ordered  - Instruct and encourage patient and family to use good hand hygiene technique  - Identify and instruct in appropriate isolation precautions for identified infection/condition   Outcome: Progressing  Goal: Absence of fever/infection during neutropenic period  Description  INTERVENTIONS:  - Monitor WBC    Outcome: Progressing     Problem: SAFETY ADULT  Goal: Patient will remain free of falls  Description  INTERVENTIONS:  - Assess patient frequently for physical needs  -  Identify cognitive and physical deficits and behaviors that affect risk of falls    -  [place] fall precautions as indicated by assessment   - Educate patient/family on patient safety including physical limitations  - Instruct patient to call for assistance with activity based on assessment  - Modify environment to reduce risk of injury  - Consider OT/PT consult to assist with strengthening/mobility  Outcome: Progressing  Goal: Maintain or return to baseline ADL function  Description  INTERVENTIONS:  -  Assess patient's ability to carry out ADLs; assess patient's baseline for ADL function and identify physical deficits which impact ability to perform ADLs (bathing, care of mouth/teeth, toileting, grooming, dressing, etc )  - Assess/evaluate cause of self-care deficits   - Assess range of motion  - Assess patient's mobility; develop plan if impaired  - Assess patient's need for assistive devices and provide as appropriate  - Encourage maximum independence but intervene and supervise when necessary  - Involve family in performance of ADLs  - Assess for home care needs following discharge   - Consider OT consult to assist with ADL evaluation and planning for discharge  - Provide patient education as appropriate  Outcome: Progressing  Goal: Maintain or return mobility status to optimal level  Description  INTERVENTIONS:  - Assess patient's baseline mobility status (ambulation, transfers, stairs, etc )    - Identify cognitive and physical deficits and behaviors that affect mobility  - Identify mobility aids required to assist with transfers and/or ambulation (gait belt, sit-to-stand, lift, walker, cane, etc )  - Jean fall precautions as indicated by assessment  - Record patient progress and toleration of activity level on Mobility SBAR; progress patient to next Phase/Stage  - Instruct patient to call for assistance with activity based on assessment  - Consider rehabilitation consult to assist with strengthening/weightbearing, etc   Outcome: Progressing     Problem: DISCHARGE PLANNING  Goal: Discharge to home or other facility with appropriate resources  Description  INTERVENTIONS:  - Identify barriers to discharge w/patient and caregiver  - Arrange for needed discharge resources and transportation as appropriate  - Identify discharge learning needs (meds, wound care, etc )  - Arrange for interpretive services to assist at discharge as needed  - Refer to Case Management Department for coordinating discharge planning if the patient needs post-hospital services based on physician/advanced practitioner order or complex needs related to functional status, cognitive ability, or social support system  Outcome: Progressing     Problem: Knowledge Deficit  Goal: Patient/family/caregiver demonstrates understanding of disease process, treatment plan, medications, and discharge instructions  Description  Complete learning assessment and assess knowledge base    Interventions:  - Provide teaching at level of understanding  - Provide teaching via preferred learning methods  Outcome: Progressing     Problem: Nutrition/Hydration-ADULT  Goal: Nutrient/Hydration intake appropriate for improving, restoring or maintaining nutritional needs  Description  Monitor and assess patient's nutrition/hydration status for malnutrition  Collaborate with interdisciplinary team and initiate plan and interventions as ordered  Monitor patient's weight and dietary intake as ordered or per policy  Utilize nutrition screening tool and intervene as necessary  Determine patient's food preferences and provide high-protein, high-caloric foods as appropriate       INTERVENTIONS:  - Monitor oral intake, urinary output, labs, and treatment plans  - Assess nutrition and hydration status and recommend course of action  - Evaluate amount of meals eaten  - Assist patient with eating if necessary   - Allow adequate time for meals  - Recommend/ encourage appropriate diets, oral nutritional supplements, and vitamin/mineral supplements  - Order, calculate, and assess calorie counts as needed  - Assess need for intravenous fluids  - Provide specific nutrition/hydration education as appropriate  - Include patient/family/caregiver in decisions related to nutrition   Outcome: Progressing

## 2019-12-23 NOTE — SOCIAL WORK
Met with pt and his son Cecilia Rios to discuss pt's discharge plan  They stated that the prefer to have pt go home at discharge with home therapy  Pt will stay with his dgt Glenn Face and she will provide pt with 24 hr care and supervision  Adi stated that they will need a w/c and walker at discharge  They prefer [employer] for equipment  He stated that they will purchase a commode for pt  A post acute care recommendation was made by your care team for [facility]  Discussed [place] [facility] with patient and pt's son  List of agencies given to patient and pt's son via in person  patient and pt's son aware the list is custom filtered for them by preference  and that [facility]'s post acute providers are designated  They prefer a referral to [facility] referral sent for same  Will discuss equipment with MD and send referral to [facility] once order received

## 2019-12-23 NOTE — PROGRESS NOTES
Progress Note - Vascular Surgery   Mateusz Seen 78 y o  male MRN: [id number]  Unit/Bed#: [facility] 731-01 Encounter: [id number]    Assessment:  79 y/o M presents with Right lower extremity rest pain, anemia, concern for GI bleed, supratherapeutic INR   He is s/p left femoral endarterectomy with profundoplasty, SFA embolectomy and retrograde iliac stenting 11/6/19 and arteriogram with left SFA/popliteal angioplasty and right SFA angioplasty 11/14/19   -S/P EGD/Colonoscopy 12/10 (EGD clean based ulcers with no active bleeding, colonoscopy poor prep no active bleeding)  -S/P RLE Agram- popliteal recanualization, AT/PT occluded- IR 12/13  -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    INR: 1 29, from 1 32    Plan:  Regular diet  Continue hep gtt until therapeutic on warfarin   Continue to monitor INR  Daily dressing change to R  BKA stump  Continue ASA    Subjective/Objective     Subjective: No acute events overnight  Objective:     Blood pressure 138/73, pulse 85, temperature 98 4 °F (36 9 °C), resp  rate 18, height 5' 5" (1 651 m), weight 51 4 kg (113 lb 5 1 oz), SpO2 99 %  ,Body mass index is 18 86 kg/m²  Intake/Output Summary (Last 24 hours) at 12/23/2019 1011  Last data filed at 12/23/2019 0415  Gross per 24 hour   Intake 1000 ml   Output 1404 ml   Net -404 ml       Invasive Devices     Peripheral Intravenous Line            Peripheral IV 12/21/19 Right Antecubital 1 day    Peripheral IV (Ped) 12/22/19 Left Hand less than 1 day          Epidural Line            Nerve Block Catheter 12/19/19 4 days                Physical Exam:  Gen:  Well-developed, well-nourished male in NAD  CV: RRR,   Lungs: Normal respiratory effort  Abd: soft, nontender, nondistended,   Ext: right BKA covered with brace  Neuro:  AxO x3    Lab, Imaging and other studies:  CBC:   Lab Results   Component Value Date    WBC 6 09 12/23/2019    HGB 7 7 (L) 12/23/2019    HCT 24 8 (L) 12/23/2019    MCV 92 12/23/2019     12/23/2019    MCH 28 6 12/23/2019 MCHC 31 0 (L) 12/23/2019    RDW 16 7 (H) 12/23/2019    MPV 9 9 12/23/2019   , CMP:   No results found for: SODIUM, K, CL, CO2, ANIONGAP, BUN, CREATININE, GLUCOSE, CALCIUM, AST, ALT, ALKPHOS, PROT, BILITOT, EGFR, Coagulation:   Lab Results   Component Value Date    INR 1 29 (H) 12/23/2019     VTE Pharmacologic Prophylaxis: Coumadin   VTE Mechanical Prophylaxis: sequential compression device

## 2019-12-24 LAB
ANION GAP SERPL CALCULATED.3IONS-SCNC: 5 MMOL/L (ref 4–13)
APTT PPP: 61 SECONDS (ref 23–37)
BUN SERPL-MCNC: 12 MG/DL (ref 5–25)
CALCIUM SERPL-MCNC: 8.8 MG/DL (ref 8.3–10.1)
CHLORIDE SERPL-SCNC: 110 MMOL/L (ref 100–108)
CO2 SERPL-SCNC: 25 MMOL/L (ref 21–32)
CREAT SERPL-MCNC: 0.68 MG/DL (ref 0.6–1.3)
ERYTHROCYTE [DISTWIDTH] IN BLOOD BY AUTOMATED COUNT: 16.4 % (ref 11.6–15.1)
GFR SERPL CREATININE-BSD FRML MDRD: 97 ML/MIN/1.73SQ M
GLUCOSE SERPL-MCNC: 123 MG/DL (ref 65–140)
HCT VFR BLD AUTO: 24.2 % (ref 36.5–49.3)
HGB BLD-MCNC: 7.5 G/DL (ref 12–17)
INR PPP: 1.5 (ref 0.84–1.19)
MCH RBC QN AUTO: 29.1 PG (ref 26.8–34.3)
MCHC RBC AUTO-ENTMCNC: 31 G/DL (ref 31.4–37.4)
MCV RBC AUTO: 94 FL (ref 82–98)
PLATELET # BLD AUTO: 311 THOUSANDS/UL (ref 149–390)
PMV BLD AUTO: 9.6 FL (ref 8.9–12.7)
POTASSIUM SERPL-SCNC: 4.1 MMOL/L (ref 3.5–5.3)
PROTHROMBIN TIME: 17.7 SECONDS (ref 11.6–14.5)
RBC # BLD AUTO: 2.58 MILLION/UL (ref 3.88–5.62)
SODIUM SERPL-SCNC: 140 MMOL/L (ref 136–145)
WBC # BLD AUTO: 4.17 THOUSAND/UL (ref 4.31–10.16)

## 2019-12-24 PROCEDURE — 85610 PROTHROMBIN TIME: CPT | Performed by: SURGERY

## 2019-12-24 PROCEDURE — 85730 THROMBOPLASTIN TIME PARTIAL: CPT | Performed by: SURGERY

## 2019-12-24 PROCEDURE — 85027 COMPLETE CBC AUTOMATED: CPT | Performed by: PHYSICIAN ASSISTANT

## 2019-12-24 PROCEDURE — 80048 BASIC METABOLIC PNL TOTAL CA: CPT | Performed by: PHYSICIAN ASSISTANT

## 2019-12-24 PROCEDURE — 99024 POSTOP FOLLOW-UP VISIT: CPT | Performed by: SURGERY

## 2019-12-24 RX ADMIN — SENNOSIDES AND DOCUSATE SODIUM 1 TABLET: 8.6; 5 TABLET ORAL at 18:18

## 2019-12-24 RX ADMIN — TAMSULOSIN HYDROCHLORIDE 0.4 MG: 0.4 CAPSULE ORAL at 16:37

## 2019-12-24 RX ADMIN — MELATONIN 6 MG: 3 TAB ORAL at 21:17

## 2019-12-24 RX ADMIN — NICOTINE 1 PATCH: 14 PATCH TRANSDERMAL at 08:51

## 2019-12-24 RX ADMIN — THIAMINE HCL TAB 100 MG 100 MG: 100 TAB at 08:55

## 2019-12-24 RX ADMIN — CLOPIDOGREL BISULFATE 75 MG: 75 TABLET ORAL at 08:54

## 2019-12-24 RX ADMIN — WARFARIN SODIUM 2 MG: 2 TABLET ORAL at 18:18

## 2019-12-24 RX ADMIN — FLUCONAZOLE 100 MG: 100 TABLET ORAL at 14:55

## 2019-12-24 RX ADMIN — ASPIRIN 81 MG: 81 TABLET ORAL at 08:53

## 2019-12-24 RX ADMIN — HEPARIN SODIUM AND DEXTROSE 18 UNITS/KG/HR: 10000; 5 INJECTION INTRAVENOUS at 09:07

## 2019-12-24 RX ADMIN — PANTOPRAZOLE SODIUM 40 MG: 40 TABLET, DELAYED RELEASE ORAL at 16:37

## 2019-12-24 RX ADMIN — GABAPENTIN 100 MG: 100 CAPSULE ORAL at 08:54

## 2019-12-24 RX ADMIN — ATORVASTATIN CALCIUM 20 MG: 20 TABLET, FILM COATED ORAL at 18:18

## 2019-12-24 RX ADMIN — SENNOSIDES AND DOCUSATE SODIUM 1 TABLET: 8.6; 5 TABLET ORAL at 08:54

## 2019-12-24 RX ADMIN — SODIUM CHLORIDE, SODIUM LACTATE, POTASSIUM CHLORIDE, AND CALCIUM CHLORIDE 75 ML/HR: .6; .31; .03; .02 INJECTION, SOLUTION INTRAVENOUS at 05:41

## 2019-12-24 NOTE — PROGRESS NOTES
Progress Note - Vascular Surgery   Ciara Gaviria 78 y o  male MRN: [id number]  Unit/Bed#: -01 Encounter: [id number]    Assessment:  78 y o  M presents with Right lower extremity rest pain, anemia, concern for GI bleed, supratherapeutic INR   He is s/p left femoral endarterectomy with profundoplasty, SFA embolectomy and retrograde iliac stenting 11/6/19 and arteriogram with left SFA/popliteal angioplasty and right SFA angioplasty 11/14/19   -S/P EGD/Colonoscopy 12/10 (EGD clean based ulcers with no active bleeding, colonoscopy poor prep no active bleeding)  -S/P RLE Agram- popliteal recanualization, AT/PT occluded- IR 12/13  -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    INR 1 29 yesterday, labs for today still pending       Plan:  Regular diet  Continue hep gtt until therapeutic on Coumadin   Continue Coumadin - monitor INR  Daily dressing change to R  BKA stump  Continue ASA    Subjective/Objective     Subjective: No acute events overnight  Pain is controlled on prn analgesia  Tolerating diet without nausea/vomiting  No fevers, chills  Objective:     Blood pressure 128/59, pulse 77, temperature 97 7 °F (36 5 °C), resp  rate 20, height 5' 5" (1 651 m), weight 51 4 kg (113 lb 5 1 oz), SpO2 100 %  ,Body mass index is 18 86 kg/m²  Intake/Output Summary (Last 24 hours) at 12/24/2019 0638  Last data filed at 12/24/2019 5409  Gross per 24 hour   Intake 2930 ml   Output 2225 ml   Net 705 ml       Invasive Devices     Peripheral Intravenous Line            Peripheral IV 12/21/19 Right Antecubital 2 days          Epidural Line            Nerve Block Catheter 12/19/19 4 days                Physical Exam:  NAD, alert   Normocephalic, atraumatic  MMM, EOMI, PERRLA  Norm resp effort on RA  RRR  Abd soft, NT/ND  R BKA incision c/d/i   Clean dressing in place   No calf tenderness or peripheral edema  Motor/sensation intact in distal extremities  CN grossly intact  -rash/lesions      Lab, Imaging and other studies:CBC: No results found for: WBC, HGB, HCT, MCV, PLT, ADJUSTEDWBC, MCH, MCHC, RDW, MPV, NRBC, CMP: No results found for: SODIUM, K, CL, CO2, ANIONGAP, BUN, CREATININE, GLUCOSE, CALCIUM, AST, ALT, ALKPHOS, PROT, BILITOT, EGFR, Coagulation: No results found for: PT, INR, APTT  VTE Pharmacologic Prophylaxis: Heparin  VTE Mechanical Prophylaxis: sequential compression device

## 2019-12-24 NOTE — PROGRESS NOTES
Noted rt lateral of knee abrasion measuring 2 5x 1 2 and spot to top of knee   3x 3 cleansed adaptic applied with atul

## 2019-12-24 NOTE — PROGRESS NOTES
Contacted patient daughter r/t patient refusing vitals and lab work  States son is on his way in to hospital  Will ask him to try and convince patient to allow, Patient took medications except BP med bc we have no recent BP  Will continue to monitor

## 2019-12-24 NOTE — OCCUPATIONAL THERAPY NOTE
Occupational Therapy Refusal Note  Attempted to see patient for OT treatment session, however pt refusing  Attempted to educate pt, however pt not receptive/doesn't appear to understand  OT to continue to follow and re-attempt as time permits/pt allows      HYUN Cox, OTR/L

## 2019-12-24 NOTE — PHYSICAL THERAPY NOTE
Session deferred- pt again refuses despite explanation as to benefit  Will follow    Barbara Gutierrez PT, DPT CSRS

## 2019-12-24 NOTE — NURSING NOTE
Pt discovered with IV pulled out and blood coming from the IV site  Pt was cleaned up and vitals were found to be stable  On call physician for vascular surgery was notified  Will continue to monitor vitals and follow-up with morning labs

## 2019-12-24 NOTE — PROGRESS NOTES
Progress Note - Acute Pain Service Regional Follow Up  Chinedu Ballard 78 y o  male MRN: [id number]  Unit/Bed#: -01 Encounter: [id number]      SURGERY DATE: 12/19/2019  Post-Op Diagnosis Codes:     * P[facility]per[facility] [I73 9]    Assessment:   79 yo male s/p BKA doing well  Popliteal OnQ ball complete, patient comfortable      Plan:   1  D/c OnQ pump, catheter pulled  2  PO pain medicine per primary team - oxycodone ordered  3  Adjuncts with gabapentin, can increase to TID if no concerns over sedation  May also benefit from scheduled tylenol to reduce narcotic requirement  APS sign off  Thank you for the Consult  Please contact APS ([phone]) with any further questions    Pain History  24 hour history: no acute events  24 hour opoid requirement: none  Meds/Allergies     all current active meds have been reviewed    No Known Allergies    Objective     Temp:  [97 7 °F (36 5 °C)-99 5 °F (37 5 °C)] 97 7 °F (36 5 °C)  HR:  [77-78] 77  Resp:  [20] 20  BP: (113-128)/(53-59) 128/59    Physical Exam   Constitutional: He is oriented to person, place, and time  He appears well-developed and well-nourished  HENT:   Head: Normocephalic and atraumatic  Eyes: EOM are normal    Cardiovascular: Normal rate and regular rhythm  Pulmonary/Chest: Effort normal and breath sounds normal    Abdominal: Soft  He exhibits no distension  There is no tenderness  Musculoskeletal: He exhibits no edema or deformity  Catheter site c/d/i  Catheter removed tip intact   Neurological: He is alert and oriented to person, place, and time  Skin: Skin is warm and dry  Psychiatric: He has a normal mood and affect  His behavior is normal    Nursing note and vitals reviewed

## 2019-12-25 LAB
APTT PPP: 59 SECONDS (ref 23–37)
APTT PPP: 93 SECONDS (ref 23–37)
INR PPP: 1.59 (ref 0.84–1.19)
PROTHROMBIN TIME: 18.5 SECONDS (ref 11.6–14.5)

## 2019-12-25 PROCEDURE — 99024 POSTOP FOLLOW-UP VISIT: CPT | Performed by: SURGERY

## 2019-12-25 PROCEDURE — 85610 PROTHROMBIN TIME: CPT | Performed by: PHYSICIAN ASSISTANT

## 2019-12-25 PROCEDURE — 85730 THROMBOPLASTIN TIME PARTIAL: CPT | Performed by: SURGERY

## 2019-12-25 RX ADMIN — TAMSULOSIN HYDROCHLORIDE 0.4 MG: 0.4 CAPSULE ORAL at 16:59

## 2019-12-25 RX ADMIN — FLUCONAZOLE 100 MG: 100 TABLET ORAL at 08:29

## 2019-12-25 RX ADMIN — PANTOPRAZOLE SODIUM 40 MG: 40 TABLET, DELAYED RELEASE ORAL at 16:59

## 2019-12-25 RX ADMIN — PANTOPRAZOLE SODIUM 40 MG: 40 TABLET, DELAYED RELEASE ORAL at 05:14

## 2019-12-25 RX ADMIN — SENNOSIDES AND DOCUSATE SODIUM 1 TABLET: 8.6; 5 TABLET ORAL at 17:00

## 2019-12-25 RX ADMIN — CLOPIDOGREL BISULFATE 75 MG: 75 TABLET ORAL at 08:29

## 2019-12-25 RX ADMIN — MELATONIN 6 MG: 3 TAB ORAL at 22:45

## 2019-12-25 RX ADMIN — THIAMINE HCL TAB 100 MG 100 MG: 100 TAB at 08:29

## 2019-12-25 RX ADMIN — OXYCODONE HYDROCHLORIDE 5 MG: 5 TABLET ORAL at 01:14

## 2019-12-25 RX ADMIN — NICOTINE 1 PATCH: 14 PATCH TRANSDERMAL at 08:30

## 2019-12-25 RX ADMIN — AMLODIPINE BESYLATE 10 MG: 10 TABLET ORAL at 08:29

## 2019-12-25 RX ADMIN — GABAPENTIN 100 MG: 100 CAPSULE ORAL at 08:29

## 2019-12-25 RX ADMIN — SENNOSIDES AND DOCUSATE SODIUM 1 TABLET: 8.6; 5 TABLET ORAL at 08:29

## 2019-12-25 RX ADMIN — WARFARIN SODIUM 2 MG: 2 TABLET ORAL at 17:00

## 2019-12-25 RX ADMIN — HEPARIN SODIUM AND DEXTROSE 18 UNITS/KG/HR: 10000; 5 INJECTION INTRAVENOUS at 18:34

## 2019-12-25 RX ADMIN — ATORVASTATIN CALCIUM 20 MG: 20 TABLET, FILM COATED ORAL at 17:00

## 2019-12-25 RX ADMIN — ASPIRIN 81 MG: 81 TABLET ORAL at 08:29

## 2019-12-25 RX ADMIN — HEPARIN SODIUM 1500 UNITS: 1000 INJECTION INTRAVENOUS; SUBCUTANEOUS at 08:27

## 2019-12-25 NOTE — PROGRESS NOTES
Progress Note - Vascular Surgery   Jonah Ibrahim 78 y o  male MRN: [id number]  Unit/Bed#: -01 Encounter: [id number]    Assessment:  78 y o  M presents with Right lower extremity rest pain, anemia, concern for GI bleed, supratherapeutic INR   He is s/p left femoral endarterectomy with profundoplasty, SFA embolectomy and retrograde iliac stenting 11/6/19 and arteriogram with left SFA/popliteal angioplasty and right SFA angioplasty 11/14/19   -S/P EGD/Colonoscopy 12/10 (EGD clean based ulcers with no active bleeding, colonoscopy poor prep no active bleeding)  -S/P RLE Agram- popliteal recanualization, AT/PT occluded- IR 12/13  -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    INR 1 59 from 1 50    Plan:  Regular diet  Continue hep gtt until therapeutic on Coumadin   Continue Coumadin - monitor INR  Daily dressing change to R  BKA stump  Continue ASA  Dispo planning - home with family with 24 hr supervision    Subjective/Objective     Subjective: No acute events overnight  Pain is controlled on prn analgesia  Tolerating diet without nausea/vomiting  No fevers, chills  Objective:     Blood pressure 127/55, pulse 77, temperature 97 8 °F (36 6 °C), resp  rate 18, height 5' 5" (1 651 m), weight 51 4 kg (113 lb 5 1 oz), SpO2 100 %  ,Body mass index is 18 86 kg/m²  Intake/Output Summary (Last 24 hours) at 12/25/2019 0723  Last data filed at 12/24/2019 2209  Gross per 24 hour   Intake 1498 75 ml   Output 1825 ml   Net -326 25 ml       Invasive Devices     Peripheral Intravenous Line            Peripheral IV 12/25/19 Right Forearm less than 1 day          Epidural Line            Nerve Block Catheter 12/19/19 5 days                Physical Exam:  NAD, alert   Normocephalic, atraumatic  MMM, EOMI, PERRLA  Norm resp effort on RA  RRR  Abd soft, NT/ND  R BKA incision c/d/i   Clean dressing in place   No calf tenderness or peripheral edema  Motor/sensation intact in distal extremities  CN grossly intact  -rash/lesions      Lab, Imaging and other studies:CBC:   Lab Results   Component Value Date    WBC 4 17 (L) 12/24/2019    HGB 7 5 (L) 12/24/2019    HCT 24 2 (L) 12/24/2019    MCV 94 12/24/2019     12/24/2019    MCH 29 1 12/24/2019    MCHC 31 0 (L) 12/24/2019    RDW 16 4 (H) 12/24/2019    MPV 9 6 12/24/2019   , CMP:   Lab Results   Component Value Date    SODIUM 140 12/24/2019    K 4 1 12/24/2019     (H) 12/24/2019    CO2 25 12/24/2019    BUN 12 12/24/2019    CREATININE 0 68 12/24/2019    CALCIUM 8 8 12/24/2019    EGFR 97 12/24/2019   , Coagulation:   Lab Results   Component Value Date    INR 1 59 (H) 12/25/2019     VTE Pharmacologic Prophylaxis: Heparin  VTE Mechanical Prophylaxis: sequential compression device

## 2019-12-25 NOTE — PLAN OF CARE
Problem: Prexisting or High Potential for Compromised Skin Integrity  Goal: Skin integrity is maintained or improved  Description  INTERVENTIONS:  - Identify patients at risk for skin breakdown  - Assess and monitor skin integrity  - Assess and monitor nutrition and hydration status  - Monitor labs   - Assess for incontinence   - Turn and reposition patient  - Assist with mobility/ambulation  - Relieve pressure over bony prominences  - Avoid friction and shearing  - Provide appropriate hygiene as needed including keeping skin clean and dry  - Evaluate need for skin moisturizer/barrier cream  - Collaborate with interdisciplinary team   - Patient/family teaching  - Consider wound care consult   Outcome: Progressing     Problem: Potential for Falls  Goal: Patient will remain free of falls  Description  INTERVENTIONS:  - Assess patient frequently for physical needs  -  Identify cognitive and physical deficits and behaviors that affect risk of falls    -  [place] fall precautions as indicated by assessment   - Educate patient/family on patient safety including physical limitations  - Instruct patient to call for assistance with activity based on assessment  - Modify environment to reduce risk of injury  - Consider OT/PT consult to assist with strengthening/mobility  Outcome: Progressing     Problem: PAIN - ADULT  Goal: Verbalizes/displays adequate comfort level or baseline comfort level  Description  Interventions:  - Encourage patient to monitor pain and request assistance  - Assess pain using appropriate pain scale  - Administer analgesics based on type and severity of pain and evaluate response  - Implement non-pharmacological measures as appropriate and evaluate response  - Consider cultural and social influences on pain and pain management  - Notify physician/advanced practitioner if interventions unsuccessful or patient reports new pain  Outcome: Progressing     Problem: INFECTION - ADULT  Goal: Absence or prevention of progression during hospitalization  Description  INTERVENTIONS:  - Assess and monitor for signs and symptoms of infection  - Monitor lab/diagnostic results  - Monitor all insertion sites, i e  indwelling lines, tubes, and drains  - [place] appropriate cooling/warming therapies per order  - Administer medications as ordered  - Instruct and encourage patient and family to use good hand hygiene technique  - Identify and instruct in appropriate isolation precautions for identified infection/condition   Outcome: Progressing  Goal: Absence of fever/infection during neutropenic period  Description  INTERVENTIONS:  - Monitor WBC    Outcome: Progressing     Problem: SAFETY ADULT  Goal: Patient will remain free of falls  Description  INTERVENTIONS:  - Assess patient frequently for physical needs  -  Identify cognitive and physical deficits and behaviors that affect risk of falls    -  [place] fall precautions as indicated by assessment   - Educate patient/family on patient safety including physical limitations  - Instruct patient to call for assistance with activity based on assessment  - Modify environment to reduce risk of injury  - Consider OT/PT consult to assist with strengthening/mobility  Outcome: Progressing  Goal: Maintain or return to baseline ADL function  Description  INTERVENTIONS:  -  Assess patient's ability to carry out ADLs; assess patient's baseline for ADL function and identify physical deficits which impact ability to perform ADLs (bathing, care of mouth/teeth, toileting, grooming, dressing, etc )  - Assess/evaluate cause of self-care deficits   - Assess range of motion  - Assess patient's mobility; develop plan if impaired  - Assess patient's need for assistive devices and provide as appropriate  - Encourage maximum independence but intervene and supervise when necessary  - Involve family in performance of ADLs  - Assess for home care needs following discharge   - Consider OT consult to assist with ADL evaluation and planning for discharge  - Provide patient education as appropriate  Outcome: Progressing  Goal: Maintain or return mobility status to optimal level  Description  INTERVENTIONS:  - Assess patient's baseline mobility status (ambulation, transfers, stairs, etc )    - Identify cognitive and physical deficits and behaviors that affect mobility  - Identify mobility aids required to assist with transfers and/or ambulation (gait belt, sit-to-stand, lift, walker, cane, etc )  - [place] fall precautions as indicated by assessment  - Record patient progress and toleration of activity level on Mobility SBAR; progress patient to next Phase/Stage  - Instruct patient to call for assistance with activity based on assessment  - Consider rehabilitation consult to assist with strengthening/weightbearing, etc   Outcome: Progressing     Problem: DISCHARGE PLANNING  Goal: Discharge to home or other facility with appropriate resources  Description  INTERVENTIONS:  - Identify barriers to discharge w/patient and caregiver  - Arrange for needed discharge resources and transportation as appropriate  - Identify discharge learning needs (meds, wound care, etc )  - Arrange for interpretive services to assist at discharge as needed  - Refer to Case Management Department for coordinating discharge planning if the patient needs post-hospital services based on physician/advanced practitioner order or complex needs related to functional status, cognitive ability, or social support system  Outcome: Progressing     Problem: Knowledge Deficit  Goal: Patient/family/caregiver demonstrates understanding of disease process, treatment plan, medications, and discharge instructions  Description  Complete learning assessment and assess knowledge base    Interventions:  - Provide teaching at level of understanding  - Provide teaching via preferred learning methods  Outcome: Progressing     Problem: Nutrition/Hydration-ADULT  Goal: Nutrient/Hydration intake appropriate for improving, restoring or maintaining nutritional needs  Description  Monitor and assess patient's nutrition/hydration status for malnutrition  Collaborate with interdisciplinary team and initiate plan and interventions as ordered  Monitor patient's weight and dietary intake as ordered or per policy  Utilize nutrition screening tool and intervene as necessary  Determine patient's food preferences and provide high-protein, high-caloric foods as appropriate       INTERVENTIONS:  - Monitor oral intake, urinary output, labs, and treatment plans  - Assess nutrition and hydration status and recommend course of action  - Evaluate amount of meals eaten  - Assist patient with eating if necessary   - Allow adequate time for meals  - Recommend/ encourage appropriate diets, oral nutritional supplements, and vitamin/mineral supplements  - Order, calculate, and assess calorie counts as needed  - Assess need for intravenous fluids  - Provide specific nutrition/hydration education as appropriate  - Include patient/family/caregiver in decisions related to nutrition   Outcome: Progressing

## 2019-12-26 LAB
APTT PPP: 111 SECONDS (ref 23–37)
APTT PPP: 65 SECONDS (ref 23–37)
APTT PPP: 90 SECONDS (ref 23–37)
INR PPP: 1.79 (ref 0.84–1.19)
PROTHROMBIN TIME: 20.3 SECONDS (ref 11.6–14.5)

## 2019-12-26 PROCEDURE — 97530 THERAPEUTIC ACTIVITIES: CPT

## 2019-12-26 PROCEDURE — 85730 THROMBOPLASTIN TIME PARTIAL: CPT | Performed by: SURGERY

## 2019-12-26 PROCEDURE — 85610 PROTHROMBIN TIME: CPT | Performed by: PHYSICIAN ASSISTANT

## 2019-12-26 PROCEDURE — 99024 POSTOP FOLLOW-UP VISIT: CPT | Performed by: SURGERY

## 2019-12-26 RX ADMIN — ATORVASTATIN CALCIUM 20 MG: 20 TABLET, FILM COATED ORAL at 17:33

## 2019-12-26 RX ADMIN — MELATONIN 6 MG: 3 TAB ORAL at 22:30

## 2019-12-26 RX ADMIN — AMLODIPINE BESYLATE 10 MG: 10 TABLET ORAL at 10:04

## 2019-12-26 RX ADMIN — NICOTINE 1 PATCH: 14 PATCH TRANSDERMAL at 10:05

## 2019-12-26 RX ADMIN — THIAMINE HCL TAB 100 MG 100 MG: 100 TAB at 10:04

## 2019-12-26 RX ADMIN — PANTOPRAZOLE SODIUM 40 MG: 40 TABLET, DELAYED RELEASE ORAL at 06:22

## 2019-12-26 RX ADMIN — TAMSULOSIN HYDROCHLORIDE 0.4 MG: 0.4 CAPSULE ORAL at 16:08

## 2019-12-26 RX ADMIN — ASPIRIN 81 MG: 81 TABLET ORAL at 10:05

## 2019-12-26 RX ADMIN — WARFARIN SODIUM 2 MG: 2 TABLET ORAL at 17:33

## 2019-12-26 RX ADMIN — FLUCONAZOLE 100 MG: 100 TABLET ORAL at 10:06

## 2019-12-26 RX ADMIN — SENNOSIDES AND DOCUSATE SODIUM 1 TABLET: 8.6; 5 TABLET ORAL at 10:04

## 2019-12-26 RX ADMIN — GABAPENTIN 100 MG: 100 CAPSULE ORAL at 10:05

## 2019-12-26 RX ADMIN — CLOPIDOGREL BISULFATE 75 MG: 75 TABLET ORAL at 10:04

## 2019-12-26 RX ADMIN — PANTOPRAZOLE SODIUM 40 MG: 40 TABLET, DELAYED RELEASE ORAL at 16:08

## 2019-12-26 NOTE — SOCIAL WORK
CM contacted pt's son Yarelis López [phone] to relay the message that [place] [id number] that was chosen are unable to accept pt  The agencies are at capacity  CM hopes for call back and another choice  Pt needs DME  [employer] informed CM that pt has balances overdue from prior equipment  CM was given billing # [phone]  Pt needs to pay balance or set up payment plan  CM will make pt's son aware

## 2019-12-26 NOTE — OCCUPATIONAL THERAPY NOTE
OccupationalTherapy Progress Note     Patient Name: Chinedu Ballard  QYNBJ'C Date: 12/26/2019  Problem List  Principal Problem:    Critical lower limb ischemia  Active Problems:    PAD (peripheral artery disease) ([facility] )          12/26/19 1345   Restrictions/Precautions   Weight Bearing Precautions Per Order Yes   RLE Weight Bearing Per Order NWB   Braces or Orthoses LE Immobilizer   Other Precautions Cognitive; Chair Alarm; Bed Alarm;WBS; Telemetry; Fall Risk;Pain   Lifestyle   Autonomy Per son, Pt requires A w/ all ADLS and IADLS from family; (-) drives PTA  Son reports he plans on carrying Pt up/down the stairs as son does not wish Pt to go to rehab  Reciprocal Relationships Per son, Pt lives w/ multiple family members  Son reports a family member will be home at all times to provide A as needed  Service to Others Pt is retired  Intrinsic Gratification Pt reports enjoying spending time w/ family  Pain Assessment   Pain Assessment Mello-Baker FACES   Mello-Baker FACES Pain Rating 4   Pain Type Surgical pain   Pain Location Leg   Pain Orientation Right   Hospital Pain Intervention(s) Repositioned; Ambulation/increased activity; Rest;Emotional support   ADL   Where Assessed Supine, bed   LB Dressing Assistance 5  Supervision/Setup   LB Dressing Deficit Setup;Don/doff L sock  (long sitting in bed with crossed over leg method to don left sock)   Bed Mobility   Supine to Sit 3  Moderate assistance   Additional items Assist x 1; Increased time required;HOB elevated; Bedrails   Additional Comments pt sat edge of bed approx ~5 minutes with CS and unsupported static sitting   Transfers   Additional Comments refused sit to stand transfers 2* to RLE pain   Cognition   Overall Cognitive Status Impaired   Arousal/Participation Alert; Responsive; Uncooperative   Attention Difficulty attending to directions   Orientation Level Oriented X4   Memory Unable to assess   Following Commands Follows one step commands inconsistently Comments Baseline early dementia, difficulty following simple directives  Activity Tolerance   Activity Tolerance Patient limited by pain   Medical Staff Made Aware RN cleared pt for thearpy   Assessment   Assessment Patient participated in Skilled OT session this date with interventions consisting of ADL re training with the use of correct body mechnaics, bed mobiltiy   Patient agreeable to OT treatment session, upon arrival patient was found supine in bed  In comparison to previous session, patient with improvements in LB dressing tasks   Patient requiring verbal cues for safety, verbal cues for correct technique and frequent rest periods  Patient continues to be functioning below baseline level, occupational performance remains limited secondary to factors listed above and increased risk for falls and injury  From OT standpoint, recommendation at time of d/c would be Short Term Rehab  Patient to benefit from continued Occupational Therapy treatment while in the hospital to address deficits as defined above and maximize level of functional independence with ADLs and functional mobility  Plan   Treatment Interventions ADL retraining;Functional transfer training;Cognitive reorientation; Endurance training;Patient/family training; Compensatory technique education; Activityengagement   Goal Expiration Date 12/20/19   OT Treatment Day 3   OT Frequency 3-5x/wk   Recommendation   OT Discharge Recommendation Short Term Rehab     Ramiro PURVIS OTR/L

## 2019-12-26 NOTE — PROGRESS NOTES
Progress Note - Vascular Surgery   Pancho Cheney 78 y o  male MRN: [id number]  Unit/Bed#: -01 Encounter: [id number]    Assessment:  78 y o  M presents with Right lower extremity rest pain, anemia, concern for GI bleed, supratherapeutic INR   He is s/p left femoral endarterectomy with profundoplasty, SFA embolectomy and retrograde iliac stenting 11/6/19 and arteriogram with left SFA/popliteal angioplasty and right SFA angioplasty 11/14/19   -S/P EGD/Colonoscopy 12/10 (EGD clean based ulcers with no active bleeding, colonoscopy poor prep no active bleeding)  -S/P RLE Agram- popliteal recanualization, AT/PT occluded- IR 12/13  -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    INR 1 59 yesterday, labs for today still pending     Plan:  Regular diet  Continue hep gtt until therapeutic on Coumadin   Continue Coumadin - monitor INR  Daily dressing change to R  BKA stump  Continue ASA  Dispo planning - home with family with 24 hr supervision    Subjective/Objective     Subjective: No acute events overnight  Pain is controlled on prn analgesia  Tolerating diet without nausea/vomiting  No fevers, chills  Objective:     Blood pressure 141/90, pulse 82, temperature 97 8 °F (36 6 °C), resp  rate 16, height 5' 5" (1 651 m), weight 51 4 kg (113 lb 5 1 oz), SpO2 100 %  ,Body mass index is 18 86 kg/m²  Intake/Output Summary (Last 24 hours) at 12/26/2019 0647  Last data filed at 12/26/2019 6589  Gross per 24 hour   Intake 620 ml   Output 1800 ml   Net -1180 ml       Invasive Devices     Peripheral Intravenous Line            Peripheral IV 12/25/19 Right Forearm 1 day          Epidural Line            Nerve Block Catheter 12/19/19 6 days                Physical Exam:  NAD, alert   Normocephalic, atraumatic  MMM, EOMI, PERRLA  Norm resp effort on RA  RRR  Abd soft, NT/ND  R BKA incision c/d/i   Clean dressing in place   No calf tenderness or peripheral edema  Motor/sensation intact in distal extremities  CN grossly intact  -rash/lesions      Lab, Imaging and other studies:CBC:   No results found for: WBC, HGB, HCT, MCV, PLT, ADJUSTEDWBC, MCH, MCHC, RDW, MPV, NRBC, CMP:   No results found for: SODIUM, K, CL, CO2, ANIONGAP, BUN, CREATININE, GLUCOSE, CALCIUM, AST, ALT, ALKPHOS, PROT, BILITOT, EGFR, Coagulation:   No results found for: PT, INR, APTT  VTE Pharmacologic Prophylaxis: Heparin  VTE Mechanical Prophylaxis: sequential compression device

## 2019-12-26 NOTE — PLAN OF CARE
Problem: OCCUPATIONAL THERAPY ADULT  Goal: Performs self-care activities at highest level of function for planned discharge setting  See evaluation for individualized goals  Description  OT inteventions -self care retraining, transfers/mobility tasks  Note:   Limitation: Decreased ADL status, Decreased Safe judgement during ADL, Decreased cognition, Decreased endurance, Decreased self-care trans, Decreased high-level ADLs  Prognosis: Fair  Assessment: Patient participated in Skilled OT session this date with interventions consisting of ADL re training with the use of correct body mechnaics, bed mobiltiy   Patient agreeable to OT treatment session, upon arrival patient was found supine in bed  In comparison to previous session, patient with improvements in LB dressing tasks   Patient requiring verbal cues for safety, verbal cues for correct technique and frequent rest periods  Patient continues to be functioning below baseline level, occupational performance remains limited secondary to factors listed above and increased risk for falls and injury  From OT standpoint, recommendation at time of d/c would be Short Term Rehab  Patient to benefit from continued Occupational Therapy treatment while in the hospital to address deficits as defined above and maximize level of functional independence with ADLs and functional mobility        OT Discharge Recommendation: Short Term Rehab  OT - OK to Discharge: (Yes - rehab when medically stable)

## 2019-12-27 LAB
APTT PPP: 112 SECONDS (ref 23–37)
INR PPP: 2.69 (ref 0.84–1.19)
PROTHROMBIN TIME: 28.1 SECONDS (ref 11.6–14.5)

## 2019-12-27 PROCEDURE — 85730 THROMBOPLASTIN TIME PARTIAL: CPT | Performed by: SURGERY

## 2019-12-27 PROCEDURE — 97530 THERAPEUTIC ACTIVITIES: CPT

## 2019-12-27 PROCEDURE — 99024 POSTOP FOLLOW-UP VISIT: CPT | Performed by: SURGERY

## 2019-12-27 PROCEDURE — 85610 PROTHROMBIN TIME: CPT | Performed by: PHYSICIAN ASSISTANT

## 2019-12-27 RX ORDER — WARFARIN SODIUM 1 MG/1
1 TABLET ORAL EVERY OTHER DAY
Status: DISCONTINUED | OUTPATIENT
Start: 2019-12-28 | End: 2019-12-27

## 2019-12-27 RX ORDER — WARFARIN SODIUM 2 MG/1
2 TABLET ORAL EVERY OTHER DAY
Status: DISCONTINUED | OUTPATIENT
Start: 2019-12-28 | End: 2019-12-30

## 2019-12-27 RX ORDER — WARFARIN SODIUM 1 MG/1
1 TABLET ORAL EVERY OTHER DAY
Status: DISCONTINUED | OUTPATIENT
Start: 2019-12-27 | End: 2019-12-31 | Stop reason: HOSPADM

## 2019-12-27 RX ADMIN — PANTOPRAZOLE SODIUM 40 MG: 40 TABLET, DELAYED RELEASE ORAL at 17:03

## 2019-12-27 RX ADMIN — CLOPIDOGREL BISULFATE 75 MG: 75 TABLET ORAL at 08:06

## 2019-12-27 RX ADMIN — THIAMINE HCL TAB 100 MG 100 MG: 100 TAB at 08:06

## 2019-12-27 RX ADMIN — GABAPENTIN 100 MG: 100 CAPSULE ORAL at 08:06

## 2019-12-27 RX ADMIN — PANTOPRAZOLE SODIUM 40 MG: 40 TABLET, DELAYED RELEASE ORAL at 06:28

## 2019-12-27 RX ADMIN — AMLODIPINE BESYLATE 10 MG: 10 TABLET ORAL at 08:06

## 2019-12-27 RX ADMIN — SENNOSIDES AND DOCUSATE SODIUM 1 TABLET: 8.6; 5 TABLET ORAL at 08:06

## 2019-12-27 RX ADMIN — MELATONIN 6 MG: 3 TAB ORAL at 20:31

## 2019-12-27 RX ADMIN — TAMSULOSIN HYDROCHLORIDE 0.4 MG: 0.4 CAPSULE ORAL at 17:03

## 2019-12-27 RX ADMIN — ASPIRIN 81 MG: 81 TABLET ORAL at 08:06

## 2019-12-27 RX ADMIN — WARFARIN SODIUM 1 MG: 2 TABLET ORAL at 17:31

## 2019-12-27 RX ADMIN — OXYCODONE HYDROCHLORIDE 2.5 MG: 5 TABLET ORAL at 13:23

## 2019-12-27 RX ADMIN — HEPARIN SODIUM AND DEXTROSE 13 UNITS/KG/HR: 10000; 5 INJECTION INTRAVENOUS at 04:39

## 2019-12-27 RX ADMIN — ATORVASTATIN CALCIUM 20 MG: 20 TABLET, FILM COATED ORAL at 17:03

## 2019-12-27 RX ADMIN — SENNOSIDES AND DOCUSATE SODIUM 1 TABLET: 8.6; 5 TABLET ORAL at 17:03

## 2019-12-27 NOTE — PROGRESS NOTES
Progress Note - Vascular Surgery   Pippa July 78 y o  male MRN: [id number]  Unit/Bed#: -01 Encounter: [id number]    Assessment:  78 y o  M presents with Right lower extremity rest pain, anemia, concern for GI bleed, supratherapeutic INR   He is s/p left femoral endarterectomy with profundoplasty, SFA embolectomy and retrograde iliac stenting 11/6/19 and arteriogram with left SFA/popliteal angioplasty and right SFA angioplasty 11/14/19   -S/P EGD/Colonoscopy 12/10 (EGD clean based ulcers with no active bleeding, colonoscopy poor prep no active bleeding)  -S/P RLE Agram- popliteal recanualization, AT/PT occluded- IR 12/13  -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    INR 2 69 from 1 79    Plan:  Regular diet  D/c heparin gtt  Continue Coumadin  Daily dressing change to R  BKA stump  Continue ASA  Patient can be discharged now that he is therapeutic on his Coumadin  Will touch base with case management about discharge  Subjective/Objective     Subjective: No acute events overnight  Pain is controlled on prn analgesia  Tolerating diet without nausea/vomiting  No fevers, chills  Objective:     Blood pressure 147/66, pulse 72, temperature 98 1 °F (36 7 °C), resp  rate 16, height 5' 5" (1 651 m), weight 51 4 kg (113 lb 5 1 oz), SpO2 98 %  ,Body mass index is 18 86 kg/m²  Intake/Output Summary (Last 24 hours) at 12/27/2019 0644  Last data filed at 12/27/2019 0442  Gross per 24 hour   Intake    Output 1575 ml   Net -1575 ml       Invasive Devices     Peripheral Intravenous Line            Peripheral IV 12/25/19 Right Forearm 2 days          Epidural Line            Nerve Block Catheter 12/19/19 7 days                Physical Exam:  NAD, alert   Normocephalic, atraumatic  MMM, EOMI, PERRLA  Norm resp effort on RA  RRR  Abd soft, NT/ND  R BKA incision c/d/i   Clean dressing in place   No calf tenderness or peripheral edema  Motor/sensation intact in distal extremities  CN grossly intact  -rash/lesions      Lab, Imaging and other studies:CBC:   No results found for: WBC, HGB, HCT, MCV, PLT, ADJUSTEDWBC, MCH, MCHC, RDW, MPV, NRBC, CMP:   No results found for: SODIUM, K, CL, CO2, ANIONGAP, BUN, CREATININE, GLUCOSE, CALCIUM, AST, ALT, ALKPHOS, PROT, BILITOT, EGFR, Coagulation:   Lab Results   Component Value Date    INR 2 69 (H) 12/27/2019     VTE Pharmacologic Prophylaxis: Heparin  VTE Mechanical Prophylaxis: sequential compression device

## 2019-12-27 NOTE — SOCIAL WORK
CM left message for pt's son, [facility] [phone] for [facility] choices and DME for d/c      CM contacted pt's dtr, Olman Rouse whom pt would be residing with  Olman Rouse reported that she was open to rehab placement for acute care  Olman Rouse requested further options for additional facilities  CM explained the SNF options  Olman Rouse requested a list sent to [email or web address]  CM made a f/u with the pt's dtr, Olman Rouse  She has not made a facility choice at this time  She wants to review the new list and her choice for [facility] with pt's son, [facility]  CM received a call from Olman Rouse  [facility] is requesting [facility], [facility], and [facility] home  Referrals were sent to these facilities

## 2019-12-27 NOTE — PLAN OF CARE
Problem: OCCUPATIONAL THERAPY ADULT  Goal: Performs self-care activities at highest level of function for planned discharge setting  See evaluation for individualized goals  Description  Treatment Interventions: ADL retraining, Functional transfer training, UE strengthening/ROM, Endurance training, Cognitive reorientation, Patient/family training, Equipment evaluation/education, Compensatory technique education, Activityengagement, Energy conservation          See flowsheet documentation for full assessment, interventions and recommendations  Outcome: Progressing  Note:   Limitation: Decreased ADL status, Decreased Safe judgement during ADL, Decreased cognition, Decreased endurance, Decreased self-care trans, Decreased high-level ADLs  Prognosis: Fair  Assessment: Patient participated in Skilled OT session this date with interventions consisting of ADL re-training, functional transfers  Pt w/ NWB B/L LE orders at this time - attempted to clarify WBS with vascular surgery, NWB B/L LE maintained during session  Patient agreeable to OT treatment session, upon arrival patient was found supine in bed  In comparison to previous session, patient with improvements in functional transfers, bed mobility  Pt performed sit pivot transfer (lateral scoots to drop arm recliner) with SUP  Pt requiring MIN A for LB dressing of LLE sock seated EOB (steadying assist)  Patient continues to be functioning below baseline level, occupational performance remains limited secondary to factors listed above and increased risk for falls and injury  From OT standpoint, recommendation at time of d/c would be Short Term Rehab  Patient to benefit from continued Occupational Therapy treatment while in the hospital to address deficits as defined above and maximize level of functional independence with ADLs and functional mobility        OT Discharge Recommendation: Short Term Rehab  OT - OK to Discharge: (Yes - rehab when medically stable)

## 2019-12-27 NOTE — OCCUPATIONAL THERAPY NOTE
OccupationalTherapy Progress Note     Patient Name: March Misha  TKILQ'S Date: 12/27/2019  Problem List  Principal Problem:    Critical lower limb ischemia  Active Problems:    PAD (peripheral artery disease) ([facility] [address] )          12/27/19 0948   Restrictions/Precautions   Weight Bearing Precautions Per Order Yes   RLE Weight Bearing Per Order NWB   LLE Weight Bearing Per Order NWB   Braces or Orthoses LE Immobilizer   Other Precautions Cognitive; Chair Alarm; Bed Alarm; Fall Risk;WBS   General   Response to Previous Treatment Patient with no complaints from previous session   Pain Assessment   Pain Assessment No/denies pain   Pain Score No Pain   ADL   LB Dressing Assistance 4  Minimal Assistance   LB Dressing Deficit Steadying;Supervision/safety; Increased time to complete  (donned L sock seated EOB)   Bed Mobility   Supine to Sit 4  Minimal assistance   Additional items Assist x 1; Increased time required   Transfers   Sit pivot 5  Supervision  (lateral scoots to drop arm recliner - maintained B/L LE NWB)   Additional items Increased time required;Verbal cues   Cognition   Arousal/Participation Alert; Cooperative   Attention Attends with cues to redirect   Following Commands Follows one step commands with increased time or repetition   Comments rehab aide present during session - assisted with translation   Activity Tolerance   Activity Tolerance Patient limited by fatigue   Medical Staff Made Aware Per RN pt appropriate to be seen   Assessment   Assessment Patient participated in Skilled OT session this date with interventions consisting of ADL re-training, functional transfers  Pt w/ NWB B/L LE orders at this time - attempted to clarify WBS with vascular surgery, NWB B/L LE maintained during session  Patient agreeable to OT treatment session, upon arrival patient was found supine in bed  In comparison to previous session, patient with improvements in functional transfers, bed mobility   Pt performed sit pivot transfer (lateral scoots to drop arm recliner) with SUP  Pt requiring MIN A for LB dressing of LLE sock seated EOB (steadying assist d/t decreased balance)  Patient continues to be functioning below baseline level, occupational performance remains limited secondary to factors listed above and increased risk for falls and injury  From OT standpoint, recommendation at time of d/c would be Short Term Rehab  Patient to benefit from continued Occupational Therapy treatment while in the hospital to address deficits as defined above and maximize level of functional independence with ADLs and functional mobility      Plan   Treatment Interventions ADL retraining;Functional transfer training;Patient/family training   Goal Expiration Date 12/30/19   OT Treatment Day 4   OT Frequency 3-5x/wk   Recommendation   OT Discharge Recommendation Short Term Rehab       Additional Goals:     Pt to improve sliding board transfers to MOD IND     Pt to participate in w/c mobility with MOD IND to participate in dynamic ADLs          Peewee Patrick, OT

## 2019-12-27 NOTE — PLAN OF CARE
Problem: PHYSICAL THERAPY ADULT  Goal: Performs mobility at highest level of function for planned discharge setting  See evaluation for individualized goals  Description  Treatment/Interventions: Functional transfer training, LE strengthening/ROM, Elevations, Therapeutic exercise, Endurance training, Patient/family training, Equipment eval/education, Bed mobility, Gait training, Compensatory technique education, Spoke to nursing, Spoke to case management, OT, Family  Equipment Recommended: Walker(RW)       See flowsheet documentation for full assessment, interventions and recommendations  Note:   Prognosis: Fair  Problem List: Decreased strength, Decreased range of motion, Decreased endurance, Impaired balance, Decreased mobility, Decreased safety awareness, Pain, Orthopedic restrictions  Assessment: Pt educated in importance of early mobility  Agreeable to transfer to drop arm chair  Required pacing instruction throughout session  Demonstrated adequate B/L LE strength to clear during lateral scoot  Required close S for balance during transfer  Noted good UE strength to clear buttock for transfers  Pt will benefit from continued inpt skilled PT to maximize functional mobility & safety  Barriers to Discharge: Inaccessible home environment     Recommendation: Post acute IP rehab     PT - OK to Discharge: Yes(to rehab when medically stable)    See flowsheet documentation for full assessment

## 2019-12-27 NOTE — PHYSICAL THERAPY NOTE
PHYSICAL THERAPY NOTE          Patient Name: Marcel Rivera  IDMAK'O Date: 12/27/2019 12/27/19 0950   Pain Assessment   Pain Assessment No/denies pain   Pain Score No Pain   Restrictions/Precautions   Weight Bearing Precautions Per Order Yes   RLE Weight Bearing Per Order NWB  (per orders and notes)   LLE Weight Bearing Per Order NWB  (per order)   Braces or Orthoses LE Immobilizer   Other Precautions Fall Risk;Cognitive; Chair Alarm; Bed Alarm;Pain;WBS   General   Chart Reviewed Yes   Family/Caregiver Present No   Cognition   Orientation Level Oriented X4   Subjective   Subjective "I can try, I just have alot of pain"   Bed Mobility   Supine to Sit 4  Minimal assistance   Additional items Assist x 1   Transfers   Sliding Board transfer 5  Supervision   Additional items Increased time required  (drop arm chair)   Balance   Static Sitting Fair -   Dynamic Sitting Fair -   Static Standing Poor +   Endurance Deficit   Endurance Deficit Yes   Endurance Deficit Description weakness, pain   Activity Tolerance   Activity Tolerance Patient limited by pain; Patient limited by fatigue   Medical Staff Made Aware OT    Nurse Made Aware yes, nsg gave clearance to work with pt, updated on pt progress   Exercises   Balance training  static sitting, dynamic sitting for lateral transfers   Assessment   Prognosis Fair   Problem List Decreased strength;Decreased range of motion;Decreased endurance; Impaired balance;Decreased mobility; Decreased safety awareness;Pain;Orthopedic restrictions   Assessment Pt educated in importance of early mobility  Agreeable to transfer to drop arm chair  Required pacing instruction throughout session  Demonstrated adequate B/L LE strength to clear during lateral scoot  Required close S for balance during transfer  Noted good UE strength to clear buttock for transfers   Pt will benefit from continued inpt skilled PT to maximize functional mobility & safety  Barriers to Discharge Inaccessible home environment   Goals   Patient Goals To decrease pain   STG Expiration Date 01/03/20   PT Treatment Day 1   Plan   Treatment/Interventions Endurance training;Patient/family training;Bed mobility; Functional transfer training   PT Frequency Other (Comment)  (3-5x/wk)   Recommendation   Recommendation Post acute IP rehab   Equipment Recommended Wheelchair   PT - OK to Discharge Yes  (to rehab when medically stable)       Lakia Gonzalez, PT

## 2019-12-28 PROCEDURE — NC001 PR NO CHARGE: Performed by: SURGERY

## 2019-12-28 RX ADMIN — TAMSULOSIN HYDROCHLORIDE 0.4 MG: 0.4 CAPSULE ORAL at 17:00

## 2019-12-28 RX ADMIN — SENNOSIDES AND DOCUSATE SODIUM 1 TABLET: 8.6; 5 TABLET ORAL at 08:23

## 2019-12-28 RX ADMIN — MELATONIN 6 MG: 3 TAB ORAL at 20:37

## 2019-12-28 RX ADMIN — WARFARIN SODIUM 2 MG: 2 TABLET ORAL at 08:24

## 2019-12-28 RX ADMIN — GABAPENTIN 100 MG: 100 CAPSULE ORAL at 08:23

## 2019-12-28 RX ADMIN — AMLODIPINE BESYLATE 10 MG: 10 TABLET ORAL at 08:24

## 2019-12-28 RX ADMIN — PANTOPRAZOLE SODIUM 40 MG: 40 TABLET, DELAYED RELEASE ORAL at 05:20

## 2019-12-28 RX ADMIN — PANTOPRAZOLE SODIUM 40 MG: 40 TABLET, DELAYED RELEASE ORAL at 17:00

## 2019-12-28 RX ADMIN — SENNOSIDES AND DOCUSATE SODIUM 1 TABLET: 8.6; 5 TABLET ORAL at 17:02

## 2019-12-28 RX ADMIN — ASPIRIN 81 MG: 81 TABLET ORAL at 08:24

## 2019-12-28 RX ADMIN — CLOPIDOGREL BISULFATE 75 MG: 75 TABLET ORAL at 08:23

## 2019-12-28 RX ADMIN — ATORVASTATIN CALCIUM 20 MG: 20 TABLET, FILM COATED ORAL at 17:02

## 2019-12-28 RX ADMIN — OXYCODONE HYDROCHLORIDE 5 MG: 5 TABLET ORAL at 20:38

## 2019-12-28 RX ADMIN — NICOTINE 1 PATCH: 14 PATCH TRANSDERMAL at 08:23

## 2019-12-28 RX ADMIN — THIAMINE HCL TAB 100 MG 100 MG: 100 TAB at 08:24

## 2019-12-28 NOTE — PROGRESS NOTES
Progress Note - Vascular Surgery   Richard Womack 78 y o  male MRN: [id number]  Unit/Bed#: -01 Encounter: [id number]    Assessment:  66yo M presents with RLE rest pain, anemia, concern for GI bleed, supratherapeutic INR   He is s/p left femoral endarterectomy with profundoplasty, SFA embolectomy and retrograde iliac stenting 11/6/19 and arteriogram with left SFA/popliteal angioplasty and right SFA angioplasty 11/14/19   -S/P EGD/Colonoscopy 12/10 (EGD clean based ulcers with no active bleeding, colonoscopy poor prep no active bleeding)  -S/P RLE Agram- popliteal recanualization, AT/PT occluded- IR 12/13  -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    Plan:  · Regular diet  · Continue coumadin, INR currently therapeutic  · Daily dressing change to R BKA stump  · Continue ASA  · Dispo: stable for discharge, referrals sent by CM    Subjective/Objective     Subjective:   No acute events overnight  Objective:     Blood pressure 130/59, pulse 70, temperature 98 2 °F (36 8 °C), resp  rate 16, height 5' 5" (1 651 m), weight 51 4 kg (113 lb 5 1 oz), SpO2 100 %  ,Body mass index is 18 86 kg/m²        Intake/Output Summary (Last 24 hours) at 12/28/2019 0743  Last data filed at 12/28/2019 0501  Gross per 24 hour   Intake 1167 ml   Output 1919 ml   Net -752 ml       Invasive Devices     Peripheral Intravenous Line            Peripheral IV 12/25/19 Right Forearm 3 days          Epidural Line            Nerve Block Catheter 12/19/19 8 days                Physical Exam:   Gen: NAD  CV: RRR  Lungs: nl effort  Abd: soft, NT/ND  Ext: R BKA with stump dressing in place C/D/I   Skin: no rashes  Neuro: A&Ox3     Lab, Imaging and other studies:CBC: No results found for: WBC, HGB, HCT, MCV, PLT, ADJUSTEDWBC, MCH, MCHC, RDW, MPV, NRBC, CMP: No results found for: SODIUM, K, CL, CO2, ANIONGAP, BUN, CREATININE, GLUCOSE, CALCIUM, AST, ALT, ALKPHOS, PROT, BILITOT, EGFR, Coagulation: No results found for: PT, INR, APTT  VTE Pharmacologic Prophylaxis: Warfarin (Coumadin)  VTE Mechanical Prophylaxis: sequential compression device

## 2019-12-29 LAB
INR PPP: 1.98 (ref 0.84–1.19)
PROTHROMBIN TIME: 22 SECONDS (ref 11.6–14.5)

## 2019-12-29 PROCEDURE — NC001 PR NO CHARGE: Performed by: SURGERY

## 2019-12-29 PROCEDURE — 85610 PROTHROMBIN TIME: CPT | Performed by: PHYSICIAN ASSISTANT

## 2019-12-29 RX ADMIN — MELATONIN 6 MG: 3 TAB ORAL at 21:53

## 2019-12-29 RX ADMIN — TAMSULOSIN HYDROCHLORIDE 0.4 MG: 0.4 CAPSULE ORAL at 17:06

## 2019-12-29 RX ADMIN — WARFARIN SODIUM 1 MG: 2 TABLET ORAL at 08:13

## 2019-12-29 RX ADMIN — NICOTINE 1 PATCH: 14 PATCH TRANSDERMAL at 08:13

## 2019-12-29 RX ADMIN — THIAMINE HCL TAB 100 MG 100 MG: 100 TAB at 08:12

## 2019-12-29 RX ADMIN — SENNOSIDES AND DOCUSATE SODIUM 1 TABLET: 8.6; 5 TABLET ORAL at 08:12

## 2019-12-29 RX ADMIN — SENNOSIDES AND DOCUSATE SODIUM 1 TABLET: 8.6; 5 TABLET ORAL at 17:06

## 2019-12-29 RX ADMIN — ATORVASTATIN CALCIUM 20 MG: 20 TABLET, FILM COATED ORAL at 17:06

## 2019-12-29 RX ADMIN — AMLODIPINE BESYLATE 10 MG: 10 TABLET ORAL at 08:12

## 2019-12-29 RX ADMIN — CLOPIDOGREL BISULFATE 75 MG: 75 TABLET ORAL at 08:12

## 2019-12-29 RX ADMIN — GABAPENTIN 100 MG: 100 CAPSULE ORAL at 08:12

## 2019-12-29 RX ADMIN — PANTOPRAZOLE SODIUM 40 MG: 40 TABLET, DELAYED RELEASE ORAL at 17:05

## 2019-12-29 RX ADMIN — ASPIRIN 81 MG: 81 TABLET ORAL at 08:12

## 2019-12-29 RX ADMIN — PANTOPRAZOLE SODIUM 40 MG: 40 TABLET, DELAYED RELEASE ORAL at 06:50

## 2019-12-29 NOTE — PROGRESS NOTES
Rounded on pt this morning w Nurse Maria Dolores Morales  Pt refusing blood draws and refusing to communicate with  via blue phone  Will attempt to contact family  Pt's discharge still pending placement       Diandra Bearden MD  12/29/2019  6:01AM

## 2019-12-29 NOTE — SOCIAL WORK
Patient medically clear for d/c  CM contacted Pt's daughter Berta Thomason regarding more choices for STR  CM met with Pt's daughter at bedside  Pt's daughter provided 3 choices  [facility] [place], [facility]  [facility]  [facility]  Referrals placed in [place]  CM will follow

## 2019-12-29 NOTE — QUICK NOTE
Pt refusing blood draws, refusing to communicate using the blue phone  Discussed with Pt's daughter over the phone and conveyed the importance of continued blood draws and monitoring INR while in the hospital  She is in agreement and will visit him today  Also discussed with case management regarding placement       Jerrell Khan MD  12/29/2019  9:27 AM

## 2019-12-29 NOTE — PROGRESS NOTES
Patient is still refusing to allow nursing staff to draw PT/INR that was ordered to be drawn in the morning  Patient's IV is also due to be changed however, patient refuses to allow nursing staff to attempt to place a new IV  April Peterson MD made aware  Will continue to monitor patient

## 2019-12-29 NOTE — PROGRESS NOTES
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    VSS  Afebrile  R stump dressing clean/ dry/ intact  Non tender to palpation  INR on 12/27 2 69  Pt refusing further blood draws or communication w blue phone  Plan:  · Regular diet  · Continue coumadin, INR currently therapeutic  · Daily dressing change to R BKA stump  · Continue ASA  · Dispo: stable for discharge, referrals sent by CM    Subjective:   Refusing blood draws, refusing communication w blue phone  Will contact family  Objective:     Vitals: Blood pressure 118/60, pulse 67, temperature 97 9 °F (36 6 °C), resp  rate 18, height 5' 5" (1 651 m), weight 51 4 kg (113 lb 5 1 oz), SpO2 99 %  ,Body mass index is 18 86 kg/m²  Intake/Output Summary (Last 24 hours) at 12/29/2019 0602  Last data filed at 12/29/2019 0500  Gross per 24 hour   Intake 1240 ml   Output 1700 ml   Net -460 ml       Physical Exam  General: NAD  HEENT: NC/AT  MMM  Cv: RRR  Lungs: normal effort  Ab: Soft, NT/ND  Ex: no CCE  R stump, clean, dry, intact     Neuro: AAOx3      Invasive Devices     Peripheral Intravenous Line            Peripheral IV 12/25/19 Right Forearm 4 days              Scheduled Meds:  Current Facility-Administered Medications:  acetaminophen 650 mg Oral Q6H PRN Paradise Jensen MD   amLODIPine 10 mg Oral Daily Paradise Jensen MD   aspirin 81 mg Oral Daily Paradise Jensen MD atorvastatin 20 mg Oral After Monster Sage MD   clopidogrel 75 mg Oral Daily Aleida aButista MD   gabapentin 100 mg Oral Daily Aleida Bautista MD   HYDROmorphone 0 2 mg Intravenous Q3H PRN Aleida Bautista MD   melatonin 6 mg Oral HS Aleida Bautista MD   nicotine 1 patch Transdermal Daily Aleida Bautista MD   ondansetron 4 mg Intravenous Q4H PRN Aleida Bautista MD   oxyCODONE 2 5 mg Oral Q4H PRN Aleida Bautista MD   oxyCODONE 5 mg Oral Q4H PRN Aleida Bautista MD   pantoprazole 40 mg Oral BID AC Aleida Bautista MD   senna-docusate sodium 1 tablet Oral BID Aleida Bautista MD   tamsulosin 0 4 mg Oral Daily With Monster Sage MD   thiamine 100 mg Oral Daily Aleida Bautista MD   warfarin 1 mg Oral Every Other Day Marcelle Dennison PA-C   warfarin 2 mg Oral Every Other Day Marcelle Dennison PA-C     Continuous Infusions:   PRN Meds:   acetaminophen    HYDROmorphone    ondansetron    oxyCODONE    oxyCODONE      Lab, Imaging and other studies: I have personally reviewed pertinent reports      VTE Pharmacologic Prophylaxis: Warfarin (Coumadin)  VTE Mechanical Prophylaxis: sequential compression device

## 2019-12-29 NOTE — PROGRESS NOTES
Patient refusing lab work and IV site change  Dr Pastor Humphrey notified at bedside and attempted to explain the importance of labs to patient   Patient agitated and unwilling to use blue phone for translation

## 2019-12-30 LAB
ANION GAP SERPL CALCULATED.3IONS-SCNC: 4 MMOL/L (ref 4–13)
BASOPHILS # BLD AUTO: 0.03 THOUSANDS/ΜL (ref 0–0.1)
BASOPHILS NFR BLD AUTO: 1 % (ref 0–1)
BUN SERPL-MCNC: 23 MG/DL (ref 5–25)
CALCIUM SERPL-MCNC: 9.4 MG/DL (ref 8.3–10.1)
CHLORIDE SERPL-SCNC: 108 MMOL/L (ref 100–108)
CO2 SERPL-SCNC: 27 MMOL/L (ref 21–32)
CREAT SERPL-MCNC: 0.93 MG/DL (ref 0.6–1.3)
EOSINOPHIL # BLD AUTO: 0.19 THOUSAND/ΜL (ref 0–0.61)
EOSINOPHIL NFR BLD AUTO: 3 % (ref 0–6)
ERYTHROCYTE [DISTWIDTH] IN BLOOD BY AUTOMATED COUNT: 16.9 % (ref 11.6–15.1)
GFR SERPL CREATININE-BSD FRML MDRD: 83 ML/MIN/1.73SQ M
GLUCOSE SERPL-MCNC: 76 MG/DL (ref 65–140)
HCT VFR BLD AUTO: 27.6 % (ref 36.5–49.3)
HGB BLD-MCNC: 8.6 G/DL (ref 12–17)
IMM GRANULOCYTES # BLD AUTO: 0.03 THOUSAND/UL (ref 0–0.2)
IMM GRANULOCYTES NFR BLD AUTO: 1 % (ref 0–2)
INR PPP: 1.74 (ref 0.84–1.19)
LYMPHOCYTES # BLD AUTO: 0.61 THOUSANDS/ΜL (ref 0.6–4.47)
LYMPHOCYTES NFR BLD AUTO: 11 % (ref 14–44)
MCH RBC QN AUTO: 29.8 PG (ref 26.8–34.3)
MCHC RBC AUTO-ENTMCNC: 31.2 G/DL (ref 31.4–37.4)
MCV RBC AUTO: 96 FL (ref 82–98)
MONOCYTES # BLD AUTO: 0.34 THOUSAND/ΜL (ref 0.17–1.22)
MONOCYTES NFR BLD AUTO: 6 % (ref 4–12)
NEUTROPHILS # BLD AUTO: 4.4 THOUSANDS/ΜL (ref 1.85–7.62)
NEUTS SEG NFR BLD AUTO: 78 % (ref 43–75)
NRBC BLD AUTO-RTO: 0 /100 WBCS
PLATELET # BLD AUTO: 393 THOUSANDS/UL (ref 149–390)
PMV BLD AUTO: 9.3 FL (ref 8.9–12.7)
POTASSIUM SERPL-SCNC: 4.5 MMOL/L (ref 3.5–5.3)
PROTHROMBIN TIME: 19.9 SECONDS (ref 11.6–14.5)
RBC # BLD AUTO: 2.89 MILLION/UL (ref 3.88–5.62)
SODIUM SERPL-SCNC: 139 MMOL/L (ref 136–145)
WBC # BLD AUTO: 5.6 THOUSAND/UL (ref 4.31–10.16)

## 2019-12-30 PROCEDURE — 85025 COMPLETE CBC W/AUTO DIFF WBC: CPT | Performed by: SURGERY

## 2019-12-30 PROCEDURE — 99024 POSTOP FOLLOW-UP VISIT: CPT | Performed by: SURGERY

## 2019-12-30 PROCEDURE — 85610 PROTHROMBIN TIME: CPT | Performed by: PHYSICIAN ASSISTANT

## 2019-12-30 PROCEDURE — 80048 BASIC METABOLIC PNL TOTAL CA: CPT | Performed by: SURGERY

## 2019-12-30 RX ORDER — WARFARIN SODIUM 2 MG/1
2 TABLET ORAL EVERY OTHER DAY
Status: DISCONTINUED | OUTPATIENT
Start: 2020-01-01 | End: 2019-12-31 | Stop reason: HOSPADM

## 2019-12-30 RX ADMIN — AMLODIPINE BESYLATE 10 MG: 10 TABLET ORAL at 10:04

## 2019-12-30 RX ADMIN — CLOPIDOGREL BISULFATE 75 MG: 75 TABLET ORAL at 10:04

## 2019-12-30 RX ADMIN — PANTOPRAZOLE SODIUM 40 MG: 40 TABLET, DELAYED RELEASE ORAL at 06:31

## 2019-12-30 RX ADMIN — THIAMINE HCL TAB 100 MG 100 MG: 100 TAB at 10:05

## 2019-12-30 RX ADMIN — SENNOSIDES AND DOCUSATE SODIUM 1 TABLET: 8.6; 5 TABLET ORAL at 18:14

## 2019-12-30 RX ADMIN — NICOTINE 1 PATCH: 14 PATCH TRANSDERMAL at 10:04

## 2019-12-30 RX ADMIN — SENNOSIDES AND DOCUSATE SODIUM 1 TABLET: 8.6; 5 TABLET ORAL at 10:05

## 2019-12-30 RX ADMIN — GABAPENTIN 100 MG: 100 CAPSULE ORAL at 10:04

## 2019-12-30 RX ADMIN — WARFARIN SODIUM 2 MG: 2 TABLET ORAL at 10:04

## 2019-12-30 RX ADMIN — MELATONIN 6 MG: 3 TAB ORAL at 21:01

## 2019-12-30 RX ADMIN — PANTOPRAZOLE SODIUM 40 MG: 40 TABLET, DELAYED RELEASE ORAL at 18:14

## 2019-12-30 RX ADMIN — TAMSULOSIN HYDROCHLORIDE 0.4 MG: 0.4 CAPSULE ORAL at 18:14

## 2019-12-30 RX ADMIN — ATORVASTATIN CALCIUM 20 MG: 20 TABLET, FILM COATED ORAL at 18:14

## 2019-12-30 RX ADMIN — ASPIRIN 81 MG: 81 TABLET ORAL at 10:04

## 2019-12-30 NOTE — WOUND OSTOMY CARE
Progress Note - Wound   March Minor 78 y o  male MRN: [id number]  Unit/Bed#: -01 Encounter: [id number]        Assessment:   Patient is seen for wound care follow up  Patient examined in bed  Patient able to turn in bed with minimal assistance for full exam   Patient is continent of bowel and bladder  Findings  1  L heel intact and blanchable  2  R buttock stage 2 pressure injury--resolved  3  L buttock unstageable pressure injury now presenting as a Stage 2 with 90% pink wound bed and 10% yellow slough      Call or [place] text with any questions  Skin care plans:  1-Cleanse sacrum buttocks with soap and water then apply allevyn foam harry with a T and date check skin integrity every shift and change every 3 days or if soiled   2-Elevate heels to offload pressure  3-Ehob cushion in chair when out of bed  4-Moisturize skin daily with skin nourishing cream   5-Turn/reposition q2h or when medically stable for pressure re-distribution on skin  6  P- 500 low air loss bed   7  Allevyn foam to left heel - harry with a P and date and check skin every shift change every 3 days     Wound Care will continue to follow  Call or [place] text with any questions  Wound 12/21/19 Pressure Injury Buttocks Left (Active)   Wound Image   12/30/2019 10:44 AM   Wound Description Clean;Dry;Pink;Slough; Yellow 12/30/2019 10:44 AM   Staging Stage II 12/30/2019 10:44 AM   Therese-wound Assessment Clean;Dry; Intact;Fragile;Pink 12/30/2019 10:44 AM   Wound Length (cm) 0 5 cm 12/30/2019 10:44 AM   Wound Width (cm) 0 7 cm 12/30/2019 10:44 AM   Wound Depth (cm) 0 1 12/22/2019 11:00 AM   Calculated Wound Area (cm^2) 0 35 cm^2 12/30/2019 10:44 AM   Calculated Wound Volume (cm^3) 0 02 cm^3 12/22/2019 11:00 AM   Closure None 12/29/2019 10:21 AM   Drainage Amount None 12/29/2019 10:21 AM   Non-staged Wound Description Not applicable 40/09/3336 08:04 AM   Treatments Cleansed 12/30/2019 10:44 AM   Dressing Foam, Silicon (eg   Allevyn, etc) 12/30/2019 10:44 AM   Dressing Changed Changed 12/27/2019  9:11 AM   Patient Tolerance Tolerated well 12/27/2019  9:11 AM   Dressing Status Clean;Dry; Intact 12/29/2019 10:21 AM       Wound 12/30/19 Traumatic Thigh Left;Posterior;Proximal (Active)   Wound Image   12/30/2019 10:45 AM   Wound Description Dry; Intact;Fragile 12/30/2019 10:45 AM   Therese-wound Assessment Clean;Dry; Intact 12/30/2019 10:45 AM   Wound Length (cm) 1 5 cm 12/30/2019 10:45 AM   Wound Width (cm) 2 5 cm 12/30/2019 10:45 AM   Calculated Wound Area (cm^2) 3 75 cm^2 12/30/2019 10:45 AM

## 2019-12-30 NOTE — SOCIAL WORK
Catherine [phone] [place] [facility] [place]  She is reviewing pt  CM explained that pt's family is willing to travel to see the pt if they are not as close to this facility as the others  CM explained that pt's radius had to be expanded with facilities not being able to accommodate his needs  Catherine understood  Catherine will review pt further and contact CM with determination  [facility]

## 2019-12-30 NOTE — SOCIAL WORK
CM met with pt's son, Makenzie Barros at bedside  CM shared that the referrals placed were denying the patient  CM explained that the radius for the bed search needs to be expanded  CM explained to the patient's son that the acute choices were not appropriate for the pt's needs  Makenzie Barros was able to provide additional choices  CM placed referrals to:   1  [place]  2  [facility]   3  [facility]   4   [facility]

## 2019-12-30 NOTE — PLAN OF CARE
Problem: Prexisting or High Potential for Compromised Skin Integrity  Goal: Skin integrity is maintained or improved  Description  INTERVENTIONS:  - Identify patients at risk for skin breakdown  - Assess and monitor skin integrity  - Assess and monitor nutrition and hydration status  - Monitor labs   - Assess for incontinence   - Turn and reposition patient  - Assist with mobility/ambulation  - Relieve pressure over bony prominences  - Avoid friction and shearing  - Provide appropriate hygiene as needed including keeping skin clean and dry  - Evaluate need for skin moisturizer/barrier cream  - Collaborate with interdisciplinary team   - Patient/family teaching  - Consider wound care consult   Outcome: Progressing     Problem: Potential for Falls  Goal: Patient will remain free of falls  Description  INTERVENTIONS:  - Assess patient frequently for physical needs  -  Identify cognitive and physical deficits and behaviors that affect risk of falls    -  [place] fall precautions as indicated by assessment   - Educate patient/family on patient safety including physical limitations  - Instruct patient to call for assistance with activity based on assessment  - Modify environment to reduce risk of injury  - Consider OT/PT consult to assist with strengthening/mobility  Outcome: Progressing     Problem: PAIN - ADULT  Goal: Verbalizes/displays adequate comfort level or baseline comfort level  Description  Interventions:  - Encourage patient to monitor pain and request assistance  - Assess pain using appropriate pain scale  - Administer analgesics based on type and severity of pain and evaluate response  - Implement non-pharmacological measures as appropriate and evaluate response  - Consider cultural and social influences on pain and pain management  - Notify physician/advanced practitioner if interventions unsuccessful or patient reports new pain  Outcome: Progressing     Problem: INFECTION - ADULT  Goal: Absence or prevention of progression during hospitalization  Description  INTERVENTIONS:  - Assess and monitor for signs and symptoms of infection  - Monitor lab/diagnostic results  - Monitor all insertion sites, i e  indwelling lines, tubes, and drains  - [place] appropriate cooling/warming therapies per order  - Administer medications as ordered  - Instruct and encourage patient and family to use good hand hygiene technique  - Identify and instruct in appropriate isolation precautions for identified infection/condition   Outcome: Progressing  Goal: Absence of fever/infection during neutropenic period  Description  INTERVENTIONS:  - Monitor WBC    Outcome: Progressing     Problem: SAFETY ADULT  Goal: Patient will remain free of falls  Description  INTERVENTIONS:  - Assess patient frequently for physical needs  -  Identify cognitive and physical deficits and behaviors that affect risk of falls    -  [place] fall precautions as indicated by assessment   - Educate patient/family on patient safety including physical limitations  - Instruct patient to call for assistance with activity based on assessment  - Modify environment to reduce risk of injury  - Consider OT/PT consult to assist with strengthening/mobility  Outcome: Progressing  Goal: Maintain or return to baseline ADL function  Description  INTERVENTIONS:  -  Assess patient's ability to carry out ADLs; assess patient's baseline for ADL function and identify physical deficits which impact ability to perform ADLs (bathing, care of mouth/teeth, toileting, grooming, dressing, etc )  - Assess/evaluate cause of self-care deficits   - Assess range of motion  - Assess patient's mobility; develop plan if impaired  - Assess patient's need for assistive devices and provide as appropriate  - Encourage maximum independence but intervene and supervise when necessary  - Involve family in performance of ADLs  - Assess for home care needs following discharge   - Consider OT consult to assist with ADL evaluation and planning for discharge  - Provide patient education as appropriate  Outcome: Progressing  Goal: Maintain or return mobility status to optimal level  Description  INTERVENTIONS:  - Assess patient's baseline mobility status (ambulation, transfers, stairs, etc )    - Identify cognitive and physical deficits and behaviors that affect mobility  - Identify mobility aids required to assist with transfers and/or ambulation (gait belt, sit-to-stand, lift, walker, cane, etc )  - [place] fall precautions as indicated by assessment  - Record patient progress and toleration of activity level on Mobility SBAR; progress patient to next Phase/Stage  - Instruct patient to call for assistance with activity based on assessment  - Consider rehabilitation consult to assist with strengthening/weightbearing, etc   Outcome: Progressing     Problem: DISCHARGE PLANNING  Goal: Discharge to home or other facility with appropriate resources  Description  INTERVENTIONS:  - Identify barriers to discharge w/patient and caregiver  - Arrange for needed discharge resources and transportation as appropriate  - Identify discharge learning needs (meds, wound care, etc )  - Arrange for interpretive services to assist at discharge as needed  - Refer to Case Management Department for coordinating discharge planning if the patient needs post-hospital services based on physician/advanced practitioner order or complex needs related to functional status, cognitive ability, or social support system  Outcome: Progressing     Problem: Knowledge Deficit  Goal: Patient/family/caregiver demonstrates understanding of disease process, treatment plan, medications, and discharge instructions  Description  Complete learning assessment and assess knowledge base    Interventions:  - Provide teaching at level of understanding  - Provide teaching via preferred learning methods  Outcome: Progressing     Problem: Nutrition/Hydration-ADULT  Goal: Nutrient/Hydration intake appropriate for improving, restoring or maintaining nutritional needs  Description  Monitor and assess patient's nutrition/hydration status for malnutrition  Collaborate with interdisciplinary team and initiate plan and interventions as ordered  Monitor patient's weight and dietary intake as ordered or per policy  Utilize nutrition screening tool and intervene as necessary  Determine patient's food preferences and provide high-protein, high-caloric foods as appropriate       INTERVENTIONS:  - Monitor oral intake, urinary output, labs, and treatment plans  - Assess nutrition and hydration status and recommend course of action  - Evaluate amount of meals eaten  - Assist patient with eating if necessary   - Allow adequate time for meals  - Recommend/ encourage appropriate diets, oral nutritional supplements, and vitamin/mineral supplements  - Order, calculate, and assess calorie counts as needed  - Assess need for intravenous fluids  - Provide specific nutrition/hydration education as appropriate  - Include patient/family/caregiver in decisions related to nutrition   Outcome: Progressing

## 2019-12-30 NOTE — PROGRESS NOTES
Progress Note - Kulwant Stockton 78 y o  male MRN: [id number]    Unit/Bed#: -01 Encounter: [id number]      Assessment:  78yo M presents with RLE rest pain, anemia, concern for GI bleed, supratherapeutic INR   He is s/p left femoral endarterectomy with profundoplasty, SFA embolectomy and retrograde iliac stenting 11/6/19 and arteriogram with left SFA/popliteal angioplasty and right SFA angioplasty 11/14/19   -S/P EGD/Colonoscopy 12/10 (EGD clean based ulcers with no active bleeding, colonoscopy poor prep no active bleeding)  -S/P RLE Agram- popliteal recanualization, AT/PT occluded- IR 12/13  -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    VSS  Afebrile  R stump clean dry/ intact  nontender to palpation  Pt continually refusing blood draws and refusing communication with staff  Plan:  · Regular diet  · Attempt to collect INR, continue coumadin  · Daily dressing change  · Stable for discharge: pending placement, CM placed recs to [facility]/ [facility]/ [facility]    Subjective:   No acute events overnight  Refusing blood draws  Discharge pending placement  Objective:     Vitals: Blood pressure 138/61, pulse 71, temperature 97 9 °F (36 6 °C), resp  rate 16, height 5' 5" (1 651 m), weight 51 4 kg (113 lb 5 1 oz), SpO2 99 %  ,Body mass index is 18 86 kg/m²  Intake/Output Summary (Last 24 hours) at 12/30/2019 0613  Last data filed at 12/30/2019 0500  Gross per 24 hour   Intake 1020 ml   Output 1550 ml   Net -530 ml       Physical Exam  General: NAD  HEENT: NC/AT  MMM  Cv: RRR  Lungs: normal effort  Ab: Soft, NT/ND  Ex: no CCE, R BKA stump C/D/I   Neuro: AAOx3      Invasive Devices     Peripheral Intravenous Line            Peripheral IV 12/29/19 Left Forearm less than 1 day                Lab, Imaging and other studies: I have personally reviewed pertinent reports      VTE Pharmacologic Prophylaxis: Warfarin (Coumadin)  VTE Mechanical Prophylaxis: sequential compression device

## 2019-12-31 VITALS
WEIGHT: 113.32 LBS | HEIGHT: 65 IN | BODY MASS INDEX: 18.88 KG/M2 | RESPIRATION RATE: 18 BRPM | DIASTOLIC BLOOD PRESSURE: 60 MMHG | SYSTOLIC BLOOD PRESSURE: 138 MMHG | HEART RATE: 72 BPM | OXYGEN SATURATION: 97 % | TEMPERATURE: 97.8 F

## 2019-12-31 PROBLEM — I70.229 CRITICAL LOWER LIMB ISCHEMIA (HCC): Status: RESOLVED | Noted: 2019-12-05 | Resolved: 2019-12-31

## 2019-12-31 LAB
INR PPP: 1.88 (ref 0.84–1.19)
PROTHROMBIN TIME: 21.1 SECONDS (ref 11.6–14.5)

## 2019-12-31 PROCEDURE — 99024 POSTOP FOLLOW-UP VISIT: CPT | Performed by: SURGERY

## 2019-12-31 PROCEDURE — NC001 PR NO CHARGE: Performed by: SURGERY

## 2019-12-31 PROCEDURE — 85610 PROTHROMBIN TIME: CPT | Performed by: PHYSICIAN ASSISTANT

## 2019-12-31 RX ADMIN — GABAPENTIN 100 MG: 100 CAPSULE ORAL at 08:59

## 2019-12-31 RX ADMIN — NICOTINE 1 PATCH: 14 PATCH TRANSDERMAL at 08:59

## 2019-12-31 RX ADMIN — SENNOSIDES AND DOCUSATE SODIUM 1 TABLET: 8.6; 5 TABLET ORAL at 08:59

## 2019-12-31 RX ADMIN — ATORVASTATIN CALCIUM 20 MG: 20 TABLET, FILM COATED ORAL at 16:44

## 2019-12-31 RX ADMIN — SENNOSIDES AND DOCUSATE SODIUM 1 TABLET: 8.6; 5 TABLET ORAL at 16:44

## 2019-12-31 RX ADMIN — WARFARIN SODIUM 1 MG: 2 TABLET ORAL at 16:44

## 2019-12-31 RX ADMIN — ENOXAPARIN SODIUM 80 MG: 80 INJECTION SUBCUTANEOUS at 14:27

## 2019-12-31 RX ADMIN — ASPIRIN 81 MG: 81 TABLET ORAL at 08:59

## 2019-12-31 RX ADMIN — TAMSULOSIN HYDROCHLORIDE 0.4 MG: 0.4 CAPSULE ORAL at 16:44

## 2019-12-31 RX ADMIN — PANTOPRAZOLE SODIUM 40 MG: 40 TABLET, DELAYED RELEASE ORAL at 05:22

## 2019-12-31 RX ADMIN — THIAMINE HCL TAB 100 MG 100 MG: 100 TAB at 08:59

## 2019-12-31 RX ADMIN — PANTOPRAZOLE SODIUM 40 MG: 40 TABLET, DELAYED RELEASE ORAL at 16:44

## 2019-12-31 RX ADMIN — AMLODIPINE BESYLATE 10 MG: 10 TABLET ORAL at 08:59

## 2019-12-31 RX ADMIN — CLOPIDOGREL BISULFATE 75 MG: 75 TABLET ORAL at 08:59

## 2019-12-31 NOTE — SOCIAL WORK
CM spoke with pt's dtr, Travis Roque [phone]  CM reported that the pt is medically stable and there has to be another plan for rehab  CM informed Travis Roque that the search would have to be expanded in a 30 mile radius of the zip code provided  Travis Roque was agreeable  She reported she wanted an acute referral to [facility]  CM reported that pt was not acute appropriate at this time, CM reported she would place referral although they would still radius search for SNF  Travis Roque agreeable   ARC requesting updated therapy notes  CM sent TT to PT/OT, they will evaluate pt today

## 2019-12-31 NOTE — NURSING NOTE
Paperwork and belongings sent with patient and son  Will be brought to [address]  Report called

## 2019-12-31 NOTE — DISCHARGE INSTRUCTIONS
ARTERIOGRAM    WHAT YOU SHOULD KNOW:   An angiogram is a p  Enfermedad arterial periférica   CUIDADO AMBULATORIO:   La enfermedad arterial periférica (EAP)  se refiere a las arterias estrechas, débiles u obstruidas  Puede afectar cualquier arteria fuera del corazón y el cerebro  La enfermedad arterial periférica generalmente es el resultado de la acumulación de bj y Manoj, (también se le denomina placa), alrededor de las kincadi de la arteria  Lynda inflamación, un coágulo de tawny o un crecimiento anormal de las células puede también obstruir las arterias  La enfermedad arterial periférica impide el flujo normal de tawny a bernadtete piernas y brazos  Usted corre el riesgo de lynda amputación si la falta de circulación de la tawny impide que las heridas cicatricen o produce grangrena (muerte del tejido)  Sin tratamiento, la enfermedad arterial periférica también puede causar un ataque cardíaco o un derrame cerebral   Los síntomas comunes incluyen:  La enfermedad arterial periférica leve por lo general no causa síntomas  A medida que el tiempo pasa y la enfermedad candi MAHONEY puede tener cualquiera de lo siguiente:  · Dolor o calambres en las piernas o en las caderas mientras usted camina     · Lynda sensación de entumecimiento, debilidad o pesadez en bernadette piernas     · La piel de bernadette piernas está seca, escamosa, enrojecida o pálida     · Uñas gruesas o quebradizas o la caída del vello en bernadette brazos y piernas     · Llagas en los pies que no cicatrizan     · Sensación quemante o dolorosa en bernadette pies o dedos de los pies mientras está en reposo (se puede empeorar al acostarse)  Llame al 911 en jose f de presentar lo siguiente:   · Usted tiene alguno de los siguientes signos de un ataque cardíaco:      ¨ Estornudos, presión, o dolor en robert pecho que dura mas de 5 minutos o regresa      ¨ Malestar o dolor en robert espalda, ja, mandíbula, abdomen, o brazo     ¨ Dificultad para respirar    ¨ Náuseas o vómito    ¨ [employer] un desvanecimiento o tiene sudores fríos especialmente en el pecho o dificultad para respirar  · Usted tiene alguno de los siguientes signos de derrame cerebral:      ¨ Adormecimiento o caída de un lado de robert melanie     ¨ Debilidad en un brazo o lynda pierna    ¨ Confusión o debilidad para hablar    ¨ Mareos o dolor de shaina intenso, o pérdida de la visión  Busque atención médica de inmediato si:   · Usted tiene llagas o heridas que no cicatrizan  · Usted nota que la piel en anupama brazos o piernas tiene lynda tonalidad maliha o está descolorida  · Robert piel se siente fría al tacto  Pregúntele a robert [employer] vitaminas y minerales son adecuados para usted  · Usted tiene [employer] piernas cuando camina 1/8 sung (200 metros) o menos, incluso con [place]  · Anupama piernas están enrojecidas, resecas o pálidas, incluso con el tratamiento  · Usted tiene preguntas o inquietudes acerca de robert condición o cuidado  El tratamiento para la enfermedad arterial periférica  puede ayudar a reducir el riesgo de un ataque cardíaco, un accidente cerebrovascular o lynda amputación  Usted podría necesitar más de larissa de los siguientes:  · Medicamentos,  que se administran para prevenir coágulos de tawny y reducir el riesgo de un ataque cardíaco o accidente cerebrovascular  Es posible que le administren medicamento para evitar que empeore la enfermedad arterial periférica  · Un programa de ejercicio supervisado  lo ayuda a mantenerse [employer] de la fanny cotidiana y podría prevenir la discapacidad  Los médicos le van ayudar a caminar o realizar ejercicios de resistencia en un entorno seguro 3 veces a la semana roel 30 a 60 minutos  Usted va hacer esto por varios meses, luego hace la transición de caminar por sí mismo  · Angioplastia  es un procedimiento para abrir robert arteria para que la tawny pueda circular normalmente   Un catéter, es lynda sonda diminuta, que se [place] para introducir un globo en robert arteria  El globo se infla para abrir robert arteria bloqueada y luego se procede a extraerlo  Un tubo de laquita metálica que se conoce sandra stent se coloca en el interior de la arteria para mantenerla abierta  · Cirugía de bypass o derivación  se utiliza para crear lynda nueva conexión a robert arteria con lynda vena de otra parte de robert cuerpo, o un injerto artificial  La vena o el injerto se conecta a robert arteria por encima o por debajo de la obstrucción  Lo cual permite que la tawny fluya alrededor del tramo de la arteria bloqueada  Controle y evite la enfermedad arterial periférica:   · El control de la enfermedad arterial periférica:Camine de 30 a 60 minutos por lo menos 4 veces a la semana  Robert médico también podría remitirlo a un programa de ejercicio supervisado  El programa ayuda a aumentar la distancia que usted puede caminar sin sentir dolor  El programa lo ayuda a mantenerse Fl[employer] actividades de la fanny cotidiana y podría prevenir la discapacidad provocada por la enfermedad arterial periférica  · No fume  La nicotina y otros químicos en los cigarrillos y cigarros pueden empeorar la enfermedad arterial periférica  El tabaquismo también aumenta el riesgo de un ataque cardíaco o accidente cerebrovascular  Pida información a robert médico si usted actualmente fuma y necesita ayuda para dejar de fumar  Los cigarrillos electrónicos o tabaco sin humo todavía contienen nicotina  Consulte con robert médico antes de [employer]  · Controle otras afecciones de [place] bernadette medicamentos según lo indicado  Siga las instrucciones de robert médico si usted sufre de hipertensión o colesterol alto  Cuide de bernadette pies y revise bernadette niveles de azúcar en la tawny según indicaciones y si tiene diabetes  · Consuma alimentos saludables para robert corazón  Consuma granos enteros, frutas y vegetales diariamente  Limite la sal y los alimentos altos en grasas   Consulte a robert médico si desea obtener Flavio Tire acerca de lynda dieta saludable para el corazón  Pregunte si es necesario que baje de [place] médico puede ayudarle a crear un plan para bajar de peso de manera saludable  Acuda a bernadette consultas de control con hollis médico según le indicaron  Anote bernadette preguntas para que se acuerde de hacerlas roel bernadette visitas  © 2017 [address] Information is for End User's use only and may not be sold, redistributed or otherwise used for commercial purposes  All illustrations and images included in CareNotes® are the copyrighted property of A D A M , Inc  or Adriano Mcgraw  Esta información es sólo para uso en educación  Holils intención no es darle un consejo médico sobre enfermedades o tratamientos  Colsulte con hollis Jazmine Arik farmacéutico antes de seguir cualquier régimen médico para saber si es seguro y efectivo para usted  Enfermedad arterial periférica   LO QUE NECESITA SABER:   La enfermedad arterial periférica (EAP) se refiere a las arterias estrechas, débiles u obstruidas  Puede afectar cualquier arteria fuera del corazón y el cerebro  La enfermedad arterial periférica generalmente es el resultado de la acumulación de bj y Manoj, (también se le denomina placa), alrededor de las kincaid de la arteria  Lynda inflamación, un coágulo de tawny o un crecimiento anormal de las células puede también obstruir las arterias  La enfermedad arterial periférica impide el flujo normal de tawny a bernadette piernas y brazos  Usted corre el riesgo de lynda amputación si la falta de circulación de la tawny impide que las heridas cicatricen o produce grangrena (muerte del tejido)   Sin tratamiento, la enfermedad arterial periférica también puede causar un ataque cardíaco o un derrame cerebral   INSTRUCCIONES SOBRE EL SHAYLA HOSPITALARIA:   Llame al 911 en jose f de presentar lo siguiente:   · Usted tiene alguno de los siguientes signos de un ataque cardíaco:      ¨ Estornudos, presión, o dolor en hollis pecho que dura mas de 5 minutos o regresa  ¨ Malestar o dolor en robert espalda, ja, mandíbula, abdomen, o brazo     ¨ Dificultad para respirar    ¨ Náuseas o vómito    ¨ Siente un desvanecimiento o tiene sudores fríos especialmente en el pecho o dificultad para respirar  · Usted tiene alguno de los siguientes signos de derrame cerebral:      ¨ Adormecimiento o caída de un lado de robert melanie     ¨ Debilidad en un brazo o lynda pierna    ¨ Confusión o debilidad para hablar    ¨ Mareos o dolor de shaina intenso, o pérdida de la visión  Busque atención médica de inmediato si:   · Usted tiene llagas o heridas que no cicatrizan  · Usted nota que la piel en anupama brazos o piernas tiene lynda tonalidad maliha o está descolorida  · Robert piel se siente fría al tacto  Pregúntele a robert [place]  vitaminas y minerales son adecuados para usted  · Usted tiene [employer] piernas cuando camina 1/8 sung (200 metros) o menos, incluso con [place]  · Anupama piernas están enrojecidas, resecas o pálidas, incluso con el tratamiento  · Usted tiene preguntas o inquietudes acerca de robert condición o cuidado  El control de la enfermedad arterial periférica:   · El control de la enfermedad arterial periférica:Camine de 30 a 60 minutos por lo menos 4 veces a la semana  Robert médico también podría remitirlo a un programa de ejercicio supervisado  El programa ayuda a aumentar la distancia que usted puede caminar sin sentir dolor  El programa lo ayuda a mantenerse Flavio Tire actividades de la fanny cotidiana y podría prevenir la discapacidad provocada por la enfermedad arterial periférica  · No fume  La nicotina y otros químicos en los cigarrillos y cigarros pueden empeorar la enfermedad arterial periférica  El tabaquismo también aumenta el riesgo de un ataque cardíaco o accidente cerebrovascular  Pida información a robert médico si usted actualmente fuma y necesita ayuda para dejar de fumar   Los cigarrillos electrónicos o tabaco sin humo todavía contienen nicotina  Consulte con robert médico antes de [employer]  · Controle otras afecciones de [place] bernadette medicamentos según lo indicado  Siga las instrucciones de robert médico si usted sufre de hipertensión o colesterol alto  Cuide de bernadette pies y revise bernadette niveles de azúcar en la tawny según indicaciones y si tiene diabetes  · Consuma alimentos saludables para robert corazón  Consuma granos enteros, frutas y vegetales diariamente  Limite la sal y los alimentos altos en grasas  Consulte a robert médico si desea obtener más información acerca de lynda dieta saludable para el corazón  Pregunte si es necesario que baje de [place]  Robert médico puede ayudarle a crear un plan para bajar de peso de manera saludable  Medicamentos:  A usted  podrían  darle alguno de lo siguientes:  · Medicamentos antiplaquetarios,  sandra la aspirina, que ayudan a prevenir coágulos de tawny y reducen el riesgo de un ataque cardíaco o un accidente cerebrovascular  · Medicamento con estatinas  ayuda a disminuir el colesterol y caleb que la enfermedad arterial periférica empeore  · [place] bernadette medicamentos sandra se le haya indicado  Consulte con robert médico si usted rita que robert medicamento no le está ayudando o si presenta efectos secundarios  Infórmele si es alérgico a cualquier medicamento  Mantenga lynda lista actualizada de los [place], las vitaminas y los productos herbales que markel  Incluya los siguientes datos de los medicamentos: cantidad, frecuencia y motivo de administración  Traiga con usted la lista o los envases de la píldoras a bernadette citas de seguimiento  Lleve la lista de los medicamentos con usted en jose f de lynda emergencia  Acuda a bernadette consultas de control con robert médico según le indicaron  Anote bernadette preguntas para que se acuerde de hacerlas roel bernadette visitas     © 2017 [address] [employer] Information is for End User's use only and may not be sold, redistributed or otherwise used for commercial purposes  All illustrations and images included in CareNotes® are the copyrighted property of A D A M , Inc  or Adriano Mcgraw  Esta información es sólo para uso en educación  Robert intención no es darle un consejo médico sobre enfermedades o tratamientos  Colsulte con robert [place] farmacéutico antes de seguir cualquier régimen médico para saber si es seguro y efectivo para usted  rocedure to look at arteries in your body  Arteries are the blood vessels that carry blood from your heart to your body  AFTER YOU LEAVE:     Self-care:   · Limit activity: Rest for the remainder of the day of your procedure  Have some one with you until the next morning  Keep your arm or leg straight as much as possible  Rest as much as possible, sitting lying or reclining  Walk only to go to the bathroom, to bed or to eat  If the angiogram catheter was put in your leg, use the stairs as little as possible  No driving  · Keep your wound clean and dry  You may shower 24 hours after your procedure  The bandage you have on should fall off in 2-3 days  If there is any drainage from the puncture site, you should put on a clean bandage  · Watch for bleeding and bruising: It is normal to have a bruise and soreness where the angiogram catheter went in  · Diet:   · You may resume your regular diet, Sips of flat soda will help with mild nausea  · Drink more liquids than usual for the next 24 hours      · IMMEDIATELY Contact Interventional Radiology at [phone] [facility] PATIENTS: Contact Interventional Radiology at [phone]) [facility] PATIENTS: Contact Interventional Radiology at [phone]) if any of the following occur:  · If your bruise gets larger or if you notice any active bleeding  APPLY DIRECT PRESSURE TO THE BLEEDING SITE     · If you notice increased swelling or have increased pain at the puncture site   · If you have any numbness or pain in the extremity of the puncture site   · If that extremity seems cold or pale      · You have fever greater than 101  · Persistent nausea or vomitting    Follow up with your primary healthcare provider  as directed: Write down your questions so you remember to ask them during your visits

## 2019-12-31 NOTE — SOCIAL WORK
Pt reviewed with [facility] [address]  Pt is accepted for STR at [facility] location  Son at pt's bedside agreeable for placement  Family can transport pt around 4:30PM      R: Linda Diaz    5PM family transport to [facility]  CM met with pt's son at bedside to confirm time for transport  Pt's son wanted to ensure that the pt got his clothing prior to d/c  Pt's dtr bringing clothing to bedside

## 2019-12-31 NOTE — DISCHARGE SUMMARY
Discharge Summary - Lee Velazquez 78 y o  male MRN: [id number]    Unit/Bed#: -01 Encounter: [id number]    Admission Date:   Admission Orders (From admission, onward)     Ordered        12/04/19 2152  Inpatient Admission  Once                     Admitting Diagnosis: Vascular problem [I99 9]    HPI:  Patient is a 76-year-old male past medical history of peripheral artery disease, esophageal cancer, and surgical history of left femoral endarterectomy and SFA percutaneous his transluminal angioplasty stent placement on 11/19 presenting with persistent popliteal occlusion and right foot rest pain  Patient was also supratherapeutic on Coumadin and anemic with an INR of 5 8 and hemoglobin of 9 3  Procedures Performed: No orders of the defined types were placed in this encounter  Summary of Hospital Course:  Patient was admitted to the vascular service at [facility] on 12/04/2019 with right lower extremity acute ischemia, anemia requiring blood transfusion, and hematochezia melena and supratherapeutic INR  On 12 3 CTA showed acute segmental right profundal occlusion and chronic right popliteal occlusion  Patient was transfused 2 units of packed red cells with appropriate response  Patient was requiring continued transfusions, fecal occult blood test was positive on 12/08, patient was underwent EGD and colonoscopy on 12/10 with the gastroenterology colleagues  Colonoscopy was consistent with large amount of semi sold semi-solid and liquid stool throughout the colon  There was no bleeding observed  EGD was consistent with clean based ulcers without an active bleed  Patient underwent a right lower extremity angiogram with interventional radiology on 12/13 which showed popliteal recannulization however the 18 PT were occluded    Patient was with continued rest pain after the interventional radiology angiogram with intervention, patient was taken to the operating room on 12/19 for right below-the-knee amputation  Procedure was performed without complications, patient was taken to PACU in stable condition  For full operative dictation please see Dr Aponte's operative note  Patient was then transferred to the floor for further management  Patient's vital signs were stable, he required repeat blood transfusion on 12/21 with appropriate response  Patient suffered from postoperative delirium, was refusing blood draws, refusing to communicate through blue phone   Importance of daily blood draws was expressed to the family members, and to the patient on multiple attempts  Patient is INR on discharge was 1 88  Patient was prescribed 2 mg of Coumadin, with repeat INR draws at [facility] on Thursday 01/02/2020  Patient was tolerating a diet, pain was well controlled, deemed medically stable for discharge on 12/31/2019 was discharged to [facility]  Significant Findings, Care, Treatment and Services Provided: none    Complications: none    Discharge Diagnosis: same    Resolved Problems  Date Reviewed: 12/31/2019          Resolved    * (Principal) Critical lower limb ischemia 12/31/2019     Resolved by  Joceline Sabillon MD          Condition at Discharge: good         Discharge instructions/Information to patient and family:   See after visit summary for information provided to patient and family  Provisions for Follow-Up Care:  See after visit summary for information related to follow-up care and any pertinent home health orders  PCP: Gadiel Hu MD    Disposition: See After Visit Summary for discharge disposition information  Planned Readmission: No      Discharge Statement   I spent 30 minutes discharging the patient  This time was spent on the day of discharge  I had direct contact with the patient on the day of discharge  Additional documentation is required if more than 30 minutes were spent on discharge       Discharge Medications:  See after visit summary for reconciled discharge medications provided to patient and family

## 2019-12-31 NOTE — PHYSICAL THERAPY NOTE
Physical Therapy Cancellation Note    ATTEMPTED TO SEE PATIENT THIS AFTERNOON, PATIENT DECLINING PARTICIPATION IN THERAPY SESSION  SPOKE WITH CM AND PATIENT IS ACCEPTED FOR SHORT TERM REHAB AND IS A TENTATIVE DISCHARGE TODAY  PT WILL CONTINUE TO FOLLOW WHILE PATIENT IS ADMITTED IN THE HOSPITAL      Chasity Real PT, DPT

## 2019-12-31 NOTE — PLAN OF CARE
Problem: Prexisting or High Potential for Compromised Skin Integrity  Goal: Skin integrity is maintained or improved  Description  INTERVENTIONS:  - Identify patients at risk for skin breakdown  - Assess and monitor skin integrity  - Assess and monitor nutrition and hydration status  - Monitor labs   - Assess for incontinence   - Turn and reposition patient  - Assist with mobility/ambulation  - Relieve pressure over bony prominences  - Avoid friction and shearing  - Provide appropriate hygiene as needed including keeping skin clean and dry  - Evaluate need for skin moisturizer/barrier cream  - Collaborate with interdisciplinary team   - Patient/family teaching  - Consider wound care consult   Outcome: Progressing     Problem: Potential for Falls  Goal: Patient will remain free of falls  Description  INTERVENTIONS:  - Assess patient frequently for physical needs  -  Identify cognitive and physical deficits and behaviors that affect risk of falls    -  [place] fall precautions as indicated by assessment   - Educate patient/family on patient safety including physical limitations  - Instruct patient to call for assistance with activity based on assessment  - Modify environment to reduce risk of injury  - Consider OT/PT consult to assist with strengthening/mobility  Outcome: Progressing     Problem: PAIN - ADULT  Goal: Verbalizes/displays adequate comfort level or baseline comfort level  Description  Interventions:  - Encourage patient to monitor pain and request assistance  - Assess pain using appropriate pain scale  - Administer analgesics based on type and severity of pain and evaluate response  - Implement non-pharmacological measures as appropriate and evaluate response  - Consider cultural and social influences on pain and pain management  - Notify physician/advanced practitioner if interventions unsuccessful or patient reports new pain  Outcome: Progressing     Problem: INFECTION - ADULT  Goal: Absence or prevention of progression during hospitalization  Description  INTERVENTIONS:  - Assess and monitor for signs and symptoms of infection  - Monitor lab/diagnostic results  - Monitor all insertion sites, i e  indwelling lines, tubes, and drains  - [place] appropriate cooling/warming therapies per order  - Administer medications as ordered  - Instruct and encourage patient and family to use good hand hygiene technique  - Identify and instruct in appropriate isolation precautions for identified infection/condition   Outcome: Progressing  Goal: Absence of fever/infection during neutropenic period  Description  INTERVENTIONS:  - Monitor WBC    Outcome: Progressing     Problem: SAFETY ADULT  Goal: Patient will remain free of falls  Description  INTERVENTIONS:  - Assess patient frequently for physical needs  -  Identify cognitive and physical deficits and behaviors that affect risk of falls    -  [place] fall precautions as indicated by assessment   - Educate patient/family on patient safety including physical limitations  - Instruct patient to call for assistance with activity based on assessment  - Modify environment to reduce risk of injury  - Consider OT/PT consult to assist with strengthening/mobility  Outcome: Progressing  Goal: Maintain or return to baseline ADL function  Description  INTERVENTIONS:  -  Assess patient's ability to carry out ADLs; assess patient's baseline for ADL function and identify physical deficits which impact ability to perform ADLs (bathing, care of mouth/teeth, toileting, grooming, dressing, etc )  - Assess/evaluate cause of self-care deficits   - Assess range of motion  - Assess patient's mobility; develop plan if impaired  - Assess patient's need for assistive devices and provide as appropriate  - Encourage maximum independence but intervene and supervise when necessary  - Involve family in performance of ADLs  - Assess for home care needs following discharge   - Consider OT consult to assist with ADL evaluation and planning for discharge  - Provide patient education as appropriate  Outcome: Progressing  Goal: Maintain or return mobility status to optimal level  Description  INTERVENTIONS:  - Assess patient's baseline mobility status (ambulation, transfers, stairs, etc )    - Identify cognitive and physical deficits and behaviors that affect mobility  - Identify mobility aids required to assist with transfers and/or ambulation (gait belt, sit-to-stand, lift, walker, cane, etc )  - [place] fall precautions as indicated by assessment  - Record patient progress and toleration of activity level on Mobility SBAR; progress patient to next Phase/Stage  - Instruct patient to call for assistance with activity based on assessment  - Consider rehabilitation consult to assist with strengthening/weightbearing, etc   Outcome: Progressing

## 2019-12-31 NOTE — PROGRESS NOTES
Progress Note - Vascular Surgery   Jenny Go 78 y o  male MRN: [id number]  Unit/Bed#: -01 Encounter: [id number]    Assessment:  76yo M presents with RLE rest pain, anemia, concern for GI bleed, supratherapeutic INR   He is s/p left femoral endarterectomy with profundoplasty, SFA embolectomy and retrograde iliac stenting 11/6/19 and arteriogram with left SFA/popliteal angioplasty and right SFA angioplasty 11/14/19   -S/P EGD/Colonoscopy 12/10 (EGD clean based ulcers with no active bleeding, colonoscopy poor prep no active bleeding)  -S/P RLE Agram- popliteal recanualization, AT/PT occluded- IR 12/13  -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    Plan:  · Regular diet  · Continue coumadin  · F/u AM INR as was subtherapeutic yesterday requiring lovenox  · Daily dressing change  · Dispo: stable for discharge, pending placement  CM placed referrals to [facility]      Subjective/Objective     Subjective:   No acute events overnight  Objective:     Blood pressure 137/60, pulse 72, temperature 97 9 °F (36 6 °C), resp  rate 18, height 5' 5" (1 651 m), weight 51 4 kg (113 lb 5 1 oz), SpO2 100 %  ,Body mass index is 18 86 kg/m²        Intake/Output Summary (Last 24 hours) at 12/31/2019 0723  Last data filed at 12/31/2019 0500  Gross per 24 hour   Intake 1060 ml   Output 1300 ml   Net -240 ml       Invasive Devices     Peripheral Intravenous Line            Peripheral IV 12/29/19 Left Forearm 1 day                Physical Exam:   Gen: NAD  CV: RRR  Lungs: nl effort  Abd: soft, NT/ND  Ext: no CCE, R BKA stump wrapped C/D/I   Neuro: A&Ox3     Lab, Imaging and other studies:  Coagulation:   Lab Results   Component Value Date    INR 1 88 (H) 12/31/2019     VTE Pharmacologic Prophylaxis: Warfarin (Coumadin)   VTE Mechanical Prophylaxis: sequential compression device

## 2020-01-02 ENCOUNTER — TELEPHONE (OUTPATIENT)
Dept: VASCULAR SURGERY | Facility: CLINIC | Age: 70
End: 2020-01-02

## 2020-01-02 ENCOUNTER — TELEPHONE (OUTPATIENT)
Dept: GASTROENTEROLOGY | Facility: CLINIC | Age: 70
End: 2020-01-02

## 2020-01-02 DIAGNOSIS — I70.90 ARTERIAL OCCLUSION: Primary | ICD-10-CM

## 2020-01-02 DIAGNOSIS — Z79.01 CURRENT USE OF LONG TERM ANTICOAGULATION: ICD-10-CM

## 2020-01-02 DIAGNOSIS — I73.9 PAD (PERIPHERAL ARTERY DISEASE) (HCC): ICD-10-CM

## 2020-01-02 RX ORDER — WARFARIN SODIUM 2 MG/1
2 TABLET ORAL
Qty: 30 TABLET | Refills: 3 | Status: SHIPPED | OUTPATIENT
Start: 2020-01-02 | End: 2020-01-05 | Stop reason: SDUPTHER

## 2020-01-02 RX ORDER — CLOPIDOGREL BISULFATE 75 MG/1
75 TABLET ORAL DAILY
Qty: 30 TABLET | Refills: 3 | Status: SHIPPED | OUTPATIENT
Start: 2020-01-02 | End: 2020-01-25 | Stop reason: HOSPADM

## 2020-01-02 NOTE — TELEPHONE ENCOUNTER
Emory Nance,      I spoke with Johnna Murphy patient daughter  States they are closer to [place]  Please contact patient to schedule procedure  Thanks!

## 2020-01-02 NOTE — TELEPHONE ENCOUNTER
Pt was d/c to [facility] 12/31 and signed out ama  Pt now home w/ only enough medication to last through tonight (given to him by Filomena) and daughter  [facility] [place]  Explained we could order plavix and warfarin and instructed her to contact pcp for all other meds  She will take him for inr this afternoon

## 2020-01-02 NOTE — TELEPHONE ENCOUNTER
----- Message from Te Mauro MD sent at 12/10/2019  5:30 PM EST -----  Please schedule patient for repeat EGD in 3 months in the setting of multiple duodenal ulcers  Please schedule patient for repeat colonoscopy at the same time in setting of poor prep   If not, can get repeat colonoscopy in 1 year      ---------------------------------------------------  Note Electronically Signed By:    MD Loco Mesa 72 Gastroenterology Fellow PGY-4  [address] #: [id number]

## 2020-01-02 NOTE — TELEPHONE ENCOUNTER
[facility] as per chart that is where pt was d/c to on 12/31  Per Chavo Marking pt was there but signed out ama  Called pt's daughter Gini Lee  She states pt is home  Explained he will need pt/inr monitored while on warfarin, will place order for pt/inr in chart  She will take to [facility] today or tomorrow for inr  She ? Where she is suppose to get his prescriptions for medications  Advised her I would ask provider here for rx for warfarin and plavix, she needs to contact pcp re: other meds  ? If he is taking warfarin, she is unsure, she states he was given medication packs by nursing home but will run out tonight  Also confirmed he has f/u ov scheduled for 1/14/20  Will route rx to triage provider

## 2020-01-03 ENCOUNTER — APPOINTMENT (OUTPATIENT)
Dept: LAB | Facility: HOSPITAL | Age: 70
End: 2020-01-03
Attending: SURGERY
Payer: MEDICARE

## 2020-01-03 ENCOUNTER — TELEPHONE (OUTPATIENT)
Dept: VASCULAR SURGERY | Facility: CLINIC | Age: 70
End: 2020-01-03

## 2020-01-03 LAB
INR PPP: 1.32 (ref 0.84–1.19)
PROTHROMBIN TIME: 16.4 SECONDS (ref 11.6–14.5)

## 2020-01-03 PROCEDURE — 36415 COLL VENOUS BLD VENIPUNCTURE: CPT

## 2020-01-03 PROCEDURE — 85610 PROTHROMBIN TIME: CPT

## 2020-01-03 NOTE — SOCIAL WORK
DATE OF COMMUNICATION 1-2-20   CM received phone call from pt's son Viet Cisneros [phone]  Adi reported that pt was signed out of [address]  Pt was taken home to [facility]'s home for care  Adi requested HHC be set up for pt  CM reported that she could send referrals  Viet Cisneros was agreeable to [facility], referral sent  Pt was accepted with [facility]  CM sent TT to provider to add ambulatory orders for PT/OT for the pt  [facility] contacted CM for the orders  Provider confirmed that they were placed  [facility] reported they could start with pt at delayed start, hopeful by the weekend  Viet Cisneros was communicated with to verify [facility] start of care  Adi notified CM he would contact his sister, Racquel Minor  DATE OF COMMUNICATION 1-3-20  CM was contacted by [facility] reporting that Adi's dtr was trying to admit him to rehab  CM reported that she was unaware of this and that he had discharged from [facility]  CM received phone call from [address] [facility], patient  that reported pt's family needed resources  CM asked [place] if they could assist with resources and she reported that they called [facility] because he had discharged from that hospital  CM provided that she would call the pt's family  CM requested Adchepea's phone # and she provided pt dtr's # [phone]  CM received call from pt's dtr, Racquel Minor  CM reported that she was just speaking with [place] and pt's dtr did not receive any resources  Racquel Minor reported that she wanted to place to pt in rehab  CM asked if she still had the rehab list and she reported she did not  Racquel Minor provided her email for the SNF list; [email or web address]   CM sent emailed list

## 2020-01-03 NOTE — TELEPHONE ENCOUNTER
I called nell, patients sister and she will take him for a protime today, I asked her to take him as soon as possible  , so I can get results before the weekend

## 2020-01-04 ENCOUNTER — APPOINTMENT (EMERGENCY)
Dept: CT IMAGING | Facility: HOSPITAL | Age: 70
End: 2020-01-04
Payer: MEDICARE

## 2020-01-04 ENCOUNTER — HOSPITAL ENCOUNTER (OUTPATIENT)
Facility: HOSPITAL | Age: 70
Setting detail: OBSERVATION
Discharge: NURSING, REHAB OR LONG-TERM CARE | End: 2020-01-05
Attending: EMERGENCY MEDICINE | Admitting: STUDENT IN AN ORGANIZED HEALTH CARE EDUCATION/TRAINING PROGRAM
Payer: MEDICARE
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
## 2020-01-04 DIAGNOSIS — Z89.511 HISTORY OF BELOW-KNEE AMPUTATION OF RIGHT LOWER EXTREMITY (HCC): ICD-10-CM

## 2020-01-04 DIAGNOSIS — R26.2 AMBULATORY DYSFUNCTION: ICD-10-CM

## 2020-01-04 DIAGNOSIS — C15.9 ESOPHAGEAL CANCER (HCC): ICD-10-CM

## 2020-01-04 DIAGNOSIS — I73.9 PAD (PERIPHERAL ARTERY DISEASE) (HCC): ICD-10-CM

## 2020-01-04 DIAGNOSIS — D64.9 ANEMIA, UNSPECIFIED TYPE: ICD-10-CM

## 2020-01-04 PROBLEM — Z87.898 HISTORY OF DELIRIUM: Status: ACTIVE | Noted: 2020-01-04

## 2020-01-04 PROBLEM — R79.1 SUBTHERAPEUTIC INTERNATIONAL NORMALIZED RATIO (INR): Status: ACTIVE | Noted: 2020-01-04

## 2020-01-04 PROBLEM — E87.1 HYPONATREMIA: Status: ACTIVE | Noted: 2020-01-04

## 2020-01-04 LAB
ALBUMIN SERPL BCP-MCNC: 3 G/DL (ref 3.5–5)
ALP SERPL-CCNC: 68 U/L (ref 46–116)
ALT SERPL W P-5'-P-CCNC: 21 U/L (ref 12–78)
ANION GAP SERPL CALCULATED.3IONS-SCNC: 8 MMOL/L (ref 4–13)
APTT PPP: 34 SECONDS (ref 23–37)
AST SERPL W P-5'-P-CCNC: 14 U/L (ref 5–45)
BASOPHILS # BLD AUTO: 0.02 THOUSANDS/ΜL (ref 0–0.1)
BASOPHILS NFR BLD AUTO: 0 % (ref 0–1)
BILIRUB DIRECT SERPL-MCNC: 0.07 MG/DL (ref 0–0.2)
BILIRUB SERPL-MCNC: 0.2 MG/DL (ref 0.2–1)
BUN SERPL-MCNC: 15 MG/DL (ref 5–25)
CALCIUM SERPL-MCNC: 8.5 MG/DL (ref 8.3–10.1)
CHLORIDE SERPL-SCNC: 102 MMOL/L (ref 100–108)
CO2 SERPL-SCNC: 24 MMOL/L (ref 21–32)
CREAT SERPL-MCNC: 0.9 MG/DL (ref 0.6–1.3)
EOSINOPHIL # BLD AUTO: 0.13 THOUSAND/ΜL (ref 0–0.61)
EOSINOPHIL NFR BLD AUTO: 2 % (ref 0–6)
ERYTHROCYTE [DISTWIDTH] IN BLOOD BY AUTOMATED COUNT: 17 % (ref 11.6–15.1)
GFR SERPL CREATININE-BSD FRML MDRD: 86 ML/MIN/1.73SQ M
GLUCOSE SERPL-MCNC: 103 MG/DL (ref 65–140)
HCT VFR BLD AUTO: 28.2 % (ref 36.5–49.3)
HGB BLD-MCNC: 8.5 G/DL (ref 12–17)
IMM GRANULOCYTES # BLD AUTO: 0.05 THOUSAND/UL (ref 0–0.2)
IMM GRANULOCYTES NFR BLD AUTO: 1 % (ref 0–2)
INR PPP: 1.23 (ref 0.84–1.19)
LYMPHOCYTES # BLD AUTO: 0.31 THOUSANDS/ΜL (ref 0.6–4.47)
LYMPHOCYTES NFR BLD AUTO: 4 % (ref 14–44)
MCH RBC QN AUTO: 29 PG (ref 26.8–34.3)
MCHC RBC AUTO-ENTMCNC: 30.1 G/DL (ref 31.4–37.4)
MCV RBC AUTO: 96 FL (ref 82–98)
MONOCYTES # BLD AUTO: 0.39 THOUSAND/ΜL (ref 0.17–1.22)
MONOCYTES NFR BLD AUTO: 5 % (ref 4–12)
NEUTROPHILS # BLD AUTO: 6.45 THOUSANDS/ΜL (ref 1.85–7.62)
NEUTS SEG NFR BLD AUTO: 88 % (ref 43–75)
NRBC BLD AUTO-RTO: 0 /100 WBCS
PLATELET # BLD AUTO: 287 THOUSANDS/UL (ref 149–390)
PMV BLD AUTO: 8.7 FL (ref 8.9–12.7)
POTASSIUM SERPL-SCNC: 4.1 MMOL/L (ref 3.5–5.3)
PROT SERPL-MCNC: 6.6 G/DL (ref 6.4–8.2)
PROTHROMBIN TIME: 15.6 SECONDS (ref 11.6–14.5)
RBC # BLD AUTO: 2.93 MILLION/UL (ref 3.88–5.62)
SODIUM SERPL-SCNC: 134 MMOL/L (ref 136–145)
TROPONIN I SERPL-MCNC: <0.02 NG/ML
WBC # BLD AUTO: 7.35 THOUSAND/UL (ref 4.31–10.16)

## 2020-01-04 PROCEDURE — 70450 CT HEAD/BRAIN W/O DYE: CPT

## 2020-01-04 PROCEDURE — 80048 BASIC METABOLIC PNL TOTAL CA: CPT | Performed by: EMERGENCY MEDICINE

## 2020-01-04 PROCEDURE — 84484 ASSAY OF TROPONIN QUANT: CPT | Performed by: EMERGENCY MEDICINE

## 2020-01-04 PROCEDURE — 85610 PROTHROMBIN TIME: CPT | Performed by: EMERGENCY MEDICINE

## 2020-01-04 PROCEDURE — 85025 COMPLETE CBC W/AUTO DIFF WBC: CPT | Performed by: EMERGENCY MEDICINE

## 2020-01-04 PROCEDURE — 80076 HEPATIC FUNCTION PANEL: CPT | Performed by: EMERGENCY MEDICINE

## 2020-01-04 PROCEDURE — 85730 THROMBOPLASTIN TIME PARTIAL: CPT | Performed by: EMERGENCY MEDICINE

## 2020-01-04 PROCEDURE — 99219 PR INITIAL OBSERVATION CARE/DAY 50 MINUTES: CPT | Performed by: PHYSICIAN ASSISTANT

## 2020-01-04 PROCEDURE — 99285 EMERGENCY DEPT VISIT HI MDM: CPT

## 2020-01-04 PROCEDURE — 99285 EMERGENCY DEPT VISIT HI MDM: CPT | Performed by: EMERGENCY MEDICINE

## 2020-01-04 PROCEDURE — 36415 COLL VENOUS BLD VENIPUNCTURE: CPT | Performed by: EMERGENCY MEDICINE

## 2020-01-04 RX ORDER — SODIUM CHLORIDE 9 MG/ML
50 INJECTION, SOLUTION INTRAVENOUS CONTINUOUS
Status: DISCONTINUED | OUTPATIENT
Start: 2020-01-04 | End: 2020-01-05 | Stop reason: HOSPADM

## 2020-01-04 RX ORDER — ATORVASTATIN CALCIUM 40 MG/1
20 TABLET, FILM COATED ORAL
Status: DISCONTINUED | OUTPATIENT
Start: 2020-01-05 | End: 2020-01-05 | Stop reason: HOSPADM

## 2020-01-04 RX ORDER — AMLODIPINE BESYLATE 10 MG/1
10 TABLET ORAL DAILY
Status: DISCONTINUED | OUTPATIENT
Start: 2020-01-05 | End: 2020-01-05 | Stop reason: HOSPADM

## 2020-01-04 RX ORDER — ASPIRIN 81 MG/1
81 TABLET ORAL DAILY
Status: DISCONTINUED | OUTPATIENT
Start: 2020-01-05 | End: 2020-01-05 | Stop reason: HOSPADM

## 2020-01-04 RX ORDER — LANOLIN ALCOHOL/MO/W.PET/CERES
3 CREAM (GRAM) TOPICAL
Status: DISCONTINUED | OUTPATIENT
Start: 2020-01-04 | End: 2020-01-05 | Stop reason: HOSPADM

## 2020-01-04 RX ORDER — WARFARIN SODIUM 4 MG/1
4 TABLET ORAL
Status: DISCONTINUED | OUTPATIENT
Start: 2020-01-05 | End: 2020-01-05

## 2020-01-04 RX ORDER — FERROUS SULFATE 325(65) MG
325 TABLET ORAL 2 TIMES DAILY WITH MEALS
Status: DISCONTINUED | OUTPATIENT
Start: 2020-01-05 | End: 2020-01-05 | Stop reason: HOSPADM

## 2020-01-04 RX ORDER — THIAMINE MONONITRATE (VIT B1) 100 MG
100 TABLET ORAL DAILY
Status: DISCONTINUED | OUTPATIENT
Start: 2020-01-05 | End: 2020-01-05 | Stop reason: HOSPADM

## 2020-01-04 RX ORDER — AMOXICILLIN 250 MG
1 CAPSULE ORAL 2 TIMES DAILY
Status: DISCONTINUED | OUTPATIENT
Start: 2020-01-05 | End: 2020-01-05 | Stop reason: HOSPADM

## 2020-01-04 RX ORDER — ACETAMINOPHEN 325 MG/1
975 TABLET ORAL EVERY 8 HOURS SCHEDULED
Status: DISCONTINUED | OUTPATIENT
Start: 2020-01-04 | End: 2020-01-05 | Stop reason: HOSPADM

## 2020-01-04 RX ORDER — POLYETHYLENE GLYCOL 3350 17 G/17G
17 POWDER, FOR SOLUTION ORAL DAILY
Status: DISCONTINUED | OUTPATIENT
Start: 2020-01-05 | End: 2020-01-05 | Stop reason: HOSPADM

## 2020-01-04 RX ORDER — TAMSULOSIN HYDROCHLORIDE 0.4 MG/1
0.4 CAPSULE ORAL
Status: DISCONTINUED | OUTPATIENT
Start: 2020-01-05 | End: 2020-01-05 | Stop reason: HOSPADM

## 2020-01-04 RX ORDER — PANTOPRAZOLE SODIUM 40 MG/1
40 TABLET, DELAYED RELEASE ORAL
Status: DISCONTINUED | OUTPATIENT
Start: 2020-01-05 | End: 2020-01-05 | Stop reason: HOSPADM

## 2020-01-04 RX ORDER — ACETAMINOPHEN 325 MG/1
650 TABLET ORAL ONCE
Status: COMPLETED | OUTPATIENT
Start: 2020-01-04 | End: 2020-01-04

## 2020-01-04 RX ORDER — CLOPIDOGREL BISULFATE 75 MG/1
75 TABLET ORAL DAILY
Status: DISCONTINUED | OUTPATIENT
Start: 2020-01-05 | End: 2020-01-05 | Stop reason: HOSPADM

## 2020-01-04 RX ORDER — GABAPENTIN 100 MG/1
100 CAPSULE ORAL DAILY
Status: DISCONTINUED | OUTPATIENT
Start: 2020-01-05 | End: 2020-01-05 | Stop reason: HOSPADM

## 2020-01-04 RX ADMIN — ACETAMINOPHEN 650 MG: 325 TABLET, FILM COATED ORAL at 19:52

## 2020-01-04 NOTE — ED PROVIDER NOTES
History  Chief Complaint   Patient presents with    Fall     pt fell down 6 steps yesterday  Denies loc  Pt is on BT  Pt with h/o recent R BKA who presents to the ER after evaluation of a fall down stairs yesterday  He did not lose consciousness  He denies HA  He denies neck pain  He denies focal weakness  Family brings pt to ED essentially for placement; they report that he was seen by PT during his last admission and was dispositioned to a SNF for inpt rehab  After spending one day there the daughter took pt home as she thought she would be able to care for him but in the last few days the daughter reports that his needs for care have exceeded her expectations and capabilities and she is worried that he is unable to ambulate and that he might continue to fall and get hurt  There is no concern about the surgical incision site, they deny drainage redness and pain  There are no other concerns at this time aside from the difficulty ambulating and the concern for possible falls in the future  Prior to Admission Medications   Prescriptions Last Dose Informant Patient Reported? Taking?    amLODIPine (NORVASC) 10 mg tablet   No No   Sig: Take 1 tablet (10 mg total) by mouth daily   aspirin (ECOTRIN LOW STRENGTH) 81 mg EC tablet   No No   Sig: Take 1 tablet (81 mg total) by mouth daily   atorvastatin (LIPITOR) 20 mg tablet   No No   Sig: Take 1 tablet (20 mg total) by mouth daily after dinner   clopidogrel (PLAVIX) 75 mg tablet   No No   Sig: Take 1 tablet (75 mg total) by mouth daily   ferrous sulfate 325 (65 Fe) mg tablet   No No   Sig: Take 1 tablet (325 mg total) by mouth 2 (two) times a day with meals   gabapentin (NEURONTIN) 100 mg capsule   No No   Sig: Take 1 capsule (100 mg total) by mouth daily   hydrocortisone 2 5 % cream   Yes No   Sig: Apply to to affected areas 2-3 times daily   melatonin 3 mg   No No   Sig: Take 2 tablets (6 mg total) by mouth daily at bedtime   neomycin-bacitracin-polymyxin b (NEOSPORIN) ointment   No No   Sig: Apply topically 2 (two) times a day   Patient not taking: Reported on 11/2/2019   polyethylene glycol (MIRALAX) 17 g packet   No No   Sig: Take 17 g by mouth 2 (two) times a day   senna-docusate sodium (SENOKOT S) 8 6-50 mg per tablet   No No   Sig: Take 1 tablet by mouth 2 (two) times a day   tamsulosin (FLOMAX) 0 4 mg   No No   Sig: Take 1 capsule (0 4 mg total) by mouth daily with dinner   thiamine 100 MG tablet   No No   Sig: Take 1 tablet (100 mg total) by mouth daily   warfarin (COUMADIN) 2 mg tablet   No No   Sig: Take 1 tablet (2 mg total) by mouth daily      Facility-Administered Medications: None       Past Medical History:   Diagnosis Date    Cancer ([facility] )     throat cancer    [facility]/2019    PEG (percutaneous endoscopic gastrostomy) status ([place] [address] )     Port-A-Cath in place        Past Surgical History:   Procedure Laterality Date    BYPASS FEMORAL-FEMORAL Right     ESOPHAGOSCOPY N/A 8/24/2017    Procedure: ESOPHAGOSCOPY FLEXIBLE;  Surgeon: Adan Larsen MD;  [facility];  Service: ENT    IR ABDOMINAL ANGIOGRAPHY / INTERVENTION  11/14/2019    IR ABDOMINAL ANGIOGRAPHY / INTERVENTION  11/7/2019    IR ABDOMINAL ANGIOGRAPHY / INTERVENTION  12/13/2019    LEG AMPUTATION THROUGH LOWER TIBIA AND FIBULA Right 12/19/2019    Procedure: AMPUTATION BELOW KNEE (BKA);   Surgeon: April Steinberg MD;  [facility];  Service: Vascular    OH BRONCHOSCOPY,DIAGNOSTIC N/A 8/24/2017    Procedure: Bronchoscopy;  Surgeon: Adan Larsen MD;  [facility];  Service: ENT    OH LARYNGOSCOPY,DIRCT,OP,BIOPSY N/A 8/24/2017    Procedure: LARYNGOSCOPY DIRECT;  Surgeon: Adan Larsen MD;  [facility];  Service: ENT    OH [facility] Left 11/7/2019    Procedure: LEFT ENDARTERECTOMY ARTERIAL FEMORAL; L CFAE WITH PROFUNDOPLASTY; ARTERIOGRAM;RETROGRADE ILIAC STENTING;  Surgeon: Pooja Zhou MD; [facility];  Service: Vascular    TOE AMPUTATION Right     2 toes       History reviewed  No pertinent family history  I have reviewed and agree with the history as documented  Social History     Tobacco Use    Smoking status: Current Every Day Smoker     Packs/day: 1 00     Years: 15 00     Pack years: 15 00     Types: Cigarettes    Smokeless tobacco: Never Used    Tobacco comment: 50+ years   Substance Use Topics    Alcohol use: Never     Alcohol/week: 0 0 standard drinks     Frequency: Patient refused     Drinks per session: Patient refused     Binge frequency: Patient refused    Drug use: No        Review of Systems   Constitutional: Negative for chills, fatigue and fever  HENT: Negative for congestion and rhinorrhea  Eyes: Negative for photophobia and visual disturbance  Respiratory: Negative for apnea, chest tightness and shortness of breath  Cardiovascular: Negative for chest pain and leg swelling  Gastrointestinal: Negative for abdominal distention, abdominal pain and vomiting  Genitourinary: Negative for difficulty urinating and dysuria  Musculoskeletal: Positive for gait problem  Negative for arthralgias and back pain  Skin: Negative for color change and pallor  Neurological: Negative for dizziness, weakness and numbness  Hematological: Negative for adenopathy  Does not bruise/bleed easily  All other systems reviewed and are negative  Physical Exam  Physical Exam   Constitutional: He is oriented to person, place, and time  He appears well-developed and well-nourished  No distress  HENT:   Head: Normocephalic and atraumatic  Eyes: Pupils are equal, round, and reactive to light  Conjunctivae and EOM are normal    Neck: Normal range of motion  Neck supple  No JVD present  c spine cleared by nexus criteria  Cardiovascular: Normal rate, regular rhythm, normal heart sounds and intact distal pulses  Exam reveals no gallop and no friction rub     No murmur heard   Pulmonary/Chest: Effort normal and breath sounds normal  No stridor  No respiratory distress  He has no wheezes  He has no rales  Abdominal: Soft  He exhibits no distension and no mass  There is no tenderness  There is no rebound and no guarding  No hernia  Musculoskeletal: Normal range of motion  He exhibits deformity  He exhibits no edema or tenderness  RLE s/p BKA  Well appearing incision site, c/d/i, staples intact  No tenderness, erythema or drainage, no crepitus  No edema  Neurological: He is alert and oriented to person, place, and time  No cranial nerve deficit or sensory deficit  He exhibits normal muscle tone  Coordination normal    Skin: Skin is warm and dry  Capillary refill takes less than 2 seconds  No rash noted  He is not diaphoretic  No erythema  No pallor  Nursing note and vitals reviewed        Vital Signs  ED Triage Vitals [01/04/20 1708]   Temperature Pulse Respirations Blood Pressure SpO2   99 4 °F (37 4 °C) 96 16 123/84 100 %      Temp Source Heart Rate Source Patient Position - Orthostatic VS BP Location FiO2 (%)   Oral Monitor Sitting Left arm --      Pain Score       --           Vitals:    01/04/20 1708 01/04/20 1730   BP: 123/84 134/60   Pulse: 96 88   Patient Position - Orthostatic VS: Sitting          Visual Acuity  Visual Acuity      Most Recent Value   L Pupil Size (mm)  3   R Pupil Size (mm)  3          ED Medications  Medications   acetaminophen (TYLENOL) tablet 650 mg (has no administration in time range)       Diagnostic Studies  Results Reviewed     Procedure Component Value Units Date/Time    Protime-INR [[id number]]  (Abnormal) Collected:  01/04/20 1753    Lab Status:  Final result Specimen:  Blood from Arm, Left Updated:  01/04/20 1825     Protime 15 6 seconds      INR 1 23    APTT [[id number]]  (Normal) Collected:  01/04/20 1753    Lab Status:  Final result Specimen:  Blood from Arm, Left Updated:  01/04/20 1825     PTT 34 seconds     Troponin I [[id number]] (Normal) Collected:  01/04/20 1753    Lab Status:  Final result Specimen:  Blood from Arm, Left Updated:  01/04/20 1820     Troponin I <0 02 ng/mL     Basic metabolic panel [[id number]]  (Abnormal) Collected:  01/04/20 1753    Lab Status:  Final result Specimen:  Blood from Arm, Left Updated:  01/04/20 1817     Sodium 134 mmol/L      Potassium 4 1 mmol/L      Chloride 102 mmol/L      CO2 24 mmol/L      ANION GAP 8 mmol/L      BUN 15 mg/dL      Creatinine 0 90 mg/dL      Glucose 103 mg/dL      Calcium 8 5 mg/dL      eGFR 86 ml/min/1 73sq m     Narrative:       [place] guidelines for Chronic Kidney Disease (CKD):     Stage 1 with normal or high GFR (GFR > 90 mL/min/1 73 square meters)    Stage 2 Mild CKD (GFR = 60-89 mL/min/1 73 square meters)    Stage 3A Moderate CKD (GFR = 45-59 mL/min/1 73 square meters)    Stage 3B Moderate CKD (GFR = 30-44 mL/min/1 73 square meters)    Stage 4 Severe CKD (GFR = 15-29 mL/min/1 73 square meters)    Stage 5 End Stage CKD (GFR <15 mL/min/1 73 square meters)  Note: GFR calculation is accurate only with a steady state creatinine    Hepatic function panel [[id number]]  (Abnormal) Collected:  01/04/20 1753    Lab Status:  Final result Specimen:  Blood from Arm, Left Updated:  01/04/20 1817     Total Bilirubin 0 20 mg/dL      Bilirubin, Direct 0 07 mg/dL      Alkaline Phosphatase 68 U/L      AST 14 U/L      ALT 21 U/L      Total Protein 6 6 g/dL      Albumin 3 0 g/dL     CBC and differential [[id number]]  (Abnormal) Collected:  01/04/20 1753    Lab Status:  Final result Specimen:  Blood from Arm, Left Updated:  01/04/20 1759     WBC 7 35 Thousand/uL      RBC 2 93 Million/uL      Hemoglobin 8 5 g/dL      Hematocrit 28 2 %      MCV 96 fL      MCH 29 0 pg      MCHC 30 1 g/dL      RDW 17 0 %      MPV 8 7 fL      Platelets 444 Thousands/uL      nRBC 0 /100 WBCs      Neutrophils Relative 88 %      Immat GRANS % 1 %      Lymphocytes Relative 4 %      Monocytes Relative 5 %      Eosinophils Relative 2 %      Basophils Relative 0 %      Neutrophils Absolute 6 45 Thousands/µL      Immature Grans Absolute 0 05 Thousand/uL      Lymphocytes Absolute 0 31 Thousands/µL      Monocytes Absolute 0 39 Thousand/µL      Eosinophils Absolute 0 13 Thousand/µL      Basophils Absolute 0 02 Thousands/µL                  CT head without contrast   Final Result by Shade Carlin MD (01/04 1732)      No acute intracranial abnormality  Microangiopathic changes  Workstation performed: [id number]                    Procedures  Procedures         ED Course           Identification of Seniors at Risk      Most Recent Value   (ISAR) Identification of Seniors at Risk   Before the illness or injury that brought you to the Emergency, did you need someone to help you on a regular basis? 0 Filed at: 01/04/2020 1709   In the last 24 hours, have you needed more help than usual?  0 Filed at: 01/04/2020 1709   Have you been hospitalized for one or more nights during the past 6 months? 1 Filed at: 01/04/2020 1709   In general, do you see well?  0 Filed at: 01/04/2020 1709   In general, do you have serious problems with your memory? 0 Filed at: 01/04/2020 1709   Do you take more than three different medications every day? 1 Filed at: 01/04/2020 1709   ISAR Score  2 Filed at: 01/04/2020 1709                          MDM      Disposition  Final diagnoses:   Fall, initial encounter   Ambulatory dysfunction   History of below-knee amputation of right lower extremity ([facility] )     Time reflects when diagnosis was documented in both MDM as applicable and the Disposition within this note     Time User Action Codes Description Comment    1/4/2020  7:06 PM Vicenta Chow Add [[id number]  [id number]] Fall, initial encounter     1/4/2020  7:06 PM Vicenta Arreola [R26 2] Ambulatory dysfunction     1/4/2020  7:06 PM Juanita Zuniga Add [[id number]] History of below-knee amputation of right lower extremity [facility])       ED Disposition     ED Disposition Condition Date/Time Comment    Admit Stable Sat Jan 4, 2020  7:06 PM Case was discussed with Iain Scott and the patient's admission status was agreed to be Admission Status: observation status to the service of Dr Rafael Bass   Follow-up Information    None         Patient's Medications   Discharge Prescriptions    No medications on file     No discharge procedures on file      ED Provider  Electronically Signed by           Agustin Mckeon MD  01/04/20 5373

## 2020-01-05 VITALS
RESPIRATION RATE: 20 BRPM | TEMPERATURE: 99.8 F | WEIGHT: 111.11 LBS | OXYGEN SATURATION: 100 % | BODY MASS INDEX: 19.69 KG/M2 | HEART RATE: 76 BPM | HEIGHT: 63 IN | DIASTOLIC BLOOD PRESSURE: 53 MMHG | SYSTOLIC BLOOD PRESSURE: 109 MMHG

## 2020-01-05 PROBLEM — Z87.898 HISTORY OF DELIRIUM: Status: RESOLVED | Noted: 2020-01-04 | Resolved: 2020-01-05

## 2020-01-05 LAB
ANION GAP SERPL CALCULATED.3IONS-SCNC: 8 MMOL/L (ref 4–13)
BUN SERPL-MCNC: 13 MG/DL (ref 5–25)
CALCIUM SERPL-MCNC: 8.2 MG/DL (ref 8.3–10.1)
CHLORIDE SERPL-SCNC: 105 MMOL/L (ref 100–108)
CO2 SERPL-SCNC: 24 MMOL/L (ref 21–32)
CREAT SERPL-MCNC: 0.76 MG/DL (ref 0.6–1.3)
GFR SERPL CREATININE-BSD FRML MDRD: 92 ML/MIN/1.73SQ M
GLUCOSE SERPL-MCNC: 106 MG/DL (ref 65–140)
INR PPP: 1.24 (ref 0.84–1.19)
POTASSIUM SERPL-SCNC: 4.3 MMOL/L (ref 3.5–5.3)
PROTHROMBIN TIME: 15.7 SECONDS (ref 11.6–14.5)
SODIUM SERPL-SCNC: 137 MMOL/L (ref 136–145)

## 2020-01-05 PROCEDURE — 36415 COLL VENOUS BLD VENIPUNCTURE: CPT | Performed by: PHYSICIAN ASSISTANT

## 2020-01-05 PROCEDURE — 97163 PT EVAL HIGH COMPLEX 45 MIN: CPT

## 2020-01-05 PROCEDURE — 99217 PR OBSERVATION CARE DISCHARGE MANAGEMENT: CPT | Performed by: INTERNAL MEDICINE

## 2020-01-05 PROCEDURE — 85610 PROTHROMBIN TIME: CPT | Performed by: PHYSICIAN ASSISTANT

## 2020-01-05 PROCEDURE — 80048 BASIC METABOLIC PNL TOTAL CA: CPT | Performed by: PHYSICIAN ASSISTANT

## 2020-01-05 RX ORDER — WARFARIN SODIUM 2 MG/1
5 TABLET ORAL
Qty: 30 TABLET | Refills: 0
Start: 2020-01-05 | End: 2020-02-05

## 2020-01-05 RX ORDER — PANTOPRAZOLE SODIUM 40 MG/1
40 TABLET, DELAYED RELEASE ORAL
Refills: 0
Start: 2020-01-05

## 2020-01-05 RX ORDER — WARFARIN SODIUM 2.5 MG/1
5 TABLET ORAL
Status: DISCONTINUED | OUTPATIENT
Start: 2020-01-05 | End: 2020-01-05 | Stop reason: HOSPADM

## 2020-01-05 RX ADMIN — SODIUM CHLORIDE 50 ML/HR: 0.9 INJECTION, SOLUTION INTRAVENOUS at 00:28

## 2020-01-05 RX ADMIN — ASPIRIN 81 MG: 81 TABLET, COATED ORAL at 10:19

## 2020-01-05 RX ADMIN — PANTOPRAZOLE SODIUM 40 MG: 40 TABLET, DELAYED RELEASE ORAL at 16:12

## 2020-01-05 RX ADMIN — SENNOSIDES AND DOCUSATE SODIUM 1 TABLET: 8.6; 5 TABLET ORAL at 10:19

## 2020-01-05 RX ADMIN — GABAPENTIN 100 MG: 100 CAPSULE ORAL at 10:20

## 2020-01-05 RX ADMIN — POLYETHYLENE GLYCOL 3350 17 G: 17 POWDER, FOR SOLUTION ORAL at 10:18

## 2020-01-05 RX ADMIN — AMLODIPINE BESYLATE 10 MG: 10 TABLET ORAL at 10:19

## 2020-01-05 RX ADMIN — MELATONIN 3 MG: 3 TAB ORAL at 00:28

## 2020-01-05 RX ADMIN — ACETAMINOPHEN 975 MG: 325 TABLET, FILM COATED ORAL at 00:28

## 2020-01-05 RX ADMIN — FERROUS SULFATE TAB 325 MG (65 MG ELEMENTAL FE) 325 MG: 325 (65 FE) TAB at 10:20

## 2020-01-05 RX ADMIN — CLOPIDOGREL BISULFATE 75 MG: 75 TABLET ORAL at 10:20

## 2020-01-05 RX ADMIN — ACETAMINOPHEN 975 MG: 325 TABLET, FILM COATED ORAL at 13:39

## 2020-01-05 RX ADMIN — THIAMINE HCL TAB 100 MG 100 MG: 100 TAB at 10:20

## 2020-01-05 RX ADMIN — TAMSULOSIN HYDROCHLORIDE 0.4 MG: 0.4 CAPSULE ORAL at 16:12

## 2020-01-05 RX ADMIN — ACETAMINOPHEN 975 MG: 325 TABLET, FILM COATED ORAL at 10:18

## 2020-01-05 RX ADMIN — PANTOPRAZOLE SODIUM 40 MG: 40 TABLET, DELAYED RELEASE ORAL at 10:20

## 2020-01-05 RX ADMIN — FERROUS SULFATE TAB 325 MG (65 MG ELEMENTAL FE) 325 MG: 325 (65 FE) TAB at 16:12

## 2020-01-05 NOTE — H&P
H&P- Pam Sender 1950, 78 y o  male MRN: [id number]    Unit/Bed#: ED 08 Encounter: [id number]    Primary Care Provider: Janelle Velasco MD   Date and time admitted to hospital: 1/4/2020  5:10 PM     DOS: 1/4/2020    * Ambulatory dysfunction  Assessment & Plan  · Pt with recent BKA on 12/19  · Was recommended rehab on discharge, however patient's family believed they could take care of him at home    Status post mechanical fall down the stairs  · CT head negative  · Patient is denying any pain except for incisional pain  · P T /OT evaluation, patient will need placement to rehab which he is now agreeable to  · Scheduled Tylenol and gabapentin for pain    History of delirium  Assessment & Plan  · During previous hospitalization patient was noted to have hospital delirium and confusion  · Avoid narcotics  · Placed on scheduled Tylenol and gabapentin  · Delirium precautions  · Monitor    Esophageal cancer ([address] )  Assessment & Plan  · Pt has been treated for this in the past, no longer undergoing therapy   · Continue PPI BID   · Monitor     Hx of BKA, right ([address] )  Assessment & Plan  · Status post right BKA 12/19  · Incision appears to be healing well, no erythema or drainage noted  · Local wound care  · P T /OT evaluations    Subtherapeutic international normalized ratio (INR)  Assessment & Plan  · INR 1 23 on admission  · Maintained on Coumadin 2 mg daily  · Will increase Coumadin to 4 mg tonight and recheck INR in a m   · Goal INR 2-3  · Appears patient is maintained on this for his PAD on review of records    Hyponatremia  Assessment & Plan  · Sodium 134 on admission  · Could be secondary to mild hypovolemia  · Placed on gentle IV fluid hydration as patient appears dry  · Last echo with preserved EF, no diastolic dysfunction    Anemia  Assessment & Plan  · Baseline hemoglobin around 7-8 on review  · Stable currently at 8 5  · Monitor closely on dual anti-platelet therapy and Coumadin  · Did have EGD/colonoscopy during last admission without evidence of active bleeding and was cleared to resume his blood thinners on discharge  · CBC in the a m  · Continue b i d  iron supplementation    PAD (peripheral artery disease) ([facility] )  Assessment & Plan  · Patient follows with vascular surgery  · Continue regimen of aspirin, Plavix and Coumadin  · INR subtherapeutic, increased Coumadin dose and repeat INR in a m   · Monitor for signs of bleeding  · During previous hospitalization, patient did have colonoscopy performed without any active bleeding noted  Also EGD performed with evidence of ulcerations that were not actively bleeding  Recommended PPI b i d , do not appreciate this on patient's PTA medication list, will start on Protonix 40 mg b i d  Here  · Hemoglobin stable at 8 5, monitor closely    Essential hypertension  Assessment & Plan  · BP stable on review  · Resume amlodipine 10 mg daily  · Monitor      VTE Prophylaxis: Warfarin (Coumadin)     Code Status:  Level 1-full code, discussed with patient and patient's family at bedside  POLST: There is no POLST form on file for this patient (pre-hospital)  Discussion with family:  Discussed with patient and patient's daughter/son-in-law at bedside    Anticipated Length of Stay:  Patient will be admitted on an Observation basis with an anticipated length of stay of  < 2 midnights  Justification for Hospital Stay:  Patient mechanical fall at home status post BKA, need for rehab placement    Total Time for Visit, including Counseling / Coordination of Care: 30 minutes  Greater than 50% of this total time spent on direct patient counseling and coordination of care      Chief Complaint:   "He fell yesterday, we can't take care of him at home "    History of Present Illness:    Jenny Go is a 78 y o  male with significant past medical history of PAD status post BKA maintained on Coumadin anticoagulation, hypertension esophageal cancer who presents with mechanical fall on 01/03  Patient is mainly Malawian speaking and therefore a majority of the history was obtained by patient's family members at bedside  They report that the patient was recently hospitalized for about a month and recently had a BKA a few weeks ago  Reports that they felt they could take care of him at home and therefore he did not go to rehab  Reports that they were having difficulty lifting the patient up  Reports that there was a fall down the steps that happened yesterday with her   Patient is denying any pain anywhere  She reports that she can no longer take care of the patient at home and that he will need to be placed to rehab  Reports that he has been constipated and has not had a bowel movement for approximately 4 days  Reports that he cannot have narcotics while in the hospital as he gets extremely confused  Only complaint he has is some pain at the surgical incision site  She reports possible fever at home  Review of Systems:    Review of Systems   Constitutional: Positive for fatigue  Negative for chills and diaphoresis  HENT: Negative for congestion, sinus pressure, sinus pain, sneezing, sore throat, tinnitus and trouble swallowing  Eyes: Negative  Negative for visual disturbance  Respiratory: Negative for cough, chest tightness, shortness of breath and wheezing  Cardiovascular: Negative for chest pain and leg swelling  Gastrointestinal: Positive for constipation  Negative for abdominal pain, diarrhea, nausea and vomiting  Genitourinary: Negative for difficulty urinating, frequency and urgency  Musculoskeletal: Positive for gait problem  Negative for back pain and joint swelling  Skin: Negative for color change, pallor and rash  Neurological: Negative for dizziness, syncope, light-headedness and headaches         Past Medical and Surgical History:     Past Medical History:   Diagnosis Date    Cancer ([facility] )     throat cancer    PAD [facility]peripheral artery disease) ([facility] ) 11/2/2019    PEG (percutaneous endoscopic gastrostomy) status ([facility] )     Port-A-Cath in place        Past Surgical History:   Procedure Laterality Date    BYPASS FEMORAL-FEMORAL Right     ESOPHAGOSCOPY N/A 8/24/2017    Procedure: ESOPHAGOSCOPY FLEXIBLE;  Surgeon: Tatyana Tatum MD;  [facility];  Service: ENT    IR ABDOMINAL ANGIOGRAPHY / INTERVENTION  11/14/2019    IR ABDOMINAL ANGIOGRAPHY / INTERVENTION  11/7/2019    IR ABDOMINAL ANGIOGRAPHY / INTERVENTION  12/13/2019    LEG AMPUTATION THROUGH LOWER TIBIA AND FIBULA Right 12/19/2019    Procedure: AMPUTATION BELOW KNEE (BKA); Surgeon: Josephine Tyson MD;  [facility];  Service: Vascular    RI BRONCHOSCOPY,DIAGNOSTIC N/A 8/24/2017    Procedure: Bronchoscopy;  Surgeon: Tatyana Tatum MD;  [facility];  Service: ENT    RI LARYNGOSCOPY,DIRCT,OP,BIOPSY N/A 8/24/2017    Procedure: LARYNGOSCOPY DIRECT;  Surgeon: Tatyana Tatum MD;  [facility];  Service: ENT    RI [place] Left 11/7/2019    Procedure: LEFT ENDARTERECTOMY ARTERIAL FEMORAL; L CFAE WITH PROFUNDOPLASTY; ARTERIOGRAM;RETROGRADE ILIAC STENTING;  Surgeon: Anu Blevins MD;  [facility];  Service: Vascular    TOE AMPUTATION Right     2 toes       Meds/Allergies:    Prior to Admission medications    Medication Sig Start Date End Date Taking?  Authorizing Provider   amLODIPine (NORVASC) 10 mg tablet Take 1 tablet (10 mg total) by mouth daily 11/25/19   Zari Zuniga,    aspirin (ECOTRIN LOW STRENGTH) 81 mg EC tablet Take 1 tablet (81 mg total) by mouth daily 11/25/19   Zari Zuniga DO   atorvastatin (LIPITOR) 20 mg tablet Take 1 tablet (20 mg total) by mouth daily after dinner 11/24/19   Zari Ceballos,    clopidogrel (PLAVIX) 75 mg tablet Take 1 tablet (75 mg total) by mouth daily 1/2/20   Steven Hutson PA-C   ferrous sulfate 325 (65 Fe) mg tablet Take 1 tablet (325 mg total) by mouth 2 (two) times a day with meals 11/24/19   Zari Zuniga DO   gabapentin (NEURONTIN) 100 mg capsule Take 1 capsule (100 mg total) by mouth daily 11/25/19   Zari Grant,    hydrocortisone 2 5 % cream Apply to to affected areas 2-3 times daily 3/5/18   Historical Provider, MD   melatonin 3 mg Take 2 tablets (6 mg total) by mouth daily at bedtime 11/24/19   Zari Grant,    neomycin-bacitracin-polymyxin b (NEOSPORIN) ointment Apply topically 2 (two) times a day  Patient not taking: Reported on 11/2/2019 8/6/18   Zohreh Capps MD   polyethylene glycol (MIRALAX) 17 g packet Take 17 g by mouth 2 (two) times a day 11/24/19   Zari Zuniga DO   senna-docusate sodium (SENOKOT S) 8 6-50 mg per tablet Take 1 tablet by mouth 2 (two) times a day 11/24/19   Zari Zuniga DO   tamsulosin (FLOMAX) 0 4 mg Take 1 capsule (0 4 mg total) by mouth daily with dinner 11/24/19   Zari Zuniga, DO   thiamine 100 MG tablet Take 1 tablet (100 mg total) by mouth daily 11/25/19   Zari Zuniga, DO   warfarin (COUMADIN) 2 mg tablet Take 1 tablet (2 mg total) by mouth daily 1/2/20   Solo Moreira PA-C     I have reviewed home medications using allscripts  pt and family do not know what he takes at home       Allergies: No Known Allergies    Social History:     Marital Status: Single   Occupation:   Patient Pre-hospital Living Situation: pt lives with his daughter and son in law   Patient Pre-hospital Level of Mobility: walker  Patient Pre-hospital Diet Restrictions: None  Substance Use History:   Social History     Substance and Sexual Activity   Alcohol Use Never    Alcohol/week: 0 0 standard drinks    Frequency: Patient refused    Drinks per session: Patient refused    Binge frequency: Patient refused     Social History     Tobacco Use   Smoking Status Current Every Day Smoker    Packs/day: 1 00    Years: 15 00    Pack years: 15 00    Types: Cigarettes   Smokeless Tobacco Never Used   Tobacco Comment    50+ years     Social History     Substance and Sexual Activity   Drug Use No       Family History:    non-contributory    Physical Exam:     Vitals:   Blood Pressure: 159/70 (01/04/20 1948)  Pulse: 90 (01/04/20 1948)  Temperature: 99 4 °F (37 4 °C) (01/04/20 1708)  Temp Source: Oral (01/04/20 1708)  Respirations: 18 (01/04/20 1948)  Height: 5' 3" (160 cm) (01/04/20 1715)  Weight - Scale: 50 4 kg (111 lb 1 8 oz) (01/04/20 1715)  SpO2: 99 % (01/04/20 1948)    Physical Exam   Constitutional: No distress  Patient is in no acute distress sitting in his hospital bed resting comfortably accompanied by family members  Right-sided BKA, incision appears to be healing well  No erythema or drainage noted  Does report tenderness to palpation  Mainly Vietnamese speaking  Appears malnourished   HENT:   Head: Normocephalic and atraumatic  Eyes: Pupils are equal, round, and reactive to light  Conjunctivae are normal    Cardiovascular: Normal rate, regular rhythm and intact distal pulses  Pulmonary/Chest: Effort normal and breath sounds normal  No stridor  No respiratory distress  He has no wheezes  Abdominal: Soft  Bowel sounds are normal  He exhibits no distension  There is no tenderness  There is no guarding  Musculoskeletal: He exhibits no edema  Neurological: He is alert  Skin: Skin is warm and dry  He is not diaphoretic  No erythema  Vitals reviewed  Additional Data:     Lab Results: I have personally reviewed pertinent reports        Results from last 7 days   Lab Units 01/04/20  1753   WBC Thousand/uL 7 35   HEMOGLOBIN g/dL 8 5*   HEMATOCRIT % 28 2*   PLATELETS Thousands/uL 287   NEUTROS PCT % 88*   LYMPHS PCT % 4*   MONOS PCT % 5   EOS PCT % 2     Results from last 7 days   Lab Units 01/04/20  1753   SODIUM mmol/L 134*   POTASSIUM mmol/L 4 1   CHLORIDE mmol/L 102   CO2 mmol/L 24   BUN mg/dL 15   CREATININE mg/dL 0 90   ANION GAP mmol/L 8   CALCIUM mg/dL 8 5   ALBUMIN g/dL 3 0*   TOTAL BILIRUBIN mg/dL 0 20   ALK PHOS U/L 68   ALT U/L 21   AST U/L 14 GLUCOSE RANDOM mg/dL 103     Results from last 7 days   Lab Units 01/04/20  1753   INR  1 23*                   Imaging: I have personally reviewed pertinent reports  CT head without contrast   Final Result by Alicia Yang MD (01/04 1732)      No acute intracranial abnormality  Microangiopathic changes  Workstation performed: [id number]             EKG, Pathology, and Other Studies Reviewed on Admission:   · CT head results reviewed    Allscripts / Epic Records Reviewed: Yes     ** Please Note: This note has been constructed using a voice recognition system   **

## 2020-01-05 NOTE — ASSESSMENT & PLAN NOTE
· Sodium 134 on admission  · Could be secondary to mild hypovolemia  · Placed on gentle IV fluid hydration as patient appears dry  · Last echo with preserved EF, no diastolic dysfunction

## 2020-01-05 NOTE — ASSESSMENT & PLAN NOTE
· Status post right below-knee amputation  For rehab as above    Continue with anticoagulation and other treatment including antiplatelet

## 2020-01-05 NOTE — TRANSPORTATION MEDICAL NECESSITY
Section I - General Information    Name of Patient: Jenny Go                 : 1950    Medicare #: [id number]  Transport Date: 20 (PCS is valid for round trips on this date and for all repetitive trips in the 60-day range as noted below )  Origin: [address] [place] [address] Road: [facility]  Is the pt's stay covered under Medicare Part A (PPS/DRG)   [x]     Closest appropriate facility? If no, why is transport to more distant facility required? Yes  If hospice pt, is this transport related to pt's terminal illness? No       Section II - Medical Necessity Questionnaire  Ambulance transportation is medically necessary only if other means of transport are contraindicated or would be potentially harmful to the patient  To meet this requirement, the patient must either be "bed confined" or suffer from a condition such that transport by means other than ambulance is contraindicated by the patient's condition  The following questions must be answered by the medical professional signing below for this form to be valid:    1)  Describe the MEDICAL CONDITION (physical and/or mental) of this patient AT [address] [place] [address] that requires the patient to be transported in an ambulance and why transport by other means is contraindicated by the patient's condition: pt admitted w ambulatory dysfunction post fall  Dizziness  Unstable  Requiring assist w ambulation and transfer  Medical attendant required    2) Is the patient "bed confined" as defined below? No  To be "be confined" the patient must satisfy all three of the following conditions: (1) unable to get up from bed without Assistance; AND (2) unable to ambulate; AND (3) unable to sit in a chair or wheelchair      3) Can this patient safely be transported by car or wheelchair van (i e , seated during transport without a medical attendant or monitoring)? No    4) In addition to completing questions 1-3 above, please check any of the following conditions that apply*:   *Note: supporting documentation for any boxes checked must be maintained in the patient's medical records  If hosp-hosp transfer, describe services needed at 2nd facility not available at 1st facility? Moderate/severe pain on movement       Section III - Signature of Physician or Healthcare Professional  I certify that the above information is true and correct based on my evaluation of this patient, and represent that the patient requires transport by ambulance and that other forms of transport are contraindicated  I understand that this information will be used by the Centers for Medicare and Medicaid Services (CMS) to support the determination of medical necessity for ambulance services, and I represent that I have personal knowledge of the patient's condition at time of transport  []  If this box is checked, I also certify that the patient is physically or mentally incapable of signing the ambulance service's claim and that the institution with which I am affiliated has furnished care, services, or assistance to the patient  My signature below is made on behalf of the patient pursuant to 42 CFR §424 36(b)(4)  In accordance with 42 CFR §424 37, the specific reason(s) that the patient is physically or mentally incapable of signing the claim form is as follows: n/a  Signature of Physician* or Healthcare Professional______________________________________________________________  Signature Date 01/05/20 (For scheduled repetitive transports, this form is not valid for transports performed more than 60 days after this date)    Printed Name & Credentials of Physician or Healthcare Professional (MD, DO, RN, etc )________Silvina basilio LCSW________________________  *Form must be signed by patient's attending physician for scheduled, repetitive transports   For non-repetitive, unscheduled ambulance transports, if unable to obtain the signature of the attending physician, any of the following may sign (choose appropriate option below)  [] Physician Assistant []  Clinical Nurse Specialist []  Registered Nurse  []  Nurse Practitioner  [x] Discharge Planner

## 2020-01-05 NOTE — ASSESSMENT & PLAN NOTE
Patient was just discharged from [address] [place] [address] to a rehab facility and family took him out from there as the wanted to take care of him at home which is not possible now  Needs placement

## 2020-01-05 NOTE — PHYSICAL THERAPY NOTE
Physical Therapy Evaluation     Patient's Name: Daljit Avery    Admitting Diagnosis  No admission diagnoses are documented for this encounter  Problem List  Patient Active Problem List   Diagnosis    [facility]    History of cancer tonsil    Essential hypertension    Dependence on nicotine from cigarettes    Syncope and collapse    Renal stone    PAD (peripheral artery disease) (HCC)    Coagulopathy (HCC)    Transaminitis    Ambulatory dysfunction    Thrombocytopenia (HCC)    Supratherapeutic INR    Arterial occlusion, R PFA, acute, segmental    Anemia    Cancer [facility]    Hyponatremia    Subtherapeutic international normalized ratio (INR)    Hx of BKA, right (HCC)    [facility]    History of delirium       Past Medical History  Past Medical History:   Diagnosis Date    Cancer ([place] [address] )     throat cancer    PAD (peripheral artery disease) ([place] [address] ) 11/2/2019    PEG (percutaneous endoscopic gastrostomy) status ([place] [address] )     Port-A-Cath in place        Past Surgical History  Past Surgical History:   Procedure Laterality Date    BYPASS FEMORAL-FEMORAL Right     ESOPHAGOSCOPY N/A 8/24/2017    Procedure: ESOPHAGOSCOPY FLEXIBLE;  Surgeon: Patsy Cotter MD;  [facility];  Service: ENT    IR ABDOMINAL ANGIOGRAPHY / INTERVENTION  11/14/2019    IR ABDOMINAL ANGIOGRAPHY / INTERVENTION  11/7/2019    IR ABDOMINAL ANGIOGRAPHY / INTERVENTION  12/13/2019    LEG AMPUTATION THROUGH LOWER TIBIA AND FIBULA Right 12/19/2019    Procedure: AMPUTATION BELOW KNEE (BKA);   Surgeon: Daniel Ewing MD;  [facility];  Service: Vascular    MA BRONCHOSCOPY,DIAGNOSTIC N/A 8/24/2017    Procedure: Bronchoscopy;  Surgeon: Patsy Cotter MD;  [facility];  Service: ENT    MA LARYNGOSCOPY,DIRCT,OP,BIOPSY N/A 8/24/2017    Procedure: LARYNGOSCOPY DIRECT;  Surgeon: Patsy Cotter MD;  [facility];  Service: ENT    MA [facility] Left 11/7/2019    Procedure: LEFT ENDARTERECTOMY ARTERIAL FEMORAL; L CFAE WITH PROFUNDOPLASTY; ARTERIOGRAM;RETROGRADE ILIAC STENTING;  Surgeon: Jody Olivares MD;  [facility];  Service: Vascular    TOE AMPUTATION Right     2 toes          01/05/20 0911   Note Type   Note type Eval only   Pain Assessment   Pain Assessment Mello-Baker FACES   Mello-Baker FACES Pain Rating 6   Pain Type Acute pain   Pain Location Leg   Pain Orientation Right   Pain Frequency Constant/continuous   Pain Onset Ongoing   Clinical Progression Not changed   Hospital Pain Intervention(s) Repositioned   Multiple Pain Sites No   Home Living   Type of Home House   Home Layout Two level;Bed/bath upstairs;Stairs to enter with rails  (10 DAKOTAH)   Home Equipment Walker  (? walker vs  no AD at baseline)   Additional Comments Patient unreliable/poor historian  Information obtained from previous PT evaluation (12/5/2019)   Prior Function   Level of [place] Needs assistance with IADLs; Needs assistance with ADLs and functional mobility   Lives With Spouse   Receives Help From Family   ADL Assistance Needs assistance   IADLs Needs assistance   Falls in the last 6 months 1 to 4  (per chart review; patient denies falls)   Vocational Retired   Restrictions/Precautions   Wells Bryon Bearing Precautions Per Order Yes   RLE Wells Rowan Bearing Per Order NWB  (per chart review/previous hospital admission s/p R BKA 12/19/2019)   Braces or Orthoses Other (Comment)  (recent history of LE immobilizer - not present upon hospital admission)   Other Precautions Cognitive; Chair Alarm; Bed Alarm;WBS;Multiple lines; Fall Risk;Pain;Limb alert   General   Family/Caregiver Present No   Cognition   Overall Cognitive Status Impaired   Arousal/Participation Alert   Orientation Level Oriented to person;Oriented to place  (? disoriented to time and situation)   Memory Decreased short term memory;Decreased recall of recent events   Following Commands Follows one step commands inconsistently   Comments pt's preferred language is Roslyn Papo Krissy Campoverde [place]  # [phone]; pt agreeable to participate with consistent encouragement   RUE Assessment   RUE Assessment WFL  (based on functional assessment)   LUE Assessment   LUE Assessment WFL  (based on functional assessment)   RLE Assessment   RLE Assessment X   Strength RLE   R Hip Flexion 3/5  (resistance not applied secondary to pain + non weight-bearing status)   LLE Assessment   LLE Assessment WFL  (grossly assessed with functional mobility: at least 3+/5)   Coordination   Movements are Fluid and Coordinated 1   Sensation   (Unclear at this time)   Bed Mobility   Supine to Sit 4  Minimal assistance   Additional items Assist x 1; Increased time required;Verbal cues;LE management;HOB elevated   Sit to Supine 4  Minimal assistance   Additional items Assist x 1; Increased time required;LE management;Verbal cues;HOB elevated   Additional Comments pt deferred further functional mobility assessment; stated, "If I stand I will fall"   Transfers   Sit to Stand Unable to assess   Ambulation/Elevation   Gait pattern Not appropriate; Not tested   Balance   Static Sitting Fair +   Dynamic Sitting Fair   Endurance Deficit   Endurance Deficit Yes   Activity Tolerance   Activity Tolerance Patient limited by fatigue;Patient limited by pain; Other (Comment)  (fear of falling)   Nurse Made Aware NATHAN Brar confirmed pt appropriate for therapy; made aware of session outcomes; post-session: pt repositioned for comfort, all needs within reach   Assessment   Prognosis Good   Problem List Decreased strength;Decreased endurance; Impaired balance;Decreased mobility; Decreased cognition;Decreased safety awareness;Pain;Orthopedic restrictions;Decreased skin integrity   Assessment Pt is 78 y o  male seen for PT evaluation s/p admit to [facility] on 1/4/2020 w/ Ambulatory dysfunction  PT consulted to assess pt's functional mobility and d/c needs   Order placed for PT jaimie and tx, w/ up w/ A order (NWNAM RLE per chart review/previous hospital admission s/p R BKA 12/19/2019)  Comorbidities affecting pt's physical performance at time of assessment include: history of delirium, esophageal cancer, history of R BKA, hyponatremia, anemia, PAD, essential hypertension)  PTA, pt was requiring A for mobility, ambulates household distances, has 10 DAKOTAH and retired  Personal factors affecting pt at time of IE include: inaccessible home environment, lives in two story house, stairs to enter home, inability to ambulate household distances, inability to navigate level surfaces w/o external assistance, decreased cognition, preferred language not [place] (language barrier), positive fall history, decreased initiation and engagement, limited insight into impairments, inability to perform IADLs, inability to perform ADLs and inability to live alone  Please find objective findings from PT assessment regarding body systems outlined above with impairments and limitations including weakness, impaired balance, decreased endurance, pain, decreased activity tolerance, decreased functional mobility tolerance, decreased safety awareness, fall risk, orthopedic restrictions, decreased skin integrity and decreased cognition  The following objective measures performed on IE also reveal limitations: Barthel Index: 35/100 and Modified Humphrey: 5 (severe disability)  Pt's clinical presentation is currently unstable/unpredictable seen in pt's presentation of ongoing medical management/monitoring, pain/fatigue/fear of falling impacting overall mobility status and need for input for task focus and mobility technique/safety  Pt to benefit from continued PT tx to address deficits as defined above and maximize level of functional independent mobility and consistency  From PT/mobility standpoint, recommendation at time of d/c would be STR pending progress in order to facilitate return to PLOF     Barriers to Discharge Inaccessible home environment;Decreased caregiver support   Goals   Patient Goals to return home   STG Expiration Date 01/15/20   Short Term Goal #1 In 7-10 days: Increase L LE strength 1/2 grade to facilitate independent mobility, Perform all bed mobility tasks with SBA to decrease caregiver burden, Increase static and dynamic sitting balance 1/2 grade to decrease risk for falls and PT to see and establish goals for functional transfers, ambulation and elevations when appropriate   PT Treatment Day 0   Plan   Treatment/Interventions LE strengthening/ROM; Therapeutic exercise; Endurance training;Patient/family training;Bed mobility;Continued evaluation;Spoke to nursing;Equipment eval/education   PT Frequency Other (Comment)  (3-5x/wk)   Recommendation   Recommendation Short-term skilled PT   PT - OK to Discharge Yes  (when medically cleared; if to STR)   Modified Paris Scale   Modified Paris Scale 5   Barthel Index   Feeding 10   Bathing 0   Grooming Score 0   Dressing Score 5   Bladder Score 5   Bowels Score 10   Toilet Use Score 0   Transfers (Bed/Chair) Score 5   Mobility (Level Surface) Score 0   Stairs Score 0   Barthel Index Score 35         Nora Sorensen, PT, DPT

## 2020-01-05 NOTE — ASSESSMENT & PLAN NOTE
· INR 1 23 on admission  · Maintained on Coumadin 2 mg daily  · Will increase Coumadin to 4 mg tonight and recheck INR in a m   · Goal INR 2-3  · Appears patient is maintained on this for his PAD on review of records

## 2020-01-05 NOTE — ASSESSMENT & PLAN NOTE
· Patient follows with vascular surgery  · Continue regimen of aspirin, Plavix and Coumadin  · INR subtherapeutic, increased Coumadin dose and repeat INR in a m   · Monitor for signs of bleeding  · During previous hospitalization, patient did have colonoscopy performed without any active bleeding noted  Also EGD performed with evidence of ulcerations that were not actively bleeding  Recommended PPI b i d , do not appreciate this on patient's PTA medication list, will start on Protonix 40 mg b i d   Here  · Hemoglobin stable at 8 5, monitor closely

## 2020-01-05 NOTE — ASSESSMENT & PLAN NOTE
· Baseline hemoglobin around 7-8 on review  · Stable currently at 8 5  · Monitor closely on dual anti-platelet therapy and Coumadin  · Did have EGD/colonoscopy during last admission without evidence of active bleeding and was cleared to resume his blood thinners on discharge  · CBC in the a m    · Continue b i d  iron supplementation

## 2020-01-05 NOTE — SOCIAL WORK
Call from Tanvi Tejeda 89 at [facility] now they cannot accept as pt left last time AMA  However they can accept at [facility] ernst [place] spoke to son who was very happy w this placement as it is closer to his home in [facility] set up via slets for 6:45p  [facility] provided to rn  Graham used interperter phone to notify pt that he will not be returning to [facility], he will to [facility] as closer to son  Pt questioning why needs STR  Graham explained he is failing at home needs intensive PT/OT for strengthening   Pt stated that he understood

## 2020-01-05 NOTE — PLAN OF CARE
Problem: PHYSICAL THERAPY ADULT  Goal: Performs mobility at highest level of function for planned discharge setting  See evaluation for individualized goals  Description  Treatment/Interventions: LE strengthening/ROM, Therapeutic exercise, Endurance training, Patient/family training, Bed mobility, Continued evaluation, Spoke to nursing, Equipment eval/education          See flowsheet documentation for full assessment, interventions and recommendations  Note:   Prognosis: Good  Problem List: Decreased strength, Decreased endurance, Impaired balance, Decreased mobility, Decreased cognition, Decreased safety awareness, Pain, Orthopedic restrictions, Decreased skin integrity  Assessment: Pt is 78 y o  male seen for PT evaluation s/p admit to [facility] on 1/4/2020 w/ Ambulatory dysfunction  PT consulted to assess pt's functional mobility and d/c needs  Order placed for PT eval and tx, w/ up w/ A order (CHRISTO DUNNE per chart review/previous hospital admission s/p R BKA 12/19/2019)  Comorbidities affecting pt's physical performance at time of assessment include: history of delirium, esophageal cancer, history of R BKA, hyponatremia, anemia, PAD, essential hypertension)  PTA, pt was requiring A for mobility, ambulates household distances, has 10 DAKOTAH and retired  Personal factors affecting pt at time of IE include: inaccessible home environment, lives in two story house, stairs to enter home, inability to ambulate household distances, inability to navigate level surfaces w/o external assistance, decreased cognition, preferred language not [place] (language barrier), positive fall history, decreased initiation and engagement, limited insight into impairments, inability to perform IADLs, inability to perform ADLs and inability to live alone   Please find objective findings from PT assessment regarding body systems outlined above with impairments and limitations including weakness, impaired balance, decreased endurance, pain, decreased activity tolerance, decreased functional mobility tolerance, decreased safety awareness, fall risk, orthopedic restrictions, decreased skin integrity and decreased cognition  The following objective measures performed on IE also reveal limitations: Barthel Index: 35/100 and Modified Flora: 5 (severe disability)  Pt's clinical presentation is currently unstable/unpredictable seen in pt's presentation of ongoing medical management/monitoring, pain/fatigue/fear of falling impacting overall mobility status and need for input for task focus and mobility technique/safety  Pt to benefit from continued PT tx to address deficits as defined above and maximize level of functional independent mobility and consistency  From PT/mobility standpoint, recommendation at time of d/c would be STR pending progress in order to facilitate return to PLOF  Barriers to Discharge: Inaccessible home environment, Decreased caregiver support     Recommendation: Short-term skilled PT     PT - OK to Discharge: Yes(when medically cleared; if to STR)    See flowsheet documentation for full assessment

## 2020-01-05 NOTE — ASSESSMENT & PLAN NOTE
· Pt has been treated for this in the past, no longer undergoing therapy   · Continue PPI BID   · Monitor

## 2020-01-05 NOTE — UTILIZATION REVIEW
Initial Clinical Review    Admission: Date/Time/Statement:   Obsrevation  1/4   @  1906  Orders Placed This Encounter   Procedures    Place in Observation (expected length of stay for this patient is less than two midnights)     Standing Status:   Standing     Number of Occurrences:   1     Order Specific Question:   Admitting Physician     Answer:   Nicki Spicer     Order Specific Question:   Level of Care     Answer:   Med Surg [[id number]]     ED Arrival Information     Expected Arrival Acuity Means of Arrival Escorted By Service Admission Type    - 1/4/2020 17:02 Emergent Walk-In Family Member Hospitalist Emergency    Arrival Complaint    FALL, + BLOOD THINNERS        Chief Complaint   Patient presents with    Fall     pt fell down 6 steps yesterday  Denies loc  Pt is on BT  Assessment/Plan: Ambulatory dysfunction  Assessment & Plan  · Pt with recent BKA on 12/19  · Was recommended rehab on discharge, however patient's family believed they could take care of him at home    Status post mechanical fall down the stairs  · CT head negative  · Patient is denying any pain except for incisional pain  · P T /OT evaluation, patient will need placement to rehab which he is now agreeable to  · Scheduled Tylenol and gabapentin for pain     History of delirium  Assessment & Plan  · During previous hospitalization patient was noted to have hospital delirium and confusion  · Avoid narcotics  · Placed on scheduled Tylenol and gabapentin  · Delirium precautions  · Monitor     Esophageal cancer ([address] )  Assessment & Plan  · Pt has been treated for this in the past, no longer undergoing therapy   · Continue PPI BID   · Monitor      Hx of BKA, right ([address] )  Assessment & Plan  · Status post right BKA 12/19  · Incision appears to be healing well, no erythema or drainage noted  · Local wound care  P T /OT evaluations  Subtherapeutic international normalized ratio (INR)  Assessment & Plan  · INR 1 23 on admission  · Maintained on Coumadin 2 mg daily  · Will increase Coumadin to 4 mg tonight and recheck INR in a m   · Goal INR 2-3  · Appears patient is maintained on this for his PAD on review of records     Hyponatremia  Assessment & Plan  · Sodium 134 on admission  · Could be secondary to mild hypovolemia  · Placed on gentle IV fluid hydration as patient appears dry  · Last echo with preserved EF, no diastolic dysfunction     Anemia  Assessment & Plan  · Baseline hemoglobin around 7-8 on review  · Stable currently at 8 5  · Monitor closely on dual anti-platelet therapy and Coumadin  · Did have EGD/colonoscopy during last admission without evidence of active bleeding and was cleared to resume his blood thinners on discharge  · CBC in the a m  · Continue b i d  iron supplementation     PAD (peripheral artery disease) ([facility] )  Assessment & Plan  · Patient follows with vascular surgery  · Continue regimen of aspirin, Plavix and Coumadin  · INR subtherapeutic, increased Coumadin dose and repeat INR in a m   · Monitor for signs of bleeding  · During previous hospitalization, patient did have colonoscopy performed without any active bleeding noted  Also EGD performed with evidence of ulcerations that were not actively bleeding  Recommended PPI b i d , do not appreciate this on patient's PTA medication list, will start on Protonix 40 mg b i d  Here  · Hemoglobin stable at 8 5, monitor closely  Essential hypertension  Assessment & Plan  · BP stable on review  · Resume amlodipine 10 mg daily  · Monitor    Chinedu Ballard is a 78 y o  male with significant past medical history of PAD status post BKA maintained on Coumadin anticoagulation, hypertension esophageal cancer who presents with mechanical fall on 01/03  Patient is mainly Dive[place] speaking and therefore a majority of the history was obtained by patient's family members at bedside    They report that the patient was recently hospitalized for about a month and recently had a BKA a few weeks ago   Reports that they felt they could take care of him at home and therefore he did not go to rehab  Reports that they were having difficulty lifting the patient up  Reports that there was a fall down the steps that happened yesterday with her   Patient is denying any pain anywhere  She reports that she can no longer take care of the patient at home and that he will need to be placed to rehab  Reports that he has been constipated and has not had a bowel movement for approximately 4 days  Reports that he cannot have narcotics while in the hospital as he gets extremely confused  Only complaint he has is some pain at the surgical incision site   She reports possible fever at home         ED Triage Vitals   Temperature Pulse Respirations Blood Pressure SpO2   01/04/20 1708 01/04/20 1708 01/04/20 1708 01/04/20 1708 01/04/20 1708   99 4 °F (37 4 °C) 96 16 123/84 100 %      Temp Source Heart Rate Source Patient Position - Orthostatic VS BP Location FiO2 (%)   01/04/20 1708 01/04/20 1708 01/04/20 1708 01/04/20 1708 --   Oral Monitor Sitting Left arm       Pain Score       01/04/20 1952       7        Wt Readings from Last 1 Encounters:   01/04/20 50 4 kg (111 lb 1 8 oz)     Additional Vital Signs:   01/05/20 0340  97 6 °F (36 4 °C)  77  20  143/65  100 %  None (Room air)  Lying   01/05/20 0200    63  18  117/56  99 %  None (Room air)  Lying   01/05/20 0100    65    121/56  99 %  None (Room air)     01/04/20 2330    70  18  130/62  99 %  None (Room air)  Lying   01/04/20 2304    76  18  101/52  99 %  None (Room air)  Lying   01/04/20 1948    90  18  159/70  99 %  None (Room air)  Lying   01/04/20 1945    91  18           01/04/20 1730    88  16  134/60  99 %       01/04/20 1708  99 4 °F (37 4 °C)  96  16  123/84  100 %  None (Room air)  Sitting         Pertinent Labs/Diagnostic Test Results:     Ct  Head   ( 1/4)     No acute intracranial abnormality  Results from last 7 days   Lab Units 01/04/20  1753 12/30/19  0904   WBC Thousand/uL 7 35 5 60   HEMOGLOBIN g/dL 8 5* 8 6*   HEMATOCRIT % 28 2* 27 6*   PLATELETS Thousands/uL 287 393*   NEUTROS ABS Thousands/µL 6 45 4 40         Results from last 7 days   Lab Units 01/05/20  0511 01/04/20  1753 12/30/19  0904   SODIUM mmol/L 137 134* 139   POTASSIUM mmol/L 4 3 4 1 4 5   CHLORIDE mmol/L 105 102 108   CO2 mmol/L 24 24 27   ANION GAP mmol/L 8 8 4   BUN mg/dL 13 15 23   CREATININE mg/dL 0 76 0 90 0 93   EGFR ml/min/1 73sq m 92 86 83   CALCIUM mg/dL 8 2* 8 5 9 4     Results from last 7 days   Lab Units 01/04/20  1753   AST U/L 14   ALT U/L 21   ALK PHOS U/L 68   TOTAL PROTEIN g/dL 6 6   ALBUMIN g/dL 3 0*   TOTAL BILIRUBIN mg/dL 0 20   BILIRUBIN DIRECT mg/dL 0 07         Results from last 7 days   Lab Units 01/05/20  0511 01/04/20  1753 12/30/19  0904   GLUCOSE RANDOM mg/dL 106 103 76           Results from last 7 days   Lab Units 01/04/20  1753   TROPONIN I ng/mL <0 02         Results from last 7 days   Lab Units 01/05/20  0511 01/04/20  1753 01/03/20  1203   PROTIME seconds 15 7* 15 6* 16 4*   INR  1 24* 1 23* 1 32*   PTT seconds  --  34  --      ED Treatment:   Medication Administration from 01/04/2020 1702 to 01/05/2020 0844       Date/Time Order Dose Route Action Action by Comments     01/04/2020 1952 acetaminophen (TYLENOL) tablet 650 mg 650 mg Oral Given Goldie Regalado RN      01/05/2020 0028 melatonin tablet 3 mg 3 mg Oral Given Luigi Briggs RN      01/05/2020 0028 acetaminophen (TYLENOL) tablet 975 mg 975 mg Oral Given [place]      01/05/2020 0028 sodium chloride 0 9 % infusion 50 mL/hr Intravenous [address], RN         Past Medical History:   Diagnosis Date    Cancer [facility])     throat cancer    PAD (peripheral artery disease) ([facility] ) 11/2/2019    PEG (percutaneous endoscopic gastrostomy) status ([facility] )     Port-A-Cath in place      Present on Admission:   Anemia   Ambulatory dysfunction   PAD (peripheral artery disease) [facility])      Admitting Diagnosis: No admission diagnoses are documented for this encounter  Age/Sex: 78 y o  male  Admission Orders:  Scheduled Medications:    Medications:  acetaminophen 975 mg Oral Q8H [address]   amLODIPine 10 mg Oral Daily   aspirin 81 mg Oral Daily   atorvastatin 20 mg Oral After Dinner   clopidogrel 75 mg Oral Daily   ferrous sulfate 325 mg Oral BID With Meals   gabapentin 100 mg Oral Daily   melatonin 3 mg Oral HS   pantoprazole 40 mg Oral BID AC   polyethylene glycol 17 g Oral Daily   senna-docusate sodium 1 tablet Oral BID   tamsulosin 0 4 mg Oral Daily With Dinner   thiamine 100 mg Oral Daily   warfarin 4 mg Oral Daily (warfarin)     Continuous IV Infusions:    sodium chloride 50 mL/hr Intravenous Continuous     PRN Meds:       IP CONSULT TO CASE MANAGEMENT    Network Utilization Review Department  [email or web address]  ATTENTION: Please call with any questions or concerns to [phone] and carefully listen to the prompts so that you are directed to the right person  All voicemails are confidential   Ricky Dubose all requests for admission clinical reviews, approved or denied determinations and any other requests to dedicated fax number below belonging to the campus where the patient is receiving treatment   List of dedicated fax numbers for the Facilities:  [address] [place] [address] DENIALS (Administrative/Medical Necessity) [phone]   [address]  (Maternity/NICU/Pediatrics) [phone]   Janice Kelley [phone]   Carminal Payal [phone]   Avinash Johns [phone]   Maricel Stout [phone]   [facility] [address] [phone]   [facility]  [phone]   [address] [facility] [address]  [address] [facility] [address] [facility] [phone]

## 2020-01-05 NOTE — DISCHARGE SUMMARY
Discharge Summary - [address] Internal Medicine    Patient Information: Ladarius Castañeda 78 y o  male MRN: [id number]  Unit/Bed#: ED 08 Encounter: [id number]    Discharging Physician / Practitioner: Antwon Villegas MD  PCP: Dominga Rodriguez MD  Admission Date: 1/4/2020  Discharge Date: 01/05/20    Reason for Admission:  Ambulatory dysfunction    Discharge Diagnoses:     Principal Problem:    Ambulatory dysfunction  Active Problems:    Essential hypertension    PAD (peripheral artery disease) (HCC)    Anemia    Hyponatremia    Subtherapeutic international normalized ratio (INR)    Hx of BKA, right ([facility] )    Esophageal cancer ([facility] )  Resolved Problems:    History of delirium    Present on Admission:   Hyponatremia   Anemia   Subtherapeutic international normalized ratio (INR)   Ambulatory dysfunction   Esophageal cancer ([facility] )   Essential hypertension   PAD (peripheral artery disease) ([facility] )   (Resolved) History of delirium    Consultations During Hospital Stay:  · None    Procedures Performed:     · Nothing major    Significant Findings:     · None    Incidental Findings:   · None     Test Results Pending at Discharge (will require follow up): · None     Outpatient Tests Requested:  · INR tomorrow and then goal INR 2-3  Coumadin to be dosed according to INR    Complications:  None    Hospital Course:     Ladarius Castañeda is a 78 y o  male patient who originally presented to the hospital on 1/4/2020 due to inability of the family to care of the patient at home as the just had him discharged from [facility]  The brought him back and now he is going back to that facility this evening  He would resume all his home medications on discharge  He would get 5 mg of Coumadin today as INR still not therapeutic  INR is 1 24  Advised to follow up with all the consultants as previously advised when he was discharged from [address] [place] [address]      Condition at Discharge: fair     Discharge Day Visit / Exam:     Please refer to my note from earlier today about complete physical examination            Discharge instructions/Information to patient and family:   See after visit summary for information provided to patient and family  Provisions for Follow-Up Care:  See after visit summary for information related to follow-up care and any pertinent home health orders  Disposition:     Kathy Tamez at [employer] at [facility] to [facility]:   · Not Applicable to this Patient - Not Applicable to this Patient    Planned Readmission:  None     Discharge Statement:  I spent 30+ minutes discharging the patient  This time was spent on the day of discharge  I had direct contact with the patient on the day of discharge  Greater than 50% of the total time was spent examining patient, answering all patient questions, arranging and discussing plan of care with patient as well as directly providing post-discharge instructions  Additional time then spent on discharge activities  Discharge Medications:  See after visit summary for reconciled discharge medications provided to patient and family  ** Please Note: Dragon 360 Dictation voice to text software may have been used in the creation of this document   **

## 2020-01-05 NOTE — SOCIAL WORK
thom has no beds today  Only accepting is cheryl Griffin Cm set up bls via slets for 6:45p tonight  Cm spoke w pt and son who are agreeable  Cm explained to them that they can work on a transfer from the facility if they prefer   Med necc provided to RN

## 2020-01-05 NOTE — ASSESSMENT & PLAN NOTE
· During previous hospitalization patient was noted to have hospital delirium and confusion  · Avoid narcotics  · Placed on scheduled Tylenol and gabapentin  · Delirium precautions  · Monitor

## 2020-01-05 NOTE — SOCIAL WORK
PT eval rec STR  Cm met w pt and son  Pt has no POA  Pt is [place] speaking  Son translated  Pt discharged after [facility] stay [facility]  Was not happy there, but failing at home  Would like STR placement  Pt has 3 night qualifying stay  Son choice 2 facilities in [facility] cm explained pt will need to go where available due to obs status--son understood  Cm sent referrals locally and to [facility] in [facility]--awaiting availability   Cm to follow

## 2020-01-05 NOTE — SOCIAL WORK
Graham spoke toni hartman who cannot make a decision until they have liaison on site on Monday  Graham spoke toni Dugan from [place] who will review now and get back to graham   [facility] unable to accept

## 2020-01-05 NOTE — ASSESSMENT & PLAN NOTE
· Pt with recent BKA on 12/19  · Was recommended rehab on discharge, however patient's family believed they could take care of him at home    Status post mechanical fall down the stairs  · CT head negative  · Patient is denying any pain except for incisional pain  · P T /OT evaluation, patient will need placement to rehab which he is now agreeable to  · Scheduled Tylenol and gabapentin for pain

## 2020-01-05 NOTE — PLAN OF CARE
Problem: Prexisting or High Potential for Compromised Skin Integrity  Goal: Skin integrity is maintained or improved  Description  INTERVENTIONS:  - Identify patients at risk for skin breakdown  - Assess and monitor skin integrity  - Assess and monitor nutrition and hydration status  - Monitor labs   - Assess for incontinence   - Turn and reposition patient  - Assist with mobility/ambulation  - Relieve pressure over bony prominences  - Avoid friction and shearing  - Provide appropriate hygiene as needed including keeping skin clean and dry  - Evaluate need for skin moisturizer/barrier cream  - Collaborate with interdisciplinary team   - Patient/family teaching  - Consider wound care consult   Outcome: Progressing     Problem: Potential for Falls  Goal: Patient will remain free of falls  Description  INTERVENTIONS:  - Assess patient frequently for physical needs  -  Identify cognitive and physical deficits and behaviors that affect risk of falls    -  Shanks fall precautions as indicated by assessment   - Educate patient/family on patient safety including physical limitations  - Instruct patient to call for assistance with activity based on assessment  - Modify environment to reduce risk of injury  - Consider OT/PT consult to assist with strengthening/mobility  Outcome: Progressing

## 2020-01-05 NOTE — PROGRESS NOTES
[address] Internal Medicine Progress Note  Patient: Tommy  [address] y o  male   MRN: [id number]  PCP: Beth Mortensen MD  Unit/Bed#: ED 08 Encounter: [id number]  Date Of Visit: 20          * Ambulatory dysfunction  Assessment & Plan  Patient was just discharged from [address] [place] [address] to a rehab facility and family took him out from there as the wanted to take care of him at home which is not possible now  Needs placement  Esophageal cancer [facility])  Assessment & Plan  History of his is still cancer and has had previous treatment  Not on any active treatments    Hx of BKA, right ([facility] )  Assessment & Plan  Incision appears to be healing    Subtherapeutic international normalized ratio (INR)  Assessment & Plan  Goal INR 2-3  Continue with Coumadin    Hyponatremia  Assessment & Plan  Hemodynamic and monitor as needed    Anemia  Assessment & Plan  Hemoglobin stable  Continue with current treatment    PAD [facility]peripheral artery disease) [facility] )  Assessment & Plan  · Status post right below-knee amputation  For rehab as above  Continue with anticoagulation and other treatment including antiplatelet    Essential hypertension  Assessment & Plan  · To continue with current treatment      Present on Admission:   Hyponatremia   Anemia   Subtherapeutic international normalized ratio (INR)   Ambulatory dysfunction   [facility]   Essential hypertension   PAD [facility]peripheral artery disease) [facility]   (Resolved) History of delirium            VTE Pharmacologic Prophylaxis:   Pharmacologic: Warfarin (Coumadin)  Mechanical VTE Prophylaxis in Place: Yes left leg only    Patient Centered Rounds: I have performed bedside rounds with nursing staff today  Discussions with Specialists or Other Care Team Provider:  Yes    Education and Discussions with Family / Patient:  Yes    Time Spent for Care: 35+ min    More than 50% of total time spent on counseling and coordination of care as described above     Current Length of Stay: 0 day(s)    Current Patient Status: Observation   Certification Statement: The patient will continue to require additional inpatient hospital stay due to  for rehab    Discharge Plan:  Short-term rehab    Code Status: Level 1 - Full Code      Subjective:   Family at bedside  Patient was brought back here to the hospital as family unable to take care of him at home  He was just in [facility]   [place] [facility] rehab facility and then to came out from there as family wanted to give him care at home  Patient's family had difficulty taking him up and down and also then had a fall  Patient himself denies any other complaints  He does not speak much English    Objective:     Vitals:   Temp (24hrs), Av 9 °F (37 2 °C), Min:97 6 °F (36 4 °C), Max:99 8 °F (37 7 °C)    Temp:  [97 6 °F (36 4 °C)-99 8 °F (37 7 °C)] 99 8 °F (37 7 °C)  HR:  [63-96] 82  Resp:  [15-20] 15  BP: (101-159)/(52-84) 112/56  SpO2:  [99 %-100 %] 99 %  Body mass index is 19 68 kg/m²  Input and Output Summary (last 24 hours): Intake/Output Summary (Last 24 hours) at 2020 1341  Last data filed at 2020 0342  Gross per 24 hour   Intake    Output 725 ml   Net -725 ml           Physical Exam:     Vital signs are reviewed as above  Sick appearing male who is in the bed in the ER  Not in any respiratory distress  Conjunctivae and skin are pale  Right below-knee amputee  S1 and S2 audible  Poor air entry on auscultation  Awake and alert  Moving his arms while sitting  Depressed in general  Abdomen is soft    Bowel sounds are audible      Additional Data:     Labs:    Results from last 7 days   Lab Units 20  1753   WBC Thousand/uL 7 35   HEMOGLOBIN g/dL 8 5*   HEMATOCRIT % 28 2*   PLATELETS Thousands/uL 287   NEUTROS PCT % 88*   LYMPHS PCT % 4*   MONOS PCT % 5   EOS PCT % 2     Results from last 7 days   Lab Units 20  0511 20  1753   POTASSIUM mmol/L 4 3 4 1   CHLORIDE mmol/L 105 102 CO2 mmol/L 24 24   BUN mg/dL 13 15   CREATININE mg/dL 0 76 0 90   CALCIUM mg/dL 8 2* 8 5   ALK PHOS U/L  --  68   ALT U/L  --  21   AST U/L  --  14     Results from last 7 days   Lab Units 01/05/20  0511   INR  1 24*       * I Have Reviewed All Lab Data Listed Above  * Additional Pertinent Lab Tests Reviewed: All Labs Within Last 24 Hours Reviewed                Last 24 Hours Medication List:     Current Facility-Administered Medications:  acetaminophen 975 mg Oral Q8H [address] Rocio Cisneros PA-C    amLODIPine 10 mg Oral Daily Rocio Cisneros PA-C    aspirin 81 mg Oral Daily Rocio Cisneros PA-C    atorvastatin 20 mg Oral After Lolis Counts, TERRA    clopidogrel 75 mg Oral Daily Rocio Cisneros PA-C    ferrous sulfate 325 mg Oral BID With Meals Rocio Cisneros PA-C    gabapentin 100 mg Oral Daily Rocio Cisneros PA-C    melatonin 3 mg Oral HS Rocio Cisneros PA-C    pantoprazole 40 mg Oral BID AC Rocio Cisneros PA-C    polyethylene glycol 17 g Oral Daily Rocio Cisneros PA-C    senna-docusate sodium 1 tablet Oral BID Rocio Cisneros PA-C    sodium chloride 50 mL/hr Intravenous Continuous Ovi Ojeda PA-C Last Rate: 50 mL/hr (01/05/20 0028)   tamsulosin 0 4 mg Oral Daily With Dinner Rocio Cisneros PA-C    thiamine 100 mg Oral Daily Rocio Cisneros PA-C    warfarin 5 mg Oral Daily (warfarin) Lisa Tracey MD         Today, Patient Was Seen By: Lisa Tracey MD    ** Please Note: Dragon 360 Dictation voice to text software may have been used in the creation of this document   **

## 2020-01-06 ENCOUNTER — TELEPHONE (OUTPATIENT)
Dept: VASCULAR SURGERY | Facility: CLINIC | Age: 70
End: 2020-01-06

## 2020-01-06 NOTE — TELEPHONE ENCOUNTER
Patient was admitted to the [facility] at [address]     Spoke to nurse on his floor Ezio Perez and he said protime was done this morning and results will be followed by DR Guerita Camarena who is the medical director at the facilty  I told Lakesha Jain our therapeutic range was 2-3 nomally

## 2020-01-14 ENCOUNTER — OFFICE VISIT (OUTPATIENT)
Dept: VASCULAR SURGERY | Facility: CLINIC | Age: 70
End: 2020-01-14
Payer: MEDICARE

## 2020-01-14 VITALS — SYSTOLIC BLOOD PRESSURE: 124 MMHG | DIASTOLIC BLOOD PRESSURE: 60 MMHG | HEART RATE: 64 BPM | TEMPERATURE: 97.4 F

## 2020-01-14 DIAGNOSIS — Z79.01 ANTICOAGULANT LONG-TERM USE: ICD-10-CM

## 2020-01-14 DIAGNOSIS — I73.9 PERIPHERAL VASCULAR DISEASE (HCC): Primary | ICD-10-CM

## 2020-01-14 DIAGNOSIS — R26.2 AMBULATORY DYSFUNCTION: ICD-10-CM

## 2020-01-14 DIAGNOSIS — Z89.511 HX OF BKA, RIGHT (HCC): ICD-10-CM

## 2020-01-14 DIAGNOSIS — I65.22 CAROTID STENOSIS, LEFT: ICD-10-CM

## 2020-01-14 DIAGNOSIS — I73.9 PAD (PERIPHERAL ARTERY DISEASE) (HCC): ICD-10-CM

## 2020-01-14 DIAGNOSIS — I65.22 LEFT CAROTID STENOSIS: ICD-10-CM

## 2020-01-14 PROBLEM — I70.90 ARTERIAL OCCLUSION: Status: RESOLVED | Noted: 2019-12-03 | Resolved: 2020-01-14

## 2020-01-14 PROCEDURE — 99215 OFFICE O/P EST HI 40 MIN: CPT | Performed by: SURGERY

## 2020-01-14 NOTE — PROGRESS NOTES
Assessment/Plan:    Left carotid stenosis  Recently diagnosed when patient was in hospital   Likely related to radiation for tonsillar cancer and esophageal Cancer  At this point recommend continue with q 6 monthly duplex follow up  Patient is on Dual antiplatelet and statin Rx  Remains asymptomatic  PAD (peripheral artery disease) (HCC)  Has undergone left femoral thromboendarterectomy and Left SFA stents in recent past   Unclear if etiology was in situ thrombosis versus embolic so best to continue anticoagulation with coumadin in addition to antiplatelet agents  Plan to continue follow up with duplex exams  Currently denies any symtpoms on left leg and has triphasic signals in PT  Ambulatory dysfunction  Encouraged to participate in rehab therapy  Current difficulties are due to dementia and right BKA status    Anticoagulant long-term use  On coumadin for h/o recurent thrombotic events and h/o cancer  Remains at risk of bleeding due to triple therapy but benefits outweight risks due to h/o mulitple thromboembolic events  Diagnoses and all orders for this visit:    Peripheral vascular disease ([facility] [address] )  -     VAS carotid complete study; Future  -     VAS lower limb arterial duplex, limited/unilateral; Future    Carotid stenosis, left  -     VAS carotid complete study; Future    PAD (peripheral artery disease) (HCC)    Hx of BKA, right (HCC)    Left carotid stenosis    Ambulatory dysfunction    Anticoagulant long-term use          Subjective:      Patient ID: Tigist Manley is a 78 y o  male  Patient is here for followup for multiple medical issues  He underwent Right BKA on 12-19-19 for advanced rest pain and tissue loss  He is currently in rehab  Most of history is obtained from family members present today as he has mild dementia and only speaks [address]  Denies any pain, redness of drainage from stump area  Appetite remains poor per family and his temper can be difficult    Denies any left leg pain or incisional drainage from left groin wound  Also diagnosed to have left carotid stenosis in hospital   Denies any stroke like symptoms  Prior h/o radiation for tonsilar Ca  Also had chemo Rx for esophageal Ca  The following portions of the patient's history were reviewed and updated as appropriate: allergies, current medications, past family history, past medical history, past social history, past surgical history and problem list     Review of Systems   Constitutional: Positive for activity change  HENT: Negative  Eyes: Negative  Respiratory: Negative  Cardiovascular: Negative  Gastrointestinal: Negative  Endocrine: Negative  Genitourinary: Negative  Musculoskeletal: Positive for gait problem  Skin: Negative  Allergic/Immunologic: Negative  Hematological: Negative  Psychiatric/Behavioral: Negative  I have reviewed the ROS as entered and made changes as necessary  Objective:      /60 (BP Location: Right arm, Patient Position: Sitting)   Pulse 64   Temp (!) 97 4 °F (36 3 °C) (Tympanic)          Physical Exam   Constitutional: He is oriented to person, place, and time  He appears well-developed and well-nourished  HENT:   Head: Normocephalic and atraumatic  Eyes: Conjunctivae are normal    Neck: Normal range of motion  Carotid bruit is present  Cardiovascular: Normal rate and regular rhythm  Pulses:       Femoral pulses are 2+ on the left side  Dorsalis pedis pulses are 0 on the left side  Posterior tibial pulses are 0 on the left side  Tripahsic PT signal on left foot  Pulmonary/Chest: Effort normal and breath sounds normal  No stridor  No respiratory distress  He has no wheezes  Musculoskeletal: He exhibits deformity (RIGHT BKA is well healed)  He exhibits no edema  Neurological: He is alert and oriented to person, place, and time  Skin: Skin is warm and dry  No erythema  No pallor     Nursing note and vitals reviewed

## 2020-01-15 NOTE — ASSESSMENT & PLAN NOTE
Encouraged to participate in rehab therapy    Current difficulties are due to dementia and right BKA status
Has undergone left femoral thromboendarterectomy and Left SFA stents in recent past   Unclear if etiology was in situ thrombosis versus embolic so best to continue anticoagulation with coumadin in addition to antiplatelet agents  Plan to continue follow up with duplex exams    Currently denies any symtpoms on left leg and has triphasic signals in PT 
On coumadin for h/o recurent thrombotic events and h/o cancer  Remains at risk of bleeding due to triple therapy but benefits outweight risks due to h/o mulitple thromboembolic events 
Recently diagnosed when patient was in hospital   Likely related to radiation for tonsillar cancer and esophageal Cancer  At this point recommend continue with q 6 monthly duplex follow up  Patient is on Dual antiplatelet and statin Rx  Remains asymptomatic 
MSK:  +decreased ROM secondary to pain, right hip  Quad/GS/TA/FHL/EHL motor strength 5/5 bilaterally.   Sensation intact and equal to distal bilateral LE.   DP pulses palpable, toes warm and well perfused, cap refill brisk.    Remainder of exam per medical clearance note

## 2020-01-21 ENCOUNTER — TELEPHONE (OUTPATIENT)
Dept: GASTROENTEROLOGY | Facility: CLINIC | Age: 70
End: 2020-01-21

## 2020-01-21 ENCOUNTER — APPOINTMENT (EMERGENCY)
Dept: RADIOLOGY | Facility: HOSPITAL | Age: 70
DRG: 378 | End: 2020-01-21
Payer: MEDICARE

## 2020-01-21 ENCOUNTER — HOSPITAL ENCOUNTER (INPATIENT)
Facility: HOSPITAL | Age: 70
LOS: 4 days | Discharge: NURSING, REHAB OR LONG-TERM CARE | DRG: 378 | End: 2020-01-25
Attending: EMERGENCY MEDICINE | Admitting: INTERNAL MEDICINE
Payer: MEDICARE

## 2020-01-21 ENCOUNTER — APPOINTMENT (EMERGENCY)
Dept: CT IMAGING | Facility: HOSPITAL | Age: 70
DRG: 378 | End: 2020-01-21
Payer: MEDICARE

## 2020-01-21 DIAGNOSIS — I25.10 CAD (CORONARY ARTERY DISEASE): ICD-10-CM

## 2020-01-21 DIAGNOSIS — D50.0 BLOOD LOSS ANEMIA: ICD-10-CM

## 2020-01-21 DIAGNOSIS — D62 ACUTE BLOOD LOSS ANEMIA: ICD-10-CM

## 2020-01-21 DIAGNOSIS — R42 DIZZINESS: Primary | ICD-10-CM

## 2020-01-21 DIAGNOSIS — I73.9 PAD (PERIPHERAL ARTERY DISEASE) (HCC): ICD-10-CM

## 2020-01-21 DIAGNOSIS — K92.2 GI BLEED: ICD-10-CM

## 2020-01-21 PROBLEM — D72.829 LEUKOCYTOSIS: Status: ACTIVE | Noted: 2020-01-21

## 2020-01-21 LAB
ABO GROUP BLD: NORMAL
ALBUMIN SERPL BCP-MCNC: 2.4 G/DL (ref 3.5–5)
ALP SERPL-CCNC: 67 U/L (ref 46–116)
ALT SERPL W P-5'-P-CCNC: 10 U/L (ref 12–78)
ANION GAP SERPL CALCULATED.3IONS-SCNC: 14 MMOL/L (ref 4–13)
APTT PPP: 47 SECONDS (ref 23–37)
AST SERPL W P-5'-P-CCNC: 18 U/L (ref 5–45)
BASOPHILS # BLD AUTO: 0.03 THOUSANDS/ΜL (ref 0–0.1)
BASOPHILS NFR BLD AUTO: 0 % (ref 0–1)
BILIRUB SERPL-MCNC: 0.3 MG/DL (ref 0.2–1)
BLD GP AB SCN SERPL QL: NEGATIVE
BUN SERPL-MCNC: 31 MG/DL (ref 5–25)
CALCIUM SERPL-MCNC: 8.6 MG/DL (ref 8.3–10.1)
CHLORIDE SERPL-SCNC: 104 MMOL/L (ref 100–108)
CO2 SERPL-SCNC: 18 MMOL/L (ref 21–32)
CREAT SERPL-MCNC: 0.95 MG/DL (ref 0.6–1.3)
D DIMER PPP FEU-MCNC: 0.41 UG/ML FEU
EOSINOPHIL # BLD AUTO: 0.02 THOUSAND/ΜL (ref 0–0.61)
EOSINOPHIL NFR BLD AUTO: 0 % (ref 0–6)
ERYTHROCYTE [DISTWIDTH] IN BLOOD BY AUTOMATED COUNT: 17 % (ref 11.6–15.1)
ERYTHROCYTE [DISTWIDTH] IN BLOOD BY AUTOMATED COUNT: 17.5 % (ref 11.6–15.1)
FLUAV RNA NPH QL NAA+PROBE: NORMAL
FLUBV RNA NPH QL NAA+PROBE: NORMAL
GFR SERPL CREATININE-BSD FRML MDRD: 81 ML/MIN/1.73SQ M
GLUCOSE SERPL-MCNC: 131 MG/DL (ref 65–140)
HCT VFR BLD AUTO: 21.6 % (ref 36.5–49.3)
HCT VFR BLD AUTO: 23.9 % (ref 36.5–49.3)
HGB BLD-MCNC: 6.4 G/DL (ref 12–17)
HGB BLD-MCNC: 7.6 G/DL (ref 12–17)
IMM GRANULOCYTES # BLD AUTO: 0.17 THOUSAND/UL (ref 0–0.2)
IMM GRANULOCYTES NFR BLD AUTO: 1 % (ref 0–2)
INR PPP: 1.91 (ref 0.84–1.19)
LYMPHOCYTES # BLD AUTO: 0.77 THOUSANDS/ΜL (ref 0.6–4.47)
LYMPHOCYTES NFR BLD AUTO: 5 % (ref 14–44)
MCH RBC QN AUTO: 30 PG (ref 26.8–34.3)
MCH RBC QN AUTO: 30.6 PG (ref 26.8–34.3)
MCHC RBC AUTO-ENTMCNC: 29.6 G/DL (ref 31.4–37.4)
MCHC RBC AUTO-ENTMCNC: 31.8 G/DL (ref 31.4–37.4)
MCV RBC AUTO: 103 FL (ref 82–98)
MCV RBC AUTO: 95 FL (ref 82–98)
MONOCYTES # BLD AUTO: 0.76 THOUSAND/ΜL (ref 0.17–1.22)
MONOCYTES NFR BLD AUTO: 5 % (ref 4–12)
NEUTROPHILS # BLD AUTO: 14.93 THOUSANDS/ΜL (ref 1.85–7.62)
NEUTS SEG NFR BLD AUTO: 89 % (ref 43–75)
NRBC BLD AUTO-RTO: 0 /100 WBCS
PLATELET # BLD AUTO: 208 THOUSANDS/UL (ref 149–390)
PLATELET # BLD AUTO: 242 THOUSANDS/UL (ref 149–390)
PMV BLD AUTO: 9.2 FL (ref 8.9–12.7)
PMV BLD AUTO: 9.6 FL (ref 8.9–12.7)
POTASSIUM SERPL-SCNC: 4.3 MMOL/L (ref 3.5–5.3)
PROT SERPL-MCNC: 5.8 G/DL (ref 6.4–8.2)
PROTHROMBIN TIME: 22 SECONDS (ref 11.6–14.5)
RBC # BLD AUTO: 2.09 MILLION/UL (ref 3.88–5.62)
RBC # BLD AUTO: 2.53 MILLION/UL (ref 3.88–5.62)
RH BLD: POSITIVE
RSV RNA NPH QL NAA+PROBE: NORMAL
SODIUM SERPL-SCNC: 136 MMOL/L (ref 136–145)
SPECIMEN EXPIRATION DATE: NORMAL
TROPONIN I SERPL-MCNC: <0.02 NG/ML
WBC # BLD AUTO: 13.4 THOUSAND/UL (ref 4.31–10.16)
WBC # BLD AUTO: 16.68 THOUSAND/UL (ref 4.31–10.16)

## 2020-01-21 PROCEDURE — 86850 RBC ANTIBODY SCREEN: CPT | Performed by: EMERGENCY MEDICINE

## 2020-01-21 PROCEDURE — 99223 1ST HOSP IP/OBS HIGH 75: CPT | Performed by: INTERNAL MEDICINE

## 2020-01-21 PROCEDURE — 93005 ELECTROCARDIOGRAM TRACING: CPT

## 2020-01-21 PROCEDURE — 85379 FIBRIN DEGRADATION QUANT: CPT | Performed by: EMERGENCY MEDICINE

## 2020-01-21 PROCEDURE — 30233N1 TRANSFUSION OF NONAUTOLOGOUS RED BLOOD CELLS INTO PERIPHERAL VEIN, PERCUTANEOUS APPROACH: ICD-10-PCS | Performed by: EMERGENCY MEDICINE

## 2020-01-21 PROCEDURE — 86923 COMPATIBILITY TEST ELECTRIC: CPT

## 2020-01-21 PROCEDURE — 36415 COLL VENOUS BLD VENIPUNCTURE: CPT | Performed by: EMERGENCY MEDICINE

## 2020-01-21 PROCEDURE — 86901 BLOOD TYPING SEROLOGIC RH(D): CPT | Performed by: EMERGENCY MEDICINE

## 2020-01-21 PROCEDURE — 80053 COMPREHEN METABOLIC PANEL: CPT | Performed by: EMERGENCY MEDICINE

## 2020-01-21 PROCEDURE — 87631 RESP VIRUS 3-5 TARGETS: CPT | Performed by: EMERGENCY MEDICINE

## 2020-01-21 PROCEDURE — 85027 COMPLETE CBC AUTOMATED: CPT | Performed by: INTERNAL MEDICINE

## 2020-01-21 PROCEDURE — 85610 PROTHROMBIN TIME: CPT | Performed by: EMERGENCY MEDICINE

## 2020-01-21 PROCEDURE — 85025 COMPLETE CBC W/AUTO DIFF WBC: CPT | Performed by: EMERGENCY MEDICINE

## 2020-01-21 PROCEDURE — 85730 THROMBOPLASTIN TIME PARTIAL: CPT | Performed by: EMERGENCY MEDICINE

## 2020-01-21 PROCEDURE — P9058 RBC, L/R, CMV-NEG, IRRAD: HCPCS

## 2020-01-21 PROCEDURE — 36430 TRANSFUSION BLD/BLD COMPNT: CPT

## 2020-01-21 PROCEDURE — 71046 X-RAY EXAM CHEST 2 VIEWS: CPT

## 2020-01-21 PROCEDURE — 70450 CT HEAD/BRAIN W/O DYE: CPT

## 2020-01-21 PROCEDURE — 96360 HYDRATION IV INFUSION INIT: CPT

## 2020-01-21 PROCEDURE — 99285 EMERGENCY DEPT VISIT HI MDM: CPT

## 2020-01-21 PROCEDURE — 84484 ASSAY OF TROPONIN QUANT: CPT | Performed by: EMERGENCY MEDICINE

## 2020-01-21 PROCEDURE — 86900 BLOOD TYPING SEROLOGIC ABO: CPT | Performed by: EMERGENCY MEDICINE

## 2020-01-21 PROCEDURE — 99285 EMERGENCY DEPT VISIT HI MDM: CPT | Performed by: EMERGENCY MEDICINE

## 2020-01-21 PROCEDURE — 96361 HYDRATE IV INFUSION ADD-ON: CPT

## 2020-01-21 PROCEDURE — C9113 INJ PANTOPRAZOLE SODIUM, VIA: HCPCS | Performed by: PHYSICIAN ASSISTANT

## 2020-01-21 RX ORDER — HEPARIN SODIUM 10000 [USP'U]/100ML
3-20 INJECTION, SOLUTION INTRAVENOUS
Status: DISCONTINUED | OUTPATIENT
Start: 2020-01-21 | End: 2020-01-24

## 2020-01-21 RX ADMIN — SODIUM CHLORIDE 80 MG: 9 INJECTION, SOLUTION INTRAVENOUS at 18:27

## 2020-01-21 RX ADMIN — SODIUM CHLORIDE 1000 ML: 0.9 INJECTION, SOLUTION INTRAVENOUS at 12:59

## 2020-01-21 RX ADMIN — HEPARIN SODIUM AND DEXTROSE 12 UNITS/KG/HR: 10000; 5 INJECTION INTRAVENOUS at 18:25

## 2020-01-21 RX ADMIN — SODIUM CHLORIDE 8 MG/HR: 9 INJECTION, SOLUTION INTRAVENOUS at 18:49

## 2020-01-21 NOTE — ASSESSMENT & PLAN NOTE
· Patient presenting with generalized weakness  · Hemoglobin 6 4, currently being transfused 1 unit PRBC  · Protonix drip, monitor H&H  · GI consultation will be appreciated

## 2020-01-21 NOTE — ASSESSMENT & PLAN NOTE
Patient has undergone left femoral thromboendarterectomy and left SFA stents in recent past  Vascular surgery had recommend to maintained on anti-platelet and anticoagulation  Given GI bleed will hold aspirin, Plavix, discussed with Dr Lolis Lockett at this point will start heparin drip and monitor H&H

## 2020-01-21 NOTE — ASSESSMENT & PLAN NOTE
This is a 26-year-old male with history of hypertension, hyperlipidemia, peripheral vascular disease, esophageal cancer status post chemoradiation presenting with dizziness and weakness        · CT head negative  · Blood work significant for anemia, guaiac-positive in the ED Simple: Patient demonstrates quick and easy understanding

## 2020-01-21 NOTE — ASSESSMENT & PLAN NOTE
Recently diagnosed, 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 and esophageal cancer  Vascular surgery recommending six-month follow-up

## 2020-01-21 NOTE — ASSESSMENT & PLAN NOTE
· White blood cell count 16 68  · Possibly reactive to anemia  · Patient afebrile, no signs of any infection  · Will defer antibiotics at this point and monitor CBC

## 2020-01-21 NOTE — ED PROVIDER NOTES
History  Chief Complaint   Patient presents with    Dizziness     pt was out with family and reports onset of dizziness when laying down; recent right side below knee amputation     78 y o  M presents w dizziness, weakness, AMS  Recent BKA, R   Stump appears noninfected, healing well  Appropriately tender  Patient also has dark stool reported by son  Patient denies headache, denies chest pain, denies shortness of breath, denies nausea or vomiting, denies urinary tract infection symptoms  No fevers or chills  Son has power of  of this patient  Indicates he had a colonoscopy and endoscopy performed a few weeks ago, and did require blood transfusion 2 weeks ago as well  This was after his BKA  Prior to this patient did not have blood loss problems resulting in transfusions  Prior to Admission Medications   Prescriptions Last Dose Informant Patient Reported? Taking?    amLODIPine (NORVASC) 10 mg tablet   No No   Sig: Take 1 tablet (10 mg total) by mouth daily   aspirin (ECOTRIN LOW STRENGTH) 81 mg EC tablet   No No   Sig: Take 1 tablet (81 mg total) by mouth daily   atorvastatin (LIPITOR) 20 mg tablet   No No   Sig: Take 1 tablet (20 mg total) by mouth daily after dinner   clopidogrel (PLAVIX) 75 mg tablet   No No   Sig: Take 1 tablet (75 mg total) by mouth daily   ferrous sulfate 325 (65 Fe) mg tablet   No No   Sig: Take 1 tablet (325 mg total) by mouth 2 (two) times a day with meals   gabapentin (NEURONTIN) 100 mg capsule   No No   Sig: Take 1 capsule (100 mg total) by mouth daily   hydrocortisone 2 5 % cream   Yes No   Sig: Apply to to affected areas 2-3 times daily   melatonin 3 mg   No No   Sig: Take 2 tablets (6 mg total) by mouth daily at bedtime   neomycin-bacitracin-polymyxin b (NEOSPORIN) ointment   No No   Sig: Apply topically 2 (two) times a day   Patient not taking: Reported on 11/2/2019   pantoprazole (PROTONIX) 40 mg tablet   No No   Sig: Take 1 tablet (40 mg total) by mouth 2 (two) times a day before meals   polyethylene glycol (MIRALAX) 17 g packet   No No   Sig: Take 17 g by mouth 2 (two) times a day   senna-docusate sodium (SENOKOT S) 8 6-50 mg per tablet   No No   Sig: Take 1 tablet by mouth 2 (two) times a day   tamsulosin (FLOMAX) 0 4 mg   No No   Sig: Take 1 capsule (0 4 mg total) by mouth daily with dinner   thiamine 100 MG tablet   No No   Sig: Take 1 tablet (100 mg total) by mouth daily   warfarin (COUMADIN) 2 mg tablet   No No   Sig: Take 2 5 tablets (5 mg total) by mouth once for 1 dose      Facility-Administered Medications: None       Past Medical History:   Diagnosis Date    Cancer ([place] [address] )     throat cancer    Left carotid stenosis 1/14/2020    PAD (peripheral artery disease) ([facility] ) 11/2/2019    PEG (percutaneous endoscopic gastrostomy) status ([place] [address] )     Port-A-Cath in place        Past Surgical History:   Procedure Laterality Date    BYPASS FEMORAL-FEMORAL Right     ESOPHAGOSCOPY N/A 8/24/2017    Procedure: ESOPHAGOSCOPY FLEXIBLE;  Surgeon: Tanner Guo MD;  Location: [facility];  Service: ENT    IR ABDOMINAL ANGIOGRAPHY / INTERVENTION  11/14/2019    IR ABDOMINAL ANGIOGRAPHY / INTERVENTION  11/7/2019    IR ABDOMINAL ANGIOGRAPHY / INTERVENTION  12/13/2019    LEG AMPUTATION THROUGH LOWER TIBIA AND FIBULA Right 12/19/2019    Procedure: AMPUTATION BELOW KNEE (BKA);   Surgeon: Ella Beavers MD;  [facility];  Service: Vascular    [place] BRONCHOSCOPY,DIAGNOSTIC N/A 8/24/2017    Procedure: Bronchoscopy;  Surgeon: Tanner Guo MD;  [facility];  Service: ENT    [place] LARYNGOSCOPY,DIRCT,OP,BIOPSY N/A 8/24/2017    Procedure: LARYNGOSCOPY DIRECT;  Surgeon: Tanner Guo MD;  [facility];  Service: ENT    NJ [facility] Left 11/7/2019    Procedure: LEFT ENDARTERECTOMY ARTERIAL FEMORAL; L CFAE WITH PROFUNDOPLASTY; ARTERIOGRAM;RETROGRADE ILIAC STENTING;  Surgeon: Milka Garnett MD;  [facility];  Service: Vascular    TOE AMPUTATION Right     2 toes       History reviewed  No pertinent family history  I have reviewed and agree with the history as documented  Social History     Tobacco Use    Smoking status: Former Smoker     Packs/day: 1 00     Years: 15 00     Pack years: 15      Types: Cigarettes     Last attempt to quit: 2019     Years since quittin 2    Smokeless tobacco: Never Used    Tobacco comment: 50+ years   Substance Use Topics    Alcohol use: Never     Alcohol/week: 0 0 standard drinks     Frequency: Patient refused     Drinks per session: Patient refused     Binge frequency: Patient refused    Drug use: No        Review of Systems   Constitutional: Positive for fatigue  Negative for chills and fever  Respiratory: Negative for cough and shortness of breath  Cardiovascular: Negative for chest pain, palpitations and leg swelling  Gastrointestinal: Negative for abdominal pain, nausea and vomiting  Dark stool   Genitourinary: Negative for dysuria and flank pain  Musculoskeletal: Negative for back pain  Skin: Positive for pallor  Negative for rash and wound  Neurological: Positive for dizziness and light-headedness  Negative for weakness, numbness and headaches  Hematological: Bruises/bleeds easily (on thinners)  Psychiatric/Behavioral: Positive for confusion  Physical Exam  Physical Exam   Constitutional: He is oriented to person, place, and time  He appears well-developed and well-nourished  No distress  fatigue   HENT:   Head: Normocephalic and atraumatic  Oropharynx is clear but dry   Eyes: Pupils are equal, round, and reactive to light  Conjunctivae and EOM are normal  Right eye exhibits no discharge  Left eye exhibits no discharge  No scleral icterus  Neck: Normal range of motion  Neck supple  No JVD present  Cardiovascular: Normal rate, regular rhythm, normal heart sounds and intact distal pulses  Exam reveals no gallop and no friction rub     No murmur heard  Pulmonary/Chest: Effort normal and breath sounds normal  No respiratory distress  He has no wheezes  He has no rales  He exhibits no tenderness  Abdominal: Soft  Bowel sounds are normal  He exhibits no distension  There is no tenderness  There is no guarding  Musculoskeletal: Normal range of motion  He exhibits no edema, tenderness or deformity  R sided BKA - stump appears to be healing well, C/D/I, no evidence of infection - appropriately tender   Neurological: He is alert and oriented to person, place, and time  No cranial nerve deficit or sensory deficit  Skin: Skin is warm and dry  No rash noted  He is not diaphoretic  No erythema  There is pallor  Psychiatric: He has a normal mood and affect  His behavior is normal    Vitals reviewed    guiac+ but no gross hematochezia    Vital Signs  ED Triage Vitals [01/21/20 1145]   Temperature Pulse Respirations Blood Pressure SpO2   97 9 °F (36 6 °C) 79 14 (!) 81/46 100 %      Temp Source Heart Rate Source Patient Position - Orthostatic VS BP Location FiO2 (%)   Oral Monitor Sitting Left arm --      Pain Score       No Pain           Vitals:    01/21/20 1430 01/21/20 1510 01/21/20 1515 01/21/20 1530   BP: 129/61 122/57 126/60 123/57   Pulse: 74 76 79 76   Patient Position - Orthostatic VS: Lying Lying Lying          Visual Acuity  Visual Acuity      Most Recent Value   L Pupil Size (mm)  3   R Pupil Size (mm)  3          ED Medications  Medications   pantoprazole (PROTONIX) 80 mg in sodium chloride 0 9 % 100 mL IVPB (has no administration in time range)   pantoprazole (PROTONIX) 80 mg in sodium chloride 0 9 % 100 mL infusion (has no administration in time range)   heparin (porcine) 25,000 units in 250 mL infusion (premix) (has no administration in time range)   sodium chloride 0 9 % bolus 1,000 mL (0 mL Intravenous Stopped 1/21/20 1551)       Diagnostic Studies  Results Reviewed     Procedure Component Value Units Date/Time    APTT six (6) hours after Heparin bolus/drip initiation or dosing change [[id number]]     Lab Status:  No result Specimen:  Blood     CBC [[id number]]     Lab Status:  No result Specimen:  Blood     Protime-INR [[id number]]     Lab Status:  No result Specimen:  Blood     APTT [[id number]]  (Abnormal) Collected:  01/21/20 1257    Lab Status:  Final result Specimen:  Blood from Arm, Left Updated:  01/21/20 1504     PTT 47 seconds     Protime-INR [[id number]]  (Abnormal) Collected:  01/21/20 1257    Lab Status:  Final result Specimen:  Blood from Arm, Left Updated:  01/21/20 1504     Protime 22 0 seconds      INR 1 91    D-dimer, quantitative [[id number]]  (Normal) Collected:  01/21/20 1257    Lab Status:  Final result Specimen:  Blood from Arm, Left Updated:  01/21/20 1331     D-Dimer, Quant 0 41 ug/ml FEU     Influenza A/B and RSV PCR [[id number]]  (Normal) Collected:  01/21/20 1159    Lab Status:  Final result Specimen:  Nose Updated:  01/21/20 1254     INFLUENZA A PCR None Detected     INFLUENZA B PCR None Detected     RSV PCR None Detected    Comprehensive metabolic panel [[id number]]  (Abnormal) Collected:  01/21/20 1159    Lab Status:  Final result Specimen:  Blood from Arm, Right Updated:  01/21/20 1238     Sodium 136 mmol/L      Potassium 4 3 mmol/L      Chloride 104 mmol/L      CO2 18 mmol/L      ANION GAP 14 mmol/L      BUN 31 mg/dL      Creatinine 0 95 mg/dL      Glucose 131 mg/dL      Calcium 8 6 mg/dL      AST 18 U/L      ALT 10 U/L      Alkaline Phosphatase 67 U/L      Total Protein 5 8 g/dL      Albumin 2 4 g/dL      Total Bilirubin 0 30 mg/dL      eGFR 81 ml/min/1 73sq m     Narrative:       [facility] guidelines for Chronic Kidney Disease (CKD):     Stage 1 with normal or high GFR (GFR > 90 mL/min/1 73 square meters)    Stage 2 Mild CKD (GFR = 60-89 mL/min/1 73 square meters)    Stage 3A Moderate CKD (GFR = 45-59 mL/min/1 73 square meters)    Stage 3B Moderate CKD (GFR = 30-44 mL/min/1 73 square meters)    Stage 4 Severe CKD (GFR = 15-29 mL/min/1 73 square meters)    Stage 5 End Stage CKD (GFR <15 mL/min/1 73 square meters)  Note: GFR calculation is accurate only with a steady state creatinine    Troponin I [[id number]]  (Normal) Collected:  01/21/20 1159    Lab Status:  Final result Specimen:  Blood from Arm, Right Updated:  01/21/20 1235     Troponin I <0 02 ng/mL     CBC and differential [[id number]]  (Abnormal) Collected:  01/21/20 1159    Lab Status:  Final result Specimen:  Blood from Arm, Right Updated:  01/21/20 1227     WBC 16 68 Thousand/uL      RBC 2 09 Million/uL      Hemoglobin 6 4 g/dL      Hematocrit 21 6 %       fL      MCH 30 6 pg      MCHC 29 6 g/dL      RDW 17 5 %      MPV 9 6 fL      Platelets 538 Thousands/uL      nRBC 0 /100 WBCs      Neutrophils Relative 89 %      Immat GRANS % 1 %      Lymphocytes Relative 5 %      Monocytes Relative 5 %      Eosinophils Relative 0 %      Basophils Relative 0 %      Neutrophils Absolute 14 93 Thousands/µL      Immature Grans Absolute 0 17 Thousand/uL      Lymphocytes Absolute 0 77 Thousands/µL      Monocytes Absolute 0 76 Thousand/µL      Eosinophils Absolute 0 02 Thousand/µL      Basophils Absolute 0 03 Thousands/µL     UA w Reflex to Microscopic w Reflex to Culture [[id number]]     Lab Status:  No result Specimen:  Urine                  XR chest 2 views   Final Result by Ananya Borrero MD (01/21 8563)   Hypoinflated lungs      No pulmonary congestion seen  Mild patchy density seen in the left lower lung may represent atelectasis, may represent infiltrate   Follow-up suggested to demonstrate resolution 6-8 weeks      The study was marked in EPIC for significant notification  Workstation performed: [id number]         CT head without contrast   ED Interpretation by Diana Chanel DO (01/21 1229)   No acute intracranial abnormality        Final Result by Raul Wagner MD (01/21 1220)      No acute intracranial abnormality  Workstation performed: [id number]                    Procedures  Procedures         ED Course  ED Course as of Jan 21 1647   Tue Jan 21, 2020   1228 Hemoglobin(!!): 6 4   1359 D-Dimer, Quant: 0 41   1400 Tiger text sent to slim for admission  Identification of Seniors at Risk      Most Recent Value   (ISAR) Identification of Seniors at Risk   Before the illness or injury that brought you to the Emergency, did you need someone to help you on a regular basis? 1 Filed at: 01/21/2020 1148   In the last 24 hours, have you needed more help than usual?  1 Filed at: 01/21/2020 1148   Have you been hospitalized for one or more nights during the past 6 months? 1 Filed at: 01/21/2020 1148   In general, do you see well? 1 Filed at: 01/21/2020 1148   In general, do you have serious problems with your memory? 0 Filed at: 01/21/2020 1148   Do you take more than three different medications every day? 1 Filed at: 01/21/2020 1148   ISAR Score  5 Filed at: 01/21/2020 1148                          MDM  Number of Diagnoses or Management Options  Diagnosis management comments: 78 y o  M w blood loss anemia from GI bleed causing AMS  Will admit for blood transfusion and GI consult - colonoscopy/endoscopy as needed          Amount and/or Complexity of Data Reviewed  Clinical lab tests: ordered and reviewed  Tests in the radiology section of CPT®: ordered and reviewed          Disposition  Final diagnoses:   Dizziness   Blood loss anemia   GI bleed     Time reflects when diagnosis was documented in both MDM as applicable and the Disposition within this note     Time User Action Codes Description Comment    1/21/2020  2:08 PM Eliane Aase Add [R42] Dizziness     1/21/2020  2:09 PM Eliane Aase Add [D50 0] Blood loss anemia     1/21/2020  2:09 PM Marcia Perkins Add [K92 2] GI bleed     1/21/2020  3:20 PM Petey Lemos Modify [R42] Dizziness     1/21/2020  3:20 PM Femi Wellford Add [I25 10] CAD (coronary artery disease)     1/21/2020  3:20 PM Femi Wellford Modify [R42] Dizziness     1/21/2020  3:23 PM Femi Manfred Modify [R42] Dizziness     1/21/2020  3:23 PM Femi Manfred Modify [R42] Dizziness     1/21/2020  3:23 PM Femi Wellford Add [I73 9] PAD (peripheral artery disease) ([facility] [address] )     1/21/2020  3:23 PM Femi Wellford Modify [R42] Dizziness     1/21/2020  3:23 PM Femi Manfred Modify [R42] Dizziness       ED Disposition     ED Disposition Condition Date/Time Comment    Admit Stable Tue Jan 21, 2020  2:37 PM Case was discussed with Sheilda Soulier [facility]) and the patient's admission status was agreed to be Admission Status: inpatient status to the service of Dr Sheilda Soulier [facility]   Follow-up Information    None         Patient's Medications   Discharge Prescriptions    No medications on file     No discharge procedures on file      ED Provider  Electronically Signed by           Eddie Lloyd DO  01/21/20 5498

## 2020-01-21 NOTE — CONSULTS
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, PAD with recent femoral thromboendarterectomy, status post iliac stents, recent right BKA, peripheral vascular disease, and atrial fibrillation  Patient is currently on aspirin, Plavix and Coumadin  Patient is known to us for anemia  He was recently admitted to [facility] prior to having a right BKA in December for drop in hemoglobin  At that time, EGD and colonoscopy were performed  EGD revealing Candida, multiple duodenal ulcers, but no active GI bleeding  Colonoscopy was inadequate prep  Brown stool was noted throughout the colon  On admission, patient's hemoglobin at 6 4, BUN 31 and INR 1 91  In the beginning of January, his hemoglobin was 8 5  Patient's family was not present in the room  I utilize blue translation phone  However, the patient did not want to participate  I left a message for the patient's family to contact me again  Review of Systems:    CONSTITUTIONAL: Denies any fever, chills, or rigors  Good appetite, and no recent weight loss  HEENT: No earache or tinnitus  Denies hearing loss or visual disturbances  CARDIOVASCULAR: No chest pain or palpitations  RESPIRATORY: Denies any cough, hemoptysis, shortness of breath or dyspnea on exertion  GASTROINTESTINAL: As noted in the History of Present Illness  GENITOURINARY: No problems with urination  Denies any hematuria or dysuria  NEUROLOGIC: No dizziness or vertigo, denies headaches  MUSCULOSKELETAL: Denies any muscle or joint pain  SKIN: Denies skin rashes or itching  ENDOCRINE: Denies excessive thirst  Denies intolerance to heat or cold  PSYCHOSOCIAL: Denies depression or anxiety   Denies any recent memory loss  Historical Information   Past Medical History:   Diagnosis Date    Cancer [facility])     throat cancer    Left carotid stenosis 1/14/2020    PAD [facility]peripheral artery disease) [facility] [address] ) 11/2/2019    PEG (percutaneous endoscopic gastrostomy) status ([facility] [address] )     Port-A-Cath in place      Past Surgical History:   Procedure Laterality Date    BYPASS FEMORAL-FEMORAL Right     ESOPHAGOSCOPY N/A 8/24/2017    Procedure: ESOPHAGOSCOPY FLEXIBLE;  Surgeon: Mar Johnson MD;  [facility];  Service: ENT    IR ABDOMINAL ANGIOGRAPHY / INTERVENTION  11/14/2019    IR ABDOMINAL ANGIOGRAPHY / INTERVENTION  11/7/2019    IR ABDOMINAL ANGIOGRAPHY / INTERVENTION  12/13/2019    LEG AMPUTATION THROUGH LOWER TIBIA AND FIBULA Right 12/19/2019    Procedure: AMPUTATION BELOW KNEE (BKA);   Surgeon: Ruiz Galindo MD;  [facility];  Service: Vascular    CA BRONCHOSCOPY,DIAGNOSTIC N/A 8/24/2017    Procedure: Bronchoscopy;  Surgeon: Mar Johnson MD;  [facility];  Service: ENT    CA LARYNGOSCOPY,DIRCT,OP,BIOPSY N/A 8/24/2017    Procedure: LARYNGOSCOPY DIRECT;  Surgeon: Mar Johnson MD;  [facility];  Service: ENT    CA [place] Left 11/7/2019    Procedure: LEFT ENDARTERECTOMY ARTERIAL FEMORAL; L CFAE WITH PROFUNDOPLASTY; ARTERIOGRAM;RETROGRADE ILIAC STENTING;  Surgeon: Luba Chatman MD;  [facility];  Service: Vascular    TOE AMPUTATION Right     2 toes     Social History   Social History     Substance and Sexual Activity   Alcohol Use Never    Alcohol/week: 0 0 standard drinks    Frequency: Patient refused    Drinks per session: Patient refused    Binge frequency: Patient refused     Social History     Substance and Sexual Activity   Drug Use No     Social History     Tobacco Use   Smoking Status Former Smoker    Packs/day: 1 00    Years: 15 00    Pack years: 15 00    Types: Cigarettes    Last attempt to quit: 11/1/2019    Years since quittin 2   Smokeless Tobacco Never Used   Tobacco Comment    50+ years     History reviewed  No pertinent family history  Meds/Allergies     Current Facility-Administered Medications   Medication Dose Route Frequency    heparin (porcine) 25,000 units in 250 mL infusion (premix)  3-20 Units/kg/hr (Order-Specific) Intravenous Titrated    pantoprazole (PROTONIX) 80 mg in sodium chloride 0 9 % 100 mL infusion  8 mg/hr Intravenous Continuous    pantoprazole (PROTONIX) 80 mg in sodium chloride 0 9 % 100 mL IVPB  80 mg Intravenous Once       No Known Allergies      Objective     Blood pressure 123/57, pulse 76, temperature 97 9 °F (36 6 °C), temperature source Oral, resp  rate 14, height 5' 6" (1 676 m), weight 55 2 kg (121 lb 11 1 oz), SpO2 100 %  Intake/Output Summary (Last 24 hours) at 2020 1623  Last data filed at 2020 1551  Gross per 24 hour   Intake 300 ml   Output    Net 300 ml         PHYSICAL EXAM:      General Appearance:   Alert and oriented x 3  Cooperative, and in no respiratory distress   HEENT:   Normocephalic, atraumatic, anicteric      Neck:  Supple, symmetrical, trachea midline   Lungs:   Clear to auscultation bilaterally; no rales, rhonchi or wheezing; respirations unlabored    Heart[de-identified]   S1 and S2 normal; regular rate and rhythm; no murmur, rub, or gallop  Abdomen:   Soft, non-tender, non-distended; normal bowel sounds; no masses, no organomegaly    Genitalia:   Deferred    Rectal:   I utilize blue translation phone to ask for patient's permission to perform rectal exam   Patient given me permission    Brown stool in the rectal vault      Extremities:  No cyanosis, clubbing or edema    Pulses:  2+ and symmetric all extremities    Skin:  Skin color, texture, turgor normal, no rashes or lesions    Lymph nodes:  No palpable cervical or supraclavicular lymphadenopathy        Lab Results:   Results from last 7 days   Lab Units 20  1159   WBC Thousand/uL 16 68* HEMOGLOBIN g/dL 6 4*   HEMATOCRIT % 21 6*   PLATELETS Thousands/uL 242   NEUTROS PCT % 89*   LYMPHS PCT % 5*   MONOS PCT % 5   EOS PCT % 0     Results from last 7 days   Lab Units 01/21/20  1159   POTASSIUM mmol/L 4 3   CHLORIDE mmol/L 104   CO2 mmol/L 18*   BUN mg/dL 31*   CREATININE mg/dL 0 95   CALCIUM mg/dL 8 6   ALK PHOS U/L 67   ALT U/L 10*   AST U/L 18     Results from last 7 days   Lab Units 01/21/20  1257   INR  1 91*           Imaging Studies: I have personally reviewed pertinent imaging studies  Xr Chest 2 Views    Result Date: 1/21/2020  Impression: Hypoinflated lungs No pulmonary congestion seen  Mild patchy density seen in the left lower lung may represent atelectasis, may represent infiltrate Follow-up suggested to demonstrate resolution 6-8 weeks The study was marked in EPIC for significant notification  Workstation performed: [id number]     Ct Head Without Contrast    Result Date: 1/21/2020  Impression: No acute intracranial abnormality  Workstation performed: [id number]       ASSESSMENT and PLAN:      1) Acute on chronic anemia - Patient is on aspirin, Plavix and Coumadin for history of PAD/PVD/atrial firbillation  Patient recently followed up with vascular surgery, who deemed that it was necessary for the patient to be on anticoagulation as he is very high risk for thromboembolism  Patient's hemoglobin was normal in November 2019  Since then, he has had continuous drop  He had a right BKA in mid December  This appears to have healed appropriately  He had EGD and colonoscopy prior to this revealing clean based ulcers in the duodenum without active bleeding  No active bleeding was seen in the colon either, however prep was poor    Fortunately, on exam he does not have any signs of active bleeding   - I left a message to speak to the patient's son regarding his care  - We could tentatively plan for EGD with push enteroscopy as the small bowel has not been evaluated  - PPI drip for now  - Remain NPO  - If patient requires anticoagulation, it is okay to put him on heparin drip  - He is going to be high risk for bleeding given that he is on 3 different anticoagulations    The patient was seen and examined by Dr Mai Arredondo, all terrell medical decisions were made with Dr Mai Arredondo  Thank you for allowing us to participate in the care of this pleasant patient  We will follow up with you closely

## 2020-01-21 NOTE — H&P
H&P- Lee Velazquez 1950, 78 y o  male MRN: [id number]    Unit/Bed#: ED 29 Encounter: [id number]    Primary Care Provider: Gadiel Hu MD   Date and time admitted to hospital: 1/21/2020 11:54 AM        * Dizziness  Assessment & Plan  This is a 71-year-old male with history of hypertension, hyperlipidemia, peripheral vascular disease, esophageal cancer status post chemoradiation presenting with dizziness and weakness        · CT head negative  · Blood work significant for anemia, guaiac-positive in the ED    GI bleed  Assessment & Plan  · Patient presenting with generalized weakness  · Hemoglobin 6 4, currently being transfused 1 unit PRBC  · Protonix drip, monitor H&H  · GI consultation will be appreciated    Leukocytosis  Assessment & Plan  · White blood cell count 16 68  · Possibly reactive to anemia  · Patient afebrile, no signs of any infection  · Will defer antibiotics at this point and monitor CBC    Left carotid stenosis  Assessment & Plan  Recently diagnosed, 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 and esophageal cancer  Vascular surgery recommending six-month follow-up    Esophageal cancer ([facility] [address] )  Assessment & Plan  · Patient has history of 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 diagnosed in July 2017  · Status post concurrent chemo radiation  · Outpatient Oncology and ENT follow-up    Hx of BKA, right ([facility] [address] )  Assessment & Plan  On 12/19/2019   Incision site intact    PAD (peripheral artery disease) [facility])  Assessment & Plan  Patient has undergone left femoral thromboendarterectomy and left SFA stents in recent past  Vascular surgery had recommend to maintained on anti-platelet and anticoagulation  Given GI bleed will hold aspirin, Plavix, discussed with Dr Lizabeth Diaz at this point will start heparin drip and monitor H&H    Essential hypertension  Assessment & Plan  Continue home medication, monitor blood pressure    Updated patient's son, Cher Lara via telephone who is power of   VTE Prophylaxis: Heparin Drip  / sequential compression device   Code Status: FULL  POLST: There is no POLST form on file for this patient (pre-hospital)    Anticipated Length of Stay:  Patient will be admitted on an Inpatient basis with an anticipated length of stay of  greater than 2 midnights  Justification for Hospital Stay:  GI bleed    Total Time for Visit, including Counseling / Coordination of Care: 30 minutes  Greater than 50% of this total time spent on direct patient counseling and coordination of care  Chief Complaint:   Generalized weakness    History of Present Illness:    Lalito Daniels is a 78 y o  male who presents with generalized weakness  Patient has history of hypertension, hyperlipidemia, recent BKA currently living with his daughter at home  As per son, power of , patient was feeling very dizzy for the last 1-2 days  Had dark brown stools but no melena, no hematochezia, hematemesis  No nausea or vomiting or abdominal pain  No chest pain, palpitations or dyspnea  Upon arrival to the ED patient's vitals noted to be significant for hypertension, blood work significant for hemoglobin 6 4, white blood cell count 16 6 day, quite positive in the ED  Plan to transfuse 1 unit PRBC  Patient will be admitted for further management and evaluation  Review of Systems:    Review of Systems   Constitutional: Positive for fatigue  HENT: Negative  Eyes: Negative  Respiratory: Negative  Cardiovascular: Negative  Gastrointestinal: Negative  Endocrine: Negative  Genitourinary: Negative  Musculoskeletal: Negative  Skin: Negative  Allergic/Immunologic: Negative  Neurological: Positive for dizziness and weakness  Hematological: Negative  Psychiatric/Behavioral: Negative          Past Medical and Surgical History:     Past Medical History:   Diagnosis Date    Cancer ([facility] [address] )     throat cancer  Left carotid stenosis 1/14/2020    PAD [facility]peripheral artery disease) ([facility] [address] ) 11/2/2019    PEG (percutaneous endoscopic gastrostomy) status ([facility] [address] )     Port-A-Cath in place        Past Surgical History:   Procedure Laterality Date    BYPASS FEMORAL-FEMORAL Right     ESOPHAGOSCOPY N/A 8/24/2017    Procedure: ESOPHAGOSCOPY FLEXIBLE;  Surgeon: Deb Soto MD;  [facility];  Service: ENT    IR ABDOMINAL ANGIOGRAPHY / INTERVENTION  11/14/2019    IR ABDOMINAL ANGIOGRAPHY / INTERVENTION  11/7/2019    IR ABDOMINAL ANGIOGRAPHY / INTERVENTION  12/13/2019    LEG AMPUTATION THROUGH LOWER TIBIA AND FIBULA Right 12/19/2019    Procedure: AMPUTATION BELOW KNEE (BKA); Surgeon: Gladis Quintana MD;  [facility];  Service: Vascular    NH BRONCHOSCOPY,DIAGNOSTIC N/A 8/24/2017    Procedure: Bronchoscopy;  Surgeon: Deb Soto MD;  [facility];  Service: ENT    NH LARYNGOSCOPY,DIRCT,OP,BIOPSY N/A 8/24/2017    Procedure: LARYNGOSCOPY DIRECT;  Surgeon: Deb Soto MD;  [facility];  Service: ENT    NH [place] Left 11/7/2019    Procedure: LEFT ENDARTERECTOMY ARTERIAL FEMORAL; L CFAE WITH PROFUNDOPLASTY; ARTERIOGRAM;RETROGRADE ILIAC STENTING;  Surgeon: Claudetta Knife, MD;  [facility];  Service: Vascular    TOE AMPUTATION Right     2 toes       Meds/Allergies:    Prior to Admission medications    Medication Sig Start Date End Date Taking?  Authorizing Provider   amLODIPine (NORVASC) 10 mg tablet Take 1 tablet (10 mg total) by mouth daily 11/25/19   Zari Zuniga DO   aspirin (ECOTRIN LOW STRENGTH) 81 mg EC tablet Take 1 tablet (81 mg total) by mouth daily 11/25/19   Zari Zuniga DO   atorvastatin (LIPITOR) 20 mg tablet Take 1 tablet (20 mg total) by mouth daily after dinner 11/24/19   Zari Quevedo DO   clopidogrel (PLAVIX) 75 mg tablet Take 1 tablet (75 mg total) by mouth daily 1/2/20   ELBA GarciaC   ferrous sulfate 325 (65 Fe) mg tablet Take 1 tablet (325 mg total) by mouth 2 (two) times a day with meals 19   Zari Zuniga DO   gabapentin (NEURONTIN) 100 mg capsule Take 1 capsule (100 mg total) by mouth daily 19   Zari Farnsworth DO   hydrocortisone 2 5 % cream Apply to to affected areas 2-3 times daily 3/5/18   Yoselin Pryor MD   melatonin 3 mg Take 2 tablets (6 mg total) by mouth daily at bedtime 19   Zari Farnsworth DO   neomycin-bacitracin-polymyxin b (NEOSPORIN) ointment Apply topically 2 (two) times a day  Patient not taking: Reported on 2019   Stacey Irvni MD   pantoprazole (PROTONIX) 40 mg tablet Take 1 tablet (40 mg total) by mouth 2 (two) times a day before meals 20   June Landry MD   polyethylene glycol (MIRALAX) 17 g packet Take 17 g by mouth 2 (two) times a day 19   Zari Zuniga DO   senna-docusate sodium (SENOKOT S) 8 6-50 mg per tablet Take 1 tablet by mouth 2 (two) times a day 19   Zari Zuniga DO   tamsulosin (FLOMAX) 0 4 mg Take 1 capsule (0 4 mg total) by mouth daily with dinner 19   Zari Zuniga DO   thiamine 100 MG tablet Take 1 tablet (100 mg total) by mouth daily 19   Zari Zuniga DO   warfarin (COUMADIN) 2 mg tablet Take 2 5 tablets (5 mg total) by mouth once for 1 dose 20  June Landry MD     I have reviewed home medications with patient personally      Allergies: No Known Allergies    Social History:     Social History     Substance and Sexual Activity   Alcohol Use Never    Alcohol/week: 0 0 standard drinks    Frequency: Patient refused    Drinks per session: Patient refused    Binge frequency: Patient refused     Social History     Tobacco Use   Smoking Status Former Smoker    Packs/day: 1 00    Years: 15 00    Pack years: 15     Types: Cigarettes    Last attempt to quit: 2019    Years since quittin 2   Smokeless Tobacco Never Used   Tobacco Comment    50+ years     Social History     Substance and Sexual Activity   Drug Use No       Family History:    History reviewed  No pertinent family history  Physical Exam:     Vitals:   Blood Pressure: 123/57 (01/21/20 1530)  Pulse: 76 (01/21/20 1530)  Temperature: 97 9 °F (36 6 °C) (01/21/20 1530)  Temp Source: Oral (01/21/20 1530)  Respirations: 14 (01/21/20 1530)  Height: 5' 6" (167 6 cm) (01/21/20 1145)  Weight - Scale: 55 2 kg (121 lb 11 1 oz) (01/21/20 1550)  SpO2: 100 % (01/21/20 1515)    Constitutional: Patient is in no acute distress  HEENT:  Normocephalic, atraumatic, EOMI, PERRLA, no scleral icterus, no pallor, moist oral mucosa  Neck:  Supple, no masses, no thyromegaly, no bruits Normal range of motion  Lymph nodes:  No lymphadenopathy  Cardiovascular: Normal S1S2, RRR, No murmurs/rubs/gallops appreciated  Pulmonary:  Bilateral air entry, No rhonchi/rales/wheezing appreciated  Abdominal: Soft, Bowel sounds present, Non-tender, Non-distended, No rebound/guarding, no hepatomegaly   Extremities:  No cyanosis, clubbing or edema  Peripheral pulses palpable and equal bilaterally  Right BKA stump incision intact    Additional Data:     Lab Results: I have personally reviewed pertinent reports  Results from last 7 days   Lab Units 01/21/20  1159   WBC Thousand/uL 16 68*   HEMOGLOBIN g/dL 6 4*   HEMATOCRIT % 21 6*   PLATELETS Thousands/uL 242   NEUTROS PCT % 89*   LYMPHS PCT % 5*   MONOS PCT % 5   EOS PCT % 0     Results from last 7 days   Lab Units 01/21/20  1159   POTASSIUM mmol/L 4 3   CHLORIDE mmol/L 104   CO2 mmol/L 18*   BUN mg/dL 31*   CREATININE mg/dL 0 95   CALCIUM mg/dL 8 6   ALK PHOS U/L 67   ALT U/L 10*   AST U/L 18     Results from last 7 days   Lab Units 01/21/20  1257   INR  1 91*       Imaging: I have personally reviewed pertinent reports  Xr Chest 2 Views    Result Date: 1/21/2020  Narrative: CHEST INDICATION:   weakness   COMPARISON:  December 9, 2019 EXAM PERFORMED/VIEWS:  XR CHEST PA & LATERAL Images: 2 FINDINGS: Cardiomediastinal silhouette appears unremarkable  Mild patchy density seen left lower lung, may be due to atelectasis, or infiltrate Mild crowding of vasculature on the right side likely due to hypoinflation and atelectasis Lungs show hyperinflation Osseous structures appear within normal limits for patient age  Impression: Hypoinflated lungs No pulmonary congestion seen  Mild patchy density seen in the left lower lung may represent atelectasis, may represent infiltrate Follow-up suggested to demonstrate resolution 6-8 weeks The study was marked in EPIC for significant notification  Workstation performed: [id number]     Ct Head Without Contrast    Result Date: 1/21/2020  Narrative: CT BRAIN - WITHOUT CONTRAST INDICATION:   Altered mental status  COMPARISON:  CT from January 4, 2020  TECHNIQUE:  CT examination of the brain was performed  In addition to axial images, coronal 2D reformatted images were created and submitted for interpretation  Radiation dose length product (DLP) for this visit:  763 mGy-cm   This examination, like all CT scans performed in the [facility], was performed utilizing techniques to minimize radiation dose exposure, including the use of iterative reconstruction and automated exposure control  IMAGE QUALITY:  Diagnostic  FINDINGS: PARENCHYMA:  No intracranial mass, mass effect or midline shift  No CT signs of acute infarction  No acute parenchymal hemorrhage  VENTRICLES AND EXTRA-AXIAL SPACES:  Ventricles and extra-axial CSF spaces are prominent commensurate with the degree of volume loss  No hydrocephalus  No acute extra-axial hemorrhage  VISUALIZED ORBITS AND PARANASAL SINUSES:  Orbits unremarkable  Mild mucosal thickening in the ethmoid and right maxillary sinus  CALVARIUM AND EXTRACRANIAL SOFT TISSUES:  Normal      Impression: No acute intracranial abnormality   Workstation performed: [id number]     Ct Head Without Contrast    Result Date: 1/4/2020  Narrative: CT BRAIN - WITHOUT CONTRAST INDICATION:   Head trauma, minor (Age => 64y)  COMPARISON:  11/7/2019 TECHNIQUE:  CT examination of the brain was performed  In addition to axial images, coronal 2D reformatted images were created and submitted for interpretation  Radiation dose length product (DLP) for this visit:  837  This examination, like all CT scans performed in the [facility], was performed utilizing techniques to minimize radiation dose exposure, including the use of iterative reconstruction and automated exposure control  IMAGE QUALITY:  Diagnostic  FINDINGS: PARENCHYMA: Decreased attenuation is noted in periventricular and subcortical white matter demonstrating an appearance that is statistically most likely to represent moderate microangiopathic change; this appearance is similar when compared to most recent prior examination  No CT signs of acute infarction  No intracranial mass, mass effect or midline shift  No acute parenchymal hemorrhage  VENTRICLES AND EXTRA-AXIAL SPACES:  Ventricles and extra-axial CSF spaces are prominent commensurate with the degree of volume loss  No hydrocephalus  No acute extra-axial hemorrhage  VISUALIZED ORBITS AND PARANASAL SINUSES:  Mild sinus disease  CALVARIUM AND EXTRACRANIAL SOFT TISSUES:  Normal      Impression: No acute intracranial abnormality  Microangiopathic changes  Workstation performed: [id number]       [facility] [employer] / [employer] Records Reviewed: Yes     ** Please Note: This note has been constructed using a voice recognition system   **

## 2020-01-21 NOTE — ED NOTES
Trauma: no    1  CC; dizziness, dark stools    2  Admission related to injury? no    3  Orientation status alert and oriented x4, Armenian speaking only     4  Abnormal labs/abnormal focused assessment/abnormal vitals: VS wnl, hemoglobin 6 4    5  Medications/drips: 1 U PRBC hanging, after blood transfusion pt needs protonix and heparin drip    6  Last time narcotics given/ pain meds none 0/10 pain    7  IV lines/drains/ etc RAC 20, RFA 20g    8  Isolation status none    9  Skin: wnl, dry     10   Ambulation status recent right raegan Arnold, RN  01/21/20 2700

## 2020-01-22 ENCOUNTER — ANESTHESIA EVENT (INPATIENT)
Dept: GASTROENTEROLOGY | Facility: HOSPITAL | Age: 70
DRG: 378 | End: 2020-01-22
Payer: MEDICARE

## 2020-01-22 ENCOUNTER — APPOINTMENT (INPATIENT)
Dept: GASTROENTEROLOGY | Facility: HOSPITAL | Age: 70
DRG: 378 | End: 2020-01-22
Payer: MEDICARE

## 2020-01-22 ENCOUNTER — ANESTHESIA (INPATIENT)
Dept: GASTROENTEROLOGY | Facility: HOSPITAL | Age: 70
DRG: 378 | End: 2020-01-22
Payer: MEDICARE

## 2020-01-22 PROBLEM — D62 ACUTE BLOOD LOSS ANEMIA: Status: ACTIVE | Noted: 2020-01-22

## 2020-01-22 LAB
ABO GROUP BLD BPU: NORMAL
ALBUMIN SERPL BCP-MCNC: 2.5 G/DL (ref 3.5–5)
ALP SERPL-CCNC: 63 U/L (ref 46–116)
ALT SERPL W P-5'-P-CCNC: 8 U/L (ref 12–78)
ANION GAP SERPL CALCULATED.3IONS-SCNC: 8 MMOL/L (ref 4–13)
AST SERPL W P-5'-P-CCNC: 9 U/L (ref 5–45)
ATRIAL RATE: 72 BPM
BASOPHILS # BLD AUTO: 0.02 THOUSANDS/ΜL (ref 0–0.1)
BASOPHILS NFR BLD AUTO: 0 % (ref 0–1)
BILIRUB SERPL-MCNC: 0.4 MG/DL (ref 0.2–1)
BILIRUB UR QL STRIP: NEGATIVE
BPU ID: NORMAL
BUN SERPL-MCNC: 24 MG/DL (ref 5–25)
CALCIUM SERPL-MCNC: 8.3 MG/DL (ref 8.3–10.1)
CHLORIDE SERPL-SCNC: 109 MMOL/L (ref 100–108)
CLARITY UR: CLEAR
CO2 SERPL-SCNC: 24 MMOL/L (ref 21–32)
COLOR UR: YELLOW
CREAT SERPL-MCNC: 0.87 MG/DL (ref 0.6–1.3)
CROSSMATCH: NORMAL
EOSINOPHIL # BLD AUTO: 0.15 THOUSAND/ΜL (ref 0–0.61)
EOSINOPHIL NFR BLD AUTO: 2 % (ref 0–6)
ERYTHROCYTE [DISTWIDTH] IN BLOOD BY AUTOMATED COUNT: 18.6 % (ref 11.6–15.1)
GFR SERPL CREATININE-BSD FRML MDRD: 87 ML/MIN/1.73SQ M
GLUCOSE SERPL-MCNC: 126 MG/DL (ref 65–140)
GLUCOSE SERPL-MCNC: 127 MG/DL (ref 65–140)
GLUCOSE SERPL-MCNC: 92 MG/DL (ref 65–140)
GLUCOSE UR STRIP-MCNC: NEGATIVE MG/DL
HCT VFR BLD AUTO: 22.9 % (ref 36.5–49.3)
HGB BLD-MCNC: 7.2 G/DL (ref 12–17)
HGB UR QL STRIP.AUTO: NEGATIVE
IMM GRANULOCYTES # BLD AUTO: 0.04 THOUSAND/UL (ref 0–0.2)
IMM GRANULOCYTES NFR BLD AUTO: 1 % (ref 0–2)
KETONES UR STRIP-MCNC: NEGATIVE MG/DL
LEUKOCYTE ESTERASE UR QL STRIP: NEGATIVE
LYMPHOCYTES # BLD AUTO: 0.65 THOUSANDS/ΜL (ref 0.6–4.47)
LYMPHOCYTES NFR BLD AUTO: 8 % (ref 14–44)
MCH RBC QN AUTO: 30 PG (ref 26.8–34.3)
MCHC RBC AUTO-ENTMCNC: 31.4 G/DL (ref 31.4–37.4)
MCV RBC AUTO: 95 FL (ref 82–98)
MONOCYTES # BLD AUTO: 0.44 THOUSAND/ΜL (ref 0.17–1.22)
MONOCYTES NFR BLD AUTO: 6 % (ref 4–12)
NEUTROPHILS # BLD AUTO: 6.43 THOUSANDS/ΜL (ref 1.85–7.62)
NEUTS SEG NFR BLD AUTO: 83 % (ref 43–75)
NITRITE UR QL STRIP: NEGATIVE
NRBC BLD AUTO-RTO: 0 /100 WBCS
P AXIS: 51 DEGREES
PH UR STRIP.AUTO: 6.5 [PH]
PLATELET # BLD AUTO: 186 THOUSANDS/UL (ref 149–390)
PMV BLD AUTO: 9.1 FL (ref 8.9–12.7)
POTASSIUM SERPL-SCNC: 3.9 MMOL/L (ref 3.5–5.3)
PR INTERVAL: 150 MS
PROT SERPL-MCNC: 5.7 G/DL (ref 6.4–8.2)
PROT UR STRIP-MCNC: NEGATIVE MG/DL
QRS AXIS: 16 DEGREES
QRSD INTERVAL: 72 MS
QT INTERVAL: 356 MS
QTC INTERVAL: 389 MS
RBC # BLD AUTO: 2.4 MILLION/UL (ref 3.88–5.62)
SODIUM SERPL-SCNC: 141 MMOL/L (ref 136–145)
SP GR UR STRIP.AUTO: <=1.005 (ref 1–1.03)
T WAVE AXIS: 63 DEGREES
UNIT DISPENSE STATUS: NORMAL
UNIT PRODUCT CODE: NORMAL
UNIT RH: NORMAL
UROBILINOGEN UR QL STRIP.AUTO: 0.2 E.U./DL
VENTRICULAR RATE: 72 BPM
WBC # BLD AUTO: 7.73 THOUSAND/UL (ref 4.31–10.16)

## 2020-01-22 PROCEDURE — 44799 UNLISTED PX SMALL INTESTINE: CPT | Performed by: INTERNAL MEDICINE

## 2020-01-22 PROCEDURE — 82948 REAGENT STRIP/BLOOD GLUCOSE: CPT

## 2020-01-22 PROCEDURE — 0DJ08ZZ INSPECTION OF UPPER INTESTINAL TRACT, VIA NATURAL OR ARTIFICIAL OPENING ENDOSCOPIC: ICD-10-PCS | Performed by: INTERNAL MEDICINE

## 2020-01-22 PROCEDURE — 80053 COMPREHEN METABOLIC PANEL: CPT | Performed by: INTERNAL MEDICINE

## 2020-01-22 PROCEDURE — 99222 1ST HOSP IP/OBS MODERATE 55: CPT | Performed by: CLINICAL NURSE SPECIALIST

## 2020-01-22 PROCEDURE — C9113 INJ PANTOPRAZOLE SODIUM, VIA: HCPCS | Performed by: INTERNAL MEDICINE

## 2020-01-22 PROCEDURE — 99233 SBSQ HOSP IP/OBS HIGH 50: CPT | Performed by: INTERNAL MEDICINE

## 2020-01-22 PROCEDURE — 81003 URINALYSIS AUTO W/O SCOPE: CPT | Performed by: INTERNAL MEDICINE

## 2020-01-22 PROCEDURE — 93010 ELECTROCARDIOGRAM REPORT: CPT | Performed by: INTERNAL MEDICINE

## 2020-01-22 PROCEDURE — 85025 COMPLETE CBC W/AUTO DIFF WBC: CPT | Performed by: INTERNAL MEDICINE

## 2020-01-22 RX ORDER — GABAPENTIN 100 MG/1
100 CAPSULE ORAL DAILY
Status: DISCONTINUED | OUTPATIENT
Start: 2020-01-22 | End: 2020-01-26 | Stop reason: HOSPADM

## 2020-01-22 RX ORDER — LANOLIN ALCOHOL/MO/W.PET/CERES
6 CREAM (GRAM) TOPICAL
Status: DISCONTINUED | OUTPATIENT
Start: 2020-01-22 | End: 2020-01-26 | Stop reason: HOSPADM

## 2020-01-22 RX ORDER — SODIUM CHLORIDE, SODIUM LACTATE, POTASSIUM CHLORIDE, CALCIUM CHLORIDE 600; 310; 30; 20 MG/100ML; MG/100ML; MG/100ML; MG/100ML
INJECTION, SOLUTION INTRAVENOUS CONTINUOUS PRN
Status: DISCONTINUED | OUTPATIENT
Start: 2020-01-22 | End: 2020-01-22 | Stop reason: SURG

## 2020-01-22 RX ORDER — ATORVASTATIN CALCIUM 20 MG/1
20 TABLET, FILM COATED ORAL
Status: DISCONTINUED | OUTPATIENT
Start: 2020-01-22 | End: 2020-01-26 | Stop reason: HOSPADM

## 2020-01-22 RX ORDER — MAGNESIUM CARB/ALUMINUM HYDROX 105-160MG
296 TABLET,CHEWABLE ORAL ONCE
Status: COMPLETED | OUTPATIENT
Start: 2020-01-22 | End: 2020-01-22

## 2020-01-22 RX ORDER — KETAMINE HCL IN NACL, ISO-OSM 100MG/10ML
SYRINGE (ML) INJECTION AS NEEDED
Status: DISCONTINUED | OUTPATIENT
Start: 2020-01-22 | End: 2020-01-22 | Stop reason: SURG

## 2020-01-22 RX ORDER — PROPOFOL 10 MG/ML
INJECTION, EMULSION INTRAVENOUS AS NEEDED
Status: DISCONTINUED | OUTPATIENT
Start: 2020-01-22 | End: 2020-01-22 | Stop reason: SURG

## 2020-01-22 RX ORDER — TAMSULOSIN HYDROCHLORIDE 0.4 MG/1
0.4 CAPSULE ORAL
Status: DISCONTINUED | OUTPATIENT
Start: 2020-01-22 | End: 2020-01-26 | Stop reason: HOSPADM

## 2020-01-22 RX ORDER — LIDOCAINE HYDROCHLORIDE 10 MG/ML
INJECTION, SOLUTION EPIDURAL; INFILTRATION; INTRACAUDAL; PERINEURAL AS NEEDED
Status: DISCONTINUED | OUTPATIENT
Start: 2020-01-22 | End: 2020-01-22 | Stop reason: SURG

## 2020-01-22 RX ORDER — GLYCOPYRROLATE 0.2 MG/ML
INJECTION INTRAMUSCULAR; INTRAVENOUS AS NEEDED
Status: DISCONTINUED | OUTPATIENT
Start: 2020-01-22 | End: 2020-01-22 | Stop reason: SURG

## 2020-01-22 RX ORDER — MAGNESIUM CARB/ALUMINUM HYDROX 105-160MG
296 TABLET,CHEWABLE ORAL ONCE
Status: DISCONTINUED | OUTPATIENT
Start: 2020-01-22 | End: 2020-01-26 | Stop reason: HOSPADM

## 2020-01-22 RX ORDER — ONDANSETRON 2 MG/ML
4 INJECTION INTRAMUSCULAR; INTRAVENOUS EVERY 6 HOURS PRN
Status: DISCONTINUED | OUTPATIENT
Start: 2020-01-22 | End: 2020-01-26 | Stop reason: HOSPADM

## 2020-01-22 RX ORDER — METOCLOPRAMIDE HYDROCHLORIDE 5 MG/ML
10 INJECTION INTRAMUSCULAR; INTRAVENOUS ONCE
Status: COMPLETED | OUTPATIENT
Start: 2020-01-22 | End: 2020-01-22

## 2020-01-22 RX ORDER — SODIUM CHLORIDE 9 MG/ML
75 INJECTION, SOLUTION INTRAVENOUS CONTINUOUS
Status: DISCONTINUED | OUTPATIENT
Start: 2020-01-22 | End: 2020-01-24

## 2020-01-22 RX ORDER — AMLODIPINE BESYLATE 10 MG/1
10 TABLET ORAL DAILY
Status: DISCONTINUED | OUTPATIENT
Start: 2020-01-22 | End: 2020-01-26 | Stop reason: HOSPADM

## 2020-01-22 RX ADMIN — Medication 15 MG: at 13:12

## 2020-01-22 RX ADMIN — GABAPENTIN 100 MG: 100 CAPSULE ORAL at 11:12

## 2020-01-22 RX ADMIN — METOCLOPRAMIDE 10 MG: 5 INJECTION, SOLUTION INTRAMUSCULAR; INTRAVENOUS at 14:08

## 2020-01-22 RX ADMIN — SODIUM CHLORIDE, SODIUM LACTATE, POTASSIUM CHLORIDE, AND CALCIUM CHLORIDE: .6; .31; .03; .02 INJECTION, SOLUTION INTRAVENOUS at 13:01

## 2020-01-22 RX ADMIN — SODIUM CHLORIDE 8 MG/HR: 9 INJECTION, SOLUTION INTRAVENOUS at 18:20

## 2020-01-22 RX ADMIN — MAGNESIUM CITRATE 296 ML: 1.75 LIQUID ORAL at 13:19

## 2020-01-22 RX ADMIN — AMLODIPINE BESYLATE 10 MG: 10 TABLET ORAL at 11:11

## 2020-01-22 RX ADMIN — PROPOFOL 30 MG: 10 INJECTION, EMULSION INTRAVENOUS at 13:15

## 2020-01-22 RX ADMIN — LIDOCAINE HYDROCHLORIDE 50 MG: 10 INJECTION, SOLUTION EPIDURAL; INFILTRATION; INTRACAUDAL; PERINEURAL at 13:12

## 2020-01-22 RX ADMIN — PROPOFOL 50 MG: 10 INJECTION, EMULSION INTRAVENOUS at 13:12

## 2020-01-22 RX ADMIN — GLYCOPYRROLATE 0.4 MG: 0.2 INJECTION, SOLUTION INTRAMUSCULAR; INTRAVENOUS at 13:22

## 2020-01-22 RX ADMIN — MAGNESIUM CITRATE 296 ML: 1.75 LIQUID ORAL at 13:20

## 2020-01-22 RX ADMIN — GLUCAGON HYDROCHLORIDE 1 MG: KIT at 13:20

## 2020-01-22 RX ADMIN — PROPOFOL 30 MG: 10 INJECTION, EMULSION INTRAVENOUS at 13:19

## 2020-01-22 NOTE — ASSESSMENT & PLAN NOTE
Secondary to above  Status post transfusion of 1 unit of blood  Continue to monitor hemoglobin  Patient has had previous blood transfusions   egd with push enteroscopy-normal 1/22/20  Discussed with family  Continue to monitor labs   Colonoscopy today

## 2020-01-22 NOTE — QUICK NOTE
Spoke to patient's son, James Samson  We are planning for EGD with push enteroscopy  Patient voices understanding, and is in agreement with our plan  Awaiting hemoglobin this morning

## 2020-01-22 NOTE — PLAN OF CARE
Problem: PAIN - ADULT  Goal: Verbalizes/displays adequate comfort level or baseline comfort level  Description  Interventions:  - Encourage patient to monitor pain and request assistance  - Assess pain using appropriate pain scale  - Administer analgesics based on type and severity of pain and evaluate response  - Implement non-pharmacological measures as appropriate and evaluate response  - Consider cultural and social influences on pain and pain management  - Notify physician/advanced practitioner if interventions unsuccessful or patient reports new pain  Outcome: Progressing     Problem: INFECTION - ADULT  Goal: Absence or prevention of progression during hospitalization  Description  INTERVENTIONS:  - Assess and monitor for signs and symptoms of infection  - Monitor lab/diagnostic results  - Monitor all insertion sites, i e  indwelling lines, tubes, and drains  - Monitor endotracheal if appropriate and nasal secretions for changes in amount and color  - [place] appropriate cooling/warming therapies per order  - Administer medications as ordered  - Instruct and encourage patient and family to use good hand hygiene technique  - Identify and instruct in appropriate isolation precautions for identified infection/condition  Outcome: Progressing     Problem: SAFETY ADULT  Goal: Patient will remain free of falls  Description  INTERVENTIONS:  - Assess patient frequently for physical needs  -  Identify cognitive and physical deficits and behaviors that affect risk of falls    -  [place] fall precautions as indicated by assessment   - Educate patient/family on patient safety including physical limitations  - Instruct patient to call for assistance with activity based on assessment  - Modify environment to reduce risk of injury  - Consider OT/PT consult to assist with strengthening/mobility  Outcome: Progressing  Goal: Maintain or return to baseline ADL function  Description  INTERVENTIONS:  -  Assess patient's ability to carry out ADLs; assess patient's baseline for ADL function and identify physical deficits which impact ability to perform ADLs (bathing, care of mouth/teeth, toileting, grooming, dressing, etc )  - Assess/evaluate cause of self-care deficits   - Assess range of motion  - Assess patient's mobility; develop plan if impaired  - Assess patient's need for assistive devices and provide as appropriate  - Encourage maximum independence but intervene and supervise when necessary  - Involve family in performance of ADLs  - Assess for home care needs following discharge   - Consider OT consult to assist with ADL evaluation and planning for discharge  - Provide patient education as appropriate  Outcome: Progressing  Goal: Maintain or return mobility status to optimal level  Description  INTERVENTIONS:  - Assess patient's baseline mobility status (ambulation, transfers, stairs, etc )    - Identify cognitive and physical deficits and behaviors that affect mobility  - Identify mobility aids required to assist with transfers and/or ambulation (gait belt, sit-to-stand, lift, walker, cane, etc )  - [place] fall precautions as indicated by assessment  - Record patient progress and toleration of activity level on Mobility SBAR; progress patient to next Phase/Stage  - Instruct patient to call for assistance with activity based on assessment  - Consider rehabilitation consult to assist with strengthening/weightbearing, etc   Outcome: Progressing     Problem: DISCHARGE PLANNING  Goal: Discharge to home or other facility with appropriate resources  Description  INTERVENTIONS:  - Identify barriers to discharge w/patient and caregiver  - Arrange for needed discharge resources and transportation as appropriate  - Identify discharge learning needs (meds, wound care, etc )  - Arrange for interpretive services to assist at discharge as needed  - Refer to Case Management Department for coordinating discharge planning if the patient needs post-hospital services based on physician/advanced practitioner order or complex needs related to functional status, cognitive ability, or social support system  Outcome: Progressing     Problem: Knowledge Deficit  Goal: Patient/family/caregiver demonstrates understanding of disease process, treatment plan, medications, and discharge instructions  Description  Complete learning assessment and assess knowledge base    Interventions:  - Provide teaching at level of understanding  - Provide teaching via preferred learning methods  Outcome: Progressing

## 2020-01-22 NOTE — PROGRESS NOTES
[address] Internal Medicine Progress Note  Patient: Kulwant Stockton 78 y o  male   MRN: [id number]  PCP: Mariluz Quiroz MD  Unit/Bed#: MS 33267 Encounter: [id number]  Date Of Visit: 01/22/20          * GI bleed  Assessment & Plan  Patient with melena and generalized weakness  Hemoglobin also down  Received urine of blood  Continue with PPI  Awaiting for GI procedure  Acute blood loss anemia  Assessment & Plan  Secondary to above  Status post transfusion of 1 unit of blood  Continue to monitor hemoglobin  Patient has had previous blood transfusions  Awaiting to go for GI process-EGD  Discussed with family  Continue to monitor labs    Esophageal cancer [facility])  Assessment & Plan  Follow-up with his oncologist on outpatient basis    Hx of BKA, right [facility])  Assessment & Plan  Incision site intact  Had it done on December 19, 2019  Back to rehab hopefully after procedure and stabilization    Essential hypertension  Assessment & Plan  Continue home medications    Peripheral vascular disease [facility])  Assessment & Plan  Patient would need to go back on his anticoagulation  Remains at high risk for bleeding      Present on Admission:   Dizziness   Peripheral vascular disease (HCC)   Essential hypertension   GI bleed   Esophageal cancer (HCC)   Left carotid stenosis   Leukocytosis   Acute blood loss anemia            VTE Pharmacologic Prophylaxis:   Pharmacologic: Patient on Coumadin  Holding because of acute blood loss anemia  He was started on IV heparin which is being held for procedure  Mechanical VTE Prophylaxis in Place:  Left leg only as patient is right below-knee amputee    Patient Centered Rounds: I have performed bedside rounds with nursing staff today  Discussions with Specialists or Other Care Team Provider:  Yes    Education and Discussions with Family / Patient:  Yes    Time Spent for Care: 35+ minutes    More than 50% of total time spent on counseling and coordination of care as described above  Current Length of Stay: 1 day(s)    Current Patient Status: Inpatient   Certification Statement: The patient will continue to require additional inpatient hospital stay due to Acute blood loss anemia    Discharge Plan:  Hopefully back to his rehab facility soon    Code Status: Level 1 - Full Code      Subjective:   Patient does not talk much  Family at bedside  Patient was out with family from the rehab facility  He had a bowel movement which was black cleared also felt dizzy and weak  Received blood transfusion  At the moment, as per patient's family, he is feeling hungry  Awaiting to go for GI procedure  Patient has no other complaints    Objective:     Vitals:   Temp (24hrs), Av °F (36 7 °C), Min:97 9 °F (36 6 °C), Max:98 2 °F (36 8 °C)    Temp:  [97 9 °F (36 6 °C)-98 2 °F (36 8 °C)] 98 2 °F (36 8 °C)  HR:  [73-83] 73  Resp:  [14-26] 17  BP: ()/(46-61) 116/55  SpO2:  [99 %-100 %] 99 %  Body mass index is 19 64 kg/m²  Input and Output Summary (last 24 hours): Intake/Output Summary (Last 24 hours) at 2020 1111  Last data filed at 2020 5826  Gross per 24 hour   Intake 300 ml   Output 550 ml   Net -250 ml           Physical Exam:     Vital signs reviewed as above  Patient lying in bed  Oropharynx are dry  Abdomen is soft    Bowel sounds are  Conjunctivae are pale  Right below-knee amputee  In poor health in general  No cyanosis or clubbing left side  Poor air entry on auscultation  S1 and S2 audible      Additional Data:     Labs:    Results from last 7 days   Lab Units 20  0943   WBC Thousand/uL 7 73   HEMOGLOBIN g/dL 7 2*   HEMATOCRIT % 22 9*   PLATELETS Thousands/uL 186   NEUTROS PCT % 83*   LYMPHS PCT % 8*   MONOS PCT % 6   EOS PCT % 2     Results from last 7 days   Lab Units 20  0943   POTASSIUM mmol/L 3 9   CHLORIDE mmol/L 109*   CO2 mmol/L 24   BUN mg/dL 24   CREATININE mg/dL 0 87   CALCIUM mg/dL 8 3   ALK PHOS U/L 63   ALT U/L 8* AST U/L 9     Results from last 7 days   Lab Units 01/21/20  1257   INR  1 91*       * I Have Reviewed All Lab Data Listed Above  * Additional Pertinent Lab Tests Reviewed: All Labs Within Last 24 Hours Reviewed            Last 24 Hours Medication List:     Current Facility-Administered Medications:  amLODIPine 10 mg Oral Daily Jesús Moulton MD    atorvastatin 20 mg Oral After Chuy Dorsey MD    gabapentin 100 mg Oral Daily Jesús Moulton MD    heparin (porcine) 3-20 Units/kg/hr (Order-Specific) Intravenous Titrated Jesús Moulton MD Last Rate: Stopped (01/22/20 0053)   melatonin 6 mg Oral HS Jesús Moulton MD    ondansetron 4 mg Intravenous Q6H PRN Jesús Moulton MD    pantoprozole (PROTONIX) infusion (Continuous) 8 mg/hr Intravenous Continuous Jesús Moulton MD Last Rate: 8 mg/hr (01/21/20 1849)   sodium chloride 75 mL/hr Intravenous Continuous Jesús Moulton MD    tamsulosin 0 4 mg Oral Daily With Chuy Dorsey MD         Today, Patient Was Seen By: Ebony Gresham MD    ** Please Note: Dragon 360 Dictation voice to text software may have been used in the creation of this document   **

## 2020-01-22 NOTE — PROGRESS NOTES
Attempted to draw PTT bloodwork but patient adamantly refused  Educated on the importance of checking PTT while on a Heparin drip but patient still refusing  Notified Steven SAUCEDA - instructed to hold Heparin drip until patient allows bloodwork

## 2020-01-22 NOTE — ANESTHESIA POSTPROCEDURE EVALUATION
Post-Op Assessment Note    CV Status:  Stable  Pain Score: 0    Pain management: adequate     Mental Status:  Sleepy   Hydration Status:  Stable   PONV Controlled:  None   Airway Patency:  Patent and adequate   Post Op Vitals Reviewed: Yes      Staff: CRNA           BP   142/65   Temp      Pulse  90   Resp  16   SpO2   99%

## 2020-01-22 NOTE — ASSESSMENT & PLAN NOTE
Patient with melena and generalized weakness  Hemoglobin also down  Received urit of blood  Continue with PPI    Awaiting for colonoscopy today

## 2020-01-22 NOTE — SOCIAL WORK
Pt is a 30-day readmission  CM spoke with pt's son/[facility])  Shasha Luz stated that his father came to [facility] from the [place] [facility] [place]  Shasha Luz is hopeful that pt will be able to return to that facility upon d/c CM to s/w [facility] regarding placing PT/OT orders to facilitate placement  CM to follow

## 2020-01-22 NOTE — ANESTHESIA PREPROCEDURE EVALUATION
Review of Systems/Medical History  Patient summary reviewed  Chart reviewed  No history of anesthetic complications     Cardiovascular  EKG reviewed, Negative cardio ROS Exercise tolerance (METS): >4,  Hyperlipidemia, Hypertension , PVD,   Comment: R PFA occulusion with failed bypass,  Pulmonary    Comment: Laryngeal cancer  GI/Hepatic  Negative GI/hepatic ROS   GERD ,        Negative  ROS Kidney stones,        Endo/Other  Negative endo/other ROS      GYN  Negative gynecology ROS          Hematology  Anemia ,     Musculoskeletal  Negative musculoskeletal ROS        Neurology  Negative neurology ROS      Psychology   Negative psychology ROS              Physical Exam    Airway    Mallampati score: I  TM Distance: >3 FB  Neck ROM: full     Dental   upper dentures and lower dentures,     Cardiovascular  Comment: Negative ROS, Rhythm: regular, Rate: normal, Cardiovascular exam normal    Pulmonary  Pulmonary exam normal Breath sounds clear to auscultation,     Other Findings        Anesthesia Plan  ASA Score- 2     Anesthesia Type- IV sedation with anesthesia with ASA Monitors  Additional Monitors:   Airway Plan: LMA  Plan Factors- Patient instructed to abstain from smoking on day of procedure  Patient did not smoke on day of surgery  Induction- intravenous  Postoperative Plan-     Informed Consent- Anesthetic plan and risks discussed with patient  I personally reviewed this patient with the CRNA  Discussed and agreed on the Anesthesia Plan with the CRNA  Casie Mcgee

## 2020-01-22 NOTE — PLAN OF CARE
Problem: PAIN - ADULT  Goal: Verbalizes/displays adequate comfort level or baseline comfort level  Description  Interventions:  - Encourage patient to monitor pain and request assistance  - Assess pain using appropriate pain scale  - Administer analgesics based on type and severity of pain and evaluate response  - Implement non-pharmacological measures as appropriate and evaluate response  - Consider cultural and social influences on pain and pain management  - Notify physician/advanced practitioner if interventions unsuccessful or patient reports new pain  Outcome: Progressing     Problem: INFECTION - ADULT  Goal: Absence or prevention of progression during hospitalization  Description  INTERVENTIONS:  - Assess and monitor for signs and symptoms of infection  - Monitor lab/diagnostic results  - Monitor all insertion sites, i e  indwelling lines, tubes, and drains  - Monitor endotracheal if appropriate and nasal secretions for changes in amount and color  - [place] appropriate cooling/warming therapies per order  - Administer medications as ordered  - Instruct and encourage patient and family to use good hand hygiene technique  - Identify and instruct in appropriate isolation precautions for identified infection/condition  Outcome: Progressing     Problem: SAFETY ADULT  Goal: Patient will remain free of falls  Description  INTERVENTIONS:  - Assess patient frequently for physical needs  -  Identify cognitive and physical deficits and behaviors that affect risk of falls    -  [place] fall precautions as indicated by assessment   - Educate patient/family on patient safety including physical limitations  - Instruct patient to call for assistance with activity based on assessment  - Modify environment to reduce risk of injury  - Consider OT/PT consult to assist with strengthening/mobility  Outcome: Progressing  Goal: Maintain or return to baseline ADL function  Description  INTERVENTIONS:  -  Assess patient's ability to carry out ADLs; assess patient's baseline for ADL function and identify physical deficits which impact ability to perform ADLs (bathing, care of mouth/teeth, toileting, grooming, dressing, etc )  - Assess/evaluate cause of self-care deficits   - Assess range of motion  - Assess patient's mobility; develop plan if impaired  - Assess patient's need for assistive devices and provide as appropriate  - Encourage maximum independence but intervene and supervise when necessary  - Involve family in performance of ADLs  - Assess for home care needs following discharge   - Consider OT consult to assist with ADL evaluation and planning for discharge  - Provide patient education as appropriate  Outcome: Progressing  Goal: Maintain or return mobility status to optimal level  Description  INTERVENTIONS:  - Assess patient's baseline mobility status (ambulation, transfers, stairs, etc )    - Identify cognitive and physical deficits and behaviors that affect mobility  - Identify mobility aids required to assist with transfers and/or ambulation (gait belt, sit-to-stand, lift, walker, cane, etc )  - [place] fall precautions as indicated by assessment  - Record patient progress and toleration of activity level on Mobility SBAR; progress patient to next Phase/Stage  - Instruct patient to call for assistance with activity based on assessment  - Consider rehabilitation consult to assist with strengthening/weightbearing, etc   Outcome: Progressing     Problem: DISCHARGE PLANNING  Goal: Discharge to home or other facility with appropriate resources  Description  INTERVENTIONS:  - Identify barriers to discharge w/patient and caregiver  - Arrange for needed discharge resources and transportation as appropriate  - Identify discharge learning needs (meds, wound care, etc )  - Arrange for interpretive services to assist at discharge as needed  - Refer to Case Management Department for coordinating discharge planning if the patient needs post-hospital services based on physician/advanced practitioner order or complex needs related to functional status, cognitive ability, or social support system  Outcome: Progressing     Problem: Knowledge Deficit  Goal: Patient/family/caregiver demonstrates understanding of disease process, treatment plan, medications, and discharge instructions  Description  Complete learning assessment and assess knowledge base    Interventions:  - Provide teaching at level of understanding  - Provide teaching via preferred learning methods  Outcome: Progressing

## 2020-01-22 NOTE — ASSESSMENT & PLAN NOTE
Incision site intact  Had it done on December 19, 2019    Back to rehab hopefully after procedure and stabilization

## 2020-01-23 ENCOUNTER — ANESTHESIA EVENT (INPATIENT)
Dept: GASTROENTEROLOGY | Facility: HOSPITAL | Age: 70
DRG: 378 | End: 2020-01-23
Payer: MEDICARE

## 2020-01-23 ENCOUNTER — ANESTHESIA (INPATIENT)
Dept: GASTROENTEROLOGY | Facility: HOSPITAL | Age: 70
DRG: 378 | End: 2020-01-23
Payer: MEDICARE

## 2020-01-23 ENCOUNTER — APPOINTMENT (INPATIENT)
Dept: GASTROENTEROLOGY | Facility: HOSPITAL | Age: 70
DRG: 378 | End: 2020-01-23
Payer: MEDICARE

## 2020-01-23 LAB
APTT PPP: 39 SECONDS (ref 23–37)
HCT VFR BLD AUTO: 23.4 % (ref 36.5–49.3)
HGB BLD-MCNC: 7.2 G/DL (ref 12–17)
INR PPP: 1.27 (ref 0.84–1.19)
PROTHROMBIN TIME: 16 SECONDS (ref 11.6–14.5)

## 2020-01-23 PROCEDURE — 85730 THROMBOPLASTIN TIME PARTIAL: CPT | Performed by: INTERNAL MEDICINE

## 2020-01-23 PROCEDURE — 85014 HEMATOCRIT: CPT | Performed by: INTERNAL MEDICINE

## 2020-01-23 PROCEDURE — 85018 HEMOGLOBIN: CPT | Performed by: INTERNAL MEDICINE

## 2020-01-23 PROCEDURE — 45378 DIAGNOSTIC COLONOSCOPY: CPT | Performed by: INTERNAL MEDICINE

## 2020-01-23 PROCEDURE — 85610 PROTHROMBIN TIME: CPT | Performed by: INTERNAL MEDICINE

## 2020-01-23 PROCEDURE — 99232 SBSQ HOSP IP/OBS MODERATE 35: CPT | Performed by: GENERAL PRACTICE

## 2020-01-23 PROCEDURE — C9113 INJ PANTOPRAZOLE SODIUM, VIA: HCPCS | Performed by: INTERNAL MEDICINE

## 2020-01-23 PROCEDURE — 0DJD8ZZ INSPECTION OF LOWER INTESTINAL TRACT, VIA NATURAL OR ARTIFICIAL OPENING ENDOSCOPIC: ICD-10-PCS | Performed by: INTERNAL MEDICINE

## 2020-01-23 RX ORDER — PROPOFOL 10 MG/ML
INJECTION, EMULSION INTRAVENOUS AS NEEDED
Status: DISCONTINUED | OUTPATIENT
Start: 2020-01-23 | End: 2020-01-23 | Stop reason: SURG

## 2020-01-23 RX ORDER — GLYCOPYRROLATE 0.2 MG/ML
INJECTION INTRAMUSCULAR; INTRAVENOUS AS NEEDED
Status: DISCONTINUED | OUTPATIENT
Start: 2020-01-23 | End: 2020-01-23 | Stop reason: SURG

## 2020-01-23 RX ADMIN — SODIUM CHLORIDE 8 MG/HR: 9 INJECTION, SOLUTION INTRAVENOUS at 16:31

## 2020-01-23 RX ADMIN — SODIUM CHLORIDE 75 ML/HR: 0.9 INJECTION, SOLUTION INTRAVENOUS at 00:10

## 2020-01-23 RX ADMIN — PROPOFOL 20 MG: 10 INJECTION, EMULSION INTRAVENOUS at 15:19

## 2020-01-23 RX ADMIN — ATORVASTATIN CALCIUM 20 MG: 20 TABLET, FILM COATED ORAL at 17:13

## 2020-01-23 RX ADMIN — PROPOFOL 20 MG: 10 INJECTION, EMULSION INTRAVENOUS at 15:06

## 2020-01-23 RX ADMIN — MELATONIN 6 MG: 3 TAB ORAL at 21:49

## 2020-01-23 RX ADMIN — PROPOFOL 100 MG: 10 INJECTION, EMULSION INTRAVENOUS at 15:00

## 2020-01-23 RX ADMIN — SODIUM CHLORIDE 8 MG/HR: 9 INJECTION, SOLUTION INTRAVENOUS at 06:41

## 2020-01-23 RX ADMIN — AMLODIPINE BESYLATE 10 MG: 10 TABLET ORAL at 10:08

## 2020-01-23 RX ADMIN — TAMSULOSIN HYDROCHLORIDE 0.4 MG: 0.4 CAPSULE ORAL at 17:13

## 2020-01-23 RX ADMIN — PROPOFOL 20 MG: 10 INJECTION, EMULSION INTRAVENOUS at 15:11

## 2020-01-23 RX ADMIN — PROPOFOL 20 MG: 10 INJECTION, EMULSION INTRAVENOUS at 15:22

## 2020-01-23 RX ADMIN — GLYCOPYRROLATE 0.2 MG: 0.2 INJECTION, SOLUTION INTRAMUSCULAR; INTRAVENOUS at 15:10

## 2020-01-23 RX ADMIN — GABAPENTIN 100 MG: 100 CAPSULE ORAL at 10:08

## 2020-01-23 NOTE — ANESTHESIA POSTPROCEDURE EVALUATION
Post-Op Assessment Note    CV Status:  Stable  Pain Score: 0    Pain management: adequate     Mental Status:  Sleepy   Hydration Status:  Stable   PONV Controlled:  None   Airway Patency:  Patent   Post Op Vitals Reviewed: Yes      Staff: CRNA           BP   106/58   Temp      Pulse  76   Resp   20   SpO2   98

## 2020-01-23 NOTE — PROGRESS NOTES
Pt is uncooperative, family was visiting today and his son is the only one patient would listen to  Patient refused all evening medication including Mag citrate for prep for colonoscopy

## 2020-01-23 NOTE — SOCIAL WORK
[facility] [place] confirming acceptance      Nursing report phone [phone]     Fax # [phone]

## 2020-01-23 NOTE — ANESTHESIA PREPROCEDURE EVALUATION
Review of Systems/Medical History  Patient summary reviewed  Chart reviewed  No history of anesthetic complications     Cardiovascular  EKG reviewed, Negative cardio ROS Exercise tolerance (METS): >4,  Hyperlipidemia, Hypertension , PVD,   Comment: R PFA occulusion with failed bypass,  Pulmonary  Negative pulmonary ROS   Comment: Laryngeal cancer  GI/Hepatic  Negative GI/hepatic ROS   GERD ,        Negative  ROS Kidney stones,        Endo/Other  Negative endo/other ROS      GYN       Hematology  Anemia ,     Musculoskeletal  Negative musculoskeletal ROS        Neurology  Negative neurology ROS      Psychology   Negative psychology ROS              Physical Exam    Airway    Mallampati score: I  TM Distance: >3 FB  Neck ROM: full     Dental   upper dentures and lower dentures,     Cardiovascular  Comment: Negative ROS, Rhythm: regular, Rate: normal, Cardiovascular exam normal    Pulmonary  Pulmonary exam normal Breath sounds clear to auscultation,     Other Findings        Anesthesia Plan  ASA Score- 2     Anesthesia Type- IV sedation with anesthesia with ASA Monitors  Additional Monitors:   Airway Plan: LMA  Plan Factors- Patient instructed to abstain from smoking on day of procedure  Patient did not smoke on day of surgery  Induction- intravenous  Postoperative Plan-     Informed Consent- Anesthetic plan and risks discussed with patient  I personally reviewed this patient with the CRNA  Discussed and agreed on the Anesthesia Plan with the TAYA León

## 2020-01-23 NOTE — ASSESSMENT & PLAN NOTE
· White blood cell count 16 68  · Possibly reactive to anemia  · Patient afebrile, no signs of any infection  · Will defer antibiotics at this point and monitor CBC  · resolved

## 2020-01-23 NOTE — PROGRESS NOTES
Progress Note - Maida Villalta 1950, 78 y o  male MRN: [id number]    Unit/Bed#: -01 Encounter: [id number]    Primary Care Provider: Den Marie MD   Date and time admitted to hospital: 1/21/2020 11:54 AM        Acute blood loss anemia  Assessment & Plan  Secondary to above  Status post transfusion of 1 unit of blood  Continue to monitor hemoglobin  Patient has had previous blood transfusions   egd with push enteroscopy-normal 1/22/20  Discussed with family  Continue to monitor labs  Colonoscopy today    Leukocytosis  Assessment & Plan  · White blood cell count 16 68  · Possibly reactive to anemia  · Patient afebrile, no signs of any infection  · Will defer antibiotics at this point and monitor CBC  · resolved    Left carotid stenosis  Assessment & Plan  Recently diagnosed, 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 and esophageal cancer  Vascular surgery recommending six-month follow-up    Esophageal cancer [facility])  Assessment & Plan  Follow-up with his oncologist on outpatient basis    Hx of BKA, right ([address] )  Assessment & Plan  Incision site intact  Had it done on December 19, 2019  Back to rehab hopefully after procedure and stabilization    Essential hypertension  Assessment & Plan  Continue home medications-bp low at times will monitor    Peripheral vascular disease [facility])  Assessment & Plan  Patient would need to go back on his anticoagulation  Remains at high risk for bleeding    * GI bleed  Assessment & Plan  Patient with melena and generalized weakness  Hemoglobin also down  Received urit of blood  Continue with PPI  Awaiting for colonoscopy today      VTE Pharmacologic Prophylaxis:   Pharmacologic: Heparin Drip  Mechanical VTE Prophylaxis in Place: Yes    Patient Centered Rounds: I have performed bedside rounds with nursing staff today      Discussions with Specialists or Other Care Team Provider:     Education and Discussions with Family / Patient:     Time Spent for Care: 30 minutes  More than 50% of total time spent on counseling and coordination of care as described above  Current Length of Stay: 2 day(s)    Current Patient Status: Inpatient   Certification Statement: The patient will continue to require additional inpatient hospital stay due to colonoscopy today    Discharge Plan: rehab    Code Status: Level 1 - Full Code      Subjective:   No c/o-denies abdominal pain    Objective:     Vitals:   Temp (24hrs), Av 5 °F (36 4 °C), Min:97 3 °F (36 3 °C), Max:97 6 °F (36 4 °C)    Temp:  [97 3 °F (36 3 °C)-97 6 °F (36 4 °C)] 97 5 °F (36 4 °C)  HR:  [60-76] 64  Resp:  [17-36] 17  BP: ()/(43-65) 111/45  SpO2:  [96 %-100 %] 98 %  Body mass index is 19 64 kg/m²  Input and Output Summary (last 24 hours): Intake/Output Summary (Last 24 hours) at 2020 0813  Last data filed at 2020 1900  Gross per 24 hour   Intake 200 ml   Output    Net 200 ml       Physical Exam:     Physical Exam   Constitutional: He is oriented to person, place, and time  He appears well-developed  HENT:   Head: Normocephalic and atraumatic  Eyes: Pupils are equal, round, and reactive to light  Neck: Neck supple  Cardiovascular: Normal rate and regular rhythm  Pulmonary/Chest: Effort normal and breath sounds normal    Abdominal: Soft  Bowel sounds are normal    Musculoskeletal: He exhibits no edema  S/p right bka   Neurological: He is alert and oriented to person, place, and time  Skin: Skin is warm and dry         Additional Data:     Labs:    Results from last 7 days   Lab Units 20  0943   WBC Thousand/uL 7 73   HEMOGLOBIN g/dL 7 2*   HEMATOCRIT % 22 9*   PLATELETS Thousands/uL 186   NEUTROS PCT % 83*   LYMPHS PCT % 8*   MONOS PCT % 6   EOS PCT % 2     Results from last 7 days   Lab Units 20  0943   SODIUM mmol/L 141   POTASSIUM mmol/L 3 9   CHLORIDE mmol/L 109*   CO2 mmol/L 24   BUN mg/dL 24   CREATININE mg/dL 0 87   ANION GAP mmol/L 8   CALCIUM mg/dL 8  3   ALBUMIN g/dL 2 5*   TOTAL BILIRUBIN mg/dL 0 40   ALK PHOS U/L 63   ALT U/L 8*   AST U/L 9   GLUCOSE RANDOM mg/dL 92     Results from last 7 days   Lab Units 01/21/20  1257   INR  1 91*     Results from last 7 days   Lab Units 01/22/20 2050 01/22/20  1757   POC GLUCOSE mg/dl 126 127                   * I Have Reviewed All Lab Data Listed Above  * Additional Pertinent Lab Tests Reviewed:    Imaging:    Imaging Reports Reviewed Today Include:   Imaging Personally Reviewed by Myself Includes:      Recent Cultures (last 7 days):           Last 24 Hours Medication List:     Current Facility-Administered Medications:  amLODIPine 10 mg Oral Daily Nithya Dixon MD    atorvastatin 20 mg Oral After Heather Hayes MD    gabapentin 100 mg Oral Daily Nithya Dixon MD    heparin (porcine) 3-20 Units/kg/hr (Order-Specific) Intravenous Titrated Nithya Dixon MD Last Rate: Stopped (01/22/20 0053)   magnesium citrate 296 mL Oral Once Currie Fong, DO    melatonin 6 mg Oral HS Nithya Dixon MD    ondansetron 4 mg Intravenous Q6H PRN Nithya Dixon MD    pantoprozole (PROTONIX) infusion (Continuous) 8 mg/hr Intravenous Continuous Nithya Dixon MD Last Rate: 8 mg/hr (01/23/20 0641)   sodium chloride 75 mL/hr Intravenous Continuous Nithya Dixon MD Last Rate: 75 mL/hr (01/23/20 0010)   tamsulosin 0 4 mg Oral Daily With Heather Hayes MD         Today, Patient Was Seen By: Jung Krishna MD    ** Please Note: Dictation voice to text software may have been used in the creation of this document   **

## 2020-01-23 NOTE — SOCIAL WORK
Waiting colonscopy today  CM to request PT/OT orders to facilitate placement when cleared for discharge

## 2020-01-24 LAB
APTT PPP: 37 SECONDS (ref 23–37)
BASOPHILS # BLD AUTO: 0.02 THOUSANDS/ΜL (ref 0–0.1)
BASOPHILS NFR BLD AUTO: 0 % (ref 0–1)
EOSINOPHIL # BLD AUTO: 0.1 THOUSAND/ΜL (ref 0–0.61)
EOSINOPHIL NFR BLD AUTO: 2 % (ref 0–6)
ERYTHROCYTE [DISTWIDTH] IN BLOOD BY AUTOMATED COUNT: 18 % (ref 11.6–15.1)
HCT VFR BLD AUTO: 23.1 % (ref 36.5–49.3)
HGB BLD-MCNC: 7.1 G/DL (ref 12–17)
IMM GRANULOCYTES # BLD AUTO: 0.05 THOUSAND/UL (ref 0–0.2)
IMM GRANULOCYTES NFR BLD AUTO: 1 % (ref 0–2)
INR PPP: 1.22 (ref 0.84–1.19)
LYMPHOCYTES # BLD AUTO: 0.4 THOUSANDS/ΜL (ref 0.6–4.47)
LYMPHOCYTES NFR BLD AUTO: 6 % (ref 14–44)
MCH RBC QN AUTO: 29.8 PG (ref 26.8–34.3)
MCHC RBC AUTO-ENTMCNC: 30.7 G/DL (ref 31.4–37.4)
MCV RBC AUTO: 97 FL (ref 82–98)
MONOCYTES # BLD AUTO: 0.26 THOUSAND/ΜL (ref 0.17–1.22)
MONOCYTES NFR BLD AUTO: 4 % (ref 4–12)
NEUTROPHILS # BLD AUTO: 5.81 THOUSANDS/ΜL (ref 1.85–7.62)
NEUTS SEG NFR BLD AUTO: 87 % (ref 43–75)
NRBC BLD AUTO-RTO: 0 /100 WBCS
PLATELET # BLD AUTO: 197 THOUSANDS/UL (ref 149–390)
PMV BLD AUTO: 8.9 FL (ref 8.9–12.7)
PROTHROMBIN TIME: 15.5 SECONDS (ref 11.6–14.5)
RBC # BLD AUTO: 2.38 MILLION/UL (ref 3.88–5.62)
WBC # BLD AUTO: 6.64 THOUSAND/UL (ref 4.31–10.16)

## 2020-01-24 PROCEDURE — 85610 PROTHROMBIN TIME: CPT | Performed by: INTERNAL MEDICINE

## 2020-01-24 PROCEDURE — 94762 N-INVAS EAR/PLS OXIMTRY CONT: CPT

## 2020-01-24 PROCEDURE — 97167 OT EVAL HIGH COMPLEX 60 MIN: CPT

## 2020-01-24 PROCEDURE — 85730 THROMBOPLASTIN TIME PARTIAL: CPT | Performed by: INTERNAL MEDICINE

## 2020-01-24 PROCEDURE — 99232 SBSQ HOSP IP/OBS MODERATE 35: CPT | Performed by: GENERAL PRACTICE

## 2020-01-24 PROCEDURE — C9113 INJ PANTOPRAZOLE SODIUM, VIA: HCPCS | Performed by: INTERNAL MEDICINE

## 2020-01-24 PROCEDURE — 97163 PT EVAL HIGH COMPLEX 45 MIN: CPT

## 2020-01-24 PROCEDURE — 85025 COMPLETE CBC W/AUTO DIFF WBC: CPT | Performed by: INTERNAL MEDICINE

## 2020-01-24 RX ORDER — PANTOPRAZOLE SODIUM 40 MG/1
40 TABLET, DELAYED RELEASE ORAL
Status: DISCONTINUED | OUTPATIENT
Start: 2020-01-25 | End: 2020-01-26 | Stop reason: HOSPADM

## 2020-01-24 RX ORDER — WARFARIN SODIUM 2.5 MG/1
2.5 TABLET ORAL
Status: DISCONTINUED | OUTPATIENT
Start: 2020-01-24 | End: 2020-01-26 | Stop reason: HOSPADM

## 2020-01-24 RX ADMIN — ENOXAPARIN SODIUM 60 MG: 60 INJECTION SUBCUTANEOUS at 21:55

## 2020-01-24 RX ADMIN — ATORVASTATIN CALCIUM 20 MG: 20 TABLET, FILM COATED ORAL at 17:55

## 2020-01-24 RX ADMIN — TAMSULOSIN HYDROCHLORIDE 0.4 MG: 0.4 CAPSULE ORAL at 17:55

## 2020-01-24 RX ADMIN — MELATONIN 6 MG: 3 TAB ORAL at 21:55

## 2020-01-24 RX ADMIN — SODIUM CHLORIDE 8 MG/HR: 9 INJECTION, SOLUTION INTRAVENOUS at 03:28

## 2020-01-24 RX ADMIN — GABAPENTIN 100 MG: 100 CAPSULE ORAL at 09:07

## 2020-01-24 RX ADMIN — ENOXAPARIN SODIUM 60 MG: 60 INJECTION SUBCUTANEOUS at 12:22

## 2020-01-24 RX ADMIN — WARFARIN SODIUM 2.5 MG: 2.5 TABLET ORAL at 17:55

## 2020-01-24 RX ADMIN — SODIUM CHLORIDE 75 ML/HR: 0.9 INJECTION, SOLUTION INTRAVENOUS at 07:59

## 2020-01-24 RX ADMIN — AMLODIPINE BESYLATE 10 MG: 10 TABLET ORAL at 09:07

## 2020-01-24 RX ADMIN — SODIUM CHLORIDE 8 MG/HR: 9 INJECTION, SOLUTION INTRAVENOUS at 15:28

## 2020-01-24 NOTE — NURSING NOTE
Unable to restart heparin drip, held since 1/22 at 0053 according to past notes  Pt continues to refuse labs tonight  No additional anticoagulant orders, pt hx of afib, and femoral thromboendartectomy  Slim tigertexted  Pt continues to refused to allow new IV access to restart drip, currently running protonix drip, slim aware, willing to review after admission

## 2020-01-24 NOTE — PROGRESS NOTES
Progress Note - Sandro Tavares 1950, 78 y o  male MRN: [id number]    Unit/Bed#: -01 Encounter: [id number]    Primary Care Provider: Simba Wisdom MD   Date and time admitted to hospital: 1/21/2020 11:54 AM        Acute blood loss anemia  Assessment & Plan  Secondary to above  Status post transfusion of 1 unit of blood  Continue to monitor hemoglobin  Patient has had previous blood transfusions   egd with push enteroscopy-normal 1/22/20  Continue to monitor labs  Colonoscopy without active bleeding-diverticulosis    Patient declining lab draws    Dizziness  Assessment & Plan  This is a 69-year-old male with history of hypertension, hyperlipidemia, peripheral vascular disease, esophageal cancer status post chemoradiation presenting with dizziness and weakness  · CT head negative  · Blood work significant for anemia, guaiac-positive in the ED    Left carotid stenosis  Assessment & Plan  Recently diagnosed, 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 and esophageal cancer  Vascular surgery recommending six-month follow-up    Esophageal cancer [facility])  Assessment & Plan  Follow-up with his oncologist on outpatient basis    Hx of BKA, right ([address] )  Assessment & Plan  Incision site intact  Had it done on December 19, 2019  Back to rehab hopefully after procedure and stabilization    Essential hypertension  Assessment & Plan  Continue home medications    Peripheral vascular disease [facility])  Assessment & Plan  Patient would need to go back on his anticoagulation  Remains at high risk for bleeding  Can use lovenox in the interim-refusing lab draws     * GI bleed  Assessment & Plan  Patient with melena and generalized weakness  Hemoglobin also down  Received urit of blood  Continue with PPI           VTE Pharmacologic Prophylaxis:   Pharmacologic: Enoxaparin (Lovenox)  Mechanical VTE Prophylaxis in Place: Yes    Patient Centered Rounds: I have performed bedside rounds with nursing staff today  Discussions with Specialists or Other Care Team Provider:     Education and Discussions with Family / Patient:     Time Spent for Care: 30 minutes  More than 50% of total time spent on counseling and coordination of care as described above  Current Length of Stay: 3 day(s)    Current Patient Status: Inpatient   Certification Statement: The patient will continue to require additional inpatient hospital stay due to anemia/gi bleed    [facility]charge Plan: [facility] [place]    Code Status: Level 1 - Full Code      Subjective:   No c/o this morning; denies difficulty or pain opening jaw  Objective:     Vitals:   Temp (24hrs), Av °F (36 7 °C), Min:97 °F (36 1 °C), Max:98 5 °F (36 9 °C)    Temp:  [97 °F (36 1 °C)-98 5 °F (36 9 °C)] 98 5 °F (36 9 °C)  HR:  [67-90] 75  Resp:  [14-20] 18  BP: ()/(45-63) 125/52  SpO2:  [96 %-100 %] 98 %  Body mass index is 19 64 kg/m²  Input and Output Summary (last 24 hours): Intake/Output Summary (Last 24 hours) at 2020 0713  Last data filed at 2020 3746  Gross per 24 hour   Intake 750 ml   Output 420 ml   Net 330 ml       Physical Exam:     Physical Exam   Constitutional: He appears well-developed  HENT:   Head: Normocephalic and atraumatic  Eyes: Pupils are equal, round, and reactive to light  Neck: Neck supple  Cardiovascular: Normal rate and regular rhythm  Pulmonary/Chest: Effort normal and breath sounds normal    Abdominal: Soft  Bowel sounds are normal    Musculoskeletal: He exhibits no edema  Neurological: He is alert  Skin: Skin is warm and dry  Psychiatric: He has a normal mood and affect   His behavior is normal          Additional Data:     Labs:    Results from last 7 days   Lab Units 20  1803 20  0943   WBC Thousand/uL  --  7 73   HEMOGLOBIN g/dL 7 2* 7 2*   HEMATOCRIT % 23 4* 22 9*   PLATELETS Thousands/uL  --  186   NEUTROS PCT %  --  83*   LYMPHS PCT %  --  8*   MONOS PCT %  --  6   EOS PCT %  --  2 Results from last 7 days   Lab Units 01/22/20  0943   SODIUM mmol/L 141   POTASSIUM mmol/L 3 9   CHLORIDE mmol/L 109*   CO2 mmol/L 24   BUN mg/dL 24   CREATININE mg/dL 0 87   ANION GAP mmol/L 8   CALCIUM mg/dL 8 3   ALBUMIN g/dL 2 5*   TOTAL BILIRUBIN mg/dL 0 40   ALK PHOS U/L 63   ALT U/L 8*   AST U/L 9   GLUCOSE RANDOM mg/dL 92     Results from last 7 days   Lab Units 01/23/20  1803   INR  1 27*     Results from last 7 days   Lab Units 01/22/20 2050 01/22/20  1757   POC GLUCOSE mg/dl 126 127                   * I Have Reviewed All Lab Data Listed Above  * Additional Pertinent Lab Tests Reviewed: All Labs Within Last 24 Hours Reviewed    Imaging:    Imaging Reports Reviewed Today Include:   Imaging Personally Reviewed by Myself Includes:      Recent Cultures (last 7 days):           Last 24 Hours Medication List:     Current Facility-Administered Medications:  amLODIPine 10 mg Oral Daily Fernanda Lamar, DO    atorvastatin 20 mg Oral After [facility], DO    enoxaparin 1 mg/kg Subcutaneous Q12H [address] Mancel Dhaval Faustin MD    gabapentin 100 mg Oral Daily Fernanda Lamar, DO    magnesium citrate 296 mL Oral Once Fernanda Lamar, DO    melatonin 6 mg Oral HS Fernanda Lamar, DO    ondansetron 4 mg Intravenous Q6H PRN Fernanda Lamar, DO    pantoprozole (PROTONIX) infusion (Continuous) 8 mg/hr Intravenous Continuous Fernanda Lamar, DO Last Rate: 8 mg/hr (01/24/20 0328)   sodium chloride 75 mL/hr Intravenous Continuous Fernanda Lamar, DO Last Rate: 75 mL/hr (01/23/20 0010)   tamsulosin 0 4 mg Oral Daily With [employer], DO         Today, Patient Was Seen By: Stefany Khan MD    ** Please Note: Dictation voice to text software may have been used in the creation of this document   **

## 2020-01-24 NOTE — ASSESSMENT & PLAN NOTE
Secondary to above  Status post transfusion of 1 unit of blood  Continue to monitor hemoglobin  Patient has had previous blood transfusions   egd with push enteroscopy-normal 1/22/20  Continue to monitor labs   Colonoscopy without active bleeding-diverticulosis    Patient declining lab draws

## 2020-01-24 NOTE — OCCUPATIONAL THERAPY NOTE
Occupational Therapy Evaluation        Patient Name: Aliyah Wilder  YBHMS'G Date: 1/24/2020 01/24/20 1501   Note Type   Note type Eval only   Restrictions/Precautions   Weight Bearing Precautions Per Order No   Braces or Orthoses Other (Comment)  (none at baseline per pt)   Other Precautions Chair Alarm; Bed Alarm;Limb alert; Fall Risk  (Right BKA)   Pain Assessment   Pain Assessment No/denies pain   Pain Score No Pain   Home Living   Type of Home House   Home Layout Two level;Performs ADLs on one level; Able to live on main level with bedroom/bathroom  (no DAKOTAH)   Bathroom Equipment Commode;Tub transfer [address]; Wheelchair-manual   Additional Comments home set up listed above reflects dtr's home set up: setting pt is to be d/c'ed from once finished @ STR   Prior Function   Level of [place] Needs assistance with ADLs and functional mobility; Needs assistance with IADLs   Lives With Daughter   Receives Help From Family   ADL Assistance Needs assistance   IADLs Needs assistance   Falls in the last 6 months 1 to 4   Vocational Retired   Comments patient does not drive   Lifestyle   Autonomy Patient 's Son provided most of the history as patient was not answerring questions correctly due to tendency to "joke around"   Patient was receiving STR at a facility in [place], plan is to return to the facility and reside with his daughter in a multi level house , 1st floor set up with no DAKOTAH  Patient was indepependnet with ADLs/ IADLs prior to his leg amputation on 12/2019      Reciprocal Relationships Supportive family   Service to Others Retired /    Psychosocial   Psychosocial (WDL) [address]  6  Modified independent   [address] [place] [address] [address]  Moderate Assistance   LB [address]  [address] 3  Moderate Assistance   LB Dressing Assistance 2  Maximal Assistance   Toileting Assistance  3  Moderate Assistance   Functional Assistance 3  Moderate Assistance   Bed Mobility   Rolling L 5  Supervision   Additional items Assist x 1;HOB elevated; Increased time required   Supine to Sit 5  Supervision   Additional items Assist x 1;HOB elevated; Bedrails   Transfers   Sit to Stand 4  Minimal assistance   Additional items Assist x 2; Increased time required;Verbal cues; Impulsive   Stand to Sit 4  Minimal assistance   Additional items Assist x 2; Increased time required;Verbal cues   Functional Mobility   Functional Mobility 3  Moderate assistance   Additional items Rolling walker   Balance   Static Sitting Fair +   Dynamic Sitting Fair   Static Standing Poor +   Dynamic Standing Poor   Activity Tolerance   Activity Tolerance Patient limited by fatigue   Nurse Made Aware NATHAN Chapa verbalized pt appropriate for PT eval, made aware of session outcomes/recs   RUE Assessment   RUE Assessment WFL   LUE Assessment   LUE Assessment WFL   Hand Function   Gross Motor Coordination Functional   Fine Motor Coordination Functional   Sensation   Light Touch No apparent deficits  (BUEs)   Vision-Basic Assessment   Current Vision Does not wear glasses   Cognition   Overall Cognitive Status Impaired   Arousal/Participation Alert; Responsive; Cooperative   Attention Attends with cues to redirect   Orientation Level Oriented to person;Disoriented to situation   Memory Decreased short term memory;Decreased recall of recent events   Following Commands Follows one step commands without difficulty   AssessmentPatient is a 78 y o  male seen for OT evaluation s/p admit to [address] [facility] on 1/21/2020 w/GI bleed  Commorbidities affecting patient's functional performance at time of assessment include:Dizziness, leukocytosis, left carotid stenosis, esophageal CA, 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, PAD, essential HTN  Orders placed for OT evaluation and treatment    Performed at least two patient identifiers during session including name and wristband  Prior to admission, Patient 's Son provided most of the history as patient was not answerring questions correctly due to tendency to "joke around"   Patient was receiving STR at a facility in [place], plan is to return to the facility and reside with his daughter in a multi level house , 1st floor set up with no DAKOTAH  Patient was indepependnet with ADLs/ IADLs prior to his leg amputation on 12/2019  Personal factors affecting patient at time of initial evaluation include: limited insight into deficits, non compliance, decreased initiation and engagement, difficulty performing ADLs and difficulty performing IADLs  Upon evaluation, patient requires supervision, set up and contact guard assist for UB ADLs, moderate assist for LB ADLs  Occupational performance is affected by the following deficits: degenerative arthritic joint changes, impaired gross motor coordination, dynamic sit/ stand balance deficit with poor standing tolerance time for self care and functional mobility, decreased activity tolerance, impaired memory, impaired problem solving, decreased safety awareness, increased pain, orthopedic restrictions, impaired interpersonal skills, decreased coping skills and postural control and postural alignment deficit, requiring external assistance to complete transitional movements  Therapist completed  extensive additional review of medical records and additional review of physical, cognitive or psychosocial history, clinical examination identifying 5 or more performance deficits, clinical decision making of a high complexity , consistent with a high complexity level evaluation  Patient to benefit from continued Occupational Therapy treatment while in the hospital to address deficits as defined above and maximize level of functional independence with ADLs and functional mobility   Occupational Performance areas to address include: bathing/ shower, dressing, toilet hygiene, transfer to all surfaces, functional mobility, emergency response, health maintenance, IADLs: safety procedures, IADLs: meal prep/ clean up, Leisure Participation and Social participation  From OT standpoint, recommendation at time of d/c would be Short Term Rehab  Limitation Decreased ADL status; Decreased Safe judgement during ADL;Decreased endurance;Decreased self-care trans;Decreased high-level ADLs;Mood limitation   Prognosis Good   Goals   Patient Goals none expressed by patient, family reported need to be indeppendnet enough to return home after rehab  Plan   Treatment Interventions ADL retraining;Functional transfer training; Endurance training;Equipment evaluation/education;Patient/family training; Compensatory technique education;Cardiac education; Energy conservation; Activityengagement   Goal Expiration Date 02/07/20   OT Frequency 3-5x/wk   Recommendation   OT Discharge Recommendation Short Term Rehab   Barthel Index   Feeding 5   Bathing 0   Grooming Score 5   Dressing Score 5   Bladder Score 10   Bowels Score 10   Toilet Use Score 5   Transfers (Bed/Chair) Score 5   Mobility (Level Surface) Score 0   Stairs Score 0   Barthel Index Score 45   Modified Stewart Scale   Modified Flora Scale 4     Occupational Therapy goals: In 7-14 days:     1- Patient will verbalize and demonstrate use of energy conservation/ deep breathing technique and work simplification skills during functional activity with no verbal cues  2-Patient will verbalize and demonstrate good body mechanics and joint protection techniques during  ADLs/ IADLs with no verbal cues  3- Patient will increase OOB/ sitting tolerance to 2-4 hours per day for increased participation in self care and leisure tasks with no s/s of exertion    4-Patient will increase standing tolerance time to 5  minutes with unilateral UE support to complete sink level ADLs@ mod I level   5- Patient will increase sitting tolerance at edge of bed to 20 minutes to complete UB ADLs @ set up assist level  6- Patient will transfer bed to Chair / toilet at Set up assist level with AD as indicated  7- Patient will complete UB ADLs with set up assist  8- Patient will complete LB ADLs with min assist with the use of adaptive equipment  9- Patient will complete toileting hygiene with set up assist/ supervision for thoroughness    10-Patient/ Family  will demonstrate competency with UE Home Exercise Program

## 2020-01-24 NOTE — PHYSICAL THERAPY NOTE
Physical Therapy Evaluation     Patient's Name: Aliyah Wilder    Admitting Diagnosis  Blood loss anemia [D50 0]  Dizziness [R42]  CAD (coronary artery disease) [I25 10]  GI bleed [K92 2]  PAD (peripheral artery disease) (HCC) [I73 9]    Problem List  Patient Active Problem List   Diagnosis    Peripheral vascular disease ([facility] )    History of cancer tonsil    Essential hypertension    Dependence on nicotine from cigarettes    Syncope and collapse    Renal stone    PAD (peripheral artery disease) (HCC)    Coagulopathy (HCC)    Transaminitis    Ambulatory dysfunction    Thrombocytopenia (HCC)    Supratherapeutic INR    GI bleed    Cancer ([facility] )    Hyponatremia    Subtherapeutic international normalized ratio (INR)    Hx of BKA, right ([facility] )    Esophageal cancer ([facility] )    Left carotid stenosis    Anticoagulant long-term use    Dizziness    Leukocytosis    Acute blood loss anemia       Past Medical History  Past Medical History:   Diagnosis Date    Cancer ([place] [id number] )     throat cancer    GERD (gastroesophageal reflux disease)     Hyperlipidemia     Hypertension     Left carotid stenosis 1/14/2020    PAD (peripheral artery disease) ([facility] ) 11/2/2019    PEG (percutaneous endoscopic gastrostomy) status ([place] [address] )     Port-A-Cath in place        Past Surgical History  Past Surgical History:   Procedure Laterality Date    BYPASS FEMORAL-FEMORAL Right     ESOPHAGOSCOPY N/A 8/24/2017    Procedure: ESOPHAGOSCOPY FLEXIBLE;  Surgeon: Humberto Terrazas MD;  [facility];  Service: ENT    IR ABDOMINAL ANGIOGRAPHY / INTERVENTION  11/14/2019    IR ABDOMINAL ANGIOGRAPHY / INTERVENTION  11/7/2019    IR ABDOMINAL ANGIOGRAPHY / INTERVENTION  12/13/2019    LEG AMPUTATION THROUGH LOWER TIBIA AND FIBULA Right 12/19/2019    Procedure: AMPUTATION BELOW KNEE (BKA);   Surgeon: Janice Macedo MD;  [facility];  Service: Vascular    MD BRONCHOSCOPY,DIAGNOSTIC N/A 8/24/2017    Procedure: Bronchoscopy; Surgeon: Denise Gonzalez MD;  [facility];  Service: ENT    NV LARYNGOSCOPY,DIRCT,OP,BIOPSY N/A 8/24/2017    Procedure: LARYNGOSCOPY DIRECT;  Surgeon: Denise Gonzalez MD;  [facility];  Service: ENT    NV William Collins Left 11/7/2019    Procedure: LEFT ENDARTERECTOMY ARTERIAL FEMORAL; L CFAE WITH PROFUNDOPLASTY; ARTERIOGRAM;RETROGRADE ILIAC STENTING;  Surgeon: Smith Jaimes MD;  [facility];  Service: Vascular    TOE AMPUTATION Right     2 toes          01/24/20 1432   Note Type   Note type Eval only   Pain Assessment   Pain Assessment No/denies pain   Pain Score No Pain   Home Living   Type of Home House  (pt recently at [address] at the [facility] in [place] in 12/2019)   Home Layout Two level;Performs ADLs on one level; Able to live on main level with bedroom/bathroom  (basement /ground floor set up; 0 DAKOTAH)   Bathroom Equipment Commode;Tub transfer bench   [address]   Additional Comments home set up listed above reflects dtr's home set up: setting pt is to be d/c'ed from once finished @ STR   Prior Function   Level of Bureau Needs assistance with ADLs and functional mobility; Needs assistance with IADLs   Lives With Daughter   Receives Help From Family   ADL Assistance Needs assistance   IADLs Needs assistance   Falls in the last 6 months 1 to 4   Vocational Retired   Comments (-) driving   Restrictions/Precautions   Wells Bryon Bearing Precautions Per Order No   Braces or Orthoses Other (Comment)  (none at baseline per pt)   Other Precautions Chair Alarm; Bed Alarm; Fall Risk   General   Additional Pertinent History pt is s/p R BKA 12/2019  [facility] in [place], receiving [address] since 12/2019  pt reports transferring to [facility] and self-propelling c B UEs  son reports after d/c from rehab, will be living c dtr     Family/Caregiver Present Yes   Cognition   Overall Cognitive Status Impaired Arousal/Participation Alert   Attention Attends with cues to redirect   Orientation Level Oriented to person;Disoriented to place; Disoriented to time   Memory Decreased short term memory;Decreased recall of recent events   Following Commands Follows one step commands without difficulty   Comments pt agreeable to PT eval  pt verbalized full name and   pt is primarily Portuguese-speaking, OT Sidney Erazo translating  Son present throughout session and assisting in translation  RUE Assessment   RUE Assessment   (defer to OT eval for comment)   LUE Assessment   LUE Assessment   (defer to OT eval for comment)   RLE Assessment   RLE Assessment X  (s/p R BKA 2019)   LLE Assessment   LLE Assessment WFL  (assessed c functional mobility; grossly 3+/5)   Coordination   Sensation WFL  (pt denies numbness/tingling)   Bed Mobility   Supine to Sit 5  Supervision   Additional items Assist x 1; Increased time required   Additional Comments pt received supine in bed upon arrival  pt returned to recliner at end of session with all needs in reach, chair alarm engaged  Education on proper positioning of R LE  Transfers   Sit to Stand 4  Minimal assistance   Additional items Assist x 2; Increased time required;Verbal cues  (VCs for proper hand placement to RW)   Stand to Sit 4  Minimal assistance   Additional items Assist x 2; Increased time required;Verbal cues;Armrests  (VCs for proper hand placement to RW)   Stand pivot 4  Minimal assistance   Additional items Assist x 2; Increased time required;Armrests; Verbal cues  (VCs for sequencing c RW)   Ambulation/Elevation   Gait pattern Short stride;Decreased foot clearance  (no LOB observed   gently hopping on L LE)   Gait Assistance 4  Minimal assist   Additional items Assist x 2;Verbal cues   Assistive Device Rolling walker   Distance 3' from EOB to recliner   Stair Management Assistance Not tested   Balance   Static Sitting Fair +   Dynamic Sitting Fair   Static Standing Poor +   Dynamic Standing Poor   Ambulatory Poor   Endurance Deficit   Endurance Deficit Yes   Activity Tolerance   Activity Tolerance Patient limited by fatigue   Medical Staff Made Aware OT Scott Case PT Alo Vidal   Nurse Made Aware RN Chary Botello verbalized pt appropriate for PT eval, made aware of session outcomes/recs   Assessment   Prognosis Good   Problem List Decreased strength;Decreased endurance; Impaired balance;Decreased mobility; Decreased cognition   Assessment Pt is a 79 y/o male admitted to [facility] on 20 with GI bleed  PT consulted to assess functional mobility and d/c needs  PT eval/treat as well as up + OOB orders active  Pt is a questionable historian 2* baseline dementia, son present and providing information regarding pt's home environment and PLOF  Prior to his admission, the pt was receiving rehab at the [facility] in [place] s/p [address] in 2019  Per son, once the pt is d/c'ed from rehab, he is anticipated to be living with his dtr in a two-story house with a ground-level set up  At rehab, the pt was able to transfer to his  and self-propel  Personal factors: decreased cognition, reliance on an AD for safe ambulation, and a positive fall history  Pt agreeable to PT eval, verbalized as appropriate per [employer]  Two pt identifiers assessed, pt verbalized full name and   Pt performed all bed mobility with supervision  Pt performed all transfers with min A x2  Pt ambulated 3' from EOB to recliner with min A x2, no LOB observed  Upon assessment, pt's physical limitations include decreased strength, decreased cognition, decreased endurance, impaired balance, and decreased mobility  Outcome measures: Barthel 45/100, Modified Flora 4  Pt's clinical presentation is currently unstable due to his multiple co-morbidities, ongoing medical assessment, and abnormal lab values  At this time, pt is recommended for return to [facility] to continue with rehab   Pt would benefit from continued PT throughout his hospital stay to facilitate return to PLOF  Barriers to Discharge Decreased caregiver support; Inaccessible home environment   Goals   Patient Goals none expressed by pt   STG Expiration Date 02/03/20   Short Term Goal #1 In 7-10 days, pt will: 1) ambulate 13' with RW with supervision, 2) perform all bed mobility mod (I) to decrease caregiver burden, 3) perform all transfers mod (I) to improve overall mobility, 4) increase LE strength 1/2 grade to improve ambulation, 5) increase all balance 1 grade to improve safety and independence with functional mobility 6) PT to see for w/c mobility and safety with components/offloading   PT Treatment Day 0   Plan   Treatment/Interventions Functional transfer training;LE strengthening/ROM; Therapeutic exercise; Endurance training;Patient/family training;Equipment eval/education; Bed mobility;Gait training;Spoke to nursing;OT;Family   PT Frequency Other (Comment)  (3-5x/wk)   Recommendation   Recommendation Short-term skilled PT  (return to STR)   Equipment Recommended Other (Comment)  (TBD at STR)   PT - OK to Discharge Yes  (when medically cleared if to STR)   Modified [place] Scale   Modified [place] Scale 4   Barthel Index   Feeding 5   Bathing 0   Grooming Score 5   Dressing Score 5   Bladder Score 10   Bowels Score 10   Toilet Use Score 5   Transfers (Bed/Chair) Score 5   Mobility (Level Surface) Score 0   Stairs Score 0   Barthel Index Score 45     Tk20, SPT

## 2020-01-24 NOTE — PROGRESS NOTES
Called by nurse - informed that patient not able to open jaw  This was lost on prior encounters, possibly secondary to language barrier  Patient is currently asleep and vitals are stable  Day team to assess in am, given that patient is now asleep and onset is not sudden and acuity is not known  Will add continual pulse ox to ensure no airway compromise overnight

## 2020-01-24 NOTE — PLAN OF CARE
Problem: PHYSICAL THERAPY ADULT  Goal: Performs mobility at highest level of function for planned discharge setting  See evaluation for individualized goals  Description  Treatment/Interventions: Functional transfer training, LE strengthening/ROM, Therapeutic exercise, Endurance training, Patient/family training, Equipment eval/education, Bed mobility, Gait training, Spoke to nursing, OT, Family  Equipment Recommended: Other (Comment)(TBD at [facility])       See flowsheet documentation for full assessment, interventions and recommendations  Note:   Prognosis: Good  Problem List: Decreased strength, Decreased endurance, Impaired balance, Decreased mobility, Decreased cognition  Assessment: Pt is a 79 y/o male admitted to [facility] on 20 with GI bleed  PT consulted to assess functional mobility and d/c needs  PT eval/treat as well as up + OOB orders active  Pt is a questionable historian 2* baseline dementia, son present and providing information regarding pt's home environment and PLOF  Prior to his admission, the pt was receiving rehab at the [facility] in [facility] s/p R [address] [place] [address] in 2019  Per son, once the pt is d/c'ed from rehab, he is anticipated to be living with his dtr in a two-story house with a ground-level set up  At rehab, the pt was able to transfer to his  and self-propel  Personal factors: decreased cognition, reliance on an AD for safe ambulation, and a positive fall history  Pt agreeable to PT eval, verbalized as appropriate per [employer]  Two pt identifiers assessed, pt verbalized full name and   Pt performed all bed mobility with supervision  Pt performed all transfers with min A x2  Pt ambulated 3' from EOB to recliner with min A x2, no LOB observed  Upon assessment, pt's physical limitations include decreased strength, decreased cognition, decreased endurance, impaired balance, and decreased mobility  Outcome measures: Barthel 45/100, Modified Flora 4   Pt's clinical presentation is currently unstable due to his multiple co-morbidities, ongoing medical assessment, and abnormal lab values  At this time, pt is recommended for return to STR to continue with rehab  Pt would benefit from continued PT throughout his hospital stay to facilitate return to PLOF  Barriers to Discharge: Decreased caregiver support, Inaccessible home environment     Recommendation: Short-term skilled PT(return to STR)     PT - OK to Discharge: Yes(when medically cleared if to STR)    See flowsheet documentation for full assessment

## 2020-01-24 NOTE — ASSESSMENT & PLAN NOTE
This is a 62-year-old male with history of hypertension, hyperlipidemia, peripheral vascular disease, esophageal cancer status post chemoradiation presenting with dizziness and weakness        · CT head negative  · Blood work significant for anemia, guaiac-positive in the ED

## 2020-01-24 NOTE — ASSESSMENT & PLAN NOTE
Patient with melena and generalized weakness  Hemoglobin also down  Received urit of blood  Continue with PPI

## 2020-01-24 NOTE — ASSESSMENT & PLAN NOTE
Patient would need to go back on his anticoagulation    Remains at high risk for bleeding  Can use lovenox in the interim-refusing lab draws

## 2020-01-25 VITALS
WEIGHT: 121.69 LBS | HEART RATE: 65 BPM | OXYGEN SATURATION: 99 % | SYSTOLIC BLOOD PRESSURE: 126 MMHG | RESPIRATION RATE: 18 BRPM | DIASTOLIC BLOOD PRESSURE: 54 MMHG | TEMPERATURE: 98.9 F | BODY MASS INDEX: 19.56 KG/M2 | HEIGHT: 66 IN

## 2020-01-25 PROBLEM — R42 DIZZINESS: Status: RESOLVED | Noted: 2020-01-21 | Resolved: 2020-01-25

## 2020-01-25 PROBLEM — K92.2 GI BLEED: Status: RESOLVED | Noted: 2019-12-04 | Resolved: 2020-01-25

## 2020-01-25 PROBLEM — D72.829 LEUKOCYTOSIS: Status: RESOLVED | Noted: 2020-01-21 | Resolved: 2020-01-25

## 2020-01-25 LAB
ABO GROUP BLD: NORMAL
BASOPHILS # BLD AUTO: 0.02 THOUSANDS/ΜL (ref 0–0.1)
BASOPHILS NFR BLD AUTO: 0 % (ref 0–1)
BLD GP AB SCN SERPL QL: NEGATIVE
EOSINOPHIL # BLD AUTO: 0.18 THOUSAND/ΜL (ref 0–0.61)
EOSINOPHIL NFR BLD AUTO: 4 % (ref 0–6)
ERYTHROCYTE [DISTWIDTH] IN BLOOD BY AUTOMATED COUNT: 17.2 % (ref 11.6–15.1)
HCT VFR BLD AUTO: 23.3 % (ref 36.5–49.3)
HCT VFR BLD AUTO: 25.5 % (ref 36.5–49.3)
HGB BLD-MCNC: 7.2 G/DL (ref 12–17)
HGB BLD-MCNC: 8.1 G/DL (ref 12–17)
IMM GRANULOCYTES # BLD AUTO: 0.02 THOUSAND/UL (ref 0–0.2)
IMM GRANULOCYTES NFR BLD AUTO: 0 % (ref 0–2)
INR PPP: 1.26 (ref 0.84–1.19)
LYMPHOCYTES # BLD AUTO: 0.42 THOUSANDS/ΜL (ref 0.6–4.47)
LYMPHOCYTES NFR BLD AUTO: 9 % (ref 14–44)
MCH RBC QN AUTO: 29.9 PG (ref 26.8–34.3)
MCHC RBC AUTO-ENTMCNC: 30.9 G/DL (ref 31.4–37.4)
MCV RBC AUTO: 97 FL (ref 82–98)
MONOCYTES # BLD AUTO: 0.27 THOUSAND/ΜL (ref 0.17–1.22)
MONOCYTES NFR BLD AUTO: 6 % (ref 4–12)
NEUTROPHILS # BLD AUTO: 3.93 THOUSANDS/ΜL (ref 1.85–7.62)
NEUTS SEG NFR BLD AUTO: 81 % (ref 43–75)
NRBC BLD AUTO-RTO: 0 /100 WBCS
PLATELET # BLD AUTO: 214 THOUSANDS/UL (ref 149–390)
PMV BLD AUTO: 9.3 FL (ref 8.9–12.7)
PROTHROMBIN TIME: 15.9 SECONDS (ref 11.6–14.5)
RBC # BLD AUTO: 2.41 MILLION/UL (ref 3.88–5.62)
RH BLD: POSITIVE
SPECIMEN EXPIRATION DATE: NORMAL
WBC # BLD AUTO: 4.84 THOUSAND/UL (ref 4.31–10.16)

## 2020-01-25 PROCEDURE — 85018 HEMOGLOBIN: CPT | Performed by: GENERAL PRACTICE

## 2020-01-25 PROCEDURE — 94762 N-INVAS EAR/PLS OXIMTRY CONT: CPT

## 2020-01-25 PROCEDURE — 86901 BLOOD TYPING SEROLOGIC RH(D): CPT | Performed by: GENERAL PRACTICE

## 2020-01-25 PROCEDURE — P9058 RBC, L/R, CMV-NEG, IRRAD: HCPCS

## 2020-01-25 PROCEDURE — 86850 RBC ANTIBODY SCREEN: CPT | Performed by: GENERAL PRACTICE

## 2020-01-25 PROCEDURE — 85014 HEMATOCRIT: CPT | Performed by: GENERAL PRACTICE

## 2020-01-25 PROCEDURE — 99239 HOSP IP/OBS DSCHRG MGMT >30: CPT | Performed by: GENERAL PRACTICE

## 2020-01-25 PROCEDURE — 30233N1 TRANSFUSION OF NONAUTOLOGOUS RED BLOOD CELLS INTO PERIPHERAL VEIN, PERCUTANEOUS APPROACH: ICD-10-PCS | Performed by: GENERAL PRACTICE

## 2020-01-25 PROCEDURE — 85025 COMPLETE CBC W/AUTO DIFF WBC: CPT | Performed by: GENERAL PRACTICE

## 2020-01-25 PROCEDURE — 86900 BLOOD TYPING SEROLOGIC ABO: CPT | Performed by: GENERAL PRACTICE

## 2020-01-25 PROCEDURE — 85610 PROTHROMBIN TIME: CPT | Performed by: GENERAL PRACTICE

## 2020-01-25 PROCEDURE — 86923 COMPATIBILITY TEST ELECTRIC: CPT

## 2020-01-25 RX ADMIN — WARFARIN SODIUM 2.5 MG: 2.5 TABLET ORAL at 18:20

## 2020-01-25 RX ADMIN — PANTOPRAZOLE SODIUM 40 MG: 40 TABLET, DELAYED RELEASE ORAL at 05:51

## 2020-01-25 RX ADMIN — ENOXAPARIN SODIUM 60 MG: 60 INJECTION SUBCUTANEOUS at 11:51

## 2020-01-25 RX ADMIN — ENOXAPARIN SODIUM 60 MG: 60 INJECTION SUBCUTANEOUS at 21:58

## 2020-01-25 RX ADMIN — AMLODIPINE BESYLATE 10 MG: 10 TABLET ORAL at 11:51

## 2020-01-25 RX ADMIN — ATORVASTATIN CALCIUM 20 MG: 20 TABLET, FILM COATED ORAL at 18:19

## 2020-01-25 RX ADMIN — TAMSULOSIN HYDROCHLORIDE 0.4 MG: 0.4 CAPSULE ORAL at 15:19

## 2020-01-25 RX ADMIN — GABAPENTIN 100 MG: 100 CAPSULE ORAL at 11:52

## 2020-01-25 NOTE — PLAN OF CARE
Problem: PAIN - ADULT  Goal: Verbalizes/displays adequate comfort level or baseline comfort level  Description  Interventions:  - Encourage patient to monitor pain and request assistance  - Assess pain using appropriate pain scale  - Administer analgesics based on type and severity of pain and evaluate response  - Implement non-pharmacological measures as appropriate and evaluate response  - Consider cultural and social influences on pain and pain management  - Notify physician/advanced practitioner if interventions unsuccessful or patient reports new pain  Outcome: Completed     Problem: INFECTION - ADULT  Goal: Absence or prevention of progression during hospitalization  Description  INTERVENTIONS:  - Assess and monitor for signs and symptoms of infection  - Monitor lab/diagnostic results  - Monitor all insertion sites, i e  indwelling lines, tubes, and drains  - Monitor endotracheal if appropriate and nasal secretions for changes in amount and color  - [place] appropriate cooling/warming therapies per order  - Administer medications as ordered  - Instruct and encourage patient and family to use good hand hygiene technique  - Identify and instruct in appropriate isolation precautions for identified infection/condition  Outcome: Completed     Problem: SAFETY ADULT  Goal: Patient will remain free of falls  Description  INTERVENTIONS:  - Assess patient frequently for physical needs  -  Identify cognitive and physical deficits and behaviors that affect risk of falls    -  [place] fall precautions as indicated by assessment   - Educate patient/family on patient safety including physical limitations  - Instruct patient to call for assistance with activity based on assessment  - Modify environment to reduce risk of injury  - Consider OT/PT consult to assist with strengthening/mobility  Outcome: Completed  Goal: Maintain or return to baseline ADL function  Description  INTERVENTIONS:  -  Assess patient's ability to carry out ADLs; assess patient's baseline for ADL function and identify physical deficits which impact ability to perform ADLs (bathing, care of mouth/teeth, toileting, grooming, dressing, etc )  - Assess/evaluate cause of self-care deficits   - Assess range of motion  - Assess patient's mobility; develop plan if impaired  - Assess patient's need for assistive devices and provide as appropriate  - Encourage maximum independence but intervene and supervise when necessary  - Involve family in performance of ADLs  - Assess for home care needs following discharge   - Consider OT consult to assist with ADL evaluation and planning for discharge  - Provide patient education as appropriate  Outcome: Completed  Goal: Maintain or return mobility status to optimal level  Description  INTERVENTIONS:  - Assess patient's baseline mobility status (ambulation, transfers, stairs, etc )    - Identify cognitive and physical deficits and behaviors that affect mobility  - Identify mobility aids required to assist with transfers and/or ambulation (gait belt, sit-to-stand, lift, walker, cane, etc )  - [place] fall precautions as indicated by assessment  - Record patient progress and toleration of activity level on Mobility SBAR; progress patient to next Phase/Stage  - Instruct patient to call for assistance with activity based on assessment  - Consider rehabilitation consult to assist with strengthening/weightbearing, etc   Outcome: Completed     Problem: DISCHARGE PLANNING  Goal: Discharge to home or other facility with appropriate resources  Description  INTERVENTIONS:  - Identify barriers to discharge w/patient and caregiver  - Arrange for needed discharge resources and transportation as appropriate  - Identify discharge learning needs (meds, wound care, etc )  - Arrange for interpretive services to assist at discharge as needed  - Refer to Case Management Department for coordinating discharge planning if the patient needs post-hospital services based on physician/advanced practitioner order or complex needs related to functional status, cognitive ability, or social support system  Outcome: Completed     Problem: Knowledge Deficit  Goal: Patient/family/caregiver demonstrates understanding of disease process, treatment plan, medications, and discharge instructions  Description  Complete learning assessment and assess knowledge base  Interventions:  - Provide teaching at level of understanding  - Provide teaching via preferred learning methods  Outcome: Completed     Problem: Prexisting or High Potential for Compromised Skin Integrity  Goal: Skin integrity is maintained or improved  Description  INTERVENTIONS:  - Identify patients at risk for skin breakdown  - Assess and monitor skin integrity  - Assess and monitor nutrition and hydration status  - Monitor labs   - Assess for incontinence   - Turn and reposition patient  - Assist with mobility/ambulation  - Relieve pressure over bony prominences  - Avoid friction and shearing  - Provide appropriate hygiene as needed including keeping skin clean and dry  - Evaluate need for skin moisturizer/barrier cream  - Collaborate with interdisciplinary team   - Patient/family teaching  - Consider wound care consult   Outcome: Completed     Problem: Potential for Falls  Goal: Patient will remain free of falls  Description  INTERVENTIONS:  - Assess patient frequently for physical needs  -  Identify cognitive and physical deficits and behaviors that affect risk of falls    -  [place] fall precautions as indicated by assessment   - Educate patient/family on patient safety including physical limitations  - Instruct patient to call for assistance with activity based on assessment  - Modify environment to reduce risk of injury  - Consider OT/PT consult to assist with strengthening/mobility  Outcome: Completed

## 2020-01-25 NOTE — PLAN OF CARE
Problem: OCCUPATIONAL THERAPY ADULT  Goal: Performs self-care activities at highest level of function for planned discharge setting  See evaluation for individualized goals  Description  Treatment Interventions: ADL retraining, Functional transfer training, Endurance training, Equipment evaluation/education, Patient/family training, Compensatory technique education, Cardiac education, Energy conservation, Activityengagement          See flowsheet documentation for full assessment, interventions and recommendations  Note:   Limitation: Decreased ADL status, Decreased Safe judgement during ADL, Decreased endurance, Decreased self-care trans, Decreased high-level ADLs, Mood limitation  Prognosis: Good  Assessment: Patient is a 78 y o  male seen for OT evaluation s/p admit to [id number] [place] [address] on 1/21/2020 w/GI bleed  Commorbidities affecting patient's functional performance at time of assessment include:Dizziness, leukocytosis, left carotid stenosis, esophageal CA, 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, PAD, essential HTN  Orders placed for OT evaluation and treatment  Performed at least two patient identifiers during session including name and wristband  Prior to admission, Patient 's Son provided most of the history as patient was not answerring questions correctly due to tendency to "joke around"   Patient was receiving STR at a facility in [place], plan is to return to the facility and reside with his daughter in a multi level house , 1st floor set up with no DAKOTAH  Patient was indepependnet with ADLs/ IADLs prior to his leg amputation on 12/2019  Personal factors affecting patient at time of initial evaluation include: limited insight into deficits, non compliance, decreased initiation and engagement, difficulty performing ADLs and difficulty performing IADLs  Upon evaluation, patient requires supervision, set up and contact guard assist for UB ADLs, moderate assist for LB ADLs   Occupational performance is affected by the following deficits: degenerative arthritic joint changes, impaired gross motor coordination, dynamic sit/ stand balance deficit with poor standing tolerance time for self care and functional mobility, decreased activity tolerance, impaired memory, impaired problem solving, decreased safety awareness, increased pain, orthopedic restrictions, impaired interpersonal skills, decreased coping skills and postural control and postural alignment deficit, requiring external assistance to complete transitional movements  Therapist completed  extensive additional review of medical records and additional review of physical, cognitive or psychosocial history, clinical examination identifying 5 or more performance deficits, clinical decision making of a high complexity , consistent with a high complexity level evaluation  Patient to benefit from continued Occupational Therapy treatment while in the hospital to address deficits as defined above and maximize level of functional independence with ADLs and functional mobility        OT Discharge Recommendation: Short Term Rehab

## 2020-01-25 NOTE — ASSESSMENT & PLAN NOTE
This is a 16-year-old male with history of hypertension, hyperlipidemia, peripheral vascular disease, esophageal cancer status post chemoradiation presenting with dizziness and weakness        · CT head negative  · Blood work significant for anemia, guaiac-positive in the ED    hgb has been stable

## 2020-01-25 NOTE — DISCHARGE SUMMARY
Discharge- Sherri José 1950, 78 y o  male MRN: [id number]    Unit/Bed#: -01 Encounter: [id number]    Primary Care Provider: Davey Lux MD   Date and time admitted to hospital: 1/21/2020 11:54 AM        Acute blood loss anemia  Assessment & Plan  Secondary to above  Status post transfusion of 1 unit of blood  Continue to monitor hemoglobin  Patient has had previous blood transfusions   egd with push enteroscopy-normal 1/22/20  Continue to monitor labs  Colonoscopy without active bleeding-diverticulosis    Patient declining lab draws    Leukocytosis  Assessment & Plan  · White blood cell count 16 68  · Possibly reactive to anemia  · Patient afebrile, no signs of any infection  · Will defer antibiotics at this point and monitor CBC  · resolved    Dizziness  Assessment & Plan  This is a 50-year-old male with history of hypertension, hyperlipidemia, peripheral vascular disease, esophageal cancer status post chemoradiation presenting with dizziness and weakness  · CT head negative  · Blood work significant for anemia, guaiac-positive in the ED    hgb has been stable    Left carotid stenosis  Assessment & Plan  Recently diagnosed, 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 and esophageal cancer  Vascular surgery recommending six-month follow-up    Esophageal cancer [facility])  Assessment & Plan  Follow-up with his oncologist on outpatient basis    Essential hypertension  Assessment & Plan  Continue home medications    Peripheral vascular disease [facility])  Assessment & Plan  Patient would need to go back on his anticoagulation  Remains at high risk for bleeding  Can use lovenox in the interim-refusing lab draws     * GI bleed  Assessment & Plan  Patient with melena and generalized weakness  Hemoglobin also down  Received urit of blood  Continue with PPI         Transfused 1 u prbc prior to discharge      Discharging Physician / Practitioner: Juan Jose Rivera MD  PCP: Ricky Homans, MD  Admission Date:   Admission Orders (From admission, onward)     Ordered        01/21/20 1438  Inpatient Admission  Once                   Discharge Date: 01/25/20    Resolved Problems  Date Reviewed: 1/22/2020    None          Consultations During Hospital Stay:  · Gastroenterology and vascular surgery    Procedures Performed:   · EGD and colonoscopy    Significant Findings / Test Results:   · Acute blood loss anemia    Incidental Findings:   ·     Test Results Pending at Discharge (will require follow up):   ·      Outpatient Tests Requested:  · INR daily until therapeutic  · Cbc 1 week    Complications:      Reason for Admission:  Dizziness    Hospital Course:     Chinedu Ballard is a 78 y o  male patient who originally presented to the hospital on 1/21/2020 due to Dizziness  Patient presented with dizziness and generalized weakness  He was found to have a hemoglobin of 6 4  He was started on a Protonix drip and Gastroenterology was consult  He is on dual anti-platelet therapy with aspirin and Plavix as well as Coumadin at home  These agents were held  Patient has a history of peripheral vascular disease and carotid stenosis  He underwent EGD and colonoscopy with push enteroscopy   Colonoscopy showed only diverticulosis and EGD with push enteroscopy was normal   For his anemia he was transfused 2 units of blood during his hospital course  For his peripheral vascular disease and carotid stenosis, his aspirin and Plavix were held and he was placed on heparin drip during his hospital course  During his hospital course he refused lab draws and replacement of his IV and did not receive heparin for several days  Vascular surgery was consulted,  and as patient is a high risk for thromboembolic events, recommendations were to continue Coumadin plus a single anti-platelet agent  He was discharged on Coumadin and aspirin    As his INR was sub therapeutic, at discharge she is bridged with Lovenox  Please see above list of diagnoses and related plan for additional information  Condition at Discharge: stable     Discharge Day Visit / Exam:     Subjective:  No c/o  Vitals: Blood Pressure: (!) 129/48 (01/24/20 1511)  Pulse: 83 (01/24/20 1511)  Temperature: 98 6 °F (37 °C) (01/24/20 1511)  Temp Source: Temporal (01/23/20 1530)  Respirations: 18 (01/24/20 0743)  Height: 5' 6" (167 6 cm) (01/21/20 1145)  Weight - Scale: 55 2 kg (121 lb 11 1 oz) (01/21/20 1550)  SpO2: 98 % (01/25/20 0100)  Exam:   Physical Exam   Constitutional: He is oriented to person, place, and time  He appears well-developed and well-nourished  HENT:   Head: Normocephalic  Eyes: Pupils are equal, round, and reactive to light  EOM are normal    Neck: Neck supple  Cardiovascular: Normal rate and regular rhythm  Pulmonary/Chest: Effort normal and breath sounds normal    Abdominal: Soft  Bowel sounds are normal    Musculoskeletal: He exhibits deformity  He exhibits no edema  Neurological: He is alert and oriented to person, place, and time  Skin: Skin is warm and dry  Psychiatric: He has a normal mood and affect  His behavior is normal        Discussion with Family: son    Discharge instructions/Information to patient and family:   See after visit summary for information provided to patient and family  Provisions for Follow-Up Care:  See after visit summary for information related to follow-up care and any pertinent home health orders  Disposition:     [facility] to [facility]:   · Not Applicable to this Patient - Not Applicable to this Patient    Planned Readmission:      Discharge Statement:  I spent 40 minutes discharging the patient  This time was spent on the day of discharge  I had direct contact with the patient on the day of discharge   Greater than 50% of the total time was spent examining patient, answering all patient questions, arranging and discussing plan of care with patient as well as directly providing post-discharge instructions  Additional time then spent on discharge activities  Discharge Medications:  See after visit summary for reconciled discharge medications provided to patient and family        ** Please Note: This note has been constructed using a voice recognition system **

## 2020-01-25 NOTE — QUICK NOTE
Pt refused vitals when asked two times, once in [place] and once in [place]  RN was made aware

## 2020-01-25 NOTE — DISCHARGE INSTR - AVS FIRST PAGE
inr TODAY 1 26    CONTINUE LOVENOX UNTIL INR THERAPEUTIC    RECEIVED 1U BLOOD PRIOR TO DISCHARGE TODAY

## 2020-01-26 LAB
ABO GROUP BLD BPU: NORMAL
BPU ID: NORMAL
CROSSMATCH: NORMAL
UNIT DISPENSE STATUS: NORMAL
UNIT PRODUCT CODE: NORMAL
UNIT RH: NORMAL

## 2020-02-03 NOTE — TELEPHONE ENCOUNTER
Pt is scheduled for EGD 3/10/20 at [facility], is he able to hold coumadin and if so for how many days prior to procedure?  Pt has seen Dr Turner at [facility] office

## 2020-02-03 NOTE — TELEPHONE ENCOUNTER
Patient has significant history of thrombotic events  He has a previous history of left femoral thromboendarterectomy and Left SFA stents  Would advise for Lovenox bridge if they need to hold Coumadin at he is at risk for re-thrombosis and complications if he holds A/C  Would address with Dr Alisha Zhang for further recommendations once he returns

## 2020-02-05 ENCOUNTER — HOSPITAL ENCOUNTER (EMERGENCY)
Facility: HOSPITAL | Age: 70
Discharge: HOME | End: 2020-02-05
Attending: EMERGENCY MEDICINE | Admitting: EMERGENCY MEDICINE
Payer: MEDICARE

## 2020-02-05 ENCOUNTER — APPOINTMENT (EMERGENCY)
Dept: ULTRASOUND IMAGING | Facility: HOSPITAL | Age: 70
End: 2020-02-05
Payer: MEDICARE

## 2020-02-05 ENCOUNTER — TELEPHONE (OUTPATIENT)
Dept: VASCULAR SURGERY | Facility: CLINIC | Age: 70
End: 2020-02-05

## 2020-02-05 ENCOUNTER — APPOINTMENT (EMERGENCY)
Dept: RADIOLOGY | Facility: HOSPITAL | Age: 70
End: 2020-02-05
Payer: MEDICARE

## 2020-02-05 VITALS
HEART RATE: 71 BPM | TEMPERATURE: 98.3 F | DIASTOLIC BLOOD PRESSURE: 71 MMHG | HEIGHT: 66 IN | OXYGEN SATURATION: 100 % | BODY MASS INDEX: 19.64 KG/M2 | SYSTOLIC BLOOD PRESSURE: 158 MMHG | RESPIRATION RATE: 16 BRPM

## 2020-02-05 DIAGNOSIS — Z91.19 MEDICALLY NONCOMPLIANT: ICD-10-CM

## 2020-02-05 DIAGNOSIS — M79.672 LEFT FOOT PAIN: Primary | ICD-10-CM

## 2020-02-05 DIAGNOSIS — Z86.79 HISTORY OF PERIPHERAL ARTERIAL DISEASE: ICD-10-CM

## 2020-02-05 DIAGNOSIS — R22.42 LOCALIZED SWELLING OF LEFT FOOT: ICD-10-CM

## 2020-02-05 DIAGNOSIS — R79.1 SUBTHERAPEUTIC INTERNATIONAL NORMALIZED RATIO (INR): ICD-10-CM

## 2020-02-05 LAB
ANION GAP SERPL CALCULATED.3IONS-SCNC: 8 MMOL/L (ref 4–13)
APTT PPP: 35 SECONDS (ref 23–37)
BASOPHILS # BLD AUTO: 0.05 THOUSANDS/ΜL (ref 0–0.1)
BASOPHILS NFR BLD AUTO: 1 % (ref 0–1)
BUN SERPL-MCNC: 15 MG/DL (ref 5–25)
CALCIUM SERPL-MCNC: 9.1 MG/DL (ref 8.3–10.1)
CHLORIDE SERPL-SCNC: 106 MMOL/L (ref 100–108)
CO2 SERPL-SCNC: 28 MMOL/L (ref 21–32)
CREAT SERPL-MCNC: 0.88 MG/DL (ref 0.6–1.3)
EOSINOPHIL # BLD AUTO: 0.24 THOUSAND/ΜL (ref 0–0.61)
EOSINOPHIL NFR BLD AUTO: 4 % (ref 0–6)
ERYTHROCYTE [DISTWIDTH] IN BLOOD BY AUTOMATED COUNT: 16.3 % (ref 11.6–15.1)
GFR SERPL CREATININE-BSD FRML MDRD: 87 ML/MIN/1.73SQ M
GLUCOSE SERPL-MCNC: 104 MG/DL (ref 65–140)
HCT VFR BLD AUTO: 31.9 % (ref 36.5–49.3)
HGB BLD-MCNC: 9.9 G/DL (ref 12–17)
IMM GRANULOCYTES # BLD AUTO: 0.03 THOUSAND/UL (ref 0–0.2)
IMM GRANULOCYTES NFR BLD AUTO: 1 % (ref 0–2)
INR PPP: 1.23 (ref 0.84–1.19)
LYMPHOCYTES # BLD AUTO: 0.77 THOUSANDS/ΜL (ref 0.6–4.47)
LYMPHOCYTES NFR BLD AUTO: 12 % (ref 14–44)
MCH RBC QN AUTO: 29.5 PG (ref 26.8–34.3)
MCHC RBC AUTO-ENTMCNC: 31 G/DL (ref 31.4–37.4)
MCV RBC AUTO: 95 FL (ref 82–98)
MONOCYTES # BLD AUTO: 0.4 THOUSAND/ΜL (ref 0.17–1.22)
MONOCYTES NFR BLD AUTO: 6 % (ref 4–12)
NEUTROPHILS # BLD AUTO: 4.98 THOUSANDS/ΜL (ref 1.85–7.62)
NEUTS SEG NFR BLD AUTO: 76 % (ref 43–75)
NRBC BLD AUTO-RTO: 0 /100 WBCS
PLATELET # BLD AUTO: 365 THOUSANDS/UL (ref 149–390)
PMV BLD AUTO: 8.8 FL (ref 8.9–12.7)
POTASSIUM SERPL-SCNC: 4.3 MMOL/L (ref 3.5–5.3)
PROTHROMBIN TIME: 15.6 SECONDS (ref 11.6–14.5)
RBC # BLD AUTO: 3.36 MILLION/UL (ref 3.88–5.62)
SODIUM SERPL-SCNC: 142 MMOL/L (ref 136–145)
WBC # BLD AUTO: 6.47 THOUSAND/UL (ref 4.31–10.16)

## 2020-02-05 PROCEDURE — 85730 THROMBOPLASTIN TIME PARTIAL: CPT | Performed by: EMERGENCY MEDICINE

## 2020-02-05 PROCEDURE — 93926 LOWER EXTREMITY STUDY: CPT

## 2020-02-05 PROCEDURE — 73630 X-RAY EXAM OF FOOT: CPT

## 2020-02-05 PROCEDURE — 99285 EMERGENCY DEPT VISIT HI MDM: CPT

## 2020-02-05 PROCEDURE — 85610 PROTHROMBIN TIME: CPT | Performed by: EMERGENCY MEDICINE

## 2020-02-05 PROCEDURE — 96372 THER/PROPH/DIAG INJ SC/IM: CPT

## 2020-02-05 PROCEDURE — 80048 BASIC METABOLIC PNL TOTAL CA: CPT | Performed by: EMERGENCY MEDICINE

## 2020-02-05 PROCEDURE — 36415 COLL VENOUS BLD VENIPUNCTURE: CPT | Performed by: EMERGENCY MEDICINE

## 2020-02-05 PROCEDURE — 99285 EMERGENCY DEPT VISIT HI MDM: CPT | Performed by: EMERGENCY MEDICINE

## 2020-02-05 PROCEDURE — 85025 COMPLETE CBC W/AUTO DIFF WBC: CPT | Performed by: EMERGENCY MEDICINE

## 2020-02-05 RX ORDER — SUCRALFATE 1 G/1
1 TABLET ORAL 4 TIMES DAILY
COMMUNITY

## 2020-02-05 RX ORDER — WARFARIN SODIUM 5 MG/1
5 TABLET ORAL DAILY
Qty: 30 TABLET | Refills: 0 | Status: SHIPPED | OUTPATIENT
Start: 2020-02-05

## 2020-02-05 RX ADMIN — ENOXAPARIN SODIUM 80 MG: 80 INJECTION SUBCUTANEOUS at 17:00

## 2020-02-05 NOTE — TELEPHONE ENCOUNTER
Dr Foreign Taylor from ER called, they were unable to teach the family to administer the lovenox, so they will give patient and dose tonight and then consult for case management to find home care nurses to follow  She asked us to check in morning to make sure that someone had contacted family

## 2020-02-05 NOTE — TELEPHONE ENCOUNTER
Patient is in [facility] ER with a problem with his foot and his INR is 1 2  Dr  Flakita Carton called and said patient will have duplex done and then will be sent home on lovenox until therapeutic on coumadin  Patient had had a GI bleed and was scoped and was sent home on lovenox previously, but when I spoke to ER physician , she said they will show family how to give the lovenox and I told her we would follow the INR

## 2020-02-05 NOTE — ED PROVIDER NOTES
History  Chief Complaint   Patient presents with    Foot Swelling     Per patients family, patient is having left foot swelling and the foot is cold from the ankle the down which they noticied last night  Patient with recent vascular surgery on his left leg  Audible pedal pulse by doppler in triage   Cold Extremity     HPI    Prior to Admission Medications   Prescriptions Last Dose Informant Patient Reported? Taking?    amLODIPine (NORVASC) 10 mg tablet   No Yes   Sig: Take 1 tablet (10 mg total) by mouth daily   aspirin (ECOTRIN LOW STRENGTH) 81 mg EC tablet Not Taking at Unknown time  No No   Sig: Take 1 tablet (81 mg total) by mouth daily   Patient not taking: Reported on 2/5/2020   atorvastatin (LIPITOR) 20 mg tablet   No Yes   Sig: Take 1 tablet (20 mg total) by mouth daily after dinner   enoxaparin (LOVENOX) 60 mg/0 6 mL   No No   Sig: Inject 0 6 mL (60 mg total) under the skin every 12 (twelve) hours   ferrous sulfate 325 (65 Fe) mg tablet Not Taking at Unknown time  No No   Sig: Take 1 tablet (325 mg total) by mouth 2 (two) times a day with meals   Patient not taking: Reported on 2/5/2020   gabapentin (NEURONTIN) 100 mg capsule   No Yes   Sig: Take 1 capsule (100 mg total) by mouth daily   hydrocortisone 2 5 % cream Not Taking at Unknown time  Yes No   Sig: Apply to to affected areas 2-3 times daily   melatonin 3 mg Not Taking at Unknown time  No No   Sig: Take 2 tablets (6 mg total) by mouth daily at bedtime   Patient not taking: Reported on 2/5/2020   neomycin-bacitracin-polymyxin b (NEOSPORIN) ointment Not Taking at Unknown time  No No   Sig: Apply topically 2 (two) times a day   Patient not taking: Reported on 11/2/2019   pantoprazole (PROTONIX) 40 mg tablet   No Yes   Sig: Take 1 tablet (40 mg total) by mouth 2 (two) times a day before meals   polyethylene glycol (MIRALAX) 17 g packet Not Taking at Unknown time  No No   Sig: Take 17 g by mouth 2 (two) times a day   Patient not taking: Reported on 2/5/2020   senna-docusate sodium (SENOKOT S) 8 6-50 mg per tablet Not Taking at Unknown time  No No   Sig: Take 1 tablet by mouth 2 (two) times a day   Patient not taking: Reported on 2/5/2020   sucralfate (CARAFATE) 1 g tablet   Yes Yes   Sig: Take 1 g by mouth 4 (four) times a day   tamsulosin (FLOMAX) 0 4 mg   No Yes   Sig: Take 1 capsule (0 4 mg total) by mouth daily with dinner   thiamine 100 MG tablet Not Taking at Unknown time  No No   Sig: Take 1 tablet (100 mg total) by mouth daily   Patient not taking: Reported on 2/5/2020      Facility-Administered Medications: None       Past Medical History:   Diagnosis Date    Cancer ([facility] )     throat cancer    GERD (gastroesophageal reflux disease)     Hyperlipidemia     Hypertension     Left carotid stenosis 1/14/2020    PAD (peripheral artery disease) ([facility] ) 11/2/2019    PEG (percutaneous endoscopic gastrostomy) status ([place] [address] )     Port-A-Cath in place        Past Surgical History:   Procedure Laterality Date    BYPASS FEMORAL-FEMORAL Right     ESOPHAGOSCOPY N/A 8/24/2017    Procedure: ESOPHAGOSCOPY FLEXIBLE;  Surgeon: Trent Borden MD;  [facility];  Service: ENT    IR ABDOMINAL ANGIOGRAPHY / INTERVENTION  11/14/2019    IR ABDOMINAL ANGIOGRAPHY / INTERVENTION  11/7/2019    IR ABDOMINAL ANGIOGRAPHY / INTERVENTION  12/13/2019    LEG AMPUTATION THROUGH LOWER TIBIA AND FIBULA Right 12/19/2019    Procedure: AMPUTATION BELOW KNEE (BKA);   Surgeon: Nia Gutierrez MD;  [facility];  Service: Vascular    MT BRONCHOSCOPY,DIAGNOSTIC N/A 8/24/2017    Procedure: Bronchoscopy;  Surgeon: Trent Borden MD;  [facility];  Service: ENT    MT LARYNGOSCOPY,DIRCT,OP,BIOPSY N/A 8/24/2017    Procedure: LARYNGOSCOPY DIRECT;  Surgeon: Trent Borden MD;  [facility];  Service: ENT    MT [facility] Left 11/7/2019    Procedure: LEFT ENDARTERECTOMY ARTERIAL FEMORAL; L CFAE WITH PROFUNDOPLASTY; ARTERIOGRAM;RETROGRADE ILIAC STENTING;  Surgeon: Alex Dwyer MD;  [facility];  Service: Vascular    TOE AMPUTATION Right     2 toes       History reviewed  No pertinent family history  I have reviewed and agree with the history as documented  Social History     Tobacco Use    Smoking status: Former Smoker     Packs/day: 1 00     Years: 15      Pack years: 15      Types: Cigarettes     Last attempt to quit: 2019     Years since quittin 2    Smokeless tobacco: Never Used    Tobacco comment: 50+ years   Substance Use Topics    Alcohol use: Never     Alcohol/week: 0 0 standard drinks     Frequency: Patient refused     Drinks per session: Patient refused     Binge frequency: Patient refused    Drug use: No        Review of Systems    Physical Exam  Physical Exam   Constitutional: He is oriented to person, place, and time  He appears well-developed and well-nourished  No distress  HENT:   Head: Normocephalic and atraumatic  Eyes: Pupils are equal, round, and reactive to light  Conjunctivae are normal    Neck: Normal range of motion  No tracheal deviation present  Cardiovascular: Normal rate, regular rhythm and intact distal pulses  Pulses:       Dorsalis pedis pulses are 0 on the left side  Posterior tibial pulses are 0 on the left side  Dopplerable left DP pulse   Pulmonary/Chest: Effort normal  No respiratory distress  Musculoskeletal:        Legs:       Left foot: There is tenderness (dorsal surface), swelling (mild) and decreased capillary refill (2-3 seconds)  There is normal range of motion, no bony tenderness, no crepitus and no deformity  No discoloration of skin  Foot slightly cooler than the rest of the leg, but not cold   Neurological: He is alert and oriented to person, place, and time  GCS eye subscore is 4  GCS verbal subscore is 5  GCS motor subscore is 6  Skin: Skin is warm and dry  Psychiatric: He has a normal mood and affect   His behavior is normal    Nursing note and vitals reviewed        Vital Signs  ED Triage Vitals [02/05/20 1149]   Temperature Pulse Respirations Blood Pressure SpO2   98 3 °F (36 8 °C) 75 18 134/62 98 %      Temp Source Heart Rate Source Patient Position - Orthostatic VS BP Location FiO2 (%)   Oral Monitor Sitting Left arm --      Pain Score       7           Vitals:    02/05/20 1149 02/05/20 1450   BP: 134/62 155/70   Pulse: 75 78   Patient Position - Orthostatic VS: Sitting          Visual Acuity      ED Medications  Medications   enoxaparin (LOVENOX) subcutaneous injection 80 mg (has no administration in time range)       Diagnostic Studies  Results Reviewed     Procedure Component Value Units Date/Time    Protime-INR [[id number]]  (Abnormal) Collected:  02/05/20 1306    Lab Status:  Final result Specimen:  Blood from Arm, Right Updated:  02/05/20 1342     Protime 15 6 seconds      INR 1 23    APTT [[id number]]  (Normal) Collected:  02/05/20 1306    Lab Status:  Final result Specimen:  Blood from Arm, Right Updated:  02/05/20 1342     PTT 35 seconds     Basic metabolic panel [[id number]] Collected:  02/05/20 1306    Lab Status:  Final result Specimen:  Blood from Arm, Right Updated:  02/05/20 1327     Sodium 142 mmol/L      Potassium 4 3 mmol/L      Chloride 106 mmol/L      CO2 28 mmol/L      ANION GAP 8 mmol/L      BUN 15 mg/dL      Creatinine 0 88 mg/dL      Glucose 104 mg/dL      Calcium 9 1 mg/dL      eGFR 87 ml/min/1 73sq m     Narrative:       Aj guidelines for Chronic Kidney Disease (CKD):     Stage 1 with normal or high GFR (GFR > 90 mL/min/1 73 square meters)    Stage 2 Mild CKD (GFR = 60-89 mL/min/1 73 square meters)    Stage 3A Moderate CKD (GFR = 45-59 mL/min/1 73 square meters)    Stage 3B Moderate CKD (GFR = 30-44 mL/min/1 73 square meters)    Stage 4 Severe CKD (GFR = 15-29 mL/min/1 73 square meters)    Stage 5 End Stage CKD (GFR <15 mL/min/1 73 square meters)  Note: GFR calculation is accurate only with a steady state creatinine    CBC and differential [[id number]]  (Abnormal) Collected:  02/05/20 1306    Lab Status:  Final result Specimen:  Blood from Arm, Right Updated:  02/05/20 1317     WBC 6 47 Thousand/uL      RBC 3 36 Million/uL      Hemoglobin 9 9 g/dL      Hematocrit 31 9 %      MCV 95 fL      MCH 29 5 pg      MCHC 31 0 g/dL      RDW 16 3 %      MPV 8 8 fL      Platelets 992 Thousands/uL      nRBC 0 /100 WBCs      Neutrophils Relative 76 %      Immat GRANS % 1 %      Lymphocytes Relative 12 %      Monocytes Relative 6 %      Eosinophils Relative 4 %      Basophils Relative 1 %      Neutrophils Absolute 4 98 Thousands/µL      Immature Grans Absolute 0 03 Thousand/uL      Lymphocytes Absolute 0 77 Thousands/µL      Monocytes Absolute 0 40 Thousand/µL      Eosinophils Absolute 0 24 Thousand/µL      Basophils Absolute 0 05 Thousands/µL                  XR foot 3+ views LEFT   Final Result by Tex Mcallister MD (02/05 1448)      No acute osseous abnormality  Workstation performed: [id number]         VAS lower limb arterial duplex, limited, unilateral    (Results Pending)              Procedures  Procedures         ED Course                               MDM  Number of Diagnoses or Management Options  History of peripheral arterial disease: new and requires workup  Left foot pain: new and requires workup  Localized swelling of left foot: new and requires workup  Medically noncompliant: new and requires workup  Subtherapeutic international normalized ratio (INR): new and requires workup  Diagnosis management comments: This is a 48-year-old male who presents here today with left foot pain and swelling  His daughter noticed last night that the foot was slightly swollen and cooler to the touch  She states it feels more warm today, but he is complaining of pain today  She denies any noticeable color change  He denies any injuries to the foot, and per family is mostly bedbound    The patient states pain is localized to the dorsal surface of his foot  Given his complaints of pain today, family brought him in for evaluation  They have not given him or done anything for his symptoms  They stated was a couple of weeks since he last saw his vascular surgeon and are not sure when his next appointment is  They deny any new medications or changes in medications  They state he has been taking his blood thinners as prescribed  He had left femoral endarterectomy, profundoplasty, SMA embolectomy and retrograde iliac stenting on 11/6/19 with residual left SFA stenosis for which he had angiogram performed with treatment of SFA and popliteal arteries on 11/14/19  Angiography on 11/14 showed one-vessel runoff the left lower extremity through the peroneal artery with chronic occlusions of left AT and PT, with the left dorsalis pedis and plantar arteries reconstituted through collaterals  In early December he presented with severe right leg pain, with a supratherapeutic INR and failure of angiography with Interventional Radiology and had eventual BKA  There is a vascular surgery note from 01/14 that mentions him healing well from his BKA, and needing continued follow-up for his left leg  His INR has been subtherapeutic recently  When he was seen on 01/04 his INR was increased from 2 to 5 milligrams  He was admitted from 01/21 to 1/25 due to acute anemia in the setting of anticoagulation use  His INR was still subtherapeutic, so he was bridged with Lovenox  He has not had his INR checked since being discharged from the hospital  The daughter is uncertain of what exactly the patient is taking, but states it should be as per the medication list in the computer since he was just admitted  Review of systems:  Otherwise negative unless stated as above    He is well-appearing, in no acute distress  He has no palpable DP or PT pulses in the left leg, but does have a dopplerable pulse    The foot is slightly cool compared to the rest of the leg  It is mildly swollen  He has mild tenderness to the dorsal surface  He has no color change  Capillary refill is about 2-3 seconds  Exam is otherwise unremarkable  Concerns for recurrent occlusion or worsening of underlying stenosis  We will check lab work to evaluate for recurrence of anemia, INR to check current level, PTT in case he needs heparin, BMP to evaluate for current creatinine case additional anticoagulants are needed, or he needs recurrent diaphragm angiography  We will repeat arterial duplex to evaluate for signs of acute occlusion, and x-ray to evaluate for underlying bony injuries given focal tenderness though he is not having any complaints of trauma  His INR is still subtherapeutic at 1 23  His anemia has improved slightly to hemoglobin 9 9  Lab work is otherwise unremarkable  X-ray was reviewed by myself the radiologist shows no acute abnormalities  Duplex shows 75% stenosis in the left mid popliteal but no other acute significant changes  I discussed the patient with Dr Mandy Adams from vascular surgery  On his review, he denies any significant acute changes in imaging that would require emergent intervention  He is most concerned about the patient being subtherapeutic, especially with concern for hypercoagulability in this patient  He would recommend the patient be started on bridging Lovenox, and have his Coumadin adjusted and he will require close follow-up of this  His recommendation for Lovenox would be 1 milligram/kilogram b i d , however if the family has difficulties doing this would be fine to 1 5 milligrams/kilogram daily  The patient will follow-up in the vascular surgery office for further evaluation of his INR as well as his peripheral arterial disease  I discussed with the family findings and recommended treatment plan    The daughter states that she does not think the patient is still taking the Coumadin, as it was stopped while he was in the nursing home due to him not wanting to have his blood work checked  She thinks he was switched to a different medication but is uncertain of what  She did not bring his list with him  She was able to contact somebody who is in the house, who sent pictures of all the medication bottles  Per this, the patient is not currently taking any anticoagulation medications, including the baby aspirin that he is supposed to be taking  The daughter became very anxious the thought of having to give her father shots, and due to his underlying dementia he is unable to administer these to himself  I spoke again with Ashley Womack, the nurse educator the vascular surgery office, who states that as far as nursing services go would need to be set up through the VNA  They will follow up on his INR to make sure it is becoming therapeutic appropriately  He will be given the first dose of Lovenox here  We will place orders to get home nursing set up at home for help with administration of Lovenox until the patient is therapeutic  I discussed with patient and family continued treatment at home, importance of taking medications as prescribed, following up with vascular surgery and indications for return, and they expressed understanding with this plan         Amount and/or Complexity of Data Reviewed  Clinical lab tests: ordered and reviewed  Tests in the radiology section of CPT®: ordered and reviewed  Decide to obtain previous medical records or to obtain history from someone other than the patient: yes  Obtain history from someone other than the patient: yes (family)  Review and summarize past medical records: yes  Discuss the patient with other providers: yes  Independent visualization of images, tracings, or specimens: yes          Disposition  Final diagnoses:   Left foot pain   Localized swelling of left foot   History of peripheral arterial disease   Subtherapeutic international normalized ratio (INR)   Medically noncompliant     Time reflects when diagnosis was documented in both MDM as applicable and the Disposition within this note     Time User Action Codes Description Comment    2/5/2020  4:09 PM Oneida Barlow Add [Q25 875] Left foot pain     2/5/2020  4:09 PM Ben Jackson [R22 42] Localized swelling of left foot     2/5/2020  4:09 PM Ben Jackson [Z86 79] History of peripheral arterial disease     2/5/2020  4:39 PM Ben Jackson Add [R79 1] Subtherapeutic international normalized ratio (INR)     2/5/2020  4:39 PM Ben Jackson [Z91 19] Medically noncompliant       ED Disposition     ED Disposition Condition Date/Time Comment    Discharge Good Wed Feb 5, 2020  3:36 PM [address] discharge to home/self care        Follow-up Information     Follow up With Specialties Details Why Contact Info Additional Information    Herman Tapia MD Internal Medicine Schedule an appointment as soon as possible for a visit  to follow up on your foot [address]  [phone]       The [address] [place] [address] Vascular Surgery Schedule an appointment as soon as possible for a visit  to follow up on your leg and your INR [address] [facility] [address] [place] [address]  [phone] [address], [address] [place] [address], [id number] [phone]          Patient's Medications   Discharge Prescriptions    ENOXAPARIN (LOVENOX) 80 MG/0 8 ML    Inject 0 8 mL (80 mg total) under the skin daily       Start Date: 2/5/2020  End Date: --       Order Dose: 80 mg       Quantity: 14 Syringe    Refills: 0    WARFARIN (COUMADIN) 5 MG TABLET    Take 1 tablet (5 mg total) by mouth daily       Start Date: 2/5/2020  End Date: --       Order Dose: 5 mg       Quantity: 30 tablet    Refills: 0         ED Provider  Electronically Signed by Vickie Leos MD  02/05/20 8172

## 2020-02-05 NOTE — ED NOTES
Pt's daughter educated on need for Lovenox injection and proper administration    Pt daughter verbalized understanding     Francia Spicer RN  02/05/20 9100

## 2020-02-05 NOTE — DISCHARGE INSTRUCTIONS
Take the Lovenox until your INR is in the appropriate level  Take the Coumadin as per the instructions  He will need to have the level checked periodically to make sure it is at the correct dose and working appropriately  The vascular surgery team will tell you when you can stop doing the injections  He needs to be taking a baby aspirin daily as well  Follow up with the vascular surgery team regarding his medication levels and adjustments as needed, as well as for monitoring the known clots in his leg  Follow up with his primary care doctor for further evaluation of his foot  Return if he has severe pain spreading from the foot, has color changes of his skin, if it is persistently cold, or for any other concerns

## 2020-02-06 ENCOUNTER — TELEPHONE (OUTPATIENT)
Dept: VASCULAR SURGERY | Facility: CLINIC | Age: 70
End: 2020-02-06

## 2020-02-06 PROCEDURE — 93922 UPR/L XTREMITY ART 2 LEVELS: CPT | Performed by: SURGERY

## 2020-02-06 PROCEDURE — 93926 LOWER EXTREMITY STUDY: CPT | Performed by: SURGERY

## 2020-02-06 NOTE — TELEPHONE ENCOUNTER
Call from Elver at [facility]  She had to leave message at [facility] at [facility] but they are going to open home care and see pt  She instructed the nurse that will be evaluating pt that if they accept his case and needs are not beyond home care that a pt/inr needs to be drawn w/ results to our office  They may not see pt until tomorrow  Called pt's daughter Faraz Kasper ([phone]) and informed of same  Instructed her that they need to provide lovenox injections to pt as directed by ED yesterday  She states they are going to pharmacy to  medication and her brother is going to come over to administer  We will need to f/u on pt/inr results

## 2020-02-06 NOTE — TELEPHONE ENCOUNTER
Per s/o from Jaya pt was in ED yesteray, he has been d/c from [facility] at [facility]  He needs to be on lovenox until inr therapeutic on coumadin and family was not giving it to him  inr in ed was 1 2 yesterday  ED physician placed ref for [facility]  Called [facility] and s/w Roberto Hobbs, she states pt was ref to them in early January and they did not accept, "needs were beyond home care"  She transf me to intake, s/w Mirta and explained above  She ? Why SNF did not arrange home care for pt  She is going to call them and then get back to me  If pt's needs are again beyond home care they will not open case

## 2020-02-07 ENCOUNTER — TRANSCRIBE ORDERS (OUTPATIENT)
Dept: ADMINISTRATIVE | Facility: HOSPITAL | Age: 70
End: 2020-02-07

## 2020-02-07 ENCOUNTER — APPOINTMENT (OUTPATIENT)
Dept: LAB | Facility: MEDICAL CENTER | Age: 70
End: 2020-02-07
Payer: MEDICARE

## 2020-02-07 DIAGNOSIS — I73.9 PAD (PERIPHERAL ARTERY DISEASE) (HCC): ICD-10-CM

## 2020-02-07 DIAGNOSIS — Z79.01 LONG TERM (CURRENT) USE OF ANTICOAGULANTS: Primary | ICD-10-CM

## 2020-02-07 LAB
INR PPP: 1.28 (ref 0.84–1.19)
INR PPP: 1.28 (ref 0.84–1.19)
PROTHROMBIN TIME: 15.6 SECONDS (ref 11.6–14.5)

## 2020-02-07 PROCEDURE — 85610 PROTHROMBIN TIME: CPT

## 2020-02-07 PROCEDURE — 36415 COLL VENOUS BLD VENIPUNCTURE: CPT

## 2020-02-07 NOTE — TELEPHONE ENCOUNTER
Ximena Blake from [address] [place] [address] VNA called to inform vascular she did see patient today and PT/INR was drawn  No results in chart yet  Will await

## 2020-02-10 ENCOUNTER — ANTICOAG VISIT (OUTPATIENT)
Dept: VASCULAR SURGERY | Facility: CLINIC | Age: 70
End: 2020-02-10

## 2020-02-10 NOTE — PROGRESS NOTES
Spoke with mac at [facility] and will continue lovenox 80 mgm and repeat protime on wed 2/12

## 2020-02-10 NOTE — TELEPHONE ENCOUNTER
Will send message to GI, when patient needs EGD, he will need to stop coumadin and have lovenox bridge

## 2020-02-12 ENCOUNTER — TRANSCRIBE ORDERS (OUTPATIENT)
Dept: ADMINISTRATIVE | Facility: HOSPITAL | Age: 70
End: 2020-02-12

## 2020-02-12 ENCOUNTER — HOSPITAL ENCOUNTER (INPATIENT)
Facility: HOSPITAL | Age: 70
LOS: 5 days | Discharge: NURSING, REHAB OR LONG-TERM CARE | DRG: 813 | End: 2020-02-18
Attending: EMERGENCY MEDICINE | Admitting: STUDENT IN AN ORGANIZED HEALTH CARE EDUCATION/TRAINING PROGRAM
Payer: MEDICARE

## 2020-02-12 ENCOUNTER — LAB REQUISITION (OUTPATIENT)
Dept: LAB | Facility: HOSPITAL | Age: 70
DRG: 813 | End: 2020-02-12
Payer: MEDICARE

## 2020-02-12 ENCOUNTER — TELEPHONE (OUTPATIENT)
Dept: VASCULAR SURGERY | Facility: CLINIC | Age: 70
End: 2020-02-12

## 2020-02-12 DIAGNOSIS — K92.1 MELENA: ICD-10-CM

## 2020-02-12 DIAGNOSIS — I73.9 PVD (PERIPHERAL VASCULAR DISEASE) (HCC): ICD-10-CM

## 2020-02-12 DIAGNOSIS — D68.9 COAGULOPATHY (HCC): ICD-10-CM

## 2020-02-12 DIAGNOSIS — R79.1 SUPRATHERAPEUTIC INTERNATIONAL NORMALIZED RATIO (INR): Primary | ICD-10-CM

## 2020-02-12 DIAGNOSIS — Z51.81 ENCOUNTER FOR THERAPEUTIC DRUG LEVEL MONITORING: ICD-10-CM

## 2020-02-12 PROBLEM — E87.1 HYPONATREMIA: Status: RESOLVED | Noted: 2020-01-04 | Resolved: 2020-02-12

## 2020-02-12 PROBLEM — R91.1 LUNG NODULE: Status: ACTIVE | Noted: 2018-03-18

## 2020-02-12 PROBLEM — D69.6 THROMBOCYTOPENIA (HCC): Status: RESOLVED | Noted: 2019-11-18 | Resolved: 2020-02-12

## 2020-02-12 PROBLEM — R55 SYNCOPE AND COLLAPSE: Status: RESOLVED | Noted: 2019-11-04 | Resolved: 2020-02-12

## 2020-02-12 PROBLEM — I25.10 ASCVD (ARTERIOSCLEROTIC CARDIOVASCULAR DISEASE): Status: ACTIVE | Noted: 2017-09-19

## 2020-02-12 PROBLEM — R74.01 TRANSAMINITIS: Status: RESOLVED | Noted: 2019-11-12 | Resolved: 2020-02-12

## 2020-02-12 LAB
ALBUMIN SERPL BCP-MCNC: 3.2 G/DL (ref 3.5–5)
ALP SERPL-CCNC: 82 U/L (ref 46–116)
ALT SERPL W P-5'-P-CCNC: 14 U/L (ref 12–78)
ANION GAP SERPL CALCULATED.3IONS-SCNC: 9 MMOL/L (ref 4–13)
APTT PPP: 180 SECONDS (ref 23–37)
AST SERPL W P-5'-P-CCNC: 28 U/L (ref 5–45)
BASOPHILS # BLD AUTO: 0.03 THOUSANDS/ΜL (ref 0–0.1)
BASOPHILS NFR BLD AUTO: 1 % (ref 0–1)
BILIRUB SERPL-MCNC: 0.3 MG/DL (ref 0.2–1)
BUN SERPL-MCNC: 20 MG/DL (ref 5–25)
CALCIUM SERPL-MCNC: 9.2 MG/DL (ref 8.3–10.1)
CHLORIDE SERPL-SCNC: 104 MMOL/L (ref 100–108)
CO2 SERPL-SCNC: 26 MMOL/L (ref 21–32)
CREAT SERPL-MCNC: 0.7 MG/DL (ref 0.6–1.3)
EOSINOPHIL # BLD AUTO: 0.3 THOUSAND/ΜL (ref 0–0.61)
EOSINOPHIL NFR BLD AUTO: 6 % (ref 0–6)
ERYTHROCYTE [DISTWIDTH] IN BLOOD BY AUTOMATED COUNT: 15.9 % (ref 11.6–15.1)
GFR SERPL CREATININE-BSD FRML MDRD: 96 ML/MIN/1.73SQ M
GLUCOSE SERPL-MCNC: 106 MG/DL (ref 65–140)
HCT VFR BLD AUTO: 34.3 % (ref 36.5–49.3)
HGB BLD-MCNC: 10.6 G/DL (ref 12–17)
IMM GRANULOCYTES # BLD AUTO: 0.02 THOUSAND/UL (ref 0–0.2)
IMM GRANULOCYTES NFR BLD AUTO: 0 % (ref 0–2)
INR PPP: 10.23 (ref 0.84–1.19)
INR PPP: >5 (ref 0.84–1.19)
LYMPHOCYTES # BLD AUTO: 0.69 THOUSANDS/ΜL (ref 0.6–4.47)
LYMPHOCYTES NFR BLD AUTO: 13 % (ref 14–44)
MCH RBC QN AUTO: 29 PG (ref 26.8–34.3)
MCHC RBC AUTO-ENTMCNC: 30.9 G/DL (ref 31.4–37.4)
MCV RBC AUTO: 94 FL (ref 82–98)
MONOCYTES # BLD AUTO: 0.36 THOUSAND/ΜL (ref 0.17–1.22)
MONOCYTES NFR BLD AUTO: 7 % (ref 4–12)
NEUTROPHILS # BLD AUTO: 4.09 THOUSANDS/ΜL (ref 1.85–7.62)
NEUTS SEG NFR BLD AUTO: 73 % (ref 43–75)
NRBC BLD AUTO-RTO: 0 /100 WBCS
PLATELET # BLD AUTO: 284 THOUSANDS/UL (ref 149–390)
PMV BLD AUTO: 10 FL (ref 8.9–12.7)
POTASSIUM SERPL-SCNC: 4.6 MMOL/L (ref 3.5–5.3)
PROT SERPL-MCNC: 7.7 G/DL (ref 6.4–8.2)
PROTHROMBIN TIME: 83.5 SECONDS (ref 11.6–14.5)
PROTHROMBIN TIME: >120 SECONDS (ref 11.6–14.5)
RBC # BLD AUTO: 3.66 MILLION/UL (ref 3.88–5.62)
SODIUM SERPL-SCNC: 139 MMOL/L (ref 136–145)
WBC # BLD AUTO: 5.49 THOUSAND/UL (ref 4.31–10.16)

## 2020-02-12 PROCEDURE — 1124F ACP DISCUSS-NO DSCNMKR DOCD: CPT | Performed by: NURSE PRACTITIONER

## 2020-02-12 PROCEDURE — 85610 PROTHROMBIN TIME: CPT | Performed by: PHYSICIAN ASSISTANT

## 2020-02-12 PROCEDURE — 99284 EMERGENCY DEPT VISIT MOD MDM: CPT

## 2020-02-12 PROCEDURE — 99220 PR INITIAL OBSERVATION CARE/DAY 70 MINUTES: CPT | Performed by: INTERNAL MEDICINE

## 2020-02-12 PROCEDURE — 80053 COMPREHEN METABOLIC PANEL: CPT | Performed by: PHYSICIAN ASSISTANT

## 2020-02-12 PROCEDURE — 85730 THROMBOPLASTIN TIME PARTIAL: CPT | Performed by: PHYSICIAN ASSISTANT

## 2020-02-12 PROCEDURE — 36415 COLL VENOUS BLD VENIPUNCTURE: CPT | Performed by: PHYSICIAN ASSISTANT

## 2020-02-12 PROCEDURE — 85025 COMPLETE CBC W/AUTO DIFF WBC: CPT | Performed by: PHYSICIAN ASSISTANT

## 2020-02-12 PROCEDURE — 99284 EMERGENCY DEPT VISIT MOD MDM: CPT | Performed by: PHYSICIAN ASSISTANT

## 2020-02-12 PROCEDURE — 85610 PROTHROMBIN TIME: CPT | Performed by: SURGERY

## 2020-02-12 RX ORDER — PANTOPRAZOLE SODIUM 40 MG/1
40 TABLET, DELAYED RELEASE ORAL
Status: DISCONTINUED | OUTPATIENT
Start: 2020-02-13 | End: 2020-02-18 | Stop reason: HOSPADM

## 2020-02-12 RX ORDER — ACETAMINOPHEN 325 MG/1
650 TABLET ORAL EVERY 6 HOURS PRN
Status: DISCONTINUED | OUTPATIENT
Start: 2020-02-12 | End: 2020-02-18 | Stop reason: HOSPADM

## 2020-02-12 RX ORDER — TAMSULOSIN HYDROCHLORIDE 0.4 MG/1
0.4 CAPSULE ORAL
Status: DISCONTINUED | OUTPATIENT
Start: 2020-02-13 | End: 2020-02-18 | Stop reason: HOSPADM

## 2020-02-12 RX ORDER — AMLODIPINE BESYLATE 10 MG/1
10 TABLET ORAL DAILY
Status: DISCONTINUED | OUTPATIENT
Start: 2020-02-13 | End: 2020-02-18 | Stop reason: HOSPADM

## 2020-02-12 RX ORDER — GABAPENTIN 100 MG/1
100 CAPSULE ORAL DAILY
Status: DISCONTINUED | OUTPATIENT
Start: 2020-02-13 | End: 2020-02-18 | Stop reason: HOSPADM

## 2020-02-12 RX ORDER — THIAMINE MONONITRATE (VIT B1) 100 MG
100 TABLET ORAL DAILY
Status: DISCONTINUED | OUTPATIENT
Start: 2020-02-13 | End: 2020-02-18 | Stop reason: HOSPADM

## 2020-02-12 RX ORDER — SUCRALFATE 1 G/1
1 TABLET ORAL 4 TIMES DAILY
Status: DISCONTINUED | OUTPATIENT
Start: 2020-02-12 | End: 2020-02-18 | Stop reason: HOSPADM

## 2020-02-12 RX ORDER — ATORVASTATIN CALCIUM 20 MG/1
20 TABLET, FILM COATED ORAL
Status: DISCONTINUED | OUTPATIENT
Start: 2020-02-12 | End: 2020-02-18 | Stop reason: HOSPADM

## 2020-02-12 RX ADMIN — ATORVASTATIN CALCIUM 20 MG: 20 TABLET, FILM COATED ORAL at 21:34

## 2020-02-12 RX ADMIN — SUCRALFATE 1 G: 1 TABLET ORAL at 21:34

## 2020-02-12 NOTE — TELEPHONE ENCOUNTER
Received call from the lab w/ critical INR value of 10 23  Attempted to call pt's daughter, [facility] requesting she call us back ASAP because pt needs to be evaluated in the ER

## 2020-02-12 NOTE — TELEPHONE ENCOUNTER
Pt's daughter called back and is taking him to [facility] ER  She confirmed he has been taking the correct dose

## 2020-02-12 NOTE — ED PROVIDER NOTES
History  Chief Complaint   Patient presents with    Abnormal Lab     pt with resulted abnormal INR from today     69-year-old male patient here for evaluation of abnormal INR drawn as outpatient  Drawn earlier today  He is on both Lovenox as well as Coumadin for history of multiple prior thrombotic events and malignancy  INR today as outpatient was read as 10 2  He is currently taking 7 5 mg for the past 2 days which was recently increased from 5 mg daily after his previous INR was subtherapeutic  He is offering no complaints  Denies any melena or hematochezia  No diarrhea  No vomiting  No abdominal pain no chest pain or shortness of breath  Follows with vascular surgery given his extensive vascular history  He has had prior BKA  He has had multiple prior thrombotic events  History of malignancy  He is compliant with his medications  He offers no complaints at this time  History provided by:  Patient   used: No    Evaluation of Abnormal Diagnostic Test   Time since result:  2 hours  Resulting agency:  External  Result type: coagulation studies    Coagulation studies:     PT/INR:  High      Prior to Admission Medications   Prescriptions Last Dose Informant Patient Reported? Taking?    amLODIPine (NORVASC) 10 mg tablet   No No   Sig: Take 1 tablet (10 mg total) by mouth daily   aspirin (ECOTRIN LOW STRENGTH) 81 mg EC tablet   No No   Sig: Take 1 tablet (81 mg total) by mouth daily   Patient not taking: Reported on 2/5/2020   atorvastatin (LIPITOR) 20 mg tablet   No No   Sig: Take 1 tablet (20 mg total) by mouth daily after dinner   enoxaparin (LOVENOX) 60 mg/0 6 mL   No No   Sig: Inject 0 6 mL (60 mg total) under the skin every 12 (twelve) hours   enoxaparin (LOVENOX) 80 mg/0 8 mL   No No   Sig: Inject 0 8 mL (80 mg total) under the skin daily   ferrous sulfate 325 (65 Fe) mg tablet   No No   Sig: Take 1 tablet (325 mg total) by mouth 2 (two) times a day with meals   Patient not taking: Reported on 2/5/2020   gabapentin (NEURONTIN) 100 mg capsule   No No   Sig: Take 1 capsule (100 mg total) by mouth daily   hydrocortisone 2 5 % cream   Yes No   Sig: Apply to to affected areas 2-3 times daily   melatonin 3 mg   No No   Sig: Take 2 tablets (6 mg total) by mouth daily at bedtime   Patient not taking: Reported on 2/5/2020   neomycin-bacitracin-polymyxin b (NEOSPORIN) ointment   No No   Sig: Apply topically 2 (two) times a day   Patient not taking: Reported on 11/2/2019   pantoprazole (PROTONIX) 40 mg tablet   No No   Sig: Take 1 tablet (40 mg total) by mouth 2 (two) times a day before meals   polyethylene glycol (MIRALAX) 17 g packet   No No   Sig: Take 17 g by mouth 2 (two) times a day   Patient not taking: Reported on 2/5/2020   senna-docusate sodium (SENOKOT S) 8 6-50 mg per tablet   No No   Sig: Take 1 tablet by mouth 2 (two) times a day   Patient not taking: Reported on 2/5/2020   sucralfate (CARAFATE) 1 g tablet   Yes No   Sig: Take 1 g by mouth 4 (four) times a day   tamsulosin (FLOMAX) 0 4 mg   No No   Sig: Take 1 capsule (0 4 mg total) by mouth daily with dinner   thiamine 100 MG tablet   No No   Sig: Take 1 tablet (100 mg total) by mouth daily   Patient not taking: Reported on 2/5/2020   warfarin (COUMADIN) 5 mg tablet   No No   Sig: Take 1 tablet (5 mg total) by mouth daily      Facility-Administered Medications: None       Past Medical History:   Diagnosis Date    Cancer ([place] [address] )     throat cancer    GERD (gastroesophageal reflux disease)     Hyperlipidemia     Hypertension     Left carotid stenosis 1/14/2020    PAD (peripheral artery disease) ([place] [address] ) 11/2/2019    PEG (percutaneous endoscopic gastrostomy) status ([place] [address] )     Port-A-Cath in place     Thrombocytopenia ([place] [address] ) 11/18/2019       Past Surgical History:   Procedure Laterality Date    BYPASS FEMORAL-FEMORAL Right     ESOPHAGOSCOPY N/A 8/24/2017    Procedure: ESOPHAGOSCOPY FLEXIBLE;  Surgeon: Tej Flower MD;  [facility];  Service: ENT    IR ABDOMINAL ANGIOGRAPHY / INTERVENTION  2019    IR ABDOMINAL ANGIOGRAPHY / INTERVENTION  2019    IR ABDOMINAL ANGIOGRAPHY / INTERVENTION  2019    LEG AMPUTATION THROUGH LOWER TIBIA AND FIBULA Right 2019    Procedure: AMPUTATION BELOW KNEE (BKA); Surgeon: Alexandr Rojas MD;  [facility];  Service: Vascular    AK BRONCHOSCOPY,DIAGNOSTIC N/A 2017    Procedure: Bronchoscopy;  Surgeon: Conchis Beavers MD;  [facility];  Service: ENT    AK LARYNGOSCOPY,DIRCT,OP,BIOPSY N/A 2017    Procedure: LARYNGOSCOPY DIRECT;  Surgeon: Conchis Beavers MD;  [facility];  Service: ENT    [place] Left 2019    Procedure: LEFT ENDARTERECTOMY ARTERIAL FEMORAL; L CFAE WITH PROFUNDOPLASTY; ARTERIOGRAM;RETROGRADE ILIAC STENTING;  Surgeon: Jose Ozuna MD;  [facility];  Service: Vascular    TOE AMPUTATION Right     2 toes       History reviewed  No pertinent family history  I have reviewed and agree with the history as documented  Social History     Tobacco Use    Smoking status: Former Smoker     Packs/day: 1 00     Years: 15 00     Pack years: 15 00     Types: Cigarettes     Last attempt to quit: 2019     Years since quittin 2    Smokeless tobacco: Never Used    Tobacco comment: 50+ years   Substance Use Topics    Alcohol use: Never     Alcohol/week: 0 0 standard drinks     Frequency: Patient refused     Drinks per session: Patient refused     Binge frequency: Patient refused    Drug use: No       Review of Systems   Constitutional: Negative for activity change, appetite change, chills, diaphoresis, fatigue, fever and unexpected weight change  HENT: Negative for congestion, rhinorrhea, sinus pressure, sore throat and trouble swallowing  Eyes: Negative for photophobia and visual disturbance     Respiratory: Negative for apnea, cough, choking, chest tightness, shortness of breath, wheezing and stridor  Cardiovascular: Negative for chest pain, palpitations and leg swelling  Gastrointestinal: Negative for abdominal distention, abdominal pain, blood in stool, constipation, diarrhea, nausea and vomiting  Genitourinary: Negative for decreased urine volume, difficulty urinating, dysuria, enuresis, flank pain, frequency, hematuria and urgency  Musculoskeletal: Negative for arthralgias, myalgias, neck pain and neck stiffness  Skin: Negative for color change, pallor, rash and wound  Allergic/Immunologic: Negative  Neurological: Negative for dizziness, tremors, syncope, weakness, light-headedness, numbness and headaches  Hematological: Negative  Psychiatric/Behavioral: Negative  All other systems reviewed and are negative  Physical Exam  Physical Exam   Constitutional: He is oriented to person, place, and time  He appears well-developed and well-nourished  Non-toxic appearance  He does not have a sickly appearance  He does not appear ill  No distress  HENT:   Head: Normocephalic and atraumatic  Eyes: Pupils are equal, round, and reactive to light  EOM and lids are normal    Neck: Normal range of motion  Neck supple  Cardiovascular: Normal rate, regular rhythm, S1 normal, S2 normal, normal heart sounds, intact distal pulses and normal pulses  Exam reveals no gallop, no distant heart sounds, no friction rub and no decreased pulses  No murmur heard  Pulses:       Radial pulses are 2+ on the right side, and 2+ on the left side  Pulses dopplerable   Pulmonary/Chest: Effort normal and breath sounds normal  No accessory muscle usage  No apnea, no tachypnea and no bradypnea  No respiratory distress  He has no decreased breath sounds  He has no wheezes  He has no rhonchi  He has no rales  Abdominal: Soft  Normal appearance  He exhibits no distension  There is no tenderness  There is no rigidity, no rebound and no guarding     Musculoskeletal: Normal range of motion  He exhibits no edema, tenderness or deformity  Right BKA     Neurological: He is alert and oriented to person, place, and time  No cranial nerve deficit  GCS eye subscore is 4  GCS verbal subscore is 5  GCS motor subscore is 6  Skin: Skin is warm, dry and intact  No rash noted  He is not diaphoretic  No erythema  No pallor  Psychiatric: His speech is normal    Nursing note and vitals reviewed        Vital Signs  ED Triage Vitals   Temperature Pulse Respirations Blood Pressure SpO2   02/12/20 1710 02/12/20 1709 02/12/20 1709 02/12/20 1709 02/12/20 1709   98 °F (36 7 °C) 77 20 145/69 97 %      Temp Source Heart Rate Source Patient Position - Orthostatic VS BP Location FiO2 (%)   02/12/20 1710 02/12/20 1709 02/12/20 1709 02/12/20 1709 --   Oral Monitor Lying Right arm       Pain Score       02/12/20 2123       No Pain           Vitals:    02/12/20 1755 02/12/20 1900 02/12/20 2000 02/12/20 2123   BP: 151/72 144/67 144/69 142/63   Pulse: 72 73 80 74   Patient Position - Orthostatic VS: Lying Lying Lying Lying         Visual Acuity      ED Medications  Medications   amLODIPine (NORVASC) tablet 10 mg (has no administration in time range)   atorvastatin (LIPITOR) tablet 20 mg (20 mg Oral Given 2/12/20 2134)   gabapentin (NEURONTIN) capsule 100 mg (has no administration in time range)   pantoprazole (PROTONIX) EC tablet 40 mg (has no administration in time range)   sucralfate (CARAFATE) tablet 1 g (1 g Oral Given 2/12/20 2134)   tamsulosin (FLOMAX) capsule 0 4 mg (has no administration in time range)   thiamine (VITAMIN B1) tablet 100 mg (has no administration in time range)   acetaminophen (TYLENOL) tablet 650 mg (has no administration in time range)       Diagnostic Studies  Results Reviewed     Procedure Component Value Units Date/Time    Protime-INR [[id number]]  (Abnormal) Collected:  02/12/20 1754    Lab Status:  Final result Specimen:  Blood from Arm, Right Updated:  02/12/20 1828     Protime >120 0 seconds      INR >5 00    APTT [[id number]]  (Abnormal) Collected:  02/12/20 1754    Lab Status:  Final result Specimen:  Blood from Arm, Right Updated:  02/12/20 1828      seconds     Comprehensive metabolic panel [[id number]]  (Abnormal) Collected:  02/12/20 1754    Lab Status:  Final result Specimen:  Blood from Arm, Right Updated:  02/12/20 1818     Sodium 139 mmol/L      Potassium 4 6 mmol/L      Chloride 104 mmol/L      CO2 26 mmol/L      ANION GAP 9 mmol/L      BUN 20 mg/dL      Creatinine 0 70 mg/dL      Glucose 106 mg/dL      Calcium 9 2 mg/dL      AST 28 U/L      ALT 14 U/L      Alkaline Phosphatase 82 U/L      Total Protein 7 7 g/dL      Albumin 3 2 g/dL      Total Bilirubin 0 30 mg/dL      eGFR 96 ml/min/1 73sq m     Narrative:       [place] guidelines for Chronic Kidney Disease (CKD):     Stage 1 with normal or high GFR (GFR > 90 mL/min/1 73 square meters)    Stage 2 Mild CKD (GFR = 60-89 mL/min/1 73 square meters)    Stage 3A Moderate CKD (GFR = 45-59 mL/min/1 73 square meters)    Stage 3B Moderate CKD (GFR = 30-44 mL/min/1 73 square meters)    Stage 4 Severe CKD (GFR = 15-29 mL/min/1 73 square meters)    Stage 5 End Stage CKD (GFR <15 mL/min/1 73 square meters)  Note: GFR calculation is accurate only with a steady state creatinine    CBC and differential [[id number]]  (Abnormal) Collected:  02/12/20 1754    Lab Status:  Final result Specimen:  Blood from Arm, Right Updated:  02/12/20 1800     WBC 5 49 Thousand/uL      RBC 3 66 Million/uL      Hemoglobin 10 6 g/dL      Hematocrit 34 3 %      MCV 94 fL      MCH 29 0 pg      MCHC 30 9 g/dL      RDW 15 9 %      MPV 10 0 fL      Platelets 837 Thousands/uL      nRBC 0 /100 WBCs      Neutrophils Relative 73 %      Immat GRANS % 0 %      Lymphocytes Relative 13 %      Monocytes Relative 7 %      Eosinophils Relative 6 %      Basophils Relative 1 %      Neutrophils Absolute 4 09 Thousands/µL      Immature Grans Absolute 0 02 Thousand/uL      Lymphocytes Absolute 0 69 Thousands/µL      Monocytes Absolute 0 36 Thousand/µL      Eosinophils Absolute 0 30 Thousand/µL      Basophils Absolute 0 03 Thousands/µL                  No orders to display              Procedures  Procedures         ED Course                               MDM  Number of Diagnoses or Management Options  Supratherapeutic international normalized ratio (INR): new and requires workup  Diagnosis management comments: ddx includes but is not limited to false lab, supra therapeutic INR, anemia  Plan: repeat labs  Check Hgb  Check Cr  Dispo pending  Amount and/or Complexity of Data Reviewed  Clinical lab tests: ordered and reviewed  Discuss the patient with other providers: (Dr Bc Navarrete)    Risk of Complications, Morbidity, and/or Mortality  Presenting problems: moderate  Management options: low  General comments: 50-year-old male patient with elevated INR  He has significant elevation to the point that his INR here is unable to be calculated  He has history of extensive thrombotic disease making him high risk for thrombosis if we were to reverse his anticoagulation  At this time he has no active bleeding and his hemoglobin is actually increased from prior making acute blood loss anemia  Discussed with on call vascular surgeon  After discussion it was agreed to admit for obs  Hold anticoagulation  Repeat coags in AM and have vascular consult  Pt and daughter agree with plan  Stable at the time of consult      Patient Progress  Patient progress: stable        Disposition  Final diagnoses:   Supratherapeutic international normalized ratio (INR)     Time reflects when diagnosis was documented in both MDM as applicable and the Disposition within this note     Time User Action Codes Description Comment    2/12/2020  7:19 PM [place] NAM Add [R79 1] Supratherapeutic international normalized ratio (INR)     2/12/2020  8:57 PM Kendal Howard Add [I73 9] PVD (peripheral vascular disease) ([facility] )     2/12/2020  8:57 PM Simone Bauer Add [D68 9] Coagulopathy [facility])       ED Disposition     ED Disposition Condition Date/Time Comment    Admit Stable Wed Feb 12, 2020  7:19 PM Case was discussed with Dr Leticia Bunn and the patient's admission status was agreed to be Admission Status: observation status to the service of Dr Leticia Bunn  Follow-up Information    None         Current Discharge Medication List      CONTINUE these medications which have NOT CHANGED    Details   amLODIPine (NORVASC) 10 mg tablet Take 1 tablet (10 mg total) by mouth daily  Qty: 30 tablet, Refills: 0    Associated Diagnoses: PAD (peripheral artery disease) ([facility])      aspirin (ECOTRIN LOW STRENGTH) 81 mg EC tablet Take 1 tablet (81 mg total) by mouth daily  Qty: 30 tablet, Refills: 0    Associated Diagnoses: PAD (peripheral artery disease) ([facility])      atorvastatin (LIPITOR) 20 mg tablet Take 1 tablet (20 mg total) by mouth daily after dinner  Qty: 30 tablet, Refills: 0    Associated Diagnoses: PAD (peripheral artery disease) ([facility] )      ! ! enoxaparin (LOVENOX) 60 mg/0 6 mL Inject 0 6 mL (60 mg total) under the skin every 12 (twelve) hours  Qty: 10 Syringe, Refills: 0    Associated Diagnoses: GI bleed      !! enoxaparin (LOVENOX) 80 mg/0 8 mL Inject 0 8 mL (80 mg total) under the skin daily  Qty: 14 Syringe, Refills: 0    Associated Diagnoses: History of peripheral arterial disease      ferrous sulfate 325 (65 Fe) mg tablet Take 1 tablet (325 mg total) by mouth 2 (two) times a day with meals  Qty: 30 tablet, Refills: 0    Associated Diagnoses: PAD (peripheral artery disease) ([facility])      gabapentin (NEURONTIN) 100 mg capsule Take 1 capsule (100 mg total) by mouth daily  Qty: 30 capsule, Refills: 0    Associated Diagnoses: PAD (peripheral artery disease) ([facility])      hydrocortisone 2 5 % cream Apply to to affected areas 2-3 times daily      melatonin 3 mg Take 2 tablets (6 mg total) by mouth daily at bedtime  Qty: 30 tablet, Refills: 0    Associated Diagnoses: PAD (peripheral artery disease) ([facility])      neomycin-bacitracin-polymyxin b (NEOSPORIN) ointment Apply topically 2 (two) times a day  Qty: 15 g, Refills: 0    Associated Diagnoses: Cellulitis      pantoprazole (PROTONIX) 40 mg tablet Take 1 tablet (40 mg total) by mouth 2 (two) times a day before meals  Refills: 0    Associated Diagnoses: Anemia, unspecified type; Esophageal cancer ([facility])      polyethylene glycol (MIRALAX) 17 g packet Take 17 g by mouth 2 (two) times a day  Qty: 14 each, Refills: 0    Associated Diagnoses: PAD (peripheral artery disease) ([facility])      senna-docusate sodium (SENOKOT S) 8 6-50 mg per tablet Take 1 tablet by mouth 2 (two) times a day  Qty: 30 tablet, Refills: 0    Associated Diagnoses: PAD (peripheral artery disease) ([facility])      sucralfate (CARAFATE) 1 g tablet Take 1 g by mouth 4 (four) times a day      tamsulosin (FLOMAX) 0 4 mg Take 1 capsule (0 4 mg total) by mouth daily with dinner  Qty: 30 capsule, Refills: 0    Associated Diagnoses: PAD (peripheral artery disease) ([facility])      thiamine 100 MG tablet Take 1 tablet (100 mg total) by mouth daily  Qty: 30 tablet, Refills: 0    Associated Diagnoses: PAD (peripheral artery disease) ([facility])      warfarin (COUMADIN) 5 mg tablet Take 1 tablet (5 mg total) by mouth daily  Qty: 30 tablet, Refills: 0    Associated Diagnoses: History of peripheral arterial disease       !! - Potential duplicate medications found  Please discuss with provider  No discharge procedures on file      PDMP Review     None          ED Provider  Electronically Signed by           Ifeoma Canales PA-C  02/12/20 8819

## 2020-02-13 LAB
ANION GAP SERPL CALCULATED.3IONS-SCNC: 10 MMOL/L (ref 4–13)
BUN SERPL-MCNC: 13 MG/DL (ref 5–25)
CALCIUM SERPL-MCNC: 8.9 MG/DL (ref 8.3–10.1)
CHLORIDE SERPL-SCNC: 106 MMOL/L (ref 100–108)
CO2 SERPL-SCNC: 22 MMOL/L (ref 21–32)
CREAT SERPL-MCNC: 0.79 MG/DL (ref 0.6–1.3)
ERYTHROCYTE [DISTWIDTH] IN BLOOD BY AUTOMATED COUNT: 15.8 % (ref 11.6–15.1)
GFR SERPL CREATININE-BSD FRML MDRD: 91 ML/MIN/1.73SQ M
GLUCOSE P FAST SERPL-MCNC: 97 MG/DL (ref 65–99)
GLUCOSE SERPL-MCNC: 97 MG/DL (ref 65–140)
HCT VFR BLD AUTO: 30.7 % (ref 36.5–49.3)
HGB BLD-MCNC: 9.5 G/DL (ref 12–17)
INR PPP: 10.96 (ref 0.84–1.19)
MCH RBC QN AUTO: 28.7 PG (ref 26.8–34.3)
MCHC RBC AUTO-ENTMCNC: 30.9 G/DL (ref 31.4–37.4)
MCV RBC AUTO: 93 FL (ref 82–98)
PLATELET # BLD AUTO: 244 THOUSANDS/UL (ref 149–390)
PMV BLD AUTO: 9.5 FL (ref 8.9–12.7)
POTASSIUM SERPL-SCNC: 3.8 MMOL/L (ref 3.5–5.3)
PROTHROMBIN TIME: 88.3 SECONDS (ref 11.6–14.5)
RBC # BLD AUTO: 3.31 MILLION/UL (ref 3.88–5.62)
SODIUM SERPL-SCNC: 138 MMOL/L (ref 136–145)
WBC # BLD AUTO: 4.87 THOUSAND/UL (ref 4.31–10.16)

## 2020-02-13 PROCEDURE — 80048 BASIC METABOLIC PNL TOTAL CA: CPT | Performed by: INTERNAL MEDICINE

## 2020-02-13 PROCEDURE — 99223 1ST HOSP IP/OBS HIGH 75: CPT | Performed by: PHYSICIAN ASSISTANT

## 2020-02-13 PROCEDURE — 99024 POSTOP FOLLOW-UP VISIT: CPT | Performed by: PHYSICIAN ASSISTANT

## 2020-02-13 PROCEDURE — 85027 COMPLETE CBC AUTOMATED: CPT | Performed by: INTERNAL MEDICINE

## 2020-02-13 PROCEDURE — 99232 SBSQ HOSP IP/OBS MODERATE 35: CPT | Performed by: NURSE PRACTITIONER

## 2020-02-13 PROCEDURE — 85610 PROTHROMBIN TIME: CPT | Performed by: INTERNAL MEDICINE

## 2020-02-13 RX ADMIN — TAMSULOSIN HYDROCHLORIDE 0.4 MG: 0.4 CAPSULE ORAL at 18:02

## 2020-02-13 RX ADMIN — GABAPENTIN 100 MG: 100 CAPSULE ORAL at 10:21

## 2020-02-13 RX ADMIN — THIAMINE HCL TAB 100 MG 100 MG: 100 TAB at 10:21

## 2020-02-13 RX ADMIN — ATORVASTATIN CALCIUM 20 MG: 20 TABLET, FILM COATED ORAL at 18:02

## 2020-02-13 RX ADMIN — PANTOPRAZOLE SODIUM 40 MG: 40 TABLET, DELAYED RELEASE ORAL at 10:21

## 2020-02-13 RX ADMIN — PANTOPRAZOLE SODIUM 40 MG: 40 TABLET, DELAYED RELEASE ORAL at 18:02

## 2020-02-13 RX ADMIN — AMLODIPINE BESYLATE 10 MG: 10 TABLET ORAL at 10:21

## 2020-02-13 RX ADMIN — SUCRALFATE 1 G: 1 TABLET ORAL at 18:02

## 2020-02-13 RX ADMIN — SUCRALFATE 1 G: 1 TABLET ORAL at 12:53

## 2020-02-13 RX ADMIN — SUCRALFATE 1 G: 1 TABLET ORAL at 22:23

## 2020-02-13 RX ADMIN — SUCRALFATE 1 G: 1 TABLET ORAL at 10:24

## 2020-02-13 NOTE — ASSESSMENT & PLAN NOTE
· Continue statin  · Vascular following  · The patient was seen in the ED on 2/5 with a cold left foot and mild pain   · Duplex results >70% mid popliteal artery   · His INR at that time was 1 2 and Lovenox 60 was recommended until coumadin was therapeutic, he was discharged with family education and VNA

## 2020-02-13 NOTE — ED NOTES
1 CC-Abnormal lab  2  Orientation status- Patient is primarily [place] speaking, A&O  3  Abnormal labs/vitals/focused assessment- , INR >5  4  Medication/drips- N/A  5  Narcotic time- N/A  6 IV lines/drains/etc - 20 R AC  7  Isolation status- N/A  8 Skin- Unable to assess patients backside, visible front side of body skin intact  9  Ambulation status- Unable to assess as patient did not ambulate for nurse on this shift, patient utilizes beside urinal while in bed  10   ED phone number- #[phone] please contact charge if unable to reach RN at this extension         Paul Dc RN  02/12/20 [id number]

## 2020-02-13 NOTE — H&P
H&P- Aliyah Wilder 1950, 78 y o  male MRN: [id number]    Unit/Bed#: - Encounter: [id number]    Primary Care Provider: Adrianna Roladn MD   Date and time admitted to hospital: 2/12/2020  5:04 PM        * Supratherapeutic INR  Assessment & Plan  · Will temporarily hold antiplatelet agents, Lovenox and Coumadin  · Consult vascular surgery  · Monitor for sign of bleed  · Repeat INR  · If INR remains significantly elevated, would consider hematology consult    Coagulopathy [facility])  Assessment & Plan  Patient has history of prior thrombotic events  · In light of supratherapeutic INR, hold Lovenox and Coumadin  · Can continue SCDs or foot pumps  · See plan for supratherapeutic INR    ASCVD (arteriosclerotic cardiovascular disease)  Assessment & Plan  Continue statin    Esophageal cancer [facility])  Assessment & Plan  Continue outpatient follow-up with oncology    Hx of BKA, right (HCC)  Assessment & Plan  · Historical  · Monitor, ambulate only with assistance    Essential hypertension  Assessment & Plan  Amlodipine daily    PVD (peripheral vascular disease) ([address] )  Assessment & Plan  · Continue statin  · Vascular consult  · See plan for supratherapeutic INR      History and Physical - [facility] Internal Medicine    Patient Information: Aliyah Wilder 78 y o  male MRN: [id number]  Unit/Bed#: - Encounter: [id number]  Admitting Physician: Colin Licea DO  PCP: Adrianna Roland MD  Date of Admission:  02/12/20      VTE Prophylaxis: Pharmacologic VTE Prophylaxis contraindicated due to Supratherapeutic INR  / sequential compression device   Code Status:  Level 1-full code  POLST: POLST form is not discussed and not completed at this time  Anticipated Length of Stay:  Patient will be admitted on an Observation basis with an anticipated length of stay of < 2 midnights  Justification for Hospital Stay: Please see detailed plans noted above      Total Time for Visit, including Counseling / Coordination of Care: 45 minutes  Greater than 50% of this total time spent on direct patient counseling and coordination of care  Chief Complaint:    Abnormal lab test    History of Present Illness:    Mar Batista is a 78 y o  male who presents with an elevated INR  He has a known history of malignancy, prior thrombotic events, and extensive peripheral vascular disease  He is on both Lovenox and Coumadin as an outpatient  He has had stents placed in his external iliac and common femoral artery in the past   Follows with vascular as an outpatient  He recently had his Coumadin dose adjusted from 5 mg daily 7 5 mg for the past 2 days  Earlier today, he had an INR greater than 10 and was told to come to the ED  In the ED, his INR was greater than 5 with a PTT of 180  Patient himself is very pleasant on exam and has no evidence of active bleed  He denies hematemesis, melena, hematochezia  No large ecchymoses seen on exam   However, due to his tenuous status he was admitted for observation  Patient himself has no complaints at this time  He is awake, pleasant, in no acute distress  Review of Systems:    Review of Systems   Constitutional: Negative for activity change, chills and fever  HENT: Negative  Negative for hearing loss, sore throat and trouble swallowing  Eyes: Negative for visual disturbance  Respiratory: Negative for cough, shortness of breath and wheezing  Cardiovascular: Negative for chest pain, palpitations and leg swelling  Gastrointestinal: Negative for abdominal distention, abdominal pain, blood in stool, constipation, diarrhea, nausea and vomiting  Endocrine: Negative  Genitourinary: Negative for dysuria, frequency and hematuria  Musculoskeletal: Negative for arthralgias, joint swelling and myalgias  Skin: Negative  Allergic/Immunologic: Negative for immunocompromised state     Neurological: Negative for dizziness, facial asymmetry, speech difficulty, weakness, numbness and headaches  Hematological: Negative  Psychiatric/Behavioral: Negative for behavioral problems and confusion  Past Medical and Surgical History:     Past Medical History:   Diagnosis Date    Cancer ([place] [address] )     throat cancer    GERD (gastroesophageal reflux disease)     Hyperlipidemia     Hypertension     Left carotid stenosis 1/14/2020    PAD (peripheral artery disease) ([place] [address] ) 11/2/2019    PEG (percutaneous endoscopic gastrostomy) status ([place] [address] )     Port-A-Cath in place     Thrombocytopenia ([place] [address] ) 11/18/2019       Past Surgical History:   Procedure Laterality Date    BYPASS FEMORAL-FEMORAL Right     ESOPHAGOSCOPY N/A 8/24/2017    Procedure: ESOPHAGOSCOPY FLEXIBLE;  Surgeon: Kathy Beck MD;  [facility];  Service: ENT    IR ABDOMINAL ANGIOGRAPHY / INTERVENTION  11/14/2019    IR ABDOMINAL ANGIOGRAPHY / INTERVENTION  11/7/2019    IR ABDOMINAL ANGIOGRAPHY / INTERVENTION  12/13/2019    LEG AMPUTATION THROUGH LOWER TIBIA AND FIBULA Right 12/19/2019    Procedure: AMPUTATION BELOW KNEE (BKA); Surgeon: Frances Rouse MD;  [facility];  Service: Vascular    SC BRONCHOSCOPY,DIAGNOSTIC N/A 8/24/2017    Procedure: Bronchoscopy;  Surgeon: Kathy Beck MD;  [facility];  Service: ENT    SC LARYNGOSCOPY,DIRCT,OP,BIOPSY N/A 8/24/2017    Procedure: LARYNGOSCOPY DIRECT;  Surgeon: Kathy Beck MD;  [facility];  Service: ENT    SC [place] Left 11/7/2019    Procedure: LEFT ENDARTERECTOMY ARTERIAL FEMORAL; L CFAE WITH PROFUNDOPLASTY; ARTERIOGRAM;RETROGRADE ILIAC STENTING;  Surgeon: Jovanni Garcia MD;  [facility];  Service: Vascular    TOE AMPUTATION Right     2 toes       Meds/Allergies:    Prior to Admission medications    Medication Sig Start Date End Date Taking?  Authorizing Provider   amLODIPine (NORVASC) 10 mg tablet Take 1 tablet (10 mg total) by mouth daily 11/25/19   Zari Gay DO aspirin (ECOTRIN LOW STRENGTH) 81 mg EC tablet Take 1 tablet (81 mg total) by mouth daily  Patient not taking: Reported on 2/5/2020 11/25/19   Zari Joyner DO   atorvastatin (LIPITOR) 20 mg tablet Take 1 tablet (20 mg total) by mouth daily after dinner 11/24/19   Zari Joyner DO   enoxaparin (LOVENOX) 60 mg/0 6 mL Inject 0 6 mL (60 mg total) under the skin every 12 (twelve) hours 1/25/20   Harrison Faustin MD   enoxaparin (LOVENOX) 80 mg/0 8 mL Inject 0 8 mL (80 mg total) under the skin daily 2/5/20   Willard Gordon MD   ferrous sulfate 325 (65 Fe) mg tablet Take 1 tablet (325 mg total) by mouth 2 (two) times a day with meals  Patient not taking: Reported on 2/5/2020 11/24/19   Zari Joyner DO   gabapentin (NEURONTIN) 100 mg capsule Take 1 capsule (100 mg total) by mouth daily 11/25/19   Zari Joyner DO   hydrocortisone 2 5 % cream Apply to to affected areas 2-3 times daily 3/5/18   Yoselin Pryor MD   melatonin 3 mg Take 2 tablets (6 mg total) by mouth daily at bedtime  Patient not taking: Reported on 2/5/2020 11/24/19   Zari Joyner DO   neomycin-bacitracin-polymyxin b (NEOSPORIN) ointment Apply topically 2 (two) times a day  Patient not taking: Reported on 11/2/2019 8/6/18   Alyssa Humphrey MD   pantoprazole (PROTONIX) 40 mg tablet Take 1 tablet (40 mg total) by mouth 2 (two) times a day before meals 1/5/20   Anthony Markham MD   polyethylene glycol (MIRALAX) 17 g packet Take 17 g by mouth 2 (two) times a day  Patient not taking: Reported on 2/5/2020 11/24/19   Zari Joyner DO   senna-docusate sodium (SENOKOT S) 8 6-50 mg per tablet Take 1 tablet by mouth 2 (two) times a day  Patient not taking: Reported on 2/5/2020 11/24/19   Zari Joyner DO   sucralfate (CARAFATE) 1 g tablet Take 1 g by mouth 4 (four) times a day    Historical Provider, MD   tamsulosin (FLOMAX) 0 4 mg Take 1 capsule (0 4 mg total) by mouth daily with dinner 11/24/19   Zari Zuniga DO   thiamine 100 MG tablet Take 1 tablet (100 mg total) by mouth daily  Patient not taking: Reported on 19   Villaleonel Ajsonia Rileybiju,    warfarin (COUMADIN) 5 mg tablet Take 1 tablet (5 mg total) by mouth daily 20   Michelle Barlow MD     I have reviewed home medications with patient personally  Allergies: No Known Allergies    Social History:     Marital Status: Single     Substance Use History:   Social History     Substance and Sexual Activity   Alcohol Use Never    Alcohol/week: 0 0 standard drinks    Frequency: Patient refused    Drinks per session: Patient refused    Binge frequency: Patient refused     Social History     Tobacco Use   Smoking Status Former Smoker    Packs/day: 1 00    Years: 15 00    Pack years: 15     Types: Cigarettes    Last attempt to quit: 2019    Years since quittin 2   Smokeless Tobacco Never Used   Tobacco Comment    50+ years     Social History     Substance and Sexual Activity   Drug Use No       Family History:    History reviewed  No pertinent family history  Physical Exam:     Vitals:   Blood Pressure: 144/69 (20)  Pulse: 80 (20)  Temperature: 98 °F (36 7 °C) (20)  Temp Source: Oral (20)  Respirations: 18 (20)  SpO2: 96 % (20)    Physical Exam   Constitutional: He is oriented to person, place, and time  He appears well-developed and well-nourished  No distress  HENT:   Head: Normocephalic and atraumatic  Nose: Nose normal    Mouth/Throat: Oropharynx is clear and moist    Eyes: Pupils are equal, round, and reactive to light  Conjunctivae and EOM are normal    Neck: Normal range of motion  Neck supple  Cardiovascular: Normal rate, regular rhythm, normal heart sounds and intact distal pulses  Pulmonary/Chest: Effort normal and breath sounds normal  No respiratory distress  Abdominal: Soft  He exhibits no distension  There is no tenderness  There is no guarding     Musculoskeletal: Normal range of motion  He exhibits no edema, tenderness or deformity  RLE s/p BKA   Neurological: He is alert and oriented to person, place, and time  No cranial nerve deficit or sensory deficit  He exhibits normal muscle tone  Skin: Skin is warm and dry  No rash noted  He is not diaphoretic  No pallor  Psychiatric: He has a normal mood and affect  His behavior is normal    Vitals reviewed  Additional Data:     Lab Results: I have personally reviewed pertinent reports  Results from last 7 days   Lab Units 02/12/20  1754   WBC Thousand/uL 5 49   HEMOGLOBIN g/dL 10 6*   HEMATOCRIT % 34 3*   PLATELETS Thousands/uL 284   NEUTROS PCT % 73   LYMPHS PCT % 13*   MONOS PCT % 7   EOS PCT % 6     Results from last 7 days   Lab Units 02/12/20  1754   POTASSIUM mmol/L 4 6   CHLORIDE mmol/L 104   CO2 mmol/L 26   BUN mg/dL 20   CREATININE mg/dL 0 70   CALCIUM mg/dL 9 2   ALK PHOS U/L 82   ALT U/L 14   AST U/L 28     Results from last 7 days   Lab Units 02/12/20  1754   INR  >5 00*       Imaging: I have personally reviewed pertinent reports  Xr Chest 2 Views    Result Date: 1/21/2020  Narrative: CHEST INDICATION:   weakness  COMPARISON:  December 9, 2019 EXAM PERFORMED/VIEWS:  XR CHEST PA & LATERAL Images: 2 FINDINGS: Cardiomediastinal silhouette appears unremarkable  Mild patchy density seen left lower lung, may be due to atelectasis, or infiltrate Mild crowding of vasculature on the right side likely due to hypoinflation and atelectasis Lungs show hyperinflation Osseous structures appear within normal limits for patient age  Impression: Hypoinflated lungs No pulmonary congestion seen  Mild patchy density seen in the left lower lung may represent atelectasis, may represent infiltrate Follow-up suggested to demonstrate resolution 6-8 weeks The study was marked in EPIC for significant notification   Workstation performed: [id number]     Xr Foot 3+ Views Left    Result Date: 2/5/2020  Narrative: LEFT FOOT INDICATION:   foot pain, swelling, no known injuries  COMPARISON:  None VIEWS:  XR FOOT 3+ VW LEFT Images: 3 FINDINGS: There is no acute fracture or dislocation  No significant degenerative changes  No lytic or blastic lesions seen  Soft tissues are unremarkable  Extensive vascular calcifications  Impression: No acute osseous abnormality  Workstation performed: [id number]     Ct Head Without Contrast    Result Date: 1/21/2020  Narrative: CT BRAIN - WITHOUT CONTRAST INDICATION:   Altered mental status  COMPARISON:  CT from January 4, 2020  TECHNIQUE:  CT examination of the brain was performed  In addition to axial images, coronal 2D reformatted images were created and submitted for interpretation  Radiation dose length product (DLP) for this visit:  763 mGy-cm   This examination, like all CT scans performed in the [facility], was performed utilizing techniques to minimize radiation dose exposure, including the use of iterative reconstruction and automated exposure control  IMAGE QUALITY:  Diagnostic  FINDINGS: PARENCHYMA:  No intracranial mass, mass effect or midline shift  No CT signs of acute infarction  No acute parenchymal hemorrhage  VENTRICLES AND EXTRA-AXIAL SPACES:  Ventricles and extra-axial CSF spaces are prominent commensurate with the degree of volume loss  No hydrocephalus  No acute extra-axial hemorrhage  VISUALIZED ORBITS AND PARANASAL SINUSES:  Orbits unremarkable  Mild mucosal thickening in the ethmoid and right maxillary sinus  CALVARIUM AND EXTRACRANIAL SOFT TISSUES:  Normal      Impression: No acute intracranial abnormality  Workstation performed: [id number]     Vas Lower Limb Arterial Duplex, Limited, Unilateral    Result Date: 2/6/2020  Narrative:  [facility] REPORT CLINICAL: Indications:  Left Atherosclerosis with Rest Pain [I70 [id number]]  The patient presents to the ED with his family complaining of colder left lower extremity x a few days   He states he has slight left foot pain  H/O: Left iliofemoral, fem-pop thromboembolectomy, along with femoral endarterectomy with extensive profundaplasty and stenting of common and external iliac artery  Operative History: 2019-11-14 IR angiography with intervention 2019-11-08 Left Common Femoral endarterectomy, primary closure 2019-11-08 Left Extended profundoplasty 2019-11-08 Left Iliac thrombembolectomy, leg incision 2019-11-08 Left Transluminal balloon angioplasty, lower limb artery Right BKA BPG right LE Iliac stent Peg Tube Port A Cath Risk Factors The patient has history of HTN, Diabetes (Yes), HLD, PAD and smoking (current) 1-2 ppd  Clinical 134 mm Hg  FINDINGS:  Left                   Impression  PSV  EDV  AP (cm)  Prox  EIA                          120    0           Dist EIA                           139    5           Common Femoral Artery              139    5           Prox Profunda                       87    3           Dist  Profunda                      95    5           Prox SFA                           150    8           Mid SFA                            161    0           Dist SFA                           186    0           Proximal Pop                        66    6      0 9  Distal Pop             >75%        397   60           Dist Post Tibial       Occluded                       Dist  Ant  Tibial                   21    8           Dist Peroneal                       38   14              CONCLUSION: Impression: LEFT LOWER LIMB: External iliac artery stent and common femoral arterectomy site are patent  There is a >75% stenosis noted in the left mid popliteal artery  There proximal popliteal artery is also ectatic  There is tibioperoneal disease  Tech note: ABIs not performed due to urgency of study, full duplex scan done of the left leg with known extensive vascular disease  Preliminary findings given to Dr Sravani Devine at time of study     Compared to previous study on 11/9/2019, there is new high grade stenosis of the popliteal artery  Kaleigh Escobar SIGNATURE: Electronically Signed by: Indira Morrison on 2020-02-06 07:08:20 PM      EKG, Pathology, and Other Studies Reviewed on Admission:   · EKG:  Normal sinus rhythm with sinus arrhythmia parentheses obtained in January of 2020)    Allscripts / Epic Records Reviewed: Yes     Portions of the record may have been created with voice recognition software  Occasional wrong word or "sound a like" substitutions may have occurred due to the inherent limitations of voice recognition software  Read the chart carefully and recognize, using context, where substitutions have occurred

## 2020-02-13 NOTE — ASSESSMENT & PLAN NOTE
· Will temporarily hold antiplatelet agents, Lovenox and Coumadin    · Vascular surgery following  · Home dose of Coumadin as 5 mg  · Monitor for sign of bleed  · Repeat INR  · If INR remains significantly elevated, would consider hematology consult

## 2020-02-13 NOTE — PROGRESS NOTES
[address] Internal Medicine  Progress Note - Blake Oswaldnorm 1950, 78 y o  male MRN: [id number]    Unit/Bed#: -01 Encounter: [id number]    Primary Care Provider: Ananya Whitehead MD   Date and time admitted to hospital: 2/12/2020  5:04 PM    * Supratherapeutic INR  Assessment & Plan  · Will temporarily hold antiplatelet agents, Lovenox and Coumadin  · Vascular surgery following  · Home dose of Coumadin as 5 mg  · Monitor for sign of bleed  · Repeat INR  · If INR remains significantly elevated, would consider hematology consult    PVD (peripheral vascular disease) ([facility] )  Assessment & Plan  · Continue statin  · Vascular following  · The patient was seen in the ED on 2/5 with a cold left foot and mild pain   · Duplex results >70% mid popliteal artery   · His INR at that time was 1 2 and Lovenox 60 was recommended until coumadin was therapeutic, he was discharged with family education and VNA     ASCVD (arteriosclerotic cardiovascular disease)  Assessment & Plan  Continue statin    Esophageal cancer [facility])  Assessment & Plan  Continue outpatient follow-up with oncology    Hx of BKA right ([facility] )  Assessment & Plan  · Historical  · Monitor, ambulate only with assistance    Coagulopathy [facility])  Assessment & Plan  Patient has history of prior thrombotic events  · In light of supratherapeutic INR, hold Lovenox and Coumadin  · Can continue SCDs or foot pumps  · See plan for supratherapeutic INR    Essential hypertension  Assessment & Plan  Amlodipine daily       VTE Pharmacologic Prophylaxis:   Pharmacologic: Pharmacologic VTE Prophylaxis contraindicated due to Supratherapeutic INR  Mechanical VTE Prophylaxis in Place: Yes    Patient Centered Rounds: I have performed bedside rounds with nursing staff today      Discussions with Specialists or Other Care Team Provider:  Reviewed previous provider notes reviewed vascular notes discussed with case management primary RN     Education and Discussions with Family / Patient: Educated patient current plan of care denies any additional questions or concerns at this time    Time Spent for Care: 20 minutes  More than 50% of total time spent on counseling and coordination of care as described above  Current Length of Stay: 0 day(s)    Current Patient Status: Inpatient   Certification Statement: The patient will continue to require additional inpatient hospital stay due to Supratherapeutic INR    Discharge Plan / Estimated Discharge Date:  Pending INR      Code Status: Level 1 - Full Code      Subjective:   Offers no complaints at this time    Objective:     Vitals:   Temp (24hrs), Av 2 °F (36 8 °C), Min:97 9 °F (36 6 °C), Max:98 4 °F (36 9 °C)    Temp:  [97 9 °F (36 6 °C)-98 4 °F (36 9 °C)] 98 3 °F (36 8 °C)  HR:  [71-80] 73  Resp:  [16-20] 16  BP: (124-151)/(56-72) 124/56  SpO2:  [96 %-100 %] 100 %  Body mass index is 19 19 kg/m²  Input and Output Summary (last 24 hours): Intake/Output Summary (Last 24 hours) at 2020 1557  Last data filed at 2020 1433  Gross per 24 hour   Intake 180 ml   Output 1900 ml   Net -1720 ml       Physical Exam:     Physical Exam   Constitutional: He is oriented to person, place, and time  He appears well-developed and well-nourished  He is active and cooperative  He appears ill  HENT:   Head: Normocephalic  Eyes: Pupils are equal, round, and reactive to light  Neck: Normal range of motion  Neck supple  Cardiovascular: Normal rate  Pulmonary/Chest: Effort normal    Abdominal: Soft  Bowel sounds are normal    Musculoskeletal: Normal range of motion  Neurological: He is alert and oriented to person, place, and time  Skin: Skin is warm and dry  Psychiatric: He has a normal mood and affect  His behavior is normal  Judgment and thought content normal    Nursing note and vitals reviewed      Additional Data:     Labs:    Results from last 7 days   Lab Units 20  0523 20  1754   WBC Thousand/uL 4 87 5  49   HEMOGLOBIN g/dL 9 5* 10 6*   HEMATOCRIT % 30 7* 34 3*   PLATELETS Thousands/uL 244 284   NEUTROS PCT %  --  73   LYMPHS PCT %  --  13*   MONOS PCT %  --  7   EOS PCT %  --  6     Results from last 7 days   Lab Units 02/13/20  0523 02/12/20  1754   POTASSIUM mmol/L 3 8 4 6   CHLORIDE mmol/L 106 104   CO2 mmol/L 22 26   BUN mg/dL 13 20   CREATININE mg/dL 0 79 0 70   CALCIUM mg/dL 8 9 9 2   ALK PHOS U/L  --  82   ALT U/L  --  14   AST U/L  --  28     Results from last 7 days   Lab Units 02/13/20  0523   INR  10 96*       * I Have Reviewed All Lab Data Listed Above  * Additional Pertinent Lab Tests Reviewed: All [facility] Admission Reviewed  Recent Cultures (last 7 days):       Last 24 Hours Medication List:     Current Facility-Administered Medications:  acetaminophen 650 mg Oral Q6H PRN Leroy Sparks,    amLODIPine 10 mg Oral Daily Tang Howard, DO   atorvastatin 20 mg Oral After [employer] VALENTE Howard, DO   gabapentin 100 mg Oral Daily Tang Howard, DO   pantoprazole 40 mg Oral BID AC Francois Howard, DO   sucralfate 1 g Oral 4x Daily Tang Howard, DO   tamsulosin 0 4 mg Oral Daily With [employer] VALENTE Howard, DO   thiamine 100 mg Oral Daily Leroy Sparks,         Today, Patient Was Seen By: ALISSA Mullins    ** Please Note: Dragon 360 Dictation voice to text software may have been used in the creation of this document   **

## 2020-02-13 NOTE — CONSULTS
Consultation - Vascular Surgery   Adriana Howard 78 y o  male MRN: [id number]  Unit/Bed#: -01 Encounter: [id number]      Assessment/Plan    History obtained through pt's son and medical records  Language barrier       Supra therapeutic INR, 10 96 on 2/12 after  Coumadin dose of 7 5 mg  Home dose was increased to 7 5 for last 2 days for outpatient INR subtherapeutic at 1 23 as well as Lovenox and ASA, usual dose is 5 mg daily  INR 10 6 today   All antiplatelet/Anticoagulation was held on admission  -Hematology was consulted today  -cont to hold [facility] until INR is therapeutic and no evidence of bleeding and then resume ASA and  Coumadin, heparin bridge is pt fall subtherapeutic,   -cont to monitor for bleeding,  -monitor Hbg   -would not consider NOACs due to h o GI Bleed    H/o AC Therapy for high risk of thrombosis due to PAD and malignancy: h/o LLE thrombosis SFA on   ASA, Lovenox 60 mg, Coumadin 5 mg daily  PAD, Multiple intervention including:  LLE SFA Pop Angioplasty, femoral endarterectomy with profundoplasty and iliac stent of both internal and external    R Angioplasty SFA,  BKA   High risk for thrombosis, s/p L SFA stent and thromboendarterectomy  Anemia  H/o Gi Bleed, Hbg  baseline seems to be 8-9    Current Smoker      Physician Requesting Consult: Jani Razo MD  Chief Complaint    HPI: Adriana Howard is a 78y o  year old male PMH: PAD, h/o LLE SFA-Pop Angioplasty, iliac  stent and  Thrombosis, s/p thromboendarterectomy with profundoplasty RLE SFA Angioplasty, BKA  High risk of re-thrombosis, on ASA, Coumadin 5 mg and Lovenox 60 mg daily    L Carotid stenosis US q 6 months, Anemia, h/o GI Bleed, Hbg baseline  HTN, HLD, Esophageal and tonsillar cancer s/p radiation,      The patient was seen in the ED on 2/5 with a cold left foot and mild pain   Duplex results >70% mid popliteal artery   His INR at that time was 1 2 and Lovenox 60 was recommended until coumadin was therapeutic, he was discharged with family education and VNA   2/12 outpatient INR was 10 23 and he reported to the ED immediately  On admission his INR was 10 23 and all AC was held, he denies black bm, or bleeding  Today's Hbg 10 6, and Hematology was consulted       Inpatient consult to Vascular Surgery  Consult performed by: Genaro Desai PA-C  Consult ordered by: Joseph Wahl DO        Hematology  Vascular Surgery        Historical Information   Past Medical History:   Diagnosis Date    Cancer [facility])     throat cancer    GERD (gastroesophageal reflux disease)     Hyperlipidemia     Hypertension     Left carotid stenosis 1/14/2020    PAD [facility]peripheral [facility] disease) [facility] ) 11/2/2019    PEG (percutaneous endoscopic gastrostomy) status ([facility] )     Port-A-Cath in place     Thrombocytopenia ([facility] ) 11/18/2019     Past Surgical History:   Procedure Laterality Date    BYPASS FEMORAL-FEMORAL Right     ESOPHAGOSCOPY N/A 8/24/2017    Procedure: ESOPHAGOSCOPY FLEXIBLE;  Surgeon: Eidth Arce MD;  [facility];  Service: ENT    IR ABDOMINAL ANGIOGRAPHY / INTERVENTION  11/14/2019    IR ABDOMINAL ANGIOGRAPHY / INTERVENTION  11/7/2019    IR ABDOMINAL ANGIOGRAPHY / INTERVENTION  12/13/2019    LEG AMPUTATION THROUGH LOWER TIBIA AND FIBULA Right 12/19/2019    Procedure: AMPUTATION BELOW KNEE (BKA);   Surgeon: Ebony Cohen MD;  [facility];  Service: Vascular    GA BRONCHOSCOPY,DIAGNOSTIC N/A 8/24/2017    Procedure: Bronchoscopy;  Surgeon: Edith Arce MD;  [facility];  Service: ENT    GA LARYNGOSCOPY,DIRCT,OP,BIOPSY N/A 8/24/2017    Procedure: LARYNGOSCOPY DIRECT;  Surgeon: Edith Arce MD;  [facility];  Service: ENT    GA [facility] Left 11/7/2019    Procedure: LEFT ENDARTERECTOMY ARTERIAL FEMORAL; L CFAE WITH PROFUNDOPLASTY; ARTERIOGRAM;RETROGRADE ILIAC STENTING;  Surgeon: Edgar Villela MD;  [facility];  Service: Vascular    TOE AMPUTATION Right     2 toes     Social History   Social History     Substance and Sexual Activity   Alcohol Use Never    Alcohol/week: 0 0 standard drinks    Frequency: Patient refused    Drinks per session: Patient refused    Binge frequency: Patient refused     Social History     Substance and Sexual Activity   Drug Use No     Social History     Tobacco Use   Smoking Status Former Smoker    Packs/day: 1 00    Years: 15 00    Pack years: 15     Types: Cigarettes    Last attempt to quit: 2019    Years since quittin 2   Smokeless Tobacco Never Used   Tobacco Comment    50+ years     Family History: no pertinent family history  No Known Allergies   Meds/Allergies      current meds:   Current Facility-Administered Medications   Medication Dose Route Frequency    acetaminophen (TYLENOL) tablet 650 mg  650 mg Oral Q6H PRN    amLODIPine (NORVASC) tablet 10 mg  10 mg Oral Daily    atorvastatin (LIPITOR) tablet 20 mg  20 mg Oral After Dinner    gabapentin (NEURONTIN) capsule 100 mg  100 mg Oral Daily    pantoprazole (PROTONIX) EC tablet 40 mg  40 mg Oral BID AC    sucralfate (CARAFATE) tablet 1 g  1 g Oral 4x Daily    tamsulosin (FLOMAX) capsule 0 4 mg  0 4 mg Oral Daily With Dinner    thiamine (VITAMIN B1) tablet 100 mg  100 mg Oral Daily         Objective   Vitals: Blood pressure 142/62, pulse 71, temperature 98 3 °F (36 8 °C), temperature source Oral, resp  rate 20, height 5' 5" (1 651 m), weight 52 3 kg (115 lb 4 8 oz), SpO2 100 %  ,Body mass index is 19 19 kg/m²  Intake/Output Summary (Last 24 hours) at 2020 1033  Last data filed at 2020 0846  Gross per 24 hour   Intake 180 ml   Output 1300 ml   Net -1120 ml     Invasive Devices     Peripheral Intravenous Line            Peripheral IV 20 Right Antecubital less than 1 day                Review of Systems     12 point ROS addressed and negative   His INR was super high    He does not walk much'  The amputation is healing well  No pain in his left leg    Physical Exam:    General appearance: A & O x 3, cooperative, pleasant  Eyes: PERRLA, EOMI, sclera anicterus   Neck: trachea midline, no thyromegaly, no JVD Carotids: harsh bruit on right , soft bruit left     Back: nontender   Lungs: clear to auscultation bilaterally  Heart[de-identified] regular rate and rhythm, S1, S2 normal,  Murmur +systolic murmur at apex  Abdomen: soft, nontender, NBS no masses, bruits or pulsations  Extremities: S/p R BKA, stump well healed  LLE is cool, no tissue loss  Decreased cap refill  +motor and sensory    Neurologic: Grossly normal    Wound/Incision:  Right stump is well healed     Pulse exam:    Femoral: Right: 2+ Left: 2+    DP: Right: BKA Left:   PT: Right: doppler signal, monophasic  Left: doppler signal    Lab Results:   INR 2/12  10 23,           2/13 10  96      CBC with diff:   Lab Results   Component Value Date    WBC 4 87 02/13/2020    HGB 9 5 (L) 02/13/2020    HCT 30 7 (L) 02/13/2020    MCV 93 02/13/2020     02/13/2020    MCH 28 7 02/13/2020    MCHC 30 9 (L) 02/13/2020    RDW 15 8 (H) 02/13/2020    MPV 9 5 02/13/2020    NRBC 0 02/12/2020   , BMP/CMP:   Lab Results   Component Value Date    SODIUM 138 02/13/2020    K 3 8 02/13/2020     02/13/2020    CO2 22 02/13/2020    BUN 13 02/13/2020    CREATININE 0 79 02/13/2020    CALCIUM 8 9 02/13/2020    AST 28 02/12/2020    ALT 14 02/12/2020    ALKPHOS 82 02/12/2020    EGFR 91 02/13/2020     Imaging Studies:  No results found  Counseling / Coordination of Care  Counseling/Coordination of Care: Total floor / unit time spent today 30 minutes  Greater than 50% of total time was spent with the patient and / or family counseling and / or coordination of care  A description of the counseling / coordination of care: plan to restart ASA coumadin whe in therapeutic range,  with bridging heparin if he falls subtherapeutic   Consider history of GI Bleed and monitor        Farideh Sanches, TERRA  2/13/2020

## 2020-02-13 NOTE — UTILIZATION REVIEW
Initial Clinical Review    Admission: Date/Time/Statement: Admission Orders: Observation 2/12 @ 1919 and changed to Inpatient on 2/13 @ 1411  Pt requiring at least 2 MN to continue treatment  Admitting Physician Param Martinez V    Level of Care Med Surg    Estimated length of stay More than 2 Midnights    Certification I certify that inpatient services are medically necessary for this patient for a duration of greater than two midnights  See H&P and MD Progress Notes for additional information about the patient's course of treatment  ED Arrival Information     Expected Arrival Acuity Means of Arrival Escorted By Service Admission Type    - 2/12/2020 16:59 Emergent Wheelchair Family Member General Medicine Emergency    Arrival Complaint    ABNORMAL LABS        Chief Complaint   Patient presents with    Abnormal Lab     pt with resulted abnormal INR from today     Assessment/Plan:   78 y o  male who presents with an elevated INR  He has a known history of malignancy, prior thrombotic events, and extensive peripheral vascular disease  He is on both Lovenox and Coumadin as an outpatient  He has had stents placed in his external iliac and common femoral artery in the past   Follows with vascular as an outpatient  He recently had his Coumadin dose adjusted from 5 mg daily 7 5 mg for the past 2 days  Earlier today, he had an INR greater than 10 and was told to come to the ED  In the ED, his INR was greater than 5 with a PTT of 180  Patient himself is very pleasant on exam and has no evidence of active bleed  He denies hematemesis, melena, hematochezia  No large ecchymoses seen on exam   However, due to his tenuous status he was admitted  Supratherapeutic INR  Assessment & Plan  · Will temporarily hold antiplatelet agents, Lovenox and Coumadin    · Consult vascular surgery  · Monitor for sign of bleed  · Repeat INR  · If INR remains significantly elevated, would consider hematology consult     Coagulopathy [facility])  Assessment & Plan  Patient has history of prior thrombotic events  · In light of supratherapeutic INR, hold Lovenox and Coumadin  · Can continue SCDs or foot pumps  · See plan for supratherapeutic INR     ASCVD (arteriosclerotic cardiovascular disease)  Assessment & Plan  Continue statin     Esophageal cancer [facility])  Assessment & Plan  Continue outpatient follow-up with oncology     Hx of BKA, right (HCC)  Assessment & Plan  · Historical  · Monitor, ambulate only with assistance     Essential hypertension  Assessment & Plan  Amlodipine daily     PVD (peripheral vascular disease) (HCC)  Assessment & Plan  · Continue statin  · Vascular consult  · See plan for supratherapeutic INR        ED Triage Vitals   Temperature Pulse Respirations Blood Pressure SpO2   02/12/20 1710 02/12/20 1709 02/12/20 1709 02/12/20 1709 02/12/20 1709   98 °F (36 7 °C) 77 20 145/69 97 %      Temp Source Heart Rate Source Patient Position - Orthostatic VS BP Location FiO2 (%)   02/12/20 1710 02/12/20 1709 02/12/20 1709 02/12/20 1709 --   Oral Monitor Lying Right arm       Pain Score       02/12/20 2123       No Pain        Wt Readings from Last 1 Encounters:   02/12/20 52 3 kg (115 lb 4 8 oz)     Additional Vital Signs:   02/13/20 0718  98 3 °F (36 8 °C)  71  20  142/62  --  100 %  None (Room air)  Lying   02/12/20 2300  97 9 °F (36 6 °C)  74  17  130/61  88  99 %  None (Room air)  Lying   02/12/20 2123  98 4 °F (36 9 °C)  74  17  142/63  91  98 %  None (Room air)  Lying   02/12/20 2000                     Pertinent Labs/Diagnostic Test Results:   Results from last 7 days   Lab Units 02/13/20  0523 02/12/20  1754   WBC Thousand/uL 4 87 5 49   HEMOGLOBIN g/dL 9 5* 10 6*   HEMATOCRIT % 30 7* 34 3*   PLATELETS Thousands/uL 244 284   NEUTROS ABS Thousands/µL  --  4 09         Results from last 7 days   Lab Units 02/13/20  0523 02/12/20  1754   SODIUM mmol/L 138 139   POTASSIUM mmol/L 3 8 4 6   CHLORIDE mmol/L 106 104 CO2 mmol/L 22 26   ANION GAP mmol/L 10 9   BUN mg/dL 13 20   CREATININE mg/dL 0 79 0 70   EGFR ml/min/1 73sq m 91 96   CALCIUM mg/dL 8 9 9 2     Results from last 7 days   Lab Units 02/12/20  1754   AST U/L 28   ALT U/L 14   ALK PHOS U/L 82   TOTAL PROTEIN g/dL 7 7   ALBUMIN g/dL 3 2*   TOTAL BILIRUBIN mg/dL 0 30         Results from last 7 days   Lab Units 02/13/20  0523 02/12/20  1754   GLUCOSE RANDOM mg/dL 97 106     Results from last 7 days   Lab Units 02/13/20  0523 02/12/20  1754 02/12/20  1500   PROTIME seconds 88 3* >120 0* 83 5*   INR  10 96* >5 00* 10 23*   PTT seconds  --  180*  --      ED Treatment:   Medication Administration from 02/12/2020 1659 to 02/12/2020 2106     None        Past Medical History:   Diagnosis Date    Cancer ([place] [address] )     throat cancer    GERD (gastroesophageal reflux disease)     Hyperlipidemia     Hypertension     Left carotid stenosis 1/14/2020    PAD (peripheral artery disease) ([place] [address] ) 11/2/2019    PEG (percutaneous endoscopic gastrostomy) status ([place] [address] )     Port-A-Cath in place     Thrombocytopenia ([place] [address] ) 11/18/2019     Present on Admission:   Supratherapeutic INR   Esophageal cancer ([place] [address] )   PVD (peripheral vascular disease) ([place] [address] )   Essential hypertension   Coagulopathy (HCC)   ASCVD (arteriosclerotic cardiovascular disease)      Admitting Diagnosis: Coagulopathy (HCC) [D68 9]  PVD (peripheral vascular disease) (HCC) [I73 9]  Supratherapeutic international normalized ratio (INR) [R79 1]  Abnormal international normal ratio (INR) [R79 1]  Age/Sex: 78 y o  male     Admission Orders:  IP CONSULT TO VASCULAR SURGERY  IP CONSULT TO HEMATOLOGY    Scheduled Medications:  Medications:  amLODIPine 10 mg Oral Daily   atorvastatin 20 mg Oral After Dinner   gabapentin 100 mg Oral Daily   pantoprazole 40 mg Oral BID AC   sucralfate 1 g Oral 4x Daily   tamsulosin 0 4 mg Oral Daily With Dinner   thiamine 100 mg Oral Daily     Continuous IV Infusions:     PRN Meds:  acetaminophen 650 mg Oral Q6H PRN     Network Utilization Review Department  [email or web address]  ATTENTION: Please call with any questions or concerns to [phone] and carefully listen to the prompts so that you are directed to the right person  All voicemails are confidential   Carol Plasencia all requests for admission clinical reviews, approved or denied determinations and any other requests to dedicated fax number below belonging to the campus where the patient is receiving treatment   List of dedicated fax numbers for the Facilities:  [address] DENIALS (Administrative/Medical Necessity) [phone]   [address] (Maternity/NICU/Pediatrics) [phone]   [facility] [phone]   [facility] [phone]   [address] [place] [address] [phone]   [address] [place] [address]  [phone]   [address] [place] [address] [phone]   Shani Marcum [phone]   [address] [facility] [address] [facility]  [address] [facility] [address] [facility] [phone]

## 2020-02-13 NOTE — SOCIAL WORK
CM met w/ pt and son Sharonda Stands) bedside and received following information as per pt's son  Pt was recently d/c'd from [facility] to [facility] [place] for [address]  Pt sees Dr Ellen Mahmood as PCP and has no issues obtaining from [facility]  Pt was previously current w/ [facility] (this CM sent referral via [facility] for [facility])  As per pt's son, pt and family are interesting in long-term care closer to the [facility] where pt's son resides  CM reviewed discharge planning process including the following: identifying help at home, patient preference for discharge planning needs, pharmacy preference, and availability of treatment team to discuss questions or concerns patient and/or family may have regarding understanding medications and recognizing signs and symptoms once discharged  CM also encouraged patient to follow up with all recommended appointments after discharge  Patient advised of importance for patient and family to participate in managing patients medical well being  CM name and role reviewed  Discharge Checklist reviewed and CM will continue to monitor for progress toward discharge goals in nursing and provider rounds  CM requested PT/OT orders from [facility] to facilitate discharge disposition  CM sending referral to [facility] per pt's son request on [facility]  Son states he makes health care decisions for his father  CM to follow

## 2020-02-13 NOTE — ASSESSMENT & PLAN NOTE
· Will temporarily hold antiplatelet agents, Lovenox and Coumadin    · Consult vascular surgery  · Monitor for sign of bleed  · Repeat INR  · If INR remains significantly elevated, would consider hematology consult

## 2020-02-13 NOTE — CONSULTS
Consultation - [address] 78 y o  male MRN: [id number]  Unit/Bed#: -01 Encounter: [id number]    Physician Requesting Consult: Addis Schmitz MD  Reason for Consult:   Coagulopathy with Supratherapeutic INR  HPI: Marcel Rivera is a 78y o  year old male with a history of Thrombocytopenia, PAD, HTN, Hyperlipidemia, GERD and Left Tonsillar Cancer, who was admitted for Elevated INR  He has PMH of Left tonsillar Cancer, Prior issue with Thrombotic events and PVD  Was on Lovenox, Coumadin and ASA as outpatient  Follows with vascular  Coumadin was 1 28 on 2/7/20  He had increase in Coumadin from 5 to 7 5 mg dosage  INR to 10 23, so send to ED  Today INR 10 96  So we were consulted  Vascular already on board  Oncologic History:  Stage VARGAS ([id number]) Left tonsillar cancer (oropharynx SCC)  Dx in [facility] in 7/2017  CT neck and chest with contrast on 7/7/2017 that showed a 2 9 x 2 x 3 cm left tonsillar pillar mass extending and involving base onf tongue and providing effacement of left parapharyngeal spaces  It also extends into lateral recess of soft palate   Bony defect 1 6 cm left posterior mandibular body  Came to  US to be with family for further Tx  Seen by ENT in [facility] on 8/15/17  Follows with Dr Nishi Gillespie in [facility]  Previously tx with Concurrent ChemoXRT with weekly Erbitux from         10/26/17 to 12/15/17  XRT to Left tonsil Total dose 6750 cGy  Now on surveillance  Last seen 9/11/19  Assessment/Plan:   #1  Coagulopathy / PVD  History of thrombosis, Arterial blockage as well as R BKA  Follows with Vascular  Also history of malignancy, that can contribute to hypercoaguable state  Multiple surgeries by Vascular  #2   Supratherapeutic INR  INR 10 96  Lovenox, Coumadin, ASA on hold at this time    Appears previously INR was at 1 28, there was Coumadin increase to 7 5, then INR increased as above   Denies Bleeding presently  Continue to follow Hgb to ensure no drop  Now grade 2, with Hgb at 9 5  Need to hold anticoagulation until INR therapeutic, continue to monitor for evidence of bleeding  Will defer to Vascular as already involved and follow with Patient as outpatient  Continue fall precautions  #3  Left Tonsillar Cancer  See Onc Hist above  Follows with Jairon Goodson  Appt on 3/13/30  Followup with Hem Onc as scheduled  In surveillance  Discussed with the primary team (Marie Castellon) via tiger text on this date  We will continue to follow this admission  Please contact us if you have any questions regarding this consult  Thank you for this consult  Past Medical History:   Diagnosis Date    Cancer ([place] [id number] )     throat cancer    GERD (gastroesophageal reflux disease)     Hyperlipidemia     Hypertension     Left carotid stenosis 2020    PAD (peripheral artery disease) ([place] [address] ) 2019    PEG (percutaneous endoscopic gastrostomy) status ([place] [address] )     Port-A-Cath in place     Thrombocytopenia ([place] [address] ) 2019     History reviewed  No pertinent family history    Social History     Socioeconomic History    Marital status: Single     Spouse name: None    Number of children: None    Years of education: None    Highest education level: None   Occupational History    None   Social Needs    Financial resource strain: None    Food insecurity:     Worry: None     Inability: None    Transportation needs:     Medical: None     Non-medical: None   Tobacco Use    Smoking status: Former Smoker     Packs/day: 1 00     Years: 15 00     Pack years: 15 00     Types: Cigarettes     Last attempt to quit: 2019     Years since quittin 2    Smokeless tobacco: Never Used    Tobacco comment: 50+ years   Substance and Sexual Activity    Alcohol use: Never     Alcohol/week: 0 0 standard drinks     Frequency: Patient refused     Drinks per session: Patient refused     Binge frequency: Patient refused    Drug use: No    Sexual activity: Not Currently     Birth control/protection: None   Lifestyle    Physical activity:     Days per week: None     Minutes per session: None    Stress: None   Relationships    Social connections:     Talks on phone: None     Gets together: None     Attends Ep[employer] service: None     Active member of club or organization: None     Attends meetings of clubs or organizations: None     Relationship status: None    Intimate partner violence:     Fear of current or ex partner: None     Emotionally abused: None     Physically abused: None     Forced sexual activity: None   Other Topics Concern    None   Social History Narrative    None     No Known Allergies    Review of Systems   Constitutional: Negative for appetite change, diaphoresis, fatigue, fever and unexpected weight change  Denies pain  Denies bleeding  No complaints states doing fine  HENT:   Negative for lump/mass, mouth sores and nosebleeds  Eyes: Negative for icterus  Respiratory: Negative for chest tightness, cough, hemoptysis, shortness of breath and wheezing  Cardiovascular: Negative for chest pain, leg swelling and palpitations  Gastrointestinal: Negative for abdominal distention, abdominal pain, blood in stool, constipation, diarrhea, nausea and vomiting  Genitourinary: Negative for difficulty urinating, dysuria and hematuria  Musculoskeletal: Negative for arthralgias, back pain and myalgias  Skin: Negative for itching, rash and wound  Neurological: Negative for dizziness, extremity weakness and numbness  Hematological: Negative for adenopathy  Does not bruise/bleed easily  Psychiatric/Behavioral: Negative for sleep disturbance  The remainder of a 12 point review of systems was negative      Objective:  /56 (BP Location: Left arm)   Pulse 73   Temp 98 3 °F (36 8 °C) (Oral)   Resp 16   Ht 5' 5" (1 651 m)   Wt 52 3 kg (115 lb 4 8 oz)   SpO2 100%   BMI 19 19 kg/m²       Current Facility-Administered Medications:     acetaminophen (TYLENOL) tablet 650 mg, 650 mg, Oral, Q6H PRN, Jamir Hare Veres, DO    amLODIPine (NORVASC) tablet 10 mg, 10 mg, Oral, Daily, Jamir Hare Veres, DO, 10 mg at 20 1021    atorvastatin (LIPITOR) tablet 20 mg, 20 mg, Oral, After Dinner, Jenean Copper, DO, 20 mg at 20 1802    gabapentin (NEURONTIN) capsule 100 mg, 100 mg, Oral, Daily, Jamir Hare Veres, DO, 100 mg at 20 1021    pantoprazole (PROTONIX) EC tablet 40 mg, 40 mg, Oral, BID AC, Jamir Hare Veres, DO, 40 mg at 20 1802    sucralfate (CARAFATE) tablet 1 g, 1 g, Oral, 4x Daily, Jamir Hare Veres, DO, 1 g at 20 1802    tamsulosin (FLOMAX) capsule 0 4 mg, 0 4 mg, Oral, Daily With Dinner, Jenean Copper, DO, 0 4 mg at 20 1802    thiamine (VITAMIN B1) tablet 100 mg, 100 mg, Oral, Daily, Jamir Hare Veres, DO, 100 mg at 20 1021    Recent Labs     20  1754 20  0523   WBC 5 49 4 87   HGB 10 6* 9 5*    244   MCV 94 93   RDW 15 9* 15 8*   CREATININE 0 70 0 79   AST 28  --    ALT 14  --      Laboratory studies were reviewed    [address] REPORT  20  CLINICAL:  Indications:  Left Atherosclerosis with Rest Pain [I70 229]  The patient  presents to the ED with his family complaining of colder left lower extremity x  a few days  He states he has slight left foot pain  H/O: Left iliofemoral, fem-pop thromboembolectomy, along with femoral  endarterectomy with extensive profundaplasty and stenting of common and external  iliac artery    Operative History:  2019 IR angiography with intervention  2019 Left Common Femoral endarterectomy, primary closure  2019 Left Extended profundoplasty  2019 Left Iliac thrombembolectomy, leg incision  2019 Left Transluminal balloon angioplasty, lower limb artery  Right BKA  BPG right LE  Iliac stent  Peg Tube  Port A Cath  Risk Factors  The patient has history of HTN, Diabetes (Yes), HLD, PAD and smoking (current)  1-2 ppd  Clinical  134 mm Hg  FINDINGS:     Left                   Impression  PSV  EDV  AP (cm)    Prox  EIA                          120    0             Dist EIA                           139    5             Common Femoral Artery              139    5             Prox Profunda                       87    3             Dist  Profunda                      95    5             Prox SFA                           150    8             Mid SFA                            161    0             Dist SFA                           186    0             Proximal Pop                        66    6      0 9    Distal Pop             >75%        397   60             Dist Post Tibial       Occluded                         Dist  Ant  Tibial                   21    8             Dist Peroneal                       38   14                  CONCLUSION:  Impression:  LEFT LOWER LIMB:  External iliac artery stent and common femoral arterectomy site are patent  There is a >75% stenosis noted in the left mid popliteal artery  There proximal  popliteal artery is also ectatic  There is tibioperoneal disease  Tech note: ABIs not performed due to urgency of study, full duplex scan done of  the left leg with known extensive vascular disease  LEFT FOOT  Xray  2/5/20   INDICATION:   foot pain, swelling, no known injuries  COMPARISON:  None     VIEWS:  XR FOOT 3+ VW LEFT   Images: 3     FINDINGS:     There is no acute fracture or dislocation      No significant degenerative changes      No lytic or blastic lesions seen      Soft tissues are unremarkable  Extensive vascular calcifications      IMPRESSION:   No acute osseous abnormality  Pathology: None    Physical Exam   Constitutional: He is oriented to person, place, and time  No distress  HENT:   Head: Normocephalic and atraumatic     Eyes: Conjunctivae are normal  Right eye exhibits no discharge  Left eye exhibits no discharge  No scleral icterus  Cardiovascular: Normal rate, regular rhythm and normal heart sounds  Exam reveals no gallop and no friction rub  No murmur heard  Pulmonary/Chest: Effort normal and breath sounds normal  No stridor  No respiratory distress  He has no wheezes  He has no rales  Abdominal: Soft  Bowel sounds are normal  He exhibits no distension and no mass  There is no tenderness  There is no guarding  Musculoskeletal: Normal range of motion  He exhibits no edema  S/P Right BKA  Neurological: He is alert and oriented to person, place, and time  No sensory deficit  Skin: Skin is warm and dry  No rash noted  He is not diaphoretic  No erythema  Psychiatric: He has a normal mood and affect  His behavior is normal      Code Status: Level 1 - Full Code    ** Please Note: Dictation voice to text software may have been used in the creation of this document   **

## 2020-02-13 NOTE — PLAN OF CARE
Problem: Potential for Falls  Goal: Patient will remain free of falls  Description  INTERVENTIONS:  - Assess patient frequently for physical needs  -  Identify cognitive and physical deficits and behaviors that affect risk of falls    -  [place] fall precautions as indicated by assessment   - Educate patient/family on patient safety including physical limitations  - Instruct patient to call for assistance with activity based on assessment  - Modify environment to reduce risk of injury  - Consider OT/PT consult to assist with strengthening/mobility  Outcome: Progressing

## 2020-02-13 NOTE — SOCIAL WORK
Clinicals sent to [facility] to start referral process via fax on Epic as SNF is not active on [facility]       Fax: [phone]    Phone: [phone]    Address:  [address], [address] [place] [address]

## 2020-02-13 NOTE — ASSESSMENT & PLAN NOTE
Patient has history of prior thrombotic events  · In light of supratherapeutic INR, hold Lovenox and Coumadin  · Can continue SCDs or foot pumps  · See plan for supratherapeutic INR

## 2020-02-14 ENCOUNTER — APPOINTMENT (INPATIENT)
Dept: CT IMAGING | Facility: HOSPITAL | Age: 70
DRG: 813 | End: 2020-02-14
Payer: MEDICARE

## 2020-02-14 LAB
ANION GAP SERPL CALCULATED.3IONS-SCNC: 10 MMOL/L (ref 4–13)
BUN SERPL-MCNC: 16 MG/DL (ref 5–25)
CALCIUM SERPL-MCNC: 8.8 MG/DL (ref 8.3–10.1)
CHLORIDE SERPL-SCNC: 106 MMOL/L (ref 100–108)
CO2 SERPL-SCNC: 24 MMOL/L (ref 21–32)
CREAT SERPL-MCNC: 0.95 MG/DL (ref 0.6–1.3)
ERYTHROCYTE [DISTWIDTH] IN BLOOD BY AUTOMATED COUNT: 15.6 % (ref 11.6–15.1)
GFR SERPL CREATININE-BSD FRML MDRD: 81 ML/MIN/1.73SQ M
GLUCOSE SERPL-MCNC: 102 MG/DL (ref 65–140)
HCT VFR BLD AUTO: 30.5 % (ref 36.5–49.3)
HGB BLD-MCNC: 9.6 G/DL (ref 12–17)
INR PPP: 10.32 (ref 0.84–1.19)
INR PPP: 11.77 (ref 0.84–1.19)
MCH RBC QN AUTO: 29 PG (ref 26.8–34.3)
MCHC RBC AUTO-ENTMCNC: 31.5 G/DL (ref 31.4–37.4)
MCV RBC AUTO: 92 FL (ref 82–98)
PLATELET # BLD AUTO: 247 THOUSANDS/UL (ref 149–390)
PMV BLD AUTO: 9.6 FL (ref 8.9–12.7)
POTASSIUM SERPL-SCNC: 4.1 MMOL/L (ref 3.5–5.3)
PROTHROMBIN TIME: 84.2 SECONDS (ref 11.6–14.5)
PROTHROMBIN TIME: 93.4 SECONDS (ref 11.6–14.5)
RBC # BLD AUTO: 3.31 MILLION/UL (ref 3.88–5.62)
SODIUM SERPL-SCNC: 140 MMOL/L (ref 136–145)
WBC # BLD AUTO: 3.95 THOUSAND/UL (ref 4.31–10.16)

## 2020-02-14 PROCEDURE — 85610 PROTHROMBIN TIME: CPT | Performed by: PHYSICIAN ASSISTANT

## 2020-02-14 PROCEDURE — 70450 CT HEAD/BRAIN W/O DYE: CPT

## 2020-02-14 PROCEDURE — 85027 COMPLETE CBC AUTOMATED: CPT | Performed by: NURSE PRACTITIONER

## 2020-02-14 PROCEDURE — 82728 ASSAY OF FERRITIN: CPT | Performed by: PHYSICIAN ASSISTANT

## 2020-02-14 PROCEDURE — 99232 SBSQ HOSP IP/OBS MODERATE 35: CPT | Performed by: PHYSICIAN ASSISTANT

## 2020-02-14 PROCEDURE — 83540 ASSAY OF IRON: CPT | Performed by: PHYSICIAN ASSISTANT

## 2020-02-14 PROCEDURE — 85610 PROTHROMBIN TIME: CPT | Performed by: NURSE PRACTITIONER

## 2020-02-14 PROCEDURE — 83550 IRON BINDING TEST: CPT | Performed by: PHYSICIAN ASSISTANT

## 2020-02-14 PROCEDURE — 80048 BASIC METABOLIC PNL TOTAL CA: CPT | Performed by: NURSE PRACTITIONER

## 2020-02-14 RX ADMIN — SUCRALFATE 1 G: 1 TABLET ORAL at 15:24

## 2020-02-14 RX ADMIN — SUCRALFATE 1 G: 1 TABLET ORAL at 23:01

## 2020-02-14 RX ADMIN — ATORVASTATIN CALCIUM 20 MG: 20 TABLET, FILM COATED ORAL at 18:11

## 2020-02-14 RX ADMIN — SUCRALFATE 1 G: 1 TABLET ORAL at 18:11

## 2020-02-14 RX ADMIN — GABAPENTIN 100 MG: 100 CAPSULE ORAL at 10:56

## 2020-02-14 RX ADMIN — AMLODIPINE BESYLATE 10 MG: 10 TABLET ORAL at 10:55

## 2020-02-14 RX ADMIN — SUCRALFATE 1 G: 1 TABLET ORAL at 10:55

## 2020-02-14 RX ADMIN — PANTOPRAZOLE SODIUM 40 MG: 40 TABLET, DELAYED RELEASE ORAL at 06:03

## 2020-02-14 RX ADMIN — THIAMINE HCL TAB 100 MG 100 MG: 100 TAB at 15:24

## 2020-02-14 RX ADMIN — TAMSULOSIN HYDROCHLORIDE 0.4 MG: 0.4 CAPSULE ORAL at 18:11

## 2020-02-14 RX ADMIN — PANTOPRAZOLE SODIUM 40 MG: 40 TABLET, DELAYED RELEASE ORAL at 15:24

## 2020-02-14 NOTE — PLAN OF CARE
Problem: Potential for Falls  Goal: Patient will remain free of falls  Description  INTERVENTIONS:  - Assess patient frequently for physical needs  -  Identify cognitive and physical deficits and behaviors that affect risk of falls    -  [place] fall precautions as indicated by assessment   - Educate patient/family on patient safety including physical limitations  - Instruct patient to call for assistance with activity based on assessment  - Modify environment to reduce risk of injury  - Consider OT/PT consult to assist with strengthening/mobility  2/13/2020 2023 by Edwina Villeda RN  Outcome: Progressing  2/13/2020 1902 by Edwina Villeda RN  Outcome: Progressing     Problem: PAIN - ADULT  Goal: Verbalizes/displays adequate comfort level or baseline comfort level  Description  Interventions:  - Encourage patient to monitor pain and request assistance  - Assess pain using appropriate pain scale  - Administer analgesics based on type and severity of pain and evaluate response  - Implement non-pharmacological measures as appropriate and evaluate response  - Consider cultural and social influences on pain and pain management  - Notify physician/advanced practitioner if interventions unsuccessful or patient reports new pain  2/13/2020 2023 by Edwina Villeda RN  Outcome: Progressing  2/13/2020 1902 by Edwina Villeda RN  Outcome: Progressing     Problem: INFECTION - ADULT  Goal: Absence or prevention of progression during hospitalization  Description  INTERVENTIONS:  - Assess and monitor for signs and symptoms of infection  - Monitor lab/diagnostic results  - Monitor all insertion sites, i e  indwelling lines, tubes, and drains  - Monitor endotracheal if appropriate and nasal secretions for changes in amount and color  - [place] appropriate cooling/warming therapies per order  - Administer medications as ordered  - Instruct and encourage patient and family to use good hand hygiene technique  - Identify and instruct in appropriate isolation precautions for identified infection/condition  2/13/2020 2023 by Latasha Valentino RN  Outcome: Progressing  2/13/2020 1902 by Latasha Valentino RN  Outcome: Progressing  Goal: Absence of fever/infection during neutropenic period  Description  INTERVENTIONS:  - Monitor WBC    2/13/2020 2023 by Latasha Valentino RN  Outcome: Progressing  2/13/2020 1902 by Latasha Valentino RN  Outcome: Progressing     Problem: DISCHARGE PLANNING  Goal: Discharge to home or other facility with appropriate resources  Description  INTERVENTIONS:  - Identify barriers to discharge w/patient and caregiver  - Arrange for needed discharge resources and transportation as appropriate  - Identify discharge learning needs (meds, wound care, etc )  - Arrange for interpretive services to assist at discharge as needed  - Refer to Case Management Department for coordinating discharge planning if the patient needs post-hospital services based on physician/advanced practitioner order or complex needs related to functional status, cognitive ability, or social support system  2/13/2020 2023 by Latasha Valentino RN  Outcome: Progressing  2/13/2020 1902 by Latasha Valentino RN  Outcome: Progressing     Problem: Knowledge Deficit  Goal: Patient/family/caregiver demonstrates understanding of disease process, treatment plan, medications, and discharge instructions  Description  Complete learning assessment and assess knowledge base    Interventions:  - Provide teaching at level of understanding  - Provide teaching via preferred learning methods  2/13/2020 2023 by Latasha Valentino RN  Outcome: Progressing  2/13/2020 1902 by Latasha Valentino RN  Outcome: Progressing     Problem: Prexisting or High Potential for Compromised Skin Integrity  Goal: Skin integrity is maintained or improved  Description  INTERVENTIONS:  - Identify patients at risk for skin breakdown  - Assess and monitor skin integrity  - Assess and monitor nutrition and hydration status  - Monitor labs   - Assess for incontinence   - Turn and reposition patient  - Assist with mobility/ambulation  - Relieve pressure over bony prominences  - Avoid friction and shearing  - Provide appropriate hygiene as needed including keeping skin clean and dry  - Evaluate need for skin moisturizer/barrier cream  - Collaborate with interdisciplinary team   - Patient/family teaching  - Consider wound care consult   2/13/2020 2023 by Wilner Figueroa, RN  Outcome: Progressing  2/13/2020 1902 by Wilner Figueroa, RN  Outcome: Progressing

## 2020-02-14 NOTE — PLAN OF CARE
Problem: Potential for Falls  Goal: Patient will remain free of falls  Description  INTERVENTIONS:  - Assess patient frequently for physical needs  -  Identify cognitive and physical deficits and behaviors that affect risk of falls    -  [place] fall precautions as indicated by assessment   - Educate patient/family on patient safety including physical limitations  - Instruct patient to call for assistance with activity based on assessment  - Modify environment to reduce risk of injury  - Consider OT/PT consult to assist with strengthening/mobility  Outcome: Progressing     Problem: PAIN - ADULT  Goal: Verbalizes/displays adequate comfort level or baseline comfort level  Description  Interventions:  - Encourage patient to monitor pain and request assistance  - Assess pain using appropriate pain scale  - Administer analgesics based on type and severity of pain and evaluate response  - Implement non-pharmacological measures as appropriate and evaluate response  - Consider cultural and social influences on pain and pain management  - Notify physician/advanced practitioner if interventions unsuccessful or patient reports new pain  Outcome: Progressing     Problem: INFECTION - ADULT  Goal: Absence or prevention of progression during hospitalization  Description  INTERVENTIONS:  - Assess and monitor for signs and symptoms of infection  - Monitor lab/diagnostic results  - Monitor all insertion sites, i e  indwelling lines, tubes, and drains  - Monitor endotracheal if appropriate and nasal secretions for changes in amount and color  - [place] appropriate cooling/warming therapies per order  - Administer medications as ordered  - Instruct and encourage patient and family to use good hand hygiene technique  - Identify and instruct in appropriate isolation precautions for identified infection/condition  Outcome: Progressing  Goal: Absence of fever/infection during neutropenic period  Description  INTERVENTIONS:  - Monitor WBC    Outcome: Progressing     Problem: DISCHARGE PLANNING  Goal: Discharge to home or other facility with appropriate resources  Description  INTERVENTIONS:  - Identify barriers to discharge w/patient and caregiver  - Arrange for needed discharge resources and transportation as appropriate  - Identify discharge learning needs (meds, wound care, etc )  - Arrange for interpretive services to assist at discharge as needed  - Refer to Case Management Department for coordinating discharge planning if the patient needs post-hospital services based on physician/advanced practitioner order or complex needs related to functional status, cognitive ability, or social support system  Outcome: Progressing     Problem: Knowledge Deficit  Goal: Patient/family/caregiver demonstrates understanding of disease process, treatment plan, medications, and discharge instructions  Description  Complete learning assessment and assess knowledge base    Interventions:  - Provide teaching at level of understanding  - Provide teaching via preferred learning methods  Outcome: Progressing     Problem: Prexisting or High Potential for Compromised Skin Integrity  Goal: Skin integrity is maintained or improved  Description  INTERVENTIONS:  - Identify patients at risk for skin breakdown  - Assess and monitor skin integrity  - Assess and monitor nutrition and hydration status  - Monitor labs   - Assess for incontinence   - Turn and reposition patient  - Assist with mobility/ambulation  - Relieve pressure over bony prominences  - Avoid friction and shearing  - Provide appropriate hygiene as needed including keeping skin clean and dry  - Evaluate need for skin moisturizer/barrier cream  - Collaborate with interdisciplinary team   - Patient/family teaching  - Consider wound care consult   Outcome: Progressing

## 2020-02-14 NOTE — ASSESSMENT & PLAN NOTE
· With history of right sided BKA  · Vascular surgery following,  · Continue statin  · Dual antiplatelets and coumadin currently on hold secondary to supratherapeutic INR  · Can resume once INR is within therapeutic range, goal 2-3

## 2020-02-14 NOTE — PROGRESS NOTES
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  · INR at outpatient check noted to be subtherapeutic at 1 28, Coumadin was increased and a few days later INR noted to be 10 96  · LFTs WNL  · Continue to hold antiplatelets and Coumadin   · INR worsened to 11 77 today   · Hematology and vascular surgery following,  · Recommending monitoring INR until therapeutic to resume anticoagulation   · No vit   K or FFP were given, continue to hold as patient without any active bleeding at this time   · Pt does have history of mild dementia and confusion in the hospital, mildly confused today, will check CT head to rule out bleed  · Pt likely would not be a candidate for another agent due to lack of reversal agents per vascular, pt with history of GIB, hgb is stable at 9 6 today   · Repeat INR to confirm this afternoon, if still up trending will consider vit K administration  · Appreciate additional hematology recommendations    Esophageal cancer [facility])  Assessment & Plan  · Continue outpatient follow-up with oncology  · Continue PPI BID    Hx of [facility], right ([facility] )  Assessment & Plan  · Historical  · Monitor, PT/OT consultations are pending once INR starts down trending     Coagulopathy [facility])  Assessment & Plan  Patient has history of prior thrombotic events  · In light of supratherapeutic INR, hold Lovenox and Coumadin  · Can continue SCDs or foot pumps  · See plan for supratherapeutic INR    Essential hypertension  Assessment & Plan  · BP appears stable on review   · Continue amlodipine 10 mg daily   · Monitor     PVD (peripheral vascular disease) ([facility] )  Assessment & Plan  · With history of right sided BKA  · Vascular surgery following,  · Continue statin  · Dual antiplatelets and coumadin currently on hold secondary to supratherapeutic INR  · Can resume once INR is within therapeutic range, goal 2-3      VTE Pharmacologic Prophylaxis:   Pharmacologic: Pharmacologic VTE Prophylaxis contraindicated due to supratherapeutic INR  Mechanical VTE Prophylaxis in Place: Yes    Patient Centered Rounds: I have performed bedside rounds with nursing staff today  Deshawn Landeros     Discussions with Specialists or Other Care Team Provider: Discussed with hematology, vascular, RN, CM and reviewed previous notes     Education and Discussions with Family / Patient: Discussed with patient at bedside, also discussed with patient's daughter at bedside regarding plan of care  Time Spent for Care: 30 minutes  More than 50% of total time spent on counseling and coordination of care as described above  Current Length of Stay: 1 day(s)    Current Patient Status: Inpatient   Certification Statement: The patient will continue to require additional inpatient hospital stay due to awaiting head CT, improvement of INR    Discharge Plan: Not medically stable, likely not for at least 48 hours pending improvement of INR to therapeutic range     Code Status: Level 1 - Full Code      Subjective:   Pt reports that he has no pain today  Is able to state his name and states he is in the hospital  Is disoriented to time and reason why he is in the hospital  Pt's daughter reports that he has intermittent confusion at home and may have some developing dementia  Nursing reports patient had a bowel movement yesterday morning that was negative for any blood or black stool  Has been urinating without difficulties or hematuria       Objective:     Vitals:   Temp (24hrs), Av 1 °F (36 7 °C), Min:97 8 °F (36 6 °C), Max:98 3 °F (36 8 °C)    Temp:  [97 8 °F (36 6 °C)-98 3 °F (36 8 °C)] 97 8 °F (36 6 °C)  HR:  [72-79] 79  Resp:  [16-20] 20  BP: (124-145)/(50-67) 145/67  SpO2:  [100 %] 100 %  Body mass index is 19 19 kg/m²  Input and Output Summary (last 24 hours): Intake/Output Summary (Last 24 hours) at 2/14/2020 1015  Last data filed at 2/14/2020 0827  Gross per 24 hour   Intake 560 ml   Output 1875 ml   Net -1315 ml       Physical Exam:     Physical Exam   Constitutional: No distress  Pt is in no acute distress sitting in his hospital chair resting comfortably  Mainly Romansh speaking  Alert and oriented x 2  Answers questions appropriately  Right sided BKA   HENT:   Head: Normocephalic and atraumatic  Eyes: Pupils are equal, round, and reactive to light  Conjunctivae are normal    Cardiovascular: Normal rate, regular rhythm and intact distal pulses  Pulmonary/Chest: Effort normal and breath sounds normal  No stridor  No respiratory distress  He has no wheezes  Abdominal: Soft  Bowel sounds are normal  He exhibits no distension  There is no tenderness  There is no guarding  Musculoskeletal: He exhibits no edema  Neurological: He is alert  Skin: Skin is warm and dry  He is not diaphoretic  No erythema  Vitals reviewed  Additional Data:     Labs:    Results from last 7 days   Lab Units 02/14/20  0522 02/12/20  1754   WBC Thousand/uL 3 95*   < > 5 49   HEMOGLOBIN g/dL 9 6*   < > 10 6*   HEMATOCRIT % 30 5*   < > 34 3*   PLATELETS Thousands/uL 247   < > 284   NEUTROS PCT %  --   --  73   LYMPHS PCT %  --   --  13*   MONOS PCT %  --   --  7   EOS PCT %  --   --  6    < > = values in this interval not displayed  Results from last 7 days   Lab Units 02/14/20 0522 02/12/20  1754   POTASSIUM mmol/L 4 1   < > 4 6   CHLORIDE mmol/L 106   < > 104   CO2 mmol/L 24   < > 26   BUN mg/dL 16   < > 20   CREATININE mg/dL 0 95   < > 0 70   CALCIUM mg/dL 8 8   < > 9 2   ALK PHOS U/L  --   --  82   ALT U/L  --   --  14   AST U/L  --   --  28    < > = values in this interval not displayed       Results from last 7 days   Lab Units 02/14/20  0522   INR  11 77*       * I Have Reviewed All Lab Data Listed Above  * Additional Pertinent Lab Tests Reviewed: All Labs Within Last 24 Hours Reviewed    Imaging:    Imaging Reports Reviewed Today Include: None  Imaging Personally Reviewed by Myself Includes:  none    Recent Cultures (last 7 days):           Last 24 Hours Medication List:     Current Facility-Administered Medications:  acetaminophen 650 mg Oral Q6H PRN Javier Saldaña, DO   amLODIPine 10 mg Oral Daily Sher Cleverly Veres, DO   atorvastatin 20 mg Oral After [employer] D Anita, DO   gabapentin 100 mg Oral Daily Sher Clelanny Veres, DO   pantoprazole 40 mg Oral BID [facility] VALENTE Howard, DO   sucralfate 1 g Oral 4x Daily Sher Clelanny Verantoni, DO   tamsulosin 0 4 mg Oral Daily With [employer] D Anita, DO   thiamine 100 mg Oral Daily Javier Saldaña,         Today, Patient Was Seen By: Pacheco Ernandez PA-C    ** Please Note: Dictation voice to text software may have been used in the creation of this document   **

## 2020-02-14 NOTE — ASSESSMENT & PLAN NOTE
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  · INR at outpatient check noted to be subtherapeutic at 1 28, Coumadin was increased and a few days later INR noted to be 10 96  · LFTs WNL  · Continue to hold antiplatelets and Coumadin   · INR worsened to 11 77 today   · Hematology and vascular surgery following,  · Recommending monitoring INR until therapeutic to resume anticoagulation   · No vit   K or FFP were given, continue to hold as patient without any active bleeding at this time   · Pt does have history of mild dementia and confusion in the hospital, mildly confused today, will check CT head to rule out bleed  · Pt likely would not be a candidate for another agent due to lack of reversal agents per vascular, pt with history of GIB, hgb is stable at 9 6 today   · Repeat INR to confirm this afternoon, if still up trending will consider vit K administration  · Appreciate additional hematology recommendations

## 2020-02-14 NOTE — PHYSICAL THERAPY NOTE
Physical Therapy Cancellation Note    PT orders received + chart review completed  Pt c subtherapeutic INR of 11 77  Unstable for PT eval at this time  Will cont to follow + perform PT eval as appropriate       Anamaria Austin, PT, DPT

## 2020-02-15 PROBLEM — D64.9 ANEMIA: Status: ACTIVE | Noted: 2020-02-15

## 2020-02-15 LAB
ERYTHROCYTE [DISTWIDTH] IN BLOOD BY AUTOMATED COUNT: 15.5 % (ref 11.6–15.1)
FERRITIN SERPL-MCNC: 50 NG/ML (ref 8–388)
HCT VFR BLD AUTO: 32.6 % (ref 36.5–49.3)
HGB BLD-MCNC: 10 G/DL (ref 12–17)
INR PPP: 7.51 (ref 0.84–1.19)
IRON SATN MFR SERPL: 8 %
IRON SERPL-MCNC: 23 UG/DL (ref 65–175)
MCH RBC QN AUTO: 28.7 PG (ref 26.8–34.3)
MCHC RBC AUTO-ENTMCNC: 30.7 G/DL (ref 31.4–37.4)
MCV RBC AUTO: 94 FL (ref 82–98)
PLATELET # BLD AUTO: 271 THOUSANDS/UL (ref 149–390)
PMV BLD AUTO: 9.3 FL (ref 8.9–12.7)
PROTHROMBIN TIME: 65.4 SECONDS (ref 11.6–14.5)
RBC # BLD AUTO: 3.48 MILLION/UL (ref 3.88–5.62)
TIBC SERPL-MCNC: 283 UG/DL (ref 250–450)
WBC # BLD AUTO: 4.72 THOUSAND/UL (ref 4.31–10.16)

## 2020-02-15 PROCEDURE — 85610 PROTHROMBIN TIME: CPT | Performed by: PHYSICIAN ASSISTANT

## 2020-02-15 PROCEDURE — 99232 SBSQ HOSP IP/OBS MODERATE 35: CPT | Performed by: PHYSICIAN ASSISTANT

## 2020-02-15 PROCEDURE — 85027 COMPLETE CBC AUTOMATED: CPT | Performed by: PHYSICIAN ASSISTANT

## 2020-02-15 RX ADMIN — SUCRALFATE 1 G: 1 TABLET ORAL at 11:04

## 2020-02-15 RX ADMIN — AMLODIPINE BESYLATE 10 MG: 10 TABLET ORAL at 11:04

## 2020-02-15 RX ADMIN — PANTOPRAZOLE SODIUM 40 MG: 40 TABLET, DELAYED RELEASE ORAL at 07:35

## 2020-02-15 RX ADMIN — SUCRALFATE 1 G: 1 TABLET ORAL at 14:46

## 2020-02-15 RX ADMIN — SUCRALFATE 1 G: 1 TABLET ORAL at 22:38

## 2020-02-15 RX ADMIN — TAMSULOSIN HYDROCHLORIDE 0.4 MG: 0.4 CAPSULE ORAL at 18:18

## 2020-02-15 RX ADMIN — PANTOPRAZOLE SODIUM 40 MG: 40 TABLET, DELAYED RELEASE ORAL at 18:18

## 2020-02-15 RX ADMIN — ATORVASTATIN CALCIUM 20 MG: 20 TABLET, FILM COATED ORAL at 18:18

## 2020-02-15 RX ADMIN — THIAMINE HCL TAB 100 MG 100 MG: 100 TAB at 11:04

## 2020-02-15 RX ADMIN — GABAPENTIN 100 MG: 100 CAPSULE ORAL at 11:03

## 2020-02-15 RX ADMIN — SUCRALFATE 1 G: 1 TABLET ORAL at 18:18

## 2020-02-15 NOTE — ASSESSMENT & PLAN NOTE
· With history of right sided BKA  · Vascular surgery following,  · Continue statin  · Dual antiplatelets and coumadin currently on hold secondary to supratherapeutic INR  · Can resume once INR is within therapeutic range, goal 2-3  · INR 7 51 today, repeat in AM

## 2020-02-15 NOTE — PROGRESS NOTES
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  · INR at outpatient check noted to be subtherapeutic at 1 28, Coumadin was increased and a few days later INR noted to be 10 96  · LFTs WNL  · Continue to hold antiplatelets and Coumadin   · INR improved to 7 51 today, continue to trend INR in the AM  · Hematology and vascular surgery following,  · Recommending monitoring INR until therapeutic to resume anticoagulation   · No vit   K or FFP were given, continue to hold as patient without any active bleeding at this time   · Pt does have history of mild dementia and confusion in the hospital, mildly confused today, CT head negative for bleed   · Pt likely would not be a candidate for another agent due to lack of reversal agents per vascular, pt with history of GIB, hgb is stable at 10 0 today     Anemia  Assessment & Plan  · Hgb 10 0 today, stable   · Baseline hgb noted to be around 8-9 on review  · No signs of active bleeding currently   · Trend CBC  · Obtain iron panel as it appears pt is supposed to be taking ferrous sulfate as an outpatient but has not been taking this    Esophageal cancer ([facility])  Assessment & Plan  · Continue outpatient follow-up with oncology  · Continue PPI BID and Carafate QID   · Pt with decreased PO intake here, downgrade to dental soft diet and obtain speech evaluation   · TID ensures     Hx of BKA, right ([facility] )  Assessment & Plan  · Historical  · PT/OT consultations are pending once INR starts down trending, appreciate recommendations   · Pt's family has discussed with nursing staff regarding long term placement for the patient as they are unable to take care of him at home due to ambulatory dysfunction  Will discuss with CM options once patient is medically stable for discharge for placement     Coagulopathy [facility])  Assessment & Plan  Patient has history of prior thrombotic events  · In light of supratherapeutic INR, hold Lovenox and Coumadin  · Can continue SCDs or foot pumps  · See plan for supratherapeutic INR    Essential hypertension  Assessment & Plan  · BP appears stable on review   · Continue amlodipine 10 mg daily   · Monitor     PVD (peripheral vascular disease) ([address] )  Assessment & Plan  · With history of right sided BKA  · Vascular surgery following,  · Continue statin  · Dual antiplatelets and coumadin currently on hold secondary to supratherapeutic INR  · Can resume once INR is within therapeutic range, goal 2-3  · INR 7 51 today, repeat in AM    VTE Pharmacologic Prophylaxis:   Pharmacologic: on hold secondary to supratherapeutic INR  Mechanical VTE Prophylaxis in Place: Yes    Patient Centered Rounds: I have performed bedside rounds with nursing staff today  Montrell Gill     Discussions with Specialists or Other Care Team Provider: Discussed with RN and reviewed previous notes       Education and Discussions with Family / Patient: Discussed with patient at bedside with nursing staff for translation, will discuss with patient's family members over the phone later today for update  Time Spent for Care: 30 minutes  More than 50% of total time spent on counseling and coordination of care as described above  Current Length of Stay: 2 day(s)    Current Patient Status: Inpatient   Certification Statement: The patient will continue to require additional inpatient hospital stay due to continued monitoring of INR    Discharge Plan: Not medically stable as above, anticipate next 48 hours, awaiting PT/OT consultations for likely placement     Code Status: Level 1 - Full Code      Subjective:   Pt reports that he is feeling well  He denies pain anywhere   Had a bowel movement yesterday and has been urinating well  No evidence of any hematuria or hematochezia/melena  Objective:     Vitals:   Temp (24hrs), Av °F (36 7 °C), Min:97 8 °F (36 6 °C), Max:98 2 °F (36 8 °C)    Temp:  [97 8 °F (36 6 °C)-98 2 °F (36 8 °C)] 97 8 °F (36 6 °C)  HR:  [65-73] 65  Resp:  [18] 18  BP: (126-165)/(63-99) 165/67  SpO2:  [95 %-100 %] 95 %  Body mass index is 19 19 kg/m²  Input and Output Summary (last 24 hours): Intake/Output Summary (Last 24 hours) at 2/15/2020 1529  Last data filed at 2/15/2020 0950  Gross per 24 hour   Intake 450 ml   Output 1200 ml   Net -750 ml       Physical Exam:     Physical Exam   Constitutional: No distress  Patient is a thin and cachectic male who is in no acute distress  Right-sided BKA  Mainly Green[place]ic-speaking   HENT:   Head: Normocephalic and atraumatic  Eyes: Pupils are equal, round, and reactive to light  Conjunctivae are normal    Cardiovascular: Normal rate, regular rhythm and intact distal pulses  Pulmonary/Chest: Effort normal and breath sounds normal  No stridor  No respiratory distress  He has no wheezes  Abdominal: Soft  Bowel sounds are normal  He exhibits no distension  There is no tenderness  There is no guarding  Musculoskeletal: He exhibits no edema  Neurological: He is alert  Skin: Skin is warm and dry  He is not diaphoretic  No erythema  Vitals reviewed  Additional Data:     Labs:    Results from last 7 days   Lab Units 02/15/20  1148  20  1754   WBC Thousand/uL 4 72   < > 5 49   HEMOGLOBIN g/dL 10 0*   < > 10 6*   HEMATOCRIT % 32 6*   < > 34 3*   PLATELETS Thousands/uL 271   < > 284   NEUTROS PCT %  --   --  73   LYMPHS PCT %  --   --  13*   MONOS PCT %  --   --  7   EOS PCT %  --   --  6    < > = values in this interval not displayed       Results from last 7 days   Lab Units 20  0522  20  1754   POTASSIUM mmol/L 4 1   < > 4 6   CHLORIDE mmol/L 106   < > 104   CO2 mmol/L 24   < > 26   BUN mg/dL 16   < > 20   CREATININE mg/dL 0 95   < > 0 70   CALCIUM mg/dL 8 8   < > 9 2   ALK PHOS U/L  --   --  82   ALT U/L  --   --  14   AST U/L  --   --  28    < > = values in this interval not displayed  Results from last 7 days   Lab Units 02/15/20  1148   INR  7 51*       * I Have Reviewed All Lab Data Listed Above  * Additional Pertinent Lab Tests Reviewed: All Labs Within Last 24 Hours Reviewed    Imaging:    Imaging Reports Reviewed Today Include:  CT head  Imaging Personally Reviewed by Myself Includes:  None    Recent Cultures (last 7 days):           Last 24 Hours Medication List:     Current Facility-Administered Medications:  acetaminophen 650 mg Oral Q6H PRN Sophronia Shook, DO   amLODIPine 10 mg Oral Daily Maggie Quivers Veres, DO   atorvastatin 20 mg Oral After [employer] D Veres, DO   gabapentin 100 mg Oral Daily Maggie Quivers Veres, DO   pantoprazole 40 mg Oral BID [facility] D Veres, DO   sucralfate 1 g Oral 4x Daily Maggie Quivers Veres, DO   tamsulosin 0 4 mg Oral Daily With [employer] D Veres, DO   thiamine 100 mg Oral Daily Sophronia Concepcion, DO        Today, Patient Was Seen By: Jung Aquino PA-C    ** Please Note: Dictation voice to text software may have been used in the creation of this document   **

## 2020-02-15 NOTE — ASSESSMENT & PLAN NOTE
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  · INR at outpatient check noted to be subtherapeutic at 1 28, Coumadin was increased and a few days later INR noted to be 10 96  · LFTs WNL  · Continue to hold antiplatelets and Coumadin   · INR improved to 7 51 today, continue to trend INR in the AM  · Hematology and vascular surgery following,  · Recommending monitoring INR until therapeutic to resume anticoagulation   · No vit   K or FFP were given, continue to hold as patient without any active bleeding at this time   · Pt does have history of mild dementia and confusion in the hospital, mildly confused today, CT head negative for bleed   · Pt likely would not be a candidate for another agent due to lack of reversal agents per vascular, pt with history of GIB, hgb is stable at 10 0 today

## 2020-02-15 NOTE — ASSESSMENT & PLAN NOTE
· Continue outpatient follow-up with oncology  · Continue PPI BID and Carafate QID   · Pt with decreased PO intake here, downgrade to dental soft diet and obtain speech evaluation   · TID ensures

## 2020-02-15 NOTE — PLAN OF CARE
Problem: Potential for Falls  Goal: Patient will remain free of falls  Description  INTERVENTIONS:  - Assess patient frequently for physical needs  -  Identify cognitive and physical deficits and behaviors that affect risk of falls    -  [place] fall precautions as indicated by assessment   - Educate patient/family on patient safety including physical limitations  - Instruct patient to call for assistance with activity based on assessment  - Modify environment to reduce risk of injury  - Consider OT/PT consult to assist with strengthening/mobility  Outcome: Progressing     Problem: PAIN - ADULT  Goal: Verbalizes/displays adequate comfort level or baseline comfort level  Description  Interventions:  - Encourage patient to monitor pain and request assistance  - Assess pain using appropriate pain scale  - Administer analgesics based on type and severity of pain and evaluate response  - Implement non-pharmacological measures as appropriate and evaluate response  - Consider cultural and social influences on pain and pain management  - Notify physician/advanced practitioner if interventions unsuccessful or patient reports new pain  Outcome: Progressing     Problem: INFECTION - ADULT  Goal: Absence or prevention of progression during hospitalization  Description  INTERVENTIONS:  - Assess and monitor for signs and symptoms of infection  - Monitor lab/diagnostic results  - Monitor all insertion sites, i e  indwelling lines, tubes, and drains  - Monitor endotracheal if appropriate and nasal secretions for changes in amount and color  - [place] appropriate cooling/warming therapies per order  - Administer medications as ordered  - Instruct and encourage patient and family to use good hand hygiene technique  - Identify and instruct in appropriate isolation precautions for identified infection/condition  Outcome: Progressing  Goal: Absence of fever/infection during neutropenic period  Description  INTERVENTIONS:  - Monitor WBC    Outcome: Progressing     Problem: DISCHARGE PLANNING  Goal: Discharge to home or other facility with appropriate resources  Description  INTERVENTIONS:  - Identify barriers to discharge w/patient and caregiver  - Arrange for needed discharge resources and transportation as appropriate  - Identify discharge learning needs (meds, wound care, etc )  - Arrange for interpretive services to assist at discharge as needed  - Refer to Case Management Department for coordinating discharge planning if the patient needs post-hospital services based on physician/advanced practitioner order or complex needs related to functional status, cognitive ability, or social support system  Outcome: Progressing     Problem: Knowledge Deficit  Goal: Patient/family/caregiver demonstrates understanding of disease process, treatment plan, medications, and discharge instructions  Description  Complete learning assessment and assess knowledge base    Interventions:  - Provide teaching at level of understanding  - Provide teaching via preferred learning methods  Outcome: Progressing     Problem: Prexisting or High Potential for Compromised Skin Integrity  Goal: Skin integrity is maintained or improved  Description  INTERVENTIONS:  - Identify patients at risk for skin breakdown  - Assess and monitor skin integrity  - Assess and monitor nutrition and hydration status  - Monitor labs   - Assess for incontinence   - Turn and reposition patient  - Assist with mobility/ambulation  - Relieve pressure over bony prominences  - Avoid friction and shearing  - Provide appropriate hygiene as needed including keeping skin clean and dry  - Evaluate need for skin moisturizer/barrier cream  - Collaborate with interdisciplinary team   - Patient/family teaching  - Consider wound care consult   Outcome: Progressing     Problem: Knowledge Deficit  Goal: Patient/family/caregiver demonstrates understanding of disease process, treatment plan, medications, and discharge instructions  Description  Complete learning assessment and assess knowledge base    Interventions:  - Provide teaching at level of understanding  - Provide teaching via preferred learning methods  Outcome: Progressing

## 2020-02-15 NOTE — NURSING NOTE
Per night shift report  Pt refusing Lab work  Approached pt and provided EDU regarding lab work, Continues to refuse  Slim to be notified

## 2020-02-15 NOTE — ASSESSMENT & PLAN NOTE
· Hgb 10 0 today, stable   · Baseline hgb noted to be around 8-9 on review  · No signs of active bleeding currently   · Trend CBC  · Obtain iron panel as it appears pt is supposed to be taking ferrous sulfate as an outpatient but has not been taking this

## 2020-02-16 PROBLEM — K92.1 MELENA: Status: ACTIVE | Noted: 2020-02-16

## 2020-02-16 LAB
ERYTHROCYTE [DISTWIDTH] IN BLOOD BY AUTOMATED COUNT: 15.4 % (ref 11.6–15.1)
HCT VFR BLD AUTO: 28.4 % (ref 36.5–49.3)
HCT VFR BLD AUTO: 28.6 % (ref 36.5–49.3)
HGB BLD-MCNC: 8.7 G/DL (ref 12–17)
HGB BLD-MCNC: 8.7 G/DL (ref 12–17)
INR PPP: 4.16 (ref 0.84–1.19)
MCH RBC QN AUTO: 28.3 PG (ref 26.8–34.3)
MCHC RBC AUTO-ENTMCNC: 30.6 G/DL (ref 31.4–37.4)
MCV RBC AUTO: 93 FL (ref 82–98)
PLATELET # BLD AUTO: 282 THOUSANDS/UL (ref 149–390)
PMV BLD AUTO: 9 FL (ref 8.9–12.7)
PROTHROMBIN TIME: 40.9 SECONDS (ref 11.6–14.5)
RBC # BLD AUTO: 3.07 MILLION/UL (ref 3.88–5.62)
WBC # BLD AUTO: 4.97 THOUSAND/UL (ref 4.31–10.16)

## 2020-02-16 PROCEDURE — 85610 PROTHROMBIN TIME: CPT | Performed by: PHYSICIAN ASSISTANT

## 2020-02-16 PROCEDURE — 85014 HEMATOCRIT: CPT | Performed by: PHYSICIAN ASSISTANT

## 2020-02-16 PROCEDURE — 99232 SBSQ HOSP IP/OBS MODERATE 35: CPT | Performed by: PHYSICIAN ASSISTANT

## 2020-02-16 PROCEDURE — 85018 HEMOGLOBIN: CPT | Performed by: PHYSICIAN ASSISTANT

## 2020-02-16 PROCEDURE — 85027 COMPLETE CBC AUTOMATED: CPT | Performed by: PHYSICIAN ASSISTANT

## 2020-02-16 RX ADMIN — GABAPENTIN 100 MG: 100 CAPSULE ORAL at 09:07

## 2020-02-16 RX ADMIN — ATORVASTATIN CALCIUM 20 MG: 20 TABLET, FILM COATED ORAL at 17:26

## 2020-02-16 RX ADMIN — SUCRALFATE 1 G: 1 TABLET ORAL at 17:26

## 2020-02-16 RX ADMIN — AMLODIPINE BESYLATE 10 MG: 10 TABLET ORAL at 09:07

## 2020-02-16 RX ADMIN — SUCRALFATE 1 G: 1 TABLET ORAL at 13:24

## 2020-02-16 RX ADMIN — PANTOPRAZOLE SODIUM 40 MG: 40 TABLET, DELAYED RELEASE ORAL at 17:27

## 2020-02-16 RX ADMIN — THIAMINE HCL TAB 100 MG 100 MG: 100 TAB at 09:07

## 2020-02-16 RX ADMIN — PANTOPRAZOLE SODIUM 40 MG: 40 TABLET, DELAYED RELEASE ORAL at 09:07

## 2020-02-16 RX ADMIN — TAMSULOSIN HYDROCHLORIDE 0.4 MG: 0.4 CAPSULE ORAL at 17:26

## 2020-02-16 RX ADMIN — SUCRALFATE 1 G: 1 TABLET ORAL at 23:05

## 2020-02-16 RX ADMIN — SUCRALFATE 1 G: 1 TABLET ORAL at 09:08

## 2020-02-16 NOTE — ASSESSMENT & PLAN NOTE
· Historical  · PT/OT consultations are pending once INR starts down trending, appreciate recommendations   · Pt's family has discussed with nursing staff regarding long term placement for the patient as they are unable to take care of him at home due to ambulatory dysfunction   Will discuss with CM options once patient is medically stable for discharge for placement

## 2020-02-16 NOTE — PLAN OF CARE
Problem: Potential for Falls  Goal: Patient will remain free of falls  Description  INTERVENTIONS:  - Assess patient frequently for physical needs  -  Identify cognitive and physical deficits and behaviors that affect risk of falls    -  [place] fall precautions as indicated by assessment   - Educate patient/family on patient safety including physical limitations  - Instruct patient to call for assistance with activity based on assessment  - Modify environment to reduce risk of injury  - Consider OT/PT consult to assist with strengthening/mobility  Outcome: Progressing     Problem: PAIN - ADULT  Goal: Verbalizes/displays adequate comfort level or baseline comfort level  Description  Interventions:  - Encourage patient to monitor pain and request assistance  - Assess pain using appropriate pain scale  - Administer analgesics based on type and severity of pain and evaluate response  - Implement non-pharmacological measures as appropriate and evaluate response  - Consider cultural and social influences on pain and pain management  - Notify physician/advanced practitioner if interventions unsuccessful or patient reports new pain  Outcome: Progressing     Problem: INFECTION - ADULT  Goal: Absence or prevention of progression during hospitalization  Description  INTERVENTIONS:  - Assess and monitor for signs and symptoms of infection  - Monitor lab/diagnostic results  - Monitor all insertion sites, i e  indwelling lines, tubes, and drains  - Monitor endotracheal if appropriate and nasal secretions for changes in amount and color  - [place] appropriate cooling/warming therapies per order  - Administer medications as ordered  - Instruct and encourage patient and family to use good hand hygiene technique  - Identify and instruct in appropriate isolation precautions for identified infection/condition  Outcome: Progressing  Goal: Absence of fever/infection during neutropenic period  Description  INTERVENTIONS:  - Monitor WBC    Outcome: Progressing     Problem: DISCHARGE PLANNING  Goal: Discharge to home or other facility with appropriate resources  Description  INTERVENTIONS:  - Identify barriers to discharge w/patient and caregiver  - Arrange for needed discharge resources and transportation as appropriate  - Identify discharge learning needs (meds, wound care, etc )  - Arrange for interpretive services to assist at discharge as needed  - Refer to Case Management Department for coordinating discharge planning if the patient needs post-hospital services based on physician/advanced practitioner order or complex needs related to functional status, cognitive ability, or social support system  Outcome: Progressing     Problem: Knowledge Deficit  Goal: Patient/family/caregiver demonstrates understanding of disease process, treatment plan, medications, and discharge instructions  Description  Complete learning assessment and assess knowledge base    Interventions:  - Provide teaching at level of understanding  - Provide teaching via preferred learning methods  Outcome: Progressing     Problem: Prexisting or High Potential for Compromised Skin Integrity  Goal: Skin integrity is maintained or improved  Description  INTERVENTIONS:  - Identify patients at risk for skin breakdown  - Assess and monitor skin integrity  - Assess and monitor nutrition and hydration status  - Monitor labs   - Assess for incontinence   - Turn and reposition patient  - Assist with mobility/ambulation  - Relieve pressure over bony prominences  - Avoid friction and shearing  - Provide appropriate hygiene as needed including keeping skin clean and dry  - Evaluate need for skin moisturizer/barrier cream  - Collaborate with interdisciplinary team   - Patient/family teaching  - Consider wound care consult   Outcome: Progressing

## 2020-02-16 NOTE — PROGRESS NOTES
Progress Note - Tru Hermila 1950, 78 y o  male MRN: [id number]    Unit/Bed#: -01 Encounter: [id number]    Primary Care Provider: Rachelle Pollard MD   Date and time admitted to hospital: 2/12/2020  5:04 PM      DOS: 2/16/2020    * Supratherapeutic INR  Assessment & Plan  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  · INR at outpatient check noted to be subtherapeutic at 1 28, Coumadin was increased and a few days later INR noted to be 10 96  · LFTs WNL  · Continue to hold antiplatelets and Coumadin   · INR improved to 7 51 yesterday, repeat INR from this AM pending as patient was initially refusing this morning  · Hematology and vascular surgery following,  · Recommending monitoring INR until therapeutic to resume anticoagulation   · No vit   K or FFP were given, continue to hold as INR trending down   · Ct head negative for bleed   · Pt likely would not be a candidate for another agent due to lack of reversal agents per vascular, pt with history of GIB, episodes of melena today, plan as above    Melena  Assessment & Plan  · Started today   · Pt with one episode of melena, does have history of GIB and PUD   · Last EGD noted to be in January that was fairly normal  · Hgb decreased from 10 to 8 7 today, monitor H&H  · INR from today pending   · Fecal occult stool pending  · Discussed with GI who will evaluate tomorrow, NPO after midnight for possible repeat scope if continues   · Pt was previously on iron supplementation as an outpatient, unsure if he was compliant with this, has not received iron since admission on 2/12  · Continue PPI BID     Anemia  Assessment & Plan  · Hgb decreased to 8 7 today from 10, concerning as patient with new onset melena at this time, did also have mild epistaxis today that spontaneously resolved   · Baseline hgb noted to be around 8-9 on review  · Discussed with GI, pt is NPO after midnight for possible scope tomorrow does have history of PUD  · Trend H&H  · Transfuse if hgb < 7 0  · Iron panel with stores 23, was previously on iron supplementation but was noted not to be taking it on admission and therefore was not restarted  Hold off for now as ferritin WNL  Esophageal cancer ([facility] )  Assessment & Plan  · Continue outpatient follow-up with oncology  · Continue PPI BID and Carafate QID   · Pt with decreased PO intake here, continue dental soft diet and await speech evaluation    · TID ensures, pt appears to be tolerating well     Hx of BKA, right ([facility] )  Assessment & Plan  · Historical  · PT/OT consultations are pending once INR starts down trending, appreciate recommendations   · Pt's family has discussed with nursing staff regarding long term placement for the patient as they are unable to take care of him at home due to ambulatory dysfunction  Will discuss with CM options once patient is medically stable for discharge for placement     Coagulopathy [facility])  Assessment & Plan  Patient has history of prior thrombotic events  · In light of supratherapeutic INR, continue to hold Lovenox and Coumadin  · Can continue SCDs or foot pumps  · See plan for supratherapeutic INR    Essential hypertension  Assessment & Plan  · BP appears stable on review   · Continue amlodipine 10 mg daily   · Monitor     PVD (peripheral vascular disease) ([facility] )  Assessment & Plan  · With history of right sided BKA  · Vascular surgery following,  · Continue statin  · Dual antiplatelets and coumadin currently on hold secondary to supratherapeutic INR  · Can resume once INR is within therapeutic range, goal 2-3  · INR 7 51, repeat from this AM pending as patient was refusing lab work initially       VTE Pharmacologic Prophylaxis:   Pharmacologic: Pharmacologic VTE Prophylaxis contraindicated due to Supratherapeutic INR and melena  Mechanical VTE Prophylaxis in Place: Yes    Patient Centered Rounds: I have performed bedside rounds with nursing staff today  Kam Fuetnes    Discussions with Specialists or Other Care Team Provider:  Discussed with GI, RN and reviewed previous notes    Education and Discussions with Family / Patient:  Discussed with patient and patient's sister over the phone regarding plan of care    Time Spent for Care: 30 minutes  More than 50% of total time spent on counseling and coordination of care as described above  Current Length of Stay: 3 day(s)    Current Patient Status: Inpatient   Certification Statement: The patient will continue to require additional inpatient hospital stay due to Continue monitoring of INR, GI evaluation workup for melena, close trending of hemoglobin    Discharge Plan:  Not medically stable as above  Pending GI workup  Code Status: Level 1 - Full Code      Subjective:   Patient continues to refuse blood work this morning  It appears in the mornings he may be more confused which improves throughout the day  He is usually agreeable when there are family members in the room  He currently denies any pain  Did have an episode of melena today per discussion with PCA  No hematuria  Objective:     Vitals:   Temp (24hrs), Av 2 °F (36 8 °C), Min:98 °F (36 7 °C), Max:98 4 °F (36 9 °C)    Temp:  [98 °F (36 7 °C)-98 4 °F (36 9 °C)] 98 °F (36 7 °C)  HR:  [80-82] 82  Resp:  [18-19] 18  BP: (142-162)/(64-67) 162/67  SpO2:  [98 %-100 %] 100 %  Body mass index is 19 19 kg/m²  Input and Output Summary (last 24 hours): Intake/Output Summary (Last 24 hours) at 2020 1549  Last data filed at 2020 0901  Gross per 24 hour   Intake 610 ml   Output 800 ml   Net -190 ml       Physical Exam:     Physical Exam   Constitutional: No distress  Patient is in no acute distress sitting in his hospital bed resting comfortably  Right-sided BKA  No pallor  Mainly Dominican speaking  HENT:   Head: Normocephalic and atraumatic  Eyes: Pupils are equal, round, and reactive to light   Conjunctivae are normal  Cardiovascular: Normal rate, regular rhythm and intact distal pulses  Pulmonary/Chest: Effort normal and breath sounds normal  No stridor  No respiratory distress  He has no wheezes  Abdominal: Soft  Bowel sounds are normal  He exhibits no distension  There is no tenderness  There is no guarding  Musculoskeletal: He exhibits no edema  Neurological: He is alert  Skin: Skin is warm and dry  He is not diaphoretic  No erythema  Vitals reviewed  Additional Data:     Labs:    Results from last 7 days   Lab Units 02/16/20  1524  02/12/20  1754   WBC Thousand/uL 4 97   < > 5 49   HEMOGLOBIN g/dL 8 7*   < > 10 6*   HEMATOCRIT % 28 4*   < > 34 3*   PLATELETS Thousands/uL 282   < > 284   NEUTROS PCT %  --   --  73   LYMPHS PCT %  --   --  13*   MONOS PCT %  --   --  7   EOS PCT %  --   --  6    < > = values in this interval not displayed  Results from last 7 days   Lab Units 02/14/20  0522  02/12/20  1754   POTASSIUM mmol/L 4 1   < > 4 6   CHLORIDE mmol/L 106   < > 104   CO2 mmol/L 24   < > 26   BUN mg/dL 16   < > 20   CREATININE mg/dL 0 95   < > 0 70   CALCIUM mg/dL 8 8   < > 9 2   ALK PHOS U/L  --   --  82   ALT U/L  --   --  14   AST U/L  --   --  28    < > = values in this interval not displayed  Results from last 7 days   Lab Units 02/15/20  1148   INR  7 51*       * I Have Reviewed All Lab Data Listed Above  * Additional Pertinent Lab Tests Reviewed:  All Labs Within Last 24 Hours Reviewed    Imaging:    Imaging Reports Reviewed Today Include:  None  Imaging Personally Reviewed by Myself Includes:  None    Recent Cultures (last 7 days):           Last 24 Hours Medication List:     Current Facility-Administered Medications:  acetaminophen 650 mg Oral Q6H PRN Corinn Check, DO   amLODIPine 10 mg Oral Daily Keith Tyler Veres, DO   atorvastatin 20 mg Oral After [employer] D Veres, DO   gabapentin 100 mg Oral Daily Keith Tyler Veres, DO   pantoprazole 40 mg Oral BID AC Corinn Check, DO sucralfate 1 g Oral 4x Daily Orville Howard DO   tamsulosin 0 4 mg Oral Daily With Dinner Marbella Howard DO   thiamine 100 mg Oral Daily Mervat Griffin DO        Today, Patient Was Seen By: Harry Ortiz PA-C    ** Please Note: Dictation voice to text software may have been used in the creation of this document   **

## 2020-02-16 NOTE — ASSESSMENT & PLAN NOTE
Patient has history of prior thrombotic events  · In light of supratherapeutic INR, continue to hold Lovenox and Coumadin  · Can continue SCDs or foot pumps  · See plan for supratherapeutic INR

## 2020-02-16 NOTE — ASSESSMENT & PLAN NOTE
· Hgb decreased to 8 7 today from 10, concerning as patient with new onset melena at this time, did also have mild epistaxis today that spontaneously resolved   · Baseline hgb noted to be around 8-9 on review  · Discussed with GI, pt is NPO after midnight for possible scope tomorrow does have history of PUD  · Trend H&H  · Transfuse if hgb < 7 0  · Iron panel with stores 23, was previously on iron supplementation but was noted not to be taking it on admission and therefore was not restarted  Hold off for now as ferritin WNL

## 2020-02-16 NOTE — SPEECH THERAPY NOTE
Speech Language/Pathology    Speech/Language Pathology Progress Note    Patient Name: Marcel Rivera  [id number] Date: 2/16/2020     Problem List  Principal Problem:    Supratherapeutic INR  Active Problems:    PVD (peripheral vascular disease) ([place] [address] )    Essential hypertension    Coagulopathy (HCC)    Hx of BKA, right ([place] [address] )    Esophageal cancer ([place] [address] )    ASCVD (arteriosclerotic cardiovascular disease)    Anemia       Past Medical History  Past Medical History:   Diagnosis Date    Cancer ([place] [address] )     throat cancer    GERD (gastroesophageal reflux disease)     Hyperlipidemia     Hypertension     Left carotid stenosis 1/14/2020    PAD (peripheral artery disease) ([place] [address] ) 11/2/2019    PEG (percutaneous endoscopic gastrostomy) status ([place] [address] )     Port-A-Cath in place     Thrombocytopenia ([place] [address] ) 11/18/2019        Past Surgical History  Past Surgical History:   Procedure Laterality Date    BYPASS FEMORAL-FEMORAL Right     ESOPHAGOSCOPY N/A 8/24/2017    Procedure: ESOPHAGOSCOPY FLEXIBLE;  Surgeon: Margo Edwards MD;  [facility];  Service: ENT    IR ABDOMINAL ANGIOGRAPHY / INTERVENTION  11/14/2019    IR ABDOMINAL ANGIOGRAPHY / INTERVENTION  11/7/2019    IR ABDOMINAL ANGIOGRAPHY / INTERVENTION  12/13/2019    LEG AMPUTATION THROUGH LOWER TIBIA AND FIBULA Right 12/19/2019    Procedure: AMPUTATION BELOW KNEE (BKA);   Surgeon: Rosibel Mustafa MD;  [facility];  Service: Vascular    NH BRONCHOSCOPY,DIAGNOSTIC N/A 8/24/2017    Procedure: Bronchoscopy;  Surgeon: Margo Edwards MD;  [facility];  Service: ENT    NH LARYNGOSCOPY,DIRCT,OP,BIOPSY N/A 8/24/2017    Procedure: LARYNGOSCOPY DIRECT;  Surgeon: Margo Edwards MD;  [facility];  Service: ENT    NH [facility] Left 11/7/2019    Procedure: LEFT ENDARTERECTOMY ARTERIAL FEMORAL; L CFAE WITH PROFUNDOPLASTY; ARTERIOGRAM;RETROGRADE ILIAC STENTING;  Surgeon: Miles Miller MD;  [facility];  Service: Vascular    TOE AMPUTATION Right     2 toes         Consult received, medical chart reviewed and case history taken  Spoke with RN prior to initiating evaluation  Per RN, patient is NPO awaiting GI consultation related to presence of dark stools  Will hold PO assessment at this time and complete evaluation once patient is medically able to participate

## 2020-02-16 NOTE — ASSESSMENT & PLAN NOTE
· Continue outpatient follow-up with oncology  · Continue PPI BID and Carafate QID   · Pt with decreased PO intake here, continue dental soft diet and await speech evaluation    · TID ensures, pt appears to be tolerating well

## 2020-02-16 NOTE — NURSING NOTE
Patient refused to have his blood drawn this AM after several attempts were made  He was educated on the risk and benefits  Will continue to encourage

## 2020-02-16 NOTE — ASSESSMENT & PLAN NOTE
· Started today   · Pt with one episode of melena, does have history of GIB and PUD   · Last EGD noted to be in January that was fairly normal  · Hgb decreased from 10 to 8 7 today, monitor H&H  · INR from today pending   · Fecal occult stool pending  · Discussed with GI who will evaluate tomorrow, NPO after midnight for possible repeat scope if continues   · Pt was previously on iron supplementation as an outpatient, unsure if he was compliant with this, has not received iron since admission on 2/12  · Continue PPI BID

## 2020-02-16 NOTE — ASSESSMENT & PLAN NOTE
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  · INR at outpatient check noted to be subtherapeutic at 1 28, Coumadin was increased and a few days later INR noted to be 10 96  · LFTs WNL  · Continue to hold antiplatelets and Coumadin   · INR improved to 7 51 yesterday, repeat INR from this AM pending as patient was initially refusing this morning  · Hematology and vascular surgery following,  · Recommending monitoring INR until therapeutic to resume anticoagulation   · No vit   K or FFP were given, continue to hold as INR trending down   · Ct head negative for bleed   · Pt likely would not be a candidate for another agent due to lack of reversal agents per vascular, pt with history of GIB, episodes of melena today, plan as above

## 2020-02-16 NOTE — ASSESSMENT & PLAN NOTE
· With history of right sided BKA  · Vascular surgery following,  · Continue statin  · Dual antiplatelets and coumadin currently on hold secondary to supratherapeutic INR  · Can resume once INR is within therapeutic range, goal 2-3  · INR 7 51, repeat from this AM pending as patient was refusing lab work initially

## 2020-02-17 LAB
ERYTHROCYTE [DISTWIDTH] IN BLOOD BY AUTOMATED COUNT: 15.2 % (ref 11.6–15.1)
HCT VFR BLD AUTO: 31.9 % (ref 36.5–49.3)
HGB BLD-MCNC: 9.8 G/DL (ref 12–17)
INR PPP: 1.94 (ref 0.84–1.19)
MCH RBC QN AUTO: 28.6 PG (ref 26.8–34.3)
MCHC RBC AUTO-ENTMCNC: 30.7 G/DL (ref 31.4–37.4)
MCV RBC AUTO: 93 FL (ref 82–98)
PLATELET # BLD AUTO: 284 THOUSANDS/UL (ref 149–390)
PMV BLD AUTO: 9.1 FL (ref 8.9–12.7)
PROTHROMBIN TIME: 22.3 SECONDS (ref 11.6–14.5)
RBC # BLD AUTO: 3.43 MILLION/UL (ref 3.88–5.62)
WBC # BLD AUTO: 5.04 THOUSAND/UL (ref 4.31–10.16)

## 2020-02-17 PROCEDURE — 85610 PROTHROMBIN TIME: CPT | Performed by: PHYSICIAN ASSISTANT

## 2020-02-17 PROCEDURE — 99232 SBSQ HOSP IP/OBS MODERATE 35: CPT | Performed by: PHYSICIAN ASSISTANT

## 2020-02-17 PROCEDURE — 99222 1ST HOSP IP/OBS MODERATE 55: CPT | Performed by: INTERNAL MEDICINE

## 2020-02-17 PROCEDURE — 85027 COMPLETE CBC AUTOMATED: CPT | Performed by: PHYSICIAN ASSISTANT

## 2020-02-17 RX ORDER — WARFARIN SODIUM 5 MG/1
5 TABLET ORAL
Status: DISCONTINUED | OUTPATIENT
Start: 2020-02-17 | End: 2020-02-18 | Stop reason: HOSPADM

## 2020-02-17 RX ORDER — ASPIRIN 81 MG/1
81 TABLET ORAL DAILY
Status: DISCONTINUED | OUTPATIENT
Start: 2020-02-18 | End: 2020-02-18 | Stop reason: HOSPADM

## 2020-02-17 RX ORDER — ECHINACEA PURPUREA EXTRACT 125 MG
1 TABLET ORAL
Status: DISCONTINUED | OUTPATIENT
Start: 2020-02-17 | End: 2020-02-18 | Stop reason: HOSPADM

## 2020-02-17 RX ADMIN — AMLODIPINE BESYLATE 10 MG: 10 TABLET ORAL at 08:42

## 2020-02-17 RX ADMIN — SUCRALFATE 1 G: 1 TABLET ORAL at 18:32

## 2020-02-17 RX ADMIN — PANTOPRAZOLE SODIUM 40 MG: 40 TABLET, DELAYED RELEASE ORAL at 07:30

## 2020-02-17 RX ADMIN — PANTOPRAZOLE SODIUM 40 MG: 40 TABLET, DELAYED RELEASE ORAL at 16:45

## 2020-02-17 RX ADMIN — GABAPENTIN 100 MG: 100 CAPSULE ORAL at 08:42

## 2020-02-17 RX ADMIN — SUCRALFATE 1 G: 1 TABLET ORAL at 21:38

## 2020-02-17 RX ADMIN — THIAMINE HCL TAB 100 MG 100 MG: 100 TAB at 08:43

## 2020-02-17 RX ADMIN — SUCRALFATE 1 G: 1 TABLET ORAL at 13:09

## 2020-02-17 RX ADMIN — TAMSULOSIN HYDROCHLORIDE 0.4 MG: 0.4 CAPSULE ORAL at 16:45

## 2020-02-17 RX ADMIN — SUCRALFATE 1 G: 1 TABLET ORAL at 08:43

## 2020-02-17 RX ADMIN — WARFARIN SODIUM 5 MG: 5 TABLET ORAL at 18:32

## 2020-02-17 RX ADMIN — GABAPENTIN 100 MG: 100 CAPSULE ORAL at 21:38

## 2020-02-17 RX ADMIN — ATORVASTATIN CALCIUM 20 MG: 20 TABLET, FILM COATED ORAL at 18:32

## 2020-02-17 NOTE — ASSESSMENT & PLAN NOTE
· Historical  · PT/OT consultations are pending    · Pt's family requesting long term placement for the patient, CM is following and referrals have been sent for when patient is medically stable for discharge

## 2020-02-17 NOTE — SPEECH THERAPY NOTE
Upon chart review, pt is NPO pending possible scope today  SLP to initiate swallow evaluation when cleared for po  Cassandra Morgan  [place], [place], CCC/SLP  [id number]  [email or web address]  Available via tiger text

## 2020-02-17 NOTE — SOCIAL WORK
[facility] willing to accept patient for LTC  CM notified pt's son  Pt's son wants to discuss this placement with his sister  CM to follow-up

## 2020-02-17 NOTE — QUICK NOTE
Anticoagulation was being held due to INR elevation to 10  96  Vascular was already on board and treating as outpatient  Worsened to 11 77 on 2/14/20  Had discussion at that time about Vitamin K x1 dose, but not needed as with repeat INR at midday, it went down to 10 32 and rode it out  INR continued to decrease to 7 51, then 4 16, now today to 1 94  Did have melena on 2/16, but not overnight, with Hgb stabilization at 9 8 today  GI denied need for Endoscopy, ordered FOBT and will continue to follow  FOBT not back yet  SLIM already discussed with Vascular, and placing on lovenox bridge with Coumadin 5 mg dosage  Sounds reasonable  Will defer to them for further management

## 2020-02-17 NOTE — SOCIAL WORK
CM spoke w/ liaison from [facility] [place]  Pt was previously at their facility and staff attempted to fill out MA information in order to transition patient to long-term placement at their facility when family decided to remove patient from that facility and take him home  Liaison stated that they would be able to take him back at their facility for long-term placement versus STR  CM made a call to pt's son in order to discuss placement acceptance and left VM  Waiting CB

## 2020-02-17 NOTE — PROGRESS NOTES
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  · INR at outpatient check noted to be subtherapeutic at 1 28, Coumadin was increased and a few days later INR noted to be 10 96  · LFTs WNL  · INR now 1 94 today, discussed with vascular surgery/GI, will place patient on lovenox bridge and start Coumadin at prior home dose of 5 mg tonight  GI evaluated, pt without additional melenotic stools overnight, hgb stabilized  No signs of acute active bleeding currently  Occult stool is pending  Saline nasal spray for any additional epistaxis  Pt has been consented for blood if he is noted to have any decrease in hgb levels  · Repeat INR in the AM  · Hematology and vascular surgery following,  · Ct head negative for bleed   · Pt likely would not be a candidate for another agent due to lack of reversal agents per vascular    Melena  Assessment & Plan  · Started 2/16  · Pt with one episode of melena, does have history of GIB and PUD, however no additional episodes overnight   · Last EGD noted to be in January that was fairly normal  · Hgb decreased from 10 to 8 7, but now is stabilized at 9 8  · GI evaluated, no need for endoscopy evaluation at this time, no concern for active bleed  Fecal occult ordered and will monitor  · INR 1 94 today, therefore discussed with GI and vascular, cleared to resume a/c with Coumadin/lovenox bridge and ASA   Monitor stool output closely   · Pt was previously on iron supplementation as an outpatient, unsure if he was compliant with this, has not received iron since admission on 2/12  · Continue PPI BID     Anemia  Assessment & Plan  · Hgb stabilized at 9 8 today · Baseline hgb noted to be around 8-9 on review  · GI evaluated, no concern for active bleed at this time, no additional melena  Monitor occult stool, no need for endoscopic evaluation now  Cleared to resume a/c at this time  · Transfuse if hgb < 7 0  · Iron panel with stores 23, was previously on iron supplementation but was noted not to be taking it on admission and therefore was not restarted  Hold off for now as ferritin WNL  Esophageal cancer ([facility] )  Assessment & Plan  · Continue outpatient follow-up with oncology  · Continue PPI BID and Carafate QID   · Pt with decreased PO intake here, continue dental soft diet and await speech evaluation    · TID ensures, pt appears to be tolerating well     Hx of BKA, right ([facility] )  Assessment & Plan  · Historical  · PT/OT consultations are pending    · Pt's family requesting long term placement for the patient, CM is following and referrals have been sent for when patient is medically stable for discharge    Essential hypertension  Assessment & Plan  · BP appears stable on review   · Continue amlodipine 10 mg daily   · Monitor     PVD (peripheral vascular disease) ([facility] )  Assessment & Plan  · With history of right sided BKA  · Vascular surgery following,  · Continue statin  · Resume ASA with Coumadin/lovenox bridge today as patient's INR 1 94  · Goal INR 2-3  · Coumadin resumed at previous home dose of 5 mg daily, montior INR in the AM closely  · Monitor for any signs of bleeding, currently pt is stable no evidence of acute bleed      VTE Pharmacologic Prophylaxis:   Pharmacologic: lovenox/coumadin bridge  Mechanical VTE Prophylaxis in Place: Yes    Patient Centered Rounds: I have performed bedside rounds with nursing staff today      Discussions with Specialists or Other Care Team Provider: Discussed with vascular surgery, GI, RN, Cm and reviewed previous notes      Education and Discussions with Family / Patient: Discussed with patient at bedside and patient's son over the phone regarding plan of care  Time Spent for Care: 30 minutes  More than 50% of total time spent on counseling and coordination of care as described above  Current Length of Stay: 4 day(s)    Current Patient Status: Inpatient   Certification Statement: The patient will continue to require additional inpatient hospital stay due to monitoring hgb, restarting anticoagulation    Discharge Plan: Not medically stable as above, pt's family also requesting long term placement for patient  PT eval pending  Code Status: Level 1 - Full Code      Subjective:   Pt denies any pain or complaints at this time  Did have a small episode of epistaxis today that spontaneously resolved  Objective:     Vitals:   Temp (24hrs), Av 8 °F (36 6 °C), Min:97 6 °F (36 4 °C), Max:98 °F (36 7 °C)    Temp:  [97 6 °F (36 4 °C)-98 °F (36 7 °C)] 97 6 °F (36 4 °C)  HR:  [78-81] 78  Resp:  [18-20] 20  BP: (126-163)/(60-67) 163/67  SpO2:  [94 %-100 %] 100 %  Body mass index is 19 19 kg/m²  Input and Output Summary (last 24 hours): Intake/Output Summary (Last 24 hours) at 2020 1415  Last data filed at 2020 1201  Gross per 24 hour   Intake 470 ml   Output 1225 ml   Net -755 ml       Physical Exam:     Physical Exam   Constitutional: No distress  Pt is in no acute distress sitting in his hospital bed resting comfortably  Mainly [place] speaking  HENT:   Head: Normocephalic and atraumatic  Eyes: Pupils are equal, round, and reactive to light  Conjunctivae are normal    Cardiovascular: Normal rate, regular rhythm and intact distal pulses  Pulmonary/Chest: Effort normal and breath sounds normal  No stridor  No respiratory distress  He has no wheezes  Abdominal: Soft  Bowel sounds are normal  He exhibits no distension  There is no tenderness  There is no guarding  Musculoskeletal: He exhibits no edema  Neurological: He is alert  Skin: Skin is warm and dry  He is not diaphoretic  No erythema     Vitals reviewed  Additional Data:     Labs:    Results from last 7 days   Lab Units 02/17/20  0913  02/12/20  1754   WBC Thousand/uL 5 04   < > 5 49   HEMOGLOBIN g/dL 9 8*   < > 10 6*   HEMATOCRIT % 31 9*   < > 34 3*   PLATELETS Thousands/uL 284   < > 284   NEUTROS PCT %  --   --  73   LYMPHS PCT %  --   --  13*   MONOS PCT %  --   --  7   EOS PCT %  --   --  6    < > = values in this interval not displayed  Results from last 7 days   Lab Units 02/14/20  0522  02/12/20  1754   POTASSIUM mmol/L 4 1   < > 4 6   CHLORIDE mmol/L 106   < > 104   CO2 mmol/L 24   < > 26   BUN mg/dL 16   < > 20   CREATININE mg/dL 0 95   < > 0 70   CALCIUM mg/dL 8 8   < > 9 2   ALK PHOS U/L  --   --  82   ALT U/L  --   --  14   AST U/L  --   --  28    < > = values in this interval not displayed  Results from last 7 days   Lab Units 02/17/20  0913   INR  1 94*       * I Have Reviewed All Lab Data Listed Above  * Additional Pertinent Lab Tests Reviewed:  All Labs Within Last 24 Hours Reviewed    Imaging:    Imaging Reports Reviewed Today Include: None  Imaging Personally Reviewed by Myself Includes:  None    Recent Cultures (last 7 days):           Last 24 Hours Medication List:     Current Facility-Administered Medications:  acetaminophen 650 mg Oral Q6H PRN [place] , DO   amLODIPine 10 mg Oral Daily [place] , DO   [START ON 2/18/2020] aspirin 81 mg Oral Daily Rocio Cisneros PA-C   atorvastatin 20 mg Oral After [employer] D Verantoni, DO   enoxaparin 1 mg/kg Subcutaneous Q12H [address] Marie Stone PA-C   gabapentin 100 mg Oral Daily [employer] Veres, DO   pantoprazole 40 mg Oral BID AC Francois D Anita, DO   sodium chloride 1 spray Each Nare Q1H PRN Marie Stone PA-C   sucralfate 1 g Oral 4x Daily [employer] Veres, DO   tamsulosin 0 4 mg Oral Daily With [employer] D Verantoni, DO   thiamine 100 mg Oral Daily [employer] Veres, DO   warfarin 5 mg Oral Daily (warfarin) Marie Stone PA-C        Today, Patient Was Seen By: Harrison Julien PA-C    ** Please Note: Dictation voice to text software may have been used in the creation of this document   **

## 2020-02-17 NOTE — CONSULTS
Consultation - [facility]  Suraj Patrick 78 y o  male MRN: [id number]  Unit/Bed#: -01 Encounter: [id number]      Consults "Click on Consult Button"     Reason for Consult / Principal Problem:  Melena    HPI: Suraj Patrick is a 78y o  year old male who presents with an extensive past medical history to include esophageal cancer history, of BKA, essential hypertension, peripheral vascular disease, and coagulopathy  GI was consulted on patient due to melenic stool  Per nurse currently taking care of him today there has been no melena overnight  Patient's hemoglobin  yesterday was 8 7  Unfortunately there is no CBC from this morning  Patient refuses blood draws unless his family is present     Patient's INR yesterday was 4 16  There is stool hemoccults pending  Patient did have an endoscopic evaluation performed during his last admission  Patient's push enteroscopy from January 22, 2020 showed a small erosion in the stomach  Patient's colonoscopy from January 22, 2020 was a normal examination  At the present time patient is denying any abdominal pain  Patient's nurse reports again no melena overnight or this morning so far  REVIEW OF SYSTEMS:     CONSTITUTIONAL: Denies any fever, chills, or rigors  Good appetite, and no recent weight loss  HEENT: No earache or tinnitus  Denies hearing loss or visual disturbances  CARDIOVASCULAR: No chest pain or palpitations  RESPIRATORY: Denies any cough, hemoptysis, shortness of breath or dyspnea on exertion  GASTROINTESTINAL: As noted in the History of Present Illness  GENITOURINARY: No problems with urination  Denies any hematuria or dysuria  NEUROLOGIC: No dizziness or vertigo, denies headaches  MUSCULOSKELETAL: Denies any muscle or joint pain  SKIN: Denies skin rashes or itching  ENDOCRINE: Denies excessive thirst  Denies intolerance to heat or cold  PSYCHOSOCIAL: Denies depression or anxiety   Denies any recent memory loss  Historical Information   Past Medical History:   Diagnosis Date    Cancer ([place] [address] )     throat cancer    GERD (gastroesophageal reflux disease)     Hyperlipidemia     Hypertension     Left carotid stenosis 1/14/2020    PAD (peripheral artery disease) ([place] [address] ) 11/2/2019    PEG (percutaneous endoscopic gastrostomy) status ([place] [address] )     Port-A-Cath in place     Thrombocytopenia ([place] [address] ) 11/18/2019     Past Surgical History:   Procedure Laterality Date    BYPASS FEMORAL-FEMORAL Right     ESOPHAGOSCOPY N/A 8/24/2017    Procedure: ESOPHAGOSCOPY FLEXIBLE;  Surgeon: Fidencio Mcallister MD;  [facility];  Service: ENT    IR ABDOMINAL ANGIOGRAPHY / INTERVENTION  11/14/2019    IR ABDOMINAL ANGIOGRAPHY / INTERVENTION  11/7/2019    IR ABDOMINAL ANGIOGRAPHY / INTERVENTION  12/13/2019    LEG AMPUTATION THROUGH LOWER TIBIA AND FIBULA Right 12/19/2019    Procedure: AMPUTATION BELOW KNEE (BKA);   Surgeon: Aryan John MD;  [facility];  Service: Vascular    OK BRONCHOSCOPY,DIAGNOSTIC N/A 8/24/2017    Procedure: Bronchoscopy;  Surgeon: Fidencio Mcallister MD;  [facility];  Service: ENT    OK LARYNGOSCOPY,DIRCT,OP,BIOPSY N/A 8/24/2017    Procedure: LARYNGOSCOPY DIRECT;  Surgeon: Fidencio Mcallister MD;  [facility];  Service: ENT    OK [facility] Left 11/7/2019    Procedure: LEFT ENDARTERECTOMY ARTERIAL FEMORAL; L CFAE WITH PROFUNDOPLASTY; ARTERIOGRAM;RETROGRADE ILIAC STENTING;  Surgeon: Bladimir Malloy MD;  [facility];  Service: Vascular    TOE AMPUTATION Right     2 toes     Social History   Social History     Substance and Sexual Activity   Alcohol Use Never    Alcohol/week: 0 0 standard drinks    Frequency: Patient refused    Drinks per session: Patient refused    Binge frequency: Patient refused     Social History     Substance and Sexual Activity   Drug Use No     Social History     Tobacco Use   Smoking Status Former Smoker    Packs/day: 1 00    Years: 15 00    Pack years: 15 00    Types: Cigarettes    Last attempt to quit: 2019    Years since quittin 2   Smokeless Tobacco Never Used   Tobacco Comment    50+ years     History reviewed  No pertinent family history  Meds/Allergies     Medications Prior to Admission   Medication    amLODIPine (NORVASC) 10 mg tablet    aspirin (ECOTRIN LOW STRENGTH) 81 mg EC tablet    atorvastatin (LIPITOR) 20 mg tablet    enoxaparin (LOVENOX) 60 mg/0 6 mL    enoxaparin (LOVENOX) 80 mg/0 8 mL    ferrous sulfate 325 (65 Fe) mg tablet    gabapentin (NEURONTIN) 100 mg capsule    hydrocortisone 2 5 % cream    melatonin 3 mg    neomycin-bacitracin-polymyxin b (NEOSPORIN) ointment    pantoprazole (PROTONIX) 40 mg tablet    polyethylene glycol (MIRALAX) 17 g packet    senna-docusate sodium (SENOKOT S) 8 6-50 mg per tablet    sucralfate (CARAFATE) 1 g tablet    tamsulosin (FLOMAX) 0 4 mg    thiamine 100 MG tablet    warfarin (COUMADIN) 5 mg tablet     Current Facility-Administered Medications   Medication Dose Route Frequency    acetaminophen (TYLENOL) tablet 650 mg  650 mg Oral Q6H PRN    amLODIPine (NORVASC) tablet 10 mg  10 mg Oral Daily    atorvastatin (LIPITOR) tablet 20 mg  20 mg Oral After Dinner    gabapentin (NEURONTIN) capsule 100 mg  100 mg Oral Daily    pantoprazole (PROTONIX) EC tablet 40 mg  40 mg Oral BID AC    sucralfate (CARAFATE) tablet 1 g  1 g Oral 4x Daily    tamsulosin (FLOMAX) capsule 0 4 mg  0 4 mg Oral Daily With Dinner    thiamine (VITAMIN B1) tablet 100 mg  100 mg Oral Daily       No Known Allergies    Objective   Blood pressure 132/60, pulse 79, temperature 98 °F (36 7 °C), temperature source Oral, resp  rate 19, height 5' 5" (1 651 m), weight 52 3 kg (115 lb 4 8 oz), SpO2 94 %      Intake/Output Summary (Last 24 hours) at 2020 0840  Last data filed at 2020 0515  Gross per 24 hour   Intake 750 ml   Output 1150 ml   Net -400 ml         PHYSICAL EXAM: General Appearance:   Alert, cooperative, no distress, appears stated age    HEENT:   Normocephalic, atraumatic, anicteric      Neck:  Supple, symmetrical, trachea midline, no adenopathy;    thyroid: no enlargement/tenderness/nodules; no carotid  bruit or JVD    Lungs:   Clear to auscultation bilaterally; no rales, rhonchi or wheezing; respirations unlabored    Heart[de-identified]   S1 and S2 normal; regular rate and rhythm; no murmur, rub, or gallop     Abdomen:   Soft, non-tender, non-distended; normal bowel sounds; no masses, no organomegaly    Genitalia:   Deferred    Rectal:   Deferred    Extremities:  No cyanosis, clubbing or edema    Pulses:  2+ and symmetric all extremities    Skin:  Skin color, texture, turgor normal, no rashes or lesions    Lymph nodes:  No palpable cervical, axillary or inguinal lymphadenopathy        Lab Results:   Admission on 02/12/2020   Component Date Value    WBC 02/12/2020 5 49     RBC 02/12/2020 3 66*    Hemoglobin 02/12/2020 10 6*    Hematocrit 02/12/2020 34 3*    MCV 02/12/2020 94     MCH 02/12/2020 29 0     MCHC 02/12/2020 30 9*    RDW 02/12/2020 15 9*    MPV 02/12/2020 10 0     Platelets 62/02/8168 284     nRBC 02/12/2020 0     Neutrophils Relative 02/12/2020 73     Immat GRANS % 02/12/2020 0     Lymphocytes Relative 02/12/2020 13*    Monocytes Relative 02/12/2020 7     Eosinophils Relative 02/12/2020 6     Basophils Relative 02/12/2020 1     Neutrophils Absolute 02/12/2020 4 09     Immature Grans Absolute 02/12/2020 0 02     Lymphocytes Absolute 02/12/2020 0 69     Monocytes Absolute 02/12/2020 0 36     Eosinophils Absolute 02/12/2020 0 30     Basophils Absolute 02/12/2020 0 03     Sodium 02/12/2020 139     Potassium 02/12/2020 4 6     Chloride 02/12/2020 104     CO2 02/12/2020 26     ANION GAP 02/12/2020 9     BUN 02/12/2020 20     Creatinine 02/12/2020 0 70     Glucose 02/12/2020 106     Calcium 02/12/2020 9 2     AST 02/12/2020 28     ALT 02/12/2020 14     Alkaline Phosphatase 02/12/2020 82     Total Protein 02/12/2020 7 7     Albumin 02/12/2020 3 2*    Total Bilirubin 02/12/2020 0 30     eGFR 02/12/2020 96     Protime 02/12/2020 >120 0*    INR 02/12/2020 >5 00*    PTT 02/12/2020 180*    Sodium 02/13/2020 138     Potassium 02/13/2020 3 8     Chloride 02/13/2020 106     CO2 02/13/2020 22     ANION GAP 02/13/2020 10     BUN 02/13/2020 13     Creatinine 02/13/2020 0 79     Glucose 02/13/2020 97     Glucose, Fasting 02/13/2020 97     Calcium 02/13/2020 8 9     eGFR 02/13/2020 91     WBC 02/13/2020 4 87     RBC 02/13/2020 3 31*    Hemoglobin 02/13/2020 9 5*    Hematocrit 02/13/2020 30 7*    MCV 02/13/2020 93     MCH 02/13/2020 28 7     MCHC 02/13/2020 30 9*    RDW 02/13/2020 15 8*    Platelets 72/37/9295 244     MPV 02/13/2020 9 5     Protime 02/13/2020 88 3*    INR 02/13/2020 10 96*    Sodium 02/14/2020 140     Potassium 02/14/2020 4 1     Chloride 02/14/2020 106     CO2 02/14/2020 24     ANION GAP 02/14/2020 10     BUN 02/14/2020 16     Creatinine 02/14/2020 0 95     Glucose 02/14/2020 102     Calcium 02/14/2020 8 8     eGFR 02/14/2020 81     WBC 02/14/2020 3 95*    RBC 02/14/2020 3 31*    Hemoglobin 02/14/2020 9 6*    Hematocrit 02/14/2020 30 5*    MCV 02/14/2020 92     MCH 02/14/2020 29 0     MCHC 02/14/2020 31 5     RDW 02/14/2020 15 6*    Platelets 14/06/4744 247     MPV 02/14/2020 9 6     Protime 02/14/2020 93 4*    INR 02/14/2020 11 77*    Protime 02/14/2020 84 2*    INR 02/14/2020 10 32*    WBC 02/15/2020 4 72     RBC 02/15/2020 3 48*    Hemoglobin 02/15/2020 10 0*    Hematocrit 02/15/2020 32 6*    MCV 02/15/2020 94     MCH 02/15/2020 28 7     MCHC 02/15/2020 30 7*    RDW 02/15/2020 15 5*    Platelets 20/26/8659 271     MPV 02/15/2020 9 3     Protime 02/15/2020 65 4*    INR 02/15/2020 7 51*    Iron Saturation 02/14/2020 8     TIBC 02/14/2020 283     Iron 02/14/2020 23*    Ferritin 02/14/2020 50     Protime 02/16/2020 40 9*    INR 02/16/2020 4 16*    WBC 02/16/2020 4 97     RBC 02/16/2020 3 07*    Hemoglobin 02/16/2020 8 7*    Hematocrit 02/16/2020 28 4*    MCV 02/16/2020 93     MCH 02/16/2020 28 3     MCHC 02/16/2020 30 6*    RDW 02/16/2020 15 4*    Platelets 79/61/5836 282     MPV 02/16/2020 9 0     Hemoglobin 02/16/2020 8 7*    Hematocrit 02/16/2020 28 6*       Imaging Studies: I have personally reviewed pertinent imaging studies  ASSESSMENT & PLAN:  Melena  Supratherapeutic INR  -Patient's hemoglobin from yesterday was 8 7  INR was 4 19   -Her nurse there has been no reported melena, hematemesis, or rectal bleeding overnight   -Patient's endoscopic evaluation from January 22, 2020 was reviewed in its entirety  There was no active bleeding at that time   -Will check stool hemoccults  -If stool Hemoccult positive will plan outpatient video capsule endoscopy  -Continue to correct INR  -Continue to monitor CBC  -Continue Protonix 40 mg b i d  -Continue Carafate 1 g q i d  -No plans at the present time for endoscopic evaluation   -Patients nurse will contact us with any evidence of GI bleeding  Patient will be seen and examined by Dr Tiffanie Meredith PA-C  2/17/2020,8:40 AM

## 2020-02-17 NOTE — ASSESSMENT & PLAN NOTE
· Started 2/16  · Pt with one episode of melena, does have history of GIB and PUD, however no additional episodes overnight   · Last EGD noted to be in January that was fairly normal  · Hgb decreased from 10 to 8 7, but now is stabilized at 9 8  · GI evaluated, no need for endoscopy evaluation at this time, no concern for active bleed  Fecal occult ordered and will monitor  · INR 1 94 today, therefore discussed with GI and vascular, cleared to resume a/c with Coumadin/lovenox bridge and ASA   Monitor stool output closely   · Pt was previously on iron supplementation as an outpatient, unsure if he was compliant with this, has not received iron since admission on 2/12  · Continue PPI BID

## 2020-02-17 NOTE — SOCIAL WORK
CM spoke w/ [facility]  Pt's son requesting to speak with CM about long-term placement  CM provided pt and pt's son w/ a list of SNF facilities  Preference is for [facility], otherwise requesting referrals be placed to nearby facilities  Referrals pended to [facility] and other local SNFs per patient's son's request  CM to follow-up

## 2020-02-17 NOTE — PLAN OF CARE
Problem: Potential for Falls  Goal: Patient will remain free of falls  Description  INTERVENTIONS:  - Assess patient frequently for physical needs  -  Identify cognitive and physical deficits and behaviors that affect risk of falls    -  Irving fall precautions as indicated by assessment   - Educate patient/family on patient safety including physical limitations  - Instruct patient to call for assistance with activity based on assessment  - Modify environment to reduce risk of injury  - Consider OT/PT consult to assist with strengthening/mobility  Outcome: Progressing

## 2020-02-17 NOTE — ASSESSMENT & PLAN NOTE
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  · INR at outpatient check noted to be subtherapeutic at 1 28, Coumadin was increased and a few days later INR noted to be 10 96  · LFTs WNL  · INR now 1 94 today, discussed with vascular surgery/GI, will place patient on lovenox bridge and start Coumadin at prior home dose of 5 mg tonight  GI evaluated, pt without additional melenotic stools overnight, hgb stabilized  No signs of acute active bleeding currently  Occult stool is pending  Saline nasal spray for any additional epistaxis  Pt has been consented for blood if he is noted to have any decrease in hgb levels    · Repeat INR in the AM  · Hematology and vascular surgery following,  · Ct head negative for bleed   · Pt likely would not be a candidate for another agent due to lack of reversal agents per vascular

## 2020-02-17 NOTE — ASSESSMENT & PLAN NOTE
· Hgb stabilized at 9 8 today   · Baseline hgb noted to be around 8-9 on review  · GI evaluated, no concern for active bleed at this time, no additional melena  Monitor occult stool, no need for endoscopic evaluation now  Cleared to resume a/c at this time  · Transfuse if hgb < 7 0  · Iron panel with stores 23, was previously on iron supplementation but was noted not to be taking it on admission and therefore was not restarted  Hold off for now as ferritin WNL

## 2020-02-18 VITALS
RESPIRATION RATE: 18 BRPM | DIASTOLIC BLOOD PRESSURE: 64 MMHG | TEMPERATURE: 97.9 F | HEIGHT: 65 IN | HEART RATE: 77 BPM | SYSTOLIC BLOOD PRESSURE: 126 MMHG | BODY MASS INDEX: 19.21 KG/M2 | OXYGEN SATURATION: 100 % | WEIGHT: 115.3 LBS

## 2020-02-18 LAB
ERYTHROCYTE [DISTWIDTH] IN BLOOD BY AUTOMATED COUNT: 15.4 % (ref 11.6–15.1)
HCT VFR BLD AUTO: 29 % (ref 36.5–49.3)
HGB BLD-MCNC: 9 G/DL (ref 12–17)
INR PPP: 1.5 (ref 0.84–1.19)
MCH RBC QN AUTO: 28.6 PG (ref 26.8–34.3)
MCHC RBC AUTO-ENTMCNC: 31 G/DL (ref 31.4–37.4)
MCV RBC AUTO: 92 FL (ref 82–98)
PLATELET # BLD AUTO: 298 THOUSANDS/UL (ref 149–390)
PMV BLD AUTO: 9.1 FL (ref 8.9–12.7)
PROTHROMBIN TIME: 18.2 SECONDS (ref 11.6–14.5)
RBC # BLD AUTO: 3.15 MILLION/UL (ref 3.88–5.62)
WBC # BLD AUTO: 7.79 THOUSAND/UL (ref 4.31–10.16)

## 2020-02-18 PROCEDURE — 85610 PROTHROMBIN TIME: CPT | Performed by: PHYSICIAN ASSISTANT

## 2020-02-18 PROCEDURE — 92610 EVALUATE SWALLOWING FUNCTION: CPT

## 2020-02-18 PROCEDURE — 85027 COMPLETE CBC AUTOMATED: CPT | Performed by: PHYSICIAN ASSISTANT

## 2020-02-18 PROCEDURE — 99232 SBSQ HOSP IP/OBS MODERATE 35: CPT | Performed by: PHYSICIAN ASSISTANT

## 2020-02-18 PROCEDURE — 99238 HOSP IP/OBS DSCHRG MGMT 30/<: CPT | Performed by: NURSE PRACTITIONER

## 2020-02-18 RX ORDER — ECHINACEA PURPUREA EXTRACT 125 MG
1 TABLET ORAL AS NEEDED
Qty: 30 ML | Refills: 0 | Status: SHIPPED | OUTPATIENT
Start: 2020-02-18

## 2020-02-18 RX ADMIN — ASPIRIN 81 MG: 81 TABLET, COATED ORAL at 08:36

## 2020-02-18 RX ADMIN — GABAPENTIN 100 MG: 100 CAPSULE ORAL at 08:36

## 2020-02-18 RX ADMIN — ENOXAPARIN SODIUM 50 MG: 60 INJECTION SUBCUTANEOUS at 12:28

## 2020-02-18 RX ADMIN — SUCRALFATE 1 G: 1 TABLET ORAL at 12:23

## 2020-02-18 RX ADMIN — PANTOPRAZOLE SODIUM 40 MG: 40 TABLET, DELAYED RELEASE ORAL at 06:34

## 2020-02-18 RX ADMIN — AMLODIPINE BESYLATE 10 MG: 10 TABLET ORAL at 08:35

## 2020-02-18 RX ADMIN — SUCRALFATE 1 G: 1 TABLET ORAL at 08:35

## 2020-02-18 RX ADMIN — THIAMINE HCL TAB 100 MG 100 MG: 100 TAB at 08:36

## 2020-02-18 NOTE — ASSESSMENT & PLAN NOTE
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  · INR at outpatient check noted to be subtherapeutic at 1 28, Coumadin was increased and a few days later INR noted to be 10 96  · LFTs WNL  · INR now 1 5 today, discussed with vascular surgery/GI, will place patient on lovenox bridge and start Coumadin at prior home dose of 5 mg tonight  · GI evaluated, pt without additional melenotic stools overnight, hgb stabilized  No signs of acute active bleeding currently  Occult stool not collected, no bm since  Saline nasal spray for any additional epistaxis  Pt has been consented for blood if he is noted to have any decrease in hgb levels    Stable for discharge from a GI standpoint  · Hematology and vascular surgery following,  · Ct head negative for bleed   · Pt likely would not be a candidate for another agent due to lack of reversal agents per vascular  · Discharged on home dose of Coumadin to short-term/long-term rehab with Lovenox bridge

## 2020-02-18 NOTE — PLAN OF CARE
Problem: Potential for Falls  Goal: Patient will remain free of falls  Description  INTERVENTIONS:  - Assess patient frequently for physical needs  -  Identify cognitive and physical deficits and behaviors that affect risk of falls    -  [place] fall precautions as indicated by assessment   - Educate patient/family on patient safety including physical limitations  - Instruct patient to call for assistance with activity based on assessment  - Modify environment to reduce risk of injury  - Consider OT/PT consult to assist with strengthening/mobility  Outcome: Progressing     Problem: PAIN - ADULT  Goal: Verbalizes/displays adequate comfort level or baseline comfort level  Description  Interventions:  - Encourage patient to monitor pain and request assistance  - Assess pain using appropriate pain scale  - Administer analgesics based on type and severity of pain and evaluate response  - Implement non-pharmacological measures as appropriate and evaluate response  - Consider cultural and social influences on pain and pain management  - Notify physician/advanced practitioner if interventions unsuccessful or patient reports new pain  Outcome: Progressing     Problem: INFECTION - ADULT  Goal: Absence or prevention of progression during hospitalization  Description  INTERVENTIONS:  - Assess and monitor for signs and symptoms of infection  - Monitor lab/diagnostic results  - Monitor all insertion sites, i e  indwelling lines, tubes, and drains  - Monitor endotracheal if appropriate and nasal secretions for changes in amount and color  - [place] appropriate cooling/warming therapies per order  - Administer medications as ordered  - Instruct and encourage patient and family to use good hand hygiene technique  - Identify and instruct in appropriate isolation precautions for identified infection/condition  Outcome: Progressing  Goal: Absence of fever/infection during neutropenic period  Description  INTERVENTIONS:  - Monitor WBC    Outcome: Progressing     Problem: DISCHARGE PLANNING  Goal: Discharge to home or other facility with appropriate resources  Description  INTERVENTIONS:  - Identify barriers to discharge w/patient and caregiver  - Arrange for needed discharge resources and transportation as appropriate  - Identify discharge learning needs (meds, wound care, etc )  - Arrange for interpretive services to assist at discharge as needed  - Refer to Case Management Department for coordinating discharge planning if the patient needs post-hospital services based on physician/advanced practitioner order or complex needs related to functional status, cognitive ability, or social support system  Outcome: Progressing     Problem: Knowledge Deficit  Goal: Patient/family/caregiver demonstrates understanding of disease process, treatment plan, medications, and discharge instructions  Description  Complete learning assessment and assess knowledge base    Interventions:  - Provide teaching at level of understanding  - Provide teaching via preferred learning methods  Outcome: Progressing     Problem: Prexisting or High Potential for Compromised Skin Integrity  Goal: Skin integrity is maintained or improved  Description  INTERVENTIONS:  - Identify patients at risk for skin breakdown  - Assess and monitor skin integrity  - Assess and monitor nutrition and hydration status  - Monitor labs   - Assess for incontinence   - Turn and reposition patient  - Assist with mobility/ambulation  - Relieve pressure over bony prominences  - Avoid friction and shearing  - Provide appropriate hygiene as needed including keeping skin clean and dry  - Evaluate need for skin moisturizer/barrier cream  - Collaborate with interdisciplinary team   - Patient/family teaching  - Consider wound care consult   Outcome: Progressing     Problem: Nutrition/Hydration-ADULT  Goal: Nutrient/Hydration intake appropriate for improving, restoring or maintaining nutritional needs  Description  Monitor and assess patient's nutrition/hydration status for malnutrition  Collaborate with interdisciplinary team and initiate plan and interventions as ordered  Monitor patient's weight and dietary intake as ordered or per policy  Utilize nutrition screening tool and intervene as necessary  Determine patient's food preferences and provide high-protein, high-caloric foods as appropriate  INTERVENTIONS:  - Monitor oral intake, urinary output, labs, and treatment plans  - Assess nutrition and hydration status and recommend course of action  - Evaluate amount of meals eaten  - Assist patient with eating if necessary   - Allow adequate time for meals  - Recommend/ encourage appropriate diets, oral nutritional supplements, and vitamin/mineral supplements  - Order, calculate, and assess calorie counts as needed  - Recommend, monitor, and adjust tube feedings and TPN/PPN based on assessed needs  - Assess need for intravenous fluids  - Provide specific nutrition/hydration education as appropriate  - Include patient/family/caregiver in decisions related to nutrition  Outcome: Progressing     Problem: Potential for Falls  Goal: Patient will remain free of falls  Description  INTERVENTIONS:  - Assess patient frequently for physical needs  -  Identify cognitive and physical deficits and behaviors that affect risk of falls    -  [place] fall precautions as indicated by assessment   - Educate patient/family on patient safety including physical limitations  - Instruct patient to call for assistance with activity based on assessment  - Modify environment to reduce risk of injury  - Consider OT/PT consult to assist with strengthening/mobility  Outcome: Progressing     Problem: PAIN - ADULT  Goal: Verbalizes/displays adequate comfort level or baseline comfort level  Description  Interventions:  - Encourage patient to monitor pain and request assistance  - Assess pain using appropriate pain scale  - Administer analgesics based on type and severity of pain and evaluate response  - Implement non-pharmacological measures as appropriate and evaluate response  - Consider cultural and social influences on pain and pain management  - Notify physician/advanced practitioner if interventions unsuccessful or patient reports new pain  Outcome: Progressing     Problem: INFECTION - ADULT  Goal: Absence or prevention of progression during hospitalization  Description  INTERVENTIONS:  - Assess and monitor for signs and symptoms of infection  - Monitor lab/diagnostic results  - Monitor all insertion sites, i e  indwelling lines, tubes, and drains  - Monitor endotracheal if appropriate and nasal secretions for changes in amount and color  - [place] appropriate cooling/warming therapies per order  - Administer medications as ordered  - Instruct and encourage patient and family to use good hand hygiene technique  - Identify and instruct in appropriate isolation precautions for identified infection/condition  Outcome: Progressing  Goal: Absence of fever/infection during neutropenic period  Description  INTERVENTIONS:  - Monitor WBC    Outcome: Progressing     Problem: DISCHARGE PLANNING  Goal: Discharge to home or other facility with appropriate resources  Description  INTERVENTIONS:  - Identify barriers to discharge w/patient and caregiver  - Arrange for needed discharge resources and transportation as appropriate  - Identify discharge learning needs (meds, wound care, etc )  - Arrange for interpretive services to assist at discharge as needed  - Refer to Case Management Department for coordinating discharge planning if the patient needs post-hospital services based on physician/advanced practitioner order or complex needs related to functional status, cognitive ability, or social support system  Outcome: Progressing     Problem: Knowledge Deficit  Goal: Patient/family/caregiver demonstrates understanding of disease process, treatment plan, medications, and discharge instructions  Description  Complete learning assessment and assess knowledge base  Interventions:  - Provide teaching at level of understanding  - Provide teaching via preferred learning methods  Outcome: Progressing     Problem: Prexisting or High Potential for Compromised Skin Integrity  Goal: Skin integrity is maintained or improved  Description  INTERVENTIONS:  - Identify patients at risk for skin breakdown  - Assess and monitor skin integrity  - Assess and monitor nutrition and hydration status  - Monitor labs   - Assess for incontinence   - Turn and reposition patient  - Assist with mobility/ambulation  - Relieve pressure over bony prominences  - Avoid friction and shearing  - Provide appropriate hygiene as needed including keeping skin clean and dry  - Evaluate need for skin moisturizer/barrier cream  - Collaborate with interdisciplinary team   - Patient/family teaching  - Consider wound care consult   Outcome: Progressing     Problem: Nutrition/Hydration-ADULT  Goal: Nutrient/Hydration intake appropriate for improving, restoring or maintaining nutritional needs  Description  Monitor and assess patient's nutrition/hydration status for malnutrition  Collaborate with interdisciplinary team and initiate plan and interventions as ordered  Monitor patient's weight and dietary intake as ordered or per policy  Utilize nutrition screening tool and intervene as necessary  Determine patient's food preferences and provide high-protein, high-caloric foods as appropriate       INTERVENTIONS:  - Monitor oral intake, urinary output, labs, and treatment plans  - Assess nutrition and hydration status and recommend course of action  - Evaluate amount of meals eaten  - Assist patient with eating if necessary   - Allow adequate time for meals  - Recommend/ encourage appropriate diets, oral nutritional supplements, and vitamin/mineral supplements  - Order, calculate, and assess calorie counts as needed  - Recommend, monitor, and adjust tube feedings and TPN/PPN based on assessed needs  - Assess need for intravenous fluids  - Provide specific nutrition/hydration education as appropriate  - Include patient/family/caregiver in decisions related to nutrition  Outcome: Progressing

## 2020-02-18 NOTE — PLAN OF CARE
Problem: Potential for Falls  Goal: Patient will remain free of falls  Description  INTERVENTIONS:  - Assess patient frequently for physical needs  -  Identify cognitive and physical deficits and behaviors that affect risk of falls    -  [place] fall precautions as indicated by assessment   - Educate patient/family on patient safety including physical limitations  - Instruct patient to call for assistance with activity based on assessment  - Modify environment to reduce risk of injury  - Consider OT/PT consult to assist with strengthening/mobility  Outcome: Progressing     Problem: PAIN - ADULT  Goal: Verbalizes/displays adequate comfort level or baseline comfort level  Description  Interventions:  - Encourage patient to monitor pain and request assistance  - Assess pain using appropriate pain scale  - Administer analgesics based on type and severity of pain and evaluate response  - Implement non-pharmacological measures as appropriate and evaluate response  - Consider cultural and social influences on pain and pain management  - Notify physician/advanced practitioner if interventions unsuccessful or patient reports new pain  Outcome: Progressing     Problem: INFECTION - ADULT  Goal: Absence or prevention of progression during hospitalization  Description  INTERVENTIONS:  - Assess and monitor for signs and symptoms of infection  - Monitor lab/diagnostic results  - Monitor all insertion sites, i e  indwelling lines, tubes, and drains  - Monitor endotracheal if appropriate and nasal secretions for changes in amount and color  - [place] appropriate cooling/warming therapies per order  - Administer medications as ordered  - Instruct and encourage patient and family to use good hand hygiene technique  - Identify and instruct in appropriate isolation precautions for identified infection/condition  Outcome: Progressing  Goal: Absence of fever/infection during neutropenic period  Description  INTERVENTIONS:  - Monitor WBC    Outcome: Progressing     Problem: DISCHARGE PLANNING  Goal: Discharge to home or other facility with appropriate resources  Description  INTERVENTIONS:  - Identify barriers to discharge w/patient and caregiver  - Arrange for needed discharge resources and transportation as appropriate  - Identify discharge learning needs (meds, wound care, etc )  - Arrange for interpretive services to assist at discharge as needed  - Refer to Case Management Department for coordinating discharge planning if the patient needs post-hospital services based on physician/advanced practitioner order or complex needs related to functional status, cognitive ability, or social support system  Outcome: Progressing     Problem: Knowledge Deficit  Goal: Patient/family/caregiver demonstrates understanding of disease process, treatment plan, medications, and discharge instructions  Description  Complete learning assessment and assess knowledge base    Interventions:  - Provide teaching at level of understanding  - Provide teaching via preferred learning methods  Outcome: Progressing     Problem: Prexisting or High Potential for Compromised Skin Integrity  Goal: Skin integrity is maintained or improved  Description  INTERVENTIONS:  - Identify patients at risk for skin breakdown  - Assess and monitor skin integrity  - Assess and monitor nutrition and hydration status  - Monitor labs   - Assess for incontinence   - Turn and reposition patient  - Assist with mobility/ambulation  - Relieve pressure over bony prominences  - Avoid friction and shearing  - Provide appropriate hygiene as needed including keeping skin clean and dry  - Evaluate need for skin moisturizer/barrier cream  - Collaborate with interdisciplinary team   - Patient/family teaching  - Consider wound care consult   Outcome: Progressing     Problem: Nutrition/Hydration-ADULT  Goal: Nutrient/Hydration intake appropriate for improving, restoring or maintaining nutritional needs  Description  Monitor and assess patient's nutrition/hydration status for malnutrition  Collaborate with interdisciplinary team and initiate plan and interventions as ordered  Monitor patient's weight and dietary intake as ordered or per policy  Utilize nutrition screening tool and intervene as necessary  Determine patient's food preferences and provide high-protein, high-caloric foods as appropriate       INTERVENTIONS:  - Monitor oral intake, urinary output, labs, and treatment plans  - Assess nutrition and hydration status and recommend course of action  - Evaluate amount of meals eaten  - Assist patient with eating if necessary   - Allow adequate time for meals  - Recommend/ encourage appropriate diets, oral nutritional supplements, and vitamin/mineral supplements  - Order, calculate, and assess calorie counts as needed  - Recommend, monitor, and adjust tube feedings and TPN/PPN based on assessed needs  - Assess need for intravenous fluids  - Provide specific nutrition/hydration education as appropriate  - Include patient/family/caregiver in decisions related to nutrition  Outcome: Progressing

## 2020-02-18 NOTE — SOCIAL WORK
Per Kim Robertsonpe  at 2:30 wcv  CM notified nurse  CM s/w pt's son and provided eta, son in agreement and IMM reviewed

## 2020-02-18 NOTE — DISCHARGE INSTR - AVS FIRST PAGE
Thank you for choosing [facility] for [facility], please take all prescriptions as instructed, please make appropriate follow-up visits

## 2020-02-18 NOTE — ASSESSMENT & PLAN NOTE
· Historical  · PT/OT recommending short-term rehab family also requesting long-term placement  · medically stable for discharge

## 2020-02-18 NOTE — DISCHARGE INSTRUCTIONS
Anemia   LO QUE NECESITA SABER:   La anemia es cuando el número de glóbulos rojos o la cantidad de [employer] glóbulos rojos es baja  La hemoglobina es lynda proteína que ayuda a transportar el oxígeno a través de robert cuerpo  Los glóbulos rojos usan el beth para crear hemoglobina  La anemia podría desarrollarse si robert cuerpo no tiene suficiente beth  También podría desarrollarse si robert cuerpo no produce suficientes glóbulos rojos o si ellos mueren antes de que robert cuerpo pueda producirlos  INSTRUCCIONES SOBRE EL SHAYLA HOSPITALARIA:   Llame al 911 o kiki que alguien llame al 911 en cualquiera de los siguientes casos:   · Usted pierde el conocimiento  · Usted tiene un dolor intenso en el pecho  Regrese a la praveen de emergencias si:   · Usted tiene evacuaciones intestinales oscuras o con tawny  Pregúntele a robert [place] vitaminas y minerales son adecuados para usted  · Anupama síntomas empeoran, aún después del [place]  · Usted tiene preguntas o inquietudes acerca de robert condición o cuidado  Medicamentos:   · Suplementos de beth o ácido fólico  ayudan a aumentar anupama glóbulos rojos y los niveles de hemoglobina  · Inyecciones de vitamina B12  podrían ayudar a aumentar el conteo de anupama glóbulos rojos y a disminuir anupama síntomas  Pregunte a robert médico cómo se inyecta la B12  · [place] anupama medicamentos sandra se le haya indicado  Consulte con robert médico si usted rita que robert medicamento no le está ayudando o si presenta efectos secundarios  Infórmele si es alérgico a algún medicamento  Mantenga lynda lista actualizada de los [employer], las vitaminas y los productos herbales que markel  Incluya los siguientes datos de los medicamentos: cantidad, frecuencia y motivo de administración  Traiga con usted la lista o los envases de la píldoras a anupama citas de seguimiento  Lleve la lista de los medicamentos con usted en jose f de lynda emergencia    Evite la anemia:  Consuma alimentos sanos altos en beth y vitamina Katerin Puentes, vegetales de hojas lata oscuro y frijoles son altos en beth y proteína  La vitamina C ayuda a hollis cuerpo a absorber el beth  Los [employer] en vitamina C incluyen naranjas y otros cítricos  Pídale a hollis médico lynda lista de otros alimentos altos en beth y vitamina C  Pregunte si usted necesita llevar lynda dieta especial    Margean Lamp a bernadette consultas de control con hollis médico según le indicaron  Anote bernadette preguntas para que se acuerde de hacerlas roel bernadette visitas  © 2017 [address] Kaur Information is for End User's use only and may not be sold, redistributed or otherwise used for commercial purposes  All illustrations and images included in CareNotes® are the copyrighted property of A D A M , Inc  or Adriano Mcgraw  Esta información es sólo para uso en educación  Hollis intención no es darle un consejo médico sobre enfermedades o tratamientos  Colsulte con hollis Jazmine Arik farmacéutico antes de seguir cualquier régimen médico para saber si es seguro y efectivo para usted  Hipertensión crónica   LO QUE NECESITA SABER:   La hipertensión es la presión arterial shayla  La presión arterial es la fuerza que ejerce la tawny contra las kincaid de las arterias  La presión arterial normal debería estar a menos de 120/80  La pre-hipertensión estaría entre 120/80 y 139/ 80  La presión arterial shayla estaría a 140/90 o más shayla  La hipertensión causa que hollis presión arterial se eleve tanto que hollis corazón se ve forzado a trabajar [employer] de lo normal  [place] puede dañar hollis corazón  La hipertensión crónica es lynda condición de krishna plazo que usted puede controlar con un estilo de fanny alyssa o con medicamentos  La presión [place] a proteger bernadette órganos sandra hollis corazón, pulmones, cerebro, y riñones     INSTRUCCIONES SOBRE EL SHAYLA HOSPITALARIA:   Llame al 911 en jose f de presentar lo siguiente:   · Usted tiene malestar en el pecho que se siente sandra [place], presión, Rise Melinda o dolor  · Usted se siente confundido o tiene dificultad para hablar  · Repentinamente se siente aturdido o con dificultad para respirar  · Usted tiene dolor o [employer] espalda, [place], [place], abdomen o Cesar Kand  Busque atención médica de inmediato si:   · Usted tiene un tatiana dolor de shaina o pérdida de la visión  · Usted tiene debilidad en un brazo o en lynda pierna  Pregúntele a robert Margorie Longest vitaminas y minerales son adecuados para usted  · Usted se siente mareado, confundido, somnoliento o sandra si se fuera a desmayar  · Usted se ha tomado robert medicamento para la presión arterial vane robert presión arterial todavía está más harvey de lo que le indicó robert médico     · Usted tiene preguntas o inquietudes acerca de robert condición o cuidado  Medicamentos:  Es posible que usted necesite alguno de los siguientes:  · Medicamento  podría usarse para ayudar a disminuir la presión arterial  Es posible que necesite más de un tipo de [place]  [place] el medicamento exactamente sandra indicado  · Diuréticos  ayudan a eliminar el exceso de líquido que se acumula en el organismo  Somis contribuirá a bajar robert presión arterial  Es posible que orine más seguido mientras markel bhargavi medicamento  · Los medicamentos para el colesterol  ayudan a bajar los niveles de [place]  Un nivel bajo de colesterol ayuda a prevenir enfermedades cardíacas y facilita el control de la presión arterial      · [place] bernadette medicamentos sandra se le haya indicado  Consulte con robert médico si usted rita que robert medicamento no le está ayudando o si presenta efectos secundarios  Infórmele si es alérgico a cualquier medicamento  Mantenga lynda lista actualizada de los [place], las vitaminas y los productos herbales que markel  Incluya los siguientes datos de los medicamentos: cantidad, frecuencia y motivo de administración  Traiga con usted la lista o los envases de la píldoras a bernadette citas de seguimiento   Lleve la [employer] de los medicamentos con usted en jose f de lynda emergencia  Acuda a bernadette consultas de control con robert médico según le indicaron  Usted necesitará regresar para medir robert presión arterial y realizar otros exámenes de laboratorio  Anote bernadette preguntas para que se acuerde de hacerlas roel bernadette visitas  Controle la hipertensión crónica:  Hable con robert médico sobre las siguientes recomendaciones y otras formas de controlar la hipertensión:  · Tómese la presión arterial en robert casa  Siéntese y descanse por 5 minutos antes de tomarse la presión arterial  Extienda robert brazo y apóyelo en lynda superficie plana  Robert brazo debe estar a la misma altura que robert corazón  Siga las instrucciones que vienen con el monitor para la presión arterial o tensiómetro  Si es posible tome por lo menos 2 lecturas de la presión cada vez  Tómese la presión arterial por lo pbsi al día a la misma hora todos los días, lynda en la mañana y la otra en la noche  Mantenga un registro de las lecturas de robert presión arterial y llévelo consigo a bernadette consultas  Pregúntele a robert médico cuál debería ser robert presión arterial            · Limite el sodio (la sal) sandra se le haya indicado  Demasiado sodio puede afectar el equilibrio de líquidos  Revise las etiquetas para buscar alimentos bajos en sodio o sin sal agregada  Algunos alimentos bajos en sodio utilizan sales de potasio para añadir sabor  Demasiado potasio también puede causar problemas de [place]  Rboert médico le dirá qué cantidad de sodio y potasio es guillen para el consumo en un día  Él puede recomendarle que limite el sodio a 2,300 mg al día  · Siga el plan de comidas recomendado por robert médico   Un dietista o médico puede darle más información sobre planes de bajo contenido de sodio o el plan de alimentación DASH (enfoques dietéticos para detener la hipertensión)  El plan DASH es bajo en sodio, grasas saturadas y grasa total  Es alto en potasio, calcio y [place]             · Ejercítese para mantener un peso saludable  Realice actividad física por lo menos 30 minutos al día, la mayoría de los días de la [place]  [place] ayudará a bajar hollis presión arterial  Pida más información acerca de un plan de ejercicio adecuado para usted  · [address] [place] [address] estrés  [place] podría ayudarlo a bajar hollis presión arterial  Aprenda sobre formas de relajarse, sandra respiración profunda o escuchar música  · Limite el consumo de alcohol  Las mujeres deberían limitar el consumo de alcohol a 1 bebida por día  Los hombres deberían limitar el consumo de alcohol a 2 tragos al día  Un trago equivale a 12 onzas de cerveza, 5 onzas de vino o 1 onza y ½ de licor  · No fume  La nicotina y otros químicos en los cigarrillos y cigarros pueden aumentar hollis presión arterial y también pueden provocar daño al pulmón  Pida información a hollis médico si usted actualmente fuma y necesita ayuda para dejar de fumar  Los cigarrillos electrónicos o tabaco sin humo todavía contienen nicotina  Consulte con hollis médico antes de [employer]  © 2017 26[address] George  Information is for End User's use only and may not be sold, redistributed or otherwise used for commercial purposes  All illustrations and images included in CareNotes® are the copyrighted property of A D A M , Inc  or Adriano Mcgraw  Esta información es sólo para uso en educación  Hollis intención no es darle un consejo médico sobre enfermedades o tratamientos  Colsulte con hollis [place] farmacéutico antes de seguir cualquier régimen médico para saber si es seguro y efectivo para usted  Ind[place] Internacional Normalizado elevado   LO QUE NECESITA SABER:   ¿Qué es el índice internacional normalizado elevado? El índice internacional normalizado (INR por bernadette siglas en inglés), es la medida de tiempo que tarda la tawny en coagular  El tiempo de tromboplastina es otra prueba de tawny que se realiza para ayudar a medir el índice internacional normalizado  [place]  [employer] alto esté robert tiempo de tromboplastina o el [place], más tiempo tarda robert tawny en coagular  Un INR o un tiempo de tromboplastina elevado significa que robert tawny está tardando más tiempo en coagular de lo que robert médico rita es lo saludable en robert jose f  Cuando robert tiempo de tromboplastina o el índice normalizado internacional está demasiado elevado, usted corre un riesgo mayor de sangrado  ¿Qué aumenta mi riesgo de presentar un índice internacional normalizado elevado? · Demasiado medicamento anticoagulante, un tipo de anticoagulante que ayuda a prevenir coágulos     · Otros medicamentos, sandra la aspirina, los analgésicos antiinflamatorios no esteroides (AINES), y algunos antibióticos, cuando usted también está usando anticoagulantes     · Condiciones de [place], sandra insuficiencia hepática o trastornos de la tawny     · [place] disminución repentina de vitamina K en robert [place] son los signos y síntomas de un índice internacional normalizado elevado? Usted puede tener cortadas pequeñas que sangren más de lo normal y por más tiempo del normal  Puede que usted sufra de moretones con facilidad, tenga sangrados de nariz frecuentes o note que bernadette encías sangran  ¿Cómo se trata el índice internacional normalizado elevado? El tratamiento depende de si usted está sangrando en la actualidad y lo severo que es el sangrado  Si usted markel un medicamento anticoagulante, es posible que robert médico le cambie robert dosis o le indique que no tome lynda o más dosis  Usted podría necesitar larissa de los siguientes tratamientos:  · Vitamina K  se puede administrar para disminuir robert índice y robert sangrado  · Componentes sanguíneos  se pueden administrar roel la transfusión para ayudar a detener robert sangrado  Los componentes sanguíneos son las partes de la tawny que la ayudan a coagular  Unos ejemplos son los factores de [place], las plaquetas y el plasma  ¿Cómo puedo prevenir el índice internacional normalizado elevado? · Mida robert índice con regularidad  Es posible que robert médico [place]mor[place] medir robert INR algunas veces a la semana hasta que esté [place], y luego solamente lynda vez al mes  También es posible que le saquen tawny en el consultorio de robert médico  Algunas personas pueden revisarse la tawny en casa  · Si usted markel medicamentos, tómeselos según indicaciones  Comuníquese con robert médico antes de ramses otros medicamentos o suplementos, ya que podrían elevar robert INR  · Consuma la misma cantidad de vitamina K a diario  para mantener robert índice estable  La vitamina K cambia la forma en que coagula la tawny y afecta robert INR (razón normalizada internacional) La vitamina K se encuentra en las hortalizas de hoja lata, el brócoli, las uvas y otros alimentos  Pídale a robert médico más información sobre lo que usted debe comer cuando robert INR está elevado  · Limite el consumo de alcohol  El alcohol aumenta el índice internacional normalizado  Pregunte a robert médico cuánto alcohol es seguro para usted  · No fume  Si usted fuma, nunca es demasiado tarde para dejar de hacerlo  El fumar puede afectar la forma en que robert tawny coagula  Pida información si usted necesita ayuda para dejar de fumar  ¿Cómo puedo disminuir mi riesgo de sangrado? · Evite actividades  que puedan causar sangrado o moretones  · Cepíllese los dientes y aféitese suavemente  Use un cepillo de cerdas suaves y Larene Larch eléctrica para evitar el sangrado  · Infórmele a robert dentista y otros médicos  si usted markel un medicamento anticoagulante o sufre de un trastorno de la tawny  Use un brazalete de [employer], o tenga a mano lynda tarjeta que dé [employer]  Pregunte dónde puede conseguir estos artículos  ¿Cuándo shirley comunicarme con mi médico?   · Robert periodo menstrual es [employer] de lo normal     · Usted orina con [place]  · Anupama heces tienen tawny o están negras      · Usted sufre de moretones o sangra más de lo normal, anupama encías sangran o usted tiene sangrados de nariz con frecuencia  · Usted tiene dolor o inflamación en las articulaciones  · [address] los pies se tornan steven oscuro  · Usted tiene más brandin de [place] de lo normal o bernadette brandin de [place] son diferentes a los de [place]  · Usted tiene preguntas o inquietudes sobre bernadette cuidado  ¿Cuándo shirley buscar atención inmediata o llamar al 911? · Usted vomita tawny o reynolds vómito parece café molido  · Usted tiene algún tipo de sangrado que no para en 15 minutos  · [place] pierna se siente cálida, sensible y [place]  Se podría dawood inflamado y bautista  · Usted tiene alguno de los siguientes signos de derrame cerebral:      ¨ Adormecimiento o caída de un lado de reynolds melanie     ¨ Debilidad en un brazo o lynda pierna    ¨ Confusión o debilidad para hablar    ¨ Mareos o dolor de shaina intenso, o pérdida de la visión  ACUERDOS SOBRE REYNOLDS CUIDADO:   Usted tiene el derecho de ayudar a planear reynolds cuidado  Aprenda todo lo que pueda sobre reynolds condición y sandra darle tratamiento  Discuta bernadette opciones de tratamiento con bernadette médicos para decidir el cuidado que usted desea recibir  Usted siempre tiene el derecho de rechazar el tratamiento  Esta información es sólo para uso en educación  Reynolds intención no es darle un consejo médico sobre enfermedades o tratamientos  Colsulte con reynolds Art Ping farmacéutico antes de seguir cualquier régimen médico para saber si es seguro y efectivo para usted  © 2017 [address] George Kaur Information is for End User's use only and may not be sold, redistributed or otherwise used for commercial purposes  All illustrations and images included in CareNotes® are the copyrighted property of A D A M , Inc  or [employer]  Enfermedad arterial periférica   CUIDADO AMBULATORIO:   La enfermedad arterial periférica (EAP)  se refiere a las arterias estrechas, débiles u obstruidas   Puede afectar cualquier arteria fuera del emmanuel Beasley cerebro  La enfermedad arterial periférica generalmente es el resultado de la acumulación de grasrobert y [place], (también se le denomina placa), alrededor de las kincaid de la arteria  Lynda inflamación, un coágulo de tawny o un crecimiento anormal de las células puede también obstruir las arterias  La enfermedad arterial periférica impide el flujo normal de tawny a bernadette piernas y brazos  Usted corre el riesgo de lynda amputación si la falta de circulación de la tawny impide que las heridas cicatricen o produce grangrena (muerte del tejido)  Sin tratamiento, la enfermedad arterial periférica también puede causar un ataque cardíaco o un derrame cerebral   Los síntomas comunes incluyen:  La enfermedad arterial periférica leve por lo general no causa síntomas  A medida que el tiempo pasa y la enfermedad candi MAHONEY puede tener cualquiera de lo siguiente:  · Dolor o calambres en las piernas o en las caderas mientras usted camina     · Lynda sensación de entumecimiento, debilidad o pesadez en bernadette piernas     · La piel de bernadette piernas está seca, escamosa, enrojecida o pálida     · Uñas gruesas o quebradizas o la caída del vello en bernadette brazos y piernas     · Llagas en los pies que no cicatrizan     · Sensación quemante o dolorosa en bernadette pies o dedos de los pies mientras está en reposo (se puede empeorar al acostarse)  Llame al 911 en jose f de presentar lo siguiente:   · Usted tiene alguno de los siguientes signos de un ataque cardíaco:      ¨ Estornudos, presión, o dolor en robert pecho que dura mas de 5 minutos o regresa  ¨ Malestar o dolor en robert espalda, ja, mandíbula, abdomen, o brazo     ¨ Dificultad para respirar    ¨ Náuseas o vómito    ¨ Siente un desvanecimiento o tiene sudores fríos especialmente en el pecho o dificultad para respirar      · Usted tiene alguno de los siguientes signos de derrame cerebral:      ¨ Adormecimiento o caída de un lado de robert melanie     ¨ Debilidad en un brazo o lynda pierna    ¨ Confusión o debilidad para hablar    ¨ Mareos o dolor de shaina intenso, o pérdida de la visión  Busque atención médica de inmediato si:   · Usted tiene llagas o heridas que no cicatrizan  · Usted nota que la piel en anupama brazos o piernas tiene lynda tonalidad maliha o está descolorida  · Robert piel se siente fría al tacto  Pregúntele a robert Phoebe [place] vitaminas y minerales son adecuados para usted  · Usted tiene [employer] piernas cuando camina 1/8 sung (200 metros) o menos, incluso con [place]  · Anupama piernas están enrojecidas, resecas o pálidas, incluso con el tratamiento  · Usted tiene preguntas o inquietudes acerca de robert condición o cuidado  El tratamiento para la enfermedad arterial periférica  puede ayudar a reducir el riesgo de un ataque cardíaco, un accidente cerebrovascular o lynda amputación  Usted podría necesitar más de larissa de los siguientes:  · Medicamentos,  que se administran para prevenir coágulos de tawny y reducir el riesgo de un ataque cardíaco o accidente cerebrovascular  Es posible que le administren medicamento para evitar que empeore la enfermedad arterial periférica  · Un programa de ejercicio supervisado  lo ayuda a mantenerse [employer] de la fanny cotidiana y podría prevenir la discapacidad  Los médicos le van ayudar a caminar o realizar ejercicios de resistencia en un entorno seguro 3 veces a la semana roel 30 a 60 minutos  Usted va hacer esto por varios meses, luego hace la transición de caminar por sí mismo  · Angioplastia  es un procedimiento para abrir robert arteria para que la tawny pueda circular normalmente  Un catéter, es lynda sonda diminuta, que se [place] para introducir un globo en robert arteria  El globo se infla para abrir robert arteria bloqueada y luego se procede a extraerlo  Un tubo de laquita metálica que se conoce sandra stent se coloca en el interior de la arteria para mantenerla abierta       · Cirugía de bypass o derivación  se utiliza para crear [place] nueva conexión a robert arteria con lynda vena de otra parte de robert cuerpo, o un injerto artificial  La vena o el injerto se conecta a robert arteria por encima o por debajo de la obstrucción  Lo cual permite que la tawny fluya alrededor del tramo de la arteria bloqueada  Controle y evite la enfermedad arterial periférica:   · El control de la enfermedad arterial periférica:Camine de 30 a 60 minutos por lo menos 4 veces a la semana  Robert médico también podría remitirlo a un programa de ejercicio supervisado  El programa ayuda a aumentar la distancia que usted puede caminar sin sentir dolor  El programa lo ayuda a mantenerse [place] [employer] actividades de la fanny cotidiana y podría prevenir la discapacidad provocada por la enfermedad arterial periférica  · No fume  La nicotina y otros químicos en los cigarrillos y cigarros pueden empeorar la enfermedad arterial periférica  El tabaquismo también aumenta el riesgo de un ataque cardíaco o accidente cerebrovascular  Pida información a robert médico si usted actualmente fuma y necesita ayuda para dejar de fumar  Los cigarrillos electrónicos o tabaco sin humo todavía contienen nicotina  Consulte con robert médico antes de [employer]  · Controle otras afecciones de [place] bernadette medicamentos según lo indicado  Siga las instrucciones de robert médico si usted sufre de hipertensión o colesterol alto  Cuide de bernadette pies y revise bernadette niveles de azúcar en la tawny según indicaciones y si tiene diabetes  · Consuma alimentos saludables para robert corazón  Consuma granos enteros, frutas y vegetales diariamente  Limite la sal y los alimentos altos en grasas  Consulte a robert médico si desea obtener más información acerca de lynda dieta saludable para el corazón  Pregunte si es necesario que baje de [place]  Robert médico puede ayudarle a crear un plan para bajar de peso de manera saludable  Acuda a bernadette consultas de control con robert médico según le indicaron    Anote bernadette preguntas para que se acuerde de hacerlas roel bernadette visitas  © 2017 26[address] George Kaur Information is for End User's use only and may not be sold, redistributed or otherwise used for commercial purposes  All illustrations and images included in CareNotes® are the copyrighted property of A D A M , Inc  or Adriano Mcgraw  Esta información es sólo para uso en educación  Hollis intención no es darle un consejo médico sobre enfermedades o tratamientos  Colsulte con hollis Alize Merl farmacéutico antes de seguir cualquier régimen médico para saber si es seguro y efectivo para usted  Enfermedad vascular periférica   LO QUE NECESITA SABER:   ¿Qué es la enfermedad vascular periférica ()? La  es anita condición que provoca la disminución del flujo sanguíneo a bernadette extremidades debido a la obstrucción de vasos sanguíneos  La obstrucción generalmente es a causa de la aterosclerosis  La aterosclerosis es cuando un material, sandra el colesterol, se acumula en el interior de los vasos sanguíneos y los hace angostos  ¿Qué aumenta mi riesgo de [place] ? · Fumar    · Obesidad o falta de actividad o ejercicio    · Anita condición médica sanrda hipertensión, colesterol alto o diabetes    · Historial de coágulos sanguíneos, enfermedades renales o insuficiencia cardíaca    · Edad avanzada    · Un historial familiar de enfermedad arterial periférica, enfermedades cardíacas o derrame cerebral  ¿Cuáles son los signos y síntomas de la ? · Calambres dolorosos en las caderas, los muslos o las pantorrillas, especialmente después de caminar o subir las escaleras    · Ardor en bernadette bhupendra, pies o dedos    · Piel brillante, tensa o fría y crecimiento desigual del vello en la piel    · Cambio en la coloración de la piel    · Llagas en la piel que no sanan    · Debilidad o pérdida de la sensación en bernadette bhupendra o pies  ¿Cómo se diagnostica la ? Hollis médico le preguntará acerca de bernadette síntomas y lo examinará   Es posible que usted necesite hacerse alguno de los siguientes estudios:  · Exámenes de tawny u orina  podrían usarse para obtener información acerca del funcionamiento de robert cuerpo  · El índice tobillo-brazo  compara la presión arterial de los brazos con la presión arterial de las piernas  Si la presión arterial es más baja en las piernas, puede significar que tiene lynda obstrucción en los vasos sanguíneos  · Ultrasonido Doppler  podría mostrar los vasos sanguíneos angostos u obstruidos  · Angiografía  podría mostrar el flujo sanguíneo y la obstrucción  Es posible que le administren un líquido de contraste para que las imágenes se aprecien mejor  Dígale al médico si usted alguna vez ha tenido lynda reacción alérgica al líquido de [place]  ¿Cómo se trata la ? · Medicamentos,  podrían administrarse para ayudar a disminuir robert nivel de colesterol, abrir bernadette vasos sanguíneos o evitar los coágulos de [place]  · Procedimientos  podrían ser usados para abrir los vasos sanguíneos obstruidos  Podrían colocarle un stent (tubo) de metal o plástico en donde la arteria se obstruyó para mantenerla abierta  La cirugía podría usarse para colocar un vaso sanguíneo nuevo cerca del que está obstruido, o reemplazar el área del vaso sanguíneo  ¿Cómo puedo controlar la ? · No fume  La nicotina y otros químicos en los cigarrillos y cigarros pueden dañar bernadette vasos sanguíneos  Pida información a robert médico si usted actualmente fuma y necesita ayuda para dejar de fumar  Los cigarrillos electrónicos o tabaco sin humo todavía contienen nicotina  Consulte con robert médico antes de [employer]  · Ejercítese regularmente  Pida más información acerca de un plan de ejercicio adecuado para usted  Caminar es lynda manera de hacer ejercicio de bajo impacto y mejorar robert circulación sanguínea  Deténgase y descanse si le duelen las piernas  · El cuidado de bernadette pies  Observe detenidamente bernadette pies todos los lashay   Maureen Byrd grietas o llagas  Lávese bernadette pies a diario con un [place] Sunday y séquelos grecia  No camine descalzo en jose f de que se campos con un objeto gavi o afilado  · Cambie la posición en que duerme  Es posible que sienta dolor en las piernas o los pies cuando duerme  Eleve la cabecera de robert cama 4 pulgadas o use almohadas, de modo que la parte superior de robert cuerpo esté más harvey que bernadette piernas  [place] podría ayudar a que la tawny circule a bernadette pies y aliviar el dolor  · Maxene Heads y use un material acolchado en bernadette pies y [place]  Si tiene [place], podría ser necesario que use vendajes con almohadillas en los talones  También podría usar botines de espuma de caucho  Es posible que los calentadores de bhupendra o pies disminuyan el dolor en esas áreas  ¿Cómo puedo evitar la ? · Consuma alimentos saludables y variados  Los alimentos saludables incluyen frutas, verduras, pan integral, productos lácteos bajos en grasa, frijoles, china magras y pescado  Pregunte si necesita seguir lynda dieta especial     · Mantenga un peso saludable  Consulte con robert médico cuánto debería pesar  Pida que le ayude a crear un plan para bajar de peso si usted tiene sobrepeso  · Controle robert diabetes  Mantenga el nivel de azúcar en robert tawny dentro del margen correcto  Mida el nivel de azúcar en robert tawny a menudo  Pregunte a robert médico si debería cambiar robert dieta, robert programa de ejercicio o los medicamentos que markel  Llame al 911 en jose f de presentar lo siguiente:   · Usted tiene alguno de los siguientes signos de un ataque cardíaco:      ¨ Estornudos, presión, o dolor en robert pecho que dura mas de 5 minutos o regresa  ¨ Malestar o dolor en robert espalda, ja, mandíbula, abdomen, o brazo     ¨ Dificultad para respirar    ¨ Náuseas o vómito    ¨ Siente un desvanecimiento o tiene sudores fríos especialmente en el pecho o dificultad para respirar      · Usted tiene alguno de los siguientes signos de derrame cerebral: ¨ Adormecimiento o caída de un lado de reynolds melanie     ¨ Debilidad en un brazo o lynda pierna    ¨ Confusión o debilidad para hablar    ¨ Mareos o dolor de shaina intenso, o pérdida de la visión  ¿Cuándo shirley buscar atención inmediata? · Reynolds brazo o pierna se siente caliente, sensible y [place]  Se podría dawood inflamado y bautista  · Usted tiene [place] piernas que no desaparece con el reposo  · Usted tiene manchas oscuras en la piel de anupama piernas  · Usted no puede dawood con larissa o ambos ojos  ¿Cuándo shirley comunicarme con mi médico?   · Anupama signos y síntomas empeoran o no mejoran, aún después del [place]  · Usted tiene lynda llaga o úlcera que no skyler o que DENZEL  · Usted tiene preguntas o inquietudes acerca de reynolds condición o cuidado  ACUERDOS SOBRE REYNOLDS CUIDADO:   Usted tiene el derecho de ayudar a planear reynolds cuidado  Aprenda todo lo que pueda sobre reynolds condición y sandra darle tratamiento  Discuta anupama opciones de tratamiento con anupama médicos para decidir el cuidado que usted desea recibir  Usted siempre tiene el derecho de rechazar el tratamiento  Esta información es sólo para uso en educación  Reynolds intención no es darle un consejo médico sobre enfermedades o tratamientos  Colsulte con reynolds Dayna Junior farmacéutico antes de seguir cualquier régimen médico para saber si es seguro y efectivo para usted  © 2017 [address] [employer] Information is for End User's use only and may not be sold, redistributed or otherwise used for commercial purposes  All illustrations and images included in CareNotes® are the copyrighted property of [employer] M [employer] Inc  or Adriano Mcgraw  Sangrado rectal   LO QUE NECESITA SABER:   El sangrado rectal puede ser causado por estreñimiento, hemorroides o fisura anal  Éste puede también ser causado por pólipos, tumores o condiciones médicas, sandra colitis o diverticulitis    MIENTRAS USTED ESTÁ AQUÍ:   Consentimiento informado  es un documento legal que explica los exámenes, tratamientos, o procedimientos que usted podría necesitar  Un consentimiento informado significa que usted comprende que es lo que se va a realizar y que puede ramses decisiones sobre lo que usted desee  Usted da robert permiso al firmar el formulario de consentimiento  Puede designar a otra persona para que firme inga formulario por usted si usted no puede hacerlo  Usted tiene el derecho de comprender robert cuidado médico en términos o palabras que usted pueda entender  Antes de firmar el documento de consentimiento, entienda los riesgos y beneficios de lo que se le hará  Asegúrese que todas bernadette preguntas armaan contestadas  Medicamentos:   · Analgésicos:  Es posible que le receten un medicamento para aliviar el dolor  No espere hasta que el dolor sea intenso antes de pedir [employer]  · Vasoconstrictores:  [employer] tamaño de bernadette vasos sanguíneos y puede ayudar a detener el sangrado  · Suplemento de beth:  El beth [place] a robert cuerpo a producir más glóbulos rojos  · Esteroides:  Inga medicamento disminuye la inflamación en robert recto  Éste puede ser aplicado sandra lynda crema, ungüento o loción  Exámenes:   · Análisis de tawny:  Es posible que le saquen tawny para determinar si usted tiene anemia (bajo conteo de glóbulos rojos)  · Tomografía computarizada:  Inga examen también se conoce sandra escán TAC  Gualbertoelawarren Mcnair de alex-x [place] un computador para ramses imágenes de los órganos y vasos sanguíneos en robert abdomen  Las imágenes pueden mostrar problemas que pueden causar sangrado  Es posible que le administren un medio de contraste antes de ramses las imágenes para que los médicos las puedan dawood con mayor claridad  Dígale al médico si usted alguna vez ha tenido lynda reacción alérgica al tinte de [place]  · Colonoscopia:  Inga es un procedimiento para mirar dentro de robert intestino inferior  Puede mostrar de dónde surge el sangrado y qué lo está causando   Juan [place] con lynda tico en el extremo será colocado dentro de robert ano y entonces se moverá hacia dentro de robert colon  Si robert médico encuentra un crecimiento, puede que él lo extraiga  · Endoscopia:  [place] es un procedimiento para mirar dentro de robert intestino superior  Puede mostrar de dónde surge el sangrado y qué lo está causando  Lynda sonda con lynda tico en el extremo se inserta dentro de robert garganta y se mueve hacia abajo dentro de robert estómago e intestino superior  Si robert médico encuentra un crecimiento, puede que él lo extraiga  Puede que él administre lynda inyección de [place] en las áreas que están sangrando para estrechar los vasos sanguíneos y detener el sangrado  [place], láser o corrientes eléctricas también se pueden utilizar para hacer que la tawny se coagule  Tratamiento:   · IV:  Es posible que usted necesite un IV si está deshidratado y [employer] líquidos  · Transfusión de tawny:  Nel lynda transfusión recibirá tawny completa o partes de la tawny por lynda vía intravenosa  La tawny es analizada para buscar por enfermedades, sandra la hepatitis y el VIH para asegurarse de que sea guillen  · Cirugía:  Puede que usted necesite cirugía para extraer hemorroides, tumores o pólipos  RIESGOS:   · Puede tener dolor abdominal o daños en los órganos y vasos sanguíneos cercanos con la [place]  Aun con tratamiento, el sangrado rectal puede continuar  O puede que desaparezca por un tiempo y volver nuevamente  · Sin tratamiento, puede que usted continúe teniendo Sarah Bobo y calambres  Usted puede desarrollar anemia  Es posible que usted necesite lynda transfusión de [place]  Puede que usted pierda lynda gran cantidad de [place]  [place] puede poner en peligro robert fanny  ACUERDOS SOBRE ROBERT CUIDADO:   Usted tiene el derecho de ayudar a planear robert cuidado  Aprenda todo lo que pueda sobre robert condición y sandra darle tratamiento  Discuta bernadette opciones de tratamiento con bernadette médicos para decidir el cuidado que usted desea recibir   Susanne Carp siempre tiene el derecho de rechazar el tratamiento  © 2017 [address] [employer] Information is for End User's use only and may not be sold, redistributed or otherwise used for commercial purposes  All illustrations and images included in CareNotes® are the copyrighted property of A VALENTE A M , Inc  or Adriano Mcgraw  Esta información es sólo para uso en educación  [place] intención no es darle un consejo médico sobre enfermedades o tratamientos  Colsulte con hollis Orlena Barban farmacéutico antes de seguir cualquier régimen médico para saber si es seguro y efectivo para usted

## 2020-02-18 NOTE — SOCIAL WORK
CM received cb from pt's son who reported he would like pt to dc to [facility]  Pt's son requested cm setup transport  Pt's son requested cb with [facility]  CM s/w [address] who reports they can accept pt and will continue lovenox to coumadin bridge with inr checks  Per Crystal they can accept today  CM notified [facility]IM  CM s/w Ed, [facility] and requested transport  CM awaiting cb with

## 2020-02-18 NOTE — ASSESSMENT & PLAN NOTE
Patient has history of prior thrombotic events  · INR below target, resume Lovenox and Coumadin  · Can continue SCDs or foot pumps, patient is refusing extensive education encouraged and completed  · See plan for supratherapeutic INR

## 2020-02-18 NOTE — PROGRESS NOTES
Pt refusing Heparin injections  Gave pt Education on the importance of the Heparin  Pt continues to refuse

## 2020-02-18 NOTE — SOCIAL WORK
Per SLIM pt clear for dc  CM s/w pt's son, Cecilia Rios who is agreeable to [place] [facility] [place] for [address]  CM s/w Humphrey Yusra at [address] accept pt unless family is paying private or they provide financials  Per Guanako Lapine they owe over $1700 oop  Guanako Lapine would require financials or private pay prior to admission  CM reviewed referrals in [facility] also able to accept  CM s/w pt's son to discuss financial situation at [facility]  Per pt's son he will discuss with pt's daughter and cb with final decision  CM awaiting cb

## 2020-02-18 NOTE — PROGRESS NOTES
GI Progress Note - Pancho Cheney 78 y o  male MRN: [id number]    Unit/Bed#: -01 Encounter: [id number]    Subjective: He is feeling well  He wants to go home  No other black stools noted while here  Objective:     Vitals: Blood pressure 126/64, pulse 77, temperature 97 9 °F (36 6 °C), temperature source Oral, resp  rate 18, height 5' 5" (1 651 m), weight 52 3 kg (115 lb 4 8 oz), SpO2 100 %  ,Body mass index is 19 19 kg/m²  Intake/Output Summary (Last 24 hours) at 2/18/2020 1146  Last data filed at 2/18/2020 1101  Gross per 24 hour   Intake 650 ml   Output 1250 ml   Net -600 ml       Physical Exam:     General Appearance: Alert, appears stated age and cooperative  Lungs: Clear to auscultation bilaterally, no rales or rhonchi, no labored breathing/accessory muscle use  Heart: Regular rate and rhythm, S1, S2 normal, no murmur, click, rub or gallop  Abdomen: Soft, non-tender, non-distended; bowel sounds normal; no masses or no organomegaly  Extremities: No cyanosis, edema, (+) R BKA    Invasive Devices     Peripheral Intravenous Line            Peripheral IV 02/14/20 Dorsal (posterior); Left Hand 3 days                Lab Results:  Results from last 7 days   Lab Units 02/18/20  0518  02/12/20  1754   WBC Thousand/uL 7 79   < > 5 49   HEMOGLOBIN g/dL 9 0*   < > 10 6*   HEMATOCRIT % 29 0*   < > 34 3*   PLATELETS Thousands/uL 298   < > 284   NEUTROS PCT %  --   --  73   LYMPHS PCT %  --   --  13*   MONOS PCT %  --   --  7   EOS PCT %  --   --  6    < > = values in this interval not displayed  Results from last 7 days   Lab Units 02/14/20  0522  02/12/20  1754   POTASSIUM mmol/L 4 1   < > 4 6   CHLORIDE mmol/L 106   < > 104   CO2 mmol/L 24   < > 26   BUN mg/dL 16   < > 20   CREATININE mg/dL 0 95   < > 0 70   CALCIUM mg/dL 8 8   < > 9 2   ALK PHOS U/L  --   --  82   ALT U/L  --   --  14   AST U/L  --   --  28    < > = values in this interval not displayed       Results from last 7 days   Lab Units 02/18/20  0512   INR  1 50*           Imaging Studies: I have personally reviewed pertinent imaging studies  Ct Head Wo Contrast  Result Date: 2/14/2020  Impression: No acute intracranial abnormality  Generalized parenchymal volume loss and cerebral chronic microangiopathic disease  Worsening maxillary sinus opacification, correlate for acute on chronic sinusitis  Assessment and Plan:    ? Melena  Chronic Anemia  - Possible episode of melena on Sunday but no further episodes and his Hb has remained stable  - Okay to continue anticoagulation  - He recently had a EGD/push enteroscopy and colonoscopy on 1/22 for evaluation of anemia and these were negative  - Will arrange pill cam as outpatient to rule out small bowel etiology of anemia  - No further workup needed inpatient  - Stable for discharge from GI Standpoint      The patient will be seen by Dr Rafaela Renee  GI will sign off

## 2020-02-18 NOTE — ASSESSMENT & PLAN NOTE
· With history of right sided BKA  · Vascular surgery following,  · Continue statin  · Resume ASA with Coumadin/lovenox bridge today as patient's INR 1 5  · Goal INR 2-3  · Coumadin resumed at previous home dose of 5 mg daily, montior INR closely  · Monitor for any signs of bleeding, currently pt is stable no evidence of acute bleed

## 2020-02-18 NOTE — PLAN OF CARE
Problem: Potential for Falls  Goal: Patient will remain free of falls  Description  INTERVENTIONS:  - Assess patient frequently for physical needs  -  Identify cognitive and physical deficits and behaviors that affect risk of falls    -  [place] fall precautions as indicated by assessment   - Educate patient/family on patient safety including physical limitations  - Instruct patient to call for assistance with activity based on assessment  - Modify environment to reduce risk of injury  - Consider OT/PT consult to assist with strengthening/mobility  2/18/2020 1350 by Timmy Dumas RN  Outcome: Adequate for Discharge  2/18/2020 1034 by Timmy Dumas RN  Outcome: Progressing     Problem: PAIN - ADULT  Goal: Verbalizes/displays adequate comfort level or baseline comfort level  Description  Interventions:  - Encourage patient to monitor pain and request assistance  - Assess pain using appropriate pain scale  - Administer analgesics based on type and severity of pain and evaluate response  - Implement non-pharmacological measures as appropriate and evaluate response  - Consider cultural and social influences on pain and pain management  - Notify physician/advanced practitioner if interventions unsuccessful or patient reports new pain  2/18/2020 1350 by Timmy Dumas RN  Outcome: Adequate for Discharge  2/18/2020 1034 by Timmy Dumas RN  Outcome: Progressing     Problem: INFECTION - ADULT  Goal: Absence or prevention of progression during hospitalization  Description  INTERVENTIONS:  - Assess and monitor for signs and symptoms of infection  - Monitor lab/diagnostic results  - Monitor all insertion sites, i e  indwelling lines, tubes, and drains  - Monitor endotracheal if appropriate and nasal secretions for changes in amount and color  - [place] appropriate cooling/warming therapies per order  - Administer medications as ordered  - Instruct and encourage patient and family to use good hand hygiene technique  - Identify and instruct in appropriate isolation precautions for identified infection/condition  2/18/2020 1350 by Chet Skaggs RN  Outcome: Adequate for Discharge  2/18/2020 1034 by Chet Skaggs RN  Outcome: Progressing  Goal: Absence of fever/infection during neutropenic period  Description  INTERVENTIONS:  - Monitor WBC    2/18/2020 1350 by Chet Skaggs RN  Outcome: Adequate for Discharge  2/18/2020 1034 by Chet Skaggs RN  Outcome: Progressing     Problem: DISCHARGE PLANNING  Goal: Discharge to home or other facility with appropriate resources  Description  INTERVENTIONS:  - Identify barriers to discharge w/patient and caregiver  - Arrange for needed discharge resources and transportation as appropriate  - Identify discharge learning needs (meds, wound care, etc )  - Arrange for interpretive services to assist at discharge as needed  - Refer to Case Management Department for coordinating discharge planning if the patient needs post-hospital services based on physician/advanced practitioner order or complex needs related to functional status, cognitive ability, or social support system  2/18/2020 1350 by Chet Skaggs RN  Outcome: Adequate for Discharge  2/18/2020 1034 by Chet Skaggs RN  Outcome: Progressing     Problem: Knowledge Deficit  Goal: Patient/family/caregiver demonstrates understanding of disease process, treatment plan, medications, and discharge instructions  Description  Complete learning assessment and assess knowledge base    Interventions:  - Provide teaching at level of understanding  - Provide teaching via preferred learning methods  2/18/2020 1350 by Chet Skaggs RN  Outcome: Adequate for Discharge  2/18/2020 1034 by Chet Skaggs RN  Outcome: Progressing     Problem: Prexisting or High Potential for Compromised Skin Integrity  Goal: Skin integrity is maintained or improved  Description  INTERVENTIONS:  - Identify patients at risk for skin breakdown  - Assess and monitor skin integrity  - Assess and monitor nutrition and hydration status  - Monitor labs   - Assess for incontinence   - Turn and reposition patient  - Assist with mobility/ambulation  - Relieve pressure over bony prominences  - Avoid friction and shearing  - Provide appropriate hygiene as needed including keeping skin clean and dry  - Evaluate need for skin moisturizer/barrier cream  - Collaborate with interdisciplinary team   - Patient/family teaching  - Consider wound care consult   2/18/2020 1350 by Jasson Mittal RN  Outcome: Adequate for Discharge  2/18/2020 1034 by Jasson Mittal RN  Outcome: Progressing     Problem: Nutrition/Hydration-ADULT  Goal: Nutrient/Hydration intake appropriate for improving, restoring or maintaining nutritional needs  Description  Monitor and assess patient's nutrition/hydration status for malnutrition  Collaborate with interdisciplinary team and initiate plan and interventions as ordered  Monitor patient's weight and dietary intake as ordered or per policy  Utilize nutrition screening tool and intervene as necessary  Determine patient's food preferences and provide high-protein, high-caloric foods as appropriate       INTERVENTIONS:  - Monitor oral intake, urinary output, labs, and treatment plans  - Assess nutrition and hydration status and recommend course of action  - Evaluate amount of meals eaten  - Assist patient with eating if necessary   - Allow adequate time for meals  - Recommend/ encourage appropriate diets, oral nutritional supplements, and vitamin/mineral supplements  - Order, calculate, and assess calorie counts as needed  - Recommend, monitor, and adjust tube feedings and TPN/PPN based on assessed needs  - Assess need for intravenous fluids  - Provide specific nutrition/hydration education as appropriate  - Include patient/family/caregiver in decisions related to nutrition  2/18/2020 1350 by Wellington Bernard Aminah Irizarry RN  Outcome: Adequate for Discharge  2/18/2020 1034 by Chet Skaggs RN  Outcome: Progressing

## 2020-02-18 NOTE — DISCHARGE SUMMARY
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  · INR at outpatient check noted to be subtherapeutic at 1 28, Coumadin was increased and a few days later INR noted to be 10 96  · LFTs WNL  · INR now 1 5 today, discussed with vascular surgery/GI, will place patient on lovenox bridge and start Coumadin at prior home dose of 5 mg tonight  · GI evaluated, pt without additional melenotic stools overnight, hgb stabilized  No signs of acute active bleeding currently  Occult stool not collected, no bm since  Saline nasal spray for any additional epistaxis  Pt has been consented for blood if he is noted to have any decrease in hgb levels    Stable for discharge from a GI standpoint  · Hematology and vascular surgery following,  · Ct head negative for bleed   · Pt likely would not be a candidate for another agent due to lack of reversal agents per vascular  · Discharged on home dose of Coumadin to short-term/long-term rehab with Lovenox bridge    PVD [facility]peripheral vascular disease) [facility] )  Assessment & Plan  · With history of right sided BKA  · Vascular surgery following,  · Continue statin  · Resume ASA with Coumadin/lovenox bridge today as patient's INR 1 5  · Goal INR 2-3  · Coumadin resumed at previous home dose of 5 mg daily, montior INR closely  · Monitor for any signs of bleeding, currently pt is stable no evidence of acute bleed    Melena  Assessment & Plan  · Started 2/16  · Pt with one episode of melena, does have history of GIB and PUD, however no additional episodes overnight   · Last EGD noted to be in January that was fairly normal  · Hgb decreased from 10 to 8 7, but now is stabilized at 9 8  · GI evaluated, no need for endoscopy evaluation at this time, no concern for active bleed  Fecal occult ordered and will monitor  · INR 1 94 today, therefore discussed with GI and vascular, cleared to resume a/c with Coumadin/lovenox bridge and ASA  Monitor stool output closely   · Pt was previously on iron supplementation as an outpatient, unsure if he was compliant with this, has not received iron since admission on 2/12  · Continue PPI BID     Anemia  Assessment & Plan  · Hgb stabilized at 9 0 today   · Baseline hgb noted to be around 8-9 on review  · GI evaluated, no concern for active bleed at this time, no additional melena  Monitor occult stool, no need for endoscopic evaluation now  Cleared to resume a/c at this time  · Iron panel with stores 23, was previously on iron supplementation but was noted not to be taking it on admission and therefore was not restarted  Hold off for now as ferritin WNL      ASCVD (arteriosclerotic cardiovascular disease)  Assessment & Plan  Continue statin    Esophageal cancer ([facility] )  Assessment & Plan  · Continue outpatient follow-up with oncology  · Continue PPI BID and Carafate QID   · Pt with decreased PO intake here, continue dental soft diet and await speech evaluation    · TID ensures, pt appears to be tolerating well     Hx of BKA, right ([facility] )  Assessment & Plan  · Historical  · PT/OT recommending short-term rehab family also requesting long-term placement  · medically stable for discharge    Coagulopathy [facility])  Assessment & Plan  Patient has history of prior thrombotic events  · INR below target, resume Lovenox and Coumadin  · Can continue SCDs or foot pumps, patient is refusing extensive education encouraged and completed  · See plan for supratherapeutic INR    Essential hypertension  Assessment & Plan  · BP appears stable on review   · Continue amlodipine 10 mg daily        Discharging Physician / Practitioner: ALISSA Kirk  PCP: Binu Espana Mildred Enlgand MD  Admission Date:   Admission Orders (From admission, onward)     Ordered        02/13/20 1411  Inpatient Admission  Once         02/12/20 1919  Place in Observation  Once                   Discharge Date: 02/18/20    Resolved Problems  Date Reviewed: 2/17/2020    None          Consultations During Hospital Stay:  · GI  · Oncology  · Vascular surgery    Procedures Performed:   · CT of head  · X-ray have it    Significant Findings / Test Results:   · No acute intracranial abnormality  · No acute osseous abnormality    Incidental Findings:   · None     Test Results Pending at Discharge (will require follow up): · None     Outpatient Tests Requested:  · None    Complications:  None    Reason for Admission:  Supratherapeutic INR    Hospital Course:     Jasmin Cummings is a 78 y o  male patient who originally presented to the hospital on 2/12/2020 due to elevated INR  Patient was discharged from a previous admission on Coumadin secondary to peripheral vascular disease and having decreased pulses in his foot  Had his INR checked and his INR was significantly elevated  His Coumadin was held here he also developed 1 episode of melena which spontaneously resolved his hemoglobin remained stable he was evaluated by GI and cleared for discharge after discussion with vascular surgery he is not a candidate for any other anticoagulants as there were no reversal agents  Patient was discharged on a Lovenox bridge to Coumadin to rehab and will have his INR and Coumadin managed rehab    Please see above list of diagnoses and related plan for additional information       Condition at Discharge: stable     Discharge Day Visit / Exam:     Subjective:  Offers no complaints at this time and is eager for discharge  Vitals: Blood Pressure: 126/64 (02/18/20 0700)  Pulse: 77 (02/18/20 0700)  Temperature: 97 9 °F (36 6 °C) (02/18/20 0700)  Temp Source: Oral (02/18/20 0700)  Respirations: 18 (02/18/20 0700)  Height: 5' 5" (165 1 cm) (02/17/20 1458)  Weight - Scale: 52 3 kg (115 lb 4 8 oz) (02/17/20 1458)  SpO2: 100 % (02/18/20 0700)  Exam:   Physical Exam   Constitutional: He appears well-developed and well-nourished  No distress  HENT:   Head: Normocephalic  Eyes: Pupils are equal, round, and reactive to light  Neck: Normal range of motion  Cardiovascular: Normal rate  Pulmonary/Chest: Effort normal    Abdominal: Soft  Musculoskeletal: Normal range of motion  Neurological: He is alert  Skin: Skin is warm and dry  Psychiatric: He has a normal mood and affect  His behavior is normal  Judgment and thought content normal    Nursing note and vitals reviewed  Discussion with Family:  Discussed plan of care with alfonso alexis    Discharge instructions/Information to patient and family:   See after visit summary for information provided to patient and family  Provisions for Follow-Up Care:  See after visit summary for information related to follow-up care and any pertinent home health orders  Disposition:     Short-term rehab    For Discharges to [facility] SNF:   · Not Applicable to this Patient - Not Applicable to this Patient    Planned Readmission:  No     Discharge Statement:  I spent 30 minutes discharging the patient  This time was spent on the day of discharge  I had direct contact with the patient on the day of discharge  Greater than 50% of the total time was spent examining patient, answering all patient questions, arranging and discussing plan of care with patient as well as directly providing post-discharge instructions  Additional time then spent on discharge activities  Discharge Medications:  See after visit summary for reconciled discharge medications provided to patient and family        ** Please Note: This note has been constructed using a voice recognition system **

## 2020-02-18 NOTE — ASSESSMENT & PLAN NOTE
· Hgb stabilized at 9 0 today   · Baseline hgb noted to be around 8-9 on review  · GI evaluated, no concern for active bleed at this time, no additional melena  Monitor occult stool, no need for endoscopic evaluation now  Cleared to resume a/c at this time  · Iron panel with stores 23, was previously on iron supplementation but was noted not to be taking it on admission and therefore was not restarted  Hold off for now as ferritin WNL

## 2020-02-19 ENCOUNTER — TELEPHONE (OUTPATIENT)
Dept: GASTROENTEROLOGY | Facility: CLINIC | Age: 70
End: 2020-02-19

## 2020-02-19 NOTE — TELEPHONE ENCOUNTER
----- Message from Omar Byrd PA-C sent at 2/18/2020 11:49 AM EST -----  Please arrange pill cam for him in the next couple weeks  He had a EGD/colon in January while inpatient  He will likely be discharged tomorrow from the hospital, thanks! Reason for pill cam is iron deficiency anemia and melena

## 2020-03-02 ENCOUNTER — TELEPHONE (OUTPATIENT)
Dept: VASCULAR SURGERY | Facility: CLINIC | Age: 70
End: 2020-03-02

## 2020-03-02 ENCOUNTER — ANTICOAG VISIT (OUTPATIENT)
Dept: VASCULAR SURGERY | Facility: CLINIC | Age: 70
End: 2020-03-02

## 2020-03-02 NOTE — TELEPHONE ENCOUNTER
Pt's daughter Alfonza Lesch called  Pt has returned to [place] to live  He is currently admitted to [facility]   His surgeon there would like to s/w Dr Lolis Lockett re: pt  She does not know the surgeon's name but he gave her his cell # [phone]  Advised her Dr Lolis Lockett currently in surgery but would route message to him requesting he call  She also asked for his medical records to be transferred to [address] [place] [address] [address] to Denisha Mccray in medical records to assist her  Dr Lolis Lockett can you please call [place] surgeon - [phone]  Thank you

## 2020-03-02 NOTE — PROGRESS NOTES
Patient moved back to [place] and is currently a patient in hospital in [place]  Daughter called for transfer of his records

## 2020-03-31 NOTE — ASSESSMENT & PLAN NOTE
Patient received Prolia 60 mg injection sub Q in the left arm.  Tolerated well and left in NAD.       · Historical  · PT/OT consultations are pending once INR starts down trending, appreciate recommendations   · Pt's family has discussed with nursing staff regarding long term placement for the patient as they are unable to take care of him at home due to ambulatory dysfunction   Will discuss with CM options once patient is medically stable for discharge for placement

## 2021-02-13 NOTE — SPEECH THERAPY NOTE
Speech-Language Pathology Bedside Swallow Evaluation        Patient Name: Bella Keating    AHKJS'P Date: 11/16/2019     Problem List  Principal Problem:    PAD (peripheral artery disease) ([place] [address] )  Active Problems:    Peripheral vascular disease (HCC)    History of throat cancer    Hypertension    Dependence on nicotine from cigarettes    Syncope and collapse    Renal stone    Acute blood loss anemia    Coagulopathy ([place] [address] )    Transaminitis         Past Medical History  Past Medical History:   Diagnosis Date    Cancer ([place] [address] )     throat cancer    PAD (peripheral artery disease) ([place] [address] ) 11/2/2019    PEG (percutaneous endoscopic gastrostomy) status ([place] [address] )     Port-A-Cath in place        Past Surgical History  Past Surgical History:   Procedure Laterality Date    BYPASS FEMORAL-FEMORAL Right     ESOPHAGOSCOPY N/A 8/24/2017    Procedure: ESOPHAGOSCOPY FLEXIBLE;  Surgeon: Edith Arce MD;  [facility];  Service: ENT    IR ABDOMINAL ANGIOGRAPHY / INTERVENTION  11/14/2019    AZ BRONCHOSCOPY,DIAGNOSTIC N/A 8/24/2017    Procedure: Bronchoscopy;  Surgeon: Edith Arce MD;  [facility];  Service: ENT    AZ LARYNGOSCOPY,DIRCT,OP,BIOPSY N/A 8/24/2017    Procedure: LARYNGOSCOPY DIRECT;  Surgeon: Edith Arce MD;  [facility];  Service: ENT    AZ [facility] Left 11/7/2019    Procedure: LEFT ENDARTERECTOMY ARTERIAL FEMORAL; L CFAE WITH PROFUNDOPLASTY; ARTERIOGRAM;RETROGRADE ILIAC STENTING;  Surgeon: Edgar Villela MD;  [facility];  Service: Vascular    TOE AMPUTATION Right     2 toes       Summary    Pt presents with Mild oropharyngeal dysphagia due to decreased mastication due to missing dentition, c/o dryness due to chemo/XRT  No overt s/s aspiration noted w/ thin liquids  Recommendations:   Diet: soft/level 3 diet and thin liquids -moist, soft foods  Alternate food and liquids as needed    Meds: whole with liquid   Frequent Oral care: Subjective   Chief Complaint   Patient presents with   • Urinary Tract Infection       Patricia F Bancroft is a 69 y.o. female.     Pt has had UTI's in the past with similar symptoms.     Urinary Tract Infection   This is a new problem. Episode onset: 2 days. The problem has been gradually worsening. The quality of the pain is described as burning. The pain is mild. There has been no fever. There is no history of pyelonephritis. Associated symptoms include frequency and urgency. Pertinent negatives include no chills, discharge, flank pain, hematuria, hesitancy, nausea, possible pregnancy, sweats or vomiting. She has tried nothing for the symptoms. There is no history of kidney stones.        Allergies   Allergen Reactions   • Dust Mite Extract Cough, Itching and Other (See Comments)   • Ciprofloxacin Rash   • Ibuprofen Rash       Past Medical History:   Diagnosis Date   • Allergic    • Allergic rhinitis     Seasonal allergies   • Coronary artery disease     S/P JAN to RCA, May 2014 EF 65% no residual Left system disease   • Dyslipidemia    • Dysosmia    • Hx of migraines    • Hyperlipidemia    • Hypertension        Past Surgical History:   Procedure Laterality Date   • BREAST BIOPSY Left    • BREAST EXCISIONAL BIOPSY Left    • BREAST SURGERY      breast biopsy X2   • CARDIAC CATHETERIZATION  2014    Also    • CORONARY STENT PLACEMENT  2014   • HYSTERECTOMY     • OOPHORECTOMY     • OTHER SURGICAL HISTORY      Stent placed in the right coronary artery 6 weeks ago       Social History     Socioeconomic History   • Marital status:      Spouse name: Not on file   • Number of children: Not on file   • Years of education: Not on file   • Highest education level: Not on file   Tobacco Use   • Smoking status: Former Smoker     Packs/day: 1.00     Years: 40.00     Pack years: 40.00     Types: Cigarettes     Start date: 1966     Quit date:      Years since quittin.5   • Smokeless  2x/day  Other Recommendations/ considerations: no further follow up  tx       Current Medical Status  Pt is a 78 y o  male who presented to [facility] with PAD  Pt presents with "circulation problems" and previous diagnosis of throat CA not currently being treated who presents with worsening LLE pain x 2 months  Pt is Welsh-speaking only, so the history was obtained from his daughter  Pt lives in [facility] and comes back to the [facility] about every 3 months for doctor appointments  He was scheduled to come back in December, but returned early due to his leg pain  Pt is unable to walk more than a few feet without pain  He denies pain at rest  Pt is poor historian, and would not always answer his daughter's questions during the encounter (she reports this is his baseline for the past year)  He has surgical scars on his RLE, but it is unknown what surgery he had or when   CTA shows extensive peripheral vascular disease bilaterally, worse on the left side         Past medical history:   Please see H&P for details    Special Studies:  No recent CXR    Social/Education/Vocational Hx:  Pt lives  In [facility] Information   Current Risks for Dysphagia & Aspiration: hx of throat cancer  Current Symptoms/Concerns: hx of throat cancer/dysphagia  (tonsillar mass w/o involvement in larynx)   Current Diet: soft/level 3 diet and thin liquids   Baseline Diet: soft/moist foods w/ thin liquids  Takes pills- whole w/ water     Baseline Assessment   Behavior/Cognition: alert  Speech/Language Status: Afghan speaking, dtr present to interpret  Patient Positioning: upright in bed     Swallow Mechanism Exam   Facial: symmetrical  Labial: WFL  Lingual: decreased ROM to R  Velum: symmetrical  Mandible: adequate ROM  Dentition: edentulous  Vocal quality:clear/adequate   Volitional Cough: strong/productive   Respiratory: RA    Consistencies Assessed and Performance   Consistencies Administered: thin liquids, tobacco: Never Used   Substance and Sexual Activity   • Alcohol use: No   • Drug use: Defer   • Sexual activity: Defer     Partners: Male     Birth control/protection: Post-menopausal       Family History   Problem Relation Age of Onset   • Cancer Mother         Lung cancer;  Dec 1996   • Cancer Father         Primary site unknown;  1996   • Cancer Sister         Pancreatic Stage 1 diagnosed end of Dec 2017; chemo then surgery then chemo ending Oct 5 2018. Doing well so far.   • Breast cancer Neg Hx    • Ovarian cancer Neg Hx          Current Outpatient Medications:   •  aspirin 162 MG EC tablet, Take 162 mg by mouth daily., Disp: , Rfl:   •  cetirizine (zyrTEC) 10 MG tablet, Take 10 mg by mouth Daily., Disp: , Rfl:   •  docusate sodium (COLACE) 100 MG capsule, Take 100 mg by mouth Daily. 3 tablets once daily, Disp: , Rfl:   •  estradiol (CLIMARA) 0.025 MG/24HR patch, Place 1 patch on the skin as directed by provider 1 (One) Time Per Week., Disp: 12 patch, Rfl: 3  •  fluticasone (FLONASE) 50 MCG/ACT nasal spray, 2 sprays into the nostril(s) as directed by provider Daily., Disp: , Rfl:   •  lactulose (CHRONULAC) 10 GM/15ML solution, Take 30 mL by mouth 2 (Two) Times a Day As Needed (constipation)., Disp: 946 mL, Rfl: 3  •  lisinopril (PRINIVIL,ZESTRIL) 10 MG tablet, Take 1 tablet by mouth Daily., Disp: 90 tablet, Rfl: 1  •  metoprolol succinate XL (TOPROL-XL) 25 MG 24 hr tablet, Take 1 tablet by mouth Daily., Disp: 90 tablet, Rfl: 3  •  rosuvastatin (CRESTOR) 40 MG tablet, Take 1 tablet by mouth Daily., Disp: 90 tablet, Rfl: 3  •  nitrofurantoin, macrocrystal-monohydrate, (Macrobid) 100 MG capsule, Take 1 capsule by mouth 2 (Two) Times a Day for 5 days., Disp: 10 capsule, Rfl: 0      Review of Systems   Constitutional: Negative for chills, diaphoresis, fatigue and fever.   Gastrointestinal: Negative for abdominal distention, abdominal pain, diarrhea, nausea and vomiting.   Genitourinary: Positive for  decreased urine volume, dysuria, frequency and urgency. Negative for flank pain, genital sores, hematuria, hesitancy, menstrual problem, pelvic pain, pelvic pressure, urinary incontinence, vaginal bleeding, vaginal discharge and vaginal pain.   Musculoskeletal: Negative for back pain.        There were no vitals filed for this visit.    Objective   Physical Exam  Constitutional:       General: She is not in acute distress.     Appearance: Normal appearance. She is not ill-appearing, toxic-appearing or diaphoretic.   HENT:      Head: Normocephalic and atraumatic.   Pulmonary:      Effort: Pulmonary effort is normal.   Abdominal:      Palpations: Abdomen is soft.      Tenderness: There is no abdominal tenderness.   Neurological:      Mental Status: She is alert and oriented to person, place, and time.   Psychiatric:         Mood and Affect: Mood normal.         Speech: Speech normal.         Behavior: Behavior normal.          Procedures     Assessment/Plan   Diagnoses and all orders for this visit:    1. Suspected UTI (Primary)  -     nitrofurantoin, macrocrystal-monohydrate, (Macrobid) 100 MG capsule; Take 1 capsule by mouth 2 (Two) Times a Day for 5 days.  Dispense: 10 capsule; Refill: 0      Wipe front to back, stay hydrated with water to flush the kidneys, avoid caffeinated beverages.  If symptoms worsen or do not improve follow up with your PCP or visit your nearest Urgent Care Center or ER.        PLAN: Discussed dosing, side effects, recommended other symptomatic care.  Patient should follow up with primary care provider if symptoms worsen, fail to resolve or other symptoms need attention. Patient/family agree to the above.     I spent 20 minutes caring for Dahiana on this date of service. This time includes time spent by me in the following activities:preparing for the visit, obtaining and/or reviewing a separately obtained history, performing a medically appropriate examination and/or evaluation ,  puree and soft solids    Oral Stage: adequate bolus retrieval via spoon, cup and straw  Pt unable to bite toast, dtr reported pt does not eat toast, but can eat soft bread  Pt tore pcs of toast and was able to masticate effectively  Good oral control of liquids  Pharyngeal Stage: Swallows were timely and complete  No overt s/s aspiration   Pt denied any feeling of food sticking in his throat at this time but did take sips of liquids between bites of toast        Esophageal Concerns: none reported      Results Reviewed with: patient and family     Elaine Velasco MA CCC-SLP  Speech Patholgist  [place] [facility] [id number]  [place] license # [id number]  Available via haku counseling and educating the patient/family/caregiver, ordering medications, tests, or procedures and documenting information in the medical record    Aissatou Carrillo, APRN

## 2021-04-24 NOTE — ASSESSMENT & PLAN NOTE
Pharmacy to Dose Warfarin    Pharmacy consulted to dose warfarin for DVT/Afib. INR Goal: 2-3    INR today: 2.99    Assessment/Plan:  - INR therapeutic after holding.  - Restart warfarin at lowered dose of 2.5 mg daily    Pharmacy will continue to follow.     Favian Kay, PharmD  Clinical Pharmacist [id number]  4/24/2021 · Continue outpatient follow-up with oncology  · Continue PPI BID and Carafate QID   · Pt with decreased PO intake here, continue dental soft diet and await speech evaluation    · TID ensures, pt appears to be tolerating well

## 2022-05-20 NOTE — ASSESSMENT & PLAN NOTE
77 y/o M presents with Right lower extremity rest pain, anemia, concern for GI bleed, supratherapeutic INR  He is s/p left femoral endarterectomy with profundoplasty, SFA embolectomy and retrograde iliac stenting 11/6/19 and arteriogram with left SFA/popliteal angioplasty and right SFA angioplasty 11/14/19      S/P EGD/Colonoscopy 12/10 (EGD clean based ulcers with no active bleeding, colonoscopy poor prep no active bleeding)  S/P RLE Agram- popliteal recanualization, AT/PT occluded- IR 12/13    Plan:  Plan for R BKA Thursday 12/19  Continue heparin drip  Pain control Unknown

## 2023-04-25 NOTE — PLAN OF CARE
Problem: Nutrition/Hydration-ADULT  Goal: Nutrient/Hydration intake appropriate for improving, restoring or maintaining nutritional needs  Description  Monitor and assess patient's nutrition/hydration status for malnutrition  Collaborate with interdisciplinary team and initiate plan and interventions as ordered  Monitor patient's weight and dietary intake as ordered or per policy  Utilize nutrition screening tool and intervene as necessary  Determine patient's food preferences and provide high-protein, high-caloric foods as appropriate       INTERVENTIONS:  - Monitor oral intake, urinary output, labs, and treatment plans  - Assess nutrition and hydration status and recommend course of action  - Evaluate amount of meals eaten  - Assist patient with eating if necessary   - Allow adequate time for meals  - Recommend/ encourage appropriate diets, oral nutritional supplements, and vitamin/mineral supplements  - Order, calculate, and assess calorie counts as needed  - Assess need for intravenous fluids  - Provide specific nutrition/hydration education as appropriate  - Include patient/family/caregiver in decisions related to nutrition   Outcome: Progressing Plan: Pt states her PCP prescribed her a compound cream that keep area at bay.

## 2024-02-05 NOTE — ASSESSMENT & PLAN NOTE
"Chief Complaint   Patient presents with    Colonoscopy     Never had colon     Endoscopy     Was having acid reflux started on omeprazole has family history esophageal cancer       PCP: Provider, No Known  REFER: No ref. provider found    Subjective     HPI    Compa Bonds is a 60 y.o. male who presents to office for preventative maintenance.  There is not  a personal history of colon polyps.   He does not have complaints of nausea/vomiting, change in bowels, weight loss, no BRBPR, no melena.  There is not a family history of colon cancer in first degree relative.  There is not a family history of colon polyps in first degree relative.  Compa Bonds last colonoscopy-no previous colonoscopy .  Bowels do move on regular basis.    New onset acid reflux 6 months ago.   Started on Omeprazole daily 3 weeks ago with improvement in heartburn.  No dysphagia.  Prior to Omeprazole he only had taken tums.  Denies frequent use of NSAIDs.   Consumes daily coffee and 3-4 pepsi per day.      No results for input(s): \"GLUCOSE\", \"NA\", \"K\", \"CREATININE\", \"BUN\", \"BCR\", \"ALKPHOS\", \"ALT\", \"AST\", \"BILITOT\", \"ALBUMIN\" in the last 86399 hours.    No results for input(s): \"EGFRIFNONA\", \"EGFRIFAFRI\", \"EGFRRESULT\" in the last 82920 hours.     History reviewed. No pertinent past medical history.  Past Surgical History:   Procedure Laterality Date    APPENDECTOMY       Outpatient Medications Marked as Taking for the 2/5/24 encounter (Office Visit) with Juan Soler APRN   Medication Sig Dispense Refill    omeprazole (priLOSEC) 40 MG capsule Take 1 capsule by mouth Daily.       No Known Allergies  Social History     Socioeconomic History    Marital status:    Tobacco Use    Smoking status: Every Day     Packs/day: 1.00     Years: 40.00     Additional pack years: 0.00     Total pack years: 40.00     Types: Cigarettes    Smokeless tobacco: Never   Vaping Use    Vaping Use: Never used   Substance and Sexual Activity " · Still smokes despite diagnosis of throat cancer, per daughter  · She thinks he smokes 1ppd  · Nicotine patch  · Provide smoking cessation education    Alcohol use: Not Currently    Drug use: Never    Sexual activity: Defer     Review of Systems   Constitutional:  Negative for fever and unexpected weight change.   HENT:  Negative for trouble swallowing.    Respiratory:  Negative for shortness of breath.    Cardiovascular:  Negative for chest pain.   Gastrointestinal:  Negative for abdominal pain and anal bleeding.     Objective   Vitals:    02/05/24 1321   BP: 128/80   Pulse: 94   Temp: 97 °F (36.1 °C)   SpO2: 96%     Physical Exam  Constitutional:       Appearance: Normal appearance. He is well-developed.   Eyes:      General: No scleral icterus.  Cardiovascular:      Heart sounds: Normal heart sounds. No murmur heard.  Pulmonary:      Effort: Pulmonary effort is normal.   Abdominal:      General: Bowel sounds are normal. There is no distension.      Palpations: Abdomen is soft.      Tenderness: There is no abdominal tenderness. There is no guarding.   Skin:     General: Skin is warm and dry.      Coloration: Skin is not jaundiced.   Neurological:      Mental Status: He is alert.   Psychiatric:         Behavior: Behavior is cooperative.       Imaging Results (Most Recent)       None          Body mass index is 26.99 kg/m².    Assessment & Plan   Diagnoses and all orders for this visit:    1. Encounter for screening for malignant neoplasm of colon (Primary)  -     Case Request; Standing  -     Implement Anesthesia Orders Day of Procedure; Standing  -     Verify bowel prep was successful; Standing  -     Give tap water enema if bowel prep was insufficient; Standing  -     Obtain Informed Consent; Standing  -     Case Request    2. Gastroesophageal reflux disease, unspecified whether esophagitis present  -     Case Request; Standing  -     Implement Anesthesia Orders Day of Procedure; Standing  -     Obtain Informed Consent; Standing  -     Case Request    Other orders  -     sodium-potassium-magnesium sulfates (Suprep Bowel Prep Kit) 17.5-3.13-1.6 GM/177ML  solution oral solution; Take 1 bottle by mouth Take As Directed. Take as directed  Dispense: 177 mL; Refill: 0      COLONOSCOPY WITH ANESTHESIA (N/A), ESOPHAGOGASTRODUODENOSCOPY WITH ANESTHESIA (N/A)    Change timing of Omeprazole and start taking in the morning prior to eating or drinking  Take PPI 30 min prior to breakfast   Decrease caffeine, nicotine, etoh- all contribute to acid reflux  Do not eat 2-3 hours within lying down, elevated HOB 4-6 inches     Advised pt to stop use of NSAIDs, Fish Oil, and MV 5 days prior to procedure, per Dr Palmer protocol.  Tylenol based products are ok to take.  Pt verbalized understanding.     All risks, benefits, alternatives, and indications of colonoscopy procedure have been discussed with the patient. Risks to include perforation of the colon requiring possible surgery or colostomy, risk of bleeding from biopsies or removal of colon tissue, possibility of missing a colon polyp or cancer, or adverse drug reaction.  Benefits to include the diagnosis and management of disease of the colon and rectum. Alternatives to include barium enema, radiographic evaluation, lab testing or no intervention. He verbalizes understanding and agrees.     The risks of the endoscopy were discussed in detail.  We discussed the risks of perforation (one out of 2383-5238, riskier with dilation), bleeding (one out of 500), and the rare risks of infection, adverse reaction to anesthesia, respiratory failure, cardiac failure including MI and adverse reaction to medications, etc.  We discussed consequences that could occur if a risk were to develop such as the need for hospitalization, blood transfusion, surgical intervention, medications, pain and disability and death.  Alternatives include not doing anything, or pursuing an UGI series which only offers a diagnosis with potential less accuracy compared to EGD.  The patient verbalizes understanding and agrees to proceed.    I have explained having an  adequate and complete prep is associated with success of colonoscopy.   I have explained to Compa Bonds that not having an adequate bowel prep can lead to decreased rate of  adenoma detection, longer procedure times, and higher chance the physician will not be able to successfully complete full colonoscopy (able to intubate cecum).   I have discussed bowel prep options miralax prep, suprep, and Golytley.  Compa Bonds has elected to proceed with suprep.   I have also discussed that there are a variety of prep options however prep that is prescribed is influenced on patient health history, how well bowels move on regular basis, and individualized insurance plan.  I have explained that we are not able to know the amount of coverage each individual insurance plan provides for preps.  I asked Compa Bonds to please call our office if we need to send prescription/provide instructions for another prep due to costs/insurance coverage.      Juan Soler, APRN  02/05/24        Patient Instructions   Gastroesophageal Reflux Disease, Adult    Gastroesophageal reflux disease (GERD) happens when acid from your stomach flows up into the esophagus. When acid comes in contact with the esophagus, the acid causes soreness (inflammation) in the esophagus. Over time, GERD may create small holes (ulcers) in the lining of the esophagus.  CAUSES    Increased body weight. This puts pressure on the stomach, making acid rise from the stomach into the esophagus.  Smoking. This increases acid production in the stomach.  Drinking alcohol. This causes decreased pressure in the lower esophageal sphincter (valve or ring of muscle between the esophagus and stomach), allowing acid from the stomach into the esophagus.  Late evening meals and a full stomach. This increases pressure and acid production in the stomach.  A malformed lower esophageal sphincter.  Sometimes, no cause is found.  SYMPTOMS    Burning pain in the  lower part of the mid-chest behind the breastbone and in the mid-stomach area. This may occur twice a week or more often.  Trouble swallowing.  Sore throat.  Dry cough.  Asthma-like symptoms including chest tightness, shortness of breath, or wheezing.  DIAGNOSIS    Your caregiver may be able to diagnose GERD based on your symptoms. In some cases, X-rays and other tests may be done to check for complications or to check the condition of your stomach and esophagus.  TREATMENT    Your caregiver may recommend over-the-counter or prescription medicines to help decrease acid production. Ask your caregiver before starting or adding any new medicines.    HOME CARE INSTRUCTIONS    Change the factors that you can control. Ask your caregiver for guidance concerning weight loss, quitting smoking, and alcohol consumption.  Avoid foods and drinks that make your symptoms worse, such as:  Caffeine or alcoholic drinks.  Chocolate.  Peppermint or mint flavorings.  Garlic and onions.  Spicy foods.  Citrus fruits, such as oranges, estrellita, or limes.  Tomato-based foods such as sauce, chili, salsa, and pizza.  Fried and fatty foods.  Avoid lying down for the 3 hours prior to your bedtime or prior to taking a nap.  Eat small, frequent meals instead of large meals.  Wear loose-fitting clothing. Do not wear anything tight around your waist that causes pressure on your stomach.  Raise the head of your bed 6 to 8 inches with wood blocks to help you sleep. Extra pillows will not help.  Only take over-the-counter or prescription medicines for pain, discomfort, or fever as directed by your caregiver.  Do not take aspirin, ibuprofen, or other nonsteroidal anti-inflammatory drugs (NSAIDs).  SEEK IMMEDIATE MEDICAL CARE IF:    You have pain in your arms, neck, jaw, teeth, or back.  Your pain increases or changes in intensity or duration.  You develop nausea, vomiting, or sweating (diaphoresis).  You develop shortness of breath, or you faint.  Your  vomit is green, yellow, black, or looks like coffee grounds or blood.  Your stool is red, bloody, or black.  These symptoms could be signs of other problems, such as heart disease, gastric bleeding, or esophageal bleeding.  MAKE SURE YOU:    Understand these instructions.  Will watch your condition.  Will get help right away if you are not doing well or get worse.     This information is not intended to replace advice given to you by your health care provider. Make sure you discuss any questions you have with your health care provider.     Document Released: 09/27/2006 Document Revised: 01/08/2016 Document Reviewed: 04/13/2016  [employer] Interactive Patient Education ©2016 [employer]      Beasley's Esophagus    Beasley's esophagus occurs when the lining of the esophagus is damaged. The esophagus is the tube that carries food from the mouth to the stomach. With Beasley's esophagus, the lining of the esophagus gets replaced by material that is similar to the lining in the intestines. This process is called intestinal metaplasia. A small number of people with Beasley's esophagus develop esophageal cancer.  CAUSES    The exact cause of Beasley's esophagus is unknown.  SYMPTOMS    Most people with Beasley's esophagus do not have symptoms. However, many patients also have gastroesophageal reflux disease (GERD). GERD can cause heartburn, trouble swallowing, and a dry cough.  DIAGNOSIS  Beasley's esophagus is diagnosed by an exam called upper gastrointestinal endoscopy. A thin, flexible tube (endoscope) is passed down the esophagus. The endoscope has a light and camera on the end. Your caregiver uses the endoscope to view the inside of the esophagus. A tissue sample may also be taken and examined under a microscope (biopsy). If cancer cells are found during the biopsy, this condition is called dysplasia.  TREATMENT    If you have no dysplasia or low-grade dysplasia, your caregiver may recommend no treatment or only taking  "medicines to treat GERD. Sometimes, taking acid-blocking drugs to treat GERD helps improve the tissue affected by Beasley's esophagus. Your caregiver may also recommend periodic esophageal exams.  If you have high-grade dysplasia, treatment may include removing the damaged parts of the esophagus. This can be done by heating, freezing, or surgically removing the tissue. In some cases, surgery may be done to remove most of the esophagus. The stomach is then attached to the remaining portion of the esophagus.  HOME CARE INSTRUCTIONS  Take acid-blocking drugs for GERD if recommended by your caregiver.  Keep all follow-up appointments as directed by your caregiver. You may need periodic esophageal exams.  SEEK IMMEDIATE MEDICAL CARE IF:  You have chest pain.  You have trouble swallowing.  You vomit blood or material that looks like coffee grounds.  Your stools are bright red or dark.  This information is not intended to replace advice given to you by your health care provider. Make sure you discuss any questions you have with your health care provider.  Document Released: 03/09/2005 Document Revised: 06/18/2013 Document Reviewed: 02/26/2013  [employer] Interactive Patient Education ©2016 [employer]    Smoking Tobacco Information, Adult  Smoking tobacco can be harmful to your health. Tobacco contains a toxic colorless chemical called nicotine. Nicotine causes changes in your brain that make you want more and more. This is called addiction. This can make it hard to stop smoking once you start. Tobacco also has other toxic chemicals that can hurt your body and raise your risk of many cancers.  Menthol or \"lite\" tobacco or cigarette brands are not safer than regular brands.  How can smoking tobacco affect me?  Smoking tobacco puts you at risk for:  Cancer. Smoking is most commonly associated with lung cancer, but can also lead to cancer in other parts of the body.  Chronic obstructive pulmonary disease (COPD). This is a " long-term lung condition that makes it hard to breathe. It also gets worse over time.  High blood pressure (hypertension), heart disease, stroke, heart attack, and lung infections, such as pneumonia.  Cataracts. This is when the lenses in the eyes become clouded.  Digestive problems. This may include peptic ulcers, heartburn, and gastroesophageal reflux disease (GERD).  Oral health problems, such as gum disease, mouth sores, and tooth loss.  Loss of taste and smell.  Smoking also affects how you look and smell. Smoking may cause:  Wrinkles.  Yellow or stained teeth, fingers, and fingernails.  Bad breath.  Bad-smelling clothes and hair.  Smoking tobacco can also affect your social life, because:  It may be challenging to find places to smoke when away from home. Many workplaces, restaurants, hotels, and public places are tobacco-free.  Smoking is expensive. This is due to the cost of tobacco and the long-term costs of treating health problems from smoking.  Secondhand smoke may affect those around you. Secondhand smoke can cause lung cancer, breathing problems, and heart disease. Children of smokers have a higher risk for:  Sudden infant death syndrome (SIDS).  Ear infections.  Lung infections.  What actions can I take to prevent health problems?  Quit smoking    Do not start smoking. Quit if you already smoke.  Do not replace cigarette smoking with vaping devices, such as e-cigarettes.  Make a plan to quit smoking and commit to it. Look for programs to help you, and ask your health care provider for recommendations and ideas. Set a date and write down all the reasons you want to quit.  Let your friends and family know you are quitting so they can help and support you. Consider finding friends who also want to quit. It can be easier to quit with someone else, so that you can support each other.  Talk with your health care provider about using nicotine replacement medicines to help you quit. These include gum,  lozenges, patches, sprays, or pills.  If you try to quit but return to smoking, stay positive. It is common to slip up when you first quit, so take it one day at a time.  Be prepared for cravings. When you feel the urge to smoke, chew gum or suck on hard candy.  Lifestyle  Stay busy.  Take care of your body. Get plenty of exercise, eat a healthy diet, and drink plenty of water.  Find ways to manage your stress, such as meditation, yoga, exercise, or time spent with friends and family.  Ask your health care provider about having regular tests (screenings) to check for cancer. This may include blood tests, imaging tests, and other tests.  Where to find support  To get support to quit smoking, consider:  Asking your health care provider for more information and resources.  Joining a support group for people who want to quit smoking in your local community. There are many effective programs that may help you to quit.  Calling the smokefree.gov counselor helpline at 5-247-QUIT-NOW (1-729.984.9140).  Where to find more information  You may find more information about quitting smoking from:  Centers for Disease Control and Prevention: cdc.gov/tobacco  Smokefree.gov: smokefree.gov  American Lung Association: freedomfromsmoking.org  Contact a health care provider if:  You have problems breathing.  Your lips, nose, or fingers turn blue.  You have chest pain.  You are coughing up blood.  You feel like you will faint.  You have other health changes that cause you to worry.  Summary  Smoking tobacco can negatively affect your health, the health of those around you, your finances, and your social life.  Do not start smoking. Quit if you already smoke. If you need help quitting, ask your health care provider.  Consider joining a support group for people in your local community who want to quit smoking. There are many effective programs that may help you to quit.  This information is not intended to replace advice given to you by  your health care provider. Make sure you discuss any questions you have with your health care provider.  Document Revised: 12/13/2022 Document Reviewed: 12/13/2022  [employer]vier Patient Education © 2023 [employer] Inc.

## 2025-05-16 NOTE — SOCIAL WORK
A user error has taken place: encounter opened in error, closed for administrative reasons.    Pt remains recommended for short-term skilled rehab placement for his aftercare plan  Pt has been referred to and accepted for placement by [facility] located in [place] [address] [address] for his aftercare placement  Pt's family will transport pt to SNF upon d/c once medically cleared by [facility]  CM to follow

## (undated) DEVICE — CATH BAL CHARGER 6 X 80MM X 75CM

## (undated) DEVICE — SPONGE LAP 18 X 18 IN

## (undated) DEVICE — SYRINGE KIT,PACKAGED,,150FT,MK 7(ANGIO-ARTERION, 150ML SYR KIT W/QFT,MC)(60729385): Brand: MEDRAD® MARK 7 ARTERION DISPOSABLE SYRINGE 150 ML WITH QUICK FILL TUBE

## (undated) DEVICE — 2000CC GUARDIAN II: Brand: GUARDIAN

## (undated) DEVICE — GLOVE SRG BIOGEL ECLIPSE 7

## (undated) DEVICE — INFUSER BAG 500ML

## (undated) DEVICE — FLEXCIL HIGH PRESSURE CONTRAST INJECTION LINE: Brand: NAMIC

## (undated) DEVICE — PAD GROUNDING ADULT

## (undated) DEVICE — TRAY FOLEY 16FR URIMETER SURESTEP

## (undated) DEVICE — SUT PROLENE 7-0 BV175-8/BV175-8 24 IN EPM8747

## (undated) DEVICE — SUT SILK 2-0 18 IN A185H

## (undated) DEVICE — INTENDED FOR TISSUE SEPARATION, AND OTHER PROCEDURES THAT REQUIRE A SHARP SURGICAL BLADE TO PUNCTURE OR CUT.: Brand: BARD-PARKER SAFETY BLADES SIZE 10, STERILE

## (undated) DEVICE — FLOSEAL HEMOSTATIC MATRIX, 10 ML: Brand: FLOSEAL

## (undated) DEVICE — IV BUTTERFLY 23G SAFETY

## (undated) DEVICE — TUBING SUCTION 5MM X 12 FT

## (undated) DEVICE — TELFA NON-ADHERENT ABSORBENT DRESSING: Brand: TELFA

## (undated) DEVICE — DRAPE SHEET THREE QUARTER

## (undated) DEVICE — RADIFOCUS GLIDEWIRE ADVANTAGE GUIDEWIRE: Brand: GLIDEWIRE ADVANTAGE

## (undated) DEVICE — PETRI DISH STERILE

## (undated) DEVICE — 1200CC GUARDIAN II: Brand: GUARDIAN

## (undated) DEVICE — BANDAGE, ESMARK LF STR 6"X9' (20/CS): Brand: CYPRESS

## (undated) DEVICE — SCD SEQUENTIAL COMPRESSION COMFORT SLEEVE MEDIUM KNEE LENGTH: Brand: KENDALL SCD

## (undated) DEVICE — 3000CC GUARDIAN II: Brand: GUARDIAN

## (undated) DEVICE — INTENDED FOR TISSUE SEPARATION, AND OTHER PROCEDURES THAT REQUIRE A SHARP SURGICAL BLADE TO PUNCTURE OR CUT.: Brand: BARD-PARKER ® CARBON RIB-BACK BLADES

## (undated) DEVICE — NEEDLE 25G X 1 1/2

## (undated) DEVICE — ANTI-FOG SOLUTION WITH FOAM PAD: Brand: DEVON

## (undated) DEVICE — COVER PROBE INTRAOPERATIVE 6 X 96 IN

## (undated) DEVICE — 3M™ IOBAN™ 2 ANTIMICROBIAL INCISE DRAPE 6640EZ: Brand: IOBAN™ 2

## (undated) DEVICE — NEURO PATTIES 1/2 X 1/2

## (undated) DEVICE — BOWL: 16OZ PEELPOUCH 75/CS 16/PLT: Brand: MEDEGEN MEDICAL PRODUCTS, LLC

## (undated) DEVICE — FOGARTY ARTERIAL EMBOLECTOMY CATHETER 3F 80CM: Brand: FOGARTY

## (undated) DEVICE — SUT SILK 4-0 18 IN A183H

## (undated) DEVICE — MEDI-VAC YANK SUCT HNDL W/TPRD BULBOUS TIP: Brand: CARDINAL HEALTH

## (undated) DEVICE — NEEDLE 18 G X 1 1/2

## (undated) DEVICE — SUT VICRYL 0 REEL 54 IN J287G

## (undated) DEVICE — BULB SYRINGE,IRRIGATION WITH PROTECTIVE CAP: Brand: DOVER

## (undated) DEVICE — PINNACLE R/O II INTRODUCER SHEATH WITH RADIOPAQUE MARKER: Brand: PINNACLE

## (undated) DEVICE — ACE WRAP 6 IN STERILE

## (undated) DEVICE — PLUMEPEN PRO 10FT

## (undated) DEVICE — PROXIMATE PLUS MD MULTI-DIRECTIONAL RELEASE SKIN STAPLERS CONTAINS 35 STAINLESS STEEL STAPLES APPROXIMATE CLOSED DIMENSIONS: 6.9MM X 3.9MM WIDE: Brand: PROXIMATE

## (undated) DEVICE — CATH DIAG 5FR .035 65CM 6S OMMI-FLUSH

## (undated) DEVICE — SUT VICRYL 0 CT-1 27 IN J260H

## (undated) DEVICE — GLOVE SRG BIOGEL 7.5

## (undated) DEVICE — IV SET 15 DROP STERILE 0/Y GRAVITY

## (undated) DEVICE — FOGARTY EMBOLECTOMY CATHETER: Brand: FOGARTY

## (undated) DEVICE — TIBURON SPLIT SHEET: Brand: CONVERTORS

## (undated) DEVICE — FLUID MANAGEMENT KIT - IR

## (undated) DEVICE — Device

## (undated) DEVICE — RADIFOCUS TORQUE DEVICE MULTI-TORQUE VISE: Brand: RADIFOCUS TORQUE DEVICE

## (undated) DEVICE — KERLIX BANDAGE ROLL: Brand: KERLIX

## (undated) DEVICE — BONE WAX WHITE: Brand: BONE WAX WHITE

## (undated) DEVICE — SYRINGE 20ML LL

## (undated) DEVICE — CHLORAPREP HI-LITE 26ML ORANGE

## (undated) DEVICE — STERILIZATION TEETH GUARDS

## (undated) DEVICE — STRL UNIVERSAL MINOR GENERAL: Brand: CARDINAL HEALTH

## (undated) DEVICE — X-RAY DETECTABLE SPONGES,16 PLY: Brand: VISTEC

## (undated) DEVICE — SPONGE STICK WITH PVP-I: Brand: KENDALL

## (undated) DEVICE — 2108 SERIES SAGITTAL BLADE (18.6 X 0.64 X 61.1MM)

## (undated) DEVICE — INTENDED FOR TISSUE SEPARATION, AND OTHER PROCEDURES THAT REQUIRE A SHARP SURGICAL BLADE TO PUNCTURE OR CUT.: Brand: BARD-PARKER SAFETY BLADES SIZE 15, STERILE

## (undated) DEVICE — PENCIL ELECTROSURG E-Z CLEAN -0035H

## (undated) DEVICE — SUT MONOCRYL 3-0 SH 27 IN Y416H

## (undated) DEVICE — UNIVERSAL MAJOR EXTREMITY,KIT: Brand: CARDINAL HEALTH

## (undated) DEVICE — ADHESIVE SKIN HIGH VISCOSITY EXOFIN MICRO 0.5ML

## (undated) DEVICE — MAYO STAND COVER: Brand: CONVERTORS

## (undated) DEVICE — GLOVE INDICATOR PI UNDERGLOVE SZ 8 BLUE

## (undated) DEVICE — STOCKINETTE REGULAR

## (undated) DEVICE — SUT SILK 0 SH 30 IN K834H

## (undated) DEVICE — NON-DEHP HIGH FLOW RATE EXTENSION SET, MALE LUER LOCK ADAPTER

## (undated) DEVICE — SUT MONOCRYL 4-0 PS-2 18 IN Y496G

## (undated) DEVICE — FOGARTY ARTERIAL EMBOLECTOMY CATHETER 4F 80CM: Brand: FOGARTY

## (undated) DEVICE — SPECIMEN CONTAINER STERILE PEEL PACK

## (undated) DEVICE — PRESTO™ INFLATION DEVICE: Brand: PRESTO

## (undated) DEVICE — NEURO PATTIES 1/2 X 3

## (undated) DEVICE — GAUZE SPONGES,16 PLY: Brand: CURITY

## (undated) DEVICE — GLOVE INDICATOR PI UNDERGLOVE SZ 7 BLUE

## (undated) DEVICE — BAG DECANTER

## (undated) DEVICE — MICROPUNCTURE 501

## (undated) DEVICE — SURGIFOAM 8.5 X 12.5

## (undated) DEVICE — SUT ETHILON 3-0 FSLX 30 IN 1673H

## (undated) DEVICE — ADHESIVE SKIN HIGH VISCOSITY EXOFIN 1ML

## (undated) DEVICE — STERILE BETHLEHEM FEM POP PACK: Brand: CARDINAL HEALTH

## (undated) DEVICE — SNAP KOVER: Brand: UNBRANDED

## (undated) DEVICE — ADAPTOR BRONCHO

## (undated) DEVICE — STERILE ICS CARDIOVASCULAR PK: Brand: CARDINAL HEALTH

## (undated) DEVICE — KNEE IMMOBILIZER: Brand: DEROYAL

## (undated) DEVICE — OCCLUSIVE GAUZE STRIP,3% BISMUTH TRIBROMOPHENATE IN PETROLATUM BLEND: Brand: XEROFORM

## (undated) DEVICE — SYRINGE 1ML SLIP TIP

## (undated) DEVICE — TRAVELKIT CONTAINS FIRST STEP KIT (200ML EP-4 KIT) AND SOILED SCOPE BAG - 1 KIT: Brand: TRAVELKIT CONTAINS FIRST STEP KIT AND SOILED SCOPE BAG